# Patient Record
Sex: FEMALE | Race: BLACK OR AFRICAN AMERICAN | Employment: OTHER | URBAN - METROPOLITAN AREA
[De-identification: names, ages, dates, MRNs, and addresses within clinical notes are randomized per-mention and may not be internally consistent; named-entity substitution may affect disease eponyms.]

---

## 2018-05-07 ENCOUNTER — HOSPITAL ENCOUNTER (OUTPATIENT)
Age: 63
Discharge: HOME | End: 2018-05-09
Payer: COMMERCIAL

## 2018-05-07 DIAGNOSIS — E11.9 TYPE 2 DIABETES MELLITUS WITHOUT COMPLICATION, WITHOUT LONG-TERM CURRENT USE OF INSULIN (HCC): ICD-10-CM

## 2018-05-07 LAB
ALBUMIN SERPL-MCNC: 3.8 G/DL (ref 3.5–5.2)
ALP BLD-CCNC: 64 U/L (ref 35–104)
ALT SERPL-CCNC: 11 U/L (ref 0–32)
ANION GAP SERPL CALCULATED.3IONS-SCNC: 19 MMOL/L (ref 7–16)
AST SERPL-CCNC: 12 U/L (ref 0–31)
BILIRUB SERPL-MCNC: 0.3 MG/DL (ref 0–1.2)
BUN BLDV-MCNC: 24 MG/DL (ref 8–23)
CALCIUM SERPL-MCNC: 9.3 MG/DL (ref 8.6–10.2)
CHLORIDE BLD-SCNC: 98 MMOL/L (ref 98–107)
CO2: 25 MMOL/L (ref 22–29)
CREAT SERPL-MCNC: 1.1 MG/DL (ref 0.5–1)
GFR AFRICAN AMERICAN: >60
GFR NON-AFRICAN AMERICAN: >60 ML/MIN/1.73
GLUCOSE BLD-MCNC: 133 MG/DL (ref 74–109)
HBA1C MFR BLD: 7.6 % (ref 4.8–5.9)
POTASSIUM SERPL-SCNC: 4.9 MMOL/L (ref 3.5–5)
SODIUM BLD-SCNC: 142 MMOL/L (ref 132–146)
TOTAL PROTEIN: 6.9 G/DL (ref 6.4–8.3)

## 2018-05-07 PROCEDURE — 80053 COMPREHEN METABOLIC PANEL: CPT

## 2018-05-07 PROCEDURE — 83036 HEMOGLOBIN GLYCOSYLATED A1C: CPT

## 2018-06-27 ENCOUNTER — HOSPITAL ENCOUNTER (OUTPATIENT)
Age: 63
Discharge: HOME | End: 2018-06-27
Payer: COMMERCIAL

## 2018-06-27 DIAGNOSIS — F31.89 SEVERE BIPOLAR AFFECTIVE DISORDER WITH PSYCHOSIS (HCC): Chronic | ICD-10-CM

## 2018-06-27 LAB — VALPROIC ACID LEVEL: 64 MCG/ML (ref 50–100)

## 2018-06-27 PROCEDURE — 80164 ASSAY DIPROPYLACETIC ACD TOT: CPT

## 2018-06-27 PROCEDURE — 36415 COLL VENOUS BLD VENIPUNCTURE: CPT

## 2018-07-06 ENCOUNTER — HOSPITAL ENCOUNTER (INPATIENT)
Age: 63
LOS: 18 days | Discharge: HOME | DRG: 753 | End: 2018-07-24
Attending: EMERGENCY MEDICINE | Admitting: PSYCHIATRY & NEUROLOGY
Payer: COMMERCIAL

## 2018-07-06 DIAGNOSIS — E16.2 HYPOGLYCEMIA: ICD-10-CM

## 2018-07-06 DIAGNOSIS — F30.9 MANIA (HCC): Primary | ICD-10-CM

## 2018-07-06 DIAGNOSIS — N30.00 ACUTE CYSTITIS WITHOUT HEMATURIA: ICD-10-CM

## 2018-07-06 PROBLEM — F23 ACUTE PSYCHOSIS (HCC): Status: ACTIVE | Noted: 2018-07-06

## 2018-07-06 LAB
ACETAMINOPHEN LEVEL: <5 MCG/ML (ref 10–30)
ALBUMIN SERPL-MCNC: 4 G/DL (ref 3.5–5.2)
ALP BLD-CCNC: 63 U/L (ref 35–104)
ALT SERPL-CCNC: 13 U/L (ref 0–32)
AMPHETAMINE SCREEN, URINE: NOT DETECTED
ANION GAP SERPL CALCULATED.3IONS-SCNC: 15 MMOL/L (ref 7–16)
AST SERPL-CCNC: 18 U/L (ref 0–31)
BACTERIA: ABNORMAL /HPF
BARBITURATE SCREEN URINE: NOT DETECTED
BASOPHILS ABSOLUTE: 0.03 E9/L (ref 0–0.2)
BASOPHILS RELATIVE PERCENT: 0.5 % (ref 0–2)
BENZODIAZEPINE SCREEN, URINE: NOT DETECTED
BILIRUB SERPL-MCNC: 0.4 MG/DL (ref 0–1.2)
BILIRUBIN URINE: NEGATIVE
BLOOD, URINE: NEGATIVE
BUN BLDV-MCNC: 28 MG/DL (ref 8–23)
CALCIUM SERPL-MCNC: 9.6 MG/DL (ref 8.6–10.2)
CANNABINOID SCREEN URINE: NOT DETECTED
CHLORIDE BLD-SCNC: 97 MMOL/L (ref 98–107)
CLARITY: CLEAR
CO2: 24 MMOL/L (ref 22–29)
COCAINE METABOLITE SCREEN URINE: NOT DETECTED
COLOR: YELLOW
CREAT SERPL-MCNC: 1.2 MG/DL (ref 0.5–1)
EKG ATRIAL RATE: 114 BPM
EKG P AXIS: 68 DEGREES
EKG P-R INTERVAL: 146 MS
EKG Q-T INTERVAL: 340 MS
EKG QRS DURATION: 68 MS
EKG QTC CALCULATION (BAZETT): 468 MS
EKG R AXIS: 19 DEGREES
EKG T AXIS: 61 DEGREES
EKG VENTRICULAR RATE: 114 BPM
EOSINOPHILS ABSOLUTE: 0.05 E9/L (ref 0.05–0.5)
EOSINOPHILS RELATIVE PERCENT: 0.8 % (ref 0–6)
EPITHELIAL CELLS, UA: ABNORMAL /HPF
ETHANOL: <10 MG/DL (ref 0–0.08)
GFR AFRICAN AMERICAN: 55
GFR NON-AFRICAN AMERICAN: 55 ML/MIN/1.73
GLUCOSE BLD-MCNC: 193 MG/DL (ref 74–109)
GLUCOSE URINE: >=1000 MG/DL
HCT VFR BLD CALC: 40 % (ref 34–48)
HEMOGLOBIN: 13.1 G/DL (ref 11.5–15.5)
IMMATURE GRANULOCYTES #: 0.03 E9/L
IMMATURE GRANULOCYTES %: 0.5 % (ref 0–5)
KETONES, URINE: 15 MG/DL
LEUKOCYTE ESTERASE, URINE: ABNORMAL
LYMPHOCYTES ABSOLUTE: 2.16 E9/L (ref 1.5–4)
LYMPHOCYTES RELATIVE PERCENT: 35.1 % (ref 20–42)
MAGNESIUM: 2.3 MG/DL (ref 1.6–2.6)
MCH RBC QN AUTO: 29.1 PG (ref 26–35)
MCHC RBC AUTO-ENTMCNC: 32.8 % (ref 32–34.5)
MCV RBC AUTO: 88.9 FL (ref 80–99.9)
METER GLUCOSE: 161 MG/DL (ref 70–110)
METER GLUCOSE: 258 MG/DL (ref 70–110)
METHADONE SCREEN, URINE: NOT DETECTED
MONOCYTES ABSOLUTE: 0.63 E9/L (ref 0.1–0.95)
MONOCYTES RELATIVE PERCENT: 10.2 % (ref 2–12)
NEUTROPHILS ABSOLUTE: 3.26 E9/L (ref 1.8–7.3)
NEUTROPHILS RELATIVE PERCENT: 52.9 % (ref 43–80)
NITRITE, URINE: POSITIVE
OPIATE SCREEN URINE: NOT DETECTED
PDW BLD-RTO: 13.5 FL (ref 11.5–15)
PH UA: 5.5 (ref 5–9)
PHENCYCLIDINE SCREEN URINE: NOT DETECTED
PLATELET # BLD: 217 E9/L (ref 130–450)
PMV BLD AUTO: 9.7 FL (ref 7–12)
POTASSIUM SERPL-SCNC: 3.9 MMOL/L (ref 3.5–5)
PROPOXYPHENE SCREEN: NOT DETECTED
PROTEIN UA: NEGATIVE MG/DL
RBC # BLD: 4.5 E12/L (ref 3.5–5.5)
RBC UA: ABNORMAL /HPF (ref 0–2)
SALICYLATE, SERUM: <0.3 MG/DL (ref 0–30)
SODIUM BLD-SCNC: 136 MMOL/L (ref 132–146)
SPECIFIC GRAVITY UA: >=1.03 (ref 1–1.03)
T4 TOTAL: 5.6 MCG/DL (ref 4.5–11.7)
TOTAL PROTEIN: 7.5 G/DL (ref 6.4–8.3)
TRICYCLIC ANTIDEPRESSANTS SCREEN SERUM: NEGATIVE NG/ML
TROPONIN: <0.01 NG/ML (ref 0–0.03)
TSH SERPL DL<=0.05 MIU/L-ACNC: 2.7 UIU/ML (ref 0.27–4.2)
UROBILINOGEN, URINE: 0.2 E.U./DL
WBC # BLD: 6.2 E9/L (ref 4.5–11.5)
WBC UA: ABNORMAL /HPF (ref 0–5)

## 2018-07-06 PROCEDURE — 83735 ASSAY OF MAGNESIUM: CPT

## 2018-07-06 PROCEDURE — 2580000003 HC RX 258: Performed by: PHYSICIAN ASSISTANT

## 2018-07-06 PROCEDURE — 82962 GLUCOSE BLOOD TEST: CPT

## 2018-07-06 PROCEDURE — 6370000000 HC RX 637 (ALT 250 FOR IP): Performed by: INTERNAL MEDICINE

## 2018-07-06 PROCEDURE — 1240000000 HC EMOTIONAL WELLNESS R&B

## 2018-07-06 PROCEDURE — 96365 THER/PROPH/DIAG IV INF INIT: CPT

## 2018-07-06 PROCEDURE — 80307 DRUG TEST PRSMV CHEM ANLYZR: CPT

## 2018-07-06 PROCEDURE — 36415 COLL VENOUS BLD VENIPUNCTURE: CPT

## 2018-07-06 PROCEDURE — 99285 EMERGENCY DEPT VISIT HI MDM: CPT

## 2018-07-06 PROCEDURE — 80053 COMPREHEN METABOLIC PANEL: CPT

## 2018-07-06 PROCEDURE — 6360000002 HC RX W HCPCS: Performed by: PHYSICIAN ASSISTANT

## 2018-07-06 PROCEDURE — 84436 ASSAY OF TOTAL THYROXINE: CPT

## 2018-07-06 PROCEDURE — G0480 DRUG TEST DEF 1-7 CLASSES: HCPCS

## 2018-07-06 PROCEDURE — 85025 COMPLETE CBC W/AUTO DIFF WBC: CPT

## 2018-07-06 PROCEDURE — 84443 ASSAY THYROID STIM HORMONE: CPT

## 2018-07-06 PROCEDURE — 84484 ASSAY OF TROPONIN QUANT: CPT

## 2018-07-06 PROCEDURE — 6370000000 HC RX 637 (ALT 250 FOR IP): Performed by: PSYCHIATRY & NEUROLOGY

## 2018-07-06 PROCEDURE — 96375 TX/PRO/DX INJ NEW DRUG ADDON: CPT

## 2018-07-06 PROCEDURE — 81001 URINALYSIS AUTO W/SCOPE: CPT

## 2018-07-06 RX ORDER — LATANOPROST 50 UG/ML
1 SOLUTION/ DROPS OPHTHALMIC NIGHTLY
Status: DISCONTINUED | OUTPATIENT
Start: 2018-07-06 | End: 2018-07-24 | Stop reason: HOSPADM

## 2018-07-06 RX ORDER — QUETIAPINE FUMARATE 300 MG/1
600 TABLET, FILM COATED ORAL NIGHTLY
Status: DISCONTINUED | OUTPATIENT
Start: 2018-07-06 | End: 2018-07-06 | Stop reason: SDUPTHER

## 2018-07-06 RX ORDER — OLANZAPINE 10 MG/1
10 TABLET ORAL
Status: ACTIVE | OUTPATIENT
Start: 2018-07-06 | End: 2018-07-06

## 2018-07-06 RX ORDER — MAGNESIUM HYDROXIDE/ALUMINUM HYDROXICE/SIMETHICONE 120; 1200; 1200 MG/30ML; MG/30ML; MG/30ML
30 SUSPENSION ORAL PRN
Status: DISCONTINUED | OUTPATIENT
Start: 2018-07-06 | End: 2018-07-24 | Stop reason: HOSPADM

## 2018-07-06 RX ORDER — LOSARTAN POTASSIUM 50 MG/1
50 TABLET ORAL DAILY
Status: DISCONTINUED | OUTPATIENT
Start: 2018-07-07 | End: 2018-07-24 | Stop reason: HOSPADM

## 2018-07-06 RX ORDER — LORAZEPAM 1 MG/1
1 TABLET ORAL DAILY PRN
Status: DISCONTINUED | OUTPATIENT
Start: 2018-07-06 | End: 2018-07-24 | Stop reason: HOSPADM

## 2018-07-06 RX ORDER — ALOGLIPTIN 25 MG/1
25 TABLET, FILM COATED ORAL DAILY
Status: DISCONTINUED | OUTPATIENT
Start: 2018-07-07 | End: 2018-07-06 | Stop reason: CLARIF

## 2018-07-06 RX ORDER — NICOTINE POLACRILEX 4 MG
15 LOZENGE BUCCAL PRN
Status: DISCONTINUED | OUTPATIENT
Start: 2018-07-06 | End: 2018-07-24 | Stop reason: HOSPADM

## 2018-07-06 RX ORDER — HALOPERIDOL 5 MG/ML
10 INJECTION INTRAMUSCULAR EVERY 6 HOURS PRN
Status: DISCONTINUED | OUTPATIENT
Start: 2018-07-06 | End: 2018-07-24 | Stop reason: HOSPADM

## 2018-07-06 RX ORDER — DEXTROSE MONOHYDRATE 25 G/50ML
12.5 INJECTION, SOLUTION INTRAVENOUS PRN
Status: DISCONTINUED | OUTPATIENT
Start: 2018-07-06 | End: 2018-07-24 | Stop reason: HOSPADM

## 2018-07-06 RX ORDER — HYDROCHLOROTHIAZIDE 12.5 MG/1
12.5 TABLET ORAL DAILY
Status: DISCONTINUED | OUTPATIENT
Start: 2018-07-07 | End: 2018-07-24 | Stop reason: HOSPADM

## 2018-07-06 RX ORDER — GLIPIZIDE 5 MG/1
5 TABLET ORAL 2 TIMES DAILY
Status: DISCONTINUED | OUTPATIENT
Start: 2018-07-06 | End: 2018-07-10

## 2018-07-06 RX ORDER — DIPHENHYDRAMINE HYDROCHLORIDE 50 MG/ML
25 INJECTION INTRAMUSCULAR; INTRAVENOUS ONCE
Status: COMPLETED | OUTPATIENT
Start: 2018-07-06 | End: 2018-07-06

## 2018-07-06 RX ORDER — LOSARTAN POTASSIUM AND HYDROCHLOROTHIAZIDE 12.5; 5 MG/1; MG/1
1 TABLET ORAL DAILY
Status: DISCONTINUED | OUTPATIENT
Start: 2018-07-07 | End: 2018-07-06 | Stop reason: CLARIF

## 2018-07-06 RX ORDER — ERGOCALCIFEROL 1.25 MG/1
50000 CAPSULE ORAL WEEKLY
Status: DISCONTINUED | OUTPATIENT
Start: 2018-07-13 | End: 2018-07-24 | Stop reason: HOSPADM

## 2018-07-06 RX ORDER — QUETIAPINE FUMARATE 300 MG/1
600 TABLET, FILM COATED ORAL NIGHTLY
Status: DISCONTINUED | OUTPATIENT
Start: 2018-07-06 | End: 2018-07-24 | Stop reason: HOSPADM

## 2018-07-06 RX ORDER — DIVALPROEX SODIUM 500 MG/1
500 TABLET, EXTENDED RELEASE ORAL 2 TIMES DAILY
Status: DISCONTINUED | OUTPATIENT
Start: 2018-07-06 | End: 2018-07-09

## 2018-07-06 RX ORDER — BENZTROPINE MESYLATE 1 MG/ML
2 INJECTION INTRAMUSCULAR; INTRAVENOUS 2 TIMES DAILY PRN
Status: DISCONTINUED | OUTPATIENT
Start: 2018-07-06 | End: 2018-07-24 | Stop reason: HOSPADM

## 2018-07-06 RX ORDER — QUETIAPINE FUMARATE 300 MG/1
600 TABLET, FILM COATED ORAL NIGHTLY
Status: ON HOLD | COMMUNITY
End: 2018-07-24 | Stop reason: HOSPADM

## 2018-07-06 RX ORDER — DEXTROSE MONOHYDRATE 50 MG/ML
100 INJECTION, SOLUTION INTRAVENOUS PRN
Status: DISCONTINUED | OUTPATIENT
Start: 2018-07-06 | End: 2018-07-24 | Stop reason: HOSPADM

## 2018-07-06 RX ORDER — 0.9 % SODIUM CHLORIDE 0.9 %
1000 INTRAVENOUS SOLUTION INTRAVENOUS ONCE
Status: COMPLETED | OUTPATIENT
Start: 2018-07-06 | End: 2018-07-06

## 2018-07-06 RX ORDER — METOPROLOL TARTRATE 50 MG/1
50 TABLET, FILM COATED ORAL 2 TIMES DAILY
COMMUNITY
End: 2018-08-04

## 2018-07-06 RX ORDER — LORAZEPAM 1 MG/1
0.5 TABLET ORAL DAILY PRN
COMMUNITY
End: 2018-08-17

## 2018-07-06 RX ORDER — ACETAMINOPHEN 325 MG/1
650 TABLET ORAL EVERY 4 HOURS PRN
Status: DISCONTINUED | OUTPATIENT
Start: 2018-07-06 | End: 2018-07-24 | Stop reason: HOSPADM

## 2018-07-06 RX ORDER — TIMOLOL MALEATE 5 MG/ML
1 SOLUTION/ DROPS OPHTHALMIC 2 TIMES DAILY
Status: DISCONTINUED | OUTPATIENT
Start: 2018-07-06 | End: 2018-07-24 | Stop reason: HOSPADM

## 2018-07-06 RX ORDER — BENZTROPINE MESYLATE 1 MG/1
1 TABLET ORAL
Status: DISCONTINUED | OUTPATIENT
Start: 2018-07-07 | End: 2018-07-24 | Stop reason: HOSPADM

## 2018-07-06 RX ORDER — HALOPERIDOL 5 MG/ML
5 INJECTION INTRAMUSCULAR ONCE
Status: DISCONTINUED | OUTPATIENT
Start: 2018-07-06 | End: 2018-07-24 | Stop reason: HOSPADM

## 2018-07-06 RX ORDER — ATORVASTATIN CALCIUM 40 MG/1
40 TABLET, FILM COATED ORAL DAILY
Status: DISCONTINUED | OUTPATIENT
Start: 2018-07-07 | End: 2018-07-24 | Stop reason: HOSPADM

## 2018-07-06 RX ORDER — LORAZEPAM 1 MG/1
1 TABLET ORAL DAILY PRN
Status: DISCONTINUED | OUTPATIENT
Start: 2018-07-06 | End: 2018-07-06 | Stop reason: SDUPTHER

## 2018-07-06 RX ORDER — BENZTROPINE MESYLATE 1 MG/1
1 TABLET ORAL DAILY
Status: DISCONTINUED | OUTPATIENT
Start: 2018-07-06 | End: 2018-07-06 | Stop reason: SDUPTHER

## 2018-07-06 RX ORDER — LORAZEPAM 2 MG/ML
1 INJECTION INTRAMUSCULAR ONCE
Status: COMPLETED | OUTPATIENT
Start: 2018-07-06 | End: 2018-07-06

## 2018-07-06 RX ORDER — HYDROXYZINE PAMOATE 50 MG/1
50 CAPSULE ORAL EVERY 6 HOURS PRN
Status: DISCONTINUED | OUTPATIENT
Start: 2018-07-06 | End: 2018-07-24 | Stop reason: HOSPADM

## 2018-07-06 RX ORDER — TRAZODONE HYDROCHLORIDE 50 MG/1
50 TABLET ORAL NIGHTLY PRN
Status: DISCONTINUED | OUTPATIENT
Start: 2018-07-06 | End: 2018-07-24 | Stop reason: HOSPADM

## 2018-07-06 RX ORDER — METOPROLOL TARTRATE 50 MG/1
50 TABLET, FILM COATED ORAL 2 TIMES DAILY
Status: DISCONTINUED | OUTPATIENT
Start: 2018-07-06 | End: 2018-07-24 | Stop reason: HOSPADM

## 2018-07-06 RX ADMIN — SODIUM CHLORIDE 1000 ML: 9 INJECTION, SOLUTION INTRAVENOUS at 12:07

## 2018-07-06 RX ADMIN — DIPHENHYDRAMINE HYDROCHLORIDE 25 MG: 50 INJECTION, SOLUTION INTRAMUSCULAR; INTRAVENOUS at 12:09

## 2018-07-06 RX ADMIN — GLIPIZIDE 5 MG: 5 TABLET ORAL at 22:10

## 2018-07-06 RX ADMIN — LORAZEPAM 1 MG: 2 INJECTION INTRAMUSCULAR; INTRAVENOUS at 12:08

## 2018-07-06 RX ADMIN — METOPROLOL TARTRATE 50 MG: 50 TABLET, FILM COATED ORAL at 22:10

## 2018-07-06 RX ADMIN — DIVALPROEX SODIUM 500 MG: 500 TABLET, EXTENDED RELEASE ORAL at 21:53

## 2018-07-06 RX ADMIN — HYDROXYZINE PAMOATE 50 MG: 50 CAPSULE ORAL at 22:10

## 2018-07-06 RX ADMIN — INSULIN LISPRO 3 UNITS: 100 INJECTION, SOLUTION INTRAVENOUS; SUBCUTANEOUS at 22:10

## 2018-07-06 RX ADMIN — SODIUM CHLORIDE 1000 ML: 9 INJECTION, SOLUTION INTRAVENOUS at 13:51

## 2018-07-06 RX ADMIN — CEFTRIAXONE SODIUM 1 G: 1 INJECTION, POWDER, FOR SOLUTION INTRAMUSCULAR; INTRAVENOUS at 12:54

## 2018-07-06 RX ADMIN — TIMOLOL MALEATE 1 DROP: 5 SOLUTION OPHTHALMIC at 22:10

## 2018-07-06 RX ADMIN — QUETIAPINE FUMARATE 600 MG: 300 TABLET ORAL at 22:10

## 2018-07-06 ASSESSMENT — SLEEP AND FATIGUE QUESTIONNAIRES
RESTFUL SLEEP: YES
DO YOU USE A SLEEP AID: COMMENT
AVERAGE NUMBER OF SLEEP HOURS: 6
DIFFICULTY ARISING: NO
SLEEP PATTERN: INSOMNIA
DIFFICULTY STAYING ASLEEP: NO
DIFFICULTY FALLING ASLEEP: NO
DO YOU HAVE DIFFICULTY SLEEPING: NO

## 2018-07-06 ASSESSMENT — LIFESTYLE VARIABLES: HISTORY_ALCOHOL_USE: NO

## 2018-07-06 ASSESSMENT — PAIN SCALES - GENERAL: PAINLEVEL_OUTOF10: 0

## 2018-07-06 NOTE — ED NOTES
THE PATIENT'S TWO PURSES HAVE BEEN TAKING HOME BY HER BROTHER.      Jackelyn Lainez RN  07/06/18 3494

## 2018-07-06 NOTE — PROGRESS NOTES
`[facility]  Admission Note    Patient pleasant and cooperative. Denies SI, HI, and hallucinations. Patient speech is pressured, flight of ideas, tangential. Patient affect is exaggerated, labile. Admission Type:   Admission Type: Involuntary    Reason for admission:  Reason for Admission: \"my mom said i needed to have my medications adjusted\"    PATIENT STRENGTHS:  Strengths: Communication    Patient Strengths and Limitations:  Limitations: Multiple barriers to leisure interests    Addictive Behavior:   Addictive Behavior  In the past 3 months, have you felt or has someone told you that you have a problem with:  : None  Do you have a history of Chemical Use?: No  Do you have a history of Alcohol Use?: No  Do you have a history of Street Drug Abuse?: No  Histroy of Prescripton Drug Abuse?: No    Medical Problems:   Past Medical History:   Diagnosis Date    Bell's palsy     Carpal tunnel syndrome 8/4/2016    Diabetes mellitus ([facility] [address]     Psychiatric problem     bipolar    Systemic primary arterial hypertension 8/4/2016       Status EXAM:  Status and Exam  Normal: No  Facial Expression: Exaggerated  Affect: Appropriate  Level of Consciousness: Alert  Mood:Normal: No  Mood: Anxious, Labile  Motor Activity:Normal: No  Motor Activity: Increased  Interview Behavior: Cooperative  Preception: [place] to Person, [place] to Time, [place] to Place, [place] to Situation  Attention:Normal: No  Attention: Unable to Concentrate  Thought Processes: Flt.of Ideas, Tangential  Thought Content:Normal: No  Thought Content: Preoccupations  Hallucinations: None  Delusions: No  Memory:Normal: No  Memory: Poor Remote  Insight and Judgment: No  Insight and Judgment: Poor Judgment, Poor Insight  Present Suicidal Ideation: No  Present Homicidal Ideation: No    Tobacco Screening:  Practical Counseling, on admission, garret X, if applicable and completed (first 3 are required if patient doesn't refuse):             ( )

## 2018-07-06 NOTE — ED PROVIDER NOTES
ED Attending  CC: Kathryn     Department of Emergency Medicine   ED  Provider Note  Admit Date/RoomTime: 7/6/2018 10:04 AM  ED Room: 24/24   Chief Complaint   Psychiatric Evaluation (family states pt is \"talking aggressive\" Pt denies si/hi)    History of Present Illness   Source of history provided by:  Patient and brother. History/Exam Limitations: none. Waleska Cisse is a 66 y.o. old female who has a past medical history of:   Past Medical History:   Diagnosis Date    Bell's palsy     Carpal tunnel syndrome 8/4/2016    Diabetes mellitus ([facility] [address]     Psychiatric problem     bipolar    Systemic primary arterial hypertension 8/4/2016      presents to the emergency department by private vehicle, for aggressive behavior towards her family members which began a few day(s) prior to arrival.  Her brother states that she lives with her mother who is [de-identified]years old and her mother cannot control her anymore. She has a prior history of bipolar of which the problem is long-standing. Her brother states that she also has been not taking her psych medications. Her reported drug use history: Current marijuana. She  has previously been in counseling. There has been no history of recent trauma. She denies suicidal ideation and denies homicidal ideation. She denies any fever, chills, night sweats, chest pain, shortness of breath, headaches, numbness/weakness of her extremities, nausea, vomiting, diarrhea, abdominal pain, dysuria, urinary frequency, urinary urgency, and constipation. Quality:      Hopelessness:   No.     Terror:   No.     Confusion:   No.     Hallucinations:   None. Able to care for self: No.  Able to control self: No.    ROS    Pertinent positives and negatives are stated within HPI, all other systems reviewed and are negative. Past Surgical History:  has a past surgical history that includes Echo Complete (6/22/2013). Social History:  reports that she has never smoked.  She has never used smokeless Negative <1000 ng/mL    Barbiturate Screen, Ur NOT DETECTED Negative < 200 ng/mL    Benzodiazepine Screen, Urine NOT DETECTED Negative < 200 ng/mL    Cannabinoid Scrn, Ur NOT DETECTED Negative < 50ng/mL    COCAINE METABOLITE SCREEN URINE NOT DETECTED Negative < 300 ng/mL    Opiate Scrn, Ur NOT DETECTED Negative < 300ng/mL    PCP Scrn, Ur NOT DETECTED Negative < 25 ng/mL    Methadone Screen, Urine NOT DETECTED Negative <300 ng/mL    Propoxyphene Scrn, Ur NOT DETECTED Negative <300 ng/mL   Serum Drug Screen   Result Value Ref Range    Ethanol Lvl <10 mg/dL    Acetaminophen Level <5.0 (L) 10.0 - 85.6 mcg/mL    Salicylate, Serum <5.9 0.0 - 30.0 mg/dL    TCA Scrn NEGATIVE Cutoff:300 ng/mL   TSH without Reflex   Result Value Ref Range    TSH 2.700 0.270 - 4.200 uIU/mL   T4   Result Value Ref Range    T4, Total 5.6 4.5 - 11.7 mcg/dL   Troponin   Result Value Ref Range    Troponin <0.01 0.00 - 0.03 ng/mL   Magnesium   Result Value Ref Range    Magnesium 2.3 1.6 - 2.6 mg/dL   Microscopic Urinalysis   Result Value Ref Range    WBC, UA 10-20 (A) 0 - 5 /HPF    RBC, UA NONE 0 - 2 /HPF    Epi Cells MODERATE /HPF    Bacteria, UA MANY (A) /HPF   EKG 12 Lead   Result Value Ref Range    Ventricular Rate 114 BPM    Atrial Rate 114 BPM    P-R Interval 146 ms    QRS Duration 68 ms    Q-T Interval 340 ms    QTc Calculation (Bazett) 468 ms    P Axis 68 degrees    R Axis 19 degrees    T Axis 61 degrees     Imaging: All Radiology results interpreted by Radiologist unless otherwise noted. No orders to display     EKG #1:  Interpreted by emergency department physician unless otherwise noted. Time:  1018    Rate: 114  Rhythm: Sinus. Interpretation: sinus tachycardia.     ED Course / Medical Decision Making     Medications   haloperidol lactate (HALDOL) injection 5 mg (0 mg Intramuscular Held 7/6/18 1211)   0.9 % sodium chloride bolus (1,000 mLs Intravenous New Bag 7/6/18 1351)   LORazepam (ATIVAN) injection 1 mg (1 mg Intramuscular Given

## 2018-07-06 NOTE — ED NOTES
Per CHANTEL UMER Summers, patient is accepted to [facility] by Dr. Chel Cason. Patient to go to room #7308B. Called and notified admitting.     N2N: EXT [id number] [place]  07/06/18 3124

## 2018-07-06 NOTE — ED NOTES
FIRST PROVIDER CONTACT ASSESSMENT NOTE      Department of Emergency Medicine   7/6/18  9:39 AM    Chief Complaint: Psychiatric Evaluation (family states pt is \"talking aggressive\" Pt denies si/hi)      History of Present Illness:   Verónica Giron is a 66 y.o. female who presents to the ED for rambling conversation, talking agressively to family members. Patients family states that the Patient is bipolar schizophrenic    Medical History:  has a past medical history of Bell's palsy; Carpal tunnel syndrome; Diabetes mellitus ([address] Psychiatric problem; and Systemic primary arterial hypertension. Surgical History:  has a past surgical history that includes Echo Complete (6/22/2013). Social History:  reports that she has never smoked. She has never used smokeless tobacco. She reports that she does not drink alcohol or use drugs. Family History: family history includes Heart Disease (age of onset: 76) in her father; High Blood Pressure in her father. *ALLERGIES*     Ace inhibitors;  Ibuprofen; and Latuda [lurasidone hcl]     Physical Exam:      VS:  BP (!) 177/100   Pulse 132   Temp 97.2 °F (36.2 °C) (Temporal)   Resp 16   Ht 5' 3\" (1.6 m)   Wt 182 lb (82.6 kg)   SpO2 100%   BMI 32.24 kg/m²      Initial Plan of Care:  Initiate Treatment-Testing, Proceed toTreatment Area When Bed Available for ED Attending/MLP to Continue Care    -----------------END OF FIRST PROVIDER CONTACT ASSESSMENT NOTE--------------  Electronically signed by CHRISS Osuna CNP   DD: 7/6/18           CHRISS Osuna CNP  07/11/18 1809

## 2018-07-07 PROBLEM — F31.2 SEVERE MANIC BIPOLAR I DISORDER WITH PSYCHOTIC FEATURES (HCC): Status: ACTIVE | Noted: 2018-07-07

## 2018-07-07 LAB
METER GLUCOSE: 117 MG/DL (ref 70–110)
METER GLUCOSE: 127 MG/DL (ref 70–110)
METER GLUCOSE: 156 MG/DL (ref 70–110)
METER GLUCOSE: 241 MG/DL (ref 70–110)

## 2018-07-07 PROCEDURE — 6370000000 HC RX 637 (ALT 250 FOR IP): Performed by: INTERNAL MEDICINE

## 2018-07-07 PROCEDURE — 2580000003 HC RX 258

## 2018-07-07 PROCEDURE — 82962 GLUCOSE BLOOD TEST: CPT

## 2018-07-07 PROCEDURE — 99222 1ST HOSP IP/OBS MODERATE 55: CPT | Performed by: PSYCHIATRY & NEUROLOGY

## 2018-07-07 PROCEDURE — 1240000000 HC EMOTIONAL WELLNESS R&B

## 2018-07-07 PROCEDURE — 6360000002 HC RX W HCPCS: Performed by: PSYCHIATRY & NEUROLOGY

## 2018-07-07 PROCEDURE — 6370000000 HC RX 637 (ALT 250 FOR IP): Performed by: PSYCHIATRY & NEUROLOGY

## 2018-07-07 RX ORDER — ZIPRASIDONE MESYLATE 20 MG/ML
10 INJECTION, POWDER, LYOPHILIZED, FOR SOLUTION INTRAMUSCULAR ONCE
Status: COMPLETED | OUTPATIENT
Start: 2018-07-07 | End: 2018-07-07

## 2018-07-07 RX ORDER — 0.9 % SODIUM CHLORIDE 0.9 %
VIAL (ML) INJECTION
Status: COMPLETED
Start: 2018-07-07 | End: 2018-07-07

## 2018-07-07 RX ORDER — LORAZEPAM 2 MG/ML
1 INJECTION INTRAMUSCULAR ONCE
Status: COMPLETED | OUTPATIENT
Start: 2018-07-07 | End: 2018-07-07

## 2018-07-07 RX ADMIN — ATORVASTATIN CALCIUM 40 MG: 40 TABLET, FILM COATED ORAL at 09:05

## 2018-07-07 RX ADMIN — METFORMIN HYDROCHLORIDE 1000 MG: 1000 TABLET ORAL at 09:05

## 2018-07-07 RX ADMIN — TIMOLOL MALEATE 1 DROP: 5 SOLUTION OPHTHALMIC at 09:06

## 2018-07-07 RX ADMIN — INSULIN LISPRO 1 UNITS: 100 INJECTION, SOLUTION INTRAVENOUS; SUBCUTANEOUS at 20:38

## 2018-07-07 RX ADMIN — METOPROLOL TARTRATE 50 MG: 50 TABLET, FILM COATED ORAL at 09:05

## 2018-07-07 RX ADMIN — DIVALPROEX SODIUM 500 MG: 500 TABLET, EXTENDED RELEASE ORAL at 09:05

## 2018-07-07 RX ADMIN — LORAZEPAM 1 MG: 2 INJECTION INTRAMUSCULAR; INTRAVENOUS at 11:33

## 2018-07-07 RX ADMIN — LINAGLIPTIN 5 MG: 5 TABLET, FILM COATED ORAL at 09:06

## 2018-07-07 RX ADMIN — INSULIN LISPRO 4 UNITS: 100 INJECTION, SOLUTION INTRAVENOUS; SUBCUTANEOUS at 18:03

## 2018-07-07 RX ADMIN — GLIPIZIDE 5 MG: 5 TABLET ORAL at 09:05

## 2018-07-07 RX ADMIN — GLIPIZIDE 5 MG: 5 TABLET ORAL at 20:31

## 2018-07-07 RX ADMIN — HYDROXYZINE PAMOATE 50 MG: 50 CAPSULE ORAL at 20:30

## 2018-07-07 RX ADMIN — METFORMIN HYDROCHLORIDE 1000 MG: 1000 TABLET ORAL at 17:24

## 2018-07-07 RX ADMIN — METOPROLOL TARTRATE 50 MG: 50 TABLET, FILM COATED ORAL at 20:32

## 2018-07-07 RX ADMIN — SODIUM CHLORIDE 10 ML: 9 INJECTION INTRAMUSCULAR; INTRAVENOUS; SUBCUTANEOUS at 11:34

## 2018-07-07 RX ADMIN — TIMOLOL MALEATE 1 DROP: 5 SOLUTION OPHTHALMIC at 20:41

## 2018-07-07 RX ADMIN — LORAZEPAM 1 MG: 1 TABLET ORAL at 17:24

## 2018-07-07 RX ADMIN — QUETIAPINE FUMARATE 600 MG: 300 TABLET ORAL at 20:31

## 2018-07-07 RX ADMIN — LOSARTAN POTASSIUM 50 MG: 50 TABLET, FILM COATED ORAL at 09:06

## 2018-07-07 RX ADMIN — DIVALPROEX SODIUM 500 MG: 500 TABLET, EXTENDED RELEASE ORAL at 20:32

## 2018-07-07 RX ADMIN — ZIPRASIDONE MESYLATE 10 MG: 20 INJECTION, POWDER, LYOPHILIZED, FOR SOLUTION INTRAMUSCULAR at 11:33

## 2018-07-07 RX ADMIN — HYDROCHLOROTHIAZIDE 12.5 MG: 12.5 TABLET ORAL at 09:05

## 2018-07-07 RX ADMIN — BENZTROPINE MESYLATE 1 MG: 1 TABLET ORAL at 11:34

## 2018-07-07 ASSESSMENT — SLEEP AND FATIGUE QUESTIONNAIRES
RESTFUL SLEEP: YES
DO YOU USE A SLEEP AID: NO
AVERAGE NUMBER OF SLEEP HOURS: 7
DIFFICULTY ARISING: NO
SLEEP PATTERN: INSOMNIA
DIFFICULTY FALLING ASLEEP: NO
DIFFICULTY STAYING ASLEEP: NO
DO YOU HAVE DIFFICULTY SLEEPING: NO

## 2018-07-07 ASSESSMENT — LIFESTYLE VARIABLES: HISTORY_ALCOHOL_USE: NO

## 2018-07-07 NOTE — H&P
PSYCHIATRIC EVALUATION  (HISTORY & PHYSICAL)     CHIEF COMPLAINT:   [] Mood Problems [] Anxiety Problems [x] Psychosis                    [] Suicidal/Homicidal   [] Aggression  [] Other    HISTORY OF PRESENT ILLNESS: Jena Ross  is a 66 y.o. female who has a previous psychiatric history of bipolar disorder. Patient presents for admission with racing thoughts, fast, pressured speech, disorganized thought processes and labile affect. Patient denies SI, HI, or AVH. Patient is manic, but pleasant at this time. Per ED report, patient became aggressive with family members, and she currently lives with her [de-identified] y.o. mother who cannot manage patient anymore. Patient reports treating at [facility], and states that they were adjusting her medications. Patient states she has been taking her medications, but ED report states that her brother reported she has not been compliant with her medications. Symptoms began 4 days ago, are constant, severe, and worsened by potential medication non-compliance.       Precipitating Factors:     [] Family Stress   [] Recent loss/grief Stress   [] Health Stress   [] Relationship Stress    [] Legal Stress   [x] Environmental Stress    [] Occupational Stress   [] Financial Stress   [] Substance Abuse [x] Other: Medication Non-Compliance      PAST PSYCHIATRIC HISTORY:   History of psychiatric Hospitalization:    [] Denies    [x] yes  [] Days ago     []  Weeks Ago    [] Months ago  [x] Years ago-05/17              [x] [facility]  [] [address] [place] [address]  [] Other:        [] Once  [] More than once    Outpatient treatment:  [x] [facility]  [] [facility]  [] [facility]              [] [facility]  [] [facility]      [] [address] [facility] [] [facility]      [] [facility] [] [facility]  [] [facility] [] [facility]             [x] currently  [] in the past  [] Non-Compliant    [] Denies    Previous suicide attempt: [x]Denies            [] yes  [] OD  [] Cutting  [] Hanging  [] Gun  [] Other    Previous psych medications:  [] negative. Constitutional Symptoms: []  fever []  Chills  Skin Symptoms: [] rash []  Pruritus   Eye Symptoms: [] Vision unchanged []  recent vision problems[] blurred vision   Respiratory Symptoms:[] shortness of breath [] cough  Cardiovascular Symptoms:  [] chest pain   [] palpitations   Gastrointestinal Symptoms: []  abdominal pain []  nausea []  vomiting []  diarrhea  Genitourinary Symptoms: []  dysuria  []  hematuria   Musculoskeletal Symptoms: []  back pain []  muscle pain []  joint pain  Neurologic Symptoms: []  headache []  dizziness  Hematolymphoid Symptoms: [] Adenopathy [] Bruises   [] Schimosis       VITALS: /74   Pulse 88   Temp 97.8 °F (36.6 °C) (Oral)   Resp 16   Ht 5' 3\" (1.6 m)   Wt 180 lb 6.4 oz (81.8 kg)   SpO2 99%   BMI 31.96 kg/m²     ALLERGIES: Ace inhibitors;  Ibuprofen; and Latuda [lurasidone hcl]            Physical Examination:    Head:  [x] Atraumatic:  [x] normocephalic  Skin and Mucosa       [] Moist [] Dry [] Pale [x] Normal   Neck: [x] Thyroid [] Palpable    [x] Not palpable []  venus distention [] adenopathy   Chest: [x] Clear [] Rhonchi  [] Wheezing   CV: [x] S1 [x] S2 [x] No murmer   Abdomen:  [x] Soft   [] Tender  [] Viceromegaly   Extremities:  [x] No Edema   [] Edema    Cranial Nerves Examination:    CN II: [x] Pupils are reactive to light [] Pupils are non reactive to light  CN III, IV, VI:[x] No eye deviation  [x] No diplopia or ptosis   CN V: [x] Facial Sensation is intact  [] Facial Sensation is not intact   CN IIIV:  [x] Hearing is normal to rubbing fingers   CN IX, X:  [x] Normal gag reflex and phonation   CN XI: [x] Shoulder shrug and neck rotation is normal  CNXII: [x] Tongue is midline no deviation or atrophy       For further PE refer to ED note    MENTAL STATUS EXAM:       Mental Status Examination:    Cognition:      [x] Alert  [x] Awake  [x] Oriented  [x] Person  [x] Place [] Time      [] drowsy  [] tired  [] lethargic  [x] distractable  []

## 2018-07-07 NOTE — PROGRESS NOTES
Group Therapy Note    Patient attended community meeting. Patient identified daily goal as make myself happy. Group Therapy Note     Date: 7/7/2018  Start Time: 10:00  End Time:  10:45  Number of Participants: 7     Type of Group: Psychoeducation     Wellness Binder Information  Module Name:  Daily Maintenance  Session Number: NA     Patient's Goal: To identify positive coping skills to use on a daily basis.     Notes: Attended group and was able to identify positive coping skills. Status After Intervention:  Unchanged    Participation Level:  Active Listener, Interactive and Monopolizing    Participation Quality: Attentive, Inappropriate and Intrusive      Speech:  pressured      Thought Process/Content: Flight of ideas      Affective Functioning: Flat      Mood: anxious, depressed and elevated      Level of consciousness:  Alert      Response to Learning: Progressing to goal      Endings: None Reported    Modes of Intervention: Education      Discipline Responsible: Psychoeducational Specialist      Signature:  RICCI Holley

## 2018-07-07 NOTE — PROGRESS NOTES
Patient was asked to turn over her rings, and multiple necklaces to lock up in the locker. Patient became very irritable, yelling on the unit, swearing at this nurse and name calling. Writing on paper, and throwing it at this nurse. Another nurse went into patient room to try get the patient to give her the jewelry. [employer] police called to retrieve necklaces, and rings from patient. Patient belongings secured.

## 2018-07-07 NOTE — PROGRESS NOTES
Unable to maintain control. Yelling about different things and when asked to keep voice down , yells louder. Paranoid about taking medications and asked several questions repeatedly about meds before taking and stated \"if she got sick, she was oming after this writer\" explained they would make her better. Told another nurse earlier that she was turning her lights out on purpose. Vistaril 50 mg po given with her other hs meds and antihypertensives for now.

## 2018-07-08 LAB
METER GLUCOSE: 104 MG/DL (ref 70–110)
METER GLUCOSE: 123 MG/DL (ref 70–110)
METER GLUCOSE: 144 MG/DL (ref 70–110)
METER GLUCOSE: 86 MG/DL (ref 70–110)

## 2018-07-08 PROCEDURE — 1240000000 HC EMOTIONAL WELLNESS R&B

## 2018-07-08 PROCEDURE — 6370000000 HC RX 637 (ALT 250 FOR IP): Performed by: PSYCHIATRY & NEUROLOGY

## 2018-07-08 PROCEDURE — 99231 SBSQ HOSP IP/OBS SF/LOW 25: CPT | Performed by: PSYCHIATRY & NEUROLOGY

## 2018-07-08 PROCEDURE — 82962 GLUCOSE BLOOD TEST: CPT

## 2018-07-08 PROCEDURE — 6370000000 HC RX 637 (ALT 250 FOR IP): Performed by: INTERNAL MEDICINE

## 2018-07-08 RX ADMIN — DIVALPROEX SODIUM 500 MG: 500 TABLET, EXTENDED RELEASE ORAL at 08:43

## 2018-07-08 RX ADMIN — METFORMIN HYDROCHLORIDE 1000 MG: 1000 TABLET ORAL at 18:30

## 2018-07-08 RX ADMIN — QUETIAPINE FUMARATE 600 MG: 300 TABLET ORAL at 21:17

## 2018-07-08 RX ADMIN — CANAGLIFLOZIN 300 MG: 300 TABLET, FILM COATED ORAL at 08:43

## 2018-07-08 RX ADMIN — HYDROXYZINE PAMOATE 50 MG: 50 CAPSULE ORAL at 17:27

## 2018-07-08 RX ADMIN — LATANOPROST 1 DROP: 50 SOLUTION OPHTHALMIC at 17:26

## 2018-07-08 RX ADMIN — DIVALPROEX SODIUM 500 MG: 500 TABLET, EXTENDED RELEASE ORAL at 21:17

## 2018-07-08 RX ADMIN — LORAZEPAM 1 MG: 1 TABLET ORAL at 13:10

## 2018-07-08 RX ADMIN — ATORVASTATIN CALCIUM 40 MG: 40 TABLET, FILM COATED ORAL at 08:43

## 2018-07-08 RX ADMIN — TIMOLOL MALEATE 1 DROP: 5 SOLUTION OPHTHALMIC at 08:43

## 2018-07-08 RX ADMIN — METOPROLOL TARTRATE 50 MG: 50 TABLET, FILM COATED ORAL at 21:17

## 2018-07-08 RX ADMIN — BENZTROPINE MESYLATE 1 MG: 1 TABLET ORAL at 12:59

## 2018-07-08 RX ADMIN — TIMOLOL MALEATE 1 DROP: 5 SOLUTION OPHTHALMIC at 21:17

## 2018-07-08 RX ADMIN — HYDROCHLOROTHIAZIDE 12.5 MG: 12.5 TABLET ORAL at 08:43

## 2018-07-08 RX ADMIN — GLIPIZIDE 5 MG: 5 TABLET ORAL at 08:43

## 2018-07-08 RX ADMIN — GLIPIZIDE 5 MG: 5 TABLET ORAL at 18:33

## 2018-07-08 RX ADMIN — INSULIN LISPRO 1 UNITS: 100 INJECTION, SOLUTION INTRAVENOUS; SUBCUTANEOUS at 21:28

## 2018-07-08 RX ADMIN — METOPROLOL TARTRATE 50 MG: 50 TABLET, FILM COATED ORAL at 08:43

## 2018-07-08 RX ADMIN — METFORMIN HYDROCHLORIDE 1000 MG: 1000 TABLET ORAL at 08:43

## 2018-07-08 RX ADMIN — LINAGLIPTIN 5 MG: 5 TABLET, FILM COATED ORAL at 08:43

## 2018-07-08 RX ADMIN — LOSARTAN POTASSIUM 50 MG: 50 TABLET, FILM COATED ORAL at 08:43

## 2018-07-08 NOTE — PROGRESS NOTES
Patient came to the nurses station and started yelling at this nurse. Patient was calling this nurse names, and verbally threatening. Patient was told to go have a seat or go to her room. Patient continued to yell at the nurses station. Patient eventually went to the TV area and sat down.

## 2018-07-08 NOTE — PROGRESS NOTES
systems  Delusions:  [] Denies [] Endorses   Withdrawals:  [] Denies [] Endorses    Hallucinations: [] Denies [] Endorses    Extra Pyramidal Symptoms: [] Denies [] Endorses      /84   Pulse 98   Temp 97.6 °F (36.4 °C) (Oral)   Resp 12   Ht 5' 3\" (1.6 m)   Wt 180 lb 6.4 oz (81.8 kg)   SpO2 100%   BMI 31.96 kg/m²     Mental Status Examination:    Cognition:      [x] Alert  [x] Awake  [x] Oriented  [x] Person  [x] Place [x] Time      [] drowsy  [] tired  [] lethargic  [] distractable  [] Other     Attention/Concentration:   [] Attentive  [x] Distracted        Memory Recent and Remote: [] Intact   [] Impaired [x] Partially Impaired     Language: [] Able to recognize and name objects          [] Unable to recognize and name Objects    Fund of Knowledge:  [] Poor []  Fair  [] Good    Speech: [] Normal  [] Soft  [] Slow  [x] Fast [x] Pressured            [x] Loud [] Dysarthria  [] Incoherent    Appearance: [] Well Groomed  [] Casual Dressed  [] Unkept  [x] Disheveled          [] Normal weight[] Thin  [] Overweight  [] Obese           Attitude: [] Positive  [] Hostile  [] Demanding  [] Guarded  [] Defensive         [x] Cooperative  []  Uncooperative      Behavior:  [x] Normal Gait  [] Walks with Assistance  [] Vickie Chair    [] Walks with Tickf[place] Alexandr  [] In Hospital Bed  [] Sitting in Chair    Muscle-Skeletal:  [x] Normal Muscle Tone [] Muscle Atrophy       [] Abnormal Muscle Movement     Eye Contact:  [] Good eye contact  [x] Intermittent Eye Contact  [] Poor Eye Contact     Mood: [] Depressed  [x] Anxious  [] Irritated  [] Euthymic   [] Angry [x] Restless    Affect:  [] Congruent  [] Incongruent  [x] Labile  [] Constricted  [] Flat  [] Bizarre     Thought Process and Association:  [] Logical [] Illogical       [] Linear and Goal Directed  [x] Tangential  [x] Circumstantial     Thought Content:  [] Denies [] Endorses [] Suicidal [] Homicidal  [x] Delusional      [x] Paranoid  [] Somatic  [x] Grandiose    Perception:

## 2018-07-09 LAB
METER GLUCOSE: 104 MG/DL (ref 70–110)
METER GLUCOSE: 139 MG/DL (ref 70–110)
METER GLUCOSE: 158 MG/DL (ref 70–110)
METER GLUCOSE: 92 MG/DL (ref 70–110)

## 2018-07-09 PROCEDURE — 6370000000 HC RX 637 (ALT 250 FOR IP): Performed by: PSYCHIATRY & NEUROLOGY

## 2018-07-09 PROCEDURE — 1240000000 HC EMOTIONAL WELLNESS R&B

## 2018-07-09 PROCEDURE — 82962 GLUCOSE BLOOD TEST: CPT

## 2018-07-09 PROCEDURE — 6370000000 HC RX 637 (ALT 250 FOR IP): Performed by: INTERNAL MEDICINE

## 2018-07-09 PROCEDURE — 99232 SBSQ HOSP IP/OBS MODERATE 35: CPT | Performed by: PSYCHIATRY & NEUROLOGY

## 2018-07-09 RX ORDER — DIVALPROEX SODIUM 500 MG/1
1000 TABLET, EXTENDED RELEASE ORAL DAILY
Status: DISCONTINUED | OUTPATIENT
Start: 2018-07-10 | End: 2018-07-14

## 2018-07-09 RX ORDER — DIVALPROEX SODIUM 500 MG/1
500 TABLET, EXTENDED RELEASE ORAL ONCE
Status: COMPLETED | OUTPATIENT
Start: 2018-07-09 | End: 2018-07-09

## 2018-07-09 RX ORDER — CLONAZEPAM 0.5 MG/1
0.5 TABLET ORAL 2 TIMES DAILY
Status: DISCONTINUED | OUTPATIENT
Start: 2018-07-09 | End: 2018-07-23

## 2018-07-09 RX ORDER — DIVALPROEX SODIUM 500 MG/1
500 TABLET, EXTENDED RELEASE ORAL EVERY EVENING
Status: DISCONTINUED | OUTPATIENT
Start: 2018-07-09 | End: 2018-07-14

## 2018-07-09 RX ADMIN — INSULIN LISPRO 2 UNITS: 100 INJECTION, SOLUTION INTRAVENOUS; SUBCUTANEOUS at 17:31

## 2018-07-09 RX ADMIN — CLONAZEPAM 0.5 MG: 0.5 TABLET ORAL at 21:53

## 2018-07-09 RX ADMIN — TIMOLOL MALEATE 1 DROP: 5 SOLUTION OPHTHALMIC at 08:58

## 2018-07-09 RX ADMIN — METOPROLOL TARTRATE 50 MG: 50 TABLET, FILM COATED ORAL at 21:54

## 2018-07-09 RX ADMIN — GLIPIZIDE 5 MG: 5 TABLET ORAL at 21:53

## 2018-07-09 RX ADMIN — DIVALPROEX SODIUM 500 MG: 500 TABLET, EXTENDED RELEASE ORAL at 17:30

## 2018-07-09 RX ADMIN — TIMOLOL MALEATE 1 DROP: 5 SOLUTION OPHTHALMIC at 21:54

## 2018-07-09 RX ADMIN — METOPROLOL TARTRATE 50 MG: 50 TABLET, FILM COATED ORAL at 08:55

## 2018-07-09 RX ADMIN — CANAGLIFLOZIN 300 MG: 300 TABLET, FILM COATED ORAL at 06:55

## 2018-07-09 RX ADMIN — LOSARTAN POTASSIUM 50 MG: 50 TABLET, FILM COATED ORAL at 08:55

## 2018-07-09 RX ADMIN — BENZTROPINE MESYLATE 1 MG: 1 TABLET ORAL at 11:55

## 2018-07-09 RX ADMIN — HYDROCHLOROTHIAZIDE 12.5 MG: 12.5 TABLET ORAL at 08:55

## 2018-07-09 RX ADMIN — METFORMIN HYDROCHLORIDE 1000 MG: 1000 TABLET ORAL at 08:55

## 2018-07-09 RX ADMIN — CLONAZEPAM 0.5 MG: 0.5 TABLET ORAL at 11:55

## 2018-07-09 RX ADMIN — LATANOPROST 1 DROP: 50 SOLUTION OPHTHALMIC at 17:30

## 2018-07-09 RX ADMIN — QUETIAPINE FUMARATE 600 MG: 300 TABLET ORAL at 21:54

## 2018-07-09 RX ADMIN — METFORMIN HYDROCHLORIDE 1000 MG: 1000 TABLET ORAL at 17:29

## 2018-07-09 RX ADMIN — HYDROXYZINE PAMOATE 50 MG: 50 CAPSULE ORAL at 03:06

## 2018-07-09 RX ADMIN — DIVALPROEX SODIUM 500 MG: 500 TABLET, EXTENDED RELEASE ORAL at 11:55

## 2018-07-09 RX ADMIN — DIVALPROEX SODIUM 500 MG: 500 TABLET, EXTENDED RELEASE ORAL at 08:55

## 2018-07-09 RX ADMIN — GLIPIZIDE 5 MG: 5 TABLET ORAL at 08:55

## 2018-07-09 RX ADMIN — ATORVASTATIN CALCIUM 40 MG: 40 TABLET, FILM COATED ORAL at 08:55

## 2018-07-09 RX ADMIN — LINAGLIPTIN 5 MG: 5 TABLET, FILM COATED ORAL at 08:55

## 2018-07-09 ASSESSMENT — PAIN SCALES - GENERAL: PAINLEVEL_OUTOF10: 0

## 2018-07-09 NOTE — PROGRESS NOTES
Pt witnessed in the dining area yelling at other patients, making threatening comments towards them, and calling them inappropriate names. Staff walked pt to her room to calm down. Pt remained in room. Staff explained to pt that her behavior was inappropriate and that she needs to tell staff when there is a problem. Staff also explained that she could not threaten the other patients. No PRN medications given. Will continue to monitor.

## 2018-07-09 NOTE — PROGRESS NOTES
DATE OF SERVICE:     7/9/2018    Chau Castillo seen today for the purpose of continuation of care. Nursing, social work reports, laboratory studies and vital signs are reviewed. Patient chief complaint today is:             [] Depression      [] Anxiety        [x] Psychosis         [] Suicidal/Homicidal                         [] Delusions           [] Aggression          Subjective: Today patient states that \" I get around\"    Patient is severely manic and loud, she is grandiose. Sleep:  [] Good [] Fair  [x] Poor  Appetite:  [] Good [x] Fair  [] Poor    Depression:  [] Mild [] Moderate [] Severe                [] Constant [] Sporadic     Anxiety: [] Mild [] Moderate [x] Severe    [x] Constant [] Sporadic     Delusions: [] Mild [] Moderate [x] Severe     [] Constant [] Sporadic     [x] Paranoid [] Somatic [x] Grandiose     Hallucinations: [] Mild [] Moderate [] Severe     [] Constant [] Sporadic    [] Auditory  [] Visual [] Tactile       Suicidal: [] Constant [] Sporadic  Homicidal: [] Constant [] Sporadic    Unscheduled Medications     [] Patient Receiving Emergency Medications \" Chemical Restraint\"   [] Requesting PRN medications for anxiety    Medical Review of Systems:     All other than marked systmes have been reviewed and are all negative.     Constitutional Symptoms: []  fever []  Chills  Skin Symptoms: [] rash []  Pruritus   Eye Symptoms: [] Vision unchanged []  recent vision problems[] blurred vision   Respiratory Symptoms:[] shortness of breath [] cough  Cardiovascular Symptoms:  [] chest pain   [] palpitations   Gastrointestinal Symptoms: []  abdominal pain []  nausea []  vomiting []  diarrhea  Genitourinary Symptoms: []  dysuria  []  hematuria   Musculoskeletal Symptoms: []  back pain []  muscle pain []  joint pain  Neurologic Symptoms: []  headache []  dizziness  Hematolymphoid Symptoms: [] Adenopathy [] Bruises   [] Schimosis       Psychiatric Review of systems  Delusions:  [] Denies [] Endorses   Withdrawals:  [] Denies [] Endorses    Hallucinations: [] Denies [] Endorses    Extra Pyramidal Symptoms: [] Denies [] Endorses      BP (!) 165/90   Pulse 106   Temp 98.2 °F (36.8 °C) (Oral)   Resp 18   Ht 5' 3\" (1.6 m)   Wt 180 lb 6.4 oz (81.8 kg)   SpO2 99%   BMI 31.96 kg/m²     Mental Status Examination:    Cognition:      [x] Alert  [x] Awake  [x] Oriented  [x] Person  [x] Place [] Time      [] drowsy  [] tired  [] lethargic  [] distractable  [] Other     Attention/Concentration:   [] Attentive  [] Distracted        Memory Recent and Remote: [] Intact   [] Impaired [] Partially Impaired     Language: [] Able to recognize and name objects          [] Unable to recognize and name Objects    Fund of Knowledge:  [] Poor []  Fair  [] Good    Speech: [] Normal  [] Soft  [] Slow  [x] Fast [x] Pressured            [x] Loud [] Dysarthria  [] Incoherent    Appearance: [] Well Groomed  [] Casual Dressed  [] Unkept  [x] Disheveled          [] Normal weight[] Thin  [] Overweight  [] Obese           Attitude: [] Positive  [] Hostile  [] Demanding  [] Guarded  [] Defensive         [x] Cooperative  []  Uncooperative      Behavior:  [x] Normal Gait  [] Walks with Assistance  [] Vickie Chair    [] Walks with [place] Alexandr  [] In Hospital Bed  [] Sitting in Chair    Muscle-Skeletal:  [x] Normal Muscle Tone [] Muscle Atrophy       [] Abnormal Muscle Movement     Eye Contact:  [x] Good eye contact  [] Intermittent Eye Contact  [] Poor Eye Contact     Mood: [] Depressed  [x] Anxious  [] Irritated  [] Euthymic   [] Angry [] Restless    Affect:  [] Congruent  [] Incongruent  [x] Labile  [] Constricted  [] Flat  [] Bizarre     Thought Process and Association:  [] Logical [x] Illogical       [] Linear and Goal Directed  [x] Tangential  [] Circumstantial     Thought Content:  [] Denies [] Endorses [] Suicidal [] Homicidal  [x] Delusional      [x] Paranoid  [] Somatic  [x] Grandiose    Perception: [x]  None  [] Auditory   [] Visual  [] tactile   [] olfactory  [] Illusions         Insight: [] Intact  [] Fair  [x] Limited    Judgement:  [] Intact  [] Fair  [x] Limited      Assessment/Plan:        Patient Active Problem List   Diagnosis Code    Cellulitis L03.90    Severe bipolar affective disorder with psychosis ([facility] [address] F31.89    Carpal tunnel syndrome G56.00    DM2 (diabetes mellitus, type 2) ([facility] [address] E11.9    Hyperlipidemia E78.5    Depression F32.9    Vitamin D deficiency E55.9    Systemic primary arterial hypertension I10    Psychosis F29    Acute psychosis F23    Severe manic bipolar I disorder with psychotic features ([facility] [address] F31.2         Plan:    []  Patient is refusing medications  [] Improving as expected   [x] Not improving as expected  Will increase depakote and start Klonopin   [] Worsening    []  At Baseline       Reason for more than one antipsychotic:  [x] N/A  [] 3 failed monotherapy(drugs tried):  [] Cross over to a new antipsychotic  [] Taper to monotherapy from polypharmacy  [] Augmentation of Clozapine therapy due to treatment resistance to single therapy      [facility]  7/9/2018  10:59 AM

## 2018-07-10 LAB
METER GLUCOSE: 101 MG/DL (ref 70–110)
METER GLUCOSE: 139 MG/DL (ref 70–110)
METER GLUCOSE: 172 MG/DL (ref 70–110)
METER GLUCOSE: 196 MG/DL (ref 70–110)
METER GLUCOSE: 45 MG/DL (ref 70–110)
METER GLUCOSE: 55 MG/DL (ref 70–110)
METER GLUCOSE: 64 MG/DL (ref 70–110)
METER GLUCOSE: 99 MG/DL (ref 70–110)

## 2018-07-10 PROCEDURE — 1240000000 HC EMOTIONAL WELLNESS R&B

## 2018-07-10 PROCEDURE — 6370000000 HC RX 637 (ALT 250 FOR IP): Performed by: PSYCHIATRY & NEUROLOGY

## 2018-07-10 PROCEDURE — 6370000000 HC RX 637 (ALT 250 FOR IP): Performed by: INTERNAL MEDICINE

## 2018-07-10 PROCEDURE — 6360000002 HC RX W HCPCS: Performed by: PSYCHIATRY & NEUROLOGY

## 2018-07-10 PROCEDURE — 82962 GLUCOSE BLOOD TEST: CPT

## 2018-07-10 RX ADMIN — LATANOPROST 1 DROP: 50 SOLUTION OPHTHALMIC at 17:10

## 2018-07-10 RX ADMIN — CLONAZEPAM 0.5 MG: 0.5 TABLET ORAL at 20:05

## 2018-07-10 RX ADMIN — LOSARTAN POTASSIUM 50 MG: 50 TABLET, FILM COATED ORAL at 08:44

## 2018-07-10 RX ADMIN — TIMOLOL MALEATE 1 DROP: 5 SOLUTION OPHTHALMIC at 20:06

## 2018-07-10 RX ADMIN — HYDROCHLOROTHIAZIDE 12.5 MG: 12.5 TABLET ORAL at 08:44

## 2018-07-10 RX ADMIN — INSULIN LISPRO 1 UNITS: 100 INJECTION, SOLUTION INTRAVENOUS; SUBCUTANEOUS at 20:17

## 2018-07-10 RX ADMIN — DEXTROSE 15 G: 15 GEL ORAL at 12:38

## 2018-07-10 RX ADMIN — QUETIAPINE FUMARATE 600 MG: 300 TABLET ORAL at 20:05

## 2018-07-10 RX ADMIN — CLONAZEPAM 0.5 MG: 0.5 TABLET ORAL at 08:44

## 2018-07-10 RX ADMIN — DIVALPROEX SODIUM 500 MG: 500 TABLET, EXTENDED RELEASE ORAL at 17:10

## 2018-07-10 RX ADMIN — HALOPERIDOL LACTATE 10 MG: 5 INJECTION, SOLUTION INTRAMUSCULAR at 10:31

## 2018-07-10 RX ADMIN — LINAGLIPTIN 5 MG: 5 TABLET, FILM COATED ORAL at 08:45

## 2018-07-10 RX ADMIN — CANAGLIFLOZIN 300 MG: 300 TABLET, FILM COATED ORAL at 06:37

## 2018-07-10 RX ADMIN — ATORVASTATIN CALCIUM 40 MG: 40 TABLET, FILM COATED ORAL at 08:45

## 2018-07-10 RX ADMIN — METOPROLOL TARTRATE 50 MG: 50 TABLET, FILM COATED ORAL at 20:05

## 2018-07-10 RX ADMIN — GLIPIZIDE 5 MG: 5 TABLET ORAL at 08:45

## 2018-07-10 RX ADMIN — METFORMIN HYDROCHLORIDE 1000 MG: 1000 TABLET ORAL at 17:10

## 2018-07-10 RX ADMIN — METFORMIN HYDROCHLORIDE 1000 MG: 1000 TABLET ORAL at 08:44

## 2018-07-10 RX ADMIN — TIMOLOL MALEATE 1 DROP: 5 SOLUTION OPHTHALMIC at 08:45

## 2018-07-10 RX ADMIN — DIVALPROEX SODIUM 1000 MG: 500 TABLET, EXTENDED RELEASE ORAL at 08:44

## 2018-07-10 RX ADMIN — BENZTROPINE MESYLATE 1 MG: 1 TABLET ORAL at 13:23

## 2018-07-10 RX ADMIN — METOPROLOL TARTRATE 50 MG: 50 TABLET, FILM COATED ORAL at 08:50

## 2018-07-10 ASSESSMENT — PAIN SCALES - GENERAL: PAINLEVEL_OUTOF10: 0

## 2018-07-10 NOTE — PROGRESS NOTES
DATE OF SERVICE:     7/10/2018    Juancarlos Wisdom seen today for the purpose of continuation of care. Nursing, social work reports, laboratory studies and vital signs are reviewed. Patient chief complaint today is:             [] Depression      [x] Anxiety        [x] Psychosis         [] Suicidal/Homicidal                         [] Delusions           [] Aggression          Subjective: Today patient states that \"sleeping is better. \" Continuous rapid speech, but no irritability. Sleep:  [] Good [x] Fair  [] Poor  Appetite:  [] Good [] Fair  [] Poor    Depression:  [] Mild [] Moderate [] Severe                [] Constant [] Sporadic     Anxiety: [] Mild [] Moderate [x] Severe    [x] Constant [] Sporadic     Delusions: [] Mild [] Moderate [x] Severe     [x] Constant [] Sporadic     [x] Paranoid [] Somatic [] Grandiose     Hallucinations: [] Mild [] Moderate [] Severe     [] Constant [] Sporadic    [x] Auditory  [] Visual [] Tactile       Suicidal: [] Constant [] Sporadic  Homicidal: [] Constant [] Sporadic    Unscheduled Medications     [] Patient Receiving Emergency Medications \" Chemical Restraint\"   [] Requesting PRN medications for anxiety    Medical Review of Systems:     All other than marked systmes have been reviewed and are all negative.     Constitutional Symptoms: []  fever []  Chills  Skin Symptoms: [] rash []  Pruritus   Eye Symptoms: [] Vision unchanged []  recent vision problems[] blurred vision   Respiratory Symptoms:[] shortness of breath [] cough  Cardiovascular Symptoms:  [] chest pain   [] palpitations   Gastrointestinal Symptoms: []  abdominal pain []  nausea []  vomiting []  diarrhea  Genitourinary Symptoms: []  dysuria  []  hematuria   Musculoskeletal Symptoms: []  back pain []  muscle pain []  joint pain, Left leg swelling  Neurologic Symptoms: []  headache []  dizziness  Hematolymphoid Symptoms: [] Adenopathy [] Bruises   [] Schimosis       Psychiatric Review of systems  Delusions: [] Denies [] Endorses   Withdrawals:  [] Denies [] Endorses    Hallucinations: [] Denies [] Endorses    Extra Pyramidal Symptoms: [] Denies [] Endorses      /78   Pulse 100   Temp 97.8 °F (36.6 °C) (Oral)   Resp 18   Ht 5' 3\" (1.6 m)   Wt 180 lb 6.4 oz (81.8 kg)   SpO2 99%   BMI 31.96 kg/m²     Mental Status Examination:    Cognition:      [x] Alert  [x] Awake  [x] Oriented  [x] Person  [x] Place [x] Time      [] drowsy  [] tired  [] lethargic  [] distractable  [] Other     Attention/Concentration:   [] Attentive  [x] Distracted        Memory Recent and Remote: [] Intact   [] Impaired [] Partially Impaired     Language: [] Able to recognize and name objects          [] Unable to recognize and name Objects    Fund of Knowledge:  [] Poor [x]  Fair  [] Good    Speech: [] Normal  [x] Soft  [] Slow  [x] Fast [x] Pressured            [] Loud [] Dysarthria  [] Incoherent    Appearance: [] Well Groomed  [] Casual Dressed  [] Unkept  [x] Disheveled          [] Normal weight[] Thin  [] Overweight  [] Obese           Attitude: [] Positive  [] Hostile  [] Demanding  [] Guarded  [] Defensive         [x] Cooperative  []  Uncooperative      Behavior:  [x] Normal Gait  [] Walks with Assistance  [] Vickie Chair    [] Walks with Scharlene Minors  [] In Hospital Bed  [] Sitting in Chair    Muscle-Skeletal:  [x] Normal Muscle Tone [] Muscle Atrophy       [] Abnormal Muscle Movement     Eye Contact:  [] Good eye contact  [x] Intermittent Eye Contact  [] Poor Eye Contact     Mood: [] Depressed  [x] Anxious  [] Irritated  [] Euthymic   [] Angry [x] Restless    Affect:  [] Congruent  [] Incongruent  [x] Labile  [] Constricted  [] Flat  [] Bizarre     Thought Process and Association:  [] Logical [x] Illogical       [] Linear and Goal Directed  [] Tangential  [] Circumstantial     Thought Content:  [] Denies [x] Endorses [] Suicidal [] Homicidal  [] Delusional      [x] Paranoid  [] Somatic  [x] Grandiose    Perception: []  None  [x]

## 2018-07-11 LAB
METER GLUCOSE: 131 MG/DL (ref 70–110)
METER GLUCOSE: 175 MG/DL (ref 70–110)
METER GLUCOSE: 207 MG/DL (ref 70–110)
METER GLUCOSE: 97 MG/DL (ref 70–110)

## 2018-07-11 PROCEDURE — 6370000000 HC RX 637 (ALT 250 FOR IP): Performed by: PSYCHIATRY & NEUROLOGY

## 2018-07-11 PROCEDURE — 82962 GLUCOSE BLOOD TEST: CPT

## 2018-07-11 PROCEDURE — 6370000000 HC RX 637 (ALT 250 FOR IP): Performed by: INTERNAL MEDICINE

## 2018-07-11 PROCEDURE — 1240000000 HC EMOTIONAL WELLNESS R&B

## 2018-07-11 PROCEDURE — 99231 SBSQ HOSP IP/OBS SF/LOW 25: CPT | Performed by: PSYCHIATRY & NEUROLOGY

## 2018-07-11 PROCEDURE — 6360000002 HC RX W HCPCS: Performed by: PSYCHIATRY & NEUROLOGY

## 2018-07-11 RX ADMIN — DIVALPROEX SODIUM 500 MG: 500 TABLET, EXTENDED RELEASE ORAL at 17:49

## 2018-07-11 RX ADMIN — TIMOLOL MALEATE 1 DROP: 5 SOLUTION OPHTHALMIC at 21:57

## 2018-07-11 RX ADMIN — METFORMIN HYDROCHLORIDE 1000 MG: 1000 TABLET ORAL at 09:12

## 2018-07-11 RX ADMIN — INSULIN LISPRO 2 UNITS: 100 INJECTION, SOLUTION INTRAVENOUS; SUBCUTANEOUS at 22:07

## 2018-07-11 RX ADMIN — TIMOLOL MALEATE 1 DROP: 5 SOLUTION OPHTHALMIC at 09:13

## 2018-07-11 RX ADMIN — CLONAZEPAM 0.5 MG: 0.5 TABLET ORAL at 21:56

## 2018-07-11 RX ADMIN — DIVALPROEX SODIUM 1000 MG: 500 TABLET, EXTENDED RELEASE ORAL at 09:12

## 2018-07-11 RX ADMIN — CLONAZEPAM 0.5 MG: 0.5 TABLET ORAL at 09:12

## 2018-07-11 RX ADMIN — LOSARTAN POTASSIUM 50 MG: 50 TABLET, FILM COATED ORAL at 09:13

## 2018-07-11 RX ADMIN — ATORVASTATIN CALCIUM 40 MG: 40 TABLET, FILM COATED ORAL at 09:13

## 2018-07-11 RX ADMIN — INSULIN LISPRO 2 UNITS: 100 INJECTION, SOLUTION INTRAVENOUS; SUBCUTANEOUS at 12:45

## 2018-07-11 RX ADMIN — METOPROLOL TARTRATE 50 MG: 50 TABLET, FILM COATED ORAL at 09:13

## 2018-07-11 RX ADMIN — BENZTROPINE MESYLATE 1 MG: 1 TABLET ORAL at 12:32

## 2018-07-11 RX ADMIN — METFORMIN HYDROCHLORIDE 1000 MG: 1000 TABLET ORAL at 17:49

## 2018-07-11 RX ADMIN — QUETIAPINE FUMARATE 600 MG: 300 TABLET ORAL at 21:56

## 2018-07-11 RX ADMIN — METOPROLOL TARTRATE 50 MG: 50 TABLET, FILM COATED ORAL at 21:56

## 2018-07-11 RX ADMIN — HYDROCHLOROTHIAZIDE 12.5 MG: 12.5 TABLET ORAL at 09:13

## 2018-07-11 RX ADMIN — HALOPERIDOL LACTATE 10 MG: 5 INJECTION, SOLUTION INTRAMUSCULAR at 04:49

## 2018-07-11 ASSESSMENT — PAIN SCALES - GENERAL: PAINLEVEL_OUTOF10: 0

## 2018-07-11 NOTE — PROGRESS NOTES
DATE OF SERVICE:     7/11/2018    Emily Mcgovern seen today for the purpose of continuation of care. Nursing, social work reports, laboratory studies and vital signs are reviewed. Patient chief complaint today is:             [] Depression      [x] Anxiety        [x] Psychosis         [] Suicidal/Homicidal                         [] Delusions           [] Aggression          Subjective: Today patient states that \"I'm feeling good. \"    Sleep:  [] Good [x] Fair  [] Poor  Appetite:  [x] Good [] Fair  [] Poor    Depression:  [] Mild [] Moderate [] Severe                [] Constant [] Sporadic     Anxiety: [] Mild [] Moderate [x] Severe    [x] Constant [] Sporadic     Delusions: [] Mild [] Moderate [x] Severe     [x] Constant [] Sporadic     [x] Paranoid [] Somatic [] Grandiose     Hallucinations: [] Mild [] Moderate [] Severe     [] Constant [] Sporadic    [x] Auditory  [] Visual [] Tactile       Suicidal: [] Constant [] Sporadic  Homicidal: [] Constant [] Sporadic    Unscheduled Medications     [] Patient Receiving Emergency Medications \" Chemical Restraint\"   [] Requesting PRN medications for anxiety    Medical Review of Systems:     All other than marked systmes have been reviewed and are all negative.     Constitutional Symptoms: []  fever []  Chills  Skin Symptoms: [] rash []  Pruritus   Eye Symptoms: [] Vision unchanged []  recent vision problems[] blurred vision   Respiratory Symptoms:[] shortness of breath [] cough  Cardiovascular Symptoms:  [] chest pain   [] palpitations   Gastrointestinal Symptoms: []  abdominal pain []  nausea []  vomiting []  diarrhea  Genitourinary Symptoms: []  dysuria  []  hematuria   Musculoskeletal Symptoms: []  back pain []  muscle pain []  joint pain  Neurologic Symptoms: []  headache []  dizziness  Hematolymphoid Symptoms: [] Adenopathy [] Bruises   [] Schimosis       Psychiatric Review of systems  Delusions:  [] Denies [] Endorses   Withdrawals:  [] Denies [] Endorses

## 2018-07-11 NOTE — PROGRESS NOTES
Group Therapy Note    Date: 7/11/2018  Start Time: 1105  End Time:  9811  Number of Participants: 9    Type of Group: Psychotherapy    Wellness Binder Information  Module Name:  n/a  Session Number:  n/a    Patient's Goal:  To increase socialization and improve communication with others. Notes:  Pt active in group discussion focusing on ways to improve relationships with others. Pt shared personal issues and provided feedback and support to others. She has tendency to monopolize group but responds easily to limits and allows others to share. Pt was cooperative and pleasant in group. Status After Intervention:  Improved    Participation Level:  Active Listener and Interactive    Participation Quality: Appropriate, Attentive, Sharing and Supportive      Speech:  normal      Thought Process/Content: Linear      Affective Functioning: Congruent      Mood: euthymic      Level of consciousness:  Alert, Oriented x4 and Attentive      Response to Learning: Able to verbalize current knowledge/experience, Able to verbalize/acknowledge new learning and Progressing to goal      Endings: None Reported    Modes of Intervention: Support, Socialization, Exploration and Problem-solving      Discipline Responsible: /Counselor

## 2018-07-11 NOTE — PROGRESS NOTES
Patient remains with flight of ideas, tangential and is out in the dinning room observed talking to unseen others. She was given PRN Haldol Im as ordered. Patient tolerated well.

## 2018-07-11 NOTE — PROGRESS NOTES
Patient attended and participated in groups. She was flight of ideas with conversation, she was pleasant and cooperative with staff.

## 2018-07-12 LAB
METER GLUCOSE: 117 MG/DL (ref 70–110)
METER GLUCOSE: 153 MG/DL (ref 70–110)
METER GLUCOSE: 169 MG/DL (ref 70–110)
METER GLUCOSE: 172 MG/DL (ref 70–110)

## 2018-07-12 PROCEDURE — 6360000002 HC RX W HCPCS: Performed by: PSYCHIATRY & NEUROLOGY

## 2018-07-12 PROCEDURE — 6370000000 HC RX 637 (ALT 250 FOR IP): Performed by: PSYCHIATRY & NEUROLOGY

## 2018-07-12 PROCEDURE — 82962 GLUCOSE BLOOD TEST: CPT

## 2018-07-12 PROCEDURE — 99231 SBSQ HOSP IP/OBS SF/LOW 25: CPT | Performed by: PSYCHIATRY & NEUROLOGY

## 2018-07-12 PROCEDURE — 1240000000 HC EMOTIONAL WELLNESS R&B

## 2018-07-12 PROCEDURE — 6370000000 HC RX 637 (ALT 250 FOR IP): Performed by: INTERNAL MEDICINE

## 2018-07-12 RX ADMIN — ATORVASTATIN CALCIUM 40 MG: 40 TABLET, FILM COATED ORAL at 08:21

## 2018-07-12 RX ADMIN — METFORMIN HYDROCHLORIDE 1000 MG: 1000 TABLET ORAL at 08:20

## 2018-07-12 RX ADMIN — METOPROLOL TARTRATE 50 MG: 50 TABLET, FILM COATED ORAL at 08:21

## 2018-07-12 RX ADMIN — METFORMIN HYDROCHLORIDE 1000 MG: 1000 TABLET ORAL at 16:56

## 2018-07-12 RX ADMIN — CLONAZEPAM 0.5 MG: 0.5 TABLET ORAL at 08:21

## 2018-07-12 RX ADMIN — HALOPERIDOL LACTATE 10 MG: 5 INJECTION, SOLUTION INTRAMUSCULAR at 08:35

## 2018-07-12 RX ADMIN — DIVALPROEX SODIUM 1000 MG: 500 TABLET, EXTENDED RELEASE ORAL at 08:20

## 2018-07-12 RX ADMIN — TIMOLOL MALEATE 1 DROP: 5 SOLUTION OPHTHALMIC at 08:23

## 2018-07-12 RX ADMIN — LOSARTAN POTASSIUM 50 MG: 50 TABLET, FILM COATED ORAL at 08:21

## 2018-07-12 RX ADMIN — INSULIN LISPRO 2 UNITS: 100 INJECTION, SOLUTION INTRAVENOUS; SUBCUTANEOUS at 18:07

## 2018-07-12 RX ADMIN — CLONAZEPAM 0.5 MG: 0.5 TABLET ORAL at 20:40

## 2018-07-12 RX ADMIN — HYDROCHLOROTHIAZIDE 12.5 MG: 12.5 TABLET ORAL at 08:21

## 2018-07-12 RX ADMIN — INSULIN LISPRO 2 UNITS: 100 INJECTION, SOLUTION INTRAVENOUS; SUBCUTANEOUS at 08:21

## 2018-07-12 RX ADMIN — DIVALPROEX SODIUM 500 MG: 500 TABLET, EXTENDED RELEASE ORAL at 16:56

## 2018-07-12 RX ADMIN — METOPROLOL TARTRATE 50 MG: 50 TABLET, FILM COATED ORAL at 20:40

## 2018-07-12 RX ADMIN — LATANOPROST 1 DROP: 50 SOLUTION OPHTHALMIC at 20:38

## 2018-07-12 RX ADMIN — QUETIAPINE FUMARATE 600 MG: 300 TABLET ORAL at 20:40

## 2018-07-12 RX ADMIN — INSULIN LISPRO 1 UNITS: 100 INJECTION, SOLUTION INTRAVENOUS; SUBCUTANEOUS at 20:36

## 2018-07-12 RX ADMIN — TIMOLOL MALEATE 1 DROP: 5 SOLUTION OPHTHALMIC at 16:58

## 2018-07-12 RX ADMIN — BENZTROPINE MESYLATE 1 MG: 1 TABLET ORAL at 12:11

## 2018-07-12 ASSESSMENT — PAIN SCALES - GENERAL: PAINLEVEL_OUTOF10: 0

## 2018-07-12 NOTE — PROGRESS NOTES
Patient is observed talking to unseen others, yet she denies any A/V hallucinations. She took her medications without difficulties. She can get verbally aggressive with other patients for unknown reasons. Patient is able to calm herself after staff redirects her. Patient appears paranoid. She is currently resting I her room. Safety maintained.

## 2018-07-13 LAB
METER GLUCOSE: 132 MG/DL (ref 70–110)
METER GLUCOSE: 138 MG/DL (ref 70–110)
METER GLUCOSE: 140 MG/DL (ref 70–110)
METER GLUCOSE: 213 MG/DL (ref 70–110)
METER GLUCOSE: 253 MG/DL (ref 70–110)

## 2018-07-13 PROCEDURE — 82962 GLUCOSE BLOOD TEST: CPT

## 2018-07-13 PROCEDURE — 1240000000 HC EMOTIONAL WELLNESS R&B

## 2018-07-13 PROCEDURE — 6370000000 HC RX 637 (ALT 250 FOR IP): Performed by: INTERNAL MEDICINE

## 2018-07-13 PROCEDURE — 6370000000 HC RX 637 (ALT 250 FOR IP): Performed by: PSYCHIATRY & NEUROLOGY

## 2018-07-13 PROCEDURE — 99231 SBSQ HOSP IP/OBS SF/LOW 25: CPT | Performed by: PSYCHIATRY & NEUROLOGY

## 2018-07-13 RX ADMIN — DIVALPROEX SODIUM 500 MG: 500 TABLET, EXTENDED RELEASE ORAL at 17:08

## 2018-07-13 RX ADMIN — TIMOLOL MALEATE 1 DROP: 5 SOLUTION OPHTHALMIC at 08:26

## 2018-07-13 RX ADMIN — METFORMIN HYDROCHLORIDE 1000 MG: 1000 TABLET ORAL at 17:08

## 2018-07-13 RX ADMIN — ATORVASTATIN CALCIUM 40 MG: 40 TABLET, FILM COATED ORAL at 08:25

## 2018-07-13 RX ADMIN — LATANOPROST 1 DROP: 50 SOLUTION OPHTHALMIC at 21:53

## 2018-07-13 RX ADMIN — BENZTROPINE MESYLATE 1 MG: 1 TABLET ORAL at 13:39

## 2018-07-13 RX ADMIN — CLONAZEPAM 0.5 MG: 0.5 TABLET ORAL at 08:25

## 2018-07-13 RX ADMIN — HYDROCHLOROTHIAZIDE 12.5 MG: 12.5 TABLET ORAL at 08:25

## 2018-07-13 RX ADMIN — QUETIAPINE FUMARATE 600 MG: 300 TABLET ORAL at 21:52

## 2018-07-13 RX ADMIN — DIVALPROEX SODIUM 1000 MG: 500 TABLET, EXTENDED RELEASE ORAL at 08:25

## 2018-07-13 RX ADMIN — ERGOCALCIFEROL 50000 UNITS: 1.25 CAPSULE ORAL at 08:25

## 2018-07-13 RX ADMIN — METOPROLOL TARTRATE 50 MG: 50 TABLET, FILM COATED ORAL at 08:25

## 2018-07-13 RX ADMIN — ACETAMINOPHEN 650 MG: 325 TABLET, FILM COATED ORAL at 03:00

## 2018-07-13 RX ADMIN — METOPROLOL TARTRATE 50 MG: 50 TABLET, FILM COATED ORAL at 21:52

## 2018-07-13 RX ADMIN — LOSARTAN POTASSIUM 50 MG: 50 TABLET, FILM COATED ORAL at 08:25

## 2018-07-13 RX ADMIN — CLONAZEPAM 0.5 MG: 0.5 TABLET ORAL at 21:52

## 2018-07-13 RX ADMIN — METFORMIN HYDROCHLORIDE 1000 MG: 1000 TABLET ORAL at 08:29

## 2018-07-13 RX ADMIN — TIMOLOL MALEATE 1 DROP: 5 SOLUTION OPHTHALMIC at 21:53

## 2018-07-13 ASSESSMENT — PAIN SCALES - GENERAL
PAINLEVEL_OUTOF10: 0
PAINLEVEL_OUTOF10: 2
PAINLEVEL_OUTOF10: 5
PAINLEVEL_OUTOF10: 0

## 2018-07-13 NOTE — PROGRESS NOTES
Group Therapy Note    Date: 7/13/2018  Start Time:  1:30  End Time:  2:00  Number of Participants: 7    Type of Group: Cognitive Skills    Wellness Binder Information  Module Name:  Things we can and cannot change   Session Number:  n/a    Patient's Goal:  Patient will identify things they can and cannot change in recovery. Notes:  Patient was interactive during group sharing what he can and cannot change in recovery and gave support to others. Status After Intervention:  Improved    Participation Level:  Active Listener and Interactive    Participation Quality: Appropriate, Attentive, Sharing and Supportive      Speech:  normal      Thought Process/Content: Logical      Affective Functioning: Congruent      Mood: depressed      Level of consciousness:  Alert, Oriented x4 and Attentive      Response to Learning: Able to verbalize current knowledge/experience, Able to verbalize/acknowledge new learning, Able to retain information, Capable of insight, Able to change behavior and Progressing to goal      Endings: None Reported    Modes of Intervention: Education, Support, Socialization, Exploration, Clarifying and Problem-solving      Discipline Responsible: Psychoeducational Specialist      Signature:  Liberty Caputo

## 2018-07-13 NOTE — PROGRESS NOTES
DATE OF SERVICE:     7/13/2018    Diya Cornea seen today for the purpose of continuation of care. Nursing, social work reports, laboratory studies and vital signs are reviewed. Patient chief complaint today is:             [] Depression      [x] Anxiety        [x] Psychosis         [] Suicidal/Homicidal                         [] Delusions           [] Aggression          Subjective: Today patient states that \"I feel great. \" Denies SI, HI, and AVH. Sleep:  [] Good [x] Fair  [] Poor  Appetite:  [x] Good [] Fair  [] Poor    Depression:  [] Mild [] Moderate [] Severe                [] Constant [] Sporadic     Anxiety: [] Mild [] Moderate [x] Severe    [x] Constant [] Sporadic     Delusions: [] Mild [] Moderate [] Severe     [] Constant [] Sporadic     [] Paranoid [] Somatic [] Grandiose     Hallucinations: [] Mild [] Moderate [x] Severe     [x] Constant [] Sporadic    [x] Auditory  [] Visual [] Tactile       Suicidal: [] Constant [] Sporadic  Homicidal: [] Constant [] Sporadic    Unscheduled Medications     [] Patient Receiving Emergency Medications \" Chemical Restraint\"   [] Requesting PRN medications for anxiety    Medical Review of Systems:     All other than marked systmes have been reviewed and are all negative.     Constitutional Symptoms: []  fever []  Chills  Skin Symptoms: [] rash []  Pruritus   Eye Symptoms: [] Vision unchanged []  recent vision problems[] blurred vision   Respiratory Symptoms:[] shortness of breath [] cough  Cardiovascular Symptoms:  [] chest pain   [] palpitations   Gastrointestinal Symptoms: []  abdominal pain []  nausea []  vomiting []  diarrhea  Genitourinary Symptoms: []  dysuria  []  hematuria   Musculoskeletal Symptoms: []  back pain []  muscle pain []  joint pain  Neurologic Symptoms: []  headache []  dizziness  Hematolymphoid Symptoms: [] Adenopathy [] Bruises   [] Schimosis       Psychiatric Review of systems  Delusions:  [] Denies [] Endorses   Withdrawals:  [] Denies [] Endorses    Hallucinations: [x] Denies [] Endorses    Extra Pyramidal Symptoms: [] Denies [] Endorses      /79   Pulse 97   Temp 98.4 °F (36.9 °C) (Temporal)   Resp 18   Ht 5' 3\" (1.6 m)   Wt 180 lb 6.4 oz (81.8 kg)   SpO2 96%   BMI 31.96 kg/m²     Mental Status Examination:    Cognition:      [x] Alert  [x] Awake  [x] Oriented  [x] Person  [x] Place [x] Time      [] drowsy  [] tired  [] lethargic  [x] distractable  [] Other     Attention/Concentration:   [] Attentive  [x] Distracted        Memory Recent and Remote: [] Intact   [] Impaired [] Partially Impaired     Language: [] Able to recognize and name objects          [] Unable to recognize and name Objects    Fund of Knowledge:  [] Poor []  Fair  [] Good    Speech: [] Normal  [x] Soft  [] Slow  [x] Fast [x] Pressured            [] Loud [] Dysarthria  [] Incoherent    Appearance: [] Well Groomed  [x] Casual Dressed  [] Unkept  [x] Disheveled          [] Normal weight[] Thin  [x] Overweight  [] Obese           Attitude: [x] Positive  [] Hostile  [] Demanding  [] Guarded  [] Defensive         [x] Cooperative  []  Uncooperative      Behavior:  [x] Normal Gait  [] Walks with Assistance  [] Vickie Chair    [] Walks with Mac Keens  [] In Hospital Bed  [] Sitting in Chair    Muscle-Skeletal:  [x] Normal Muscle Tone [] Muscle Atrophy       [] Abnormal Muscle Movement     Eye Contact:  [] Good eye contact  [x] Intermittent Eye Contact  [] Poor Eye Contact     Mood: [] Depressed  [x] Anxious  [] Irritated  [] Euthymic   [] Angry [x] Restless    Affect:  [] Congruent  [] Incongruent  [x] Labile  [] Constricted  [] Flat  [] Bizarre     Thought Process and Association:  [] Logical [x] Illogical       [] Linear and Goal Directed  [x] Tangential  [] Circumstantial     Thought Content:  [] Denies [] Endorses [] Suicidal [] Homicidal  [x] Delusional      [] Paranoid  [] Somatic  [x] Grandiose    Perception: [x]  None  [] Auditory   [] Visual  [] tactile   []

## 2018-07-13 NOTE — CONSULTS
Subjective:  62-year-old -American woman with complicated medical and psychiatric history  Patient was seen on psychiatric unit earlier this morning  Data reviewed in detail  She was admitted with acute psychosis  Offers no significant complaints this morning  Currently on metformin only  Blood sugars improved  Objective:    /79   Pulse 97   Temp 98.4 °F (36.9 °C) (Temporal)   Resp 18   Ht 5' 3\" (1.6 m)   Wt 180 lb 6.4 oz (81.8 kg)   SpO2 96%   BMI 31.96 kg/m²   Awake alert oriented  Seems somewhat agitated  Answers questions appropriately  Pressure of speech noted  Oral mucosa moist  Neck supple  Heart:  RRR, no murmurs, gallops, or rubs.   Lungs:  CTA bilaterally, no wheeze, rales or rhonchi  Abd: bowel sounds present, nontender, nondistended, no masses  Extrem:  No clubbing, cyanosis, And chronic bilateral lower extremity  Edema noted    Data reviewed in detail    Assessment:  Acute psychosis admitted by psychiatry  Poorly controlled type 2 diabetes  Mellitus with low sugars  Improved now  Multiple comorbidities as listed below  Patient Active Problem List   Diagnosis    Cellulitis    Severe bipolar affective disorder with psychosis ([address]    Carpal tunnel syndrome    DM2 (diabetes mellitus, type 2) ([address]    Hyperlipidemia    Depression    Vitamin D deficiency    Systemic primary arterial hypertension    Psychosis    Acute psychosis    Severe manic bipolar I disorder with psychotic features ([address]       Plan:  Discontinue glyburide and invokana and tradjenta  Continue metformin  Reviewed in detail with the patient        Gerda Mejias  7:47 AM  7/13/2018

## 2018-07-14 LAB
METER GLUCOSE: 152 MG/DL (ref 70–110)
METER GLUCOSE: 163 MG/DL (ref 70–110)
METER GLUCOSE: 184 MG/DL (ref 70–110)
METER GLUCOSE: 185 MG/DL (ref 70–110)
METER GLUCOSE: 214 MG/DL (ref 70–110)
VALPROIC ACID LEVEL: 77 MCG/ML (ref 50–100)

## 2018-07-14 PROCEDURE — 36415 COLL VENOUS BLD VENIPUNCTURE: CPT

## 2018-07-14 PROCEDURE — 80164 ASSAY DIPROPYLACETIC ACD TOT: CPT

## 2018-07-14 PROCEDURE — 99231 SBSQ HOSP IP/OBS SF/LOW 25: CPT | Performed by: PSYCHIATRY & NEUROLOGY

## 2018-07-14 PROCEDURE — 82962 GLUCOSE BLOOD TEST: CPT

## 2018-07-14 PROCEDURE — 1240000000 HC EMOTIONAL WELLNESS R&B

## 2018-07-14 PROCEDURE — 6370000000 HC RX 637 (ALT 250 FOR IP): Performed by: PSYCHIATRY & NEUROLOGY

## 2018-07-14 PROCEDURE — 6370000000 HC RX 637 (ALT 250 FOR IP): Performed by: INTERNAL MEDICINE

## 2018-07-14 RX ORDER — DIVALPROEX SODIUM 500 MG/1
1000 TABLET, EXTENDED RELEASE ORAL 2 TIMES DAILY
Status: DISCONTINUED | OUTPATIENT
Start: 2018-07-15 | End: 2018-07-24 | Stop reason: HOSPADM

## 2018-07-14 RX ORDER — QUETIAPINE FUMARATE 100 MG/1
100 TABLET, FILM COATED ORAL EVERY MORNING
Status: DISCONTINUED | OUTPATIENT
Start: 2018-07-15 | End: 2018-07-17

## 2018-07-14 RX ADMIN — METOPROLOL TARTRATE 50 MG: 50 TABLET, FILM COATED ORAL at 08:59

## 2018-07-14 RX ADMIN — METFORMIN HYDROCHLORIDE 1000 MG: 1000 TABLET ORAL at 17:44

## 2018-07-14 RX ADMIN — INSULIN LISPRO 4 UNITS: 100 INJECTION, SOLUTION INTRAVENOUS; SUBCUTANEOUS at 17:44

## 2018-07-14 RX ADMIN — LINAGLIPTIN 5 MG: 5 TABLET, FILM COATED ORAL at 21:24

## 2018-07-14 RX ADMIN — DIVALPROEX SODIUM 500 MG: 500 TABLET, EXTENDED RELEASE ORAL at 17:44

## 2018-07-14 RX ADMIN — LOSARTAN POTASSIUM 50 MG: 50 TABLET, FILM COATED ORAL at 08:59

## 2018-07-14 RX ADMIN — HYDROXYZINE PAMOATE 50 MG: 50 CAPSULE ORAL at 16:56

## 2018-07-14 RX ADMIN — CLONAZEPAM 0.5 MG: 0.5 TABLET ORAL at 08:59

## 2018-07-14 RX ADMIN — INSULIN LISPRO 2 UNITS: 100 INJECTION, SOLUTION INTRAVENOUS; SUBCUTANEOUS at 09:01

## 2018-07-14 RX ADMIN — DIVALPROEX SODIUM 1000 MG: 500 TABLET, EXTENDED RELEASE ORAL at 08:59

## 2018-07-14 RX ADMIN — INSULIN LISPRO 2 UNITS: 100 INJECTION, SOLUTION INTRAVENOUS; SUBCUTANEOUS at 12:55

## 2018-07-14 RX ADMIN — CLONAZEPAM 0.5 MG: 0.5 TABLET ORAL at 21:24

## 2018-07-14 RX ADMIN — TIMOLOL MALEATE 1 DROP: 5 SOLUTION OPHTHALMIC at 21:29

## 2018-07-14 RX ADMIN — BENZTROPINE MESYLATE 1 MG: 1 TABLET ORAL at 12:56

## 2018-07-14 RX ADMIN — TIMOLOL MALEATE 1 DROP: 5 SOLUTION OPHTHALMIC at 08:59

## 2018-07-14 RX ADMIN — QUETIAPINE FUMARATE 600 MG: 300 TABLET ORAL at 21:24

## 2018-07-14 RX ADMIN — INSULIN LISPRO 1 UNITS: 100 INJECTION, SOLUTION INTRAVENOUS; SUBCUTANEOUS at 21:24

## 2018-07-14 RX ADMIN — HYDROCHLOROTHIAZIDE 12.5 MG: 12.5 TABLET ORAL at 08:59

## 2018-07-14 RX ADMIN — ATORVASTATIN CALCIUM 40 MG: 40 TABLET, FILM COATED ORAL at 08:59

## 2018-07-14 RX ADMIN — METOPROLOL TARTRATE 50 MG: 50 TABLET, FILM COATED ORAL at 21:23

## 2018-07-14 RX ADMIN — LATANOPROST 1 DROP: 50 SOLUTION OPHTHALMIC at 21:29

## 2018-07-14 RX ADMIN — METFORMIN HYDROCHLORIDE 1000 MG: 1000 TABLET ORAL at 08:59

## 2018-07-14 ASSESSMENT — PAIN SCALES - GENERAL: PAINLEVEL_OUTOF10: 0

## 2018-07-14 ASSESSMENT — PAIN - FUNCTIONAL ASSESSMENT: PAIN_FUNCTIONAL_ASSESSMENT: 0-10

## 2018-07-14 NOTE — PROGRESS NOTES
Patient attended community meeting/morning stretch. Patient identified goal for the day as:  \"Keep behaving\"

## 2018-07-14 NOTE — CONSULTS
Subjective:  62-year-old -American woman with complicated medical and psychiatric history  Patient was seen on psychiatric unit earlier this morning  Data reviewed in detail  She was admitted with acute psychosis  Offers no significant complaints this morning  Currently on metformin only  Blood sugars improved  Objective:    BP (!) 153/80   Pulse 100   Temp 98.8 °F (37.1 °C) (Oral)   Resp 16   Ht 5' 3\" (1.6 m)   Wt 180 lb 6.4 oz (81.8 kg)   SpO2 100%   BMI 31.96 kg/m²   Awake alert oriented  Seems somewhat agitated  Answers questions appropriately  Pressure of speech noted  Oral mucosa moist  Neck supple  Heart:  RRR, no murmurs, gallops, or rubs.   Lungs:  CTA bilaterally, no wheeze, rales or rhonchi  Abd: bowel sounds present, nontender, nondistended, no masses  Extrem:  No clubbing, cyanosis, And chronic bilateral lower extremity  Edema noted    Data reviewed in detail    Assessment:  Acute psychosis admitted by psychiatry  Poorly controlled type 2 diabetes  Mellitus resolved hypoglycemia  Improved now  Multiple comorbidities as listed below  Patient Active Problem List   Diagnosis    Cellulitis    Severe bipolar affective disorder with psychosis ([address]    Carpal tunnel syndrome    DM2 (diabetes mellitus, type 2) ([address]    Hyperlipidemia    Depression    Vitamin D deficiency    Systemic primary arterial hypertension    Psychosis    Acute psychosis    Severe manic bipolar I disorder with psychotic features ([address]       Plan:  Discontinue glyburide and invokana  Resume Tradjenta  Continue metformin  Reviewed in detail with the patient        Justin Warren  4:50 PM  7/14/2018

## 2018-07-14 NOTE — PROGRESS NOTES
sharee Note    Date: 7/14/2018  Start Time: 11:00  End Time:  11:45  Number of Participants: 10    Type of Group: Recovery    Wellness Binder Information  Module Name:  Shortcuts to Happiness  Session Number:     Patient's Goal:  learn 5 new principles to increase  Happiness    Notes:  Pt will be able to acknowledge and verbalize new learning    Status After Intervention:  Improved    Participation Level: Interactive    Participation Quality: Attentive and Sharing      Speech:  None      Thought Process/Content: Logical      Affective Functioning: Congruent      Mood: anxious and depressed      Level of consciousness:  Attentive      Response to Learning: Able to verbalize/acknowledge new learning      Endings: None Reported    Modes of Intervention: Support and Socialization      Discipline Responsible: /Counselor      Signature:  GLORY Vásquez

## 2018-07-14 NOTE — PROGRESS NOTES
DATE OF SERVICE:     7/14/2018    Cintia Bernal seen today for the purpose of continuation of care. Nursing, social work reports, laboratory studies and vital signs are reviewed. Per nursing she remains manic and grandiose. Patient chief complaint today is:             [] Depression      [x] Anxiety        [x] Psychosis         [] Suicidal/Homicidal                         [] Delusions           [] Aggression          Subjective: Today patient states that she slept good last night and feels a lot better. Did attend groups today. Denies SI, HI, and AVH. Sleep:  [x] Good [] Fair  [] Poor  Appetite:  [x] Good [] Fair  [] Poor    Depression:  [] Mild [] Moderate [] Severe                [] Constant [] Sporadic     Anxiety: [] Mild [] Moderate [x] Severe    [x] Constant [] Sporadic     Delusions: [] Mild [] Moderate [x] Severe     [x] Constant [] Sporadic     [] Paranoid [] Somatic [x] Grandiose     Hallucinations: [] Mild [] Moderate [] Severe     [] Constant [] Sporadic    [] Auditory  [] Visual [] Tactile       Suicidal: [] Constant [] Sporadic  Homicidal: [] Constant [] Sporadic     Unscheduled Medications     [] Patient Receiving Emergency Medications \" Chemical Restraint\"   [] Requesting PRN medications for anxiety    Medical Review of Systems:     All other than marked systmes have been reviewed and are all negative.     Constitutional Symptoms: []  fever []  Chills  Skin Symptoms: [] rash []  Pruritus   Eye Symptoms: [] Vision unchanged []  recent vision problems[] blurred vision   Respiratory Symptoms:[] shortness of breath [] cough  Cardiovascular Symptoms:  [] chest pain   [] palpitations   Gastrointestinal Symptoms: []  abdominal pain []  nausea []  vomiting []  diarrhea  Genitourinary Symptoms: []  dysuria  []  hematuria   Musculoskeletal Symptoms: []  back pain []  muscle pain []  joint pain  Neurologic Symptoms: []  headache []  dizziness  Hematolymphoid Symptoms: [] Adenopathy [] Bruises   [] Schimosis       Psychiatric Review of systems  Delusions:  [] Denies [] Endorses   Withdrawals:  [] Denies [] Endorses    Hallucinations: [] Denies [] Endorses    Extra Pyramidal Symptoms: [] Denies [] Endorses      BP (!) 153/80   Pulse 100   Temp 98.8 °F (37.1 °C) (Oral)   Resp 16   Ht 5' 3\" (1.6 m)   Wt 180 lb 6.4 oz (81.8 kg)   SpO2 100%   BMI 31.96 kg/m²     Mental Status Examination:    Cognition:      [x] Alert  [x] Awake  [x] Oriented  [x] Person  [x] Place [x] Time      [] drowsy  [] tired  [] lethargic  [] distractable  [] Other     Attention/Concentration:   [] Attentive  [x] Distracted        Memory Recent and Remote: [] Intact   [] Impaired [] Partially Impaired     Language: [] Able to recognize and name objects          [] Unable to recognize and name Objects    Fund of Knowledge:  [] Poor []  Fair  [] Good    Speech: [] Normal  [x] Soft  [] Slow  [x] Fast [] Pressured            [] Loud [] Dysarthria  [] Incoherent    Appearance: [] Well Groomed  [x] Casual Dressed  [] Unkept  [x] Disheveled          [] Normal weight[] Thin  [] Overweight  [] Obese           Attitude: [x] Positive  [] Hostile  [] Demanding  [] Guarded  [] Defensive         [x] Cooperative  []  Uncooperative      Behavior:  [x] Normal Gait  [] Walks with Assistance  [] Vickie Chair    [] Walks with Brad [place]  [] In Hospital Bed  [] Sitting in Chair    Muscle-Skeletal:  [x] Normal Muscle Tone [] Muscle Atrophy       [] Abnormal Muscle Movement     Eye Contact:  [] Good eye contact  [x] Intermittent Eye Contact  [] Poor Eye Contact     Mood: [] Depressed  [x] Anxious  [] Irritated  [] Euthymic   [] Angry [x] Restless    Affect:  [] Congruent  [] Incongruent  [x] Labile  [] Constricted  [] Flat  [] Bizarre     Thought Process and Association:  [] Logical [x] Illogical       [] Linear and Goal Directed  [x] Tangential  [] Circumstantial     Thought Content:  [x] Denies [] Endorses [] Suicidal [] Homicidal  [] Delusional      [] Paranoid

## 2018-07-15 ENCOUNTER — APPOINTMENT (OUTPATIENT)
Dept: CT IMAGING | Age: 63
DRG: 753 | End: 2018-07-15
Payer: COMMERCIAL

## 2018-07-15 LAB
METER GLUCOSE: 120 MG/DL (ref 70–110)
METER GLUCOSE: 166 MG/DL (ref 70–110)
METER GLUCOSE: 168 MG/DL (ref 70–110)
METER GLUCOSE: 244 MG/DL (ref 70–110)

## 2018-07-15 PROCEDURE — 70450 CT HEAD/BRAIN W/O DYE: CPT

## 2018-07-15 PROCEDURE — 99231 SBSQ HOSP IP/OBS SF/LOW 25: CPT | Performed by: PSYCHIATRY & NEUROLOGY

## 2018-07-15 PROCEDURE — 1240000000 HC EMOTIONAL WELLNESS R&B

## 2018-07-15 PROCEDURE — 6370000000 HC RX 637 (ALT 250 FOR IP): Performed by: PSYCHIATRY & NEUROLOGY

## 2018-07-15 PROCEDURE — 82962 GLUCOSE BLOOD TEST: CPT

## 2018-07-15 PROCEDURE — 6370000000 HC RX 637 (ALT 250 FOR IP): Performed by: INTERNAL MEDICINE

## 2018-07-15 RX ADMIN — TIMOLOL MALEATE 1 DROP: 5 SOLUTION OPHTHALMIC at 08:29

## 2018-07-15 RX ADMIN — ACETAMINOPHEN 650 MG: 325 TABLET, FILM COATED ORAL at 21:09

## 2018-07-15 RX ADMIN — INSULIN LISPRO 1 UNITS: 100 INJECTION, SOLUTION INTRAVENOUS; SUBCUTANEOUS at 21:10

## 2018-07-15 RX ADMIN — LATANOPROST 1 DROP: 50 SOLUTION OPHTHALMIC at 21:20

## 2018-07-15 RX ADMIN — METOPROLOL TARTRATE 50 MG: 50 TABLET, FILM COATED ORAL at 08:30

## 2018-07-15 RX ADMIN — METFORMIN HYDROCHLORIDE 1000 MG: 1000 TABLET ORAL at 08:30

## 2018-07-15 RX ADMIN — LOSARTAN POTASSIUM 50 MG: 50 TABLET, FILM COATED ORAL at 08:29

## 2018-07-15 RX ADMIN — CLONAZEPAM 0.5 MG: 0.5 TABLET ORAL at 21:06

## 2018-07-15 RX ADMIN — METFORMIN HYDROCHLORIDE 1000 MG: 1000 TABLET ORAL at 17:57

## 2018-07-15 RX ADMIN — TIMOLOL MALEATE 1 DROP: 5 SOLUTION OPHTHALMIC at 21:20

## 2018-07-15 RX ADMIN — METOPROLOL TARTRATE 50 MG: 50 TABLET, FILM COATED ORAL at 21:07

## 2018-07-15 RX ADMIN — DIVALPROEX SODIUM 1000 MG: 500 TABLET, EXTENDED RELEASE ORAL at 21:06

## 2018-07-15 RX ADMIN — ATORVASTATIN CALCIUM 40 MG: 40 TABLET, FILM COATED ORAL at 08:30

## 2018-07-15 RX ADMIN — BENZTROPINE MESYLATE 1 MG: 1 TABLET ORAL at 12:39

## 2018-07-15 RX ADMIN — LINAGLIPTIN 5 MG: 5 TABLET, FILM COATED ORAL at 08:30

## 2018-07-15 RX ADMIN — QUETIAPINE FUMARATE 100 MG: 100 TABLET ORAL at 08:30

## 2018-07-15 RX ADMIN — INSULIN LISPRO 2 UNITS: 100 INJECTION, SOLUTION INTRAVENOUS; SUBCUTANEOUS at 08:28

## 2018-07-15 RX ADMIN — HYDROCHLOROTHIAZIDE 12.5 MG: 12.5 TABLET ORAL at 08:30

## 2018-07-15 RX ADMIN — QUETIAPINE FUMARATE 600 MG: 300 TABLET ORAL at 21:09

## 2018-07-15 RX ADMIN — DIVALPROEX SODIUM 1000 MG: 500 TABLET, EXTENDED RELEASE ORAL at 08:30

## 2018-07-15 RX ADMIN — CLONAZEPAM 0.5 MG: 0.5 TABLET ORAL at 08:30

## 2018-07-15 RX ADMIN — INSULIN LISPRO 4 UNITS: 100 INJECTION, SOLUTION INTRAVENOUS; SUBCUTANEOUS at 17:58

## 2018-07-15 ASSESSMENT — PAIN DESCRIPTION - LOCATION: LOCATION: HIP

## 2018-07-15 ASSESSMENT — PAIN SCALES - GENERAL
PAINLEVEL_OUTOF10: 1
PAINLEVEL_OUTOF10: 0
PAINLEVEL_OUTOF10: 5
PAINLEVEL_OUTOF10: 0

## 2018-07-15 ASSESSMENT — PAIN DESCRIPTION - ORIENTATION: ORIENTATION: RIGHT

## 2018-07-15 ASSESSMENT — PAIN DESCRIPTION - PROGRESSION: CLINICAL_PROGRESSION: GRADUALLY IMPROVING

## 2018-07-15 ASSESSMENT — PAIN - FUNCTIONAL ASSESSMENT: PAIN_FUNCTIONAL_ASSESSMENT: 0-10

## 2018-07-15 ASSESSMENT — PAIN DESCRIPTION - DESCRIPTORS: DESCRIPTORS: PATIENT UNABLE TO DESCRIBE

## 2018-07-15 NOTE — PROGRESS NOTES
DATE OF SERVICE:     7/15/2018    Jalyn Cooper seen today for the purpose of continuation of care. Nursing, social work reports, laboratory studies and vital signs are reviewed. Per nursing she remains manic and grandiose. Patient chief complaint today is:             [] Depression      [x] Anxiety        [x] Psychosis         [] Suicidal/Homicidal                         [] Delusions           [] Aggression          Subjective:     Patient singing and dancing in kitchen area. Today patient states that she doesn't know how she fell last night. Did attend groups today. Denies SI, HI, and AVH. Sleep:  [x] Good [] Fair  [] Poor  Appetite:  [x] Good [] Fair  [] Poor    Depression:  [] Mild [] Moderate [] Severe                [] Constant [] Sporadic     Anxiety: [] Mild [] Moderate [x] Severe    [x] Constant [] Sporadic     Delusions: [] Mild [] Moderate [x] Severe     [x] Constant [] Sporadic     [] Paranoid [] Somatic [x] Grandiose     Hallucinations: [] Mild [] Moderate [] Severe     [] Constant [] Sporadic    [] Auditory  [] Visual [] Tactile       Suicidal: [] Constant [] Sporadic  Homicidal: [] Constant [] Sporadic     Unscheduled Medications     [] Patient Receiving Emergency Medications \" Chemical Restraint\"   [] Requesting PRN medications for anxiety    Medical Review of Systems:     All other than marked systmes have been reviewed and are all negative.     Constitutional Symptoms: []  fever []  Chills  Skin Symptoms: [] rash []  Pruritus   Eye Symptoms: [] Vision unchanged []  recent vision problems[] blurred vision   Respiratory Symptoms:[] shortness of breath [] cough  Cardiovascular Symptoms:  [] chest pain   [] palpitations   Gastrointestinal Symptoms: []  abdominal pain []  nausea []  vomiting []  diarrhea  Genitourinary Symptoms: []  dysuria  []  hematuria   Musculoskeletal Symptoms: []  back pain []  muscle pain []  joint pain  Neurologic Symptoms: []  headache []  dizziness  Hematolymphoid

## 2018-07-15 NOTE — PROGRESS NOTES
Patient declined to attend community meeting.  Patient identified goal for the day as: \"Not to fall\"

## 2018-07-15 NOTE — PROGRESS NOTES
Client yelled out a few times from her chair then heard a thud and reported by other clients that she had fallen out of her chair. Found on left side . No bleeding noted. No contusions or brusies noted. Speech is slurred and difficult to arouse. Pupils react and are equal. All extremities are in alignment. Will respond verbaly but, slowly. asst to bed and side ralis and bed alarm on and Dr Yanira Selby and Dr Delma Garcia notified.  Ct of head was ordered by Dr Yanira Selby and Dr Delma Garcia said to do without contrast.

## 2018-07-16 LAB
METER GLUCOSE: 115 MG/DL (ref 70–110)
METER GLUCOSE: 118 MG/DL (ref 70–110)
METER GLUCOSE: 144 MG/DL (ref 70–110)
METER GLUCOSE: 176 MG/DL (ref 70–110)

## 2018-07-16 PROCEDURE — 6370000000 HC RX 637 (ALT 250 FOR IP): Performed by: INTERNAL MEDICINE

## 2018-07-16 PROCEDURE — 1240000000 HC EMOTIONAL WELLNESS R&B

## 2018-07-16 PROCEDURE — 6370000000 HC RX 637 (ALT 250 FOR IP): Performed by: PSYCHIATRY & NEUROLOGY

## 2018-07-16 PROCEDURE — 82962 GLUCOSE BLOOD TEST: CPT

## 2018-07-16 PROCEDURE — 99231 SBSQ HOSP IP/OBS SF/LOW 25: CPT | Performed by: PSYCHIATRY & NEUROLOGY

## 2018-07-16 RX ADMIN — DIVALPROEX SODIUM 1000 MG: 500 TABLET, EXTENDED RELEASE ORAL at 09:59

## 2018-07-16 RX ADMIN — METOPROLOL TARTRATE 50 MG: 50 TABLET, FILM COATED ORAL at 21:33

## 2018-07-16 RX ADMIN — METFORMIN HYDROCHLORIDE 1000 MG: 1000 TABLET ORAL at 10:01

## 2018-07-16 RX ADMIN — ATORVASTATIN CALCIUM 40 MG: 40 TABLET, FILM COATED ORAL at 09:59

## 2018-07-16 RX ADMIN — INSULIN LISPRO 2 UNITS: 100 INJECTION, SOLUTION INTRAVENOUS; SUBCUTANEOUS at 13:36

## 2018-07-16 RX ADMIN — LOSARTAN POTASSIUM 50 MG: 50 TABLET, FILM COATED ORAL at 10:00

## 2018-07-16 RX ADMIN — DIVALPROEX SODIUM 1000 MG: 500 TABLET, EXTENDED RELEASE ORAL at 21:33

## 2018-07-16 RX ADMIN — CLONAZEPAM 0.5 MG: 0.5 TABLET ORAL at 21:32

## 2018-07-16 RX ADMIN — METOPROLOL TARTRATE 50 MG: 50 TABLET, FILM COATED ORAL at 10:00

## 2018-07-16 RX ADMIN — QUETIAPINE FUMARATE 100 MG: 100 TABLET ORAL at 10:02

## 2018-07-16 RX ADMIN — LINAGLIPTIN 5 MG: 5 TABLET, FILM COATED ORAL at 10:00

## 2018-07-16 RX ADMIN — BENZTROPINE MESYLATE 1 MG: 1 TABLET ORAL at 13:36

## 2018-07-16 RX ADMIN — CLONAZEPAM 0.5 MG: 0.5 TABLET ORAL at 09:59

## 2018-07-16 RX ADMIN — METFORMIN HYDROCHLORIDE 1000 MG: 1000 TABLET ORAL at 18:27

## 2018-07-16 RX ADMIN — TIMOLOL MALEATE 1 DROP: 5 SOLUTION OPHTHALMIC at 10:01

## 2018-07-16 RX ADMIN — INSULIN LISPRO 2 UNITS: 100 INJECTION, SOLUTION INTRAVENOUS; SUBCUTANEOUS at 10:04

## 2018-07-16 RX ADMIN — LATANOPROST 1 DROP: 50 SOLUTION OPHTHALMIC at 21:39

## 2018-07-16 RX ADMIN — TIMOLOL MALEATE 1 DROP: 5 SOLUTION OPHTHALMIC at 21:39

## 2018-07-16 RX ADMIN — QUETIAPINE FUMARATE 600 MG: 300 TABLET ORAL at 21:33

## 2018-07-16 RX ADMIN — HYDROCHLOROTHIAZIDE 12.5 MG: 12.5 TABLET ORAL at 10:00

## 2018-07-16 ASSESSMENT — PAIN SCALES - GENERAL
PAINLEVEL_OUTOF10: 5
PAINLEVEL_OUTOF10: 0

## 2018-07-16 NOTE — PROGRESS NOTES
Grandiose    Perception: [x]  None  [] Auditory   [] Visual  [] tactile   [] olfactory  [] Illusions         Insight: [] Intact  [] Fair  [x] Limited    Judgement:  [] Intact  [] Fair  [x] Limited      Assessment/Plan:        Patient Active Problem List   Diagnosis Code    Cellulitis L03.90    Severe bipolar [facility] F31.89    Carpal tunnel syndrome G56.00    DM2 (diabetes mellitus, type 2) (HCC) E11.9    Hyperlipidemia E78.5    Depression F32.9    Vitamin D deficiency E55.9    Systemic primary arterial hypertension I10    Psychosis F29    Acute psychosis F23    Severe manic bipolar I disorder with psychotic features ([facility] F31.2         Plan:    []  Patient is refusing medications  [] Improving as expected   [x] Not improving as expected   [] Worsening    []  At Baseline       Reason for more than one antipsychotic:  [x] N/A  [] 3 failed monotherapy(drugs tried):  [] Cross over to a new antipsychotic  [] Taper to monotherapy from polypharmacy  [] Augmentation of Clozapine therapy due to treatment resistance to single therapy      José Miguel Sheldon  7/16/2018  12:30 PM

## 2018-07-16 NOTE — CONSULTS
Subjective:  82-year-old -American woman with complicated medical and psychiatric history  Patient was seen on psychiatric unit earlier this morning  Data reviewed in detail  She was admitted with acute psychosis  Offers no significant complaints this morning  Currently on metformin /tradjenta  Blood sugars improved  Pt was found on the floor 2 nights ago  No apparent injuries  No syncope  CT head negative  Objective:    /67   Pulse 104   Temp 98.7 °F (37.1 °C) (Oral)   Resp 20   Ht 5' 3\" (1.6 m)   Wt 180 lb 6.4 oz (81.8 kg)   SpO2 94%   BMI 31.96 kg/m²   Awake alert oriented  Seems somewhat agitated  Answers questions appropriately  Pressure of speech noted  Oral mucosa moist  Neck supple  Heart:  RRR, no murmurs, gallops, or rubs.   Lungs:  CTA bilaterally, no wheeze, rales or rhonchi  Abd: bowel sounds present, nontender, nondistended, no masses  Extrem:  No clubbing, cyanosis, And chronic bilateral lower extremity  Edema noted    Data reviewed in detail    Assessment:  Acute psychosis admitted by psychiatry  Poorly controlled type 2 diabetes  Mellitus resolved hypoglycemia  Improved now  Multiple comorbidities as listed below  Patient Active Problem List   Diagnosis    Cellulitis    Severe bipolar affective disorder with psychosis ([address]    Carpal tunnel syndrome    DM2 (diabetes mellitus, type 2) ([address]    Hyperlipidemia    Depression    Vitamin D deficiency    Systemic primary arterial hypertension    Psychosis    Acute psychosis    Severe manic bipolar I disorder with psychotic features ([address]       Plan:  Discontinued glyburide and invokana  Continue Tradjenta  Continue metformin  Reviewed in detail with the patient        Brody Fina  7:53 AM  7/16/2018

## 2018-07-17 ENCOUNTER — APPOINTMENT (OUTPATIENT)
Dept: GENERAL RADIOLOGY | Age: 63
DRG: 753 | End: 2018-07-17
Payer: COMMERCIAL

## 2018-07-17 LAB
METER GLUCOSE: 108 MG/DL (ref 70–110)
METER GLUCOSE: 111 MG/DL (ref 70–110)
METER GLUCOSE: 136 MG/DL (ref 70–110)
METER GLUCOSE: 179 MG/DL (ref 70–110)

## 2018-07-17 PROCEDURE — 6370000000 HC RX 637 (ALT 250 FOR IP): Performed by: INTERNAL MEDICINE

## 2018-07-17 PROCEDURE — 82962 GLUCOSE BLOOD TEST: CPT

## 2018-07-17 PROCEDURE — 6370000000 HC RX 637 (ALT 250 FOR IP): Performed by: PSYCHIATRY & NEUROLOGY

## 2018-07-17 PROCEDURE — 1240000000 HC EMOTIONAL WELLNESS R&B

## 2018-07-17 PROCEDURE — 73565 X-RAY EXAM OF KNEES: CPT

## 2018-07-17 PROCEDURE — 99232 SBSQ HOSP IP/OBS MODERATE 35: CPT | Performed by: PSYCHIATRY & NEUROLOGY

## 2018-07-17 RX ORDER — QUETIAPINE FUMARATE 100 MG/1
200 TABLET, FILM COATED ORAL EVERY MORNING
Status: DISCONTINUED | OUTPATIENT
Start: 2018-07-18 | End: 2018-07-24 | Stop reason: HOSPADM

## 2018-07-17 RX ADMIN — CLONAZEPAM 0.5 MG: 0.5 TABLET ORAL at 23:24

## 2018-07-17 RX ADMIN — METFORMIN HYDROCHLORIDE 1000 MG: 1000 TABLET ORAL at 18:04

## 2018-07-17 RX ADMIN — QUETIAPINE FUMARATE 600 MG: 300 TABLET ORAL at 23:24

## 2018-07-17 RX ADMIN — BENZTROPINE MESYLATE 1 MG: 1 TABLET ORAL at 11:19

## 2018-07-17 RX ADMIN — METFORMIN HYDROCHLORIDE 1000 MG: 1000 TABLET ORAL at 08:47

## 2018-07-17 RX ADMIN — METOPROLOL TARTRATE 50 MG: 50 TABLET, FILM COATED ORAL at 08:46

## 2018-07-17 RX ADMIN — LATANOPROST 1 DROP: 50 SOLUTION OPHTHALMIC at 23:29

## 2018-07-17 RX ADMIN — ATORVASTATIN CALCIUM 40 MG: 40 TABLET, FILM COATED ORAL at 08:47

## 2018-07-17 RX ADMIN — DIVALPROEX SODIUM 1000 MG: 500 TABLET, EXTENDED RELEASE ORAL at 23:24

## 2018-07-17 RX ADMIN — CLONAZEPAM 0.5 MG: 0.5 TABLET ORAL at 08:46

## 2018-07-17 RX ADMIN — LINAGLIPTIN 5 MG: 5 TABLET, FILM COATED ORAL at 08:46

## 2018-07-17 RX ADMIN — LOSARTAN POTASSIUM 50 MG: 50 TABLET, FILM COATED ORAL at 08:46

## 2018-07-17 RX ADMIN — METOPROLOL TARTRATE 50 MG: 50 TABLET, FILM COATED ORAL at 23:25

## 2018-07-17 RX ADMIN — DIVALPROEX SODIUM 1000 MG: 500 TABLET, EXTENDED RELEASE ORAL at 08:46

## 2018-07-17 RX ADMIN — QUETIAPINE FUMARATE 100 MG: 100 TABLET ORAL at 08:47

## 2018-07-17 RX ADMIN — HYDROCHLOROTHIAZIDE 12.5 MG: 12.5 TABLET ORAL at 08:46

## 2018-07-17 RX ADMIN — ACETAMINOPHEN 650 MG: 325 TABLET, FILM COATED ORAL at 11:18

## 2018-07-17 RX ADMIN — TIMOLOL MALEATE 1 DROP: 5 SOLUTION OPHTHALMIC at 08:55

## 2018-07-17 RX ADMIN — INSULIN LISPRO 2 UNITS: 100 INJECTION, SOLUTION INTRAVENOUS; SUBCUTANEOUS at 08:47

## 2018-07-17 ASSESSMENT — PAIN SCALES - GENERAL
PAINLEVEL_OUTOF10: 8
PAINLEVEL_OUTOF10: 0

## 2018-07-17 NOTE — PROGRESS NOTES
Schimosis       Psychiatric Review of systems  Delusions:  [] Denies [] Endorses   Withdrawals:  [] Denies [] Endorses    Hallucinations: [] Denies [] Endorses    Extra Pyramidal Symptoms: [] Denies [] Endorses      /81   Pulse 97   Temp 96.3 °F (35.7 °C) (Axillary)   Resp 16   Ht 5' 3\" (1.6 m)   Wt 180 lb 6.4 oz (81.8 kg)   SpO2 100%   BMI 31.96 kg/m²     Mental Status Examination:    Cognition:      [x] Alert  [x] Awake  [x] Oriented  [x] Person  [x] Place [x] Time      [] drowsy  [] tired  [] lethargic  [] distractable  [] Other     Attention/Concentration:   [] Attentive  [x] Distracted        Memory Recent and Remote: [x] Intact   [] Impaired [] Partially Impaired     Language: [] Able to recognize and name objects          [] Unable to recognize and name Objects    Fund of Knowledge:  [] Poor []  Fair  [] Good    Speech: [] Normal  [x] Soft  [] Slow  [] Fast [] Pressured            [] Loud [] Dysarthria  [] Incoherent    Appearance: [] Well Groomed  [] Casual Dressed  [] Unkept  [x] Disheveled          [] Normal weight[] Thin  [x] Overweight  [] Obese           Attitude: [] Positive  [] Hostile  [] Demanding  [] Guarded  [] Defensive         [x] Cooperative  []  Uncooperative      Behavior:  [x] Normal Gait  [] Walks with Assistance  [] Vickie Chair    [] Walks with Rohini Michelle  [] In Hospital Bed  [] Sitting in Chair    Muscle-Skeletal:  [x] Normal Muscle Tone [] Muscle Atrophy       [] Abnormal Muscle Movement     Eye Contact:  [x] Good eye contact  [] Intermittent Eye Contact  [] Poor Eye Contact     Mood: [] Depressed  [x] Anxious  [] Irritated  [] Euthymic   [] Angry [x] Restless    Affect:  [] Congruent  [] Incongruent  [x] Labile  [] Constricted  [] Flat  [] Bizarre     Thought Process and Association:  [] Logical [] Illogical       [] Linear and Goal Directed  [x] Tangential  [x] Circumstantial     Thought Content:  [] Denies [] Endorses [] Suicidal [] Homicidal  [x] Delusional      [] Paranoid [] Somatic  [x] Grandiose    Perception: [x]  None  [] Auditory   [] Visual  [] tactile   [] olfactory  [] Illusions         Insight: [] Intact  [] Fair  [x] Limited    Judgement:  [] Intact  [] Fair  [x] Limited      Assessment/Plan:        Patient Active Problem List   Diagnosis Code    Cellulitis L03.90    Severe bipolar affective disorder with psychosis ([facility] F31.89    Carpal tunnel syndrome G56.00    DM2 (diabetes mellitus, type 2) ([facility] E11.9    Hyperlipidemia E78.5    Depression F32.9    Vitamin D deficiency E55.9    Systemic primary arterial hypertension I10    Psychosis F29    Acute psychosis F23    Severe manic bipolar I disorder with [facility] features [facility] F31.2         Plan:    []  Patient is refusing medications  [] Improving as expected   [x] Not improving as expected Will increase Seroquel to 200 mg in the morning   [] Worsening    []  At Baseline       Reason for more than one antipsychotic:  [x] N/A  [] 3 failed monotherapy(drugs tried):  [] Cross over to a new antipsychotic  [] Taper to monotherapy from polypharmacy  [] Augmentation of Clozapine therapy due to treatment resistance to single therapy      Signed:  Yessenia Sanders  7/17/2018  12:18 PM

## 2018-07-17 NOTE — CONSULTS
Subjective:  30-year-old -American woman with complicated medical and psychiatric history  Patient was seen on psychiatric unit earlier this morning  Data reviewed in detail  She was admitted with acute psychosis  Offers no significant complaints this morning  Currently on metformin /tradjenta  Blood sugars improved  Pt was found on the floor 3 nights ago  No apparent injuries  No syncope  CT head negative  C/o knee pain  objective:    /81   Pulse 97   Temp 96.3 °F (35.7 °C) (Axillary)   Resp 16   Ht 5' 3\" (1.6 m)   Wt 180 lb 6.4 oz (81.8 kg)   SpO2 100%   BMI 31.96 kg/m²   Awake alert oriented  Seems somewhat agitated  Answers questions appropriately  Pressure of speech noted  Oral mucosa moist  Neck supple  Heart:  RRR, no murmurs, gallops, or rubs.   Lungs:  CTA bilaterally, no wheeze, rales or rhonchi  Abd: bowel sounds present, nontender, nondistended, no masses  Extrem:  No clubbing, cyanosis, And chronic bilateral lower extremity  Edema noted  Mild knee swelling noted  Data reviewed in detail    Assessment:  Acute psychosis admitted by psychiatry  Poorly controlled type 2 diabetes  Mellitus resolved hypoglycemia  Improved now  Knee injury  Multiple comorbidities as listed below  Patient Active Problem List   Diagnosis    Cellulitis    Severe bipolar affective disorder with psychosis ([address]    Carpal tunnel syndrome    DM2 (diabetes mellitus, type 2) ([address]    Hyperlipidemia    Depression    Vitamin D deficiency    Systemic primary arterial hypertension    Psychosis    Acute psychosis    Severe manic bipolar I disorder with psychotic features ([address]       Plan:  Check xrays of knees  Discontinued glyburide and invokana  Continue Tradjenta  Continue metformin  Reviewed in detail with the patient        Simeon Earlybautista  12:59 PM  7/17/2018

## 2018-07-18 LAB
METER GLUCOSE: 107 MG/DL (ref 70–110)
METER GLUCOSE: 160 MG/DL (ref 70–110)
METER GLUCOSE: 162 MG/DL (ref 70–110)
METER GLUCOSE: 200 MG/DL (ref 70–110)

## 2018-07-18 PROCEDURE — 6370000000 HC RX 637 (ALT 250 FOR IP): Performed by: INTERNAL MEDICINE

## 2018-07-18 PROCEDURE — 99232 SBSQ HOSP IP/OBS MODERATE 35: CPT | Performed by: PSYCHIATRY & NEUROLOGY

## 2018-07-18 PROCEDURE — 6370000000 HC RX 637 (ALT 250 FOR IP): Performed by: PSYCHIATRY & NEUROLOGY

## 2018-07-18 PROCEDURE — 82962 GLUCOSE BLOOD TEST: CPT

## 2018-07-18 PROCEDURE — 1240000000 HC EMOTIONAL WELLNESS R&B

## 2018-07-18 RX ADMIN — METOPROLOL TARTRATE 50 MG: 50 TABLET, FILM COATED ORAL at 21:38

## 2018-07-18 RX ADMIN — TIMOLOL MALEATE 1 DROP: 5 SOLUTION OPHTHALMIC at 00:25

## 2018-07-18 RX ADMIN — INSULIN LISPRO 1 UNITS: 100 INJECTION, SOLUTION INTRAVENOUS; SUBCUTANEOUS at 21:30

## 2018-07-18 RX ADMIN — CLONAZEPAM 0.5 MG: 0.5 TABLET ORAL at 08:36

## 2018-07-18 RX ADMIN — DIVALPROEX SODIUM 1000 MG: 500 TABLET, EXTENDED RELEASE ORAL at 08:36

## 2018-07-18 RX ADMIN — BENZTROPINE MESYLATE 1 MG: 1 TABLET ORAL at 13:01

## 2018-07-18 RX ADMIN — HYDROCHLOROTHIAZIDE 12.5 MG: 12.5 TABLET ORAL at 08:36

## 2018-07-18 RX ADMIN — LINAGLIPTIN 5 MG: 5 TABLET, FILM COATED ORAL at 08:36

## 2018-07-18 RX ADMIN — CLONAZEPAM 0.5 MG: 0.5 TABLET ORAL at 22:11

## 2018-07-18 RX ADMIN — QUETIAPINE FUMARATE 600 MG: 300 TABLET ORAL at 22:11

## 2018-07-18 RX ADMIN — INSULIN LISPRO 4 UNITS: 100 INJECTION, SOLUTION INTRAVENOUS; SUBCUTANEOUS at 08:35

## 2018-07-18 RX ADMIN — TIMOLOL MALEATE 1 DROP: 5 SOLUTION OPHTHALMIC at 21:39

## 2018-07-18 RX ADMIN — DIVALPROEX SODIUM 1000 MG: 500 TABLET, EXTENDED RELEASE ORAL at 22:11

## 2018-07-18 RX ADMIN — METOPROLOL TARTRATE 50 MG: 50 TABLET, FILM COATED ORAL at 08:36

## 2018-07-18 RX ADMIN — METFORMIN HYDROCHLORIDE 1000 MG: 1000 TABLET ORAL at 08:37

## 2018-07-18 RX ADMIN — LATANOPROST 1 DROP: 50 SOLUTION OPHTHALMIC at 21:34

## 2018-07-18 RX ADMIN — INSULIN LISPRO 2 UNITS: 100 INJECTION, SOLUTION INTRAVENOUS; SUBCUTANEOUS at 18:06

## 2018-07-18 RX ADMIN — LOSARTAN POTASSIUM 50 MG: 50 TABLET, FILM COATED ORAL at 08:36

## 2018-07-18 RX ADMIN — METFORMIN HYDROCHLORIDE 1000 MG: 1000 TABLET ORAL at 18:08

## 2018-07-18 RX ADMIN — ATORVASTATIN CALCIUM 40 MG: 40 TABLET, FILM COATED ORAL at 08:36

## 2018-07-18 RX ADMIN — QUETIAPINE FUMARATE 200 MG: 100 TABLET ORAL at 08:37

## 2018-07-18 RX ADMIN — RISPERIDONE 3 MG: 2 TABLET, FILM COATED ORAL at 13:01

## 2018-07-18 RX ADMIN — TIMOLOL MALEATE 1 DROP: 5 SOLUTION OPHTHALMIC at 08:37

## 2018-07-18 ASSESSMENT — PAIN SCALES - GENERAL
PAINLEVEL_OUTOF10: 0
PAINLEVEL_OUTOF10: 0

## 2018-07-18 NOTE — PROGRESS NOTES
Spiritual Support Group Note    Number of Participants in Group:  6                               Time: 2-2:45    Goal: Relief from isolation and loneliness             Susu Sharing             Self-understanding and gain insight  X             Acceptance and belonging            Recognize they are not alone                Socialization             Empowerment       Encouragement    Topic:  [] Spiritual Wellness and Self Care                  [] Hope                     [] Connecting with Divine/Others        [x] Thankfulness and Gratitude               []  Meaningfulness and Purpose               [] Forgiveness               [] Peace               [] Connect to [employer]      [] Other    Participation Level:   [x] Active Listener   [] Minimal   [] Monopolizing   [x] Interactive   [] No Participation   []  Other:     Attention:   [x] Alert   [] Distractible   [] Drowsy   [] Poor   [] Other:    Manner:   [x] Cooperative   [] Suspicious   [] Withdrawn   [] Guarded   [] Irritable   [] Inhospitable   [] Other:     Others Comments from Group:

## 2018-07-18 NOTE — PROGRESS NOTES
Patient declined to attend community meeting. Patient identified goal for the day as:  \"Have a good day\"

## 2018-07-18 NOTE — PROGRESS NOTES
DATE OF SERVICE:     7/18/2018    Cam Moreno seen today for the purpose of continuation of care. Nursing, social work reports, laboratory studies and vital signs are reviewed. Patient chief complaint today is:             [] Depression      [] Anxiety        [x] Psychosis         [] Suicidal/Homicidal                         [] Delusions           [] Aggression          Subjective: Today patient is manic. Patient slept 1.5 hours last night and per nursing, patient has been yelling out. Sleep:  [] Good [] Fair  [x] Poor  Appetite:  [] Good [x] Fair  [] Poor    Depression:  [] Mild [] Moderate [] Severe                [] Constant [] Sporadic     Anxiety: [] Mild [] Moderate [x] Severe    [x] Constant [] Sporadic     Delusions: [] Mild [] Moderate [x] Severe     [x] Constant [] Sporadic     [] Paranoid [] Somatic [x] Grandiose     Hallucinations: [] Mild [] Moderate [] Severe     [] Constant [] Sporadic    [] Auditory  [] Visual [] Tactile       Suicidal: [] Constant [] Sporadic  Homicidal: [] Constant [] Sporadic    Unscheduled Medications     [] Patient Receiving Emergency Medications \" Chemical Restraint\"   [] Requesting PRN medications for anxiety    Medical Review of Systems:     All other than marked systmes have been reviewed and are all negative.     Constitutional Symptoms: []  fever []  Chills  Skin Symptoms: [] rash []  Pruritus   Eye Symptoms: [] Vision unchanged []  recent vision problems[] blurred vision   Respiratory Symptoms:[] shortness of breath [] cough  Cardiovascular Symptoms:  [] chest pain   [] palpitations   Gastrointestinal Symptoms: []  abdominal pain []  nausea []  vomiting []  diarrhea  Genitourinary Symptoms: []  dysuria  []  hematuria   Musculoskeletal Symptoms: []  back pain []  muscle pain []  joint pain  Neurologic Symptoms: []  headache []  dizziness  Hematolymphoid Symptoms: [] Adenopathy [] Bruises   [] Schimosis       Psychiatric Review of systems  Delusions:  []

## 2018-07-18 NOTE — PROGRESS NOTES
Patient was falling asleep in her chair in the day room staff assisted her to bed to prevent her from falling.

## 2018-07-19 LAB
METER GLUCOSE: 119 MG/DL (ref 70–110)
METER GLUCOSE: 143 MG/DL (ref 70–110)
METER GLUCOSE: 232 MG/DL (ref 70–110)

## 2018-07-19 PROCEDURE — 1240000000 HC EMOTIONAL WELLNESS R&B

## 2018-07-19 PROCEDURE — 6370000000 HC RX 637 (ALT 250 FOR IP): Performed by: INTERNAL MEDICINE

## 2018-07-19 PROCEDURE — 6360000002 HC RX W HCPCS: Performed by: PSYCHIATRY & NEUROLOGY

## 2018-07-19 PROCEDURE — 6370000000 HC RX 637 (ALT 250 FOR IP): Performed by: PSYCHIATRY & NEUROLOGY

## 2018-07-19 PROCEDURE — 99231 SBSQ HOSP IP/OBS SF/LOW 25: CPT | Performed by: PSYCHIATRY & NEUROLOGY

## 2018-07-19 PROCEDURE — 82962 GLUCOSE BLOOD TEST: CPT

## 2018-07-19 RX ADMIN — BENZTROPINE MESYLATE 1 MG: 1 TABLET ORAL at 12:29

## 2018-07-19 RX ADMIN — METFORMIN HYDROCHLORIDE 1000 MG: 1000 TABLET ORAL at 08:36

## 2018-07-19 RX ADMIN — METOPROLOL TARTRATE 50 MG: 50 TABLET, FILM COATED ORAL at 08:37

## 2018-07-19 RX ADMIN — RISPERIDONE 3 MG: 2 TABLET, FILM COATED ORAL at 08:36

## 2018-07-19 RX ADMIN — QUETIAPINE FUMARATE 200 MG: 100 TABLET ORAL at 08:36

## 2018-07-19 RX ADMIN — HYDROCHLOROTHIAZIDE 12.5 MG: 12.5 TABLET ORAL at 08:37

## 2018-07-19 RX ADMIN — CLONAZEPAM 0.5 MG: 0.5 TABLET ORAL at 08:36

## 2018-07-19 RX ADMIN — HALOPERIDOL LACTATE 10 MG: 5 INJECTION, SOLUTION INTRAMUSCULAR at 17:38

## 2018-07-19 RX ADMIN — TIMOLOL MALEATE 1 DROP: 5 SOLUTION OPHTHALMIC at 08:37

## 2018-07-19 RX ADMIN — LINAGLIPTIN 5 MG: 5 TABLET, FILM COATED ORAL at 08:36

## 2018-07-19 RX ADMIN — LOSARTAN POTASSIUM 50 MG: 50 TABLET, FILM COATED ORAL at 08:37

## 2018-07-19 RX ADMIN — DIVALPROEX SODIUM 1000 MG: 500 TABLET, EXTENDED RELEASE ORAL at 08:36

## 2018-07-19 RX ADMIN — ATORVASTATIN CALCIUM 40 MG: 40 TABLET, FILM COATED ORAL at 08:37

## 2018-07-19 RX ADMIN — INSULIN LISPRO 4 UNITS: 100 INJECTION, SOLUTION INTRAVENOUS; SUBCUTANEOUS at 08:37

## 2018-07-19 ASSESSMENT — PAIN SCALES - GENERAL: PAINLEVEL_OUTOF10: 0

## 2018-07-20 LAB
METER GLUCOSE: 140 MG/DL (ref 70–110)
METER GLUCOSE: 176 MG/DL (ref 70–110)
METER GLUCOSE: 186 MG/DL (ref 70–110)
METER GLUCOSE: 194 MG/DL (ref 70–110)
METER GLUCOSE: 225 MG/DL (ref 70–110)

## 2018-07-20 PROCEDURE — 1240000000 HC EMOTIONAL WELLNESS R&B

## 2018-07-20 PROCEDURE — 6370000000 HC RX 637 (ALT 250 FOR IP): Performed by: PSYCHIATRY & NEUROLOGY

## 2018-07-20 PROCEDURE — 6370000000 HC RX 637 (ALT 250 FOR IP): Performed by: INTERNAL MEDICINE

## 2018-07-20 PROCEDURE — 99232 SBSQ HOSP IP/OBS MODERATE 35: CPT | Performed by: PSYCHIATRY & NEUROLOGY

## 2018-07-20 PROCEDURE — 82962 GLUCOSE BLOOD TEST: CPT

## 2018-07-20 RX ORDER — RISPERIDONE 2 MG/1
6 TABLET, FILM COATED ORAL DAILY
Status: DISCONTINUED | OUTPATIENT
Start: 2018-07-21 | End: 2018-07-22

## 2018-07-20 RX ADMIN — QUETIAPINE FUMARATE 200 MG: 100 TABLET ORAL at 08:30

## 2018-07-20 RX ADMIN — HYDROCHLOROTHIAZIDE 12.5 MG: 12.5 TABLET ORAL at 08:30

## 2018-07-20 RX ADMIN — LINAGLIPTIN 5 MG: 5 TABLET, FILM COATED ORAL at 08:30

## 2018-07-20 RX ADMIN — CLONAZEPAM 0.5 MG: 0.5 TABLET ORAL at 08:35

## 2018-07-20 RX ADMIN — METOPROLOL TARTRATE 50 MG: 50 TABLET, FILM COATED ORAL at 08:30

## 2018-07-20 RX ADMIN — INSULIN LISPRO 2 UNITS: 100 INJECTION, SOLUTION INTRAVENOUS; SUBCUTANEOUS at 12:44

## 2018-07-20 RX ADMIN — TIMOLOL MALEATE 1 DROP: 5 SOLUTION OPHTHALMIC at 00:27

## 2018-07-20 RX ADMIN — TIMOLOL MALEATE 1 DROP: 5 SOLUTION OPHTHALMIC at 21:07

## 2018-07-20 RX ADMIN — RISPERIDONE 3 MG: 2 TABLET, FILM COATED ORAL at 08:30

## 2018-07-20 RX ADMIN — LOSARTAN POTASSIUM 50 MG: 50 TABLET, FILM COATED ORAL at 08:30

## 2018-07-20 RX ADMIN — BENZTROPINE MESYLATE 1 MG: 1 TABLET ORAL at 11:51

## 2018-07-20 RX ADMIN — QUETIAPINE FUMARATE 600 MG: 300 TABLET ORAL at 00:31

## 2018-07-20 RX ADMIN — DIVALPROEX SODIUM 1000 MG: 500 TABLET, EXTENDED RELEASE ORAL at 21:07

## 2018-07-20 RX ADMIN — METOPROLOL TARTRATE 50 MG: 50 TABLET, FILM COATED ORAL at 21:06

## 2018-07-20 RX ADMIN — CLONAZEPAM 0.5 MG: 0.5 TABLET ORAL at 21:06

## 2018-07-20 RX ADMIN — METFORMIN HYDROCHLORIDE 1000 MG: 1000 TABLET ORAL at 08:31

## 2018-07-20 RX ADMIN — QUETIAPINE FUMARATE 600 MG: 300 TABLET ORAL at 21:07

## 2018-07-20 RX ADMIN — INSULIN LISPRO 1 UNITS: 100 INJECTION, SOLUTION INTRAVENOUS; SUBCUTANEOUS at 21:13

## 2018-07-20 RX ADMIN — ATORVASTATIN CALCIUM 40 MG: 40 TABLET, FILM COATED ORAL at 08:31

## 2018-07-20 RX ADMIN — ERGOCALCIFEROL 50000 UNITS: 1.25 CAPSULE ORAL at 08:30

## 2018-07-20 RX ADMIN — METFORMIN HYDROCHLORIDE 1000 MG: 1000 TABLET ORAL at 17:43

## 2018-07-20 RX ADMIN — METOPROLOL TARTRATE 50 MG: 50 TABLET, FILM COATED ORAL at 00:29

## 2018-07-20 RX ADMIN — HYDROXYZINE PAMOATE 50 MG: 50 CAPSULE ORAL at 21:07

## 2018-07-20 RX ADMIN — DIVALPROEX SODIUM 1000 MG: 500 TABLET, EXTENDED RELEASE ORAL at 00:30

## 2018-07-20 RX ADMIN — INSULIN LISPRO 2 UNITS: 100 INJECTION, SOLUTION INTRAVENOUS; SUBCUTANEOUS at 17:46

## 2018-07-20 RX ADMIN — TIMOLOL MALEATE 1 DROP: 5 SOLUTION OPHTHALMIC at 08:51

## 2018-07-20 RX ADMIN — DIVALPROEX SODIUM 1000 MG: 500 TABLET, EXTENDED RELEASE ORAL at 08:30

## 2018-07-20 RX ADMIN — METFORMIN HYDROCHLORIDE 1000 MG: 1000 TABLET ORAL at 00:30

## 2018-07-20 NOTE — PROGRESS NOTES
Patient sitting in bathroom in front of mirror in a chair from the room. Patient refused medications several attempts made by this nurse and staff, Patient just starts screaming obscenities when door is opened and says to \"Get the F* out\" of the room.

## 2018-07-20 NOTE — PROGRESS NOTES
Group Therapy Note    Date: 7/20/2018  Start Time: 1:30  End Time:  2:00  Number of Participants: 9    Type of Group: Cognitive Skills    Wellness Binder Information  Module Name:  Ana Hayden  Session Number:  n/a    Patient's Goal:  Patient will simply match nouns to adjectives and build therapeutic relationships with peers. Notes:  Patient was interactive during group building therapeutic relationships and matched nouns to adjectives. Status After Intervention:  Improved    Participation Level:  Active Listener and Interactive    Participation Quality: Appropriate, Attentive, Sharing and Supportive      Speech:  normal      Thought Process/Content: Logical      Affective Functioning: Congruent      Mood: euthymic      Level of consciousness:  Alert, Oriented x4 and Attentive      Response to Learning: Able to verbalize current knowledge/experience, Able to verbalize/acknowledge new learning, Able to retain information, Capable of insight, Able to change behavior and Progressing to goal      Endings: None Reported    Modes of Intervention: Education, Support, Socialization, Exploration, Clarifying, Problem-solving and Activity      Discipline Responsible: Psychoeducational Specialist      Signature:  Haylie Rinaldi

## 2018-07-20 NOTE — PROGRESS NOTES
Patient sitting in TV area with peers. She began yelling at two elderly, female peers, \"Shut up bitches, I'm gonna kick your asses! \" She zachary to her feet and continued to scream at them. Staff intervened. Patient demanding telephone access. Patient was called to nurse's station area to review her behavior plan. She acknowledged awareness of plan but said she would not return to her room as specified on plan. She continued to demand phone. She then returned to TV area and sat quietly. Within a few minutes, she was yelling again. She physically threatened nursing staff by yelling, \"I'm gonna kick all your asses if you come near me. \" She called staff derogatory names and employed various insults. Hospital police were contacted. Officer Tonie Qureshi arrived and attempted to de-escalate patient. Patient told him that the nurses were \"bitches who stick together like feathers. \" She displayed seductive behavior with him by flirting and dancing. She began singing between yelling tirades. Patient eventually allowed officer to escort her to her room while accompanied by 3 nurses. Patient continued to verbally abuse and physically threaten staff. IM Haldol given without force. Patient did not attempt to harm staff. Patient and staff were uninjured. Patient told staff to get out afterward and remained in her room.

## 2018-07-20 NOTE — PROGRESS NOTES
Patient became boisterous and swearing about doctor not letting her out today and screaming obscenities saying to get his attention, unable to redirect with Behavioral Plan. Patient acknowledged plan and refused to comply. Security helped walk pt back to room. Patient tried to hit and throw bath wipe at staff member and threatened to kill this nurse and two other nurse's. Haldol IM given with security's presence.

## 2018-07-20 NOTE — PROGRESS NOTES
Group Therapy Note     Date: 7/20/2018  Start Time: 2:00  End Time:  2:45  Number of Participants:      Type of Group: Recovery     Wellness Binder Information  Module Name:  10 ways to forgive yourself  And  let go of the past  Session Number:      Patient's Goal:   Pt will learn 10 new principles  To forgive  and move on  from the past.     Notes:  Pt will be able to acknowledge and verbalize  new learning.     Status After Intervention:  Improved     Participation Level: Interactive     Participation Quality: Attentive and Sharing        Speech:  Normal        Thought Process/Content: Logical        Affective Functioning: Congruent        Mood: anxious and depressed        Level of consciousness:  Attentive        Response to Learning: Able to verbalize/acknowledge new learning        Endings: None Reported     Modes of Intervention: Support and Socialization        Discipline Responsible: /Counselor        Signature:  GLORY Carvalho

## 2018-07-20 NOTE — PROGRESS NOTES
DATE OF SERVICE:     7/20/2018    Haley Rizzo seen today for the purpose of continuation of care. Nursing, social work reports, laboratory studies and vital signs are reviewed. Patient chief complaint today is:             [] Depression      [] Anxiety        [x] Psychosis         [] Suicidal/Homicidal                         [] Delusions           [] Aggression          Subjective: Today patient remains manic. Patient did not sleep last night, was sleeping briefly today on unit. Patient received emergency PRN medications and police had to be called to unit last evening for erratic and aggressive behavior. Sleep:  [] Good [] Fair  [x] Poor  Appetite:  [] Good [x] Fair  [] Poor    Depression:  [] Mild [] Moderate [] Severe                [] Constant [] Sporadic     Anxiety: [] Mild [] Moderate [x] Severe    [x] Constant [] Sporadic     Delusions: [] Mild [] Moderate [x] Severe     [x] Constant [] Sporadic     [] Paranoid [] Somatic [x] Grandiose     Hallucinations: [] Mild [] Moderate [] Severe     [] Constant [] Sporadic    [] Auditory  [] Visual [] Tactile       Suicidal: [] Constant [] Sporadic  Homicidal: [] Constant [] Sporadic    Unscheduled Medications     [x] Patient Receiving Emergency Medications \" Chemical Restraint\"   [] Requesting PRN medications for anxiety    Medical Review of Systems:     All other than marked systmes have been reviewed and are all negative.     Constitutional Symptoms: []  fever []  Chills  Skin Symptoms: [] rash []  Pruritus   Eye Symptoms: [] Vision unchanged []  recent vision problems[] blurred vision   Respiratory Symptoms:[] shortness of breath [] cough  Cardiovascular Symptoms:  [] chest pain   [] palpitations   Gastrointestinal Symptoms: []  abdominal pain []  nausea []  vomiting []  diarrhea  Genitourinary Symptoms: []  dysuria  []  hematuria   Musculoskeletal Symptoms: []  back pain []  muscle pain []  joint pain  Neurologic Symptoms: []  headache [] dizziness  Hematolymphoid Symptoms: [] Adenopathy [] Bruises   [] Schimosis       Psychiatric Review of systems  Delusions:  [] Denies [] Endorses   Withdrawals:  [] Denies [] Endorses    Hallucinations: [] Denies [] Endorses    Extra Pyramidal Symptoms: [] Denies [] Endorses      /84   Pulse 100   Temp 97.9 °F (36.6 °C) (Oral)   Resp 18   Ht 5' 3\" (1.6 m)   Wt 180 lb 6.4 oz (81.8 kg)   SpO2 100%   BMI 31.96 kg/m²     Mental Status Examination:    Cognition:      [x] Alert  [x] Awake  [x] Oriented  [x] Person  [x] Place [] Time      [] drowsy  [] tired  [] lethargic  [] distractable  [] Other     Attention/Concentration:   [] Attentive  [x] Distracted        Memory Recent and Remote: [] Intact   [] Impaired [x] Partially Impaired     Language: [] Able to recognize and name objects          [] Unable to recognize and name Objects    Fund of Knowledge:  [] Poor []  Fair  [] Good    Speech: [] Normal  [] Soft  [] Slow  [] Fast [] Pressured            [x] Loud [x] Dysarthria  [] Incoherent    Appearance: [] Well Groomed  [] Casual Dressed  [] Unkept  [x] Disheveled          [] Normal weight[] Thin  [] Overweight  [] Obese           Attitude: [] Positive  [] Hostile  [] Demanding  [] Guarded  [] Defensive         [x] Cooperative  []  Uncooperative      Behavior:  [x] Normal Gait  [] Walks with Assistance  [] Vickie Chair    [] Walks with Eden Fabián  [] In Hospital Bed  [] Sitting in Chair    Muscle-Skeletal:  [x] Normal Muscle Tone [] Muscle Atrophy       [] Abnormal Muscle Movement     Eye Contact:  [x] Good eye contact  [] Intermittent Eye Contact  [] Poor Eye Contact     Mood: [] Depressed  [x] Anxious  [] Irritated  [] Euthymic   [] Angry [x] Restless    Affect:  [] Congruent  [] Incongruent  [x] Labile  [] Constricted  [] Flat  [] Bizarre     Thought Process and Association:  [] Logical [x] Illogical       [] Linear and Goal Directed  [] Tangential  [] Circumstantial     Thought Content:  [] Denies []

## 2018-07-21 LAB
METER GLUCOSE: 154 MG/DL (ref 70–110)
METER GLUCOSE: 162 MG/DL (ref 70–110)
METER GLUCOSE: 197 MG/DL (ref 70–110)
METER GLUCOSE: 214 MG/DL (ref 70–110)
METER GLUCOSE: 216 MG/DL (ref 70–110)

## 2018-07-21 PROCEDURE — 6370000000 HC RX 637 (ALT 250 FOR IP): Performed by: PSYCHIATRY & NEUROLOGY

## 2018-07-21 PROCEDURE — 1240000000 HC EMOTIONAL WELLNESS R&B

## 2018-07-21 PROCEDURE — 6370000000 HC RX 637 (ALT 250 FOR IP): Performed by: INTERNAL MEDICINE

## 2018-07-21 PROCEDURE — 82962 GLUCOSE BLOOD TEST: CPT

## 2018-07-21 PROCEDURE — 99231 SBSQ HOSP IP/OBS SF/LOW 25: CPT | Performed by: PSYCHIATRY & NEUROLOGY

## 2018-07-21 RX ADMIN — INSULIN LISPRO 2 UNITS: 100 INJECTION, SOLUTION INTRAVENOUS; SUBCUTANEOUS at 13:25

## 2018-07-21 RX ADMIN — CLONAZEPAM 0.5 MG: 0.5 TABLET ORAL at 21:06

## 2018-07-21 RX ADMIN — QUETIAPINE FUMARATE 600 MG: 300 TABLET ORAL at 21:06

## 2018-07-21 RX ADMIN — CLONAZEPAM 0.5 MG: 0.5 TABLET ORAL at 09:11

## 2018-07-21 RX ADMIN — METOPROLOL TARTRATE 50 MG: 50 TABLET, FILM COATED ORAL at 09:11

## 2018-07-21 RX ADMIN — DIVALPROEX SODIUM 1000 MG: 500 TABLET, EXTENDED RELEASE ORAL at 09:11

## 2018-07-21 RX ADMIN — TIMOLOL MALEATE 1 DROP: 5 SOLUTION OPHTHALMIC at 21:10

## 2018-07-21 RX ADMIN — ATORVASTATIN CALCIUM 40 MG: 40 TABLET, FILM COATED ORAL at 09:11

## 2018-07-21 RX ADMIN — INSULIN LISPRO 2 UNITS: 100 INJECTION, SOLUTION INTRAVENOUS; SUBCUTANEOUS at 21:30

## 2018-07-21 RX ADMIN — INSULIN LISPRO 4 UNITS: 100 INJECTION, SOLUTION INTRAVENOUS; SUBCUTANEOUS at 08:45

## 2018-07-21 RX ADMIN — TIMOLOL MALEATE 1 DROP: 5 SOLUTION OPHTHALMIC at 09:17

## 2018-07-21 RX ADMIN — HYDROCHLOROTHIAZIDE 12.5 MG: 12.5 TABLET ORAL at 09:11

## 2018-07-21 RX ADMIN — HYDROXYZINE PAMOATE 50 MG: 50 CAPSULE ORAL at 09:11

## 2018-07-21 RX ADMIN — QUETIAPINE FUMARATE 200 MG: 100 TABLET ORAL at 09:11

## 2018-07-21 RX ADMIN — METFORMIN HYDROCHLORIDE 1000 MG: 1000 TABLET ORAL at 08:45

## 2018-07-21 RX ADMIN — LINAGLIPTIN 5 MG: 5 TABLET, FILM COATED ORAL at 09:11

## 2018-07-21 RX ADMIN — DIVALPROEX SODIUM 1000 MG: 500 TABLET, EXTENDED RELEASE ORAL at 21:07

## 2018-07-21 RX ADMIN — LATANOPROST 1 DROP: 50 SOLUTION OPHTHALMIC at 21:09

## 2018-07-21 RX ADMIN — RISPERIDONE 6 MG: 2 TABLET, FILM COATED ORAL at 09:11

## 2018-07-21 RX ADMIN — METOPROLOL TARTRATE 50 MG: 50 TABLET, FILM COATED ORAL at 21:06

## 2018-07-21 RX ADMIN — METFORMIN HYDROCHLORIDE 1000 MG: 1000 TABLET ORAL at 18:04

## 2018-07-21 RX ADMIN — BENZTROPINE MESYLATE 1 MG: 1 TABLET ORAL at 13:24

## 2018-07-21 RX ADMIN — LOSARTAN POTASSIUM 50 MG: 50 TABLET, FILM COATED ORAL at 09:11

## 2018-07-21 RX ADMIN — INSULIN LISPRO 2 UNITS: 100 INJECTION, SOLUTION INTRAVENOUS; SUBCUTANEOUS at 18:07

## 2018-07-21 ASSESSMENT — PAIN SCALES - GENERAL
PAINLEVEL_OUTOF10: 0

## 2018-07-21 NOTE — PROGRESS NOTES
DATE OF SERVICE:     7/21/2018    Diya Damon seen today for the purpose of continuation of care. Nursing, social work reports, laboratory studies and vital signs are reviewed. Patient chief complaint today is:             [] Depression      [] Anxiety        [x] Psychosis         [] Suicidal/Homicidal                         [] Delusions           [] Aggression          Subjective: Today patient is irritable and yelling on unit in the am.  Denies SI, HI, or AVH. Sleep:  [] Good [] Fair  [x] Poor  Appetite:  [] Good [x] Fair  [] Poor    Depression:  [] Mild [] Moderate [] Severe                [] Constant [] Sporadic     Anxiety: [] Mild [] Moderate [x] Severe    [x] Constant [] Sporadic     Delusions: [] Mild [] Moderate [x] Severe     [x] Constant [] Sporadic     [x] Paranoid [] Somatic [x] Grandiose     Hallucinations: [] Mild [] Moderate [] Severe     [] Constant [] Sporadic    [] Auditory  [] Visual [] Tactile       Suicidal: [] Constant [] Sporadic  Homicidal: [] Constant [] Sporadic    Unscheduled Medications     [] Patient Receiving Emergency Medications \" Chemical Restraint\"   [] Requesting PRN medications for anxiety    Medical Review of Systems:     All other than marked systmes have been reviewed and are all negative.     Constitutional Symptoms: []  fever []  Chills  Skin Symptoms: [] rash []  Pruritus   Eye Symptoms: [] Vision unchanged []  recent vision problems[] blurred vision   Respiratory Symptoms:[] shortness of breath [] cough  Cardiovascular Symptoms:  [] chest pain   [] palpitations   Gastrointestinal Symptoms: []  abdominal pain []  nausea []  vomiting []  diarrhea  Genitourinary Symptoms: []  dysuria  []  hematuria   Musculoskeletal Symptoms: []  back pain []  muscle pain []  joint pain  Neurologic Symptoms: []  headache []  dizziness  Hematolymphoid Symptoms: [] Adenopathy [] Bruises   [] Schimosis       Psychiatric Review of systems  Delusions:  [] Denies [] Endorses Withdrawals:  [] Denies [] Endorses    Hallucinations: [] Denies [] Endorses    Extra Pyramidal Symptoms: [] Denies [] Endorses      BP (!) 152/87   Pulse 115   Temp 98 °F (36.7 °C) (Oral)   Resp 16   Ht 5' 3\" (1.6 m)   Wt 180 lb 6.4 oz (81.8 kg)   SpO2 95%   BMI 31.96 kg/m²     Mental Status Examination:    Cognition:      [x] Alert  [x] Awake  [x] Oriented  [x] Person  [x] Place [x] Time      [] drowsy  [] tired  [] lethargic  [] distractable  [] Other     Attention/Concentration:   [] Attentive  [x] Distracted        Memory Recent and Remote: [x] Intact   [] Impaired [] Partially Impaired     Language: [] Able to recognize and name objects          [] Unable to recognize and name Objects    Fund of Knowledge:  [] Poor []  Fair  [] Good    Speech: [] Normal  [] Soft  [] Slow  [x] Fast [] Pressured            [x] Loud [x] Dysarthria  [] Incoherent    Appearance: [] Well Groomed  [] Casual Dressed  [] Unkept  [x] Disheveled          [] Normal weight[] Thin  [] Overweight  [] Obese           Attitude: [] Positive  [] Hostile  [] Demanding  [] Guarded  [] Defensive         [x] Cooperative  []  Uncooperative      Behavior:  [x] Normal Gait  [] Walks with Assistance  [] Vickie Chair    [] Walks with Anatoly Grade  [] In Hospital Bed  [] Sitting in Chair    Muscle-Skeletal:  [x] Normal Muscle Tone [] Muscle Atrophy       [] Abnormal Muscle Movement     Eye Contact:  [] Good eye contact  [x] Intermittent Eye Contact  [] Poor Eye Contact     Mood: [] Depressed  [x] Anxious  [] Irritated  [] Euthymic   [] Angry [x] Restless    Affect:  [] Congruent  [] Incongruent  [x] Labile  [] Constricted  [] Flat  [] Bizarre     Thought Process and Association:  [] Logical [] Illogical       [] Linear and Goal Directed  [x] Tangential  [x] Circumstantial     Thought Content:  [] Denies [] Endorses [] Suicidal [] Homicidal  [x] Delusional      [x] Paranoid  [] Somatic  [x] Grandiose    Perception: [x]  None  [] Auditory   [] Visual  []

## 2018-07-22 LAB
METER GLUCOSE: 125 MG/DL (ref 70–110)
METER GLUCOSE: 154 MG/DL (ref 70–110)
METER GLUCOSE: 183 MG/DL (ref 70–110)
METER GLUCOSE: 185 MG/DL (ref 70–110)

## 2018-07-22 PROCEDURE — 99231 SBSQ HOSP IP/OBS SF/LOW 25: CPT | Performed by: PSYCHIATRY & NEUROLOGY

## 2018-07-22 PROCEDURE — 1240000000 HC EMOTIONAL WELLNESS R&B

## 2018-07-22 PROCEDURE — 6370000000 HC RX 637 (ALT 250 FOR IP): Performed by: PSYCHIATRY & NEUROLOGY

## 2018-07-22 PROCEDURE — 82962 GLUCOSE BLOOD TEST: CPT

## 2018-07-22 PROCEDURE — 6370000000 HC RX 637 (ALT 250 FOR IP): Performed by: INTERNAL MEDICINE

## 2018-07-22 PROCEDURE — 6360000002 HC RX W HCPCS: Performed by: PSYCHIATRY & NEUROLOGY

## 2018-07-22 RX ADMIN — DIVALPROEX SODIUM 1000 MG: 500 TABLET, EXTENDED RELEASE ORAL at 08:38

## 2018-07-22 RX ADMIN — CLONAZEPAM 0.5 MG: 0.5 TABLET ORAL at 08:45

## 2018-07-22 RX ADMIN — METFORMIN HYDROCHLORIDE 1000 MG: 1000 TABLET ORAL at 18:08

## 2018-07-22 RX ADMIN — INSULIN LISPRO 2 UNITS: 100 INJECTION, SOLUTION INTRAVENOUS; SUBCUTANEOUS at 08:42

## 2018-07-22 RX ADMIN — BENZTROPINE MESYLATE 1 MG: 1 TABLET ORAL at 13:00

## 2018-07-22 RX ADMIN — METOPROLOL TARTRATE 50 MG: 50 TABLET, FILM COATED ORAL at 21:21

## 2018-07-22 RX ADMIN — METFORMIN HYDROCHLORIDE 1000 MG: 1000 TABLET ORAL at 08:38

## 2018-07-22 RX ADMIN — HYDROCHLOROTHIAZIDE 12.5 MG: 12.5 TABLET ORAL at 08:38

## 2018-07-22 RX ADMIN — BENZTROPINE MESYLATE 2 MG: 1 INJECTION INTRAMUSCULAR; INTRAVENOUS at 03:19

## 2018-07-22 RX ADMIN — LATANOPROST 1 DROP: 50 SOLUTION OPHTHALMIC at 21:31

## 2018-07-22 RX ADMIN — QUETIAPINE FUMARATE 200 MG: 100 TABLET ORAL at 08:39

## 2018-07-22 RX ADMIN — QUETIAPINE FUMARATE 600 MG: 300 TABLET ORAL at 21:21

## 2018-07-22 RX ADMIN — LINAGLIPTIN 5 MG: 5 TABLET, FILM COATED ORAL at 08:38

## 2018-07-22 RX ADMIN — HALOPERIDOL LACTATE 10 MG: 5 INJECTION, SOLUTION INTRAMUSCULAR at 03:20

## 2018-07-22 RX ADMIN — TIMOLOL MALEATE 1 DROP: 5 SOLUTION OPHTHALMIC at 21:20

## 2018-07-22 RX ADMIN — INSULIN LISPRO 2 UNITS: 100 INJECTION, SOLUTION INTRAVENOUS; SUBCUTANEOUS at 13:01

## 2018-07-22 RX ADMIN — LORAZEPAM 1 MG: 1 TABLET ORAL at 16:00

## 2018-07-22 RX ADMIN — METOPROLOL TARTRATE 50 MG: 50 TABLET, FILM COATED ORAL at 08:38

## 2018-07-22 RX ADMIN — RISPERIDONE 6 MG: 2 TABLET, FILM COATED ORAL at 08:37

## 2018-07-22 RX ADMIN — INSULIN LISPRO 1 UNITS: 100 INJECTION, SOLUTION INTRAVENOUS; SUBCUTANEOUS at 21:26

## 2018-07-22 RX ADMIN — DIVALPROEX SODIUM 1000 MG: 500 TABLET, EXTENDED RELEASE ORAL at 21:22

## 2018-07-22 RX ADMIN — ATORVASTATIN CALCIUM 40 MG: 40 TABLET, FILM COATED ORAL at 08:38

## 2018-07-22 RX ADMIN — TIMOLOL MALEATE 1 DROP: 5 SOLUTION OPHTHALMIC at 08:37

## 2018-07-22 RX ADMIN — LOSARTAN POTASSIUM 50 MG: 50 TABLET, FILM COATED ORAL at 08:38

## 2018-07-22 RX ADMIN — HYDROXYZINE PAMOATE 50 MG: 50 CAPSULE ORAL at 13:00

## 2018-07-22 RX ADMIN — CLONAZEPAM 0.5 MG: 0.5 TABLET ORAL at 21:22

## 2018-07-22 ASSESSMENT — PAIN SCALES - GENERAL
PAINLEVEL_OUTOF10: 0
PAINLEVEL_OUTOF10: 0

## 2018-07-22 NOTE — PROGRESS NOTES
Date: 7/22/2018  Start Time: 10:00  End Time:  10:45  Number of Participants: 6     Type of Group: Psychotherapy     Wellness Binder Information  Module Name:   Session Number:      Patient's Goal:  Gain insight into interpersonal relationships by interacting with others.     Notes: Pt was sleep during group.     Status After Intervention:  Improved     Participation Level: Interactive     Participation Quality: Attentive and Sharing     Speech: Normal     Thought Process/Content: Logical     Affective Functioning: Congruent     Mood: anxious and depressed     Level of consciousness:  Attentive     Response to Learning: Able to verbalize/acknowledge new learning     Endings: None Reported     Modes of Intervention: Support and Socialization     Discipline Responsible: /Counselor

## 2018-07-22 NOTE — PROGRESS NOTES
Patient resting quietly with eyes closed. No S/S of distress noted or observed. No prn medications given at this time. Will continue to monitor, provide needs and safe environment with continued rounding every 15 minutes.

## 2018-07-23 LAB
METER GLUCOSE: 128 MG/DL (ref 70–110)
METER GLUCOSE: 132 MG/DL (ref 70–110)
METER GLUCOSE: 201 MG/DL (ref 70–110)
METER GLUCOSE: 212 MG/DL (ref 70–110)

## 2018-07-23 PROCEDURE — 6370000000 HC RX 637 (ALT 250 FOR IP): Performed by: PSYCHIATRY & NEUROLOGY

## 2018-07-23 PROCEDURE — 82962 GLUCOSE BLOOD TEST: CPT

## 2018-07-23 PROCEDURE — 6370000000 HC RX 637 (ALT 250 FOR IP): Performed by: INTERNAL MEDICINE

## 2018-07-23 PROCEDURE — 1240000000 HC EMOTIONAL WELLNESS R&B

## 2018-07-23 PROCEDURE — 99231 SBSQ HOSP IP/OBS SF/LOW 25: CPT | Performed by: PSYCHIATRY & NEUROLOGY

## 2018-07-23 RX ADMIN — DIVALPROEX SODIUM 1000 MG: 500 TABLET, EXTENDED RELEASE ORAL at 08:46

## 2018-07-23 RX ADMIN — QUETIAPINE FUMARATE 600 MG: 300 TABLET ORAL at 21:31

## 2018-07-23 RX ADMIN — INSULIN LISPRO 4 UNITS: 100 INJECTION, SOLUTION INTRAVENOUS; SUBCUTANEOUS at 08:55

## 2018-07-23 RX ADMIN — LINAGLIPTIN 5 MG: 5 TABLET, FILM COATED ORAL at 08:46

## 2018-07-23 RX ADMIN — ATORVASTATIN CALCIUM 40 MG: 40 TABLET, FILM COATED ORAL at 08:46

## 2018-07-23 RX ADMIN — BENZTROPINE MESYLATE 1 MG: 1 TABLET ORAL at 13:19

## 2018-07-23 RX ADMIN — QUETIAPINE FUMARATE 200 MG: 100 TABLET ORAL at 08:46

## 2018-07-23 RX ADMIN — DIVALPROEX SODIUM 1000 MG: 500 TABLET, EXTENDED RELEASE ORAL at 21:31

## 2018-07-23 RX ADMIN — HYDROCHLOROTHIAZIDE 12.5 MG: 12.5 TABLET ORAL at 08:46

## 2018-07-23 RX ADMIN — LOSARTAN POTASSIUM 50 MG: 50 TABLET, FILM COATED ORAL at 08:46

## 2018-07-23 RX ADMIN — METOPROLOL TARTRATE 50 MG: 50 TABLET, FILM COATED ORAL at 21:31

## 2018-07-23 RX ADMIN — LATANOPROST 1 DROP: 50 SOLUTION OPHTHALMIC at 21:31

## 2018-07-23 RX ADMIN — TIMOLOL MALEATE 1 DROP: 5 SOLUTION OPHTHALMIC at 08:48

## 2018-07-23 RX ADMIN — METFORMIN HYDROCHLORIDE 1000 MG: 1000 TABLET ORAL at 17:59

## 2018-07-23 RX ADMIN — METOPROLOL TARTRATE 50 MG: 50 TABLET, FILM COATED ORAL at 08:46

## 2018-07-23 RX ADMIN — METFORMIN HYDROCHLORIDE 1000 MG: 1000 TABLET ORAL at 08:46

## 2018-07-23 RX ADMIN — INSULIN LISPRO 4 UNITS: 100 INJECTION, SOLUTION INTRAVENOUS; SUBCUTANEOUS at 13:20

## 2018-07-23 RX ADMIN — TIMOLOL MALEATE 1 DROP: 5 SOLUTION OPHTHALMIC at 21:31

## 2018-07-23 RX ADMIN — CLONAZEPAM 0.5 MG: 0.5 TABLET ORAL at 08:47

## 2018-07-23 NOTE — PROGRESS NOTES
Symptoms: [] Adenopathy [] Bruises   [] Schimosis       Psychiatric Review of systems  Delusions:  [] Denies [] Endorses   Withdrawals:  [] Denies [] Endorses    Hallucinations: [] Denies [] Endorses    Extra Pyramidal Symptoms: [] Denies [] Endorses      /78   Pulse 93   Temp 97.9 °F (36.6 °C) (Oral)   Resp 18   Ht 5' 3\" (1.6 m)   Wt 180 lb 6.4 oz (81.8 kg)   SpO2 98%   BMI 31.96 kg/m²     Mental Status Examination:    Cognition:      [x] Alert  [x] Awake  [x] Oriented  [x] Person  [x] Place [x] Time      [] drowsy  [] tired  [] lethargic  [] distractable  [] Other     Attention/Concentration:   [x] Attentive  [] Distracted        Memory Recent and Remote: [] Intact   [] Impaired [x] Partially Impaired     Language: [] Able to recognize and name objects          [] Unable to recognize and name Objects    Fund of Knowledge:  [] Poor []  Fair  [] Good    Speech: [] Normal  [] Soft  [] Slow  [] Fast [] Pressured            [x] Loud [x] Dysarthria  [] Incoherent    Appearance: [] Well Groomed  [] Casual Dressed  [] Unkept  [x] Disheveled          [] Normal weight[] Thin  [] Overweight  [] Obese           Attitude: [] Positive  [] Hostile  [] Demanding  [] Guarded  [] Defensive         [x] Cooperative  []  Uncooperative      Behavior:  [x] Normal Gait  [] Walks with Assistance  [] Vickie Chair    [] Walks with Sherrine Specking  [] In Hospital Bed  [] Sitting in Chair    Muscle-Skeletal:  [x] Normal Muscle Tone [] Muscle Atrophy       [] Abnormal Muscle Movement     Eye Contact:  [x] Good eye contact  [] Intermittent Eye Contact  [] Poor Eye Contact     Mood: [] Depressed  [x] Anxious  [x] Irritated  [] Euthymic   [] Angry [x] Restless    Affect:  [] Congruent  [] Incongruent  [x] Labile  [] Constricted  [] Flat  [] Bizarre     Thought Process and Association:  [] Logical [] Illogical       [] Linear and Goal Directed  [x] Tangential  [x] Circumstantial     Thought Content:  [] Denies [] Endorses [] Suicidal []

## 2018-07-23 NOTE — PROGRESS NOTES
Pt remained awake most of the night. Pt. Irritable. Speaking to unseen others. No other issues. Will continue to monitor q 15 min for safety.

## 2018-07-24 VITALS
HEART RATE: 112 BPM | WEIGHT: 180.4 LBS | TEMPERATURE: 97.7 F | SYSTOLIC BLOOD PRESSURE: 139 MMHG | OXYGEN SATURATION: 97 % | HEIGHT: 63 IN | DIASTOLIC BLOOD PRESSURE: 84 MMHG | BODY MASS INDEX: 31.96 KG/M2 | RESPIRATION RATE: 16 BRPM

## 2018-07-24 LAB
METER GLUCOSE: 164 MG/DL (ref 70–110)
METER GLUCOSE: 179 MG/DL (ref 70–110)
VALPROIC ACID LEVEL: 88 MCG/ML (ref 50–100)

## 2018-07-24 PROCEDURE — 36415 COLL VENOUS BLD VENIPUNCTURE: CPT

## 2018-07-24 PROCEDURE — 6370000000 HC RX 637 (ALT 250 FOR IP): Performed by: PSYCHIATRY & NEUROLOGY

## 2018-07-24 PROCEDURE — 80164 ASSAY DIPROPYLACETIC ACD TOT: CPT

## 2018-07-24 PROCEDURE — 99238 HOSP IP/OBS DSCHRG MGMT 30/<: CPT | Performed by: PSYCHIATRY & NEUROLOGY

## 2018-07-24 PROCEDURE — 6370000000 HC RX 637 (ALT 250 FOR IP): Performed by: INTERNAL MEDICINE

## 2018-07-24 PROCEDURE — 82962 GLUCOSE BLOOD TEST: CPT

## 2018-07-24 RX ORDER — DIVALPROEX SODIUM 500 MG/1
1000 TABLET, EXTENDED RELEASE ORAL 2 TIMES DAILY
Qty: 30 TABLET | Refills: 0 | Status: SHIPPED | OUTPATIENT
Start: 2018-07-24 | End: 2018-08-04

## 2018-07-24 RX ORDER — QUETIAPINE FUMARATE 200 MG/1
200 TABLET, FILM COATED ORAL EVERY MORNING
Qty: 60 TABLET | Refills: 0 | Status: SHIPPED | OUTPATIENT
Start: 2018-07-25 | End: 2018-08-04

## 2018-07-24 RX ORDER — QUETIAPINE FUMARATE 300 MG/1
600 TABLET, FILM COATED ORAL NIGHTLY
Qty: 60 TABLET | Refills: 0 | Status: SHIPPED | OUTPATIENT
Start: 2018-07-24 | End: 2018-08-04

## 2018-07-24 RX ADMIN — QUETIAPINE FUMARATE 200 MG: 100 TABLET ORAL at 09:44

## 2018-07-24 RX ADMIN — INSULIN LISPRO 2 UNITS: 100 INJECTION, SOLUTION INTRAVENOUS; SUBCUTANEOUS at 09:43

## 2018-07-24 RX ADMIN — LOSARTAN POTASSIUM 50 MG: 50 TABLET, FILM COATED ORAL at 09:44

## 2018-07-24 RX ADMIN — BENZTROPINE MESYLATE 1 MG: 1 TABLET ORAL at 12:55

## 2018-07-24 RX ADMIN — TIMOLOL MALEATE 1 DROP: 5 SOLUTION OPHTHALMIC at 09:49

## 2018-07-24 RX ADMIN — METFORMIN HYDROCHLORIDE 1000 MG: 1000 TABLET ORAL at 09:45

## 2018-07-24 RX ADMIN — HYDROCHLOROTHIAZIDE 12.5 MG: 12.5 TABLET ORAL at 09:44

## 2018-07-24 RX ADMIN — ATORVASTATIN CALCIUM 40 MG: 40 TABLET, FILM COATED ORAL at 09:45

## 2018-07-24 RX ADMIN — INSULIN LISPRO 2 UNITS: 100 INJECTION, SOLUTION INTRAVENOUS; SUBCUTANEOUS at 12:53

## 2018-07-24 RX ADMIN — METOPROLOL TARTRATE 50 MG: 50 TABLET, FILM COATED ORAL at 09:45

## 2018-07-24 RX ADMIN — LINAGLIPTIN 5 MG: 5 TABLET, FILM COATED ORAL at 09:44

## 2018-07-24 ASSESSMENT — PAIN SCALES - GENERAL: PAINLEVEL_OUTOF10: 0

## 2018-07-24 NOTE — PROGRESS NOTES
Therapy Note    Date: 7/24/2018  Start Time: 11:00  End Time:  11:45  Number of Participants: 3    Type of Group: Psychotherapy    Wellness Binder Information  Module Name:    Session Number:     Patient's Goal:  Gain insight into interpersonal relationships by interacting with others,    Notes:  Pt was able to explore feeling of rejection and lack of support group. Pt was given positive feed back and support.     Status After Intervention:  Improved    Participation Level: Interactive    Participation Quality: Attentive and Sharing      Speech:  Normal      Thought Process/Content: Logical      Affective Functioning: Congruent      Mood: anxious and depressed      Level of consciousness:  Oriented x4 and Attentive      Response to Learning: Able to verbalize/acknowledge new learning      Endings: None Reported    Modes of Intervention: Support and Socialization      Discipline Responsible: /Counselor      Signature:  GLORY Wiseman

## 2018-07-24 NOTE — PROGRESS NOTES
DATE OF SERVICE:      7/23/2018     Didier Sims seen today for the purpose of continuation of care. Nursing, social work reports, laboratory studies and vital signs are reviewed.       Patient chief complaint today is:                                             [] Depression                                       [] Anxiety                                                 [x] Psychosis                                          [] Suicidal/Homicidal                                                 [] Delusions                                      [] Aggression           Subjective:      Today patient is labile. Patient was aggressive and irritable on night shift and received PRN medications. Pt slurring words today. Klonopin d/c'd.  Patient is cooperative today, denies SI, HI, or AVH.     Sleep:  [] Good [] Fair  [x] Poor  Appetite:  [] Good [x] Fair  [] Poor     Depression:      [] Mild [] Moderate [] Severe                                      [] Constant [] Sporadic      Anxiety: [] Mild [] Moderate [x] Severe               [x] Constant [] Sporadic      Delusions: [] Mild [] Moderate [x] Severe                           [x] Constant [] Sporadic                           [] Paranoid [] Somatic [x] Grandiose      Hallucinations: [] Mild [] Moderate [] Severe                           [] Constant [] Sporadic                          [] Auditory  [] Visual [] Tactile              Suicidal: [] Constant [] Sporadic  Homicidal: [] Constant [] Sporadic     Unscheduled Medications      [x] Patient Receiving Emergency Medications \" Chemical Restraint\"   [] Requesting PRN medications for anxiety     Medical Review of Systems:      All other than marked systmes have been reviewed and are all negative.     Constitutional Symptoms: []  fever []  Chills  Skin Symptoms: [] rash []  Pruritus   Eye Symptoms: [] Vision unchanged []  recent vision problems[] blurred vision   Respiratory Symptoms:[] shortness of breath [] cough  Cardiovascular Symptoms:  [] chest pain   [] palpitations   Gastrointestinal Symptoms: []  abdominal pain []  nausea []  vomiting []  diarrhea  Genitourinary Symptoms: []  dysuria  []  hematuria   Musculoskeletal Symptoms: []  back pain []  muscle pain []  joint pain  Neurologic Symptoms: []  headache []  dizziness  Hematolymphoid Symptoms: [] Adenopathy [] Bruises   [] Schimosis        Psychiatric Review of systems  Delusions:  [] Denies [] Endorses   Withdrawals:  [] Denies [] Endorses    Hallucinations: [] Denies [] Endorses    Extra Pyramidal Symptoms: [] Denies [] Endorses       /78   Pulse 93   Temp 97.9 °F (36.6 °C) (Oral)   Resp 18   Ht 5' 3\" (1.6 m)   Wt 180 lb 6.4 oz (81.8 kg)   SpO2 98%   BMI 31.96 kg/m²      Mental Status Examination:     Cognition:       [x] Alert  [x] Awake  [x] Oriented  [x] Person  [x] Place [x] Time       [] drowsy  [] tired  [] lethargic  [] distractable  [] Other      Attention/Concentration:   [x] Attentive  [] Distracted         Memory Recent and Remote: [] Intact   [] Impaired [x] Partially Impaired      Language: [] Able to recognize and name objects                         [] Unable to recognize and name [address] [place] [address] of Knowledge:  [] Poor []  Fair  [] Good     Speech: [] Normal  [] Soft  [] Slow  [] Fast [] Pressured                                    [x] Loud [x] Dysarthria  [] Incoherent     Appearance: [] Well Groomed  [] Casual Dressed  [] Unkept  [x] Disheveled                         [] Normal weight[] Thin  [] Overweight  [] Obese           Attitude: [] Positive  [] Hostile  [] Demanding  [] Guarded  [] Defensive                    [x] Cooperative  []  Uncooperative       Behavior:  [x] Normal Gait  [] Walks with Assistance  [] Vickie Chair               [] Walks with [place]  [] In Hospital Bed  [] Sitting in Chair     Muscle-Skeletal:  [x] Normal Muscle Tone [] Muscle Atrophy                                        [] Abnormal Muscle Movement

## 2018-07-24 NOTE — PLAN OF CARE
Problem: Altered Mood, Depressive Behavior:  Goal: Able to verbalize acceptance of life and situations over which he or she has no control  Able to verbalize acceptance of life and situations over which he or she has no control   Outcome: Ongoing
Problem: Altered Mood, Depressive Behavior:  Goal: Able to verbalize support systems  Able to verbalize support systems   Outcome: Ongoing  Calls mother every day. Mother thought she sounded better today , stated \"speech has slowed down \" did not feel she quite well but, improved.
Problem: Altered Mood, Manic Behavior:  Goal: Ability to demonstrate self-control will improve  Ability to demonstrate self-control will improve   Outcome: Ongoing  Has trouble not cursing especially in front of others . reviewed behavior plan with her for the shift including three phone calls for the shift.
Problem: Altered Mood, Manic Behavior:  Goal: Ability to interact with others will improve  Ability to interact with others will improve   Outcome: Ongoing
Problem: Altered Mood, Manic Behavior:  Goal: Ability to interact with others will improve  Ability to interact with others will improve   Outcome: Ongoing    Goal: Mood stable  Mood stable   Outcome: Ongoing      Problem: Anger Management/Homicidal Ideation:  Goal: Absence of angry outbursts  Absence of angry outbursts   Outcome: Ongoing    Pt is very agitated. Pt refused night medications. Pt refused to attend group. Pt refused assessment and to answer any questions by staff. Pt would not answer questions about SI/HI or A/V hallucinations. Every time staff enters room, pt yells \"get the fuck out of my room\". Pt refused blood sugar checks. PRN medication given earlier (see eMar). Will continue to try and support pt. Will continue to monitor.
Problem: Altered Mood, Manic Behavior:  Goal: Able to sleep  Able to sleep   Outcome: Ongoing  Patient is sleeping poorly (1.25 hours reported last night).
Problem: Altered Mood, Manic Behavior:  Goal: Able to verbalize decrease in frequency and intensity of racing thoughts  Able to verbalize decrease in frequency and intensity of racing thoughts   Outcome: Not Met This Shift
Problem: Altered Mood, Manic Behavior:  Goal: Able to verbalize decrease in frequency and intensity of racing thoughts  Able to verbalize decrease in frequency and intensity of racing thoughts   Outcome: Ongoing
Problem: Altered Mood, Manic Behavior:  Goal: Able to verbalize decrease in frequency and intensity of racing thoughts  Able to verbalize decrease in frequency and intensity of racing thoughts   Outcome: Ongoing    Goal: Ability to achieve adequate nutritional intake will improve  Ability to achieve adequate nutritional intake will improve   Outcome: Ongoing  Patient cooperative, irritable. Patient denies SI,HI, and hallucinations. Patient stated \"I feel good, I feel excited\" at the beginning of shift. Patient got verbally aggressive on phone and was given a warning about her behavior and patient continues to verbally threaten staff. Patient asked to go to room for 30 minute time out. Patient complied and was assisted to her room with no problems.  Will continue to observe and support
Problem: Altered Mood, Manic Behavior:  Goal: Able to verbalize decrease in frequency and intensity of racing thoughts  Able to verbalize decrease in frequency and intensity of racing thoughts   Outcome: Ongoing    Goal: Ability to demonstrate self-control will improve  Ability to demonstrate self-control will improve   Outcome: Ongoing  Patient pleasant and cooperative. Denies SI, HI, and hallucinations. Patient is euphoric, exaggerated, tangential, flight of ideas. Speech is pressured. Patient has been interacting appropriately with peers and staff and attended groups. Will continue to observe and support.
Problem: Altered Mood, Manic Behavior:  Goal: Able to verbalize decrease in frequency and intensity of racing thoughts  Able to verbalize decrease in frequency and intensity of racing thoughts   Outcome: Ongoing    Goal: Ability to demonstrate self-control will improve  Ability to demonstrate self-control will improve   Outcome: Ongoing  Patient pleasant and cooperative. Denies SI,HI, and hallucinations. Patient speech is tangential, pressured, flight of ideas. Patient affect is exaggerated. Will continue to observe and support.
Problem: Altered Mood, Manic Behavior:  Goal: Able to verbalize decrease in frequency and intensity of racing thoughts  Able to verbalize decrease in frequency and intensity of racing thoughts   Outcome: Ongoing    Goal: Ability to interact with others will improve  Ability to interact with others will improve   Outcome: Ongoing  Patient cooperative. Denies SI, HI, and hallucinations. Patient has flight of ideas, tangential, and labile. Patient can be argumentative and irritable.  Will continue to observe and support
Problem: Altered Mood, Manic Behavior:  Goal: Able to verbalize decrease in frequency and intensity of racing thoughts  Able to verbalize decrease in frequency and intensity of racing thoughts   Outcome: Ongoing    Goal: Ability to interact with others will improve  Ability to interact with others will improve   Outcome: Ongoing  Patient cooperative. Denies SI,HI and hallucinations. Patient is irritable, labile, boisterous. Affect is exaggerated . Patient is tangential, flight of ideas.  Will continue to observe and support
Problem: Altered Mood, Manic Behavior:  Goal: Able to verbalize decrease in frequency and intensity of racing thoughts  Able to verbalize decrease in frequency and intensity of racing thoughts   Outcome: Ongoing    Goal: Ability to interact with others will improve  Ability to interact with others will improve   Outcome: Ongoing  Patient irritable and uncooperative. Denies SI,HI, and hallucinations. Patient made multiple verbal threats toward multiple staff members and patients throughout the day. Patient given IM Haldol this morning and had to be asked to calm down in her room multiple times today. Patient has flight of ideas, tangential, pressured speech.  Will continue to observe and support
Problem: Altered Mood, Manic Behavior:  Goal: Able to verbalize decrease in frequency and intensity of racing thoughts  Able to verbalize decrease in frequency and intensity of racing thoughts   Outcome: Ongoing    Goal: Ability to interact with others will improve  Ability to interact with others will improve   Outcome: Ongoing  Patient pleasant and cooperative. Denies SI,HI, and hallucinations. Patient has been euphoric, singing and dancing. No outbursts observed or reported.  Will continue to observe
Problem: Altered Mood, Manic Behavior:  Goal: Able to verbalize decrease in frequency and intensity of racing thoughts  Able to verbalize decrease in frequency and intensity of racing thoughts   Outcome: Ongoing    Goal: Absence of self-harm  Absence of self-harm   Outcome: Ongoing  Pt cooperative and pleasant. Denies SI, HI, and hallucinations at this time. Pt is having flight of ideas. Pt is having angry outbursts at times, but is able to regain control with minimal assistance. Pt is out and social on the unit. No unit problems. Will continue to observe and support.
Problem: Altered Mood, Manic Behavior:  Goal: Absence of self-harm  Absence of self-harm   Outcome: Met This Shift    Goal: Ability to achieve adequate nutritional intake will improve  Ability to achieve adequate nutritional intake will improve   Outcome: Met This Shift
Problem: Altered Mood, Manic Behavior:  Goal: Absence of self-harm  Absence of self-harm   Outcome: Met This Shift    Goal: Ability to interact with others will improve  Ability to interact with others will improve   Outcome: Met This Shift      Problem: Anger Management/Homicidal Ideation:  Goal: Absence of angry outbursts  Absence of angry outbursts   Outcome: Met This Shift    Pt is calm and cooperative. Pt denies HI/SI and denies A/V hallucinations. Pt is isolative to her room. Pt did not attend groups this evening.
Problem: Altered Mood, Manic Behavior:  Goal: Absence of self-harm  Absence of self-harm   Outcome: Met This Shift    Goal: Ability to interact with others will improve  Ability to interact with others will improve   Outcome: Met This Shift      Problem: Anger Management/Homicidal Ideation:  Goal: Absence of angry outbursts  Absence of angry outbursts   Outcome: Met This Shift    Pt is calm and cooperative. Pt denies SI/HI and denies A/V hallucinations. Pt attended groups and took all medications. Pt is social with others on the unit.
Problem: Altered Mood, Manic Behavior:  Goal: Absence of self-harm  Absence of self-harm   Outcome: Met This Shift    Goal: Ability to interact with others will improve  Ability to interact with others will improve   Outcome: Met This Shift    Pt is calm and cooperative. Pt denies SI/HI and denies A/V hallucinations. Pt observed talking to unseen others and talking to self. Pt is social with the other patients and staff. Pt took all medications.
Problem: Altered Mood, Manic Behavior:  Goal: Absence of self-harm  Absence of self-harm   Outcome: Met This Shift    Pt is resting in bed with eyes closed. Pt shows no signs of distress. No PRN medications needed at this time. Safety needs met. Will continue to monitor.   Goal: Mood stable  Mood stable   Outcome: Met This Shift      Problem: Anger Management/Homicidal Ideation:  Goal: Participates in care planning  Participates in care planning   Outcome: Met This Shift
Problem: Anger Management/Homicidal Ideation:  Goal: Able to display appropriate communication and problem solving  Able to display appropriate communication and problem solving   Outcome: Ongoing    Goal: Absence of angry outbursts  Absence of angry outbursts   Outcome: Ongoing  Patient pleasant and cooperative. Denies SI,HI, and hallucinations. Patient has been euphoric, singing and dancing. Will continue to observe and support.
Problem: Anger Management/Homicidal Ideation:  Goal: Able to display appropriate communication and problem solving  Able to display appropriate communication and problem solving   Outcome: Ongoing    Goal: Absence of homicidal ideation  Absence of homicidal ideation   Outcome: Ongoing  Patient cooperative, irritable, labile, euphoric. Patient denies SI,HI, and hallucinations. Patient singing on unit, laughing then patient got into argument with another patient and began making verbal threats and calling the patient inappropriate names. Patient asked to stop that behavior and reminded of her behavior plan and patient continued to make verbal threats. Patient asked to go to room for time out. Patient continued behavior but walked willingly to room. Patient offered PRN medication to her her calm down, patient refused medication and stated she would calm down on her own. Patient was given dinner to eat in her room so that she could continue to calm down. When this nurse checked on patient approximately 15 minutes later patient was calm and sitting at desk in room reading. Will continue to observe and support.
Problem: Falls - Risk of:  Goal: Will remain free from falls  Will remain free from falls   Outcome: Ongoing
Problem: Falls - Risk of:  Goal: Will remain free from falls  Will remain free from falls   Outcome: Ongoing
Problem: Falls - Risk of: Intervention: Postfall assessment  See note for post fall on 7/14/2018. Speech is slurred and responds slowly, no lacerations , contusions , or bleeding, Vitals stable and blood sugar was 163. Vitals were 97.9, pulse 90 , b/p 108/72 98%. CT of head ordered and neuro checks started.
Problem: Pain:  Goal: Control of acute pain  Control of acute pain   Outcome: Met This Shift
Obsessions  Memory:Normal: No  Memory: Poor Recent, Poor Remote  Insight and Judgment: No  Insight and Judgment: Poor Judgment, Poor Insight  Present Suicidal Ideation: No  Present Homicidal Ideation: No    Daily Assessment Last Entry:   Daily Sleep (WDL): Exceptions to WDL         Patient Currently in Pain: Yes  Daily Nutrition (WDL): Within Defined Limits    Patient Monitoring:  Frequency of Checks: 4 times per hour, close    Psychiatric Symptoms:   Depression Symptoms  Depression Symptoms: No problems reported or observed. Anxiety Symptoms  Anxiety Symptoms: Generalized  Gretta Symptoms  Gretta Symptoms: Flight of ideas, Poor judgment, Pressured speech, Increased energy, Labile     Psychosis Symptoms  Hallucination Type: No problems reported or observed. Delusion Type: Paranoid    Suicide Risk CSSR-S:  1) Within the past month, have you wished you were dead or wished you could go to sleep and not wake up? : NO  2) Within the past month, have you actually had any thoughts of killing yourself? : NO  6)  Have you ever done anything, started to do anything, or prepared to do anything to end your life?: NO  Change in Result no Change in Plan of care no    EDUCATION:   Learner Progress Toward Treatment Goals: Reviewed goals and plan of care    Method: Small group    Outcome: Needs reinforcement    PATIENT GOALS: \"to be involved with people\"    PLAN/TREATMENT RECOMMENDATIONS UPDATE:  Encourage group attendance.  Provide emotional support    GOALS UPDATE:  Time frame for Short-Term Goals: 1-3 days      Yelitza Ayon RN
Poor Recent, Poor Remote  Insight and Judgment: No  Insight and Judgment: Poor Judgment, Poor Insight  Present Suicidal Ideation: No  Present Homicidal Ideation: No    Daily Assessment Last Entry:   Daily Sleep (WDL): Exceptions to WDL         Patient Currently in Pain: Unable to Assess (patient asleep)  Daily Nutrition (WDL): Within Defined Limits    Patient Monitoring:  Frequency of Checks: 4 times per hour, close    Psychiatric Symptoms:   Depression Symptoms  Depression Symptoms: Sleep disturbance  Anxiety Symptoms  Anxiety Symptoms: Obsessions  Gretta Symptoms  Gretta Symptoms: Flight of ideas, Grandiosity, Poor judgment, Pressured speech     Psychosis Symptoms  Hallucination Type: No problems reported or observed. Delusion Type: Grandeur    Suicide Risk CSSR-S:  1) Within the past month, have you wished you were dead or wished you could go to sleep and not wake up? : NO  2) Within the past month, have you actually had any thoughts of killing yourself? : NO  6)  Have you ever done anything, started to do anything, or prepared to do anything to end your life?: NO  Change in Result no Change in Plan of care no    EDUCATION:   Learner Progress Toward Treatment Goals: Reviewed goals and plan of care    Method: Small group    Outcome: Needs reinforcement    PATIENT GOALS: n/a     PLAN/TREATMENT RECOMMENDATIONS UPDATE:  Encourage group attendance.  Provide emotional support    GOALS UPDATE:  Time frame for Short-Term Goals: 1-3 days      Nino Andre RN

## 2018-07-24 NOTE — PROGRESS NOTES
Physical Therapy    Facility/Department: [facility] 6C ACUTE BH 2  Initial Assessment    NAME: Carmenza Gastelum  : 1955  MRN: [id number]    Date of Service: 2018    Physical therapy orders received/treatment attempted and chart review completed. Patient does present with facial droop and garbled speech. Patient with neoprene knee immobilizer on RLE but unable to state why. Patient observed up on the unit with and without the immobilizer and demonstrated steady/safe gait. Patient with no acute PT needs. Will discontinue PT orders at this time. Thank you for the opportunity to assist in the care of this patient. No PT services rendered.     Nila Velasquez, PT, DPT  License [id number]

## 2018-07-24 NOTE — DISCHARGE SUMMARY
Insight: [] Intact  [x] Fair  [] Limited    Judgement:  [] Intact  [x] Fair  [] Limited    Hospital Course:   Admit Date: 7/6/2018     Discharge Date: 7/24/2018  Admitted from:  [x]  Emergency Room  []  Home  []  Another facility   []  [facility]     Admitting diagnosis:   Patient Active Problem List   Diagnosis    Cellulitis    Severe bipolar affective [facility] with psychosis ([facility]    Carpal tunnel syndrome    DM2 (diabetes mellitus, type 2) ([facility]    Hyperlipidemia    Depression    Vitamin D deficiency    Systemic primary arterial hypertension    Psychosis    Acute psychosis    Severe manic bipolar I disorder with psychotic features ([facility]      Length of stay:  18 days              Lauren Aguilar was admitted in Psychiatric unit  from ER with Gretta. Patient was treated            With the above . Patient responded well to the treatment. Discharge Summary Plan:     Discharge Status:    [x] Improved [] Unchanged    [] Worse       Discharge instructions given:  [x] Patient    [] Family [] Other         Discharge disposition:  [x] Home [] Step Down unit  [] Group Home []  [facility]                                                    [] [facility]    [] AMA  [] Other           Prescriptions: Continue same medications, review with patient.        Reason for more than one antipsychotic:  [x] N/A  [] 3 failed monotherapy(drugs tried):  [] Cross over to a new antipsychotic  [] Taper to monotherapy from polypharmacy  [] Augmentation of Clozapine therapy due to treatment resistance to single therapy      Diagnosis:        Patient Active Problem List   Diagnosis Code    Cellulitis L03.90    Severe bipolar affective disorder with psychosis ([facility] F31.89    Carpal tunnel syndrome G56.00    DM2 (diabetes mellitus, type 2) ([facility] E11.9    Hyperlipidemia E78.5    Depression F32.9    Vitamin D deficiency E55.9    Systemic primary arterial hypertension I10    Psychosis F29    Acute psychosis F23    Severe manic bipolar I

## 2018-07-24 NOTE — PROGRESS NOTES
CLINICAL PHARMACY NOTE: MEDS TO [address] Select Patient?: No  Total # of Prescriptions Filled: 2   The following medications were delivered to the patient:  · Depakote  · Quetiapine  Total # of Interventions Completed: 3  Time Spent (min): 30    Additional Documentation:

## 2018-08-04 ENCOUNTER — HOSPITAL ENCOUNTER (EMERGENCY)
Age: 63
Discharge: OTHER INSTITUTION | End: 2018-08-04
Attending: EMERGENCY MEDICINE
Payer: COMMERCIAL

## 2018-08-04 VITALS
WEIGHT: 182 LBS | SYSTOLIC BLOOD PRESSURE: 133 MMHG | HEIGHT: 63 IN | HEART RATE: 99 BPM | BODY MASS INDEX: 32.25 KG/M2 | OXYGEN SATURATION: 97 % | TEMPERATURE: 98 F | RESPIRATION RATE: 16 BRPM | DIASTOLIC BLOOD PRESSURE: 76 MMHG

## 2018-08-04 DIAGNOSIS — I10 ESSENTIAL HYPERTENSION: ICD-10-CM

## 2018-08-04 DIAGNOSIS — Z76.0 ENCOUNTER FOR MEDICATION REFILL: ICD-10-CM

## 2018-08-04 DIAGNOSIS — R45.1 AGITATION: Primary | ICD-10-CM

## 2018-08-04 PROCEDURE — 6370000000 HC RX 637 (ALT 250 FOR IP)

## 2018-08-04 PROCEDURE — 6370000000 HC RX 637 (ALT 250 FOR IP): Performed by: NURSE PRACTITIONER

## 2018-08-04 PROCEDURE — 99285 EMERGENCY DEPT VISIT HI MDM: CPT

## 2018-08-04 RX ORDER — ATORVASTATIN CALCIUM 40 MG/1
40 TABLET, FILM COATED ORAL DAILY
Qty: 7 TABLET | Refills: 0 | Status: SHIPPED | OUTPATIENT
Start: 2018-08-04 | End: 2019-03-14 | Stop reason: SDUPTHER

## 2018-08-04 RX ORDER — ALOGLIPTIN 25 MG/1
25 TABLET, FILM COATED ORAL DAILY
COMMUNITY
End: 2018-08-17

## 2018-08-04 RX ORDER — ALOGLIPTIN 25 MG/1
25 TABLET, FILM COATED ORAL DAILY
Qty: 7 TABLET | Refills: 0 | Status: SHIPPED | OUTPATIENT
Start: 2018-08-04 | End: 2018-08-06 | Stop reason: SDUPTHER

## 2018-08-04 RX ORDER — DIVALPROEX SODIUM 500 MG/1
1000 TABLET, EXTENDED RELEASE ORAL 2 TIMES DAILY
Qty: 28 TABLET | Refills: 0 | Status: SHIPPED | OUTPATIENT
Start: 2018-08-04 | End: 2018-08-06 | Stop reason: DRUGHIGH

## 2018-08-04 RX ORDER — METOPROLOL TARTRATE 50 MG/1
50 TABLET, FILM COATED ORAL ONCE
Status: COMPLETED | OUTPATIENT
Start: 2018-08-04 | End: 2018-08-04

## 2018-08-04 RX ORDER — GLIPIZIDE 5 MG/1
5 TABLET ORAL 3 TIMES DAILY
COMMUNITY
End: 2018-08-06 | Stop reason: ALTCHOICE

## 2018-08-04 RX ORDER — DIVALPROEX SODIUM 500 MG/1
500 TABLET, DELAYED RELEASE ORAL 3 TIMES DAILY
Status: ON HOLD | COMMUNITY
End: 2018-08-27 | Stop reason: HOSPADM

## 2018-08-04 RX ORDER — LOSARTAN POTASSIUM AND HYDROCHLOROTHIAZIDE 12.5; 5 MG/1; MG/1
1 TABLET ORAL DAILY
COMMUNITY
End: 2018-08-06 | Stop reason: SDUPTHER

## 2018-08-04 RX ORDER — QUETIAPINE 300 MG/1
600 TABLET, FILM COATED, EXTENDED RELEASE ORAL NIGHTLY
Qty: 14 TABLET | Refills: 0 | Status: SHIPPED | OUTPATIENT
Start: 2018-08-04 | End: 2018-08-06 | Stop reason: SDUPTHER

## 2018-08-04 RX ORDER — ATORVASTATIN CALCIUM 40 MG/1
40 TABLET, FILM COATED ORAL DAILY
COMMUNITY
End: 2018-08-06 | Stop reason: SDUPTHER

## 2018-08-04 RX ORDER — QUETIAPINE 300 MG/1
600 TABLET, FILM COATED, EXTENDED RELEASE ORAL NIGHTLY
COMMUNITY
End: 2019-03-14 | Stop reason: DRUGHIGH

## 2018-08-04 RX ORDER — QUETIAPINE 200 MG/1
600 TABLET, FILM COATED, EXTENDED RELEASE ORAL ONCE
Status: COMPLETED | OUTPATIENT
Start: 2018-08-04 | End: 2018-08-04

## 2018-08-04 RX ORDER — LOSARTAN POTASSIUM AND HYDROCHLOROTHIAZIDE 12.5; 5 MG/1; MG/1
0.5 TABLET ORAL DAILY
Qty: 7 TABLET | Refills: 0 | Status: ON HOLD | OUTPATIENT
Start: 2018-08-04 | End: 2018-08-27 | Stop reason: HOSPADM

## 2018-08-04 RX ORDER — METOPROLOL TARTRATE 50 MG/1
TABLET, FILM COATED ORAL
Status: COMPLETED
Start: 2018-08-04 | End: 2018-08-04

## 2018-08-04 RX ADMIN — METOPROLOL TARTRATE 50 MG: 50 TABLET, FILM COATED ORAL at 19:17

## 2018-08-04 RX ADMIN — QUETIAPINE FUMARATE 600 MG: 200 TABLET, EXTENDED RELEASE ORAL at 19:17

## 2018-08-04 RX ADMIN — METOPROLOL TARTRATE 50 MG: 50 TABLET ORAL at 19:17

## 2018-08-04 NOTE — ED PROVIDER NOTES
ED Attending  CC: Kathryn     Department of Emergency Medicine   ED  Provider Note  Admit Date/RoomTime: 8/4/2018  1:53 PM  ED Room: [address] [place] [address]  Chief Complaint   Psychiatric Evaluation (per ems ptd mother called them stting pt babbling to self and not acting right per pt not so she is bossing me around telling me what to do pt report was here on psych unit last week released to sonya reports she is taking meds)    History of Present Illness   Source of history provided by:  patient. History/Exam Limitations: none. Hussain Pat is a 66 y.o. old female who has a past medical history of:   Past Medical History:   Diagnosis Date    Bell's palsy     Carpal tunnel syndrome 8/4/2016    Diabetes mellitus ([facility]     Psychiatric problem     bipolar    Systemic primary arterial hypertension 8/4/2016    presents to the emergency department by ems, for was in verbal altercation with her mother, who called EMS which began several day(s) prior to arrival.  She has a prior history of bipolar and psychosis of which the problem is long-standing. Her reported drug use history: denies. She  is currently in counseling at [facility]. There has been no history of recent trauma. She denies suicidal ideation  and denies homicidal ideation. She was apparently arguing with her mother and that prompted the visit today. She reports that she is compliant with her home medications and is calm and cooperative. She does get agitated when discussed her relationship with her mother. She denies any medical or psychiatric complaints. Quality:      Hopelessness:   No.     Terror:   No.     Confusion:   No.     Hallucinations:   None. Able to care for self: Yes. Able to control self: Yes. ROS    Pertinent positives and negatives are stated within HPI, all other systems reviewed and are negative. Past Surgical History:  has a past surgical history that includes Echo Complete (6/22/2013).   Social History:  reports that she has never smoked. She has never used smokeless tobacco. She reports that she does not drink alcohol or use drugs. Family History: family history includes Heart Disease (age of onset: 76) in her father; High Blood Pressure in her father. Allergies: Ace inhibitors; Ibuprofen; and Latuda [lurasidone hcl]    Physical Exam           ED Triage Vitals [08/04/18 1346]   BP Temp Temp Source Pulse Resp SpO2 Height Weight   131/79 97.4 °F (36.3 °C) Temporal 97 16 97 % 5' 3\" (1.6 m) 182 lb (82.6 kg)      Oxygen Saturation Interpretation: Normal.    General Appearance:  well-appearing. Constitutional:   Level of Consciousness: Awake and alert. ETOH: No.          Distress: none. Cooperativeness: cooperative. Eyes:  PERRL, EOMI, no discharge or conjunctival injection. Ears:  External ears without lesions. Throat:  Pharynx without injection, exudate, or tonsillar hypertrophy. Airway patient. Neck:  Normal ROM. Supple. Respiratory:  Clear to auscultation and breath sounds equal.  CV:  Regular rate and rhythm, normal heart sounds, without pathological murmurs, ectopy, gallops, or rubs. GI:  Abdomen Soft, nontender, good bowel sounds. No firm or pulsatile mass. Back:  No costovertebral tenderness. Integument:  Normal turgor. Warm, dry, without visible rash, unless noted elsewhere. Lymphatics: No lymphangitis or adenopathy noted. Neurological:  Oriented. Motor functions intact. Psychiatric:        Thought Process:       Coherent:  Yes. Delusions / Paranoia: no evidence of paranoia. Flight of ideas:  No.         Rambling conversation:  No.       Affect: congruent. Suicidal ideation:  no suicidal ideation. Homicidal ideation:  no homicidal ideation. Perceptions:  denies any perceptual disturbance present. Insight:  average. Judgement: average.     Lab / Imaging Results   (All laboratory and radiology results have been personally reviewed by myself)  Labs:  No

## 2018-08-05 NOTE — ED NOTES
POST - DISCHARGE NOTE:  Pt mother called approximately 22:46 requesting to know if pt (daughter) was discharged or walked away from ER. Explained that could not give specific information but hypothetically that if a person leaves ED it is most often because they have been evaluated and discharged. Mother stated: \"Well she is back here again and she is using that language again, she is angry and screaming at me, she can't stay here. \"  Advised mother that if she feels unsafe or feels that daughter is currently presenting a danger to her or committing a crime she can notify police. It should be noted pt cooperative during her whole stay, agreed to Crisis Unit referral and was awaiting taxi but was not here when taxi arrived.       [address], [place], [place]  08/04/18 4232
Pt agreeable to discharge to crisis unit.  contracts for safety      Shayy Manuel RN  08/04/18 3054
appropriate, no signs of agitation, speech is normal in rate flow and volume, does become loud and rapid when discussing mother. Pleasant, cooperative, appropriate with unit staff. Thought form logical, thought content clear, denies A/V hallucinations, delusions, paranoia, none noted in interview. Discussed with Dr Silva Gonzalez, pt does not meet in-patient criteria, referral to Crisis Unit will be pursued as it is felt a brief time away from mother will be beneficial and allow pt to maintain psychiatric stability.         [address], MSW, [facility]  08/04/18 1024

## 2018-08-06 ENCOUNTER — HOSPITAL ENCOUNTER (EMERGENCY)
Age: 63
Discharge: PSYCHIATRIC OR SUBSTANCE USE FACILITY | End: 2018-08-07
Attending: EMERGENCY MEDICINE
Payer: COMMERCIAL

## 2018-08-06 ENCOUNTER — HOSPITAL ENCOUNTER (OUTPATIENT)
Age: 63
Discharge: HOME | End: 2018-08-08
Payer: COMMERCIAL

## 2018-08-06 DIAGNOSIS — N39.0 URINARY TRACT INFECTION WITH PYURIA: ICD-10-CM

## 2018-08-06 DIAGNOSIS — F31.10 BIPOLAR DISEASE, MANIC (HCC): Primary | ICD-10-CM

## 2018-08-06 DIAGNOSIS — E78.00 PURE HYPERCHOLESTEROLEMIA: ICD-10-CM

## 2018-08-06 DIAGNOSIS — E11.9 TYPE 2 DIABETES MELLITUS WITHOUT COMPLICATION, WITHOUT LONG-TERM CURRENT USE OF INSULIN (HCC): ICD-10-CM

## 2018-08-06 LAB
ACETAMINOPHEN LEVEL: <5 MCG/ML (ref 10–30)
ALBUMIN SERPL-MCNC: 3.5 G/DL (ref 3.5–5.2)
ALBUMIN SERPL-MCNC: 3.8 G/DL (ref 3.5–5.2)
ALP BLD-CCNC: 54 U/L (ref 35–104)
ALP BLD-CCNC: 76 U/L (ref 35–104)
ALT SERPL-CCNC: 10 U/L (ref 0–32)
ALT SERPL-CCNC: 11 U/L (ref 0–32)
AMPHETAMINE SCREEN, URINE: NOT DETECTED
ANION GAP SERPL CALCULATED.3IONS-SCNC: 16 MMOL/L (ref 7–16)
ANION GAP SERPL CALCULATED.3IONS-SCNC: 16 MMOL/L (ref 7–16)
AST SERPL-CCNC: 13 U/L (ref 0–31)
AST SERPL-CCNC: 14 U/L (ref 0–31)
BACTERIA: ABNORMAL /HPF
BARBITURATE SCREEN URINE: NOT DETECTED
BASOPHILS ABSOLUTE: 0.02 E9/L (ref 0–0.2)
BASOPHILS RELATIVE PERCENT: 0.3 % (ref 0–2)
BENZODIAZEPINE SCREEN, URINE: NOT DETECTED
BILIRUB SERPL-MCNC: <0.2 MG/DL (ref 0–1.2)
BILIRUB SERPL-MCNC: <0.2 MG/DL (ref 0–1.2)
BILIRUBIN URINE: NEGATIVE
BLOOD, URINE: NEGATIVE
BUN BLDV-MCNC: 22 MG/DL (ref 8–23)
BUN BLDV-MCNC: 25 MG/DL (ref 8–23)
CALCIUM SERPL-MCNC: 9.6 MG/DL (ref 8.6–10.2)
CALCIUM SERPL-MCNC: 9.6 MG/DL (ref 8.6–10.2)
CANNABINOID SCREEN URINE: NOT DETECTED
CHLORIDE BLD-SCNC: 101 MMOL/L (ref 98–107)
CHLORIDE BLD-SCNC: 102 MMOL/L (ref 98–107)
CHOLESTEROL, TOTAL: 125 MG/DL (ref 0–199)
CLARITY: CLEAR
CO2: 24 MMOL/L (ref 22–29)
CO2: 25 MMOL/L (ref 22–29)
COCAINE METABOLITE SCREEN URINE: NOT DETECTED
COLOR: YELLOW
CREAT SERPL-MCNC: 1.1 MG/DL (ref 0.5–1)
CREAT SERPL-MCNC: 1.1 MG/DL (ref 0.5–1)
EKG ATRIAL RATE: 113 BPM
EKG P AXIS: 63 DEGREES
EKG P-R INTERVAL: 152 MS
EKG Q-T INTERVAL: 330 MS
EKG QRS DURATION: 82 MS
EKG QTC CALCULATION (BAZETT): 452 MS
EKG R AXIS: 23 DEGREES
EKG T AXIS: 61 DEGREES
EKG VENTRICULAR RATE: 113 BPM
EOSINOPHILS ABSOLUTE: 0.11 E9/L (ref 0.05–0.5)
EOSINOPHILS RELATIVE PERCENT: 1.7 % (ref 0–6)
ETHANOL: <10 MG/DL (ref 0–0.08)
GFR AFRICAN AMERICAN: >60
GFR AFRICAN AMERICAN: >60
GFR NON-AFRICAN AMERICAN: >60 ML/MIN/1.73
GFR NON-AFRICAN AMERICAN: >60 ML/MIN/1.73
GLUCOSE BLD-MCNC: 152 MG/DL (ref 74–109)
GLUCOSE BLD-MCNC: 84 MG/DL (ref 74–109)
GLUCOSE URINE: NEGATIVE MG/DL
HBA1C MFR BLD: 7.6 % (ref 4–5.6)
HCT VFR BLD CALC: 38.1 % (ref 34–48)
HDLC SERPL-MCNC: 46 MG/DL
HEMOGLOBIN: 12.4 G/DL (ref 11.5–15.5)
IMMATURE GRANULOCYTES #: 0.01 E9/L
IMMATURE GRANULOCYTES %: 0.2 % (ref 0–5)
KETONES, URINE: ABNORMAL MG/DL
LDL CHOLESTEROL CALCULATED: 64 MG/DL (ref 0–99)
LEUKOCYTE ESTERASE, URINE: ABNORMAL
LYMPHOCYTES ABSOLUTE: 2.9 E9/L (ref 1.5–4)
LYMPHOCYTES RELATIVE PERCENT: 44.5 % (ref 20–42)
MCH RBC QN AUTO: 28.9 PG (ref 26–35)
MCHC RBC AUTO-ENTMCNC: 32.5 % (ref 32–34.5)
MCV RBC AUTO: 88.8 FL (ref 80–99.9)
METHADONE SCREEN, URINE: NOT DETECTED
MONOCYTES ABSOLUTE: 0.6 E9/L (ref 0.1–0.95)
MONOCYTES RELATIVE PERCENT: 9.2 % (ref 2–12)
NEUTROPHILS ABSOLUTE: 2.87 E9/L (ref 1.8–7.3)
NEUTROPHILS RELATIVE PERCENT: 44.1 % (ref 43–80)
NITRITE, URINE: NEGATIVE
OPIATE SCREEN URINE: NOT DETECTED
PDW BLD-RTO: 13.7 FL (ref 11.5–15)
PH UA: 6 (ref 5–9)
PHENCYCLIDINE SCREEN URINE: NOT DETECTED
PLATELET # BLD: 234 E9/L (ref 130–450)
PMV BLD AUTO: 9.9 FL (ref 7–12)
POTASSIUM SERPL-SCNC: 4.6 MMOL/L (ref 3.5–5)
POTASSIUM SERPL-SCNC: 5 MMOL/L (ref 3.5–5)
PROPOXYPHENE SCREEN: NOT DETECTED
PROTEIN UA: NEGATIVE MG/DL
RBC # BLD: 4.29 E12/L (ref 3.5–5.5)
RBC UA: ABNORMAL /HPF (ref 0–2)
SALICYLATE, SERUM: <0.3 MG/DL (ref 0–30)
SODIUM BLD-SCNC: 142 MMOL/L (ref 132–146)
SODIUM BLD-SCNC: 142 MMOL/L (ref 132–146)
SPECIFIC GRAVITY UA: 1.02 (ref 1–1.03)
TOTAL PROTEIN: 6.5 G/DL (ref 6.4–8.3)
TOTAL PROTEIN: 7 G/DL (ref 6.4–8.3)
TRICYCLIC ANTIDEPRESSANTS SCREEN SERUM: NEGATIVE NG/ML
TRIGL SERPL-MCNC: 76 MG/DL (ref 0–149)
TSH SERPL DL<=0.05 MIU/L-ACNC: 3.52 UIU/ML (ref 0.27–4.2)
UROBILINOGEN, URINE: 0.2 E.U./DL
VLDLC SERPL CALC-MCNC: 15 MG/DL
WBC # BLD: 6.5 E9/L (ref 4.5–11.5)
WBC UA: ABNORMAL /HPF (ref 0–5)

## 2018-08-06 PROCEDURE — 80061 LIPID PANEL: CPT

## 2018-08-06 PROCEDURE — 84443 ASSAY THYROID STIM HORMONE: CPT

## 2018-08-06 PROCEDURE — 99285 EMERGENCY DEPT VISIT HI MDM: CPT

## 2018-08-06 PROCEDURE — 80053 COMPREHEN METABOLIC PANEL: CPT

## 2018-08-06 PROCEDURE — 80307 DRUG TEST PRSMV CHEM ANLYZR: CPT

## 2018-08-06 PROCEDURE — G0480 DRUG TEST DEF 1-7 CLASSES: HCPCS

## 2018-08-06 PROCEDURE — 81001 URINALYSIS AUTO W/SCOPE: CPT

## 2018-08-06 PROCEDURE — 85025 COMPLETE CBC W/AUTO DIFF WBC: CPT

## 2018-08-06 PROCEDURE — 36415 COLL VENOUS BLD VENIPUNCTURE: CPT

## 2018-08-06 PROCEDURE — 83036 HEMOGLOBIN GLYCOSYLATED A1C: CPT

## 2018-08-06 RX ORDER — CEFDINIR 300 MG/1
300 CAPSULE ORAL EVERY 12 HOURS SCHEDULED
Status: DISCONTINUED | OUTPATIENT
Start: 2018-08-07 | End: 2018-08-07 | Stop reason: HOSPADM

## 2018-08-07 VITALS
OXYGEN SATURATION: 100 % | HEART RATE: 114 BPM | DIASTOLIC BLOOD PRESSURE: 91 MMHG | RESPIRATION RATE: 20 BRPM | BODY MASS INDEX: 31.71 KG/M2 | TEMPERATURE: 97.7 F | WEIGHT: 179 LBS | SYSTOLIC BLOOD PRESSURE: 157 MMHG | HEIGHT: 63 IN

## 2018-08-07 LAB — METER GLUCOSE: 179 MG/DL (ref 70–110)

## 2018-08-07 PROCEDURE — 6370000000 HC RX 637 (ALT 250 FOR IP): Performed by: EMERGENCY MEDICINE

## 2018-08-07 PROCEDURE — 96372 THER/PROPH/DIAG INJ SC/IM: CPT

## 2018-08-07 PROCEDURE — 82962 GLUCOSE BLOOD TEST: CPT

## 2018-08-07 PROCEDURE — 2580000003 HC RX 258

## 2018-08-07 PROCEDURE — 6360000002 HC RX W HCPCS: Performed by: EMERGENCY MEDICINE

## 2018-08-07 PROCEDURE — 87088 URINE BACTERIA CULTURE: CPT

## 2018-08-07 RX ORDER — ZIPRASIDONE MESYLATE 20 MG/ML
20 INJECTION, POWDER, LYOPHILIZED, FOR SOLUTION INTRAMUSCULAR ONCE
Status: COMPLETED | OUTPATIENT
Start: 2018-08-07 | End: 2018-08-07

## 2018-08-07 RX ORDER — GLIPIZIDE 5 MG/1
5 TABLET ORAL
Status: ON HOLD | COMMUNITY
End: 2018-08-27 | Stop reason: HOSPADM

## 2018-08-07 RX ORDER — LATANOPROST 50 UG/ML
1 SOLUTION/ DROPS OPHTHALMIC DAILY
COMMUNITY
End: 2019-03-14 | Stop reason: SDUPTHER

## 2018-08-07 RX ORDER — LOSARTAN POTASSIUM 25 MG/1
25 TABLET ORAL ONCE
Status: COMPLETED | OUTPATIENT
Start: 2018-08-07 | End: 2018-08-07

## 2018-08-07 RX ORDER — HYDROCHLOROTHIAZIDE 12.5 MG/1
6.25 TABLET ORAL ONCE
Status: COMPLETED | OUTPATIENT
Start: 2018-08-07 | End: 2018-08-07

## 2018-08-07 RX ORDER — GLIPIZIDE 5 MG/1
5 TABLET ORAL ONCE
Status: COMPLETED | OUTPATIENT
Start: 2018-08-07 | End: 2018-08-07

## 2018-08-07 RX ORDER — LOSARTAN POTASSIUM AND HYDROCHLOROTHIAZIDE 12.5; 5 MG/1; MG/1
0.5 TABLET ORAL ONCE
Status: DISCONTINUED | OUTPATIENT
Start: 2018-08-07 | End: 2018-08-07 | Stop reason: CLARIF

## 2018-08-07 RX ORDER — DIVALPROEX SODIUM 250 MG/1
500 TABLET, DELAYED RELEASE ORAL ONCE
Status: COMPLETED | OUTPATIENT
Start: 2018-08-07 | End: 2018-08-07

## 2018-08-07 RX ORDER — METOPROLOL TARTRATE 50 MG/1
50 TABLET, FILM COATED ORAL ONCE
Status: COMPLETED | OUTPATIENT
Start: 2018-08-07 | End: 2018-08-07

## 2018-08-07 RX ADMIN — WATER 10 ML: 1 INJECTION INTRAMUSCULAR; INTRAVENOUS; SUBCUTANEOUS at 08:58

## 2018-08-07 RX ADMIN — METOPROLOL TARTRATE 50 MG: 50 TABLET ORAL at 08:32

## 2018-08-07 RX ADMIN — ZIPRASIDONE MESYLATE 20 MG: 20 INJECTION, POWDER, LYOPHILIZED, FOR SOLUTION INTRAMUSCULAR at 08:58

## 2018-08-07 RX ADMIN — CEFDINIR 300 MG: 300 CAPSULE ORAL at 08:32

## 2018-08-07 RX ADMIN — DIVALPROEX SODIUM 500 MG: 250 TABLET, DELAYED RELEASE ORAL at 08:56

## 2018-08-07 RX ADMIN — GLIPIZIDE 5 MG: 5 TABLET ORAL at 08:32

## 2018-08-07 RX ADMIN — METFORMIN HYDROCHLORIDE 1000 MG: 1000 TABLET ORAL at 08:56

## 2018-08-07 RX ADMIN — HYDROCHLOROTHIAZIDE 6.25 MG: 12.5 TABLET ORAL at 08:55

## 2018-08-07 RX ADMIN — LOSARTAN POTASSIUM 25 MG: 25 TABLET, FILM COATED ORAL at 08:32

## 2018-08-07 NOTE — ED PROVIDER NOTES
HPI:  8/6/18, Time: 9:06 PM         Andrew Recinos is a 66 y.o. female presenting to the ED for angry outburst, beginning today. The complaint has been persistent, moderate in severity, and worsened by her bipolar disease. Patient admits multiple nonphysical argument with family members. Denies striking anyone. Denies threatening to harm anyone. Denies threatening to harm herself. Admits police were called. Patient admits history of bipolar disease and states she is taking her medications as prescribed. Patient also admits history of Bell's palsy with residual facial weakness. Patient localizes it to the right side. Denies headache, chest pain, shortness of breath, abdominal pain, vomiting, diarrhea. Denies urinary symptoms. Denies fever. Denies any trauma. Review of Systems:   Pertinent positives and negatives are stated within HPI, all other systems reviewed and are negative.          --------------------------------------------- PAST HISTORY ---------------------------------------------  Past Medical History:  has a past medical history of Bell's palsy; Carpal tunnel syndrome; Diabetes mellitus ([facility] [address] Psychiatric problem; and Systemic primary arterial hypertension. Past Surgical History:  has a past surgical history that includes Echo Complete (6/22/2013). Social History:  reports that she has never smoked. She has never used smokeless tobacco. She reports that she does not drink alcohol or use drugs. Family History: family history includes Heart Disease (age of onset: 76) in her father; High Blood Pressure in her father. The patients home medications have been reviewed. Allergies: Ace inhibitors;  Ibuprofen; and Latuda [lurasidone hcl]        ---------------------------------------------------PHYSICAL EXAM--------------------------------------    Constitutional/General: Alert and oriented x3, well appearing, non toxic in NAD  Head: Normocephalic and atraumatic  Eyes: PERRL, EOMI  Mouth:

## 2018-08-07 NOTE — ED NOTES
Medications reviewed and verified with [facility] pharmacist Imelda Fulton.      Shira Morgan RN  08/07/18 1279

## 2018-08-07 NOTE — ED NOTES
Pt has been talking to herself and unseen others, responding, internally stimulated, verbiage is becoming more aggressive and angered, disruptive to the unit, is very loud and makes inappropriate comments. She still has her pants on, and is now upset because \"she told me I can keep my pants on\", referring to previous workers. Pt is not redirectable at this time and continues to be disruptive to other pts.          Gary Mcmullen, RICCI  08/07/18 0917

## 2018-08-07 NOTE — ED NOTES
Patient has been encouraged throughout night to lay in bed and try to rest, patient still refusing at this time.       Rinku Gardner RN  08/07/18 4284

## 2018-08-07 NOTE — ED NOTES
PT HAS BEEN ACCEPTED TO [facility] BY DR. Haile Luna TO  #1    FAXED PINK SLIP FAXED TO [phone]    [facility] WILL TRANSPORT BY 1000    [id number] [facility] 830 [facility]  08/07/18 Om 9091 830 [facility]  08/07/18 7601

## 2018-08-08 LAB — URINE CULTURE, ROUTINE: NORMAL

## 2018-08-17 ENCOUNTER — HOSPITAL ENCOUNTER (INPATIENT)
Age: 63
LOS: 10 days | Discharge: PSYCHIATRIC OR SUBSTANCE USE FACILITY | DRG: 753 | End: 2018-08-27
Attending: EMERGENCY MEDICINE | Admitting: PSYCHIATRY & NEUROLOGY
Payer: COMMERCIAL

## 2018-08-17 DIAGNOSIS — Z76.89 ENCOUNTER FOR PSYCHIATRIC ASSESSMENT: Primary | ICD-10-CM

## 2018-08-17 PROBLEM — F31.9 BIPOLAR 1 DISORDER (HCC): Status: ACTIVE | Noted: 2018-08-17

## 2018-08-17 LAB
ACETAMINOPHEN LEVEL: <5 MCG/ML (ref 10–30)
ALBUMIN SERPL-MCNC: 3.8 G/DL (ref 3.5–5.2)
ALP BLD-CCNC: 72 U/L (ref 35–104)
ALT SERPL-CCNC: 13 U/L (ref 0–32)
AMPHETAMINE SCREEN, URINE: NOT DETECTED
ANION GAP SERPL CALCULATED.3IONS-SCNC: 15 MMOL/L (ref 7–16)
AST SERPL-CCNC: 15 U/L (ref 0–31)
BACTERIA: ABNORMAL /HPF
BARBITURATE SCREEN URINE: NOT DETECTED
BASOPHILS ABSOLUTE: 0.03 E9/L (ref 0–0.2)
BASOPHILS RELATIVE PERCENT: 0.4 % (ref 0–2)
BENZODIAZEPINE SCREEN, URINE: NOT DETECTED
BILIRUB SERPL-MCNC: <0.2 MG/DL (ref 0–1.2)
BILIRUBIN URINE: NEGATIVE
BLOOD, URINE: NEGATIVE
BUN BLDV-MCNC: 25 MG/DL (ref 8–23)
CALCIUM SERPL-MCNC: 9.6 MG/DL (ref 8.6–10.2)
CANNABINOID SCREEN URINE: NOT DETECTED
CHLORIDE BLD-SCNC: 101 MMOL/L (ref 98–107)
CLARITY: CLEAR
CO2: 27 MMOL/L (ref 22–29)
COCAINE METABOLITE SCREEN URINE: NOT DETECTED
COLOR: YELLOW
CREAT SERPL-MCNC: 1 MG/DL (ref 0.5–1)
EKG ATRIAL RATE: 100 BPM
EKG P AXIS: 68 DEGREES
EKG P-R INTERVAL: 144 MS
EKG Q-T INTERVAL: 344 MS
EKG QRS DURATION: 72 MS
EKG QTC CALCULATION (BAZETT): 443 MS
EKG R AXIS: 55 DEGREES
EKG T AXIS: 72 DEGREES
EKG VENTRICULAR RATE: 100 BPM
EOSINOPHILS ABSOLUTE: 0.06 E9/L (ref 0.05–0.5)
EOSINOPHILS RELATIVE PERCENT: 0.8 % (ref 0–6)
EPITHELIAL CELLS, UA: ABNORMAL /HPF
ETHANOL: <10 MG/DL (ref 0–0.08)
GFR AFRICAN AMERICAN: >60
GFR NON-AFRICAN AMERICAN: >60 ML/MIN/1.73
GLUCOSE BLD-MCNC: 83 MG/DL (ref 74–109)
GLUCOSE URINE: NEGATIVE MG/DL
HCT VFR BLD CALC: 37.9 % (ref 34–48)
HEMOGLOBIN: 12.2 G/DL (ref 11.5–15.5)
IMMATURE GRANULOCYTES #: 0.03 E9/L
IMMATURE GRANULOCYTES %: 0.4 % (ref 0–5)
KETONES, URINE: ABNORMAL MG/DL
LEUKOCYTE ESTERASE, URINE: ABNORMAL
LYMPHOCYTES ABSOLUTE: 3.18 E9/L (ref 1.5–4)
LYMPHOCYTES RELATIVE PERCENT: 40.8 % (ref 20–42)
MCH RBC QN AUTO: 28.6 PG (ref 26–35)
MCHC RBC AUTO-ENTMCNC: 32.2 % (ref 32–34.5)
MCV RBC AUTO: 89 FL (ref 80–99.9)
METHADONE SCREEN, URINE: NOT DETECTED
MONOCYTES ABSOLUTE: 0.76 E9/L (ref 0.1–0.95)
MONOCYTES RELATIVE PERCENT: 9.7 % (ref 2–12)
NEUTROPHILS ABSOLUTE: 3.74 E9/L (ref 1.8–7.3)
NEUTROPHILS RELATIVE PERCENT: 47.9 % (ref 43–80)
NITRITE, URINE: NEGATIVE
OPIATE SCREEN URINE: NOT DETECTED
PDW BLD-RTO: 13.8 FL (ref 11.5–15)
PH UA: 6 (ref 5–9)
PHENCYCLIDINE SCREEN URINE: NOT DETECTED
PLATELET # BLD: 206 E9/L (ref 130–450)
PMV BLD AUTO: 9.9 FL (ref 7–12)
POTASSIUM SERPL-SCNC: 4.7 MMOL/L (ref 3.5–5)
PROPOXYPHENE SCREEN: NOT DETECTED
PROTEIN UA: NEGATIVE MG/DL
RBC # BLD: 4.26 E12/L (ref 3.5–5.5)
RBC UA: ABNORMAL /HPF (ref 0–2)
SALICYLATE, SERUM: <0.3 MG/DL (ref 0–30)
SODIUM BLD-SCNC: 143 MMOL/L (ref 132–146)
SPECIFIC GRAVITY UA: 1.02 (ref 1–1.03)
TOTAL PROTEIN: 7.1 G/DL (ref 6.4–8.3)
TRICYCLIC ANTIDEPRESSANTS SCREEN SERUM: NEGATIVE NG/ML
UROBILINOGEN, URINE: 0.2 E.U./DL
VALPROIC ACID LEVEL: 91 MCG/ML (ref 50–100)
WBC # BLD: 7.8 E9/L (ref 4.5–11.5)
WBC UA: ABNORMAL /HPF (ref 0–5)

## 2018-08-17 PROCEDURE — 81001 URINALYSIS AUTO W/SCOPE: CPT

## 2018-08-17 PROCEDURE — G0480 DRUG TEST DEF 1-7 CLASSES: HCPCS

## 2018-08-17 PROCEDURE — 93005 ELECTROCARDIOGRAM TRACING: CPT | Performed by: PHYSICIAN ASSISTANT

## 2018-08-17 PROCEDURE — 99285 EMERGENCY DEPT VISIT HI MDM: CPT

## 2018-08-17 PROCEDURE — 80307 DRUG TEST PRSMV CHEM ANLYZR: CPT

## 2018-08-17 PROCEDURE — 36415 COLL VENOUS BLD VENIPUNCTURE: CPT

## 2018-08-17 PROCEDURE — 80164 ASSAY DIPROPYLACETIC ACD TOT: CPT

## 2018-08-17 PROCEDURE — 85025 COMPLETE CBC W/AUTO DIFF WBC: CPT

## 2018-08-17 PROCEDURE — 1240000000 HC EMOTIONAL WELLNESS R&B

## 2018-08-17 PROCEDURE — 83036 HEMOGLOBIN GLYCOSYLATED A1C: CPT

## 2018-08-17 PROCEDURE — 80053 COMPREHEN METABOLIC PANEL: CPT

## 2018-08-17 RX ORDER — ARIPIPRAZOLE 15 MG/1
15 TABLET ORAL 2 TIMES DAILY
Status: ON HOLD | COMMUNITY
End: 2018-08-27 | Stop reason: HOSPADM

## 2018-08-17 RX ORDER — DIVALPROEX SODIUM 500 MG/1
500 TABLET, DELAYED RELEASE ORAL 3 TIMES DAILY
Status: DISCONTINUED | OUTPATIENT
Start: 2018-08-18 | End: 2018-08-27 | Stop reason: HOSPADM

## 2018-08-17 RX ORDER — LOSARTAN POTASSIUM AND HYDROCHLOROTHIAZIDE 12.5; 5 MG/1; MG/1
0.5 TABLET ORAL DAILY
Status: DISCONTINUED | OUTPATIENT
Start: 2018-08-18 | End: 2018-08-17 | Stop reason: SDUPTHER

## 2018-08-17 RX ORDER — ATORVASTATIN CALCIUM 40 MG/1
40 TABLET, FILM COATED ORAL DAILY
Status: DISCONTINUED | OUTPATIENT
Start: 2018-08-18 | End: 2018-08-27 | Stop reason: HOSPADM

## 2018-08-17 RX ORDER — HYDROCHLOROTHIAZIDE 12.5 MG/1
12.5 TABLET ORAL DAILY
Status: DISCONTINUED | OUTPATIENT
Start: 2018-08-18 | End: 2018-08-27 | Stop reason: HOSPADM

## 2018-08-17 RX ORDER — BENZTROPINE MESYLATE 1 MG/ML
2 INJECTION INTRAMUSCULAR; INTRAVENOUS 2 TIMES DAILY PRN
Status: DISCONTINUED | OUTPATIENT
Start: 2018-08-17 | End: 2018-08-27 | Stop reason: HOSPADM

## 2018-08-17 RX ORDER — ARIPIPRAZOLE 15 MG/1
15 TABLET ORAL 2 TIMES DAILY
Status: DISCONTINUED | OUTPATIENT
Start: 2018-08-18 | End: 2018-08-18

## 2018-08-17 RX ORDER — QUETIAPINE FUMARATE 25 MG/1
50 TABLET, FILM COATED ORAL 4 TIMES DAILY
Status: DISCONTINUED | OUTPATIENT
Start: 2018-08-18 | End: 2018-08-18

## 2018-08-17 RX ORDER — QUETIAPINE FUMARATE 25 MG/1
50 TABLET, FILM COATED ORAL 4 TIMES DAILY
Status: ON HOLD | COMMUNITY
End: 2018-08-27 | Stop reason: HOSPADM

## 2018-08-17 RX ORDER — LANOLIN ALCOHOL/MO/W.PET/CERES
5 CREAM (GRAM) TOPICAL NIGHTLY
Status: ON HOLD | COMMUNITY
End: 2018-08-27 | Stop reason: HOSPADM

## 2018-08-17 RX ORDER — OLANZAPINE 10 MG/1
10 TABLET ORAL
Status: ACTIVE | OUTPATIENT
Start: 2018-08-17 | End: 2018-08-17

## 2018-08-17 RX ORDER — HYDROXYZINE PAMOATE 50 MG/1
50 CAPSULE ORAL EVERY 6 HOURS PRN
Status: DISCONTINUED | OUTPATIENT
Start: 2018-08-17 | End: 2018-08-27 | Stop reason: HOSPADM

## 2018-08-17 RX ORDER — METOPROLOL TARTRATE 50 MG/1
50 TABLET, FILM COATED ORAL 2 TIMES DAILY
Status: DISCONTINUED | OUTPATIENT
Start: 2018-08-18 | End: 2018-08-27 | Stop reason: HOSPADM

## 2018-08-17 RX ORDER — QUETIAPINE 200 MG/1
600 TABLET, FILM COATED, EXTENDED RELEASE ORAL NIGHTLY
Status: DISCONTINUED | OUTPATIENT
Start: 2018-08-18 | End: 2018-08-27 | Stop reason: HOSPADM

## 2018-08-17 RX ORDER — TIMOLOL MALEATE 5 MG/ML
1 SOLUTION/ DROPS OPHTHALMIC 2 TIMES DAILY
Status: DISCONTINUED | OUTPATIENT
Start: 2018-08-18 | End: 2018-08-27 | Stop reason: HOSPADM

## 2018-08-17 RX ORDER — LATANOPROST 50 UG/ML
1 SOLUTION/ DROPS OPHTHALMIC DAILY
Status: DISCONTINUED | OUTPATIENT
Start: 2018-08-18 | End: 2018-08-27 | Stop reason: HOSPADM

## 2018-08-17 RX ORDER — HALOPERIDOL 5 MG/ML
10 INJECTION INTRAMUSCULAR EVERY 6 HOURS PRN
Status: DISCONTINUED | OUTPATIENT
Start: 2018-08-17 | End: 2018-08-27 | Stop reason: HOSPADM

## 2018-08-17 RX ORDER — LOSARTAN POTASSIUM 50 MG/1
50 TABLET ORAL DAILY
Status: DISCONTINUED | OUTPATIENT
Start: 2018-08-18 | End: 2018-08-27 | Stop reason: HOSPADM

## 2018-08-17 RX ORDER — ERGOCALCIFEROL 1.25 MG/1
50000 CAPSULE ORAL WEEKLY
Status: DISCONTINUED | OUTPATIENT
Start: 2018-08-18 | End: 2018-08-27 | Stop reason: HOSPADM

## 2018-08-17 RX ORDER — ACETAMINOPHEN 325 MG/1
650 TABLET ORAL EVERY 4 HOURS PRN
Status: DISCONTINUED | OUTPATIENT
Start: 2018-08-17 | End: 2018-08-27 | Stop reason: HOSPADM

## 2018-08-17 RX ORDER — MAGNESIUM HYDROXIDE/ALUMINUM HYDROXICE/SIMETHICONE 120; 1200; 1200 MG/30ML; MG/30ML; MG/30ML
30 SUSPENSION ORAL PRN
Status: DISCONTINUED | OUTPATIENT
Start: 2018-08-17 | End: 2018-08-27 | Stop reason: HOSPADM

## 2018-08-17 RX ORDER — GLIPIZIDE 5 MG/1
5 TABLET ORAL
Status: DISCONTINUED | OUTPATIENT
Start: 2018-08-18 | End: 2018-08-19

## 2018-08-17 RX ORDER — TRAZODONE HYDROCHLORIDE 50 MG/1
50 TABLET ORAL NIGHTLY PRN
Status: DISCONTINUED | OUTPATIENT
Start: 2018-08-17 | End: 2018-08-27 | Stop reason: HOSPADM

## 2018-08-17 ASSESSMENT — SLEEP AND FATIGUE QUESTIONNAIRES
DIFFICULTY FALLING ASLEEP: NO
SLEEP PATTERN: RESTLESSNESS
AVERAGE NUMBER OF SLEEP HOURS: 6
DIFFICULTY STAYING ASLEEP: NO
RESTFUL SLEEP: YES
DO YOU HAVE DIFFICULTY SLEEPING: NO
DO YOU USE A SLEEP AID: NO
DIFFICULTY ARISING: NO

## 2018-08-17 NOTE — ED PROVIDER NOTES
Ht 5' 3\" (1.6 m)   Wt 179 lb (81.2 kg)   SpO2 99%   BMI 31.71 kg/m²      Oxygen Saturation Interpretation: Normal.    General Appearance:  well-appearing, hospital attire, fair grooming and fair hygiene. Constitutional: Alert and oriented x4, NAD, calm and cooperative     HEENT: Non-traumatic, No bruising or lacerations noted, EOMI, Airway patient. Neck: Supple. Non-tender with no meningeal signs    Respiratory:  Clear to auscultation and breath sounds equal. No distress   CV:  Regular rate and rhythm  GI:  Abdomen soft, nontender, No guarding or rigidity   Back:  No costovertebral tenderness. No midline tenderness   Integument:  Normal turgor. Warm, dry, without visible rash, unless noted elsewhere. Extremities: No tenderness or edema bilaterally   Neurological: CN II-XII grossly intact, Motor functions intact. Psychiatric:        Thought Process:       Coherent:  Yes. Delusions / Paranoia: no evidence of delusions and no evidence of paranoia. Flight of ideas:  No.         Rambling conversation:  No.       Affect: congruent. Suicidal ideation:  no suicidal ideation. Homicidal ideation:  no homicidal ideation. Perceptions:  denies any perceptual disturbance present.        Lab / Imaging Results   (All laboratory and radiology results have been personally reviewed by myself)  Labs:  Results for orders placed or performed during the hospital encounter of 08/17/18   CBC Auto Differential   Result Value Ref Range    WBC 7.8 4.5 - 11.5 E9/L    RBC 4.26 3.50 - 5.50 E12/L    Hemoglobin 12.2 11.5 - 15.5 g/dL    Hematocrit 37.9 34.0 - 48.0 %    MCV 89.0 80.0 - 99.9 fL    MCH 28.6 26.0 - 35.0 pg    MCHC 32.2 32.0 - 34.5 %    RDW 13.8 11.5 - 15.0 fL    Platelets 025 778 - 251 E9/L    MPV 9.9 7.0 - 12.0 fL    Neutrophils % 47.9 43.0 - 80.0 %    Immature Granulocytes % 0.4 0.0 - 5.0 %    Lymphocytes % 40.8 20.0 - 42.0 %    Monocytes % 9.7 2.0 - 12.0 %    Eosinophils % 0.8 0.0 - 6.0 % specific details for the plan of care and counseling regarding the diagnosis and prognosis. Questions are answered at this time and they are agreeable with the plan. Case discussed with Dr. Alberto Given      1. Encounter for psychiatric assessment      Plan   Patient medically cleared for psychiatric evaluation   Dispo per social work   Patient condition is good    New Medications     New Prescriptions    No medications on file       Electronically signed by Lauri Nj PA-C   DD: 8/17/18  **This report was transcribed using voice recognition software. Every effort was made to ensure accuracy; however, inadvertent computerized transcription errors may be present.     END OF ED PROVIDER NOTE        Lauri Nj PA-C  08/17/18 3329

## 2018-08-17 NOTE — ED NOTES
1 labeled bag of pt belongings placed in locker 31. Pt's  has her shoes and purse and is taking them home with him.      Michelle Avalos RN  08/17/18 4429

## 2018-08-18 PROBLEM — F31.2 SEVERE MANIC BIPOLAR I DISORDER WITH PSYCHOTIC FEATURES (HCC): Status: ACTIVE | Noted: 2018-08-18

## 2018-08-18 PROBLEM — F31.13 BIPOLAR DISORD, CRNT EPSD MANIC W/O PSYCH FEATURES, SEVERE (HCC): Status: ACTIVE | Noted: 2018-08-18

## 2018-08-18 LAB
HBA1C MFR BLD: 7.8 % (ref 4–5.6)
METER GLUCOSE: 163 MG/DL (ref 70–110)
METER GLUCOSE: 192 MG/DL (ref 70–110)
METER GLUCOSE: 195 MG/DL (ref 70–110)
METER GLUCOSE: 92 MG/DL (ref 70–110)

## 2018-08-18 PROCEDURE — 6370000000 HC RX 637 (ALT 250 FOR IP): Performed by: INTERNAL MEDICINE

## 2018-08-18 PROCEDURE — 82962 GLUCOSE BLOOD TEST: CPT

## 2018-08-18 PROCEDURE — 1240000000 HC EMOTIONAL WELLNESS R&B

## 2018-08-18 PROCEDURE — 99221 1ST HOSP IP/OBS SF/LOW 40: CPT | Performed by: PSYCHIATRY & NEUROLOGY

## 2018-08-18 PROCEDURE — 6370000000 HC RX 637 (ALT 250 FOR IP): Performed by: PSYCHIATRY & NEUROLOGY

## 2018-08-18 RX ORDER — DEXTROSE MONOHYDRATE 50 MG/ML
100 INJECTION, SOLUTION INTRAVENOUS PRN
Status: DISCONTINUED | OUTPATIENT
Start: 2018-08-18 | End: 2018-08-27 | Stop reason: HOSPADM

## 2018-08-18 RX ORDER — DEXTROSE MONOHYDRATE 25 G/50ML
12.5 INJECTION, SOLUTION INTRAVENOUS PRN
Status: DISCONTINUED | OUTPATIENT
Start: 2018-08-18 | End: 2018-08-27 | Stop reason: HOSPADM

## 2018-08-18 RX ORDER — QUETIAPINE FUMARATE 100 MG/1
100 TABLET, FILM COATED ORAL 2 TIMES DAILY
Status: DISCONTINUED | OUTPATIENT
Start: 2018-08-19 | End: 2018-08-21

## 2018-08-18 RX ORDER — ARIPIPRAZOLE 10 MG/1
10 TABLET ORAL 2 TIMES DAILY
Status: DISCONTINUED | OUTPATIENT
Start: 2018-08-18 | End: 2018-08-20

## 2018-08-18 RX ORDER — NICOTINE POLACRILEX 4 MG
15 LOZENGE BUCCAL PRN
Status: DISCONTINUED | OUTPATIENT
Start: 2018-08-18 | End: 2018-08-27 | Stop reason: HOSPADM

## 2018-08-18 RX ADMIN — INSULIN LISPRO 2 UNITS: 100 INJECTION, SOLUTION INTRAVENOUS; SUBCUTANEOUS at 16:46

## 2018-08-18 RX ADMIN — QUETIAPINE FUMARATE 600 MG: 200 TABLET, EXTENDED RELEASE ORAL at 20:32

## 2018-08-18 RX ADMIN — DIVALPROEX SODIUM 500 MG: 500 TABLET, DELAYED RELEASE ORAL at 20:32

## 2018-08-18 RX ADMIN — QUETIAPINE FUMARATE 50 MG: 25 TABLET ORAL at 06:41

## 2018-08-18 RX ADMIN — HYDROCHLOROTHIAZIDE 12.5 MG: 12.5 TABLET ORAL at 09:07

## 2018-08-18 RX ADMIN — QUETIAPINE FUMARATE 50 MG: 25 TABLET ORAL at 14:55

## 2018-08-18 RX ADMIN — HYDROXYZINE PAMOATE 50 MG: 50 CAPSULE ORAL at 16:47

## 2018-08-18 RX ADMIN — TIMOLOL MALEATE 1 DROP: 5 SOLUTION OPHTHALMIC at 00:38

## 2018-08-18 RX ADMIN — GLIPIZIDE 5 MG: 5 TABLET ORAL at 06:41

## 2018-08-18 RX ADMIN — DIVALPROEX SODIUM 500 MG: 500 TABLET, DELAYED RELEASE ORAL at 09:08

## 2018-08-18 RX ADMIN — LINAGLIPTIN 5 MG: 5 TABLET, FILM COATED ORAL at 09:08

## 2018-08-18 RX ADMIN — DIVALPROEX SODIUM 500 MG: 500 TABLET, DELAYED RELEASE ORAL at 14:55

## 2018-08-18 RX ADMIN — ATORVASTATIN CALCIUM 40 MG: 40 TABLET, FILM COATED ORAL at 09:08

## 2018-08-18 RX ADMIN — QUETIAPINE FUMARATE 600 MG: 200 TABLET, EXTENDED RELEASE ORAL at 00:26

## 2018-08-18 RX ADMIN — METOPROLOL TARTRATE 50 MG: 50 TABLET, FILM COATED ORAL at 09:07

## 2018-08-18 RX ADMIN — QUETIAPINE FUMARATE 50 MG: 25 TABLET ORAL at 18:57

## 2018-08-18 RX ADMIN — LATANOPROST 1 DROP: 50 SOLUTION OPHTHALMIC at 00:38

## 2018-08-18 RX ADMIN — METFORMIN HYDROCHLORIDE 1000 MG: 1000 TABLET ORAL at 09:08

## 2018-08-18 RX ADMIN — ARIPIPRAZOLE 10 MG: 10 TABLET ORAL at 20:33

## 2018-08-18 RX ADMIN — LATANOPROST 1 DROP: 50 SOLUTION OPHTHALMIC at 20:32

## 2018-08-18 RX ADMIN — ARIPIPRAZOLE 15 MG: 15 TABLET ORAL at 00:39

## 2018-08-18 RX ADMIN — LOSARTAN POTASSIUM 50 MG: 50 TABLET, FILM COATED ORAL at 09:08

## 2018-08-18 RX ADMIN — TIMOLOL MALEATE 1 DROP: 5 SOLUTION OPHTHALMIC at 09:11

## 2018-08-18 RX ADMIN — ARIPIPRAZOLE 15 MG: 15 TABLET ORAL at 09:08

## 2018-08-18 RX ADMIN — METOPROLOL TARTRATE 50 MG: 50 TABLET, FILM COATED ORAL at 00:38

## 2018-08-18 RX ADMIN — ERGOCALCIFEROL 50000 UNITS: 1.25 CAPSULE ORAL at 09:07

## 2018-08-18 RX ADMIN — QUETIAPINE FUMARATE 50 MG: 25 TABLET ORAL at 09:13

## 2018-08-18 ASSESSMENT — SLEEP AND FATIGUE QUESTIONNAIRES
DIFFICULTY ARISING: NO
SLEEP PATTERN: RESTLESSNESS
DIFFICULTY FALLING ASLEEP: NO
DO YOU USE A SLEEP AID: NO
RESTFUL SLEEP: YES
DO YOU HAVE DIFFICULTY SLEEPING: NO
DIFFICULTY STAYING ASLEEP: NO
AVERAGE NUMBER OF SLEEP HOURS: 6

## 2018-08-18 ASSESSMENT — LIFESTYLE VARIABLES
HISTORY_ALCOHOL_USE: NO
HISTORY_ALCOHOL_USE: NO

## 2018-08-18 ASSESSMENT — PAIN SCALES - GENERAL: PAINLEVEL_OUTOF10: 0

## 2018-08-18 NOTE — ED NOTES
Patient's purse and shoes in locker 28. Will send up with patient to floor with other belongings in locker 29.      Rodney Dominguez RN  08/17/18 2020

## 2018-08-18 NOTE — PROGRESS NOTES
Attended community meeting, participate in morning stretching. Shared goal for the day as to find my shoes. Recreation assessment completed.

## 2018-08-18 NOTE — ED NOTES
Report called, waiting for floor to call stating they are ready for patient.       Kostas Crow RN  08/17/18 0873

## 2018-08-18 NOTE — BH NOTE
`[facility]  Admission Note   Pt cooperative on admission. Tangential, racing thoughts. Says her mother was getting all her nerves and she wanted to punch her. Pt. Was her about a month ago. Says she has been compliant on medication. Took night time medications without problem. Admission Type:   Admission Type: Involuntary    Reason for admission:  Reason for Admission: Mom was getting all my nerves and i wass gonna punch her.     PATIENT STRENGTHS:  Strengths: Communication, Connection to output provider    Patient Strengths and Limitations:  Limitations: Tendency to isolate self, Difficulty problem solving/relies on others to help solve problems, Lacks leisure interests    Addictive Behavior:   Addictive Behavior  In the past 3 months, have you felt or has someone told you that you have a problem with:  : None  Do you have a history of Chemical Use?: No  Do you have a history of Alcohol Use?: No  Do you have a history of Street Drug Abuse?: No  Histroy of Prescripton Drug Abuse?: No    Medical Problems:   Past Medical History:   Diagnosis Date    Bell's palsy     Carpal tunnel syndrome 8/4/2016    Diabetes mellitus ([facility] [address]     Psychiatric problem     bipolar    Systemic primary arterial hypertension 8/4/2016       Status EXAM:  Status and Exam  Normal: No  Facial Expression: Exaggerated  Affect: Unstable  Level of Consciousness: Alert  Mood:Normal: No  Mood: Anxious, Irritable, Labile  Motor Activity:Normal: No  Motor Activity: Decreased  Interview Behavior: Cooperative  Preception: [place] to Person, [place] to Time, [place] to Place, [place] to Situation  Attention:Normal: Yes  Attention: Distractible  Thought Processes: Tangential  Thought Content:Normal: No  Thought Content: Paranoia, Preoccupations  Hallucinations: None  Delusions: Yes  Delusions: Obsessions  Memory: Poor Recent, Poor Remote  Insight and Judgment: No  Insight and Judgment: Poor Judgment, Poor Insight  Present Suicidal

## 2018-08-18 NOTE — PLAN OF CARE
Problem: Altered Mood, Manic Behavior:  Goal: Able to verbalize decrease in frequency and intensity of racing thoughts  Able to verbalize decrease in frequency and intensity of racing thoughts   Outcome: Ongoing    Goal: Ability to interact with others will improve  Ability to interact with others will improve   Outcome: Ongoing      Comments: Has been out and about the unit and mood is pleasant. Continues to have pressured, rapid speech much of the time with flight of ideas. Behavior has been in control.

## 2018-08-18 NOTE — ED NOTES
Notified dr Charlene Bentley of need for admission pt accepted to [facility].        Marcy Vázquez RN  08/17/18 2016

## 2018-08-19 LAB
METER GLUCOSE: 122 MG/DL (ref 70–110)
METER GLUCOSE: 172 MG/DL (ref 70–110)
METER GLUCOSE: 193 MG/DL (ref 70–110)
METER GLUCOSE: 77 MG/DL (ref 70–110)
METER GLUCOSE: 93 MG/DL (ref 70–110)

## 2018-08-19 PROCEDURE — 6370000000 HC RX 637 (ALT 250 FOR IP): Performed by: INTERNAL MEDICINE

## 2018-08-19 PROCEDURE — 99231 SBSQ HOSP IP/OBS SF/LOW 25: CPT | Performed by: PSYCHIATRY & NEUROLOGY

## 2018-08-19 PROCEDURE — 6370000000 HC RX 637 (ALT 250 FOR IP): Performed by: PSYCHIATRY & NEUROLOGY

## 2018-08-19 PROCEDURE — 82962 GLUCOSE BLOOD TEST: CPT

## 2018-08-19 PROCEDURE — 1240000000 HC EMOTIONAL WELLNESS R&B

## 2018-08-19 RX ORDER — GLIPIZIDE 5 MG/1
15 TABLET ORAL
Status: DISCONTINUED | OUTPATIENT
Start: 2018-08-20 | End: 2018-08-20

## 2018-08-19 RX ADMIN — DIVALPROEX SODIUM 500 MG: 500 TABLET, DELAYED RELEASE ORAL at 08:59

## 2018-08-19 RX ADMIN — METFORMIN HYDROCHLORIDE 1000 MG: 1000 TABLET ORAL at 08:59

## 2018-08-19 RX ADMIN — LOSARTAN POTASSIUM 50 MG: 50 TABLET, FILM COATED ORAL at 09:00

## 2018-08-19 RX ADMIN — QUETIAPINE FUMARATE 600 MG: 200 TABLET, EXTENDED RELEASE ORAL at 20:09

## 2018-08-19 RX ADMIN — LATANOPROST 1 DROP: 50 SOLUTION OPHTHALMIC at 20:08

## 2018-08-19 RX ADMIN — HYDROCHLOROTHIAZIDE 12.5 MG: 12.5 TABLET ORAL at 09:00

## 2018-08-19 RX ADMIN — DIVALPROEX SODIUM 500 MG: 500 TABLET, DELAYED RELEASE ORAL at 13:24

## 2018-08-19 RX ADMIN — DIVALPROEX SODIUM 500 MG: 500 TABLET, DELAYED RELEASE ORAL at 20:09

## 2018-08-19 RX ADMIN — METOPROLOL TARTRATE 50 MG: 50 TABLET, FILM COATED ORAL at 11:29

## 2018-08-19 RX ADMIN — TIMOLOL MALEATE 1 DROP: 5 SOLUTION OPHTHALMIC at 09:02

## 2018-08-19 RX ADMIN — METFORMIN HYDROCHLORIDE 1000 MG: 1000 TABLET ORAL at 16:41

## 2018-08-19 RX ADMIN — ARIPIPRAZOLE 10 MG: 10 TABLET ORAL at 08:59

## 2018-08-19 RX ADMIN — TRAZODONE HYDROCHLORIDE 50 MG: 50 TABLET ORAL at 20:09

## 2018-08-19 RX ADMIN — GLIPIZIDE 5 MG: 5 TABLET ORAL at 06:43

## 2018-08-19 RX ADMIN — METOPROLOL TARTRATE 50 MG: 50 TABLET, FILM COATED ORAL at 20:09

## 2018-08-19 RX ADMIN — ARIPIPRAZOLE 10 MG: 10 TABLET ORAL at 20:09

## 2018-08-19 RX ADMIN — QUETIAPINE FUMARATE 100 MG: 100 TABLET ORAL at 09:00

## 2018-08-19 RX ADMIN — ATORVASTATIN CALCIUM 40 MG: 40 TABLET, FILM COATED ORAL at 20:09

## 2018-08-19 RX ADMIN — TIMOLOL MALEATE 1 DROP: 5 SOLUTION OPHTHALMIC at 20:08

## 2018-08-19 RX ADMIN — QUETIAPINE FUMARATE 100 MG: 100 TABLET ORAL at 20:09

## 2018-08-19 RX ADMIN — LINAGLIPTIN 5 MG: 5 TABLET, FILM COATED ORAL at 08:59

## 2018-08-19 RX ADMIN — INSULIN LISPRO 2 UNITS: 100 INJECTION, SOLUTION INTRAVENOUS; SUBCUTANEOUS at 09:01

## 2018-08-19 ASSESSMENT — PAIN SCALES - GENERAL
PAINLEVEL_OUTOF10: 0
PAINLEVEL_OUTOF10: 0

## 2018-08-19 NOTE — PLAN OF CARE
Problem: Altered Mood, Manic Behavior:  Goal: Able to verbalize decrease in frequency and intensity of racing thoughts  Able to verbalize decrease in frequency and intensity of racing thoughts   Outcome: Ongoing    Goal: Ability to interact with others will improve  Ability to interact with others will improve   Outcome: Ongoing  Patient out on unit. Stevens bright and singing. Flight of ideas and pressured speech. Denies suicidal and homicidal thoughts. Denies hallucinations.

## 2018-08-19 NOTE — PLAN OF CARE
Problem: Altered Mood, Manic Behavior:  Goal: Absence of self-harm  Absence of self-harm   Outcome: Met This Shift  Pt resting in bed apparently asleep with easy even respirations at HS q 15 min electronic rounding. RN hourly rounds provided to pt this shift.

## 2018-08-19 NOTE — PROGRESS NOTES
DATE OF SERVICE:     8/19/2018    Laurie Alvarado seen today for the purpose of continuation of care. Nursing, social work reports, laboratory studies and vital signs are reviewed. Patient chief complaint today is:             [] Depression      [] Anxiety        [x] Psychosis         [] Suicidal/Homicidal                         [] Delusions           [] Aggression          Subjective: Today patient is bright, singing on unit, and is manic. She has flight of ideas with rapid pressure speech. She slept 3 hours last night, taking medications. She is pleasant and cooperative, no altercations with other patients. Sleep:  [] Good [] Fair  [x] Poor  Appetite:  [] Good [x] Fair  [] Poor    Depression:  [] Mild [] Moderate [] Severe                [] Constant [] Sporadic     Anxiety: [] Mild [] Moderate [x] Severe    [x] Constant [] Sporadic     Delusions: [] Mild [] Moderate [x] Severe     [x] Constant [] Sporadic     [x] Paranoid [] Somatic [x] Grandiose     Hallucinations: [] Mild [] Moderate [] Severe     [] Constant [] Sporadic    [] Auditory  [] Visual [] Tactile       Suicidal: [] Constant [] Sporadic  Homicidal: [] Constant [] Sporadic    Unscheduled Medications     [] Patient Receiving Emergency Medications \" Chemical Restraint\"   [] Requesting PRN medications for anxiety    Medical Review of Systems:     All other than marked systmes have been reviewed and are all negative.     Constitutional Symptoms: []  fever []  Chills  Skin Symptoms: [] rash []  Pruritus   Eye Symptoms: [] Vision unchanged []  recent vision problems[] blurred vision   Respiratory Symptoms:[] shortness of breath [] cough  Cardiovascular Symptoms:  [] chest pain   [] palpitations   Gastrointestinal Symptoms: []  abdominal pain []  nausea []  vomiting []  diarrhea  Genitourinary Symptoms: []  dysuria  []  hematuria   Musculoskeletal Symptoms: []  back pain []  muscle pain []  joint pain  Neurologic Symptoms: []  headache []

## 2018-08-19 NOTE — CONSULTS
psychosis ([facility] [address] 07/23/2014       Plan  · DM, Type II, non-insulin-dependent, uncontrolled: Continue metformin/Tradjenta. Increase glipizide. SSI. MhO9x-5.8%. Consider insulins, but with her psych hx-- injections probably is not a good idea. · Severe bipolar disorder with psychotic features: Per psychiatry  · Follow labs  · DVT prophylaxis. · Please see orders for further management and care. ·  for discharge planning  · Discharge plan: pending clinical improvement from psychiatric POV-- no internal medicine issues keeping the patient in the hospital.     Honey Cuello DO  8/19/2018  8:06 AM    Hospital Cell (always forwarded to covering physician): [phone]. I can be reached through [employer] as well. NOTE:  This report was transcribed using voice recognition software. Every effort was made to ensure accuracy; however, inadvertent computerized transcription errors may be present.

## 2018-08-20 LAB
METER GLUCOSE: 176 MG/DL (ref 70–110)
METER GLUCOSE: 58 MG/DL (ref 70–110)
METER GLUCOSE: 64 MG/DL (ref 70–110)
METER GLUCOSE: 84 MG/DL (ref 70–110)

## 2018-08-20 PROCEDURE — 6370000000 HC RX 637 (ALT 250 FOR IP): Performed by: PSYCHIATRY & NEUROLOGY

## 2018-08-20 PROCEDURE — 82962 GLUCOSE BLOOD TEST: CPT

## 2018-08-20 PROCEDURE — 1240000000 HC EMOTIONAL WELLNESS R&B

## 2018-08-20 PROCEDURE — 99231 SBSQ HOSP IP/OBS SF/LOW 25: CPT | Performed by: PSYCHIATRY & NEUROLOGY

## 2018-08-20 PROCEDURE — 6370000000 HC RX 637 (ALT 250 FOR IP): Performed by: INTERNAL MEDICINE

## 2018-08-20 RX ORDER — ARIPIPRAZOLE 15 MG/1
15 TABLET ORAL 2 TIMES DAILY
Status: DISCONTINUED | OUTPATIENT
Start: 2018-08-20 | End: 2018-08-27 | Stop reason: HOSPADM

## 2018-08-20 RX ADMIN — LOSARTAN POTASSIUM 50 MG: 50 TABLET, FILM COATED ORAL at 09:09

## 2018-08-20 RX ADMIN — METFORMIN HYDROCHLORIDE 1000 MG: 1000 TABLET ORAL at 16:31

## 2018-08-20 RX ADMIN — METOPROLOL TARTRATE 50 MG: 50 TABLET, FILM COATED ORAL at 22:40

## 2018-08-20 RX ADMIN — QUETIAPINE FUMARATE 100 MG: 100 TABLET ORAL at 22:40

## 2018-08-20 RX ADMIN — QUETIAPINE FUMARATE 100 MG: 100 TABLET ORAL at 09:09

## 2018-08-20 RX ADMIN — DIVALPROEX SODIUM 500 MG: 500 TABLET, DELAYED RELEASE ORAL at 22:40

## 2018-08-20 RX ADMIN — LINAGLIPTIN 5 MG: 5 TABLET, FILM COATED ORAL at 09:09

## 2018-08-20 RX ADMIN — TIMOLOL MALEATE 1 DROP: 5 SOLUTION OPHTHALMIC at 22:40

## 2018-08-20 RX ADMIN — METOPROLOL TARTRATE 50 MG: 50 TABLET, FILM COATED ORAL at 09:08

## 2018-08-20 RX ADMIN — HYDROCHLOROTHIAZIDE 12.5 MG: 12.5 TABLET ORAL at 09:08

## 2018-08-20 RX ADMIN — INSULIN LISPRO 2 UNITS: 100 INJECTION, SOLUTION INTRAVENOUS; SUBCUTANEOUS at 09:08

## 2018-08-20 RX ADMIN — DIVALPROEX SODIUM 500 MG: 500 TABLET, DELAYED RELEASE ORAL at 09:09

## 2018-08-20 RX ADMIN — LATANOPROST 1 DROP: 50 SOLUTION OPHTHALMIC at 22:48

## 2018-08-20 RX ADMIN — GLIPIZIDE 15 MG: 5 TABLET ORAL at 06:29

## 2018-08-20 RX ADMIN — ARIPIPRAZOLE 15 MG: 15 TABLET ORAL at 22:44

## 2018-08-20 RX ADMIN — DIVALPROEX SODIUM 500 MG: 500 TABLET, DELAYED RELEASE ORAL at 14:31

## 2018-08-20 RX ADMIN — QUETIAPINE FUMARATE 600 MG: 200 TABLET, EXTENDED RELEASE ORAL at 22:48

## 2018-08-20 RX ADMIN — ARIPIPRAZOLE 10 MG: 10 TABLET ORAL at 09:09

## 2018-08-20 RX ADMIN — TIMOLOL MALEATE 1 DROP: 5 SOLUTION OPHTHALMIC at 09:13

## 2018-08-20 RX ADMIN — METFORMIN HYDROCHLORIDE 1000 MG: 1000 TABLET ORAL at 09:09

## 2018-08-20 RX ADMIN — ATORVASTATIN CALCIUM 40 MG: 40 TABLET, FILM COATED ORAL at 22:41

## 2018-08-20 ASSESSMENT — PAIN SCALES - GENERAL
PAINLEVEL_OUTOF10: 0
PAINLEVEL_OUTOF10: 0

## 2018-08-20 NOTE — PROGRESS NOTES
Group Therapy Note    Date: 8/20/2018  Start Time: 1000  End Time:  1050  Number of Participants: 8    Type of Group: Psychoeducation    Wellness Binder Information  Module Name:  Self esteem  Session Number:  11-4    Patient's Goal:  Patient will be able to define self esteem, and identify positive techniques to counteract negative thoughts. Notes:  Engaged in group sharing when prompted and listening. Status After Intervention:  Improved    Participation Level:  Active Listener and Interactive    Participation Quality: Appropriate, Attentive, Sharing and Supportive      Speech:  normal      Thought Process/Content: Logical      Affective Functioning: Congruent      Mood: euthymic      Level of consciousness:  Alert, Oriented x4 and Attentive      Response to Learning: Able to verbalize/acknowledge new learning, Able to retain information and Progressing to goal      Endings: None Reported    Modes of Intervention: Education, Support, Socialization, Exploration and Problem-solving      Discipline Responsible: Psychoeducational Specialist      Signature:  Rickie Magdaleno

## 2018-08-20 NOTE — PROGRESS NOTES
[] Bruises   [] Schimosis       Psychiatric Review of systems  Delusions:  [] Denies [] Endorses   Withdrawals:  [] Denies [] Endorses    Hallucinations: [] Denies [] Endorses    Extra Pyramidal Symptoms: [] Denies [] Endorses      BP (!) 143/78   Pulse 103   Temp 97.7 °F (36.5 °C) (Oral)   Resp 18   Ht 5' 3\" (1.6 m)   Wt 179 lb (81.2 kg)   SpO2 98%   BMI 31.71 kg/m²     Mental Status Examination:    Cognition:      [x] Alert  [x] Awake  [x] Oriented  [x] Person  [x] Place [x] Time      [] drowsy  [] tired  [] lethargic  [] distractable  [] Other     Attention/Concentration:   [x] Attentive  [] Distracted        Memory Recent and Remote: [x] Intact   [] Impaired [] Partially Impaired     Language: [] Able to recognize and name objects          [] Unable to recognize and name Objects    Fund of Knowledge:  [] Poor []  Fair  [] Good    Speech: [] Normal  [] Soft  [] Slow  [] Fast [] Pressured            [x] Loud [] Dysarthria  [] Incoherent    Appearance: [] Well Groomed  [x] Casual Dressed  [] Unkept  [] Disheveled          [] Normal weight[] Thin  [] Overweight  [] Obese           Attitude: [] Positive  [] Hostile  [] Demanding  [] Guarded  [] Defensive         [x] Cooperative  []  Uncooperative      Behavior:  [x] Normal Gait  [] Walks with Assistance  [] Vickie Chair    [] Walks with Reeda Osier  [] In Hospital Bed  [] Sitting in Chair    Muscle-Skeletal:  [x] Normal Muscle Tone [] Muscle Atrophy       [] Abnormal Muscle Movement     Eye Contact:  [x] Good eye contact  [] Intermittent Eye Contact  [] Poor Eye Contact     Mood: [] Depressed  [x] Anxious  [] Irritated  [] Euthymic   [] Angry [x] Restless    Affect:  [] Congruent  [] Incongruent  [x] Labile  [] Constricted  [] Flat  [] Bizarre     Thought Process and Association:  [] Logical [] Illogical       [] Linear and Goal Directed  [x] Tangential  [x] Circumstantial     Thought Content:  [] Denies [] Endorses [] Suicidal [] Homicidal  [x] Delusional      [x] Paranoid  [] Somatic  [x] Grandiose    Perception: [x]  None  [] Auditory   [] Visual  [] tactile   [] olfactory  [] Illusions         Insight: [] Intact  [] Fair  [x] Limited    Judgement:  [] Intact  [] Fair  [x] Limited      Assessment/Plan:        Patient Active Problem List   Diagnosis Code    Severe bipolar affective disorder with psychosis ([facility] [address] F31.89    Carpal tunnel syndrome G56.00    Hyperlipidemia E78.5    Depression F32.9    Vitamin D deficiency E55.9    Systemic primary arterial hypertension I10    Acute psychosis F23    Severe manic bipolar I disorder with psychotic features ([facility]) F31.2    Bipolar 1 disorder ([facility] [address] F31.9    Severe manic bipolar I disorder with psychotic features ([facility] [address] F31.2    Non-insulin dependent type 2 diabetes mellitus ([facility] E11.9         Plan:    []  Patient is refusing medications  [] Improving as expected   [x] Not improving as expected   [] Worsening    []  At Baseline      Increase Abilify to 15 mg BID        Reason for more than one antipsychotic:  [] N/A  [] 3 failed monotherapy(drugs tried):  [x] Cross over to a new antipsychotic  [] Taper to monotherapy from polypharmacy  [] Augmentation of Clozapine therapy due to treatment resistance to single therapy      Signed:  Cynthia Sawyer  8/20/2018  3:59 PM

## 2018-08-20 NOTE — PLAN OF CARE
Problem: Altered Mood, Manic Behavior:  Goal: Able to sleep  Able to sleep   Outcome: Ongoing  11-7am shift note:  Pt observed to be asleep with easy even respirations at HS rounds since 0300. RN hourly rounds provided to pt this shift.

## 2018-08-21 LAB
METER GLUCOSE: 122 MG/DL (ref 70–110)
METER GLUCOSE: 167 MG/DL (ref 70–110)
METER GLUCOSE: 181 MG/DL (ref 70–110)
METER GLUCOSE: 90 MG/DL (ref 70–110)

## 2018-08-21 PROCEDURE — 6370000000 HC RX 637 (ALT 250 FOR IP): Performed by: PSYCHIATRY & NEUROLOGY

## 2018-08-21 PROCEDURE — 1240000000 HC EMOTIONAL WELLNESS R&B

## 2018-08-21 PROCEDURE — 99231 SBSQ HOSP IP/OBS SF/LOW 25: CPT | Performed by: PSYCHIATRY & NEUROLOGY

## 2018-08-21 PROCEDURE — 6370000000 HC RX 637 (ALT 250 FOR IP): Performed by: INTERNAL MEDICINE

## 2018-08-21 PROCEDURE — 82962 GLUCOSE BLOOD TEST: CPT

## 2018-08-21 RX ORDER — QUETIAPINE FUMARATE 300 MG/1
300 TABLET, FILM COATED ORAL EVERY EVENING
Status: DISCONTINUED | OUTPATIENT
Start: 2018-08-21 | End: 2018-08-27 | Stop reason: HOSPADM

## 2018-08-21 RX ADMIN — QUETIAPINE FUMARATE 600 MG: 200 TABLET, EXTENDED RELEASE ORAL at 21:42

## 2018-08-21 RX ADMIN — METOPROLOL TARTRATE 50 MG: 50 TABLET, FILM COATED ORAL at 08:16

## 2018-08-21 RX ADMIN — METFORMIN HYDROCHLORIDE 1000 MG: 1000 TABLET ORAL at 08:16

## 2018-08-21 RX ADMIN — HYDROCHLOROTHIAZIDE 12.5 MG: 12.5 TABLET ORAL at 08:16

## 2018-08-21 RX ADMIN — INSULIN LISPRO 2 UNITS: 100 INJECTION, SOLUTION INTRAVENOUS; SUBCUTANEOUS at 08:20

## 2018-08-21 RX ADMIN — LOSARTAN POTASSIUM 50 MG: 50 TABLET, FILM COATED ORAL at 08:16

## 2018-08-21 RX ADMIN — TIMOLOL MALEATE 1 DROP: 5 SOLUTION OPHTHALMIC at 08:17

## 2018-08-21 RX ADMIN — QUETIAPINE 300 MG: 300 TABLET ORAL at 18:39

## 2018-08-21 RX ADMIN — DIVALPROEX SODIUM 500 MG: 500 TABLET, DELAYED RELEASE ORAL at 13:59

## 2018-08-21 RX ADMIN — DIVALPROEX SODIUM 500 MG: 500 TABLET, DELAYED RELEASE ORAL at 21:43

## 2018-08-21 RX ADMIN — TIMOLOL MALEATE 1 DROP: 5 SOLUTION OPHTHALMIC at 22:11

## 2018-08-21 RX ADMIN — LINAGLIPTIN 5 MG: 5 TABLET, FILM COATED ORAL at 08:17

## 2018-08-21 RX ADMIN — DIVALPROEX SODIUM 500 MG: 500 TABLET, DELAYED RELEASE ORAL at 08:16

## 2018-08-21 RX ADMIN — QUETIAPINE FUMARATE 100 MG: 100 TABLET ORAL at 08:17

## 2018-08-21 RX ADMIN — ATORVASTATIN CALCIUM 40 MG: 40 TABLET, FILM COATED ORAL at 21:43

## 2018-08-21 RX ADMIN — ARIPIPRAZOLE 15 MG: 15 TABLET ORAL at 21:43

## 2018-08-21 RX ADMIN — LATANOPROST 1 DROP: 50 SOLUTION OPHTHALMIC at 21:43

## 2018-08-21 RX ADMIN — ARIPIPRAZOLE 15 MG: 15 TABLET ORAL at 08:16

## 2018-08-21 RX ADMIN — METOPROLOL TARTRATE 50 MG: 50 TABLET, FILM COATED ORAL at 21:42

## 2018-08-21 ASSESSMENT — PAIN SCALES - GENERAL: PAINLEVEL_OUTOF10: 0

## 2018-08-21 NOTE — PLAN OF CARE
Problem: Altered Mood, Manic Behavior:  Goal: Able to sleep  Able to sleep   Outcome: Ongoing    Goal: Able to verbalize decrease in frequency and intensity of racing thoughts  Able to verbalize decrease in frequency and intensity of racing thoughts   Outcome: Ongoing      Comments: Pt. Denies SI, HI, and hallucinations this shift. Pt. Denies physical complaints currently.

## 2018-08-21 NOTE — PROGRESS NOTES
Suicidal [] Homicidal  [x] Delusional      [x] Paranoid  [] Somatic  [x] Grandiose    Perception: [x]  None  [] Auditory   [] Visual  [] tactile   [] olfactory  [] Illusions         Insight: [] Intact  [] Fair  [x] Limited    Judgement:  [] Intact  [] Fair  [x] Limited      Assessment/Plan:        Patient Active Problem List   Diagnosis Code    Severe bipolar affective disorder with psychosis ([address] F31.89    Carpal tunnel syndrome G56.00    Hyperlipidemia E78.5    Depression F32.9    Vitamin D deficiency E55.9    Systemic primary arterial hypertension I10    Acute psychosis F23    Severe manic bipolar I disorder with psychotic features ([address] F31.2    Bipolar 1 disorder ([address] F31.9    Severe manic bipolar I disorder with psychotic features ([address] F31.2    Non-insulin dependent type 2 diabetes mellitus ([facility] [address] E11.9         Plan:    []  Patient is refusing medications  [] Improving as expected   [x] Not improving as expected   [] Worsening    []  At Baseline      Increase Seroquel to 300 mg Q evening        Reason for more than one antipsychotic:  [] N/A  [] 3 failed monotherapy(drugs tried):  [x] Cross over to a new antipsychotic  [] Taper to monotherapy from polypharmacy  [] Augmentation of Clozapine therapy due to treatment resistance to single therapy      Signed:  Candace Ribeiro  8/21/2018  11:28 AM

## 2018-08-21 NOTE — PROGRESS NOTES
Pt awake and alert. Cooperative with care. Speech slurred at times, tangential. Answers questions appropriately. Currently social with peers. No behavioral outburst this morning. No hallucinations observed this shift. Close observations continue.

## 2018-08-22 LAB
METER GLUCOSE: 123 MG/DL (ref 70–110)
METER GLUCOSE: 125 MG/DL (ref 70–110)
METER GLUCOSE: 144 MG/DL (ref 70–110)
METER GLUCOSE: 157 MG/DL (ref 70–110)

## 2018-08-22 PROCEDURE — 6370000000 HC RX 637 (ALT 250 FOR IP): Performed by: INTERNAL MEDICINE

## 2018-08-22 PROCEDURE — 1240000000 HC EMOTIONAL WELLNESS R&B

## 2018-08-22 PROCEDURE — 6370000000 HC RX 637 (ALT 250 FOR IP): Performed by: PSYCHIATRY & NEUROLOGY

## 2018-08-22 PROCEDURE — 82962 GLUCOSE BLOOD TEST: CPT

## 2018-08-22 PROCEDURE — 99231 SBSQ HOSP IP/OBS SF/LOW 25: CPT | Performed by: PSYCHIATRY & NEUROLOGY

## 2018-08-22 RX ADMIN — ATORVASTATIN CALCIUM 40 MG: 40 TABLET, FILM COATED ORAL at 21:16

## 2018-08-22 RX ADMIN — LOSARTAN POTASSIUM 50 MG: 50 TABLET, FILM COATED ORAL at 08:55

## 2018-08-22 RX ADMIN — TIMOLOL MALEATE 1 DROP: 5 SOLUTION OPHTHALMIC at 08:55

## 2018-08-22 RX ADMIN — ARIPIPRAZOLE 15 MG: 15 TABLET ORAL at 08:54

## 2018-08-22 RX ADMIN — QUETIAPINE FUMARATE 600 MG: 200 TABLET, EXTENDED RELEASE ORAL at 22:46

## 2018-08-22 RX ADMIN — DIVALPROEX SODIUM 500 MG: 500 TABLET, DELAYED RELEASE ORAL at 08:55

## 2018-08-22 RX ADMIN — HYDROCHLOROTHIAZIDE 12.5 MG: 12.5 TABLET ORAL at 08:54

## 2018-08-22 RX ADMIN — LATANOPROST 1 DROP: 50 SOLUTION OPHTHALMIC at 21:14

## 2018-08-22 RX ADMIN — TIMOLOL MALEATE 1 DROP: 5 SOLUTION OPHTHALMIC at 22:05

## 2018-08-22 RX ADMIN — METOPROLOL TARTRATE 50 MG: 50 TABLET, FILM COATED ORAL at 21:15

## 2018-08-22 RX ADMIN — INSULIN LISPRO 2 UNITS: 100 INJECTION, SOLUTION INTRAVENOUS; SUBCUTANEOUS at 08:59

## 2018-08-22 RX ADMIN — METFORMIN HYDROCHLORIDE 1000 MG: 1000 TABLET ORAL at 08:54

## 2018-08-22 RX ADMIN — ARIPIPRAZOLE 15 MG: 15 TABLET ORAL at 21:16

## 2018-08-22 RX ADMIN — DIVALPROEX SODIUM 500 MG: 500 TABLET, DELAYED RELEASE ORAL at 13:39

## 2018-08-22 RX ADMIN — DIVALPROEX SODIUM 500 MG: 500 TABLET, DELAYED RELEASE ORAL at 21:16

## 2018-08-22 RX ADMIN — LINAGLIPTIN 5 MG: 5 TABLET, FILM COATED ORAL at 08:55

## 2018-08-22 RX ADMIN — QUETIAPINE 300 MG: 300 TABLET ORAL at 19:21

## 2018-08-22 RX ADMIN — METFORMIN HYDROCHLORIDE 1000 MG: 1000 TABLET ORAL at 19:21

## 2018-08-22 RX ADMIN — METOPROLOL TARTRATE 50 MG: 50 TABLET, FILM COATED ORAL at 08:55

## 2018-08-22 ASSESSMENT — PAIN SCALES - GENERAL
PAINLEVEL_OUTOF10: 0
PAINLEVEL_OUTOF10: 0

## 2018-08-22 NOTE — PLAN OF CARE
Problem: Altered Mood, Manic Behavior:  Goal: Ability to interact with others will improve  Ability to interact with others will improve   Outcome: Ongoing      Comments: Pt verbally aggressive during the night. Easily verbally redirectable. Pt was seen speaking to unseen others. Pt denies SI, HI and hallucinations.

## 2018-08-22 NOTE — PROGRESS NOTES
Musculoskeletal Symptoms: []  back pain []  muscle pain []  joint pain  Neurologic Symptoms: []  headache []  dizziness  Hematolymphoid Symptoms: [] Adenopathy [] Bruises   [] Schimosis       Psychiatric Review of systems  Delusions:  [] Denies [] Endorses   Withdrawals:  [] Denies [] Endorses    Hallucinations: [] Denies [] Endorses    Extra Pyramidal Symptoms: [] Denies [] Endorses      /66   Pulse 98   Temp 97.9 °F (36.6 °C) (Temporal)   Resp 16   Ht 5' 3\" (1.6 m)   Wt 179 lb (81.2 kg)   SpO2 100%   BMI 31.71 kg/m²     Mental Status Examination:    Cognition:      [x] Alert  [x] Awake  [x] Oriented  [x] Person  [x] Place [x] Time      [] drowsy  [] tired  [] lethargic  [] distractable  [] Other     Attention/Concentration:   [x] Attentive  [] Distracted        Memory Recent and Remote: [x] Intact   [] Impaired [] Partially Impaired     Language: [] Able to recognize and name objects          [] Unable to recognize and name Objects    Fund of Knowledge:  [] Poor []  Fair  [] Good    Speech: [] Normal  [] Soft  [] Slow  [] Fast [x] Pressured            [x] Loud [] Dysarthria  [x] Incoherent at times    Appearance: [] Well Groomed  [x] Casual Dressed  [] Unkept  [] Disheveled          [] Normal weight[] Thin  [x] Overweight  [] Obese           Attitude: [] Positive  [] Hostile  [] Demanding  [] Guarded  [] Defensive         [x] Cooperative  []  Uncooperative      Behavior:  [x] Normal Gait  [] Walks with Assistance  [] Vickie Chair    [] Walks with Bigg Rufina  [] In Hospital Bed  [] Sitting in Chair    Muscle-Skeletal:  [x] Normal Muscle Tone [] Muscle Atrophy       [] Abnormal Muscle Movement     Eye Contact:  [x] Good eye contact  [] Intermittent Eye Contact  [] Poor Eye Contact     Mood: [] Depressed  [] Anxious  [] Irritated  [x] Euthymic   [] Angry [x] Restless    Affect:  [] Congruent  [x] Incongruent  [x] Labile  [] Constricted  [] Flat  [x] Bizarre     Thought Process and Association:  [] Logical

## 2018-08-22 NOTE — PROGRESS NOTES
Patient attended community meeting/morning stretch. Patient identified goal for the day as: \"Talk to grandkids\"                                                                           Group Therapy Note    Date: 8/22/2018  Start Time: 10:00  End Time:  10:40  Number of Participants: 5    Type of Group: Psychoeducation    Wellness Binder Information  Module Name:  Spiritual Wellness  Session Number:  1    Patient's Goal:  Patient will identify the role hope plays in recovery. Notes:  Patient was interactive during group sharing the role hope plays in recovery. Patient gave support and feedback to others. Status After Intervention:  Improved    Participation Level:  Active Listener and Interactive    Participation Quality: Appropriate, Attentive, Sharing and Supportive      Speech:  normal      Thought Process/Content: Logical      Affective Functioning: Congruent      Mood: depressed      Level of consciousness:  Alert, Oriented x4 and Attentive      Response to Learning: Able to verbalize current knowledge/experience, Able to verbalize/acknowledge new learning, Able to retain information, Capable of insight, Able to change behavior and Progressing to goal      Endings: None Reported    Modes of Intervention: Education, Support, Socialization, Exploration, Clarifying, Problem-solving and Activity      Discipline Responsible: Psychoeducational Specialist      Signature:  Mark Martinez

## 2018-08-22 NOTE — PROGRESS NOTES
[address] [place] [address]  Day 3 Interdisciplinary Treatment Plan Note  Review Date & Time: 08/22/2018  0940    Patient was in treatment team    Admission Type:   Admission Type: Involuntary    Reason for admission:  Reason for Admission: Mom was getting all my nerves and i wass gonna punch her. Estimated Length of Stay Update:  3-5 days  Estimated Discharge Date Update: 5-8 days    PATIENT STRENGTHS:  Patient Strengths Strengths: Communication, Connection to output provider  Patient Strengths and Limitations:Limitations: General negative or hopeless attitude about future/recovery  Addictive Behavior:Addictive Behavior  In the past 3 months, have you felt or has someone told you that you have a problem with:  : None  Do you have a history of Chemical Use?: No  Do you have a history of Alcohol Use?: No  Do you have a history of Street Drug Abuse?: No  Histroy of Prescripton Drug Abuse?: No  Medical Problems:  Past Medical History:   Diagnosis Date    Bell's palsy     Bipolar disorder ([facility]     bipolar    Carpal tunnel syndrome 8/4/2016    Non-insulin dependent type 2 diabetes mellitus ([facility]     Systemic primary arterial hypertension 8/4/2016       Risk:  Fall RiskTotal: 91  Filiberto Scale Filiberto Scale Score: 20  BVC Total: 0  Change in scores no.  Changes to plan of Care  none    Status EXAM:   Status and Exam  Normal: No  Facial Expression: Exaggerated  Affect: Unstable  Level of Consciousness: Alert  Mood:Normal: No  Mood: Anxious  Motor Activity:Normal: No  Motor Activity: Increased  Interview Behavior: Cooperative  Preception: [place] to Person, Lisy Buer[place] to Time, [place] to Place, [place] to Situation  Attention:Normal: No  Attention: Unable to Concentrate  Thought Processes: Blocking, Flt.of Ideas, Tangential  Thought Content:Normal: No  Thought Content: Preoccupations  Hallucinations: None  Delusions: No  Delusions: Persecution, Obsessions  Memory:Normal: No  Memory: Poor Recent  Insight and Judgment: No  Insight and Judgment: Poor Judgment, Poor Insight  Present Suicidal Ideation: No  Present Homicidal Ideation: No    Daily Assessment Last Entry:   Daily Sleep (WDL): Within Defined Limits         Patient Currently in Pain: Denies  Daily Nutrition (WDL): Exceptions to WDL  Appetite Change: Decreased  Barriers to Nutrition: Other (Comment) (mood)  Level of Assistance: Independent/Self    Patient Monitoring:  Frequency of Checks: 4 times per hour, close    Psychiatric Symptoms:   Depression Symptoms  Depression Symptoms: Impaired concentration  Anxiety Symptoms  Anxiety Symptoms: Generalized  Gretta Symptoms  Gretta Symptoms: Flight of ideas, Pressured speech, Increased energy     Psychosis Symptoms  Hallucination Type: No problems reported or observed. Delusion Type: No problems reported or observed.     Suicide Risk CSSR-S:  1) Within the past month, have you wished you were dead or wished you could go to sleep and not wake up? : NO  2) Within the past month, have you actually had any thoughts of killing yourself? : NO  6)  Have you ever done anything, started to do anything, or prepared to do anything to end your life?: NO  Change in Result no Change in Plan of care none      EDUCATION:   Learner Progress Toward Treatment Goals: progressing    Method: follow care plan    Outcome: meet goals and return home    PATIENT GOALS:     PLAN/TREATMENT RECOMMENDATIONS UPDATE: continue present care plan working towards goals    GOALS UPDATE:   Time frame for Short-Term Goals: 3-5 days      Jass Pichardo RN

## 2018-08-22 NOTE — PROGRESS NOTES
Group Therapy Note    Date: 8/22/2018  Start Time: 3:50  End Time:  4:30  Number of Participants: 4    Type of Group: Recreational    Wellness Binder Information  Module Name:  Chicken Soup for Soul   Session Number:  n/a    Patient's Goal:  Patient will build therapeutic relationships with peers through chicken soup game. Notes:  Patient was interactive during group participating in chicken soup game building therapeutic relationships with peers. Status After Intervention:  Improved    Participation Level:  Active Listener and Interactive    Participation Quality: Appropriate, Attentive, Sharing and Supportive      Speech:  normal      Thought Process/Content: Logical      Affective Functioning: Congruent      Mood: depressed      Level of consciousness:  Alert, Oriented x4 and Attentive      Response to Learning: Able to verbalize current knowledge/experience, Able to verbalize/acknowledge new learning, Able to retain information, Capable of insight, Able to change behavior and Progressing to goal      Endings: None Reported    Modes of Intervention: Education, Support, Socialization, Exploration, Clarifying, Problem-solving and Activity      Discipline Responsible: Psychoeducational Specialist      Signature:  Coy Soto

## 2018-08-23 LAB
METER GLUCOSE: 146 MG/DL (ref 70–110)
METER GLUCOSE: 161 MG/DL (ref 70–110)
METER GLUCOSE: 176 MG/DL (ref 70–110)
METER GLUCOSE: 95 MG/DL (ref 70–110)
VALPROIC ACID LEVEL: 76 MCG/ML (ref 50–100)

## 2018-08-23 PROCEDURE — 6370000000 HC RX 637 (ALT 250 FOR IP): Performed by: INTERNAL MEDICINE

## 2018-08-23 PROCEDURE — 6370000000 HC RX 637 (ALT 250 FOR IP): Performed by: PSYCHIATRY & NEUROLOGY

## 2018-08-23 PROCEDURE — 99231 SBSQ HOSP IP/OBS SF/LOW 25: CPT | Performed by: PSYCHIATRY & NEUROLOGY

## 2018-08-23 PROCEDURE — 80164 ASSAY DIPROPYLACETIC ACD TOT: CPT

## 2018-08-23 PROCEDURE — 1240000000 HC EMOTIONAL WELLNESS R&B

## 2018-08-23 PROCEDURE — 82962 GLUCOSE BLOOD TEST: CPT

## 2018-08-23 PROCEDURE — 36415 COLL VENOUS BLD VENIPUNCTURE: CPT

## 2018-08-23 RX ADMIN — LINAGLIPTIN 5 MG: 5 TABLET, FILM COATED ORAL at 09:34

## 2018-08-23 RX ADMIN — METFORMIN HYDROCHLORIDE 1000 MG: 1000 TABLET ORAL at 18:20

## 2018-08-23 RX ADMIN — HYDROCHLOROTHIAZIDE 12.5 MG: 12.5 TABLET ORAL at 09:34

## 2018-08-23 RX ADMIN — LOSARTAN POTASSIUM 50 MG: 50 TABLET, FILM COATED ORAL at 09:34

## 2018-08-23 RX ADMIN — METOPROLOL TARTRATE 50 MG: 50 TABLET, FILM COATED ORAL at 21:36

## 2018-08-23 RX ADMIN — LATANOPROST 1 DROP: 50 SOLUTION OPHTHALMIC at 21:40

## 2018-08-23 RX ADMIN — DIVALPROEX SODIUM 500 MG: 500 TABLET, DELAYED RELEASE ORAL at 13:38

## 2018-08-23 RX ADMIN — TIMOLOL MALEATE 1 DROP: 5 SOLUTION OPHTHALMIC at 09:37

## 2018-08-23 RX ADMIN — DIVALPROEX SODIUM 500 MG: 500 TABLET, DELAYED RELEASE ORAL at 09:34

## 2018-08-23 RX ADMIN — METFORMIN HYDROCHLORIDE 1000 MG: 1000 TABLET ORAL at 09:34

## 2018-08-23 RX ADMIN — METOPROLOL TARTRATE 50 MG: 50 TABLET, FILM COATED ORAL at 09:34

## 2018-08-23 RX ADMIN — INSULIN LISPRO 2 UNITS: 100 INJECTION, SOLUTION INTRAVENOUS; SUBCUTANEOUS at 18:05

## 2018-08-23 RX ADMIN — ARIPIPRAZOLE 15 MG: 15 TABLET ORAL at 21:36

## 2018-08-23 RX ADMIN — QUETIAPINE FUMARATE 600 MG: 200 TABLET, EXTENDED RELEASE ORAL at 21:36

## 2018-08-23 RX ADMIN — ARIPIPRAZOLE 15 MG: 15 TABLET ORAL at 09:34

## 2018-08-23 RX ADMIN — DIVALPROEX SODIUM 500 MG: 500 TABLET, DELAYED RELEASE ORAL at 21:38

## 2018-08-23 RX ADMIN — QUETIAPINE 300 MG: 300 TABLET ORAL at 18:21

## 2018-08-23 RX ADMIN — TIMOLOL MALEATE 1 DROP: 5 SOLUTION OPHTHALMIC at 21:40

## 2018-08-23 RX ADMIN — INSULIN LISPRO 2 UNITS: 100 INJECTION, SOLUTION INTRAVENOUS; SUBCUTANEOUS at 09:35

## 2018-08-23 RX ADMIN — ATORVASTATIN CALCIUM 40 MG: 40 TABLET, FILM COATED ORAL at 21:36

## 2018-08-23 ASSESSMENT — PAIN SCALES - GENERAL: PAINLEVEL_OUTOF10: 0

## 2018-08-23 NOTE — PROGRESS NOTES
DATE OF SERVICE:     8/23/2018    Sabra Odom seen today for the purpose of continuation of care. Nursing, social work reports, laboratory studies and vital signs are reviewed. Patient chief complaint today is:             [] Depression      [] Anxiety        [x] Psychosis         [] Suicidal/Homicidal                         [] Delusions           [] Aggression          Subjective: Today patient states that she is \"feeling better today. \" Patient is labile, Manic symptoms are severe and constant. She still has flight of ideas with rapid pressured speech. She is pleasant and cooperative. Denies SI, HI, and AVH. Sleep:  [] Good [] Fair  [x] Poor  Appetite:  [] Good [x] Fair  [] Poor    Depression:  [] Mild [] Moderate [] Severe                [] Constant [] Sporadic     Anxiety: [] Mild [] Moderate [x] Severe    [x] Constant [] Sporadic     Delusions: [] Mild [] Moderate [x] Severe     [x] Constant [] Sporadic     [x] Paranoid [] Somatic [x] Grandiose     Hallucinations: [] Mild [] Moderate [] Severe     [] Constant [] Sporadic    [] Auditory  [] Visual [] Tactile       Suicidal: [] Constant [] Sporadic  Homicidal: [] Constant [] Sporadic    Unscheduled Medications     [] Patient Receiving Emergency Medications \" Chemical Restraint\"   [] Requesting PRN medications for anxiety    Medical Review of Systems:     All other than marked systmes have been reviewed and are all negative.     Constitutional Symptoms: []  fever []  Chills  Skin Symptoms: [] rash []  Pruritus   Eye Symptoms: [] Vision unchanged []  recent vision problems[] blurred vision   Respiratory Symptoms:[] shortness of breath [] cough  Cardiovascular Symptoms:  [] chest pain   [] palpitations   Gastrointestinal Symptoms: []  abdominal pain []  nausea []  vomiting []  diarrhea  Genitourinary Symptoms: []  dysuria  []  hematuria   Musculoskeletal Symptoms: []  back pain []  muscle pain []  joint pain  Neurologic Symptoms: []  headache []

## 2018-08-23 NOTE — CARE COORDINATION
Population Navigator called brother and informed him that the Dr. Praveen Nicole the Expert evaluation. He agreed to pick it up. He reports that [facility] is reporting that she is missing some belongings from the ER. Spoke with Jesús Rubin at [facility] to see if she had any belongings there. She reported that everything was given to her brothers. However, she also reported that [facility] reported that she was missing jewelry upon her arrival from [facility] [place]. Spoke to [facility] who reports that she is not quite sure when she lost her jewelry. Informed her that the chart indicates that some of her belongings were given to her brother in the ER (wallet) and advised her to call him to verify that he has it. She agreed.    Electronically signed by Rubio Mcgraw [facility] on 8/23/2018 at 9:29 AM

## 2018-08-23 NOTE — PLAN OF CARE
Problem: Altered Mood, Manic Behavior:  Goal: Able to sleep  Able to sleep   Outcome: Ongoing    Goal: Able to verbalize decrease in frequency and intensity of racing thoughts  Able to verbalize decrease in frequency and intensity of racing thoughts   Outcome: Ongoing  Pt awakened from sleep yelling and cursing in room. Nursing was able to reorient and verbally redirect. Currently resting in bed with eyes closed.  Close observations continues

## 2018-08-23 NOTE — PLAN OF CARE
Problem: Altered Mood, Manic Behavior:  Goal: Ability to interact with others will improve  Ability to interact with others will improve   Outcome: Ongoing  Patient pleasant and cooperative. Denies SI and hallucinations. Patient states she has HI toward her mother. Patient states she is anxious. Patient occasionally singing in day area. Patient took medication and interacts appropriately with others. Will continue to observe and support.   Goal: Ability to demonstrate self-control will improve  Ability to demonstrate self-control will improve   Outcome: Ongoing

## 2018-08-24 LAB
METER GLUCOSE: 112 MG/DL (ref 70–110)
METER GLUCOSE: 137 MG/DL (ref 70–110)
METER GLUCOSE: 144 MG/DL (ref 70–110)
METER GLUCOSE: 194 MG/DL (ref 70–110)

## 2018-08-24 PROCEDURE — 82962 GLUCOSE BLOOD TEST: CPT

## 2018-08-24 PROCEDURE — 1240000000 HC EMOTIONAL WELLNESS R&B

## 2018-08-24 PROCEDURE — 99231 SBSQ HOSP IP/OBS SF/LOW 25: CPT | Performed by: PSYCHIATRY & NEUROLOGY

## 2018-08-24 PROCEDURE — 6370000000 HC RX 637 (ALT 250 FOR IP): Performed by: PSYCHIATRY & NEUROLOGY

## 2018-08-24 PROCEDURE — 6370000000 HC RX 637 (ALT 250 FOR IP): Performed by: INTERNAL MEDICINE

## 2018-08-24 RX ADMIN — DIVALPROEX SODIUM 500 MG: 500 TABLET, DELAYED RELEASE ORAL at 15:17

## 2018-08-24 RX ADMIN — ARIPIPRAZOLE 15 MG: 15 TABLET ORAL at 21:28

## 2018-08-24 RX ADMIN — METFORMIN HYDROCHLORIDE 1000 MG: 1000 TABLET ORAL at 18:05

## 2018-08-24 RX ADMIN — QUETIAPINE 300 MG: 300 TABLET ORAL at 18:05

## 2018-08-24 RX ADMIN — QUETIAPINE FUMARATE 600 MG: 200 TABLET, EXTENDED RELEASE ORAL at 22:10

## 2018-08-24 RX ADMIN — METFORMIN HYDROCHLORIDE 1000 MG: 1000 TABLET ORAL at 09:30

## 2018-08-24 RX ADMIN — ARIPIPRAZOLE 15 MG: 15 TABLET ORAL at 09:31

## 2018-08-24 RX ADMIN — INSULIN LISPRO 2 UNITS: 100 INJECTION, SOLUTION INTRAVENOUS; SUBCUTANEOUS at 09:33

## 2018-08-24 RX ADMIN — TIMOLOL MALEATE 1 DROP: 5 SOLUTION OPHTHALMIC at 09:30

## 2018-08-24 RX ADMIN — LINAGLIPTIN 5 MG: 5 TABLET, FILM COATED ORAL at 09:30

## 2018-08-24 RX ADMIN — LOSARTAN POTASSIUM 50 MG: 50 TABLET, FILM COATED ORAL at 09:30

## 2018-08-24 RX ADMIN — TIMOLOL MALEATE 1 DROP: 5 SOLUTION OPHTHALMIC at 21:27

## 2018-08-24 RX ADMIN — DIVALPROEX SODIUM 500 MG: 500 TABLET, DELAYED RELEASE ORAL at 21:28

## 2018-08-24 RX ADMIN — HYDROCHLOROTHIAZIDE 12.5 MG: 12.5 TABLET ORAL at 09:30

## 2018-08-24 RX ADMIN — METOPROLOL TARTRATE 50 MG: 50 TABLET, FILM COATED ORAL at 09:30

## 2018-08-24 RX ADMIN — INSULIN LISPRO 2 UNITS: 100 INJECTION, SOLUTION INTRAVENOUS; SUBCUTANEOUS at 17:52

## 2018-08-24 RX ADMIN — METOPROLOL TARTRATE 50 MG: 50 TABLET, FILM COATED ORAL at 21:28

## 2018-08-24 RX ADMIN — DIVALPROEX SODIUM 500 MG: 500 TABLET, DELAYED RELEASE ORAL at 09:31

## 2018-08-24 RX ADMIN — ATORVASTATIN CALCIUM 40 MG: 40 TABLET, FILM COATED ORAL at 21:28

## 2018-08-24 RX ADMIN — LATANOPROST 1 DROP: 50 SOLUTION OPHTHALMIC at 21:27

## 2018-08-24 ASSESSMENT — PAIN SCALES - GENERAL
PAINLEVEL_OUTOF10: 0

## 2018-08-24 NOTE — PROGRESS NOTES
Group Therapy Note    Date: 8/24/2018  Start Time: 1:15  End Time:  1:50  Number of Participants: 5     Type of Group: Cognitive Skills     Wellness Binder Information  Module Name: Self-care  Session Number:  9-1     Patient's Goal: To understand the importance of self care in wellness recovery.     Notes: Attended group and was able to participate in discussion with peers.     Status After Intervention:  Unchanged    Participation Level:  Active Listener and Interactive    Participation Quality: Attentive and Sharing      Speech:  hesitant      Thought Process/Content: Perseverating      Affective Functioning: Flat      Mood: depressed      Level of consciousness:  Alert      Response to Learning: Progressing to goal      Endings: None Reported    Modes of Intervention: Education      Discipline Responsible: Psychoeducational Specialist      Signature:  RICCI Pizarro

## 2018-08-24 NOTE — PROGRESS NOTES
DATE OF SERVICE:     8/24/2018    Jessica Ingram seen today for the purpose of continuation of care. Nursing, social work reports, laboratory studies and vital signs are reviewed. Patient chief complaint today is:             [] Depression      [] Anxiety        [x] Psychosis         [] Suicidal/Homicidal                         [] Delusions           [] Aggression          Subjective: Today patient states that she is \"feeling happy. \" Patient is sitting in chair in her room singing. Manic which is severe and constant. She has flight of ideas with rapid pressured speech. Speech is still garbled and hard to understand at times. She is pleasant and cooperative. Nursing reports that patient admits to HI over night. Denies SI and AVH. Sleep:  [] Good [] Fair  [x] Poor  Appetite:  [] Good [x] Fair  [] Poor    Depression:  [] Mild [] Moderate [] Severe                [] Constant [] Sporadic     Anxiety: [] Mild [] Moderate [x] Severe    [x] Constant [] Sporadic     Delusions: [] Mild [] Moderate [x] Severe     [x] Constant [] Sporadic     [x] Paranoid [] Somatic [x] Grandiose     Hallucinations: [] Mild [] Moderate [] Severe     [] Constant [] Sporadic    [] Auditory  [] Visual [] Tactile       Suicidal: [] Constant [] Sporadic  Homicidal: [] Constant [x] Sporadic    Unscheduled Medications     [] Patient Receiving Emergency Medications \" Chemical Restraint\"   [] Requesting PRN medications for anxiety    Medical Review of Systems:     All other than marked systmes have been reviewed and are all negative.     Constitutional Symptoms: []  fever []  Chills  Skin Symptoms: [] rash []  Pruritus   Eye Symptoms: [] Vision unchanged []  recent vision problems[] blurred vision   Respiratory Symptoms:[] shortness of breath [] cough  Cardiovascular Symptoms:  [] chest pain   [] palpitations   Gastrointestinal Symptoms: []  abdominal pain []  nausea []  vomiting []  diarrhea  Genitourinary Symptoms: []  dysuria  []

## 2018-08-25 LAB
METER GLUCOSE: 124 MG/DL (ref 70–110)
METER GLUCOSE: 134 MG/DL (ref 70–110)
METER GLUCOSE: 169 MG/DL (ref 70–110)
METER GLUCOSE: 183 MG/DL (ref 70–110)

## 2018-08-25 PROCEDURE — 6370000000 HC RX 637 (ALT 250 FOR IP): Performed by: PSYCHIATRY & NEUROLOGY

## 2018-08-25 PROCEDURE — 1240000000 HC EMOTIONAL WELLNESS R&B

## 2018-08-25 PROCEDURE — 6370000000 HC RX 637 (ALT 250 FOR IP): Performed by: INTERNAL MEDICINE

## 2018-08-25 PROCEDURE — 99231 SBSQ HOSP IP/OBS SF/LOW 25: CPT | Performed by: PSYCHIATRY & NEUROLOGY

## 2018-08-25 PROCEDURE — 82962 GLUCOSE BLOOD TEST: CPT

## 2018-08-25 RX ADMIN — ATORVASTATIN CALCIUM 40 MG: 40 TABLET, FILM COATED ORAL at 21:42

## 2018-08-25 RX ADMIN — ARIPIPRAZOLE 15 MG: 15 TABLET ORAL at 21:41

## 2018-08-25 RX ADMIN — QUETIAPINE FUMARATE 600 MG: 200 TABLET, EXTENDED RELEASE ORAL at 21:42

## 2018-08-25 RX ADMIN — DIVALPROEX SODIUM 500 MG: 500 TABLET, DELAYED RELEASE ORAL at 09:53

## 2018-08-25 RX ADMIN — TRAZODONE HYDROCHLORIDE 50 MG: 50 TABLET ORAL at 21:41

## 2018-08-25 RX ADMIN — LATANOPROST 1 DROP: 50 SOLUTION OPHTHALMIC at 21:43

## 2018-08-25 RX ADMIN — INSULIN LISPRO 2 UNITS: 100 INJECTION, SOLUTION INTRAVENOUS; SUBCUTANEOUS at 12:52

## 2018-08-25 RX ADMIN — METFORMIN HYDROCHLORIDE 1000 MG: 1000 TABLET ORAL at 17:21

## 2018-08-25 RX ADMIN — DIVALPROEX SODIUM 500 MG: 500 TABLET, DELAYED RELEASE ORAL at 14:33

## 2018-08-25 RX ADMIN — ERGOCALCIFEROL 50000 UNITS: 1.25 CAPSULE ORAL at 08:58

## 2018-08-25 RX ADMIN — METOPROLOL TARTRATE 50 MG: 50 TABLET, FILM COATED ORAL at 08:58

## 2018-08-25 RX ADMIN — METFORMIN HYDROCHLORIDE 1000 MG: 1000 TABLET ORAL at 08:58

## 2018-08-25 RX ADMIN — HYDROCHLOROTHIAZIDE 12.5 MG: 12.5 TABLET ORAL at 08:58

## 2018-08-25 RX ADMIN — QUETIAPINE 300 MG: 300 TABLET ORAL at 17:21

## 2018-08-25 RX ADMIN — ARIPIPRAZOLE 15 MG: 15 TABLET ORAL at 08:58

## 2018-08-25 RX ADMIN — LINAGLIPTIN 5 MG: 5 TABLET, FILM COATED ORAL at 08:58

## 2018-08-25 RX ADMIN — METOPROLOL TARTRATE 50 MG: 50 TABLET, FILM COATED ORAL at 21:41

## 2018-08-25 RX ADMIN — TIMOLOL MALEATE 1 DROP: 5 SOLUTION OPHTHALMIC at 21:43

## 2018-08-25 RX ADMIN — DIVALPROEX SODIUM 500 MG: 500 TABLET, DELAYED RELEASE ORAL at 21:42

## 2018-08-25 RX ADMIN — LOSARTAN POTASSIUM 50 MG: 50 TABLET, FILM COATED ORAL at 08:58

## 2018-08-25 ASSESSMENT — PAIN SCALES - GENERAL
PAINLEVEL_OUTOF10: 0
PAINLEVEL_OUTOF10: 0

## 2018-08-25 NOTE — PROGRESS NOTES
Process and Association:  [] Logical [x] Illogical       [] Linear and Goal Directed  [x] Tangential  [x] Circumstantial     Thought Content:  [] Denies [] Endorses [] Suicidal [] Homicidal  [x] Delusional      [x] Paranoid  [] Somatic  [x] Grandiose    Perception: [x]  None  [] Auditory   [] Visual  [] tactile   [] olfactory  [] Illusions         Insight: [] Intact  [] Fair  [x] Limited    Judgement:  [] Intact  [] Fair  [x] Limited      Assessment/Plan:        Patient Active Problem List   Diagnosis Code    Severe bipolar affective disorder with psychosis ([email or web address] F31.89    Carpal tunnel syndrome G56.00    Hyperlipidemia E78.5    Depression F32.9    Vitamin D deficiency E55.9    Systemic primary arterial hypertension I10    Acute psychosis F23    Severe manic bipolar I disorder with psychotic features ([facility] F31.2    Bipolar 1 disorder ([facility] [address] [facility] F31.9    Severe manic bipolar I disorder with psychotic features ([email or web address] F31.2    Non-insulin dependent type 2 diabetes mellitus ([facility] E11.9         Plan:    []  Patient is refusing medications  [x] Improving as expected   [] Not improving as expected   [] Worsening    []  At Baseline      Continue current meds  Schafer milieu  Cont psychoeducation and coping skill      Reason for more than one antipsychotic:  [] N/A  [] 3 failed monotherapy(drugs tried):  [x] Cross over to a new antipsychotic  [] Taper to monotherapy from polypharmacy  [] Augmentation of Clozapine therapy due to treatment resistance to single therapy      Signed:  Remy Kirby  8/25/2018  2:11 PM

## 2018-08-25 NOTE — PLAN OF CARE
Problem: Altered Mood, Manic Behavior:  Goal: Able to verbalize decrease in frequency and intensity of racing thoughts  Able to verbalize decrease in frequency and intensity of racing thoughts   Outcome: Ongoing  Pt is stable and alert. Pt is cooperative, mood is pleasant and brightened but also can be low level irritable. Pt denies suicidal or homicidal ideations, denies hallucinations. Will follow and monitor.    Goal: Ability to demonstrate self-control will improve  Ability to demonstrate self-control will improve   Outcome: Ongoing

## 2018-08-26 LAB
METER GLUCOSE: 105 MG/DL (ref 70–110)
METER GLUCOSE: 106 MG/DL (ref 70–110)
METER GLUCOSE: 126 MG/DL (ref 70–110)
METER GLUCOSE: 159 MG/DL (ref 70–110)

## 2018-08-26 PROCEDURE — 6370000000 HC RX 637 (ALT 250 FOR IP): Performed by: PSYCHIATRY & NEUROLOGY

## 2018-08-26 PROCEDURE — 99231 SBSQ HOSP IP/OBS SF/LOW 25: CPT | Performed by: PSYCHIATRY & NEUROLOGY

## 2018-08-26 PROCEDURE — 6370000000 HC RX 637 (ALT 250 FOR IP): Performed by: INTERNAL MEDICINE

## 2018-08-26 PROCEDURE — 82962 GLUCOSE BLOOD TEST: CPT

## 2018-08-26 PROCEDURE — 1240000000 HC EMOTIONAL WELLNESS R&B

## 2018-08-26 RX ADMIN — TRAZODONE HYDROCHLORIDE 50 MG: 50 TABLET ORAL at 20:35

## 2018-08-26 RX ADMIN — ARIPIPRAZOLE 15 MG: 15 TABLET ORAL at 08:17

## 2018-08-26 RX ADMIN — INSULIN LISPRO 2 UNITS: 100 INJECTION, SOLUTION INTRAVENOUS; SUBCUTANEOUS at 08:20

## 2018-08-26 RX ADMIN — ARIPIPRAZOLE 15 MG: 15 TABLET ORAL at 20:36

## 2018-08-26 RX ADMIN — DIVALPROEX SODIUM 500 MG: 500 TABLET, DELAYED RELEASE ORAL at 08:17

## 2018-08-26 RX ADMIN — METOPROLOL TARTRATE 50 MG: 50 TABLET, FILM COATED ORAL at 08:17

## 2018-08-26 RX ADMIN — METOPROLOL TARTRATE 50 MG: 50 TABLET, FILM COATED ORAL at 20:35

## 2018-08-26 RX ADMIN — DIVALPROEX SODIUM 500 MG: 500 TABLET, DELAYED RELEASE ORAL at 20:36

## 2018-08-26 RX ADMIN — LATANOPROST 1 DROP: 50 SOLUTION OPHTHALMIC at 20:36

## 2018-08-26 RX ADMIN — METFORMIN HYDROCHLORIDE 1000 MG: 1000 TABLET ORAL at 17:12

## 2018-08-26 RX ADMIN — TIMOLOL MALEATE 1 DROP: 5 SOLUTION OPHTHALMIC at 20:36

## 2018-08-26 RX ADMIN — ATORVASTATIN CALCIUM 40 MG: 40 TABLET, FILM COATED ORAL at 20:36

## 2018-08-26 RX ADMIN — LINAGLIPTIN 5 MG: 5 TABLET, FILM COATED ORAL at 08:17

## 2018-08-26 RX ADMIN — METFORMIN HYDROCHLORIDE 1000 MG: 1000 TABLET ORAL at 08:17

## 2018-08-26 RX ADMIN — HYDROCHLOROTHIAZIDE 12.5 MG: 12.5 TABLET ORAL at 08:17

## 2018-08-26 RX ADMIN — QUETIAPINE 300 MG: 300 TABLET ORAL at 21:10

## 2018-08-26 RX ADMIN — LOSARTAN POTASSIUM 50 MG: 50 TABLET, FILM COATED ORAL at 08:17

## 2018-08-26 RX ADMIN — QUETIAPINE FUMARATE 600 MG: 200 TABLET, EXTENDED RELEASE ORAL at 20:41

## 2018-08-26 RX ADMIN — DIVALPROEX SODIUM 500 MG: 500 TABLET, DELAYED RELEASE ORAL at 15:43

## 2018-08-26 RX ADMIN — TIMOLOL MALEATE 1 DROP: 5 SOLUTION OPHTHALMIC at 08:17

## 2018-08-26 ASSESSMENT — PAIN SCALES - GENERAL
PAINLEVEL_OUTOF10: 0
PAINLEVEL_OUTOF10: 0

## 2018-08-26 NOTE — PROGRESS NOTES
Patient resting quietly with eyes closed, No S/S of distress noted or observed. No issues. Will continue to monitor,provide needs with continued rounding every 15 minutes.

## 2018-08-26 NOTE — PLAN OF CARE
Problem: Altered Mood, Manic Behavior:  Goal: Able to verbalize decrease in frequency and intensity of racing thoughts  Able to verbalize decrease in frequency and intensity of racing thoughts   Outcome: Ongoing  Mood is pleasant  Cooperative with meds  Slightly manic but no dyscontrol  Singing and smiling  Will continue to monitor

## 2018-08-26 NOTE — PROGRESS NOTES
pain  Neurologic Symptoms: []  headache []  dizziness  Hematolymphoid Symptoms: [] Adenopathy [] Bruises   [] Schimosis       Psychiatric Review of systems  Delusions:  [] Denies [] Endorses   Withdrawals:  [] Denies [] Endorses    Hallucinations: [] Denies [] Endorses    Extra Pyramidal Symptoms: [] Denies [] Endorses      /67   Pulse 96   Temp 97.3 °F (36.3 °C) (Temporal)   Resp 16   Ht 5' 3\" (1.6 m)   Wt 179 lb (81.2 kg)   SpO2 99%   BMI 31.71 kg/m²     Mental Status Examination:    Cognition:      [x] Alert  [x] Awake  [x] Oriented  [x] Person  [x] Place [x] Time      [] drowsy  [] tired  [] lethargic  [] distractable  [] Other     Attention/Concentration:   [x] Attentive  [] Distracted        Memory Recent and Remote: [x] Intact   [] Impaired [] Partially Impaired     Language: [] Able to recognize and name objects          [] Unable to recognize and name Objects    Fund of Knowledge:  [] Poor []  Fair  [] Good    Speech: [] Normal  [] Soft  [] Slow  [] Fast [x] Pressured            [x] Loud [] Dysarthria  [x] Incoherent at times    Appearance: [] Well Groomed  [x] Casual Dressed  [] Unkept  [x] Disheveled          [] Normal weight[] Thin  [x] Overweight  [] Obese           Attitude: [] Positive  [] Hostile  [] Demanding  [] Guarded  [] Defensive         [x] Cooperative  []  Uncooperative      Behavior:  [x] Normal Gait  [] Walks with Assistance  [] Vickie Chair    [] Walks with Vianey Arias  [] In Hospital Bed  [] Sitting in Chair    Muscle-Skeletal:  [x] Normal Muscle Tone [] Muscle Atrophy       [] Abnormal Muscle Movement     Eye Contact:  [x] Good eye contact  [] Intermittent Eye Contact  [] Poor Eye Contact     Mood: [] Depressed  [] Anxious  [] Irritated  [x] Euthymic   [] Angry [x] Restless    Affect:  [] Congruent  [x] Incongruent  [x] Labile  [] Constricted  [] Flat  [x] Bizarre     Thought Process and Association:  [] Logical [x] Illogical       [] Linear and Goal Directed  [x] Tangential  [x]

## 2018-08-26 NOTE — PROGRESS NOTES
Group Therapy Note    Date: 8/26/2018  Start Time: 11:05 am  End Time:  11:55 am  Number of Participants: 8    Type of Group: Community Meeting/Wellness    Wellness [facility] [facility]  Module Name:  [employer] Number:  Importance of Support    Patient's Goal:  Patient will be aware of treatment team and identify daily goal.  Will recognize importance of support and safety during wellness recovery. Notes:  Identified daily goal as \" Revelations, never come back\". Interacted pleasantly with peers. Status After Intervention:  Improved    Participation Level:  Active Listener and Interactive    Participation Quality: Appropriate, Attentive and Sharing      Speech:  pressured      Thought Process/Content: Linear      Affective Functioning: Incongruent      Mood: elevated      Level of consciousness:  Alert      Response to Learning: Progressing to goal      Endings: None Reported    Modes of Intervention: Education, Support, Socialization and Exploration      Discipline Responsible: Psychoeducational Specialist      Signature:  Collette , [address]

## 2018-08-27 VITALS
TEMPERATURE: 97.4 F | SYSTOLIC BLOOD PRESSURE: 98 MMHG | RESPIRATION RATE: 16 BRPM | HEIGHT: 63 IN | BODY MASS INDEX: 31.71 KG/M2 | OXYGEN SATURATION: 100 % | WEIGHT: 179 LBS | DIASTOLIC BLOOD PRESSURE: 65 MMHG | HEART RATE: 100 BPM

## 2018-08-27 LAB
METER GLUCOSE: 116 MG/DL (ref 70–110)
METER GLUCOSE: 201 MG/DL (ref 70–110)

## 2018-08-27 PROCEDURE — 6370000000 HC RX 637 (ALT 250 FOR IP): Performed by: PSYCHIATRY & NEUROLOGY

## 2018-08-27 PROCEDURE — 6370000000 HC RX 637 (ALT 250 FOR IP): Performed by: INTERNAL MEDICINE

## 2018-08-27 PROCEDURE — 82962 GLUCOSE BLOOD TEST: CPT

## 2018-08-27 PROCEDURE — 99238 HOSP IP/OBS DSCHRG MGMT 30/<: CPT | Performed by: PSYCHIATRY & NEUROLOGY

## 2018-08-27 RX ORDER — METOPROLOL TARTRATE 50 MG/1
50 TABLET, FILM COATED ORAL 2 TIMES DAILY
Qty: 60 TABLET | Refills: 3 | Status: SHIPPED | OUTPATIENT
Start: 2018-08-27 | End: 2018-11-29 | Stop reason: SDUPTHER

## 2018-08-27 RX ORDER — DIVALPROEX SODIUM 500 MG/1
500 TABLET, DELAYED RELEASE ORAL 3 TIMES DAILY
Qty: 90 TABLET | Refills: 0 | Status: SHIPPED | OUTPATIENT
Start: 2018-08-27 | End: 2019-05-28

## 2018-08-27 RX ORDER — LOSARTAN POTASSIUM 50 MG/1
50 TABLET ORAL DAILY
Qty: 30 TABLET | Refills: 3 | Status: SHIPPED | OUTPATIENT
Start: 2018-08-28 | End: 2018-11-29 | Stop reason: SDUPTHER

## 2018-08-27 RX ORDER — HYDROCHLOROTHIAZIDE 12.5 MG/1
12.5 TABLET ORAL DAILY
Qty: 30 TABLET | Refills: 3 | Status: SHIPPED | OUTPATIENT
Start: 2018-08-28 | End: 2019-03-14 | Stop reason: SDUPTHER

## 2018-08-27 RX ORDER — TIMOLOL MALEATE 5 MG/ML
1 SOLUTION/ DROPS OPHTHALMIC 2 TIMES DAILY
Qty: 1 BOTTLE | Refills: 0 | Status: SHIPPED | OUTPATIENT
Start: 2018-08-27 | End: 2018-09-26

## 2018-08-27 RX ORDER — ALOGLIPTIN 25 MG/1
25 TABLET, FILM COATED ORAL DAILY
Qty: 30 TABLET | Refills: 1 | Status: SHIPPED | OUTPATIENT
Start: 2018-08-27 | End: 2018-08-27 | Stop reason: SDUPTHER

## 2018-08-27 RX ORDER — ERGOCALCIFEROL (VITAMIN D2) 1250 MCG
50000 CAPSULE ORAL WEEKLY
Qty: 4 CAPSULE | Refills: 1 | Status: SHIPPED | OUTPATIENT
Start: 2018-09-01 | End: 2019-03-14 | Stop reason: SDUPTHER

## 2018-08-27 RX ORDER — LATANOPROST 50 UG/ML
1 SOLUTION/ DROPS OPHTHALMIC DAILY
Qty: 1 BOTTLE | Refills: 3 | Status: SHIPPED | OUTPATIENT
Start: 2018-08-27 | End: 2019-05-28

## 2018-08-27 RX ORDER — ATORVASTATIN CALCIUM 40 MG/1
40 TABLET, FILM COATED ORAL DAILY
Qty: 30 TABLET | Refills: 3 | Status: SHIPPED | OUTPATIENT
Start: 2018-08-27 | End: 2018-11-29 | Stop reason: SDUPTHER

## 2018-08-27 RX ORDER — QUETIAPINE FUMARATE 300 MG/1
300 TABLET, FILM COATED ORAL EVERY EVENING
Qty: 60 TABLET | Refills: 0 | Status: SHIPPED | OUTPATIENT
Start: 2018-08-27 | End: 2019-03-14 | Stop reason: DRUGHIGH

## 2018-08-27 RX ADMIN — TIMOLOL MALEATE 1 DROP: 5 SOLUTION OPHTHALMIC at 08:37

## 2018-08-27 RX ADMIN — DIVALPROEX SODIUM 500 MG: 500 TABLET, DELAYED RELEASE ORAL at 08:37

## 2018-08-27 RX ADMIN — LINAGLIPTIN 5 MG: 5 TABLET, FILM COATED ORAL at 08:37

## 2018-08-27 RX ADMIN — HYDROCHLOROTHIAZIDE 12.5 MG: 12.5 TABLET ORAL at 08:37

## 2018-08-27 RX ADMIN — METFORMIN HYDROCHLORIDE 1000 MG: 1000 TABLET ORAL at 08:37

## 2018-08-27 RX ADMIN — METOPROLOL TARTRATE 50 MG: 50 TABLET, FILM COATED ORAL at 08:37

## 2018-08-27 RX ADMIN — INSULIN LISPRO 4 UNITS: 100 INJECTION, SOLUTION INTRAVENOUS; SUBCUTANEOUS at 08:39

## 2018-08-27 RX ADMIN — DIVALPROEX SODIUM 500 MG: 500 TABLET, DELAYED RELEASE ORAL at 13:44

## 2018-08-27 RX ADMIN — LOSARTAN POTASSIUM 50 MG: 50 TABLET, FILM COATED ORAL at 08:37

## 2018-08-27 RX ADMIN — ARIPIPRAZOLE 15 MG: 15 TABLET ORAL at 08:37

## 2018-08-27 ASSESSMENT — PAIN SCALES - GENERAL
PAINLEVEL_OUTOF10: 0

## 2018-08-27 NOTE — CARE COORDINATION
EDDIE Note; d/c planning: SW made referral to crisis unit this am to [facility]. There are beds today there. [facility] will review notes and then inform [facility]die if they will accept pt. [facility] pt at Crisis unit today. Scripts will be sent to [facility] ([phone] ph and [phone] fax) for delivery to crisis unit.

## 2018-08-27 NOTE — DISCHARGE SUMMARY
Process and Association:  [] Logical [] Illogical       [x] Linear and Goal Directed  [] Tangential  [] Circumstantial     Thought Content:  [x] Denies [] Endorses [] Suicidal [] Homicidal  [] Delusional      [] Paranoid  [] Somatic  [] Grandiose    Perception: [x]  None  [] Auditory   [] Visual  [] tactile   [] olfactory  [] Illusions         Insight: [] Intact  [x] Fair  [] Limited    Judgement:  [] Intact  [x] Fair  [] Limited    Hospital Course:   Admit Date: 8/17/2018     Discharge Date: 8/27/2018  Admitted from:  []  Emergency Room  []  Home  []  Another facility   []  [facility]     Admitting diagnosis:   Patient Active Problem List   Diagnosis    Severe bipolar affective disorder with psychosis ([facility]    Carpal tunnel syndrome    Hyperlipidemia    Depression    Vitamin D deficiency    Systemic primary arterial hypertension    Acute psychosis    Severe manic bipolar I disorder with psychotic features ([facility]    Bipolar 1 disorder ([facility]    Severe manic bipolar I disorder with psychotic features ([facility]    Non-insulin dependent type 2 diabetes mellitus ([facility]      Length of stay:  10 days              Francisco J Good was admitted in Psychiatric unit  from ER with Bipolar disorder and psychosis. Patient was treated            With the above . Patient responded well to the treatment. Discharge Summary Plan:     Discharge Status:    [x] Improved [] Unchanged    [] Worse       Discharge instructions given:  [x] Patient    [] Family [] Other         Discharge disposition:  [] Home [x] Step Down unit  [] Group Home []  [facility]                                                    [] [facility]    [] AMA  [] Other           Prescriptions: Continue same medications, review with patient.        Reason for more than one antipsychotic:  [] N/A  [] 3 failed monotherapy(drugs tried):  [x] Cross over to a new antipsychotic  [] Taper to monotherapy from polypharmacy  [] Augmentation of Clozapine therapy due to treatment resistance to single therapy      Diagnosis:        Patient Active Problem List   Diagnosis Code    Severe bipolar affective disorder with psychosis ([facility] F31.89    Carpal tunnel syndrome G56.00    Hyperlipidemia E78.5    Depression F32.9    Vitamin D deficiency E55.9    Systemic primary arterial hypertension I10    Acute psychosis F23    Severe manic bipolar I disorder with psychotic features ([facility] F31.2    Bipolar 1 disorder ([facility] F31.9    Severe manic bipolar I disorder with psychotic features ([facility] F31.2    Non-insulin dependent type 2 diabetes mellitus ([facility] E11.9       Education and Follow-up:  Counseled:  [] Patient     [] Family    [] Guardian      Signed:   Denisse Joshi   8/27/2018   11:34 AM

## 2018-08-27 NOTE — CARE COORDINATION
In order to ensure appropriate transition and discharge planning is in place, the following documents have been transmitted to [facility] unit, as the new outpatient provider:     The d/c diagnosis was transmitted to the next care provider   The reason for hospitalization was transmitted to the next care provider   The d/c medications (dosage and indication) were transmitted to the next care provider    The continuing care plan was transmitted to the next care provider

## 2018-10-04 ENCOUNTER — HOSPITAL ENCOUNTER (OUTPATIENT)
Age: 63
Discharge: HOME | End: 2018-10-04
Payer: COMMERCIAL

## 2018-10-04 LAB
ALBUMIN SERPL-MCNC: 3.9 G/DL (ref 3.5–5.2)
ALP BLD-CCNC: 76 U/L (ref 35–104)
ALT SERPL-CCNC: 16 U/L (ref 0–32)
ANION GAP SERPL CALCULATED.3IONS-SCNC: 14 MMOL/L (ref 7–16)
AST SERPL-CCNC: 12 U/L (ref 0–31)
BILIRUB SERPL-MCNC: 0.3 MG/DL (ref 0–1.2)
BUN BLDV-MCNC: 26 MG/DL (ref 8–23)
CALCIUM SERPL-MCNC: 9.9 MG/DL (ref 8.6–10.2)
CHLORIDE BLD-SCNC: 96 MMOL/L (ref 98–107)
CHOLESTEROL, TOTAL: 153 MG/DL (ref 0–199)
CO2: 27 MMOL/L (ref 22–29)
CREAT SERPL-MCNC: 0.9 MG/DL (ref 0.5–1)
GFR AFRICAN AMERICAN: >60
GFR NON-AFRICAN AMERICAN: >60 ML/MIN/1.73
GLUCOSE BLD-MCNC: 251 MG/DL (ref 74–109)
HDLC SERPL-MCNC: 53 MG/DL
LDL CHOLESTEROL CALCULATED: 82 MG/DL (ref 0–99)
POTASSIUM SERPL-SCNC: 5 MMOL/L (ref 3.5–5)
SODIUM BLD-SCNC: 137 MMOL/L (ref 132–146)
TOTAL PROTEIN: 7.3 G/DL (ref 6.4–8.3)
TRIGL SERPL-MCNC: 92 MG/DL (ref 0–149)
TSH SERPL DL<=0.05 MIU/L-ACNC: 6.12 UIU/ML (ref 0.27–4.2)
VALPROIC ACID LEVEL: 40 MCG/ML (ref 50–100)
VLDLC SERPL CALC-MCNC: 18 MG/DL

## 2018-10-04 PROCEDURE — 36415 COLL VENOUS BLD VENIPUNCTURE: CPT

## 2018-10-04 PROCEDURE — 84443 ASSAY THYROID STIM HORMONE: CPT

## 2018-10-04 PROCEDURE — 80061 LIPID PANEL: CPT

## 2018-10-04 PROCEDURE — 80053 COMPREHEN METABOLIC PANEL: CPT

## 2018-10-04 PROCEDURE — 80164 ASSAY DIPROPYLACETIC ACD TOT: CPT

## 2019-03-22 ENCOUNTER — HOSPITAL ENCOUNTER (OUTPATIENT)
Age: 64
Discharge: HOME | End: 2019-03-22
Payer: COMMERCIAL

## 2019-03-22 DIAGNOSIS — E11.8 TYPE 2 DIABETES MELLITUS WITH COMPLICATION, WITHOUT LONG-TERM CURRENT USE OF INSULIN (HCC): ICD-10-CM

## 2019-03-22 DIAGNOSIS — E03.9 HYPOTHYROIDISM, UNSPECIFIED TYPE: ICD-10-CM

## 2019-03-22 DIAGNOSIS — E78.2 MIXED HYPERLIPIDEMIA: ICD-10-CM

## 2019-03-22 LAB
ALBUMIN SERPL-MCNC: 4.3 G/DL (ref 3.5–5.2)
ALP BLD-CCNC: 69 U/L (ref 35–104)
ALT SERPL-CCNC: 7 U/L (ref 0–32)
ANION GAP SERPL CALCULATED.3IONS-SCNC: 14 MMOL/L (ref 7–16)
AST SERPL-CCNC: 12 U/L (ref 0–31)
BILIRUB SERPL-MCNC: 0.4 MG/DL (ref 0–1.2)
BUN BLDV-MCNC: 28 MG/DL (ref 8–23)
CALCIUM SERPL-MCNC: 9.3 MG/DL (ref 8.6–10.2)
CHLORIDE BLD-SCNC: 95 MMOL/L (ref 98–107)
CHOLESTEROL, TOTAL: 163 MG/DL (ref 0–199)
CO2: 30 MMOL/L (ref 22–29)
CREAT SERPL-MCNC: 1.1 MG/DL (ref 0.5–1)
GFR AFRICAN AMERICAN: >60
GFR NON-AFRICAN AMERICAN: >60 ML/MIN/1.73
GLUCOSE BLD-MCNC: 224 MG/DL (ref 74–99)
HBA1C MFR BLD: 9.5 % (ref 4–5.6)
HDLC SERPL-MCNC: 45 MG/DL
LDL CHOLESTEROL CALCULATED: 97 MG/DL (ref 0–99)
POTASSIUM SERPL-SCNC: 5 MMOL/L (ref 3.5–5)
SODIUM BLD-SCNC: 139 MMOL/L (ref 132–146)
TOTAL PROTEIN: 7.3 G/DL (ref 6.4–8.3)
TRIGL SERPL-MCNC: 106 MG/DL (ref 0–149)
TSH SERPL DL<=0.05 MIU/L-ACNC: 5.13 UIU/ML (ref 0.27–4.2)
VLDLC SERPL CALC-MCNC: 21 MG/DL

## 2019-03-22 PROCEDURE — 80061 LIPID PANEL: CPT

## 2019-03-22 PROCEDURE — 80053 COMPREHEN METABOLIC PANEL: CPT

## 2019-03-22 PROCEDURE — 84443 ASSAY THYROID STIM HORMONE: CPT

## 2019-03-22 PROCEDURE — 36415 COLL VENOUS BLD VENIPUNCTURE: CPT

## 2019-03-22 PROCEDURE — 83036 HEMOGLOBIN GLYCOSYLATED A1C: CPT

## 2019-04-15 ENCOUNTER — HOSPITAL ENCOUNTER (OUTPATIENT)
Age: 64
Discharge: HOME | End: 2019-04-15
Payer: COMMERCIAL

## 2019-04-15 LAB — VALPROIC ACID LEVEL: 87 MCG/ML (ref 50–100)

## 2019-04-15 PROCEDURE — 36415 COLL VENOUS BLD VENIPUNCTURE: CPT

## 2019-04-15 PROCEDURE — 80164 ASSAY DIPROPYLACETIC ACD TOT: CPT

## 2019-04-25 ENCOUNTER — HOSPITAL ENCOUNTER (EMERGENCY)
Age: 64
Discharge: PSYCHIATRIC OR SUBSTANCE USE FACILITY | End: 2019-04-25
Attending: EMERGENCY MEDICINE
Payer: COMMERCIAL

## 2019-04-25 VITALS
TEMPERATURE: 98.1 F | HEART RATE: 114 BPM | OXYGEN SATURATION: 99 % | HEIGHT: 63 IN | RESPIRATION RATE: 18 BRPM | BODY MASS INDEX: 31.89 KG/M2 | DIASTOLIC BLOOD PRESSURE: 81 MMHG | WEIGHT: 180 LBS | SYSTOLIC BLOOD PRESSURE: 166 MMHG

## 2019-04-25 DIAGNOSIS — Z86.59 HISTORY OF BIPOLAR DISORDER: ICD-10-CM

## 2019-04-25 DIAGNOSIS — Z76.89 ENCOUNTER FOR PSYCHIATRIC ASSESSMENT: Primary | ICD-10-CM

## 2019-04-25 LAB
ACETAMINOPHEN LEVEL: <5 MCG/ML (ref 10–30)
ALBUMIN SERPL-MCNC: 3.9 G/DL (ref 3.5–5.2)
ALP BLD-CCNC: 58 U/L (ref 35–104)
ALT SERPL-CCNC: 12 U/L (ref 0–32)
AMPHETAMINE SCREEN, URINE: NOT DETECTED
ANION GAP SERPL CALCULATED.3IONS-SCNC: 8 MMOL/L (ref 7–16)
AST SERPL-CCNC: 17 U/L (ref 0–31)
BACTERIA: ABNORMAL /HPF
BARBITURATE SCREEN URINE: NOT DETECTED
BASOPHILS ABSOLUTE: 0.04 E9/L (ref 0–0.2)
BASOPHILS RELATIVE PERCENT: 0.6 % (ref 0–2)
BENZODIAZEPINE SCREEN, URINE: NOT DETECTED
BILIRUB SERPL-MCNC: 0.4 MG/DL (ref 0–1.2)
BILIRUBIN URINE: NEGATIVE
BLOOD, URINE: NEGATIVE
BUN BLDV-MCNC: 15 MG/DL (ref 8–23)
CALCIUM SERPL-MCNC: 9.6 MG/DL (ref 8.6–10.2)
CANNABINOID SCREEN URINE: NOT DETECTED
CHLORIDE BLD-SCNC: 94 MMOL/L (ref 98–107)
CLARITY: CLEAR
CO2: 31 MMOL/L (ref 22–29)
COCAINE METABOLITE SCREEN URINE: NOT DETECTED
COLOR: YELLOW
CREAT SERPL-MCNC: 1 MG/DL (ref 0.5–1)
EOSINOPHILS ABSOLUTE: 0.09 E9/L (ref 0.05–0.5)
EOSINOPHILS RELATIVE PERCENT: 1.4 % (ref 0–6)
ETHANOL: <10 MG/DL (ref 0–0.08)
GFR AFRICAN AMERICAN: >60
GFR NON-AFRICAN AMERICAN: >60 ML/MIN/1.73
GLUCOSE BLD-MCNC: 178 MG/DL (ref 74–99)
GLUCOSE URINE: NEGATIVE MG/DL
HCT VFR BLD CALC: 35.2 % (ref 34–48)
HEMOGLOBIN: 11.7 G/DL (ref 11.5–15.5)
IMMATURE GRANULOCYTES #: 0.02 E9/L
IMMATURE GRANULOCYTES %: 0.3 % (ref 0–5)
KETONES, URINE: NEGATIVE MG/DL
LEUKOCYTE ESTERASE, URINE: ABNORMAL
LYMPHOCYTES ABSOLUTE: 3.2 E9/L (ref 1.5–4)
LYMPHOCYTES RELATIVE PERCENT: 49.8 % (ref 20–42)
MCH RBC QN AUTO: 29 PG (ref 26–35)
MCHC RBC AUTO-ENTMCNC: 33.2 % (ref 32–34.5)
MCV RBC AUTO: 87.1 FL (ref 80–99.9)
METHADONE SCREEN, URINE: NOT DETECTED
MONOCYTES ABSOLUTE: 0.74 E9/L (ref 0.1–0.95)
MONOCYTES RELATIVE PERCENT: 11.5 % (ref 2–12)
NEUTROPHILS ABSOLUTE: 2.33 E9/L (ref 1.8–7.3)
NEUTROPHILS RELATIVE PERCENT: 36.4 % (ref 43–80)
NITRITE, URINE: NEGATIVE
OPIATE SCREEN URINE: NOT DETECTED
PDW BLD-RTO: 13.2 FL (ref 11.5–15)
PH UA: 6 (ref 5–9)
PHENCYCLIDINE SCREEN URINE: NOT DETECTED
PLATELET # BLD: 235 E9/L (ref 130–450)
PMV BLD AUTO: 9.9 FL (ref 7–12)
POTASSIUM SERPL-SCNC: 4.4 MMOL/L (ref 3.5–5)
PROPOXYPHENE SCREEN: NOT DETECTED
PROTEIN UA: NEGATIVE MG/DL
RBC # BLD: 4.04 E12/L (ref 3.5–5.5)
RBC UA: ABNORMAL /HPF (ref 0–2)
SALICYLATE, SERUM: <0.3 MG/DL (ref 0–30)
SODIUM BLD-SCNC: 133 MMOL/L (ref 132–146)
SPECIFIC GRAVITY UA: <=1.005 (ref 1–1.03)
TOTAL PROTEIN: 7.2 G/DL (ref 6.4–8.3)
TRICYCLIC ANTIDEPRESSANTS SCREEN SERUM: NEGATIVE NG/ML
UROBILINOGEN, URINE: 0.2 E.U./DL
VALPROIC ACID LEVEL: 66 MCG/ML (ref 50–100)
WBC # BLD: 6.4 E9/L (ref 4.5–11.5)
WBC UA: ABNORMAL /HPF (ref 0–5)

## 2019-04-25 PROCEDURE — 93005 ELECTROCARDIOGRAM TRACING: CPT | Performed by: EMERGENCY MEDICINE

## 2019-04-25 PROCEDURE — 80053 COMPREHEN METABOLIC PANEL: CPT

## 2019-04-25 PROCEDURE — 99284 EMERGENCY DEPT VISIT MOD MDM: CPT

## 2019-04-25 PROCEDURE — G0480 DRUG TEST DEF 1-7 CLASSES: HCPCS

## 2019-04-25 PROCEDURE — 80164 ASSAY DIPROPYLACETIC ACD TOT: CPT

## 2019-04-25 PROCEDURE — 36415 COLL VENOUS BLD VENIPUNCTURE: CPT

## 2019-04-25 PROCEDURE — 80307 DRUG TEST PRSMV CHEM ANLYZR: CPT

## 2019-04-25 PROCEDURE — 81001 URINALYSIS AUTO W/SCOPE: CPT

## 2019-04-25 PROCEDURE — 85025 COMPLETE CBC W/AUTO DIFF WBC: CPT

## 2019-04-25 NOTE — ED NOTES
Pt is alert and oriented x4, cooperative. Pt is manic with pressured speech. Pt is agitated and frequently using profane language. Denies SI/HI. Denies A/V hallucinations. No other complaints. Ambulates with a steady gait. Will continue to monitor.       Michael Calvo RN  04/25/19 1257

## 2019-04-25 NOTE — ED NOTES
Brother Lenin Herrera [phone] called reoproting that he brought pt in due to her aggressive state, verbally abusive and threatening. He said that he went over this morning and her house door was wide open and she immediately came out and expressed aggressive behaviors towards him. He said that she is also hallucinating, believing that there are kids who are playing in her house and that there is a tall lady looking in her windows. Pt reportedly treats with [facility] and is most likely off meds. Pt refuses to talk with me - told me \"look in the chart and you tell me why I am here\". Behaviors are controlled, but pt is uncooperative to questioning. She is internally stimulated and unstable.                           RICCI Lazar  04/25/19 [id number]
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
## 2019-04-25 NOTE — ED NOTES
PT'S BROTHER PRESENT AND REPORTING THAT PT HAS BEEN HALLUCINATING FOR OVER 1 WEEK. PT MISSED HER APPOINTMENT WITH DR. Ayad Pagan LAST TUESDAY     PT'S BROTHER IS Tawanna Benitez [phone] - HE IS INVOLVED WITH HER MH CARE - HE TRANSPORTS HER TO HER APPOINTMENT.     Fady Cespedes, RICCI  04/25/19 1026

## 2019-04-25 NOTE — ED NOTES
RECEIVED PHONE 1212 \[facility]\"". PATIENT IS ACCEPTED TO [facility] BY DR. Jesus Alvarez. PATIENT TO GO TO ROOM #200-B ON THE ADULT UNIT.    N2N: [phone] EXT [phone]    [facility] ACCESS TO ARRANGE TRANSPORT.      MAMI Franks, SOUMYAW  04/25/19 8592

## 2019-04-27 LAB
EKG ATRIAL RATE: 112 BPM
EKG P AXIS: 64 DEGREES
EKG P-R INTERVAL: 158 MS
EKG Q-T INTERVAL: 336 MS
EKG QRS DURATION: 80 MS
EKG QTC CALCULATION (BAZETT): 458 MS
EKG R AXIS: 15 DEGREES
EKG T AXIS: 81 DEGREES
EKG VENTRICULAR RATE: 112 BPM

## 2019-05-27 ENCOUNTER — HOSPITAL ENCOUNTER (EMERGENCY)
Age: 64
Discharge: HOME | End: 2019-05-28
Attending: EMERGENCY MEDICINE
Payer: COMMERCIAL

## 2019-05-27 DIAGNOSIS — F29 PSYCHOSIS, UNSPECIFIED PSYCHOSIS TYPE (HCC): Primary | ICD-10-CM

## 2019-05-27 DIAGNOSIS — N39.0 URINARY TRACT INFECTION WITHOUT HEMATURIA, SITE UNSPECIFIED: ICD-10-CM

## 2019-05-27 LAB
ACETAMINOPHEN LEVEL: <5 MCG/ML (ref 10–30)
ALBUMIN SERPL-MCNC: 4 G/DL (ref 3.5–5.2)
ALP BLD-CCNC: 66 U/L (ref 35–104)
ALT SERPL-CCNC: 13 U/L (ref 0–32)
AMPHETAMINE SCREEN, URINE: NOT DETECTED
ANION GAP SERPL CALCULATED.3IONS-SCNC: 17 MMOL/L (ref 7–16)
AST SERPL-CCNC: 14 U/L (ref 0–31)
BACTERIA: ABNORMAL /HPF
BARBITURATE SCREEN URINE: NOT DETECTED
BENZODIAZEPINE SCREEN, URINE: NOT DETECTED
BILIRUB SERPL-MCNC: 0.2 MG/DL (ref 0–1.2)
BILIRUBIN URINE: NEGATIVE
BLOOD, URINE: NEGATIVE
BUN BLDV-MCNC: 26 MG/DL (ref 8–23)
CALCIUM SERPL-MCNC: 9.1 MG/DL (ref 8.6–10.2)
CANNABINOID SCREEN URINE: NOT DETECTED
CHLORIDE BLD-SCNC: 100 MMOL/L (ref 98–107)
CLARITY: CLEAR
CO2: 24 MMOL/L (ref 22–29)
COCAINE METABOLITE SCREEN URINE: NOT DETECTED
COLOR: YELLOW
CREAT SERPL-MCNC: 1.1 MG/DL (ref 0.5–1)
ETHANOL: <10 MG/DL (ref 0–0.08)
GFR AFRICAN AMERICAN: >60
GFR NON-AFRICAN AMERICAN: >60 ML/MIN/1.73
GLUCOSE BLD-MCNC: 121 MG/DL (ref 74–99)
GLUCOSE URINE: NEGATIVE MG/DL
HCT VFR BLD CALC: 38.6 % (ref 34–48)
HEMOGLOBIN: 12.5 G/DL (ref 11.5–15.5)
KETONES, URINE: ABNORMAL MG/DL
LEUKOCYTE ESTERASE, URINE: ABNORMAL
MCH RBC QN AUTO: 28.9 PG (ref 26–35)
MCHC RBC AUTO-ENTMCNC: 32.4 % (ref 32–34.5)
MCV RBC AUTO: 89.1 FL (ref 80–99.9)
METHADONE SCREEN, URINE: NOT DETECTED
NITRITE, URINE: NEGATIVE
OPIATE SCREEN URINE: NOT DETECTED
PDW BLD-RTO: 13.9 FL (ref 11.5–15)
PH UA: 5 (ref 5–9)
PHENCYCLIDINE SCREEN URINE: NOT DETECTED
PLATELET # BLD: 231 E9/L (ref 130–450)
PMV BLD AUTO: 9.7 FL (ref 7–12)
POTASSIUM SERPL-SCNC: 4.3 MMOL/L (ref 3.5–5)
PROPOXYPHENE SCREEN: NOT DETECTED
PROTEIN UA: NEGATIVE MG/DL
RBC # BLD: 4.33 E12/L (ref 3.5–5.5)
RBC UA: ABNORMAL /HPF (ref 0–2)
SALICYLATE, SERUM: <0.3 MG/DL (ref 0–30)
SODIUM BLD-SCNC: 141 MMOL/L (ref 132–146)
SPECIFIC GRAVITY UA: 1.02 (ref 1–1.03)
TOTAL PROTEIN: 7 G/DL (ref 6.4–8.3)
TRICYCLIC ANTIDEPRESSANTS SCREEN SERUM: NEGATIVE NG/ML
UROBILINOGEN, URINE: 0.2 E.U./DL
VALPROIC ACID LEVEL: 45 MCG/ML (ref 50–100)
WBC # BLD: 6.1 E9/L (ref 4.5–11.5)
WBC UA: >20 /HPF (ref 0–5)

## 2019-05-27 PROCEDURE — G0480 DRUG TEST DEF 1-7 CLASSES: HCPCS

## 2019-05-27 PROCEDURE — 93010 ELECTROCARDIOGRAM REPORT: CPT | Performed by: INTERNAL MEDICINE

## 2019-05-27 PROCEDURE — 80307 DRUG TEST PRSMV CHEM ANLYZR: CPT

## 2019-05-27 PROCEDURE — 99284 EMERGENCY DEPT VISIT MOD MDM: CPT

## 2019-05-27 PROCEDURE — 81001 URINALYSIS AUTO W/SCOPE: CPT

## 2019-05-27 PROCEDURE — 80053 COMPREHEN METABOLIC PANEL: CPT

## 2019-05-27 PROCEDURE — 93005 ELECTROCARDIOGRAM TRACING: CPT | Performed by: EMERGENCY MEDICINE

## 2019-05-27 PROCEDURE — 85027 COMPLETE CBC AUTOMATED: CPT

## 2019-05-27 PROCEDURE — 36415 COLL VENOUS BLD VENIPUNCTURE: CPT

## 2019-05-27 PROCEDURE — 80164 ASSAY DIPROPYLACETIC ACD TOT: CPT

## 2019-05-28 VITALS
HEIGHT: 63 IN | DIASTOLIC BLOOD PRESSURE: 79 MMHG | TEMPERATURE: 96.8 F | WEIGHT: 180 LBS | BODY MASS INDEX: 31.89 KG/M2 | OXYGEN SATURATION: 100 % | RESPIRATION RATE: 16 BRPM | SYSTOLIC BLOOD PRESSURE: 138 MMHG | HEART RATE: 98 BPM

## 2019-05-28 LAB
EKG ATRIAL RATE: 102 BPM
EKG P AXIS: 70 DEGREES
EKG P-R INTERVAL: 140 MS
EKG Q-T INTERVAL: 340 MS
EKG QRS DURATION: 80 MS
EKG QTC CALCULATION (BAZETT): 443 MS
EKG R AXIS: 26 DEGREES
EKG T AXIS: 86 DEGREES
EKG VENTRICULAR RATE: 102 BPM

## 2019-05-28 PROCEDURE — 6370000000 HC RX 637 (ALT 250 FOR IP): Performed by: EMERGENCY MEDICINE

## 2019-05-28 RX ORDER — DIVALPROEX SODIUM 250 MG/1
250 TABLET, DELAYED RELEASE ORAL 2 TIMES DAILY
COMMUNITY
Start: 2019-05-15 | End: 2019-06-27 | Stop reason: DRUGHIGH

## 2019-05-28 RX ORDER — DIVALPROEX SODIUM 500 MG/1
TABLET, EXTENDED RELEASE ORAL
Refills: 2 | COMMUNITY
Start: 2019-05-07 | End: 2019-11-07 | Stop reason: SDUPTHER

## 2019-05-28 RX ORDER — CEPHALEXIN 250 MG/1
500 CAPSULE ORAL 2 TIMES DAILY
Qty: 28 CAPSULE | Refills: 0 | Status: SHIPPED | OUTPATIENT
Start: 2019-05-28 | End: 2019-06-04

## 2019-05-28 RX ORDER — TRIAMCINOLONE ACETONIDE 1 MG/G
CREAM TOPICAL
Refills: 0 | COMMUNITY
Start: 2019-04-10 | End: 2020-02-11

## 2019-05-28 RX ORDER — CEPHALEXIN 500 MG/1
500 CAPSULE ORAL ONCE
Status: COMPLETED | OUTPATIENT
Start: 2019-05-28 | End: 2019-05-28

## 2019-05-28 RX ORDER — ARIPIPRAZOLE 30 MG/1
15 TABLET ORAL DAILY
Status: ON HOLD | COMMUNITY
Start: 2019-05-15 | End: 2019-12-06 | Stop reason: SDUPTHER

## 2019-05-28 RX ORDER — METOPROLOL TARTRATE 50 MG/1
50 TABLET, FILM COATED ORAL ONCE
Status: COMPLETED | OUTPATIENT
Start: 2019-05-28 | End: 2019-05-28

## 2019-05-28 RX ORDER — QUETIAPINE FUMARATE 100 MG/1
100 TABLET, FILM COATED ORAL 3 TIMES DAILY
COMMUNITY
End: 2019-06-27 | Stop reason: DRUGHIGH

## 2019-05-28 RX ORDER — LORAZEPAM 1 MG/1
1 TABLET ORAL ONCE
Status: COMPLETED | OUTPATIENT
Start: 2019-05-28 | End: 2019-05-28

## 2019-05-28 RX ORDER — LOSARTAN POTASSIUM 25 MG/1
50 TABLET ORAL ONCE
Status: COMPLETED | OUTPATIENT
Start: 2019-05-28 | End: 2019-05-28

## 2019-05-28 RX ADMIN — LOSARTAN POTASSIUM 50 MG: 25 TABLET ORAL at 10:20

## 2019-05-28 RX ADMIN — METFORMIN HYDROCHLORIDE 1000 MG: 1000 TABLET ORAL at 10:30

## 2019-05-28 RX ADMIN — METOPROLOL TARTRATE 50 MG: 50 TABLET, FILM COATED ORAL at 10:21

## 2019-05-28 RX ADMIN — LORAZEPAM 1 MG: 1 TABLET ORAL at 04:12

## 2019-05-28 RX ADMIN — CEPHALEXIN 500 MG: 500 CAPSULE ORAL at 00:43

## 2019-05-28 RX ADMIN — QUETIAPINE FUMARATE 400 MG: 100 TABLET ORAL at 00:43

## 2019-05-28 NOTE — ED NOTES
Patient presents to ED after mom called 911 d/t belief that patient is no longer taking her medications. Patient reports she has been taking her medications as prescribed, listing what time of day she takes what. Patient presents with rambling, pressured, and boisterous speech. Pleasant and cooperative at this time. Labile mood. She is alert and oriented x 3. Patient denies SI. Reports she is homicidal toward her mom and is [place] knock her weave out her head\". She states her mom has been messing up her appointments and is calling her mom vulgar names. Patient denies hallucinations. She does not appear internally stimulated.         Jefferson Coates, RN  05/27/19 Biju Perez, UMER  05/28/19 9810

## 2019-05-28 NOTE — ED NOTES
Pt calm and cooperative at this time moments af belligerent words every now and then however pt easily redirected.   Informed pt of plan of care       Jaime Lee RN  05/28/19 1257

## 2019-05-28 NOTE — ED NOTES
Pt was referred to [facility] at the [facility] for inpatient admission.      MAMI Arias, [place]  05/28/19 8956

## 2019-05-28 NOTE — ED PROVIDER NOTES
secretions  Neck: Supple, full ROM  Pulmonary: Lungs clear to auscultation bilaterally, no wheezes, rales, or rhonchi. Not in respiratory distress  Cardiovascular:  Regular rate and rhythm, no murmurs, gallops, or rubs. 2+ distal pulses  Abdomen: Soft, non tender, non distended  Extremities: Moves all extremities x 4. Warm and well perfused  Skin: warm and dry without rash  Neurologic: GCS 15, CN 2-12 grossly intact, no focal deficits  Psychiatric:    General: She is cooperative. Pressured speech. Mood: appropriate. Thought Process: tangential.    Suicidal Thoughts: none   Homicidal Thoughts: none   Hallucinations: none    -------------------------------------------------- RESULTS -------------------------------------------------  All laboratory and imaging studies were reviewed by myself.     LABS:  Results for orders placed or performed during the hospital encounter of 05/27/19   CBC   Result Value Ref Range    WBC 6.1 4.5 - 11.5 E9/L    RBC 4.33 3.50 - 5.50 E12/L    Hemoglobin 12.5 11.5 - 15.5 g/dL    Hematocrit 38.6 34.0 - 48.0 %    MCV 89.1 80.0 - 99.9 fL    MCH 28.9 26.0 - 35.0 pg    MCHC 32.4 32.0 - 34.5 %    RDW 13.9 11.5 - 15.0 fL    Platelets 485 312 - 759 E9/L    MPV 9.7 7.0 - 12.0 fL   Comprehensive Metabolic Panel   Result Value Ref Range    Sodium 141 132 - 146 mmol/L    Potassium 4.3 3.5 - 5.0 mmol/L    Chloride 100 98 - 107 mmol/L    CO2 24 22 - 29 mmol/L    Anion Gap 17 (H) 7 - 16 mmol/L    Glucose 121 (H) 74 - 99 mg/dL    BUN 26 (H) 8 - 23 mg/dL    CREATININE 1.1 (H) 0.5 - 1.0 mg/dL    GFR Non-African American >60 >=60 mL/min/1.73    GFR African American >60     Calcium 9.1 8.6 - 10.2 mg/dL    Total Protein 7.0 6.4 - 8.3 g/dL    Alb 4.0 3.5 - 5.2 g/dL    Total Bilirubin 0.2 0.0 - 1.2 mg/dL    Alkaline Phosphatase 66 35 - 104 U/L    ALT 13 0 - 32 U/L    AST 14 0 - 31 U/L   Serum Drug Screen   Result Value Ref Range    Ethanol Lvl <10 mg/dL    Acetaminophen Level <5.0 (L) 10.0 - 30.0 mcg/mL Salicylate, Serum <9.9 0.0 - 30.0 mg/dL    TCA Scrn NEGATIVE Cutoff:300 ng/mL   Urine Drug Screen   Result Value Ref Range    Amphetamine Screen, Urine NOT DETECTED Negative <1000 ng/mL    Barbiturate Screen, Ur NOT DETECTED Negative < 200 ng/mL    Benzodiazepine Screen, Urine NOT DETECTED Negative < 200 ng/mL    Cannabinoid Scrn, Ur NOT DETECTED Negative < 50ng/mL    Cocaine Metabolite Screen, Urine NOT DETECTED Negative < 300 ng/mL    Opiate Scrn, Ur NOT DETECTED Negative < 300ng/mL    PCP Screen, Urine NOT DETECTED Negative < 25 ng/mL    Methadone Screen, Urine NOT DETECTED Negative <300 ng/mL    Propoxyphene Scrn, Ur NOT DETECTED Negative <300 ng/mL   Urinalysis   Result Value Ref Range    Color, UA Yellow Straw/Yellow    Clarity, UA Clear Clear    Glucose, Ur Negative Negative mg/dL    Bilirubin Urine Negative Negative    Ketones, Urine TRACE (A) Negative mg/dL    Specific Gravity, UA 1.020 1.005 - 1.030    Blood, Urine Negative Negative    pH, UA 5.0 5.0 - 9.0    Protein, UA Negative Negative mg/dL    Urobilinogen, Urine 0.2 <2.0 E.U./dL    Nitrite, Urine Negative Negative    Leukocyte Esterase, Urine MODERATE (A) Negative   Valproic acid level, total   Result Value Ref Range    Valproic Acid Lvl 45 (L) 50 - 100 mcg/mL   Microscopic Urinalysis   Result Value Ref Range    WBC, UA >20 0 - 5 /HPF    RBC, UA NONE 0 - 2 /HPF    Bacteria, UA MODERATE (A) /HPF   EKG 12 Lead   Result Value Ref Range    Ventricular Rate 102 BPM    Atrial Rate 102 BPM    P-R Interval 140 ms    QRS Duration 80 ms    Q-T Interval 340 ms    QTc Calculation (Bazett) 443 ms    P Axis 70 degrees    R Axis 26 degrees    T Axis 86 degrees       RADIOLOGY:  Interpreted by Radiologist.  No orders to display     EKG: This EKG is signed and interpreted by me.     Rate: 102  Rhythm: Sinus tachycardia  Interpretation: non-specific EKG  Comparison: no previous EKG available      ------------------------- NURSING NOTES AND VITALS REVIEWED

## 2019-05-28 NOTE — ED NOTES
Attempted to call [facility] for medication list, no answer at this time.       Fuad Wise, RN  05/28/19 1464

## 2019-05-28 NOTE — ED NOTES
Patient accepted to [facility] by Dr. Codi Matta. No bed available until 12 pm.   Nurse to nurse to be called to [phone]. [place] slip faxed to [phone].       Willy García RN  05/28/19 0583

## 2019-05-28 NOTE — ED NOTES
Notified [facility] pharmacy concerning losartan stating ace inhibitor allergy informed pt has been on this med since 2016     Nurys Garcia RN  05/28/19 0320

## 2019-05-28 NOTE — ED NOTES
Patient requesting medication for anxiety. Dr. Raoul Valdez notified. Will be medicated as ordered.       Carley Atkins RN  05/28/19 9343

## 2019-05-28 NOTE — ED NOTES
Most of patient's jewelry removed and placed in a patient belonging bag and placed inside patient's purse. Patient verbalized understanding and this was done at bedside in front of patient. 2 bags of belongings to locker 28.       Jose Singer RN  05/27/19 0262

## 2019-05-28 NOTE — ED NOTES
Bed: -28  Expected date: 5/27/19  Expected time:   Means of arrival:   Comments:  ems     June Johnson RN  05/27/19 5620
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
## 2019-05-28 NOTE — ED NOTES
Patient awake in room. She is singing one moment, but then will make derogatory comments to staff and vulgar statements. Patient was asked if she would like something to help her rest, she refused and stated she is a \"night owl\".        Tayler Mcmanus RN  05/28/19 9167

## 2019-05-28 NOTE — ED NOTES
Pt to be referred to the [facility] once medically clear     MAMI Alegre, [facility]  05/27/19 5808

## 2019-09-06 ENCOUNTER — HOSPITAL ENCOUNTER (OUTPATIENT)
Dept: GENERAL RADIOLOGY | Age: 64
Discharge: HOME | End: 2019-09-08
Payer: COMMERCIAL

## 2019-09-06 DIAGNOSIS — Z12.31 VISIT FOR SCREENING MAMMOGRAM: ICD-10-CM

## 2019-09-06 PROCEDURE — 77063 BREAST TOMOSYNTHESIS BI: CPT

## 2019-12-06 ENCOUNTER — APPOINTMENT (OUTPATIENT)
Dept: CT IMAGING | Age: 64
DRG: 351 | End: 2019-12-06
Payer: COMMERCIAL

## 2019-12-06 ENCOUNTER — APPOINTMENT (OUTPATIENT)
Dept: GENERAL RADIOLOGY | Age: 64
DRG: 351 | End: 2019-12-06
Payer: COMMERCIAL

## 2019-12-06 ENCOUNTER — HOSPITAL ENCOUNTER (INPATIENT)
Age: 64
LOS: 7 days | Discharge: NURSING, REHAB OR LONG-TERM CARE | DRG: 351 | End: 2019-12-13
Attending: EMERGENCY MEDICINE | Admitting: INTERNAL MEDICINE
Payer: COMMERCIAL

## 2019-12-06 DIAGNOSIS — E87.20 LACTIC ACIDOSIS: Primary | ICD-10-CM

## 2019-12-06 DIAGNOSIS — A41.9 SEPTICEMIA (HCC): ICD-10-CM

## 2019-12-06 DIAGNOSIS — N39.0 URINARY TRACT INFECTION WITHOUT HEMATURIA, SITE UNSPECIFIED: ICD-10-CM

## 2019-12-06 LAB
ALBUMIN SERPL-MCNC: 3.7 G/DL (ref 3.5–5.2)
ALP BLD-CCNC: 69 U/L (ref 35–104)
ALT SERPL-CCNC: 15 U/L (ref 0–32)
ANION GAP SERPL CALCULATED.3IONS-SCNC: 14 MMOL/L (ref 7–16)
ANION GAP SERPL CALCULATED.3IONS-SCNC: 22 MMOL/L (ref 7–16)
AST SERPL-CCNC: 20 U/L (ref 0–31)
BACTERIA: ABNORMAL /HPF
BILIRUB SERPL-MCNC: 0.4 MG/DL (ref 0–1.2)
BILIRUBIN URINE: NEGATIVE
BLOOD, URINE: ABNORMAL
BUN BLDV-MCNC: 37 MG/DL (ref 8–23)
BUN BLDV-MCNC: 38 MG/DL (ref 8–23)
CALCIUM SERPL-MCNC: 8.7 MG/DL (ref 8.6–10.2)
CALCIUM SERPL-MCNC: 9.1 MG/DL (ref 8.6–10.2)
CHLORIDE BLD-SCNC: 92 MMOL/L (ref 98–107)
CHLORIDE BLD-SCNC: 97 MMOL/L (ref 98–107)
CLARITY: CLEAR
CO2: 19 MMOL/L (ref 22–29)
CO2: 26 MMOL/L (ref 22–29)
COLOR: YELLOW
CREAT SERPL-MCNC: 1.4 MG/DL (ref 0.5–1)
CREAT SERPL-MCNC: 1.5 MG/DL (ref 0.5–1)
EKG ATRIAL RATE: 120 BPM
EKG P AXIS: 64 DEGREES
EKG P-R INTERVAL: 172 MS
EKG Q-T INTERVAL: 324 MS
EKG QRS DURATION: 68 MS
EKG QTC CALCULATION (BAZETT): 457 MS
EKG R AXIS: 18 DEGREES
EKG T AXIS: 96 DEGREES
EKG VENTRICULAR RATE: 120 BPM
EPITHELIAL CELLS, UA: ABNORMAL /HPF
GFR AFRICAN AMERICAN: 42
GFR AFRICAN AMERICAN: 46
GFR NON-AFRICAN AMERICAN: 42 ML/MIN/1.73
GFR NON-AFRICAN AMERICAN: 46 ML/MIN/1.73
GLUCOSE BLD-MCNC: 255 MG/DL (ref 74–99)
GLUCOSE BLD-MCNC: 376 MG/DL (ref 74–99)
GLUCOSE URINE: >=1000 MG/DL
HCT VFR BLD CALC: 35.9 % (ref 34–48)
HEMOGLOBIN: 11.5 G/DL (ref 11.5–15.5)
KETONES, URINE: ABNORMAL MG/DL
LACTIC ACID: 5 MMOL/L (ref 0.5–2.2)
LACTIC ACID: 6.7 MMOL/L (ref 0.5–2.2)
LEUKOCYTE ESTERASE, URINE: ABNORMAL
MCH RBC QN AUTO: 29.8 PG (ref 26–35)
MCHC RBC AUTO-ENTMCNC: 32 % (ref 32–34.5)
MCV RBC AUTO: 93 FL (ref 80–99.9)
METER GLUCOSE: 187 MG/DL (ref 74–99)
METER GLUCOSE: 228 MG/DL (ref 74–99)
METER GLUCOSE: 239 MG/DL (ref 74–99)
METER GLUCOSE: 258 MG/DL (ref 74–99)
METER GLUCOSE: 307 MG/DL (ref 74–99)
NITRITE, URINE: POSITIVE
PDW BLD-RTO: 15.5 FL (ref 11.5–15)
PH UA: 6 (ref 5–9)
PLATELET # BLD: 159 E9/L (ref 130–450)
PMV BLD AUTO: 10.6 FL (ref 7–12)
POTASSIUM SERPL-SCNC: 4.5 MMOL/L (ref 3.5–5)
POTASSIUM SERPL-SCNC: 5 MMOL/L (ref 3.5–5)
PROTEIN UA: NEGATIVE MG/DL
RBC # BLD: 3.86 E12/L (ref 3.5–5.5)
RBC UA: ABNORMAL /HPF (ref 0–2)
SODIUM BLD-SCNC: 133 MMOL/L (ref 132–146)
SODIUM BLD-SCNC: 137 MMOL/L (ref 132–146)
SPECIFIC GRAVITY UA: 1.01 (ref 1–1.03)
TOTAL PROTEIN: 7.3 G/DL (ref 6.4–8.3)
TROPONIN: <0.01 NG/ML (ref 0–0.03)
UROBILINOGEN, URINE: 0.2 E.U./DL
VALPROIC ACID LEVEL: 124 MCG/ML (ref 50–100)
WBC # BLD: 7.4 E9/L (ref 4.5–11.5)
WBC UA: ABNORMAL /HPF (ref 0–5)

## 2019-12-06 PROCEDURE — 6370000000 HC RX 637 (ALT 250 FOR IP): Performed by: INTERNAL MEDICINE

## 2019-12-06 PROCEDURE — 6370000000 HC RX 637 (ALT 250 FOR IP): Performed by: EMERGENCY MEDICINE

## 2019-12-06 PROCEDURE — 81001 URINALYSIS AUTO W/SCOPE: CPT

## 2019-12-06 PROCEDURE — 6360000002 HC RX W HCPCS: Performed by: INTERNAL MEDICINE

## 2019-12-06 PROCEDURE — 6360000002 HC RX W HCPCS: Performed by: EMERGENCY MEDICINE

## 2019-12-06 PROCEDURE — 96365 THER/PROPH/DIAG IV INF INIT: CPT

## 2019-12-06 PROCEDURE — 70450 CT HEAD/BRAIN W/O DYE: CPT

## 2019-12-06 PROCEDURE — 85027 COMPLETE CBC AUTOMATED: CPT

## 2019-12-06 PROCEDURE — 2060000000 HC ICU INTERMEDIATE R&B

## 2019-12-06 PROCEDURE — 80048 BASIC METABOLIC PNL TOTAL CA: CPT

## 2019-12-06 PROCEDURE — 83605 ASSAY OF LACTIC ACID: CPT

## 2019-12-06 PROCEDURE — 99285 EMERGENCY DEPT VISIT HI MDM: CPT

## 2019-12-06 PROCEDURE — 96375 TX/PRO/DX INJ NEW DRUG ADDON: CPT

## 2019-12-06 PROCEDURE — 82962 GLUCOSE BLOOD TEST: CPT

## 2019-12-06 PROCEDURE — 93005 ELECTROCARDIOGRAM TRACING: CPT | Performed by: EMERGENCY MEDICINE

## 2019-12-06 PROCEDURE — 71045 X-RAY EXAM CHEST 1 VIEW: CPT

## 2019-12-06 PROCEDURE — 87040 BLOOD CULTURE FOR BACTERIA: CPT

## 2019-12-06 PROCEDURE — 72125 CT NECK SPINE W/O DYE: CPT

## 2019-12-06 PROCEDURE — 80164 ASSAY DIPROPYLACETIC ACD TOT: CPT

## 2019-12-06 PROCEDURE — 84484 ASSAY OF TROPONIN QUANT: CPT

## 2019-12-06 PROCEDURE — 93010 ELECTROCARDIOGRAM REPORT: CPT | Performed by: INTERNAL MEDICINE

## 2019-12-06 PROCEDURE — 80053 COMPREHEN METABOLIC PANEL: CPT

## 2019-12-06 PROCEDURE — 36415 COLL VENOUS BLD VENIPUNCTURE: CPT

## 2019-12-06 PROCEDURE — 2580000003 HC RX 258: Performed by: EMERGENCY MEDICINE

## 2019-12-06 PROCEDURE — 2580000003 HC RX 258: Performed by: INTERNAL MEDICINE

## 2019-12-06 RX ORDER — LOSARTAN POTASSIUM 50 MG/1
50 TABLET ORAL DAILY
Status: DISCONTINUED | OUTPATIENT
Start: 2019-12-06 | End: 2019-12-13 | Stop reason: HOSPADM

## 2019-12-06 RX ORDER — DIVALPROEX SODIUM 250 MG/1
1000 TABLET, DELAYED RELEASE ORAL 2 TIMES DAILY
Status: DISCONTINUED | OUTPATIENT
Start: 2019-12-06 | End: 2019-12-13 | Stop reason: HOSPADM

## 2019-12-06 RX ORDER — HYDROCHLOROTHIAZIDE 12.5 MG/1
12.5 TABLET ORAL DAILY
Status: DISCONTINUED | OUTPATIENT
Start: 2019-12-06 | End: 2019-12-13 | Stop reason: HOSPADM

## 2019-12-06 RX ORDER — ACETAMINOPHEN 325 MG/1
650 TABLET ORAL ONCE
Status: COMPLETED | OUTPATIENT
Start: 2019-12-06 | End: 2019-12-06

## 2019-12-06 RX ORDER — SODIUM CHLORIDE 0.9 % (FLUSH) 0.9 %
10 SYRINGE (ML) INJECTION PRN
Status: DISCONTINUED | OUTPATIENT
Start: 2019-12-06 | End: 2019-12-13 | Stop reason: HOSPADM

## 2019-12-06 RX ORDER — ARIPIPRAZOLE 15 MG/1
30 TABLET ORAL DAILY
Status: DISCONTINUED | OUTPATIENT
Start: 2019-12-06 | End: 2019-12-06 | Stop reason: CLARIF

## 2019-12-06 RX ORDER — BRIMONIDINE TARTRATE 2 MG/ML
1 SOLUTION/ DROPS OPHTHALMIC 2 TIMES DAILY
Status: DISCONTINUED | OUTPATIENT
Start: 2019-12-06 | End: 2019-12-13 | Stop reason: HOSPADM

## 2019-12-06 RX ORDER — QUETIAPINE FUMARATE 200 MG/1
400 TABLET, FILM COATED ORAL NIGHTLY
Status: DISCONTINUED | OUTPATIENT
Start: 2019-12-06 | End: 2019-12-13 | Stop reason: HOSPADM

## 2019-12-06 RX ORDER — SODIUM CHLORIDE 0.9 % (FLUSH) 0.9 %
10 SYRINGE (ML) INJECTION EVERY 12 HOURS SCHEDULED
Status: DISCONTINUED | OUTPATIENT
Start: 2019-12-06 | End: 2019-12-13 | Stop reason: HOSPADM

## 2019-12-06 RX ORDER — ATORVASTATIN CALCIUM 40 MG/1
40 TABLET, FILM COATED ORAL DAILY
Status: DISCONTINUED | OUTPATIENT
Start: 2019-12-06 | End: 2019-12-13 | Stop reason: HOSPADM

## 2019-12-06 RX ORDER — NICOTINE POLACRILEX 4 MG
15 LOZENGE BUCCAL PRN
Status: DISCONTINUED | OUTPATIENT
Start: 2019-12-06 | End: 2019-12-13 | Stop reason: HOSPADM

## 2019-12-06 RX ORDER — 0.9 % SODIUM CHLORIDE 0.9 %
1000 INTRAVENOUS SOLUTION INTRAVENOUS ONCE
Status: COMPLETED | OUTPATIENT
Start: 2019-12-06 | End: 2019-12-06

## 2019-12-06 RX ORDER — TIMOLOL MALEATE 5 MG/ML
1 SOLUTION/ DROPS OPHTHALMIC 2 TIMES DAILY
Status: DISCONTINUED | OUTPATIENT
Start: 2019-12-06 | End: 2019-12-13 | Stop reason: HOSPADM

## 2019-12-06 RX ORDER — METOPROLOL TARTRATE 50 MG/1
50 TABLET, FILM COATED ORAL 2 TIMES DAILY
Status: DISCONTINUED | OUTPATIENT
Start: 2019-12-06 | End: 2019-12-08

## 2019-12-06 RX ORDER — OLANZAPINE 10 MG/1
10 TABLET ORAL 2 TIMES DAILY
Status: DISCONTINUED | OUTPATIENT
Start: 2019-12-06 | End: 2019-12-06

## 2019-12-06 RX ORDER — DEXTROSE MONOHYDRATE 50 MG/ML
100 INJECTION, SOLUTION INTRAVENOUS PRN
Status: DISCONTINUED | OUTPATIENT
Start: 2019-12-06 | End: 2019-12-13 | Stop reason: HOSPADM

## 2019-12-06 RX ORDER — ARIPIPRAZOLE 15 MG/1
15 TABLET ORAL DAILY
Status: DISCONTINUED | OUTPATIENT
Start: 2019-12-06 | End: 2019-12-13 | Stop reason: HOSPADM

## 2019-12-06 RX ORDER — DEXTROSE MONOHYDRATE 25 G/50ML
12.5 INJECTION, SOLUTION INTRAVENOUS PRN
Status: DISCONTINUED | OUTPATIENT
Start: 2019-12-06 | End: 2019-12-13 | Stop reason: HOSPADM

## 2019-12-06 RX ORDER — ARIPIPRAZOLE 15 MG/1
15 TABLET ORAL DAILY
COMMUNITY
End: 2020-02-11 | Stop reason: SDUPTHER

## 2019-12-06 RX ORDER — SODIUM CHLORIDE 9 MG/ML
INJECTION, SOLUTION INTRAVENOUS CONTINUOUS
Status: DISCONTINUED | OUTPATIENT
Start: 2019-12-06 | End: 2019-12-08

## 2019-12-06 RX ORDER — ACETAMINOPHEN 325 MG/1
650 TABLET ORAL EVERY 4 HOURS PRN
Status: DISCONTINUED | OUTPATIENT
Start: 2019-12-06 | End: 2019-12-13 | Stop reason: HOSPADM

## 2019-12-06 RX ORDER — ATORVASTATIN CALCIUM 40 MG/1
1 TABLET, FILM COATED ORAL DAILY
Status: DISCONTINUED | OUTPATIENT
Start: 2019-12-06 | End: 2019-12-06 | Stop reason: SDUPTHER

## 2019-12-06 RX ORDER — LANCETS 28 GAUGE
1 EACH MISCELLANEOUS DAILY
Status: DISCONTINUED | OUTPATIENT
Start: 2019-12-06 | End: 2019-12-06 | Stop reason: CLARIF

## 2019-12-06 RX ADMIN — INSULIN LISPRO 2 UNITS: 100 INJECTION, SOLUTION INTRAVENOUS; SUBCUTANEOUS at 20:26

## 2019-12-06 RX ADMIN — SODIUM CHLORIDE: 9 INJECTION, SOLUTION INTRAVENOUS at 15:15

## 2019-12-06 RX ADMIN — BRIMONIDINE TARTRATE 1 DROP: 2 SOLUTION/ DROPS OPHTHALMIC at 20:21

## 2019-12-06 RX ADMIN — ACETAMINOPHEN 650 MG: 325 TABLET, FILM COATED ORAL at 02:27

## 2019-12-06 RX ADMIN — QUETIAPINE FUMARATE 400 MG: 200 TABLET ORAL at 20:21

## 2019-12-06 RX ADMIN — HYDROCHLOROTHIAZIDE 12.5 MG: 12.5 TABLET ORAL at 09:21

## 2019-12-06 RX ADMIN — METOPROLOL TARTRATE 50 MG: 50 TABLET, FILM COATED ORAL at 09:21

## 2019-12-06 RX ADMIN — DIVALPROEX SODIUM 1000 MG: 500 TABLET, DELAYED RELEASE ORAL at 20:21

## 2019-12-06 RX ADMIN — INSULIN LISPRO 2 UNITS: 100 INJECTION, SOLUTION INTRAVENOUS; SUBCUTANEOUS at 17:12

## 2019-12-06 RX ADMIN — INSULIN LISPRO 8 UNITS: 100 INJECTION, SOLUTION INTRAVENOUS; SUBCUTANEOUS at 12:05

## 2019-12-06 RX ADMIN — METFORMIN HYDROCHLORIDE 1000 MG: 1000 TABLET ORAL at 09:21

## 2019-12-06 RX ADMIN — SODIUM CHLORIDE, PRESERVATIVE FREE 10 ML: 5 INJECTION INTRAVENOUS at 20:22

## 2019-12-06 RX ADMIN — SODIUM CHLORIDE 1000 ML: 9 INJECTION, SOLUTION INTRAVENOUS at 04:30

## 2019-12-06 RX ADMIN — INSULIN HUMAN 6 UNITS: 100 INJECTION, SOLUTION PARENTERAL at 02:08

## 2019-12-06 RX ADMIN — CEFTRIAXONE SODIUM 1 G: 1 INJECTION, POWDER, FOR SOLUTION INTRAMUSCULAR; INTRAVENOUS at 03:40

## 2019-12-06 RX ADMIN — DIVALPROEX SODIUM 1000 MG: 500 TABLET, DELAYED RELEASE ORAL at 09:22

## 2019-12-06 RX ADMIN — ATORVASTATIN CALCIUM 40 MG: 40 TABLET, FILM COATED ORAL at 09:21

## 2019-12-06 RX ADMIN — TIMOLOL MALEATE 1 DROP: 5 SOLUTION OPHTHALMIC at 20:21

## 2019-12-06 RX ADMIN — METOPROLOL TARTRATE 25 MG: 25 TABLET ORAL at 05:55

## 2019-12-06 RX ADMIN — OLANZAPINE 10 MG: 10 TABLET, FILM COATED ORAL at 09:22

## 2019-12-06 RX ADMIN — SODIUM CHLORIDE: 9 INJECTION, SOLUTION INTRAVENOUS at 06:01

## 2019-12-06 RX ADMIN — BRIMONIDINE TARTRATE 1 DROP: 2 SOLUTION/ DROPS OPHTHALMIC at 09:23

## 2019-12-06 RX ADMIN — METFORMIN HYDROCHLORIDE 1000 MG: 1000 TABLET ORAL at 17:12

## 2019-12-06 RX ADMIN — ARIPIPRAZOLE 15 MG: 15 TABLET ORAL at 09:22

## 2019-12-06 RX ADMIN — LINAGLIPTIN 5 MG: 5 TABLET, FILM COATED ORAL at 09:21

## 2019-12-06 RX ADMIN — INSULIN LISPRO 4 UNITS: 100 INJECTION, SOLUTION INTRAVENOUS; SUBCUTANEOUS at 09:22

## 2019-12-06 RX ADMIN — LOSARTAN POTASSIUM 50 MG: 50 TABLET, FILM COATED ORAL at 09:23

## 2019-12-06 RX ADMIN — ACETAMINOPHEN 650 MG: 325 TABLET, FILM COATED ORAL at 05:55

## 2019-12-06 RX ADMIN — SODIUM CHLORIDE 1000 ML: 9 INJECTION, SOLUTION INTRAVENOUS at 02:00

## 2019-12-06 RX ADMIN — TIMOLOL MALEATE 1 DROP: 5 SOLUTION OPHTHALMIC at 09:23

## 2019-12-06 RX ADMIN — ENOXAPARIN SODIUM 40 MG: 40 INJECTION SUBCUTANEOUS at 09:21

## 2019-12-06 RX ADMIN — METOPROLOL TARTRATE 50 MG: 50 TABLET, FILM COATED ORAL at 20:21

## 2019-12-06 ASSESSMENT — PAIN SCALES - GENERAL
PAINLEVEL_OUTOF10: 0

## 2019-12-07 LAB
ALBUMIN SERPL-MCNC: 2.9 G/DL (ref 3.5–5.2)
ALP BLD-CCNC: 63 U/L (ref 35–104)
ALT SERPL-CCNC: 17 U/L (ref 0–32)
ANION GAP SERPL CALCULATED.3IONS-SCNC: 11 MMOL/L (ref 7–16)
AST SERPL-CCNC: 27 U/L (ref 0–31)
BILIRUB SERPL-MCNC: 0.2 MG/DL (ref 0–1.2)
BUN BLDV-MCNC: 26 MG/DL (ref 8–23)
CALCIUM SERPL-MCNC: 8.5 MG/DL (ref 8.6–10.2)
CHLORIDE BLD-SCNC: 107 MMOL/L (ref 98–107)
CO2: 24 MMOL/L (ref 22–29)
CREAT SERPL-MCNC: 1.2 MG/DL (ref 0.5–1)
GFR AFRICAN AMERICAN: 55
GFR NON-AFRICAN AMERICAN: 55 ML/MIN/1.73
GLUCOSE BLD-MCNC: 242 MG/DL (ref 74–99)
METER GLUCOSE: 215 MG/DL (ref 74–99)
METER GLUCOSE: 218 MG/DL (ref 74–99)
METER GLUCOSE: 246 MG/DL (ref 74–99)
METER GLUCOSE: 257 MG/DL (ref 74–99)
POTASSIUM SERPL-SCNC: 4.8 MMOL/L (ref 3.5–5)
SODIUM BLD-SCNC: 142 MMOL/L (ref 132–146)
TOTAL PROTEIN: 6.4 G/DL (ref 6.4–8.3)

## 2019-12-07 PROCEDURE — 2580000003 HC RX 258: Performed by: INTERNAL MEDICINE

## 2019-12-07 PROCEDURE — 2060000000 HC ICU INTERMEDIATE R&B

## 2019-12-07 PROCEDURE — 82962 GLUCOSE BLOOD TEST: CPT

## 2019-12-07 PROCEDURE — 36415 COLL VENOUS BLD VENIPUNCTURE: CPT

## 2019-12-07 PROCEDURE — 6360000002 HC RX W HCPCS: Performed by: INTERNAL MEDICINE

## 2019-12-07 PROCEDURE — 80053 COMPREHEN METABOLIC PANEL: CPT

## 2019-12-07 PROCEDURE — 6370000000 HC RX 637 (ALT 250 FOR IP): Performed by: INTERNAL MEDICINE

## 2019-12-07 RX ADMIN — DIVALPROEX SODIUM 1000 MG: 500 TABLET, DELAYED RELEASE ORAL at 09:16

## 2019-12-07 RX ADMIN — METOPROLOL TARTRATE 50 MG: 50 TABLET, FILM COATED ORAL at 21:31

## 2019-12-07 RX ADMIN — ARIPIPRAZOLE 15 MG: 15 TABLET ORAL at 09:15

## 2019-12-07 RX ADMIN — SODIUM CHLORIDE: 9 INJECTION, SOLUTION INTRAVENOUS at 21:32

## 2019-12-07 RX ADMIN — QUETIAPINE FUMARATE 400 MG: 200 TABLET ORAL at 21:32

## 2019-12-07 RX ADMIN — METFORMIN HYDROCHLORIDE 1000 MG: 1000 TABLET ORAL at 17:11

## 2019-12-07 RX ADMIN — METFORMIN HYDROCHLORIDE 1000 MG: 1000 TABLET ORAL at 09:15

## 2019-12-07 RX ADMIN — LINAGLIPTIN 5 MG: 5 TABLET, FILM COATED ORAL at 09:15

## 2019-12-07 RX ADMIN — BRIMONIDINE TARTRATE 1 DROP: 2 SOLUTION/ DROPS OPHTHALMIC at 09:14

## 2019-12-07 RX ADMIN — SODIUM CHLORIDE, PRESERVATIVE FREE 10 ML: 5 INJECTION INTRAVENOUS at 21:32

## 2019-12-07 RX ADMIN — SODIUM CHLORIDE: 9 INJECTION, SOLUTION INTRAVENOUS at 01:13

## 2019-12-07 RX ADMIN — DIVALPROEX SODIUM 1000 MG: 500 TABLET, DELAYED RELEASE ORAL at 21:32

## 2019-12-07 RX ADMIN — METOPROLOL TARTRATE 50 MG: 50 TABLET, FILM COATED ORAL at 09:15

## 2019-12-07 RX ADMIN — INSULIN LISPRO 4 UNITS: 100 INJECTION, SOLUTION INTRAVENOUS; SUBCUTANEOUS at 17:13

## 2019-12-07 RX ADMIN — INSULIN LISPRO 6 UNITS: 100 INJECTION, SOLUTION INTRAVENOUS; SUBCUTANEOUS at 11:52

## 2019-12-07 RX ADMIN — ATORVASTATIN CALCIUM 40 MG: 40 TABLET, FILM COATED ORAL at 09:15

## 2019-12-07 RX ADMIN — LOSARTAN POTASSIUM 50 MG: 50 TABLET, FILM COATED ORAL at 09:15

## 2019-12-07 RX ADMIN — ACETAMINOPHEN 650 MG: 325 TABLET, FILM COATED ORAL at 14:44

## 2019-12-07 RX ADMIN — SODIUM CHLORIDE, PRESERVATIVE FREE 10 ML: 5 INJECTION INTRAVENOUS at 09:15

## 2019-12-07 RX ADMIN — INSULIN LISPRO 4 UNITS: 100 INJECTION, SOLUTION INTRAVENOUS; SUBCUTANEOUS at 09:27

## 2019-12-07 RX ADMIN — TIMOLOL MALEATE 1 DROP: 5 SOLUTION OPHTHALMIC at 21:31

## 2019-12-07 RX ADMIN — HYDROCHLOROTHIAZIDE 12.5 MG: 12.5 TABLET ORAL at 09:15

## 2019-12-07 RX ADMIN — CEFTRIAXONE SODIUM 1 G: 1 INJECTION, POWDER, FOR SOLUTION INTRAMUSCULAR; INTRAVENOUS at 02:59

## 2019-12-07 RX ADMIN — BRIMONIDINE TARTRATE 1 DROP: 2 SOLUTION/ DROPS OPHTHALMIC at 21:31

## 2019-12-07 RX ADMIN — TIMOLOL MALEATE 1 DROP: 5 SOLUTION OPHTHALMIC at 09:14

## 2019-12-07 RX ADMIN — INSULIN LISPRO 2 UNITS: 100 INJECTION, SOLUTION INTRAVENOUS; SUBCUTANEOUS at 21:40

## 2019-12-07 RX ADMIN — ENOXAPARIN SODIUM 40 MG: 40 INJECTION SUBCUTANEOUS at 09:15

## 2019-12-07 RX ADMIN — SODIUM CHLORIDE: 9 INJECTION, SOLUTION INTRAVENOUS at 10:52

## 2019-12-07 ASSESSMENT — PAIN DESCRIPTION - LOCATION: LOCATION: KNEE

## 2019-12-07 ASSESSMENT — PAIN SCALES - GENERAL
PAINLEVEL_OUTOF10: 10
PAINLEVEL_OUTOF10: 0

## 2019-12-07 ASSESSMENT — PAIN - FUNCTIONAL ASSESSMENT: PAIN_FUNCTIONAL_ASSESSMENT: PREVENTS OR INTERFERES SOME ACTIVE ACTIVITIES AND ADLS

## 2019-12-07 ASSESSMENT — PAIN DESCRIPTION - ONSET: ONSET: ON-GOING

## 2019-12-07 ASSESSMENT — PAIN DESCRIPTION - PAIN TYPE: TYPE: ACUTE PAIN

## 2019-12-07 ASSESSMENT — PAIN DESCRIPTION - PROGRESSION: CLINICAL_PROGRESSION: NOT CHANGED

## 2019-12-07 ASSESSMENT — PAIN DESCRIPTION - FREQUENCY: FREQUENCY: CONTINUOUS

## 2019-12-07 ASSESSMENT — PAIN DESCRIPTION - ORIENTATION: ORIENTATION: RIGHT;LEFT

## 2019-12-07 ASSESSMENT — PAIN DESCRIPTION - DESCRIPTORS: DESCRIPTORS: ACHING;THROBBING

## 2019-12-08 ENCOUNTER — APPOINTMENT (OUTPATIENT)
Dept: ULTRASOUND IMAGING | Age: 64
DRG: 351 | End: 2019-12-08
Payer: COMMERCIAL

## 2019-12-08 ENCOUNTER — APPOINTMENT (OUTPATIENT)
Dept: GENERAL RADIOLOGY | Age: 64
DRG: 351 | End: 2019-12-08
Payer: COMMERCIAL

## 2019-12-08 LAB
APTT: 32.2 SEC (ref 24.5–35.1)
APTT: 75.9 SEC (ref 24.5–35.1)
BASOPHILS ABSOLUTE: 0.01 E9/L (ref 0–0.2)
BASOPHILS RELATIVE PERCENT: 0.2 % (ref 0–2)
D DIMER: 1005 NG/ML DDU
EOSINOPHILS ABSOLUTE: 0.04 E9/L (ref 0.05–0.5)
EOSINOPHILS RELATIVE PERCENT: 0.6 % (ref 0–6)
HCT VFR BLD CALC: 33.4 % (ref 34–48)
HEMOGLOBIN: 10.3 G/DL (ref 11.5–15.5)
IMMATURE GRANULOCYTES #: 0.05 E9/L
IMMATURE GRANULOCYTES %: 0.8 % (ref 0–5)
LYMPHOCYTES ABSOLUTE: 1.31 E9/L (ref 1.5–4)
LYMPHOCYTES RELATIVE PERCENT: 21.2 % (ref 20–42)
MCH RBC QN AUTO: 29.6 PG (ref 26–35)
MCHC RBC AUTO-ENTMCNC: 30.8 % (ref 32–34.5)
MCV RBC AUTO: 96 FL (ref 80–99.9)
METER GLUCOSE: 217 MG/DL (ref 74–99)
METER GLUCOSE: 256 MG/DL (ref 74–99)
METER GLUCOSE: 277 MG/DL (ref 74–99)
METER GLUCOSE: 282 MG/DL (ref 74–99)
MONOCYTES ABSOLUTE: 0.83 E9/L (ref 0.1–0.95)
MONOCYTES RELATIVE PERCENT: 13.4 % (ref 2–12)
NEUTROPHILS ABSOLUTE: 3.94 E9/L (ref 1.8–7.3)
NEUTROPHILS RELATIVE PERCENT: 63.8 % (ref 43–80)
PDW BLD-RTO: 15.8 FL (ref 11.5–15)
PLATELET # BLD: 140 E9/L (ref 130–450)
PMV BLD AUTO: 9.8 FL (ref 7–12)
PRO-BNP: 367 PG/ML (ref 0–125)
RBC # BLD: 3.48 E12/L (ref 3.5–5.5)
WBC # BLD: 6.2 E9/L (ref 4.5–11.5)

## 2019-12-08 PROCEDURE — 2060000000 HC ICU INTERMEDIATE R&B

## 2019-12-08 PROCEDURE — 85025 COMPLETE CBC W/AUTO DIFF WBC: CPT

## 2019-12-08 PROCEDURE — 6370000000 HC RX 637 (ALT 250 FOR IP): Performed by: INTERNAL MEDICINE

## 2019-12-08 PROCEDURE — 93970 EXTREMITY STUDY: CPT

## 2019-12-08 PROCEDURE — 2500000003 HC RX 250 WO HCPCS: Performed by: INTERNAL MEDICINE

## 2019-12-08 PROCEDURE — 2580000003 HC RX 258: Performed by: INTERNAL MEDICINE

## 2019-12-08 PROCEDURE — 73565 X-RAY EXAM OF KNEES: CPT

## 2019-12-08 PROCEDURE — 36415 COLL VENOUS BLD VENIPUNCTURE: CPT

## 2019-12-08 PROCEDURE — 85378 FIBRIN DEGRADE SEMIQUANT: CPT

## 2019-12-08 PROCEDURE — 6360000002 HC RX W HCPCS: Performed by: INTERNAL MEDICINE

## 2019-12-08 PROCEDURE — 82962 GLUCOSE BLOOD TEST: CPT

## 2019-12-08 PROCEDURE — 83880 ASSAY OF NATRIURETIC PEPTIDE: CPT

## 2019-12-08 PROCEDURE — 85730 THROMBOPLASTIN TIME PARTIAL: CPT

## 2019-12-08 RX ORDER — ANALGESIC BALM 1.74; 4.06 G/29G; G/29G
OINTMENT TOPICAL ONCE
Status: COMPLETED | OUTPATIENT
Start: 2019-12-08 | End: 2019-12-08

## 2019-12-08 RX ORDER — HEPARIN SODIUM 1000 [USP'U]/ML
80 INJECTION, SOLUTION INTRAVENOUS; SUBCUTANEOUS PRN
Status: DISCONTINUED | OUTPATIENT
Start: 2019-12-08 | End: 2019-12-09

## 2019-12-08 RX ORDER — LABETALOL 200 MG/1
200 TABLET, FILM COATED ORAL EVERY 8 HOURS SCHEDULED
Status: DISCONTINUED | OUTPATIENT
Start: 2019-12-08 | End: 2019-12-13 | Stop reason: HOSPADM

## 2019-12-08 RX ORDER — HEPARIN SODIUM 1000 [USP'U]/ML
80 INJECTION, SOLUTION INTRAVENOUS; SUBCUTANEOUS ONCE
Status: COMPLETED | OUTPATIENT
Start: 2019-12-08 | End: 2019-12-08

## 2019-12-08 RX ORDER — HEPARIN SODIUM 1000 [USP'U]/ML
40 INJECTION, SOLUTION INTRAVENOUS; SUBCUTANEOUS PRN
Status: DISCONTINUED | OUTPATIENT
Start: 2019-12-08 | End: 2019-12-09

## 2019-12-08 RX ORDER — LABETALOL HYDROCHLORIDE 5 MG/ML
10 INJECTION, SOLUTION INTRAVENOUS EVERY 4 HOURS PRN
Status: DISCONTINUED | OUTPATIENT
Start: 2019-12-08 | End: 2019-12-13 | Stop reason: HOSPADM

## 2019-12-08 RX ORDER — HEPARIN SODIUM 10000 [USP'U]/100ML
18 INJECTION, SOLUTION INTRAVENOUS CONTINUOUS
Status: DISCONTINUED | OUTPATIENT
Start: 2019-12-08 | End: 2019-12-09

## 2019-12-08 RX ADMIN — ACETAMINOPHEN 650 MG: 325 TABLET, FILM COATED ORAL at 08:31

## 2019-12-08 RX ADMIN — ENOXAPARIN SODIUM 40 MG: 40 INJECTION SUBCUTANEOUS at 08:03

## 2019-12-08 RX ADMIN — METFORMIN HYDROCHLORIDE 1000 MG: 1000 TABLET ORAL at 17:48

## 2019-12-08 RX ADMIN — SODIUM CHLORIDE, PRESERVATIVE FREE 10 ML: 5 INJECTION INTRAVENOUS at 08:03

## 2019-12-08 RX ADMIN — HEPARIN SODIUM 18 UNITS/KG/HR: 10000 INJECTION, SOLUTION INTRAVENOUS at 15:22

## 2019-12-08 RX ADMIN — INSULIN LISPRO 4 UNITS: 100 INJECTION, SOLUTION INTRAVENOUS; SUBCUTANEOUS at 08:04

## 2019-12-08 RX ADMIN — TIMOLOL MALEATE 1 DROP: 5 SOLUTION OPHTHALMIC at 21:08

## 2019-12-08 RX ADMIN — HEPARIN SODIUM 6610 UNITS: 1000 INJECTION, SOLUTION INTRAVENOUS; SUBCUTANEOUS at 15:22

## 2019-12-08 RX ADMIN — INSULIN LISPRO 3 UNITS: 100 INJECTION, SOLUTION INTRAVENOUS; SUBCUTANEOUS at 21:12

## 2019-12-08 RX ADMIN — Medication 10 ML: at 02:53

## 2019-12-08 RX ADMIN — CEFTRIAXONE SODIUM 1 G: 1 INJECTION, POWDER, FOR SOLUTION INTRAMUSCULAR; INTRAVENOUS at 02:52

## 2019-12-08 RX ADMIN — METOPROLOL TARTRATE 50 MG: 50 TABLET, FILM COATED ORAL at 08:02

## 2019-12-08 RX ADMIN — BRIMONIDINE TARTRATE 1 DROP: 2 SOLUTION/ DROPS OPHTHALMIC at 21:08

## 2019-12-08 RX ADMIN — DIVALPROEX SODIUM 1000 MG: 500 TABLET, DELAYED RELEASE ORAL at 21:08

## 2019-12-08 RX ADMIN — MENTHOL AND METHYL SALICYLATE: 7.6; 29 OINTMENT TOPICAL at 12:28

## 2019-12-08 RX ADMIN — DIVALPROEX SODIUM 1000 MG: 500 TABLET, DELAYED RELEASE ORAL at 08:02

## 2019-12-08 RX ADMIN — LINAGLIPTIN 5 MG: 5 TABLET, FILM COATED ORAL at 08:02

## 2019-12-08 RX ADMIN — QUETIAPINE FUMARATE 400 MG: 200 TABLET ORAL at 21:08

## 2019-12-08 RX ADMIN — METFORMIN HYDROCHLORIDE 1000 MG: 1000 TABLET ORAL at 08:02

## 2019-12-08 RX ADMIN — LABETALOL HYDROCHLORIDE 200 MG: 200 TABLET, FILM COATED ORAL at 15:04

## 2019-12-08 RX ADMIN — ARIPIPRAZOLE 15 MG: 15 TABLET ORAL at 08:02

## 2019-12-08 RX ADMIN — LABETALOL HYDROCHLORIDE 10 MG: 5 INJECTION INTRAVENOUS at 16:11

## 2019-12-08 RX ADMIN — TIMOLOL MALEATE 1 DROP: 5 SOLUTION OPHTHALMIC at 08:03

## 2019-12-08 RX ADMIN — LABETALOL HYDROCHLORIDE 10 MG: 5 INJECTION INTRAVENOUS at 08:06

## 2019-12-08 RX ADMIN — BRIMONIDINE TARTRATE 1 DROP: 2 SOLUTION/ DROPS OPHTHALMIC at 08:03

## 2019-12-08 RX ADMIN — Medication 10 ML: at 16:11

## 2019-12-08 RX ADMIN — INSULIN LISPRO 6 UNITS: 100 INJECTION, SOLUTION INTRAVENOUS; SUBCUTANEOUS at 17:44

## 2019-12-08 RX ADMIN — HYDROCHLOROTHIAZIDE 12.5 MG: 12.5 TABLET ORAL at 08:02

## 2019-12-08 RX ADMIN — INSULIN LISPRO 6 UNITS: 100 INJECTION, SOLUTION INTRAVENOUS; SUBCUTANEOUS at 12:27

## 2019-12-08 RX ADMIN — LABETALOL HYDROCHLORIDE 200 MG: 200 TABLET, FILM COATED ORAL at 21:08

## 2019-12-08 RX ADMIN — LOSARTAN POTASSIUM 50 MG: 50 TABLET, FILM COATED ORAL at 08:02

## 2019-12-08 RX ADMIN — ATORVASTATIN CALCIUM 40 MG: 40 TABLET, FILM COATED ORAL at 08:02

## 2019-12-08 ASSESSMENT — PAIN DESCRIPTION - ORIENTATION
ORIENTATION: RIGHT;LEFT
ORIENTATION: RIGHT;LEFT

## 2019-12-08 ASSESSMENT — PAIN DESCRIPTION - PROGRESSION
CLINICAL_PROGRESSION: NOT CHANGED
CLINICAL_PROGRESSION: NOT CHANGED

## 2019-12-08 ASSESSMENT — PAIN DESCRIPTION - PAIN TYPE
TYPE: ACUTE PAIN
TYPE: ACUTE PAIN

## 2019-12-08 ASSESSMENT — PAIN DESCRIPTION - ONSET
ONSET: ON-GOING
ONSET: ON-GOING

## 2019-12-08 ASSESSMENT — PAIN SCALES - GENERAL
PAINLEVEL_OUTOF10: 0
PAINLEVEL_OUTOF10: 10
PAINLEVEL_OUTOF10: 0
PAINLEVEL_OUTOF10: 10
PAINLEVEL_OUTOF10: 0

## 2019-12-08 ASSESSMENT — PAIN DESCRIPTION - DESCRIPTORS: DESCRIPTORS: ACHING;DISCOMFORT;PINS AND NEEDLES

## 2019-12-08 ASSESSMENT — PAIN DESCRIPTION - FREQUENCY
FREQUENCY: CONTINUOUS
FREQUENCY: CONTINUOUS

## 2019-12-08 ASSESSMENT — PAIN DESCRIPTION - LOCATION
LOCATION: KNEE
LOCATION: KNEE

## 2019-12-08 ASSESSMENT — PAIN - FUNCTIONAL ASSESSMENT
PAIN_FUNCTIONAL_ASSESSMENT: PREVENTS OR INTERFERES SOME ACTIVE ACTIVITIES AND ADLS
PAIN_FUNCTIONAL_ASSESSMENT: PREVENTS OR INTERFERES SOME ACTIVE ACTIVITIES AND ADLS

## 2019-12-09 ENCOUNTER — APPOINTMENT (OUTPATIENT)
Dept: CT IMAGING | Age: 64
DRG: 351 | End: 2019-12-09
Payer: COMMERCIAL

## 2019-12-09 LAB
ALBUMIN SERPL-MCNC: 2.9 G/DL (ref 3.5–5.2)
ALP BLD-CCNC: 54 U/L (ref 35–104)
ALT SERPL-CCNC: 24 U/L (ref 0–32)
ANION GAP SERPL CALCULATED.3IONS-SCNC: 17 MMOL/L (ref 7–16)
AST SERPL-CCNC: 28 U/L (ref 0–31)
BASOPHILS ABSOLUTE: 0.01 E9/L (ref 0–0.2)
BASOPHILS RELATIVE PERCENT: 0.2 % (ref 0–2)
BILIRUB SERPL-MCNC: <0.2 MG/DL (ref 0–1.2)
BUN BLDV-MCNC: 16 MG/DL (ref 8–23)
CALCIUM SERPL-MCNC: 8.7 MG/DL (ref 8.6–10.2)
CHLORIDE BLD-SCNC: 102 MMOL/L (ref 98–107)
CO2: 21 MMOL/L (ref 22–29)
CREAT SERPL-MCNC: 0.9 MG/DL (ref 0.5–1)
EOSINOPHILS ABSOLUTE: 0.03 E9/L (ref 0.05–0.5)
EOSINOPHILS RELATIVE PERCENT: 0.5 % (ref 0–6)
GFR AFRICAN AMERICAN: >60
GFR NON-AFRICAN AMERICAN: >60 ML/MIN/1.73
GLUCOSE BLD-MCNC: 257 MG/DL (ref 74–99)
HCT VFR BLD CALC: 30.9 % (ref 34–48)
HEMOGLOBIN: 9.6 G/DL (ref 11.5–15.5)
IMMATURE GRANULOCYTES #: 0.14 E9/L
IMMATURE GRANULOCYTES %: 2.2 % (ref 0–5)
LYMPHOCYTES ABSOLUTE: 1.51 E9/L (ref 1.5–4)
LYMPHOCYTES RELATIVE PERCENT: 24 % (ref 20–42)
MCH RBC QN AUTO: 29.4 PG (ref 26–35)
MCHC RBC AUTO-ENTMCNC: 31.1 % (ref 32–34.5)
MCV RBC AUTO: 94.5 FL (ref 80–99.9)
METER GLUCOSE: 255 MG/DL (ref 74–99)
METER GLUCOSE: 307 MG/DL (ref 74–99)
METER GLUCOSE: 337 MG/DL (ref 74–99)
METER GLUCOSE: 362 MG/DL (ref 74–99)
MONOCYTES ABSOLUTE: 0.97 E9/L (ref 0.1–0.95)
MONOCYTES RELATIVE PERCENT: 15.4 % (ref 2–12)
NEUTROPHILS ABSOLUTE: 3.62 E9/L (ref 1.8–7.3)
NEUTROPHILS RELATIVE PERCENT: 57.7 % (ref 43–80)
PDW BLD-RTO: 15.8 FL (ref 11.5–15)
PLATELET # BLD: 152 E9/L (ref 130–450)
PMV BLD AUTO: 9.7 FL (ref 7–12)
POTASSIUM SERPL-SCNC: 4.1 MMOL/L (ref 3.5–5)
RBC # BLD: 3.27 E12/L (ref 3.5–5.5)
SODIUM BLD-SCNC: 140 MMOL/L (ref 132–146)
TOTAL PROTEIN: 6.7 G/DL (ref 6.4–8.3)
WBC # BLD: 6.3 E9/L (ref 4.5–11.5)

## 2019-12-09 PROCEDURE — 80053 COMPREHEN METABOLIC PANEL: CPT

## 2019-12-09 PROCEDURE — 2580000003 HC RX 258: Performed by: INTERNAL MEDICINE

## 2019-12-09 PROCEDURE — 82962 GLUCOSE BLOOD TEST: CPT

## 2019-12-09 PROCEDURE — 6360000002 HC RX W HCPCS: Performed by: INTERNAL MEDICINE

## 2019-12-09 PROCEDURE — 71275 CT ANGIOGRAPHY CHEST: CPT

## 2019-12-09 PROCEDURE — 2060000000 HC ICU INTERMEDIATE R&B

## 2019-12-09 PROCEDURE — 36415 COLL VENOUS BLD VENIPUNCTURE: CPT

## 2019-12-09 PROCEDURE — 85025 COMPLETE CBC W/AUTO DIFF WBC: CPT

## 2019-12-09 PROCEDURE — 6360000004 HC RX CONTRAST MEDICATION: Performed by: RADIOLOGY

## 2019-12-09 PROCEDURE — 6370000000 HC RX 637 (ALT 250 FOR IP): Performed by: INTERNAL MEDICINE

## 2019-12-09 RX ADMIN — ATORVASTATIN CALCIUM 40 MG: 40 TABLET, FILM COATED ORAL at 10:34

## 2019-12-09 RX ADMIN — LABETALOL HYDROCHLORIDE 200 MG: 200 TABLET, FILM COATED ORAL at 20:51

## 2019-12-09 RX ADMIN — DIVALPROEX SODIUM 1000 MG: 500 TABLET, DELAYED RELEASE ORAL at 20:51

## 2019-12-09 RX ADMIN — HYDROCHLOROTHIAZIDE 12.5 MG: 12.5 TABLET ORAL at 10:34

## 2019-12-09 RX ADMIN — INSULIN LISPRO 6 UNITS: 100 INJECTION, SOLUTION INTRAVENOUS; SUBCUTANEOUS at 17:47

## 2019-12-09 RX ADMIN — TIMOLOL MALEATE 1 DROP: 5 SOLUTION OPHTHALMIC at 10:34

## 2019-12-09 RX ADMIN — HEPARIN SODIUM 18 UNITS/KG/HR: 10000 INJECTION, SOLUTION INTRAVENOUS at 09:29

## 2019-12-09 RX ADMIN — CEFTRIAXONE SODIUM 1 G: 1 INJECTION, POWDER, FOR SOLUTION INTRAMUSCULAR; INTRAVENOUS at 03:12

## 2019-12-09 RX ADMIN — Medication 10 ML: at 03:14

## 2019-12-09 RX ADMIN — LINAGLIPTIN 5 MG: 5 TABLET, FILM COATED ORAL at 10:34

## 2019-12-09 RX ADMIN — SODIUM CHLORIDE, PRESERVATIVE FREE 10 ML: 5 INJECTION INTRAVENOUS at 20:51

## 2019-12-09 RX ADMIN — QUETIAPINE FUMARATE 400 MG: 200 TABLET ORAL at 20:51

## 2019-12-09 RX ADMIN — INSULIN LISPRO 4 UNITS: 100 INJECTION, SOLUTION INTRAVENOUS; SUBCUTANEOUS at 20:51

## 2019-12-09 RX ADMIN — ARIPIPRAZOLE 15 MG: 15 TABLET ORAL at 10:34

## 2019-12-09 RX ADMIN — LABETALOL HYDROCHLORIDE 200 MG: 200 TABLET, FILM COATED ORAL at 14:33

## 2019-12-09 RX ADMIN — LABETALOL HYDROCHLORIDE 200 MG: 200 TABLET, FILM COATED ORAL at 05:49

## 2019-12-09 RX ADMIN — BRIMONIDINE TARTRATE 1 DROP: 2 SOLUTION/ DROPS OPHTHALMIC at 10:34

## 2019-12-09 RX ADMIN — LOSARTAN POTASSIUM 50 MG: 50 TABLET, FILM COATED ORAL at 10:34

## 2019-12-09 RX ADMIN — ENOXAPARIN SODIUM 40 MG: 40 INJECTION SUBCUTANEOUS at 14:33

## 2019-12-09 RX ADMIN — TIMOLOL MALEATE 1 DROP: 5 SOLUTION OPHTHALMIC at 20:51

## 2019-12-09 RX ADMIN — INSULIN LISPRO 8 UNITS: 100 INJECTION, SOLUTION INTRAVENOUS; SUBCUTANEOUS at 09:29

## 2019-12-09 RX ADMIN — DIVALPROEX SODIUM 1000 MG: 500 TABLET, DELAYED RELEASE ORAL at 10:34

## 2019-12-09 RX ADMIN — IOPAMIDOL 60 ML: 755 INJECTION, SOLUTION INTRAVENOUS at 07:29

## 2019-12-09 RX ADMIN — INSULIN LISPRO 10 UNITS: 100 INJECTION, SOLUTION INTRAVENOUS; SUBCUTANEOUS at 12:46

## 2019-12-09 RX ADMIN — BRIMONIDINE TARTRATE 1 DROP: 2 SOLUTION/ DROPS OPHTHALMIC at 20:51

## 2019-12-09 RX ADMIN — Medication 10 ML: at 07:29

## 2019-12-09 ASSESSMENT — PAIN SCALES - GENERAL
PAINLEVEL_OUTOF10: 0
PAINLEVEL_OUTOF10: 0
PAINLEVEL_OUTOF10: 5
PAINLEVEL_OUTOF10: 5
PAINLEVEL_OUTOF10: 6

## 2019-12-09 ASSESSMENT — PAIN DESCRIPTION - DESCRIPTORS
DESCRIPTORS: ACHING;CONSTANT;DISCOMFORT;SORE

## 2019-12-09 ASSESSMENT — PAIN DESCRIPTION - PROGRESSION
CLINICAL_PROGRESSION: NOT CHANGED

## 2019-12-09 ASSESSMENT — PAIN - FUNCTIONAL ASSESSMENT
PAIN_FUNCTIONAL_ASSESSMENT: PREVENTS OR INTERFERES SOME ACTIVE ACTIVITIES AND ADLS

## 2019-12-09 ASSESSMENT — PAIN DESCRIPTION - ONSET
ONSET: ON-GOING

## 2019-12-09 ASSESSMENT — PAIN DESCRIPTION - PAIN TYPE
TYPE: ACUTE PAIN

## 2019-12-09 ASSESSMENT — PAIN DESCRIPTION - LOCATION
LOCATION: KNEE

## 2019-12-09 ASSESSMENT — PAIN DESCRIPTION - FREQUENCY
FREQUENCY: CONTINUOUS

## 2019-12-09 ASSESSMENT — PAIN DESCRIPTION - ORIENTATION
ORIENTATION: RIGHT;LEFT

## 2019-12-10 LAB
ALBUMIN SERPL-MCNC: 2.8 G/DL (ref 3.5–5.2)
ALP BLD-CCNC: 68 U/L (ref 35–104)
ALT SERPL-CCNC: 27 U/L (ref 0–32)
ANION GAP SERPL CALCULATED.3IONS-SCNC: 13 MMOL/L (ref 7–16)
AST SERPL-CCNC: 30 U/L (ref 0–31)
BASOPHILS ABSOLUTE: 0.02 E9/L (ref 0–0.2)
BASOPHILS RELATIVE PERCENT: 0.3 % (ref 0–2)
BILIRUB SERPL-MCNC: <0.2 MG/DL (ref 0–1.2)
BUN BLDV-MCNC: 16 MG/DL (ref 8–23)
CALCIUM SERPL-MCNC: 8.8 MG/DL (ref 8.6–10.2)
CHLORIDE BLD-SCNC: 100 MMOL/L (ref 98–107)
CO2: 25 MMOL/L (ref 22–29)
CREAT SERPL-MCNC: 1.1 MG/DL (ref 0.5–1)
EOSINOPHILS ABSOLUTE: 0.04 E9/L (ref 0.05–0.5)
EOSINOPHILS RELATIVE PERCENT: 0.6 % (ref 0–6)
GFR AFRICAN AMERICAN: >60
GFR NON-AFRICAN AMERICAN: >60 ML/MIN/1.73
GLUCOSE BLD-MCNC: 350 MG/DL (ref 74–99)
HCT VFR BLD CALC: 28 % (ref 34–48)
HEMOGLOBIN: 8.7 G/DL (ref 11.5–15.5)
IMMATURE GRANULOCYTES #: 0.27 E9/L
IMMATURE GRANULOCYTES %: 3.9 % (ref 0–5)
LV EF: 67 %
LVEF MODALITY: NORMAL
LYMPHOCYTES ABSOLUTE: 1.68 E9/L (ref 1.5–4)
LYMPHOCYTES RELATIVE PERCENT: 24.4 % (ref 20–42)
MCH RBC QN AUTO: 29 PG (ref 26–35)
MCHC RBC AUTO-ENTMCNC: 31.1 % (ref 32–34.5)
MCV RBC AUTO: 93.3 FL (ref 80–99.9)
METER GLUCOSE: 373 MG/DL (ref 74–99)
METER GLUCOSE: 384 MG/DL (ref 74–99)
METER GLUCOSE: 387 MG/DL (ref 74–99)
METER GLUCOSE: 431 MG/DL (ref 74–99)
MONOCYTES ABSOLUTE: 1.29 E9/L (ref 0.1–0.95)
MONOCYTES RELATIVE PERCENT: 18.7 % (ref 2–12)
NEUTROPHILS ABSOLUTE: 3.59 E9/L (ref 1.8–7.3)
NEUTROPHILS RELATIVE PERCENT: 52.1 % (ref 43–80)
PDW BLD-RTO: 16 FL (ref 11.5–15)
PLATELET # BLD: 165 E9/L (ref 130–450)
PMV BLD AUTO: 9.3 FL (ref 7–12)
POTASSIUM SERPL-SCNC: 4.5 MMOL/L (ref 3.5–5)
RBC # BLD: 3 E12/L (ref 3.5–5.5)
SODIUM BLD-SCNC: 138 MMOL/L (ref 132–146)
TOTAL PROTEIN: 6.4 G/DL (ref 6.4–8.3)
WBC # BLD: 6.9 E9/L (ref 4.5–11.5)

## 2019-12-10 PROCEDURE — 6360000002 HC RX W HCPCS: Performed by: INTERNAL MEDICINE

## 2019-12-10 PROCEDURE — 80053 COMPREHEN METABOLIC PANEL: CPT

## 2019-12-10 PROCEDURE — 93306 TTE W/DOPPLER COMPLETE: CPT

## 2019-12-10 PROCEDURE — 6370000000 HC RX 637 (ALT 250 FOR IP): Performed by: INTERNAL MEDICINE

## 2019-12-10 PROCEDURE — 2580000003 HC RX 258: Performed by: INTERNAL MEDICINE

## 2019-12-10 PROCEDURE — 85025 COMPLETE CBC W/AUTO DIFF WBC: CPT

## 2019-12-10 PROCEDURE — 2060000000 HC ICU INTERMEDIATE R&B

## 2019-12-10 PROCEDURE — 97166 OT EVAL MOD COMPLEX 45 MIN: CPT

## 2019-12-10 PROCEDURE — 97162 PT EVAL MOD COMPLEX 30 MIN: CPT

## 2019-12-10 PROCEDURE — 97535 SELF CARE MNGMENT TRAINING: CPT

## 2019-12-10 PROCEDURE — 97530 THERAPEUTIC ACTIVITIES: CPT

## 2019-12-10 PROCEDURE — 36415 COLL VENOUS BLD VENIPUNCTURE: CPT

## 2019-12-10 PROCEDURE — 82962 GLUCOSE BLOOD TEST: CPT

## 2019-12-10 RX ORDER — CEPHALEXIN 500 MG/1
500 CAPSULE ORAL 3 TIMES DAILY
Qty: 21 CAPSULE | Refills: 0 | Status: SHIPPED | OUTPATIENT
Start: 2019-12-10 | End: 2019-12-13 | Stop reason: HOSPADM

## 2019-12-10 RX ORDER — LABETALOL 200 MG/1
200 TABLET, FILM COATED ORAL EVERY 8 HOURS SCHEDULED
Qty: 90 TABLET | Refills: 3 | Status: SHIPPED | OUTPATIENT
Start: 2019-12-10 | End: 2020-02-11 | Stop reason: SDUPTHER

## 2019-12-10 RX ORDER — DIVALPROEX SODIUM 500 MG/1
1000 TABLET, DELAYED RELEASE ORAL 2 TIMES DAILY
Qty: 90 TABLET | Refills: 3 | Status: SHIPPED | OUTPATIENT
Start: 2019-12-10 | End: 2020-02-11 | Stop reason: SDUPTHER

## 2019-12-10 RX ORDER — ATORVASTATIN CALCIUM 40 MG/1
40 TABLET, FILM COATED ORAL DAILY
Qty: 30 TABLET | Refills: 0 | Status: SHIPPED
Start: 2019-12-10 | End: 2020-03-10 | Stop reason: SDUPTHER

## 2019-12-10 RX ORDER — ARIPIPRAZOLE 30 MG/1
15 TABLET ORAL DAILY
Qty: 30 TABLET | Refills: 3 | Status: ON HOLD
Start: 2019-12-10 | End: 2020-03-31 | Stop reason: HOSPADM

## 2019-12-10 RX ADMIN — LINAGLIPTIN 5 MG: 5 TABLET, FILM COATED ORAL at 08:29

## 2019-12-10 RX ADMIN — BRIMONIDINE TARTRATE 1 DROP: 2 SOLUTION/ DROPS OPHTHALMIC at 08:29

## 2019-12-10 RX ADMIN — SODIUM CHLORIDE, PRESERVATIVE FREE 10 ML: 5 INJECTION INTRAVENOUS at 08:30

## 2019-12-10 RX ADMIN — DIVALPROEX SODIUM 1000 MG: 500 TABLET, DELAYED RELEASE ORAL at 21:48

## 2019-12-10 RX ADMIN — INSULIN LISPRO 10 UNITS: 100 INJECTION, SOLUTION INTRAVENOUS; SUBCUTANEOUS at 16:42

## 2019-12-10 RX ADMIN — LABETALOL HYDROCHLORIDE 200 MG: 200 TABLET, FILM COATED ORAL at 05:38

## 2019-12-10 RX ADMIN — Medication 10 ML: at 03:01

## 2019-12-10 RX ADMIN — INSULIN LISPRO 5 UNITS: 100 INJECTION, SOLUTION INTRAVENOUS; SUBCUTANEOUS at 21:49

## 2019-12-10 RX ADMIN — QUETIAPINE FUMARATE 400 MG: 200 TABLET ORAL at 21:48

## 2019-12-10 RX ADMIN — LOSARTAN POTASSIUM 50 MG: 50 TABLET, FILM COATED ORAL at 08:29

## 2019-12-10 RX ADMIN — TIMOLOL MALEATE 1 DROP: 5 SOLUTION OPHTHALMIC at 21:48

## 2019-12-10 RX ADMIN — HYDROCHLOROTHIAZIDE 12.5 MG: 12.5 TABLET ORAL at 08:29

## 2019-12-10 RX ADMIN — INSULIN LISPRO 12 UNITS: 100 INJECTION, SOLUTION INTRAVENOUS; SUBCUTANEOUS at 11:57

## 2019-12-10 RX ADMIN — LABETALOL HYDROCHLORIDE 200 MG: 200 TABLET, FILM COATED ORAL at 16:40

## 2019-12-10 RX ADMIN — INSULIN LISPRO 10 UNITS: 100 INJECTION, SOLUTION INTRAVENOUS; SUBCUTANEOUS at 08:55

## 2019-12-10 RX ADMIN — TIMOLOL MALEATE 1 DROP: 5 SOLUTION OPHTHALMIC at 08:29

## 2019-12-10 RX ADMIN — ENOXAPARIN SODIUM 40 MG: 40 INJECTION SUBCUTANEOUS at 08:28

## 2019-12-10 RX ADMIN — ATORVASTATIN CALCIUM 40 MG: 40 TABLET, FILM COATED ORAL at 08:29

## 2019-12-10 RX ADMIN — BRIMONIDINE TARTRATE 1 DROP: 2 SOLUTION/ DROPS OPHTHALMIC at 21:48

## 2019-12-10 RX ADMIN — SODIUM CHLORIDE, PRESERVATIVE FREE 10 ML: 5 INJECTION INTRAVENOUS at 21:49

## 2019-12-10 RX ADMIN — CEFTRIAXONE SODIUM 1 G: 1 INJECTION, POWDER, FOR SOLUTION INTRAMUSCULAR; INTRAVENOUS at 03:01

## 2019-12-10 RX ADMIN — DIVALPROEX SODIUM 1000 MG: 500 TABLET, DELAYED RELEASE ORAL at 08:29

## 2019-12-10 RX ADMIN — ARIPIPRAZOLE 15 MG: 15 TABLET ORAL at 08:30

## 2019-12-10 RX ADMIN — LABETALOL HYDROCHLORIDE 200 MG: 200 TABLET, FILM COATED ORAL at 21:48

## 2019-12-10 ASSESSMENT — PAIN SCALES - GENERAL
PAINLEVEL_OUTOF10: 0

## 2019-12-11 LAB
ALBUMIN SERPL-MCNC: 2.7 G/DL (ref 3.5–5.2)
ALP BLD-CCNC: 69 U/L (ref 35–104)
ALT SERPL-CCNC: 28 U/L (ref 0–32)
ANION GAP SERPL CALCULATED.3IONS-SCNC: 14 MMOL/L (ref 7–16)
AST SERPL-CCNC: 31 U/L (ref 0–31)
BASOPHILS ABSOLUTE: 0 E9/L (ref 0–0.2)
BASOPHILS RELATIVE PERCENT: 0.4 % (ref 0–2)
BILIRUB SERPL-MCNC: <0.2 MG/DL (ref 0–1.2)
BLOOD CULTURE, ROUTINE: NORMAL
BUN BLDV-MCNC: 18 MG/DL (ref 8–23)
BURR CELLS: ABNORMAL
CALCIUM SERPL-MCNC: 9 MG/DL (ref 8.6–10.2)
CHLORIDE BLD-SCNC: 99 MMOL/L (ref 98–107)
CO2: 25 MMOL/L (ref 22–29)
CREAT SERPL-MCNC: 1 MG/DL (ref 0.5–1)
CULTURE, BLOOD 2: NORMAL
EOSINOPHILS ABSOLUTE: 0 E9/L (ref 0.05–0.5)
EOSINOPHILS RELATIVE PERCENT: 0.2 % (ref 0–6)
GFR AFRICAN AMERICAN: >60
GFR NON-AFRICAN AMERICAN: >60 ML/MIN/1.73
GLUCOSE BLD-MCNC: 381 MG/DL (ref 74–99)
HCT VFR BLD CALC: 30.8 % (ref 34–48)
HEMOGLOBIN: 9.6 G/DL (ref 11.5–15.5)
LYMPHOCYTES ABSOLUTE: 1.68 E9/L (ref 1.5–4)
LYMPHOCYTES RELATIVE PERCENT: 20 % (ref 20–42)
MCH RBC QN AUTO: 29.5 PG (ref 26–35)
MCHC RBC AUTO-ENTMCNC: 31.2 % (ref 32–34.5)
MCV RBC AUTO: 94.8 FL (ref 80–99.9)
METER GLUCOSE: 241 MG/DL (ref 74–99)
METER GLUCOSE: 323 MG/DL (ref 74–99)
METER GLUCOSE: 352 MG/DL (ref 74–99)
METER GLUCOSE: 381 MG/DL (ref 74–99)
MONOCYTES ABSOLUTE: 1.43 E9/L (ref 0.1–0.95)
MONOCYTES RELATIVE PERCENT: 17.4 % (ref 2–12)
MYELOCYTE PERCENT: 2.6 % (ref 0–0)
NEUTROPHILS ABSOLUTE: 5.29 E9/L (ref 1.8–7.3)
NEUTROPHILS RELATIVE PERCENT: 60 % (ref 43–80)
OVALOCYTES: ABNORMAL
PDW BLD-RTO: 16.5 FL (ref 11.5–15)
PLATELET # BLD: 208 E9/L (ref 130–450)
PMV BLD AUTO: 9.3 FL (ref 7–12)
POIKILOCYTES: ABNORMAL
POLYCHROMASIA: ABNORMAL
POTASSIUM SERPL-SCNC: 4.3 MMOL/L (ref 3.5–5)
RBC # BLD: 3.25 E12/L (ref 3.5–5.5)
SODIUM BLD-SCNC: 138 MMOL/L (ref 132–146)
TOTAL PROTEIN: 7.1 G/DL (ref 6.4–8.3)
WBC # BLD: 8.4 E9/L (ref 4.5–11.5)

## 2019-12-11 PROCEDURE — 82962 GLUCOSE BLOOD TEST: CPT

## 2019-12-11 PROCEDURE — 6360000002 HC RX W HCPCS: Performed by: INTERNAL MEDICINE

## 2019-12-11 PROCEDURE — 6370000000 HC RX 637 (ALT 250 FOR IP): Performed by: INTERNAL MEDICINE

## 2019-12-11 PROCEDURE — 97110 THERAPEUTIC EXERCISES: CPT

## 2019-12-11 PROCEDURE — 97530 THERAPEUTIC ACTIVITIES: CPT

## 2019-12-11 PROCEDURE — 97535 SELF CARE MNGMENT TRAINING: CPT

## 2019-12-11 PROCEDURE — 2580000003 HC RX 258: Performed by: INTERNAL MEDICINE

## 2019-12-11 PROCEDURE — 85025 COMPLETE CBC W/AUTO DIFF WBC: CPT

## 2019-12-11 PROCEDURE — 36415 COLL VENOUS BLD VENIPUNCTURE: CPT

## 2019-12-11 PROCEDURE — 2060000000 HC ICU INTERMEDIATE R&B

## 2019-12-11 PROCEDURE — 80053 COMPREHEN METABOLIC PANEL: CPT

## 2019-12-11 RX ORDER — FUROSEMIDE 10 MG/ML
20 INJECTION INTRAMUSCULAR; INTRAVENOUS ONCE
Status: COMPLETED | OUTPATIENT
Start: 2019-12-11 | End: 2019-12-11

## 2019-12-11 RX ADMIN — LABETALOL HYDROCHLORIDE 200 MG: 200 TABLET, FILM COATED ORAL at 13:36

## 2019-12-11 RX ADMIN — ENOXAPARIN SODIUM 40 MG: 40 INJECTION SUBCUTANEOUS at 09:18

## 2019-12-11 RX ADMIN — ATORVASTATIN CALCIUM 40 MG: 40 TABLET, FILM COATED ORAL at 09:33

## 2019-12-11 RX ADMIN — INSULIN LISPRO 8 UNITS: 100 INJECTION, SOLUTION INTRAVENOUS; SUBCUTANEOUS at 17:24

## 2019-12-11 RX ADMIN — BRIMONIDINE TARTRATE 1 DROP: 2 SOLUTION/ DROPS OPHTHALMIC at 09:19

## 2019-12-11 RX ADMIN — CEFTRIAXONE SODIUM 1 G: 1 INJECTION, POWDER, FOR SOLUTION INTRAMUSCULAR; INTRAVENOUS at 05:41

## 2019-12-11 RX ADMIN — ARIPIPRAZOLE 15 MG: 15 TABLET ORAL at 09:18

## 2019-12-11 RX ADMIN — SODIUM CHLORIDE, PRESERVATIVE FREE 10 ML: 5 INJECTION INTRAVENOUS at 09:18

## 2019-12-11 RX ADMIN — HYDROCHLOROTHIAZIDE 12.5 MG: 12.5 TABLET ORAL at 09:17

## 2019-12-11 RX ADMIN — LINAGLIPTIN 5 MG: 5 TABLET, FILM COATED ORAL at 09:17

## 2019-12-11 RX ADMIN — ACETAMINOPHEN 650 MG: 325 TABLET, FILM COATED ORAL at 13:36

## 2019-12-11 RX ADMIN — DIVALPROEX SODIUM 1000 MG: 500 TABLET, DELAYED RELEASE ORAL at 09:17

## 2019-12-11 RX ADMIN — LOSARTAN POTASSIUM 50 MG: 50 TABLET, FILM COATED ORAL at 09:17

## 2019-12-11 RX ADMIN — INSULIN LISPRO 10 UNITS: 100 INJECTION, SOLUTION INTRAVENOUS; SUBCUTANEOUS at 11:55

## 2019-12-11 RX ADMIN — METFORMIN HYDROCHLORIDE 1000 MG: 1000 TABLET ORAL at 17:24

## 2019-12-11 RX ADMIN — METFORMIN HYDROCHLORIDE 1000 MG: 1000 TABLET ORAL at 09:17

## 2019-12-11 RX ADMIN — FUROSEMIDE 20 MG: 10 INJECTION, SOLUTION INTRAMUSCULAR; INTRAVENOUS at 09:33

## 2019-12-11 RX ADMIN — TIMOLOL MALEATE 1 DROP: 5 SOLUTION OPHTHALMIC at 09:19

## 2019-12-11 RX ADMIN — INSULIN LISPRO 10 UNITS: 100 INJECTION, SOLUTION INTRAVENOUS; SUBCUTANEOUS at 06:57

## 2019-12-11 RX ADMIN — LABETALOL HYDROCHLORIDE 200 MG: 200 TABLET, FILM COATED ORAL at 05:41

## 2019-12-11 ASSESSMENT — PAIN SCALES - GENERAL
PAINLEVEL_OUTOF10: 5
PAINLEVEL_OUTOF10: 0
PAINLEVEL_OUTOF10: 5
PAINLEVEL_OUTOF10: 0

## 2019-12-11 ASSESSMENT — PAIN DESCRIPTION - ORIENTATION: ORIENTATION: RIGHT;LEFT

## 2019-12-11 ASSESSMENT — PAIN DESCRIPTION - PAIN TYPE: TYPE: CHRONIC PAIN

## 2019-12-11 ASSESSMENT — PAIN DESCRIPTION - PROGRESSION: CLINICAL_PROGRESSION: NOT CHANGED

## 2019-12-11 ASSESSMENT — PAIN DESCRIPTION - LOCATION: LOCATION: KNEE

## 2019-12-12 LAB
ALBUMIN SERPL-MCNC: 2.5 G/DL (ref 3.5–5.2)
ALP BLD-CCNC: 57 U/L (ref 35–104)
ALT SERPL-CCNC: 25 U/L (ref 0–32)
ANION GAP SERPL CALCULATED.3IONS-SCNC: 13 MMOL/L (ref 7–16)
APPEARANCE FLUID: NORMAL
APPEARANCE FLUID: NORMAL
AST SERPL-CCNC: 23 U/L (ref 0–31)
BASOPHILS ABSOLUTE: 0 E9/L (ref 0–0.2)
BASOPHILS RELATIVE PERCENT: 0.2 % (ref 0–2)
BILIRUB SERPL-MCNC: <0.2 MG/DL (ref 0–1.2)
BUN BLDV-MCNC: 24 MG/DL (ref 8–23)
C-REACTIVE PROTEIN: 28.6 MG/DL (ref 0–0.4)
CALCIUM SERPL-MCNC: 8.8 MG/DL (ref 8.6–10.2)
CELL COUNT FLUID TYPE: NORMAL
CELL COUNT FLUID TYPE: NORMAL
CHLORIDE BLD-SCNC: 95 MMOL/L (ref 98–107)
CO2: 27 MMOL/L (ref 22–29)
COLOR FLUID: NORMAL
COLOR FLUID: YELLOW
CREAT SERPL-MCNC: 1.3 MG/DL (ref 0.5–1)
CRYSTALS, FLUID: NORMAL
CRYSTALS, FLUID: NORMAL
EOSINOPHILS ABSOLUTE: 0 E9/L (ref 0.05–0.5)
EOSINOPHILS RELATIVE PERCENT: 0.2 % (ref 0–6)
GFR AFRICAN AMERICAN: 50
GFR NON-AFRICAN AMERICAN: 50 ML/MIN/1.73
GLUCOSE BLD-MCNC: 228 MG/DL (ref 74–99)
HCT VFR BLD CALC: 28.2 % (ref 34–48)
HEMOGLOBIN: 8.6 G/DL (ref 11.5–15.5)
LYMPHOCYTES ABSOLUTE: 2.53 E9/L (ref 1.5–4)
LYMPHOCYTES RELATIVE PERCENT: 25.2 % (ref 20–42)
MCH RBC QN AUTO: 28.5 PG (ref 26–35)
MCHC RBC AUTO-ENTMCNC: 30.5 % (ref 32–34.5)
MCV RBC AUTO: 93.4 FL (ref 80–99.9)
METER GLUCOSE: 217 MG/DL (ref 74–99)
METER GLUCOSE: 293 MG/DL (ref 74–99)
METER GLUCOSE: 319 MG/DL (ref 74–99)
METER GLUCOSE: 383 MG/DL (ref 74–99)
MONOCYTE, FLUID: 4 %
MONOCYTE, FLUID: 5 %
MONOCYTES ABSOLUTE: 1.62 E9/L (ref 0.1–0.95)
MONOCYTES RELATIVE PERCENT: 15.7 % (ref 2–12)
MYELOCYTE PERCENT: 0.9 % (ref 0–0)
NEUTROPHIL, FLUID: 95 %
NEUTROPHIL, FLUID: 96 %
NEUTROPHILS ABSOLUTE: 5.86 E9/L (ref 1.8–7.3)
NEUTROPHILS RELATIVE PERCENT: 57.4 % (ref 43–80)
NUCLEATED CELLS FLUID: NORMAL /UL
NUCLEATED CELLS FLUID: NORMAL /UL
NUCLEATED RED BLOOD CELLS: 2.6 /100 WBC
OVALOCYTES: ABNORMAL
PDW BLD-RTO: 16.7 FL (ref 11.5–15)
PLATELET # BLD: 248 E9/L (ref 130–450)
PMV BLD AUTO: 9.2 FL (ref 7–12)
POIKILOCYTES: ABNORMAL
POLYCHROMASIA: ABNORMAL
POTASSIUM SERPL-SCNC: 5 MMOL/L (ref 3.5–5)
RBC # BLD: 3.02 E12/L (ref 3.5–5.5)
RBC FLUID: NORMAL /UL
RBC FLUID: NORMAL /UL
SEDIMENTATION RATE, ERYTHROCYTE: 137 MM/HR (ref 0–20)
SODIUM BLD-SCNC: 135 MMOL/L (ref 132–146)
SOURCE BODY FLUID: NORMAL
SOURCE BODY FLUID: NORMAL
TOTAL PROTEIN: 6.7 G/DL (ref 6.4–8.3)
WBC # BLD: 10.1 E9/L (ref 4.5–11.5)

## 2019-12-12 PROCEDURE — 0S9D0ZX DRAINAGE OF LEFT KNEE JOINT, OPEN APPROACH, DIAGNOSTIC: ICD-10-PCS | Performed by: INTERNAL MEDICINE

## 2019-12-12 PROCEDURE — 97530 THERAPEUTIC ACTIVITIES: CPT

## 2019-12-12 PROCEDURE — 89051 BODY FLUID CELL COUNT: CPT

## 2019-12-12 PROCEDURE — 6360000002 HC RX W HCPCS: Performed by: INTERNAL MEDICINE

## 2019-12-12 PROCEDURE — 99231 SBSQ HOSP IP/OBS SF/LOW 25: CPT | Performed by: ORTHOPAEDIC SURGERY

## 2019-12-12 PROCEDURE — 2580000003 HC RX 258: Performed by: INTERNAL MEDICINE

## 2019-12-12 PROCEDURE — 6370000000 HC RX 637 (ALT 250 FOR IP): Performed by: INTERNAL MEDICINE

## 2019-12-12 PROCEDURE — 82962 GLUCOSE BLOOD TEST: CPT

## 2019-12-12 PROCEDURE — 97535 SELF CARE MNGMENT TRAINING: CPT

## 2019-12-12 PROCEDURE — 87070 CULTURE OTHR SPECIMN AEROBIC: CPT

## 2019-12-12 PROCEDURE — 85651 RBC SED RATE NONAUTOMATED: CPT

## 2019-12-12 PROCEDURE — 87205 SMEAR GRAM STAIN: CPT

## 2019-12-12 PROCEDURE — 97110 THERAPEUTIC EXERCISES: CPT

## 2019-12-12 PROCEDURE — 36415 COLL VENOUS BLD VENIPUNCTURE: CPT

## 2019-12-12 PROCEDURE — 89060 EXAM SYNOVIAL FLUID CRYSTALS: CPT

## 2019-12-12 PROCEDURE — 80053 COMPREHEN METABOLIC PANEL: CPT

## 2019-12-12 PROCEDURE — 85025 COMPLETE CBC W/AUTO DIFF WBC: CPT

## 2019-12-12 PROCEDURE — 86140 C-REACTIVE PROTEIN: CPT

## 2019-12-12 PROCEDURE — 2060000000 HC ICU INTERMEDIATE R&B

## 2019-12-12 RX ADMIN — CEFTRIAXONE SODIUM 1 G: 1 INJECTION, POWDER, FOR SOLUTION INTRAMUSCULAR; INTRAVENOUS at 06:13

## 2019-12-12 RX ADMIN — LABETALOL HYDROCHLORIDE 200 MG: 200 TABLET, FILM COATED ORAL at 00:04

## 2019-12-12 RX ADMIN — INSULIN LISPRO 4 UNITS: 100 INJECTION, SOLUTION INTRAVENOUS; SUBCUTANEOUS at 10:07

## 2019-12-12 RX ADMIN — INSULIN LISPRO 6 UNITS: 100 INJECTION, SOLUTION INTRAVENOUS; SUBCUTANEOUS at 17:36

## 2019-12-12 RX ADMIN — QUETIAPINE FUMARATE 400 MG: 200 TABLET ORAL at 20:29

## 2019-12-12 RX ADMIN — DIVALPROEX SODIUM 1000 MG: 500 TABLET, DELAYED RELEASE ORAL at 20:51

## 2019-12-12 RX ADMIN — INSULIN LISPRO 4 UNITS: 100 INJECTION, SOLUTION INTRAVENOUS; SUBCUTANEOUS at 20:50

## 2019-12-12 RX ADMIN — INSULIN LISPRO 2 UNITS: 100 INJECTION, SOLUTION INTRAVENOUS; SUBCUTANEOUS at 00:04

## 2019-12-12 RX ADMIN — BRIMONIDINE TARTRATE 1 DROP: 2 SOLUTION/ DROPS OPHTHALMIC at 10:00

## 2019-12-12 RX ADMIN — QUETIAPINE FUMARATE 400 MG: 200 TABLET ORAL at 00:03

## 2019-12-12 RX ADMIN — ACETAMINOPHEN 650 MG: 325 TABLET, FILM COATED ORAL at 20:30

## 2019-12-12 RX ADMIN — TIMOLOL MALEATE 1 DROP: 5 SOLUTION OPHTHALMIC at 20:30

## 2019-12-12 RX ADMIN — ENOXAPARIN SODIUM 40 MG: 40 INJECTION SUBCUTANEOUS at 10:00

## 2019-12-12 RX ADMIN — LINAGLIPTIN 5 MG: 5 TABLET, FILM COATED ORAL at 09:59

## 2019-12-12 RX ADMIN — ACETAMINOPHEN 650 MG: 325 TABLET, FILM COATED ORAL at 09:58

## 2019-12-12 RX ADMIN — LOSARTAN POTASSIUM 50 MG: 50 TABLET, FILM COATED ORAL at 09:59

## 2019-12-12 RX ADMIN — ATORVASTATIN CALCIUM 40 MG: 40 TABLET, FILM COATED ORAL at 09:58

## 2019-12-12 RX ADMIN — HYDROCHLOROTHIAZIDE 12.5 MG: 12.5 TABLET ORAL at 09:59

## 2019-12-12 RX ADMIN — BRIMONIDINE TARTRATE 1 DROP: 2 SOLUTION/ DROPS OPHTHALMIC at 20:29

## 2019-12-12 RX ADMIN — METFORMIN HYDROCHLORIDE 1000 MG: 1000 TABLET ORAL at 17:36

## 2019-12-12 RX ADMIN — SODIUM CHLORIDE, PRESERVATIVE FREE 10 ML: 5 INJECTION INTRAVENOUS at 10:01

## 2019-12-12 RX ADMIN — METFORMIN HYDROCHLORIDE 1000 MG: 1000 TABLET ORAL at 09:59

## 2019-12-12 RX ADMIN — LABETALOL HYDROCHLORIDE 200 MG: 200 TABLET, FILM COATED ORAL at 20:50

## 2019-12-12 RX ADMIN — BRIMONIDINE TARTRATE 1 DROP: 2 SOLUTION/ DROPS OPHTHALMIC at 00:03

## 2019-12-12 RX ADMIN — LABETALOL HYDROCHLORIDE 200 MG: 200 TABLET, FILM COATED ORAL at 06:13

## 2019-12-12 RX ADMIN — LABETALOL HYDROCHLORIDE 200 MG: 200 TABLET, FILM COATED ORAL at 14:32

## 2019-12-12 RX ADMIN — TIMOLOL MALEATE 1 DROP: 5 SOLUTION OPHTHALMIC at 10:01

## 2019-12-12 RX ADMIN — INSULIN LISPRO 10 UNITS: 100 INJECTION, SOLUTION INTRAVENOUS; SUBCUTANEOUS at 12:35

## 2019-12-12 RX ADMIN — ARIPIPRAZOLE 15 MG: 15 TABLET ORAL at 09:59

## 2019-12-12 RX ADMIN — DIVALPROEX SODIUM 1000 MG: 500 TABLET, DELAYED RELEASE ORAL at 10:06

## 2019-12-12 RX ADMIN — TIMOLOL MALEATE 1 DROP: 5 SOLUTION OPHTHALMIC at 00:03

## 2019-12-12 RX ADMIN — DIVALPROEX SODIUM 1000 MG: 500 TABLET, DELAYED RELEASE ORAL at 00:04

## 2019-12-12 RX ADMIN — SODIUM CHLORIDE, PRESERVATIVE FREE 10 ML: 5 INJECTION INTRAVENOUS at 20:30

## 2019-12-12 ASSESSMENT — PAIN SCALES - GENERAL
PAINLEVEL_OUTOF10: 5
PAINLEVEL_OUTOF10: 7
PAINLEVEL_OUTOF10: 4
PAINLEVEL_OUTOF10: 0
PAINLEVEL_OUTOF10: 0
PAINLEVEL_OUTOF10: 6

## 2019-12-12 ASSESSMENT — PAIN DESCRIPTION - LOCATION
LOCATION: KNEE

## 2019-12-12 ASSESSMENT — PAIN DESCRIPTION - DESCRIPTORS
DESCRIPTORS: ACHING
DESCRIPTORS: ACHING;DISCOMFORT

## 2019-12-12 ASSESSMENT — PAIN DESCRIPTION - PAIN TYPE
TYPE: ACUTE PAIN;CHRONIC PAIN

## 2019-12-12 ASSESSMENT — PAIN DESCRIPTION - PROGRESSION: CLINICAL_PROGRESSION: NOT CHANGED

## 2019-12-12 ASSESSMENT — PAIN DESCRIPTION - ONSET: ONSET: ON-GOING

## 2019-12-12 ASSESSMENT — PAIN DESCRIPTION - FREQUENCY: FREQUENCY: CONTINUOUS

## 2019-12-12 ASSESSMENT — PAIN DESCRIPTION - ORIENTATION
ORIENTATION: RIGHT;LEFT
ORIENTATION: RIGHT

## 2019-12-12 ASSESSMENT — PAIN - FUNCTIONAL ASSESSMENT: PAIN_FUNCTIONAL_ASSESSMENT: PREVENTS OR INTERFERES SOME ACTIVE ACTIVITIES AND ADLS

## 2019-12-13 VITALS
OXYGEN SATURATION: 93 % | SYSTOLIC BLOOD PRESSURE: 123 MMHG | RESPIRATION RATE: 18 BRPM | HEIGHT: 63 IN | WEIGHT: 182 LBS | HEART RATE: 87 BPM | BODY MASS INDEX: 32.25 KG/M2 | DIASTOLIC BLOOD PRESSURE: 57 MMHG | TEMPERATURE: 97 F

## 2019-12-13 LAB
ALBUMIN SERPL-MCNC: 2.3 G/DL (ref 3.5–5.2)
ALP BLD-CCNC: 63 U/L (ref 35–104)
ALT SERPL-CCNC: 26 U/L (ref 0–32)
ANION GAP SERPL CALCULATED.3IONS-SCNC: 14 MMOL/L (ref 7–16)
ANISOCYTOSIS: ABNORMAL
ANTISTREPTOLYSIN-O: 50 IU/ML (ref 0–200)
AST SERPL-CCNC: 30 U/L (ref 0–31)
BASOPHILS ABSOLUTE: 0.07 E9/L (ref 0–0.2)
BASOPHILS RELATIVE PERCENT: 0.9 % (ref 0–2)
BILIRUB SERPL-MCNC: <0.2 MG/DL (ref 0–1.2)
BUN BLDV-MCNC: 36 MG/DL (ref 8–23)
CALCIUM SERPL-MCNC: 8.7 MG/DL (ref 8.6–10.2)
CHLORIDE BLD-SCNC: 95 MMOL/L (ref 98–107)
CO2: 25 MMOL/L (ref 22–29)
CREAT SERPL-MCNC: 1.3 MG/DL (ref 0.5–1)
EOSINOPHILS ABSOLUTE: 0 E9/L (ref 0.05–0.5)
EOSINOPHILS RELATIVE PERCENT: 0.4 % (ref 0–6)
GFR AFRICAN AMERICAN: 50
GFR NON-AFRICAN AMERICAN: 50 ML/MIN/1.73
GLUCOSE BLD-MCNC: 299 MG/DL (ref 74–99)
GRAM STAIN ORDERABLE: NORMAL
GRAM STAIN ORDERABLE: NORMAL
HCT VFR BLD CALC: 27.2 % (ref 34–48)
HEMOGLOBIN: 8.3 G/DL (ref 11.5–15.5)
LYMPHOCYTES ABSOLUTE: 2.43 E9/L (ref 1.5–4)
LYMPHOCYTES RELATIVE PERCENT: 29.6 % (ref 20–42)
MCH RBC QN AUTO: 29 PG (ref 26–35)
MCHC RBC AUTO-ENTMCNC: 30.5 % (ref 32–34.5)
MCV RBC AUTO: 95.1 FL (ref 80–99.9)
METAMYELOCYTES RELATIVE PERCENT: 0.9 % (ref 0–1)
METER GLUCOSE: 278 MG/DL (ref 74–99)
METER GLUCOSE: 281 MG/DL (ref 74–99)
MONOCYTES ABSOLUTE: 0.97 E9/L (ref 0.1–0.95)
MONOCYTES RELATIVE PERCENT: 12.2 % (ref 2–12)
MYELOCYTE PERCENT: 1.7 % (ref 0–0)
NEUTROPHILS ABSOLUTE: 4.62 E9/L (ref 1.8–7.3)
NEUTROPHILS RELATIVE PERCENT: 54.8 % (ref 43–80)
PDW BLD-RTO: 16.6 FL (ref 11.5–15)
PLATELET # BLD: 293 E9/L (ref 130–450)
PMV BLD AUTO: 8.9 FL (ref 7–12)
POLYCHROMASIA: ABNORMAL
POTASSIUM SERPL-SCNC: 4.7 MMOL/L (ref 3.5–5)
RBC # BLD: 2.86 E12/L (ref 3.5–5.5)
SODIUM BLD-SCNC: 134 MMOL/L (ref 132–146)
TOTAL PROTEIN: 6.7 G/DL (ref 6.4–8.3)
URIC ACID, SERUM: 8.6 MG/DL (ref 2.4–5.7)
WBC # BLD: 8.1 E9/L (ref 4.5–11.5)

## 2019-12-13 PROCEDURE — 36415 COLL VENOUS BLD VENIPUNCTURE: CPT

## 2019-12-13 PROCEDURE — 82962 GLUCOSE BLOOD TEST: CPT

## 2019-12-13 PROCEDURE — 86060 ANTISTREPTOLYSIN O TITER: CPT

## 2019-12-13 PROCEDURE — 85025 COMPLETE CBC W/AUTO DIFF WBC: CPT

## 2019-12-13 PROCEDURE — 6360000002 HC RX W HCPCS: Performed by: INTERNAL MEDICINE

## 2019-12-13 PROCEDURE — 84550 ASSAY OF BLOOD/URIC ACID: CPT

## 2019-12-13 PROCEDURE — 2580000003 HC RX 258: Performed by: INTERNAL MEDICINE

## 2019-12-13 PROCEDURE — 80053 COMPREHEN METABOLIC PANEL: CPT

## 2019-12-13 PROCEDURE — 6370000000 HC RX 637 (ALT 250 FOR IP): Performed by: INTERNAL MEDICINE

## 2019-12-13 PROCEDURE — 99231 SBSQ HOSP IP/OBS SF/LOW 25: CPT | Performed by: ORTHOPAEDIC SURGERY

## 2019-12-13 PROCEDURE — 6370000000 HC RX 637 (ALT 250 FOR IP): Performed by: SPECIALIST

## 2019-12-13 RX ORDER — COLCHICINE 0.6 MG/1
0.6 TABLET ORAL 2 TIMES DAILY
Status: DISCONTINUED | OUTPATIENT
Start: 2019-12-13 | End: 2019-12-13 | Stop reason: HOSPADM

## 2019-12-13 RX ORDER — COLCHICINE 0.6 MG/1
0.6 TABLET ORAL 2 TIMES DAILY
Qty: 30 TABLET | Refills: 3 | Status: SHIPPED | OUTPATIENT
Start: 2019-12-13 | End: 2020-02-11 | Stop reason: SDUPTHER

## 2019-12-13 RX ADMIN — INSULIN LISPRO 6 UNITS: 100 INJECTION, SOLUTION INTRAVENOUS; SUBCUTANEOUS at 07:57

## 2019-12-13 RX ADMIN — LABETALOL HYDROCHLORIDE 200 MG: 200 TABLET, FILM COATED ORAL at 05:12

## 2019-12-13 RX ADMIN — COLCHICINE 0.6 MG: 0.6 TABLET, FILM COATED ORAL at 14:51

## 2019-12-13 RX ADMIN — ACETAMINOPHEN 650 MG: 325 TABLET, FILM COATED ORAL at 01:01

## 2019-12-13 RX ADMIN — LOSARTAN POTASSIUM 50 MG: 50 TABLET, FILM COATED ORAL at 07:56

## 2019-12-13 RX ADMIN — ENOXAPARIN SODIUM 40 MG: 40 INJECTION SUBCUTANEOUS at 08:02

## 2019-12-13 RX ADMIN — LABETALOL HYDROCHLORIDE 200 MG: 200 TABLET, FILM COATED ORAL at 14:51

## 2019-12-13 RX ADMIN — METFORMIN HYDROCHLORIDE 1000 MG: 1000 TABLET ORAL at 07:56

## 2019-12-13 RX ADMIN — SODIUM CHLORIDE, PRESERVATIVE FREE 10 ML: 5 INJECTION INTRAVENOUS at 07:56

## 2019-12-13 RX ADMIN — HYDROCHLOROTHIAZIDE 12.5 MG: 12.5 TABLET ORAL at 07:56

## 2019-12-13 RX ADMIN — INSULIN LISPRO 6 UNITS: 100 INJECTION, SOLUTION INTRAVENOUS; SUBCUTANEOUS at 11:34

## 2019-12-13 RX ADMIN — BRIMONIDINE TARTRATE 1 DROP: 2 SOLUTION/ DROPS OPHTHALMIC at 07:56

## 2019-12-13 RX ADMIN — ARIPIPRAZOLE 15 MG: 15 TABLET ORAL at 07:56

## 2019-12-13 RX ADMIN — TIMOLOL MALEATE 1 DROP: 5 SOLUTION OPHTHALMIC at 07:56

## 2019-12-13 RX ADMIN — DIVALPROEX SODIUM 1000 MG: 500 TABLET, DELAYED RELEASE ORAL at 07:56

## 2019-12-13 RX ADMIN — ATORVASTATIN CALCIUM 40 MG: 40 TABLET, FILM COATED ORAL at 07:56

## 2019-12-13 RX ADMIN — LINAGLIPTIN 5 MG: 5 TABLET, FILM COATED ORAL at 07:56

## 2019-12-13 ASSESSMENT — PAIN SCALES - GENERAL
PAINLEVEL_OUTOF10: 0
PAINLEVEL_OUTOF10: 7
PAINLEVEL_OUTOF10: 0

## 2019-12-17 LAB
BODY FLUID CULTURE, STERILE: NORMAL
BODY FLUID CULTURE, STERILE: NORMAL
GRAM STAIN RESULT: NORMAL
GRAM STAIN RESULT: NORMAL

## 2020-01-23 ENCOUNTER — HOSPITAL ENCOUNTER (EMERGENCY)
Age: 65
Discharge: NURSING, REHAB OR LONG-TERM CARE | End: 2020-01-24
Attending: EMERGENCY MEDICINE
Payer: COMMERCIAL

## 2020-01-23 VITALS
HEIGHT: 63 IN | HEART RATE: 103 BPM | DIASTOLIC BLOOD PRESSURE: 81 MMHG | RESPIRATION RATE: 20 BRPM | BODY MASS INDEX: 32.25 KG/M2 | TEMPERATURE: 97 F | OXYGEN SATURATION: 99 % | SYSTOLIC BLOOD PRESSURE: 147 MMHG | WEIGHT: 182 LBS

## 2020-01-23 LAB
ALBUMIN SERPL-MCNC: 4 G/DL (ref 3.5–5.2)
ALP BLD-CCNC: 90 U/L (ref 35–104)
ALT SERPL-CCNC: 17 U/L (ref 0–32)
ANION GAP SERPL CALCULATED.3IONS-SCNC: 14 MMOL/L (ref 7–16)
AST SERPL-CCNC: 22 U/L (ref 0–31)
BASOPHILS ABSOLUTE: 0.04 E9/L (ref 0–0.2)
BASOPHILS RELATIVE PERCENT: 0.7 % (ref 0–2)
BILIRUB SERPL-MCNC: <0.2 MG/DL (ref 0–1.2)
BUN BLDV-MCNC: 17 MG/DL (ref 8–23)
CALCIUM SERPL-MCNC: 9.3 MG/DL (ref 8.6–10.2)
CHLORIDE BLD-SCNC: 98 MMOL/L (ref 98–107)
CO2: 23 MMOL/L (ref 22–29)
CREAT SERPL-MCNC: 1.1 MG/DL (ref 0.5–1)
EOSINOPHILS ABSOLUTE: 0.12 E9/L (ref 0.05–0.5)
EOSINOPHILS RELATIVE PERCENT: 2.2 % (ref 0–6)
GFR AFRICAN AMERICAN: >60
GFR AFRICAN AMERICAN: >60
GFR NON-AFRICAN AMERICAN: 50 ML/MIN/1.73
GFR NON-AFRICAN AMERICAN: >60 ML/MIN/1.73
GLUCOSE BLD-MCNC: 142 MG/DL (ref 74–99)
GLUCOSE BLD-MCNC: 149 MG/DL (ref 74–99)
HCT VFR BLD CALC: 32.9 % (ref 34–48)
HEMOGLOBIN: 10.1 G/DL (ref 11.5–15.5)
IMMATURE GRANULOCYTES #: 0.03 E9/L
IMMATURE GRANULOCYTES %: 0.5 % (ref 0–5)
LYMPHOCYTES ABSOLUTE: 2.72 E9/L (ref 1.5–4)
LYMPHOCYTES RELATIVE PERCENT: 49.3 % (ref 20–42)
MCH RBC QN AUTO: 28.2 PG (ref 26–35)
MCHC RBC AUTO-ENTMCNC: 30.7 % (ref 32–34.5)
MCV RBC AUTO: 91.9 FL (ref 80–99.9)
MONOCYTES ABSOLUTE: 0.52 E9/L (ref 0.1–0.95)
MONOCYTES RELATIVE PERCENT: 9.4 % (ref 2–12)
NEUTROPHILS ABSOLUTE: 2.09 E9/L (ref 1.8–7.3)
NEUTROPHILS RELATIVE PERCENT: 37.9 % (ref 43–80)
PDW BLD-RTO: 15.3 FL (ref 11.5–15)
PERFORMED ON: ABNORMAL
PLATELET # BLD: 514 E9/L (ref 130–450)
PMV BLD AUTO: 10.5 FL (ref 7–12)
POC CHLORIDE: 100 MMOL/L (ref 100–108)
POC CREATININE: 1.1 MG/DL (ref 0.5–1)
POC POTASSIUM: 6.1 MMOL/L (ref 3.5–5)
POC SODIUM: 135 MMOL/L (ref 132–146)
POTASSIUM SERPL-SCNC: 5 MMOL/L (ref 3.5–5)
RBC # BLD: 3.58 E12/L (ref 3.5–5.5)
SODIUM BLD-SCNC: 135 MMOL/L (ref 132–146)
TOTAL PROTEIN: 7 G/DL (ref 6.4–8.3)
WBC # BLD: 5.5 E9/L (ref 4.5–11.5)

## 2020-01-23 PROCEDURE — 82947 ASSAY GLUCOSE BLOOD QUANT: CPT

## 2020-01-23 PROCEDURE — 84132 ASSAY OF SERUM POTASSIUM: CPT

## 2020-01-23 PROCEDURE — 93005 ELECTROCARDIOGRAM TRACING: CPT | Performed by: EMERGENCY MEDICINE

## 2020-01-23 PROCEDURE — 80053 COMPREHEN METABOLIC PANEL: CPT

## 2020-01-23 PROCEDURE — 36415 COLL VENOUS BLD VENIPUNCTURE: CPT

## 2020-01-23 PROCEDURE — 85025 COMPLETE CBC W/AUTO DIFF WBC: CPT

## 2020-01-23 PROCEDURE — 84295 ASSAY OF SERUM SODIUM: CPT

## 2020-01-23 PROCEDURE — 99284 EMERGENCY DEPT VISIT MOD MDM: CPT

## 2020-01-23 PROCEDURE — 82435 ASSAY OF BLOOD CHLORIDE: CPT

## 2020-01-23 PROCEDURE — 82565 ASSAY OF CREATININE: CPT

## 2020-01-24 LAB
EKG ATRIAL RATE: 97 BPM
EKG P AXIS: 67 DEGREES
EKG P-R INTERVAL: 158 MS
EKG Q-T INTERVAL: 360 MS
EKG QRS DURATION: 72 MS
EKG QTC CALCULATION (BAZETT): 457 MS
EKG R AXIS: 30 DEGREES
EKG T AXIS: 24 DEGREES
EKG VENTRICULAR RATE: 97 BPM

## 2020-01-24 PROCEDURE — 93010 ELECTROCARDIOGRAM REPORT: CPT | Performed by: INTERNAL MEDICINE

## 2020-01-24 NOTE — ED PROVIDER NOTES
Department of Emergency Medicine   ED  Provider Note  Admit Date/RoomTime: 1/23/2020  7:16 PM  ED Room: 24/24          History of Present Illness:  1/23/20, Time: 10:47 PM  Chief Complaint   Patient presents with    Abnormal Lab     K 6.3                Willie Lee is a 60 y.o. female presenting to the ED for hyperkalemia. Patient had routine labs done and her potassium was 6.1. Does have chronic any disease, she is not on dialysis. She has no symptoms or complaints. Denies nausea, vomiting, chest pain, shortness of breath, or any other symptoms. Review of Systems:   Pertinent positives and negatives are stated within HPI, all other systems reviewed and are negative.        --------------------------------------------- PAST HISTORY ---------------------------------------------  Past Medical History:  has a past medical history of Bell's palsy, Bipolar disorder ([facility] [address] Carpal tunnel syndrome, Non-insulin dependent type 2 diabetes mellitus ([facility] [address] and Systemic primary arterial hypertension. Past Surgical History:  has a past surgical history that includes Echo Complete (6/22/2013). Social History:  reports that she has never smoked. She has never used smokeless tobacco. She reports that she does not drink alcohol or use drugs. Family History: family history includes Heart Disease (age of onset: 76) in her father; High Blood Pressure in her father. . Unless otherwise noted, family history is non contributory    The patients home medications have been reviewed. Allergies: Ace inhibitors;  Ibuprofen; and Latuda [lurasidone hcl]        ---------------------------------------------------PHYSICAL EXAM--------------------------------------    Constitutional/General: Alert and oriented x3  Head: Normocephalic and atraumatic  Eyes: PERRL, EOMI, sclera non icteric  Mouth: Oropharynx clear, handling secretions, no trismus, no asymmetry of the posterior oropharynx or uvular edema  Neck: Supple, full ROM, no - 146 mmol/L    Potassium 5.0 3.5 - 5.0 mmol/L    Chloride 98 98 - 107 mmol/L    CO2 23 22 - 29 mmol/L    Anion Gap 14 7 - 16 mmol/L    Glucose 149 (H) 74 - 99 mg/dL    BUN 17 8 - 23 mg/dL    CREATININE 1.1 (H) 0.5 - 1.0 mg/dL    GFR Non-African American >60 >=60 mL/min/1.73    GFR African American >60     Calcium 9.3 8.6 - 10.2 mg/dL    Total Protein 7.0 6.4 - 8.3 g/dL    Alb 4.0 3.5 - 5.2 g/dL    Total Bilirubin <0.2 0.0 - 1.2 mg/dL    Alkaline Phosphatase 90 35 - 104 U/L    ALT 17 0 - 32 U/L    AST 22 0 - 31 U/L   POCT Venous   Result Value Ref Range    POC Sodium 135 132 - 146 mmol/L    POC Potassium 6.1 (H) 3.5 - 5.0 mmol/L    POC Chloride 100 100 - 108 mmol/L    POC Glucose 142 (H) 74 - 99 mg/dl    POC Creatinine 1.1 (H) 0.5 - 1.0 mg/dL    GFR Non-African American 50 >=60 mL/min/1.73    GFR  >60     Performed on SEE BELOW    ,       RADIOLOGY:  Interpreted by Radiologist unless otherwise specified  No orders to display         EKG Interpretation  Interpreted by emergency department physician, Dr. Ledesma Samples   Sinus, rate 97, no STEMI        ------------------------- NURSING NOTES AND VITALS REVIEWED ---------------------------   The nursing notes within the ED encounter and vital signs as below have been reviewed by myself  BP (!) 147/81   Pulse 103   Temp 97 °F (36.1 °C)   Resp 20   Ht 5' 3\" (1.6 m)   Wt 182 lb (82.6 kg)   SpO2 99%   BMI 32.24 kg/m²     Oxygen Saturation Interpretation: Normal    The patients available past medical records and past encounters were reviewed. ------------------------------ ED COURSE/MEDICAL DECISION MAKING----------------------  Medications - No data to display        The cardiac monitor revealed sinus with a heart rate in the 90s as interpreted by me. The cardiac monitor was ordered secondary to the patient's hyperkalemia and to monitor the patient for dysrhythmia. [facility] [id number]         Medical Decision Making:    EKG shows no hyperacute T waves.   Labs

## 2020-01-24 NOTE — ED NOTES
Bed: 24  Expected date:   Expected time:   Means of arrival:   Comments:  ems     Tessa Christianson RN  01/23/20 1916

## 2020-03-17 ENCOUNTER — HOSPITAL ENCOUNTER (INPATIENT)
Age: 65
LOS: 14 days | Discharge: HOME | DRG: 753 | End: 2020-03-31
Attending: EMERGENCY MEDICINE | Admitting: PSYCHIATRY & NEUROLOGY
Payer: COMMERCIAL

## 2020-03-17 PROBLEM — F31.9 BIPOLAR DISORDER (HCC): Status: ACTIVE | Noted: 2020-03-17

## 2020-03-17 LAB
ACETAMINOPHEN LEVEL: <5 MCG/ML (ref 10–30)
ALBUMIN SERPL-MCNC: 3.9 G/DL (ref 3.5–5.2)
ALP BLD-CCNC: 68 U/L (ref 35–104)
ALT SERPL-CCNC: 25 U/L (ref 0–32)
AMPHETAMINE SCREEN, URINE: NOT DETECTED
ANION GAP SERPL CALCULATED.3IONS-SCNC: 10 MMOL/L (ref 7–16)
AST SERPL-CCNC: 28 U/L (ref 0–31)
BACTERIA: ABNORMAL /HPF
BARBITURATE SCREEN URINE: NOT DETECTED
BASOPHILS ABSOLUTE: 0.02 E9/L (ref 0–0.2)
BASOPHILS RELATIVE PERCENT: 0.4 % (ref 0–2)
BENZODIAZEPINE SCREEN, URINE: NOT DETECTED
BILIRUB SERPL-MCNC: 0.4 MG/DL (ref 0–1.2)
BILIRUBIN URINE: NEGATIVE
BLOOD, URINE: ABNORMAL
BUN BLDV-MCNC: 14 MG/DL (ref 8–23)
CALCIUM SERPL-MCNC: 9.5 MG/DL (ref 8.6–10.2)
CANNABINOID SCREEN URINE: NOT DETECTED
CHLORIDE BLD-SCNC: 102 MMOL/L (ref 98–107)
CLARITY: CLEAR
CO2: 27 MMOL/L (ref 22–29)
COCAINE METABOLITE SCREEN URINE: NOT DETECTED
COLOR: YELLOW
CREAT SERPL-MCNC: 0.8 MG/DL (ref 0.5–1)
EKG ATRIAL RATE: 100 BPM
EKG P AXIS: 54 DEGREES
EKG P-R INTERVAL: 166 MS
EKG Q-T INTERVAL: 354 MS
EKG QRS DURATION: 72 MS
EKG QTC CALCULATION (BAZETT): 456 MS
EKG R AXIS: 21 DEGREES
EKG T AXIS: 57 DEGREES
EKG VENTRICULAR RATE: 100 BPM
EOSINOPHILS ABSOLUTE: 0.06 E9/L (ref 0.05–0.5)
EOSINOPHILS RELATIVE PERCENT: 1.1 % (ref 0–6)
EPITHELIAL CELLS, UA: ABNORMAL /HPF
ETHANOL: <10 MG/DL (ref 0–0.08)
FENTANYL SCREEN, URINE: NOT DETECTED
GFR AFRICAN AMERICAN: >60
GFR NON-AFRICAN AMERICAN: >60 ML/MIN/1.73
GLUCOSE BLD-MCNC: 194 MG/DL (ref 74–99)
GLUCOSE URINE: NEGATIVE MG/DL
HCT VFR BLD CALC: 36.4 % (ref 34–48)
HEMOGLOBIN: 11.3 G/DL (ref 11.5–15.5)
IMMATURE GRANULOCYTES #: 0.02 E9/L
IMMATURE GRANULOCYTES %: 0.4 % (ref 0–5)
KETONES, URINE: ABNORMAL MG/DL
LEUKOCYTE ESTERASE, URINE: ABNORMAL
LYMPHOCYTES ABSOLUTE: 2.32 E9/L (ref 1.5–4)
LYMPHOCYTES RELATIVE PERCENT: 43.4 % (ref 20–42)
Lab: NORMAL
MCH RBC QN AUTO: 26.7 PG (ref 26–35)
MCHC RBC AUTO-ENTMCNC: 31 % (ref 32–34.5)
MCV RBC AUTO: 85.8 FL (ref 80–99.9)
METER GLUCOSE: 206 MG/DL (ref 74–99)
METER GLUCOSE: 239 MG/DL (ref 74–99)
METHADONE SCREEN, URINE: NOT DETECTED
MONOCYTES ABSOLUTE: 0.45 E9/L (ref 0.1–0.95)
MONOCYTES RELATIVE PERCENT: 8.4 % (ref 2–12)
NEUTROPHILS ABSOLUTE: 2.48 E9/L (ref 1.8–7.3)
NEUTROPHILS RELATIVE PERCENT: 46.3 % (ref 43–80)
NITRITE, URINE: POSITIVE
OPIATE SCREEN URINE: NOT DETECTED
OXYCODONE URINE: NOT DETECTED
PDW BLD-RTO: 14.6 FL (ref 11.5–15)
PH UA: 7 (ref 5–9)
PHENCYCLIDINE SCREEN URINE: NOT DETECTED
PLATELET # BLD: 219 E9/L (ref 130–450)
PMV BLD AUTO: 10.3 FL (ref 7–12)
POTASSIUM SERPL-SCNC: 4.4 MMOL/L (ref 3.5–5)
PROTEIN UA: NEGATIVE MG/DL
RBC # BLD: 4.24 E12/L (ref 3.5–5.5)
RBC UA: ABNORMAL /HPF (ref 0–2)
SALICYLATE, SERUM: <0.3 MG/DL (ref 0–30)
SODIUM BLD-SCNC: 139 MMOL/L (ref 132–146)
SPECIFIC GRAVITY UA: 1.02 (ref 1–1.03)
TOTAL PROTEIN: 7.1 G/DL (ref 6.4–8.3)
TRICYCLIC ANTIDEPRESSANTS SCREEN SERUM: NEGATIVE NG/ML
TROPONIN: 0.01 NG/ML (ref 0–0.03)
UROBILINOGEN, URINE: 0.2 E.U./DL
VALPROIC ACID LEVEL: 9 MCG/ML (ref 50–100)
WBC # BLD: 5.4 E9/L (ref 4.5–11.5)
WBC UA: ABNORMAL /HPF (ref 0–5)

## 2020-03-17 PROCEDURE — 82962 GLUCOSE BLOOD TEST: CPT

## 2020-03-17 PROCEDURE — 6370000000 HC RX 637 (ALT 250 FOR IP): Performed by: NURSE PRACTITIONER

## 2020-03-17 PROCEDURE — 84484 ASSAY OF TROPONIN QUANT: CPT

## 2020-03-17 PROCEDURE — 6370000000 HC RX 637 (ALT 250 FOR IP): Performed by: INTERNAL MEDICINE

## 2020-03-17 PROCEDURE — 81001 URINALYSIS AUTO W/SCOPE: CPT

## 2020-03-17 PROCEDURE — 80307 DRUG TEST PRSMV CHEM ANLYZR: CPT

## 2020-03-17 PROCEDURE — 85025 COMPLETE CBC W/AUTO DIFF WBC: CPT

## 2020-03-17 PROCEDURE — 6370000000 HC RX 637 (ALT 250 FOR IP)

## 2020-03-17 PROCEDURE — 80164 ASSAY DIPROPYLACETIC ACD TOT: CPT

## 2020-03-17 PROCEDURE — G0480 DRUG TEST DEF 1-7 CLASSES: HCPCS

## 2020-03-17 PROCEDURE — 93005 ELECTROCARDIOGRAM TRACING: CPT | Performed by: NURSE PRACTITIONER

## 2020-03-17 PROCEDURE — 6370000000 HC RX 637 (ALT 250 FOR IP): Performed by: PSYCHIATRY & NEUROLOGY

## 2020-03-17 PROCEDURE — 36415 COLL VENOUS BLD VENIPUNCTURE: CPT

## 2020-03-17 PROCEDURE — 1240000000 HC EMOTIONAL WELLNESS R&B

## 2020-03-17 PROCEDURE — 80053 COMPREHEN METABOLIC PANEL: CPT

## 2020-03-17 PROCEDURE — 99284 EMERGENCY DEPT VISIT MOD MDM: CPT

## 2020-03-17 RX ORDER — MAGNESIUM HYDROXIDE/ALUMINUM HYDROXICE/SIMETHICONE 120; 1200; 1200 MG/30ML; MG/30ML; MG/30ML
30 SUSPENSION ORAL PRN
Status: DISCONTINUED | OUTPATIENT
Start: 2020-03-17 | End: 2020-03-31 | Stop reason: HOSPADM

## 2020-03-17 RX ORDER — FUROSEMIDE 20 MG/1
20 TABLET ORAL ONCE
Status: COMPLETED | OUTPATIENT
Start: 2020-03-17 | End: 2020-03-17

## 2020-03-17 RX ORDER — TRAZODONE HYDROCHLORIDE 50 MG/1
25 TABLET ORAL NIGHTLY PRN
Status: DISCONTINUED | OUTPATIENT
Start: 2020-03-17 | End: 2020-03-31 | Stop reason: HOSPADM

## 2020-03-17 RX ORDER — ACETAMINOPHEN 325 MG/1
650 TABLET ORAL EVERY 4 HOURS PRN
Status: DISCONTINUED | OUTPATIENT
Start: 2020-03-17 | End: 2020-03-31 | Stop reason: HOSPADM

## 2020-03-17 RX ORDER — CLONIDINE HYDROCHLORIDE 0.1 MG/1
0.1 TABLET ORAL ONCE
Status: COMPLETED | OUTPATIENT
Start: 2020-03-17 | End: 2020-03-17

## 2020-03-17 RX ORDER — ERGOCALCIFEROL 1.25 MG/1
50000 CAPSULE ORAL WEEKLY
Status: DISCONTINUED | OUTPATIENT
Start: 2020-03-20 | End: 2020-03-31 | Stop reason: HOSPADM

## 2020-03-17 RX ORDER — LABETALOL 100 MG/1
200 TABLET, FILM COATED ORAL ONCE
Status: COMPLETED | OUTPATIENT
Start: 2020-03-17 | End: 2020-03-17

## 2020-03-17 RX ORDER — CLONIDINE HYDROCHLORIDE 0.1 MG/1
TABLET ORAL
Status: COMPLETED
Start: 2020-03-17 | End: 2020-03-17

## 2020-03-17 RX ORDER — LATANOPROST 50 UG/ML
1 SOLUTION/ DROPS OPHTHALMIC NIGHTLY
Status: DISCONTINUED | OUTPATIENT
Start: 2020-03-17 | End: 2020-03-31 | Stop reason: HOSPADM

## 2020-03-17 RX ORDER — DEXTROSE MONOHYDRATE 25 G/50ML
12.5 INJECTION, SOLUTION INTRAVENOUS PRN
Status: DISCONTINUED | OUTPATIENT
Start: 2020-03-17 | End: 2020-03-31 | Stop reason: HOSPADM

## 2020-03-17 RX ORDER — TIMOLOL MALEATE 5 MG/ML
1 SOLUTION/ DROPS OPHTHALMIC 2 TIMES DAILY
Status: DISCONTINUED | OUTPATIENT
Start: 2020-03-17 | End: 2020-03-31 | Stop reason: HOSPADM

## 2020-03-17 RX ORDER — NITROFURANTOIN 25; 75 MG/1; MG/1
100 CAPSULE ORAL EVERY 12 HOURS SCHEDULED
Status: DISCONTINUED | OUTPATIENT
Start: 2020-03-17 | End: 2020-03-20

## 2020-03-17 RX ORDER — HALOPERIDOL 5 MG/ML
3 INJECTION INTRAMUSCULAR EVERY 6 HOURS PRN
Status: DISCONTINUED | OUTPATIENT
Start: 2020-03-17 | End: 2020-03-31 | Stop reason: HOSPADM

## 2020-03-17 RX ORDER — CHLORAL HYDRATE 500 MG
3000 CAPSULE ORAL DAILY
Status: DISCONTINUED | OUTPATIENT
Start: 2020-03-17 | End: 2020-03-17 | Stop reason: CLARIF

## 2020-03-17 RX ORDER — FUROSEMIDE 40 MG/1
40 TABLET ORAL DAILY
Status: DISCONTINUED | OUTPATIENT
Start: 2020-03-17 | End: 2020-03-31 | Stop reason: HOSPADM

## 2020-03-17 RX ORDER — HALOPERIDOL 2 MG/1
3 TABLET ORAL EVERY 6 HOURS PRN
Status: DISCONTINUED | OUTPATIENT
Start: 2020-03-17 | End: 2020-03-31 | Stop reason: HOSPADM

## 2020-03-17 RX ORDER — NICOTINE POLACRILEX 4 MG
15 LOZENGE BUCCAL PRN
Status: DISCONTINUED | OUTPATIENT
Start: 2020-03-17 | End: 2020-03-31 | Stop reason: HOSPADM

## 2020-03-17 RX ORDER — FUROSEMIDE 20 MG/1
TABLET ORAL
Status: COMPLETED
Start: 2020-03-17 | End: 2020-03-17

## 2020-03-17 RX ORDER — LABETALOL 200 MG/1
200 TABLET, FILM COATED ORAL EVERY 8 HOURS SCHEDULED
Status: DISCONTINUED | OUTPATIENT
Start: 2020-03-17 | End: 2020-03-31 | Stop reason: HOSPADM

## 2020-03-17 RX ORDER — ALOGLIPTIN 25 MG/1
25 TABLET, FILM COATED ORAL DAILY
Status: DISCONTINUED | OUTPATIENT
Start: 2020-03-17 | End: 2020-03-22 | Stop reason: SDUPTHER

## 2020-03-17 RX ORDER — CLONIDINE HYDROCHLORIDE 0.1 MG/1
0.1 TABLET ORAL 2 TIMES DAILY
Status: DISCONTINUED | OUTPATIENT
Start: 2020-03-17 | End: 2020-03-31 | Stop reason: HOSPADM

## 2020-03-17 RX ORDER — LATANOPROST 50 UG/ML
1 SOLUTION/ DROPS OPHTHALMIC NIGHTLY
COMMUNITY

## 2020-03-17 RX ORDER — INSULIN GLARGINE 100 [IU]/ML
14 INJECTION, SOLUTION SUBCUTANEOUS DAILY
Status: DISCONTINUED | OUTPATIENT
Start: 2020-03-17 | End: 2020-03-31 | Stop reason: HOSPADM

## 2020-03-17 RX ORDER — DIVALPROEX SODIUM 250 MG/1
500 TABLET, DELAYED RELEASE ORAL DAILY
Status: DISCONTINUED | OUTPATIENT
Start: 2020-03-17 | End: 2020-03-18

## 2020-03-17 RX ORDER — COLCHICINE 0.6 MG/1
0.6 TABLET ORAL 2 TIMES DAILY
Status: DISCONTINUED | OUTPATIENT
Start: 2020-03-17 | End: 2020-03-31 | Stop reason: HOSPADM

## 2020-03-17 RX ORDER — BRIMONIDINE TARTRATE 2 MG/ML
1 SOLUTION/ DROPS OPHTHALMIC 2 TIMES DAILY
Status: DISCONTINUED | OUTPATIENT
Start: 2020-03-17 | End: 2020-03-31 | Stop reason: HOSPADM

## 2020-03-17 RX ORDER — ATORVASTATIN CALCIUM 40 MG/1
40 TABLET, FILM COATED ORAL DAILY
Status: DISCONTINUED | OUTPATIENT
Start: 2020-03-17 | End: 2020-03-31 | Stop reason: HOSPADM

## 2020-03-17 RX ORDER — DEXTROSE MONOHYDRATE 50 MG/ML
100 INJECTION, SOLUTION INTRAVENOUS PRN
Status: DISCONTINUED | OUTPATIENT
Start: 2020-03-17 | End: 2020-03-31 | Stop reason: HOSPADM

## 2020-03-17 RX ORDER — LABETALOL 100 MG/1
TABLET, FILM COATED ORAL
Status: COMPLETED
Start: 2020-03-17 | End: 2020-03-17

## 2020-03-17 RX ADMIN — LABETALOL HYDROCHLORIDE 200 MG: 100 TABLET, FILM COATED ORAL at 13:37

## 2020-03-17 RX ADMIN — INSULIN GLARGINE 14 UNITS: 100 INJECTION, SOLUTION SUBCUTANEOUS at 20:47

## 2020-03-17 RX ADMIN — BRIMONIDINE TARTRATE 1 DROP: 2 SOLUTION/ DROPS OPHTHALMIC at 20:40

## 2020-03-17 RX ADMIN — FUROSEMIDE 40 MG: 40 TABLET ORAL at 18:21

## 2020-03-17 RX ADMIN — COLCHICINE 0.6 MG: 0.6 TABLET, FILM COATED ORAL at 20:39

## 2020-03-17 RX ADMIN — TIMOLOL MALEATE 1 DROP: 5 SOLUTION OPHTHALMIC at 20:40

## 2020-03-17 RX ADMIN — INSULIN LISPRO 4 UNITS: 100 INJECTION, SOLUTION INTRAVENOUS; SUBCUTANEOUS at 18:22

## 2020-03-17 RX ADMIN — TRAZODONE HYDROCHLORIDE 25 MG: 50 TABLET ORAL at 20:39

## 2020-03-17 RX ADMIN — LABETALOL 200 MG: 100 TABLET, FILM COATED ORAL at 13:37

## 2020-03-17 RX ADMIN — NITROFURANTOIN MONOHYDRATE/MACROCRYSTALLINE 100 MG: 25; 75 CAPSULE ORAL at 20:40

## 2020-03-17 RX ADMIN — FUROSEMIDE 20 MG: 20 TABLET ORAL at 13:38

## 2020-03-17 RX ADMIN — DIVALPROEX SODIUM 500 MG: 250 TABLET, DELAYED RELEASE ORAL at 18:21

## 2020-03-17 RX ADMIN — CLONIDINE HYDROCHLORIDE 0.1 MG: 0.1 TABLET ORAL at 20:39

## 2020-03-17 RX ADMIN — ATORVASTATIN CALCIUM 40 MG: 40 TABLET, FILM COATED ORAL at 20:40

## 2020-03-17 RX ADMIN — LATANOPROST 1 DROP: 50 SOLUTION OPHTHALMIC at 20:40

## 2020-03-17 RX ADMIN — CLONIDINE HYDROCHLORIDE 0.1 MG: 0.1 TABLET ORAL at 13:35

## 2020-03-17 RX ADMIN — LABETALOL HYDROCHLORIDE 200 MG: 200 TABLET, FILM COATED ORAL at 18:22

## 2020-03-17 RX ADMIN — LABETALOL HYDROCHLORIDE 200 MG: 200 TABLET, FILM COATED ORAL at 20:39

## 2020-03-17 RX ADMIN — ALOGLIPTIN 25 MG: 25 TABLET, FILM COATED ORAL at 13:52

## 2020-03-17 RX ADMIN — INSULIN LISPRO 2 UNITS: 100 INJECTION, SOLUTION INTRAVENOUS; SUBCUTANEOUS at 20:46

## 2020-03-17 ASSESSMENT — SLEEP AND FATIGUE QUESTIONNAIRES
DIFFICULTY ARISING: NO
AVERAGE NUMBER OF SLEEP HOURS: 4
DIFFICULTY FALLING ASLEEP: NO
DO YOU USE A SLEEP AID: YES
DO YOU HAVE DIFFICULTY SLEEPING: NO
RESTFUL SLEEP: YES
DIFFICULTY STAYING ASLEEP: NO

## 2020-03-17 ASSESSMENT — LIFESTYLE VARIABLES: HISTORY_ALCOHOL_USE: NO

## 2020-03-17 ASSESSMENT — PAIN SCALES - GENERAL: PAINLEVEL_OUTOF10: 0

## 2020-03-17 NOTE — ED PROVIDER NOTES
-------------------------------------------------  All laboratory and imaging studies were reviewed by myself.     LABS:  Results for orders placed or performed during the hospital encounter of 03/17/20   CBC Auto Differential   Result Value Ref Range    WBC 5.4 4.5 - 11.5 E9/L    RBC 4.24 3.50 - 5.50 E12/L    Hemoglobin 11.3 (L) 11.5 - 15.5 g/dL    Hematocrit 36.4 34.0 - 48.0 %    MCV 85.8 80.0 - 99.9 fL    MCH 26.7 26.0 - 35.0 pg    MCHC 31.0 (L) 32.0 - 34.5 %    RDW 14.6 11.5 - 15.0 fL    Platelets 244 435 - 260 E9/L    MPV 10.3 7.0 - 12.0 fL    Neutrophils % 46.3 43.0 - 80.0 %    Immature Granulocytes % 0.4 0.0 - 5.0 %    Lymphocytes % 43.4 (H) 20.0 - 42.0 %    Monocytes % 8.4 2.0 - 12.0 %    Eosinophils % 1.1 0.0 - 6.0 %    Basophils % 0.4 0.0 - 2.0 %    Neutrophils Absolute 2.48 1.80 - 7.30 E9/L    Immature Granulocytes # 0.02 E9/L    Lymphocytes Absolute 2.32 1.50 - 4.00 E9/L    Monocytes Absolute 0.45 0.10 - 0.95 E9/L    Eosinophils Absolute 0.06 0.05 - 0.50 E9/L    Basophils Absolute 0.02 0.00 - 0.20 E9/L   Comprehensive Metabolic Panel   Result Value Ref Range    Sodium 139 132 - 146 mmol/L    Potassium 4.4 3.5 - 5.0 mmol/L    Chloride 102 98 - 107 mmol/L    CO2 27 22 - 29 mmol/L    Anion Gap 10 7 - 16 mmol/L    Glucose 194 (H) 74 - 99 mg/dL    BUN 14 8 - 23 mg/dL    CREATININE 0.8 0.5 - 1.0 mg/dL    GFR Non-African American >60 >=60 mL/min/1.73    GFR African American >60     Calcium 9.5 8.6 - 10.2 mg/dL    Total Protein 7.1 6.4 - 8.3 g/dL    Alb 3.9 3.5 - 5.2 g/dL    Total Bilirubin 0.4 0.0 - 1.2 mg/dL    Alkaline Phosphatase 68 35 - 104 U/L    ALT 25 0 - 32 U/L    AST 28 0 - 31 U/L   Urinalysis   Result Value Ref Range    Color, UA Yellow Straw/Yellow    Clarity, UA Clear Clear    Glucose, Ur Negative Negative mg/dL    Bilirubin Urine Negative Negative    Ketones, Urine TRACE (A) Negative mg/dL    Specific Gravity, UA 1.020 1.005 - 1.030    Blood, Urine TRACE-INTACT Negative    pH, UA 7.0 5.0 - 9.0
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
## 2020-03-18 PROBLEM — F31.13 BIPOLAR I DISORDER, CURRENT OR MOST RECENT EPISODE MANIC, SEVERE (HCC): Status: ACTIVE | Noted: 2018-08-17

## 2020-03-18 LAB
ALBUMIN SERPL-MCNC: 3.6 G/DL (ref 3.5–5.2)
ALP BLD-CCNC: 69 U/L (ref 35–104)
ALT SERPL-CCNC: 26 U/L (ref 0–32)
ANION GAP SERPL CALCULATED.3IONS-SCNC: 15 MMOL/L (ref 7–16)
AST SERPL-CCNC: 23 U/L (ref 0–31)
BILIRUB SERPL-MCNC: 0.3 MG/DL (ref 0–1.2)
BUN BLDV-MCNC: 15 MG/DL (ref 8–23)
CALCIUM SERPL-MCNC: 9.1 MG/DL (ref 8.6–10.2)
CHLORIDE BLD-SCNC: 98 MMOL/L (ref 98–107)
CO2: 25 MMOL/L (ref 22–29)
CREAT SERPL-MCNC: 0.8 MG/DL (ref 0.5–1)
GFR AFRICAN AMERICAN: >60
GFR NON-AFRICAN AMERICAN: >60 ML/MIN/1.73
GLUCOSE BLD-MCNC: 264 MG/DL (ref 74–99)
METER GLUCOSE: 100 MG/DL (ref 74–99)
METER GLUCOSE: 140 MG/DL (ref 74–99)
METER GLUCOSE: 149 MG/DL (ref 74–99)
METER GLUCOSE: 252 MG/DL (ref 74–99)
POTASSIUM SERPL-SCNC: 3.8 MMOL/L (ref 3.5–5)
SODIUM BLD-SCNC: 138 MMOL/L (ref 132–146)
TOTAL PROTEIN: 6.7 G/DL (ref 6.4–8.3)

## 2020-03-18 PROCEDURE — 6370000000 HC RX 637 (ALT 250 FOR IP): Performed by: INTERNAL MEDICINE

## 2020-03-18 PROCEDURE — 6370000000 HC RX 637 (ALT 250 FOR IP): Performed by: NURSE PRACTITIONER

## 2020-03-18 PROCEDURE — 87077 CULTURE AEROBIC IDENTIFY: CPT

## 2020-03-18 PROCEDURE — 87088 URINE BACTERIA CULTURE: CPT

## 2020-03-18 PROCEDURE — 82962 GLUCOSE BLOOD TEST: CPT

## 2020-03-18 PROCEDURE — 80053 COMPREHEN METABOLIC PANEL: CPT

## 2020-03-18 PROCEDURE — 87186 SC STD MICRODIL/AGAR DIL: CPT

## 2020-03-18 PROCEDURE — 1240000000 HC EMOTIONAL WELLNESS R&B

## 2020-03-18 PROCEDURE — 36415 COLL VENOUS BLD VENIPUNCTURE: CPT

## 2020-03-18 PROCEDURE — 99222 1ST HOSP IP/OBS MODERATE 55: CPT | Performed by: NURSE PRACTITIONER

## 2020-03-18 RX ORDER — ARIPIPRAZOLE 5 MG/1
5 TABLET ORAL 2 TIMES DAILY
Status: DISCONTINUED | OUTPATIENT
Start: 2020-03-18 | End: 2020-03-24

## 2020-03-18 RX ORDER — DIVALPROEX SODIUM 500 MG/1
500 TABLET, DELAYED RELEASE ORAL 2 TIMES DAILY
Status: DISCONTINUED | OUTPATIENT
Start: 2020-03-18 | End: 2020-03-22

## 2020-03-18 RX ADMIN — LABETALOL HYDROCHLORIDE 200 MG: 200 TABLET, FILM COATED ORAL at 22:01

## 2020-03-18 RX ADMIN — BRIMONIDINE TARTRATE 1 DROP: 2 SOLUTION/ DROPS OPHTHALMIC at 20:18

## 2020-03-18 RX ADMIN — ATORVASTATIN CALCIUM 40 MG: 40 TABLET, FILM COATED ORAL at 20:20

## 2020-03-18 RX ADMIN — ARIPIPRAZOLE 5 MG: 5 TABLET ORAL at 20:19

## 2020-03-18 RX ADMIN — TIMOLOL MALEATE 1 DROP: 5 SOLUTION OPHTHALMIC at 20:18

## 2020-03-18 RX ADMIN — LATANOPROST 1 DROP: 50 SOLUTION OPHTHALMIC at 20:18

## 2020-03-18 RX ADMIN — ARIPIPRAZOLE 5 MG: 5 TABLET ORAL at 12:36

## 2020-03-18 RX ADMIN — BRIMONIDINE TARTRATE 1 DROP: 2 SOLUTION/ DROPS OPHTHALMIC at 09:19

## 2020-03-18 RX ADMIN — COLCHICINE 0.6 MG: 0.6 TABLET, FILM COATED ORAL at 20:19

## 2020-03-18 RX ADMIN — DIVALPROEX SODIUM 500 MG: 250 TABLET, DELAYED RELEASE ORAL at 20:19

## 2020-03-18 RX ADMIN — CLONIDINE HYDROCHLORIDE 0.1 MG: 0.1 TABLET ORAL at 20:19

## 2020-03-18 RX ADMIN — NITROFURANTOIN MONOHYDRATE/MACROCRYSTALLINE 100 MG: 25; 75 CAPSULE ORAL at 09:16

## 2020-03-18 RX ADMIN — ALOGLIPTIN 25 MG: 25 TABLET, FILM COATED ORAL at 09:23

## 2020-03-18 RX ADMIN — TIMOLOL MALEATE 1 DROP: 5 SOLUTION OPHTHALMIC at 09:19

## 2020-03-18 RX ADMIN — LABETALOL HYDROCHLORIDE 200 MG: 200 TABLET, FILM COATED ORAL at 06:17

## 2020-03-18 RX ADMIN — METFORMIN HYDROCHLORIDE 1000 MG: 1000 TABLET, FILM COATED ORAL at 09:20

## 2020-03-18 RX ADMIN — CLONIDINE HYDROCHLORIDE 0.1 MG: 0.1 TABLET ORAL at 09:19

## 2020-03-18 RX ADMIN — FUROSEMIDE 40 MG: 40 TABLET ORAL at 09:19

## 2020-03-18 RX ADMIN — DIVALPROEX SODIUM 500 MG: 250 TABLET, DELAYED RELEASE ORAL at 09:16

## 2020-03-18 RX ADMIN — INSULIN LISPRO 6 UNITS: 100 INJECTION, SOLUTION INTRAVENOUS; SUBCUTANEOUS at 09:16

## 2020-03-18 RX ADMIN — INSULIN LISPRO 1 UNITS: 100 INJECTION, SOLUTION INTRAVENOUS; SUBCUTANEOUS at 20:15

## 2020-03-18 RX ADMIN — COLCHICINE 0.6 MG: 0.6 TABLET, FILM COATED ORAL at 09:19

## 2020-03-18 RX ADMIN — LABETALOL HYDROCHLORIDE 200 MG: 200 TABLET, FILM COATED ORAL at 16:14

## 2020-03-18 RX ADMIN — NITROFURANTOIN MONOHYDRATE/MACROCRYSTALLINE 100 MG: 25; 75 CAPSULE ORAL at 20:20

## 2020-03-18 RX ADMIN — INSULIN GLARGINE 14 UNITS: 100 INJECTION, SOLUTION SUBCUTANEOUS at 20:15

## 2020-03-18 RX ADMIN — METFORMIN HYDROCHLORIDE 1000 MG: 1000 TABLET, FILM COATED ORAL at 16:15

## 2020-03-18 ASSESSMENT — PAIN - FUNCTIONAL ASSESSMENT: PAIN_FUNCTIONAL_ASSESSMENT: 0-10

## 2020-03-18 ASSESSMENT — PAIN SCALES - GENERAL
PAINLEVEL_OUTOF10: 0

## 2020-03-18 ASSESSMENT — LIFESTYLE VARIABLES: HISTORY_ALCOHOL_USE: NO

## 2020-03-18 ASSESSMENT — SLEEP AND FATIGUE QUESTIONNAIRES
DO YOU HAVE DIFFICULTY SLEEPING: NO
DIFFICULTY ARISING: NO
DIFFICULTY FALLING ASLEEP: YES
DIFFICULTY STAYING ASLEEP: YES
DO YOU USE A SLEEP AID: YES
SLEEP PATTERN: DIFFICULTY FALLING ASLEEP
RESTFUL SLEEP: NO

## 2020-03-18 NOTE — GROUP NOTE
Group Therapy Note    Date: 3/17/2020    Group Start Time: 1930  Group End Time: 2030  Group Topic: Relaxation    [facility] [address]        Group Therapy Note    Attendees: 5/7         Signature:  Gaurav Caballero
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
## 2020-03-18 NOTE — H&P
daily  metFORMIN (GLUCOPHAGE) 1000 MG tablet, Take 1 tablet by mouth 2 times daily (with meals)  atorvastatin (LIPITOR) 40 MG tablet, Take 1 tablet by mouth daily  brimonidine (ALPHAGAN) 0.2 % ophthalmic solution,   Timolol (TIMOPTIC) 0.5 % (DAILY) SOLN ophthalmic solution, Place 1 drop into both eyes 2 times daily  ammonium lactate (AMLACTIN) 12 % cream, APPLY TO AFFECTED AREA EVERY DAY  insulin glargine (LANTUS SOLOSTAR) 100 UNIT/ML injection pen, Inject 14 Units into the skin daily (Patient not taking: Reported on 3/10/2020)  FREESTYLE LITE strip, USE TO TEST BLOOD SUGAR EVERY MORNING  ARIPiprazole (ABILIFY) 30 MG tablet, Take 0.5 tablets by mouth daily [facility] (Patient not taking: Reported on 3/10/2020)  blood glucose monitor kit and supplies, CHECK BLOOD GLUCOSE DAILY DX: E11.9 ONE TOUCH GLUCOMETER REQUESTED IF OK WITH [facility]  FREESTYLE LANCETS MISC, Inject 1 each into the skin daily    Allergies:    Ace inhibitors; Ibuprofen; and Latuda [lurasidone hcl]    Social History:    reports that she has never smoked. She has never used smokeless tobacco. She reports that she does not drink alcohol or use drugs. Family History:   family history includes Heart Disease (age of onset: 76) in her father; High Blood Pressure in her father. REVIEW OF SYSTEMS:  As above in the HPI, otherwise negative    PHYSICAL EXAM:    Vitals:  /62   Pulse 93   Temp 97.5 °F (36.4 °C) (Temporal)   Resp 19   Ht 5' 3\" (1.6 m)   Wt 169 lb 2 oz (76.7 kg)   SpO2 100%   BMI 29.96 kg/m²     General:  Awake, alert, oriented X 3. Well developed, well nourished, well groomed. No apparent distress. HEENT:  Normocephalic, atraumatic. Pupils equal, round, reactive to light. No scleral icterus. No conjunctival injection. Normal lips, t No nasal discharge.   Left facial weakness from previous Bell's palsy noted  Neck:  Supple  Heart:  RRR, no murmurs, gallops, rubs  Lungs:  CTA bilaterally, bilat symmetrical
meals)  atorvastatin (LIPITOR) 40 MG tablet, Take 1 tablet by mouth daily  brimonidine (ALPHAGAN) 0.2 % ophthalmic solution,   Timolol (TIMOPTIC) 0.5 % (DAILY) SOLN ophthalmic solution, Place 1 drop into both eyes 2 times daily  ammonium lactate (AMLACTIN) 12 % cream, APPLY TO AFFECTED AREA EVERY DAY  insulin glargine (LANTUS SOLOSTAR) 100 UNIT/ML injection pen, Inject 14 Units into the skin daily (Patient not taking: Reported on 3/10/2020)  FREESTYLE LITE strip, USE TO TEST BLOOD SUGAR EVERY MORNING  ARIPiprazole (ABILIFY) 30 MG tablet, Take 0.5 tablets by mouth daily Filled at [place] pharmacy (Patient not taking: Reported on 3/10/2020)  blood glucose monitor kit and supplies, CHECK BLOOD GLUCOSE DAILY DX: E11.9 ONE TOUCH GLUCOMETER REQUESTED IF OK WITH [facility]  FREESTYLE LANCETS MISC, Inject 1 each into the skin daily    Compliance: States she will be compliant with medication therapy    Psychiatric Review of Systems       Depression: Denies     Gretta or Hypomania:  yes -manic     Panic Attacks:  no     Phobias:  no     Obsessions and Compulsions:  no     PTSD : No     Hallucinations:  no     Delusions:  yes -that 1 of the patients are which    Substance Abuse History:  ETOH: Negative  Marijuana: Negative  Opiates: Negative  Other Drugs: Negative      Past Psychiatric History:  Prior Diagnosis: Bipolar disorder type I  Psychiatrist: Dr. Any Hong  Therapist: Paul Valle  Hospitalization: yes  Hx of Suicidal Attempts: no  Hx of violence:  no  ECT: no  Previous discontinued Psychiatric Med Trials: Unknown    Past Medical History:        Diagnosis Date    Bell's palsy     [facility] [address]     bipolar    Carpal tunnel syndrome 8/4/2016    Hyperlipidemia     Non-insulin dependent type 2 diabetes mellitus ([facility] [address]     Systemic primary arterial hypertension 8/4/2016       Past Surgical History:        Procedure Laterality Date    ECHO COMPLETE  6/22/2013            Allergies:   Ace inhibitors;  Ibuprofen; and

## 2020-03-18 NOTE — CARE COORDINATION
GLORY Locke   Biopsychosocial Assessment Note    Social work met with patient to complete the biopsychosocial assessment and CSSR-S.  was unable to complete the social assessment, pt was not cooperative. Mental Status Exam: Pt is alert and orient x3  Chief Complaint: Pt report the police rrived at her [facility], [facility], Pt report she and her mothere had a disagreement,    Patient Report:    Gender  [] Male [x] Female [] Transgender  [] Other    Sexual Orientation    [x] Heterosexual [] Homosexual [] Bisexual [] Other    Suicidal Ideation  [] Reports [x] Denies    Homicidal Ideation  [] Reports [] Denies      Hallucinations (Specify type)  [] Reports [x] Denies     Substance Use/Alcohol Use/Addiction  [] Reports [x] Denies     Trauma History  [] Reports [x] Denies     Collateral Contact (JUVE signed)  Name:  Debra Coyle  Relationship: Mother  Number:     Collateral Information:   received collateral information from patient's mot her. Follow up provider: Pt will follow up with [facility] for discharge (where they live can they return): Per  pt mother, pt can return home upon discharge.

## 2020-03-19 LAB
ALBUMIN SERPL-MCNC: 3.9 G/DL (ref 3.5–5.2)
ALP BLD-CCNC: 76 U/L (ref 35–104)
ALT SERPL-CCNC: 24 U/L (ref 0–32)
ANION GAP SERPL CALCULATED.3IONS-SCNC: 13 MMOL/L (ref 7–16)
AST SERPL-CCNC: 19 U/L (ref 0–31)
BILIRUB SERPL-MCNC: 0.3 MG/DL (ref 0–1.2)
BUN BLDV-MCNC: 21 MG/DL (ref 8–23)
CALCIUM SERPL-MCNC: 9.7 MG/DL (ref 8.6–10.2)
CHLORIDE BLD-SCNC: 97 MMOL/L (ref 98–107)
CO2: 28 MMOL/L (ref 22–29)
CREAT SERPL-MCNC: 1.2 MG/DL (ref 0.5–1)
GFR AFRICAN AMERICAN: 55
GFR NON-AFRICAN AMERICAN: 55 ML/MIN/1.73
GLUCOSE BLD-MCNC: 211 MG/DL (ref 74–99)
METER GLUCOSE: 114 MG/DL (ref 74–99)
METER GLUCOSE: 140 MG/DL (ref 74–99)
METER GLUCOSE: 151 MG/DL (ref 74–99)
METER GLUCOSE: 171 MG/DL (ref 74–99)
POTASSIUM SERPL-SCNC: 4.1 MMOL/L (ref 3.5–5)
SODIUM BLD-SCNC: 138 MMOL/L (ref 132–146)
TOTAL PROTEIN: 7 G/DL (ref 6.4–8.3)

## 2020-03-19 PROCEDURE — 6370000000 HC RX 637 (ALT 250 FOR IP): Performed by: INTERNAL MEDICINE

## 2020-03-19 PROCEDURE — 6370000000 HC RX 637 (ALT 250 FOR IP): Performed by: NURSE PRACTITIONER

## 2020-03-19 PROCEDURE — 80053 COMPREHEN METABOLIC PANEL: CPT

## 2020-03-19 PROCEDURE — 99233 SBSQ HOSP IP/OBS HIGH 50: CPT | Performed by: NURSE PRACTITIONER

## 2020-03-19 PROCEDURE — 82962 GLUCOSE BLOOD TEST: CPT

## 2020-03-19 PROCEDURE — 6370000000 HC RX 637 (ALT 250 FOR IP): Performed by: PSYCHIATRY & NEUROLOGY

## 2020-03-19 PROCEDURE — 1240000000 HC EMOTIONAL WELLNESS R&B

## 2020-03-19 PROCEDURE — 36415 COLL VENOUS BLD VENIPUNCTURE: CPT

## 2020-03-19 RX ADMIN — INSULIN LISPRO 2 UNITS: 100 INJECTION, SOLUTION INTRAVENOUS; SUBCUTANEOUS at 16:57

## 2020-03-19 RX ADMIN — BRIMONIDINE TARTRATE 1 DROP: 2 SOLUTION/ DROPS OPHTHALMIC at 20:58

## 2020-03-19 RX ADMIN — INSULIN LISPRO 1 UNITS: 100 INJECTION, SOLUTION INTRAVENOUS; SUBCUTANEOUS at 20:56

## 2020-03-19 RX ADMIN — BRIMONIDINE TARTRATE 1 DROP: 2 SOLUTION/ DROPS OPHTHALMIC at 09:14

## 2020-03-19 RX ADMIN — LATANOPROST 1 DROP: 50 SOLUTION OPHTHALMIC at 20:58

## 2020-03-19 RX ADMIN — TIMOLOL MALEATE 1 DROP: 5 SOLUTION OPHTHALMIC at 20:58

## 2020-03-19 RX ADMIN — LABETALOL HYDROCHLORIDE 200 MG: 200 TABLET, FILM COATED ORAL at 06:20

## 2020-03-19 RX ADMIN — NITROFURANTOIN MONOHYDRATE/MACROCRYSTALLINE 100 MG: 25; 75 CAPSULE ORAL at 09:12

## 2020-03-19 RX ADMIN — METFORMIN HYDROCHLORIDE 1000 MG: 1000 TABLET, FILM COATED ORAL at 09:13

## 2020-03-19 RX ADMIN — INSULIN LISPRO 2 UNITS: 100 INJECTION, SOLUTION INTRAVENOUS; SUBCUTANEOUS at 02:00

## 2020-03-19 RX ADMIN — ALOGLIPTIN 25 MG: 25 TABLET, FILM COATED ORAL at 09:13

## 2020-03-19 RX ADMIN — METFORMIN HYDROCHLORIDE 1000 MG: 1000 TABLET, FILM COATED ORAL at 16:57

## 2020-03-19 RX ADMIN — CLONIDINE HYDROCHLORIDE 0.1 MG: 0.1 TABLET ORAL at 09:13

## 2020-03-19 RX ADMIN — TRAZODONE HYDROCHLORIDE 25 MG: 50 TABLET ORAL at 20:55

## 2020-03-19 RX ADMIN — COLCHICINE 0.6 MG: 0.6 TABLET, FILM COATED ORAL at 09:13

## 2020-03-19 RX ADMIN — DIVALPROEX SODIUM 500 MG: 250 TABLET, DELAYED RELEASE ORAL at 20:55

## 2020-03-19 RX ADMIN — COLCHICINE 0.6 MG: 0.6 TABLET, FILM COATED ORAL at 20:55

## 2020-03-19 RX ADMIN — INSULIN GLARGINE 14 UNITS: 100 INJECTION, SOLUTION SUBCUTANEOUS at 20:56

## 2020-03-19 RX ADMIN — FUROSEMIDE 40 MG: 40 TABLET ORAL at 09:12

## 2020-03-19 RX ADMIN — NITROFURANTOIN MONOHYDRATE/MACROCRYSTALLINE 100 MG: 25; 75 CAPSULE ORAL at 20:54

## 2020-03-19 RX ADMIN — ARIPIPRAZOLE 5 MG: 5 TABLET ORAL at 09:14

## 2020-03-19 RX ADMIN — ARIPIPRAZOLE 5 MG: 5 TABLET ORAL at 20:55

## 2020-03-19 RX ADMIN — TIMOLOL MALEATE 1 DROP: 5 SOLUTION OPHTHALMIC at 09:14

## 2020-03-19 RX ADMIN — LABETALOL HYDROCHLORIDE 200 MG: 200 TABLET, FILM COATED ORAL at 21:00

## 2020-03-19 RX ADMIN — CLONIDINE HYDROCHLORIDE 0.1 MG: 0.1 TABLET ORAL at 20:56

## 2020-03-19 RX ADMIN — ATORVASTATIN CALCIUM 40 MG: 40 TABLET, FILM COATED ORAL at 20:56

## 2020-03-19 RX ADMIN — DIVALPROEX SODIUM 500 MG: 250 TABLET, DELAYED RELEASE ORAL at 09:15

## 2020-03-19 ASSESSMENT — PAIN SCALES - GENERAL
PAINLEVEL_OUTOF10: 0

## 2020-03-19 NOTE — PROGRESS NOTES
Group Therapy Note  Patient attended goals group and stated daily goal as to make others laugh.                                                                         Group Therapy Note     Date: 3/19/2020  Start Time:10:00  End Time:  10:30  Number of Participants: 7     Type of Group: Psychoeducation     Wellness Binder Information  Module Name:  Coping Skills  Session Number:  NA     Patient's Goal: To id positive coping skills to use on a daily basis.     Notes: Attended group and was able to participate. Status After Intervention:  Unchanged    Participation Level:  Active Listener and Interactive    Participation Quality: Attentive and Sharing      Speech:  hesitant      Thought Process/Content: Logical      Affective Functioning: incongruent      Mood: elevated      Level of consciousness:  Alert      Response to Learning: Progressing to goal      Endings: None Reported    Modes of Intervention: Education      Discipline Responsible: Psychoeducational Specialist      Signature:  Marisol Villar, LSW

## 2020-03-19 NOTE — PROGRESS NOTES
 Smoking status: Never Smoker    Smokeless tobacco: Never Used   Substance and Sexual Activity    Alcohol use: No    Drug use: No    Sexual activity: Not Currently   Lifestyle    Physical activity     Days per week: Not on file     Minutes per session: Not on file    Stress: Not on file   Relationships    Social connections     Talks on phone: Not on file     Gets together: Not on file     Attends Judaism service: Not on file     Active member of club or organization: Not on file     Attends meetings of clubs or organizations: Not on file     Relationship status: Not on file    Intimate partner violence     Fear of current or ex partner: Not on file     Emotionally abused: Not on file     Physically abused: Not on file     Forced sexual activity: Not on file   Other Topics Concern    Not on file   Social History Narrative    Not on file           ROS:  [x] All negative/unchanged except if checked.  Explain positive(checked items) below:  [] Constitutional  [] Eyes  [] Ear/Nose/Mouth/Throat  [] Respiratory  [] CV  [] GI  []   [] Musculoskeletal  [] Skin/Breast  [] Neurological  [] Endocrine  [] Heme/Lymph  [] Allergic/Immunologic    Explanation:     MEDICATIONS:    Current Facility-Administered Medications:     divalproex (DEPAKOTE) DR tablet 500 mg, 500 mg, Oral, BID, CHRISS Goodwin - CNP, 500 mg at 03/19/20 0915    ARIPiprazole (ABILIFY) tablet 5 mg, 5 mg, Oral, BID, CHRISS Goodwin - CNP, 5 mg at 03/19/20 0914    alogliptin (NESINA) tablet 25 mg, 25 mg, Oral, Daily, CHRISS Pretty - CNP, 25 mg at 03/19/20 0913    metFORMIN (GLUCOPHAGE) tablet 1,000 mg, 1,000 mg, Oral, BID WC, CHRISS Pretty - CNP, 1,000 mg at 03/19/20 0913    acetaminophen (TYLENOL) tablet 650 mg, 650 mg, Oral, Q4H PRN, Shay Moss MD    haloperidol lactate (HALDOL) injection 3 mg, 3 mg, Intramuscular, Q6H PRN **OR** haloperidol (HALDOL) tablet 3 mg, 3 mg, Oral, Q6H PRN, Shay Moss MD  [facility] 03/17/20  1044 03/18/20  0729 03/19/20  0730   BILITOT 0.4 0.3 0.3   ALKPHOS 68 69 76   AST 28 23 19   ALT 25 26 24     Lab Results   Component Value Date    LABAMPH NOT DETECTED 03/17/2020    LABAMPH NOT DETECTED 03/15/2011    BARBSCNU NOT DETECTED 03/17/2020    LABBENZ NOT DETECTED 03/17/2020    LABBENZ SEE BELOW 05/06/2014    CANNAB NOT DETECTED  03/15/2011    LABMETH NOT DETECTED 03/17/2020    OPIATESCREENURINE NOT DETECTED 03/17/2020    PHENCYCLIDINESCREENURINE NOT DETECTED 03/17/2020    ETOH <10 03/17/2020     Lab Results   Component Value Date    TSH 5.130 03/22/2019     No results found for: LITHIUM  Lab Results   Component Value Date    VALPROATE 9 (L) 03/17/2020       Treatment Plan:  Reviewed current Medications with the patient  Depakote  mg twice daily for mood stabilization  Abilify 5 mg twice daily for psychosis  Risks, benefits, side effects, drug-to-drug interactions and alternatives to treatment were discussed. Collateral information: To be obtained by  to insure patient safety on discharge  CD evaluation  Encourage patient to attend group and other milieu activities.   Discharge planning discussed with the patient and treatment team.    PSYCHOTHERAPY/COUNSELING:  [x] Therapeutic interview  [x] Supportive  [] CBT  [] Ongoing  [] Other    [x] Patient continues to need, on a daily basis, active treatment furnished directly by or requiring the supervision of inpatient psychiatric personnel      Anticipated Length of stay: 5 to 7 days            Electronically signed by CHRISS Roberson CNP on 3/19/2020 at 2:37 PM

## 2020-03-19 NOTE — PLAN OF CARE
1201 by Amalia Frances RN  Outcome: Not Met This Shift     Problem: Altered Mood, Manic Behavior:  Goal: Ability to demonstrate self-control will improve  Description: Ability to demonstrate self-control will improve  Outcome: Not Met This Shift     Problem: Altered Mood, Manic Behavior:  Goal: Mood stable  Description: Mood stable  3/19/2020 1201 by Amalia Frances RN  Outcome: Not Met This Shift

## 2020-03-20 LAB
ALBUMIN SERPL-MCNC: 3.8 G/DL (ref 3.5–5.2)
ALP BLD-CCNC: 68 U/L (ref 35–104)
ALT SERPL-CCNC: 20 U/L (ref 0–32)
ANION GAP SERPL CALCULATED.3IONS-SCNC: 14 MMOL/L (ref 7–16)
AST SERPL-CCNC: 17 U/L (ref 0–31)
BILIRUB SERPL-MCNC: 0.3 MG/DL (ref 0–1.2)
BUN BLDV-MCNC: 25 MG/DL (ref 8–23)
CALCIUM SERPL-MCNC: 9.8 MG/DL (ref 8.6–10.2)
CHLORIDE BLD-SCNC: 99 MMOL/L (ref 98–107)
CO2: 25 MMOL/L (ref 22–29)
CREAT SERPL-MCNC: 1 MG/DL (ref 0.5–1)
GFR AFRICAN AMERICAN: >60
GFR NON-AFRICAN AMERICAN: >60 ML/MIN/1.73
GLUCOSE BLD-MCNC: 107 MG/DL (ref 74–99)
METER GLUCOSE: 104 MG/DL (ref 74–99)
METER GLUCOSE: 132 MG/DL (ref 74–99)
METER GLUCOSE: 151 MG/DL (ref 74–99)
METER GLUCOSE: 175 MG/DL (ref 74–99)
ORGANISM: ABNORMAL
POTASSIUM SERPL-SCNC: 4.4 MMOL/L (ref 3.5–5)
SODIUM BLD-SCNC: 138 MMOL/L (ref 132–146)
TOTAL PROTEIN: 7 G/DL (ref 6.4–8.3)
URINE CULTURE, ROUTINE: ABNORMAL

## 2020-03-20 PROCEDURE — 80053 COMPREHEN METABOLIC PANEL: CPT

## 2020-03-20 PROCEDURE — 6370000000 HC RX 637 (ALT 250 FOR IP): Performed by: NURSE PRACTITIONER

## 2020-03-20 PROCEDURE — 6370000000 HC RX 637 (ALT 250 FOR IP): Performed by: INTERNAL MEDICINE

## 2020-03-20 PROCEDURE — 82962 GLUCOSE BLOOD TEST: CPT

## 2020-03-20 PROCEDURE — 1240000000 HC EMOTIONAL WELLNESS R&B

## 2020-03-20 PROCEDURE — 36415 COLL VENOUS BLD VENIPUNCTURE: CPT

## 2020-03-20 PROCEDURE — 99232 SBSQ HOSP IP/OBS MODERATE 35: CPT | Performed by: NURSE PRACTITIONER

## 2020-03-20 PROCEDURE — 6370000000 HC RX 637 (ALT 250 FOR IP): Performed by: PSYCHIATRY & NEUROLOGY

## 2020-03-20 RX ORDER — SULFAMETHOXAZOLE AND TRIMETHOPRIM 800; 160 MG/1; MG/1
1 TABLET ORAL EVERY 12 HOURS SCHEDULED
Status: DISCONTINUED | OUTPATIENT
Start: 2020-03-20 | End: 2020-03-28

## 2020-03-20 RX ADMIN — ARIPIPRAZOLE 5 MG: 5 TABLET ORAL at 09:03

## 2020-03-20 RX ADMIN — INSULIN GLARGINE 14 UNITS: 100 INJECTION, SOLUTION SUBCUTANEOUS at 20:48

## 2020-03-20 RX ADMIN — CLONIDINE HYDROCHLORIDE 0.1 MG: 0.1 TABLET ORAL at 20:42

## 2020-03-20 RX ADMIN — ERGOCALCIFEROL 50000 UNITS: 1.25 CAPSULE ORAL at 09:03

## 2020-03-20 RX ADMIN — ATORVASTATIN CALCIUM 40 MG: 40 TABLET, FILM COATED ORAL at 20:42

## 2020-03-20 RX ADMIN — TRAZODONE HYDROCHLORIDE 25 MG: 50 TABLET ORAL at 20:42

## 2020-03-20 RX ADMIN — LABETALOL HYDROCHLORIDE 200 MG: 200 TABLET, FILM COATED ORAL at 15:07

## 2020-03-20 RX ADMIN — TIMOLOL MALEATE 1 DROP: 5 SOLUTION OPHTHALMIC at 09:33

## 2020-03-20 RX ADMIN — METFORMIN HYDROCHLORIDE 1000 MG: 1000 TABLET, FILM COATED ORAL at 17:37

## 2020-03-20 RX ADMIN — LABETALOL HYDROCHLORIDE 200 MG: 200 TABLET, FILM COATED ORAL at 06:55

## 2020-03-20 RX ADMIN — COLCHICINE 0.6 MG: 0.6 TABLET, FILM COATED ORAL at 09:24

## 2020-03-20 RX ADMIN — DIVALPROEX SODIUM 500 MG: 250 TABLET, DELAYED RELEASE ORAL at 09:23

## 2020-03-20 RX ADMIN — SULFAMETHOXAZOLE AND TRIMETHOPRIM 1 TABLET: 800; 160 TABLET ORAL at 20:42

## 2020-03-20 RX ADMIN — DIVALPROEX SODIUM 500 MG: 250 TABLET, DELAYED RELEASE ORAL at 20:43

## 2020-03-20 RX ADMIN — ARIPIPRAZOLE 5 MG: 5 TABLET ORAL at 20:42

## 2020-03-20 RX ADMIN — INSULIN LISPRO 1 UNITS: 100 INJECTION, SOLUTION INTRAVENOUS; SUBCUTANEOUS at 20:49

## 2020-03-20 RX ADMIN — NITROFURANTOIN MONOHYDRATE/MACROCRYSTALLINE 100 MG: 25; 75 CAPSULE ORAL at 09:03

## 2020-03-20 RX ADMIN — COLCHICINE 0.6 MG: 0.6 TABLET, FILM COATED ORAL at 20:42

## 2020-03-20 RX ADMIN — BRIMONIDINE TARTRATE 1 DROP: 2 SOLUTION/ DROPS OPHTHALMIC at 09:07

## 2020-03-20 RX ADMIN — LATANOPROST 1 DROP: 50 SOLUTION OPHTHALMIC at 20:43

## 2020-03-20 RX ADMIN — TIMOLOL MALEATE 1 DROP: 5 SOLUTION OPHTHALMIC at 20:52

## 2020-03-20 RX ADMIN — LABETALOL HYDROCHLORIDE 200 MG: 200 TABLET, FILM COATED ORAL at 20:43

## 2020-03-20 RX ADMIN — INSULIN LISPRO 2 UNITS: 100 INJECTION, SOLUTION INTRAVENOUS; SUBCUTANEOUS at 09:27

## 2020-03-20 RX ADMIN — ALOGLIPTIN 25 MG: 25 TABLET, FILM COATED ORAL at 09:03

## 2020-03-20 RX ADMIN — FUROSEMIDE 40 MG: 40 TABLET ORAL at 09:03

## 2020-03-20 RX ADMIN — CLONIDINE HYDROCHLORIDE 0.1 MG: 0.1 TABLET ORAL at 09:22

## 2020-03-20 RX ADMIN — METFORMIN HYDROCHLORIDE 1000 MG: 1000 TABLET, FILM COATED ORAL at 09:03

## 2020-03-20 RX ADMIN — BRIMONIDINE TARTRATE 1 DROP: 2 SOLUTION/ DROPS OPHTHALMIC at 20:43

## 2020-03-20 ASSESSMENT — PAIN SCALES - GENERAL
PAINLEVEL_OUTOF10: 0
PAINLEVEL_OUTOF10: 0

## 2020-03-20 NOTE — PROGRESS NOTES
Shar Morse MD FACP  Progress notes      CHIEF COMPLAINT:  Manic episode    HISTORY OF PRESENT ILLNESS:    68-year-old -American American woman seen on the behavioral unit earlier this morning  Well-known to me with multiple medical problems and psychiatric problems  Known history of bipolar disorder  Recently discharged from the nursing home  Lives with mother with whom she does not get along  Brought because of manic episode with pressured speech agitation inability to focus  Denied suicidal ideation  Admitted for further management  She admitted that she was not taking her psychiatric medications as prescribed  On Macrobid for the UTI  Cultures showing Klebsiella only intermediate sensitivity to Macrobid  Overall she feels better  Past Medical History:    Past Medical History:   Diagnosis Date    Bell's palsy     B[facility] disorder ([facility] [address]     bipolar    Carpal tunnel syndrome 8/4/2016    Hyperlipidemia     Non-insulin dependent type 2 diabetes mellitus ([facility] [address]     Systemic primary arterial hypertension 8/4/2016       Past Surgical History:    Past Surgical History:   Procedure Laterality Date    ECHO COMPLETE  6/22/2013            Medications Prior to Admission:    Medications Prior to Admission: latanoprost (XALATAN) 0.005 % ophthalmic solution, Place 1 drop into both eyes nightly  colchicine (COLCRYS) 0.6 MG tablet, Take 1 tablet by mouth 2 times daily  labetalol (NORMODYNE) 200 MG tablet, Take 1 tablet by mouth every 8 hours  ARIPiprazole (ABILIFY) 15 MG tablet, Take 1 tablet by mouth daily  vitamin D (ERGOCALCIFEROL) 1.25 MG (97936 UT) CAPS capsule, Take 1 capsule by mouth once a week (Patient taking differently: Take 50,000 Units by mouth once a week fridays)  SITagliptin (JANUVIA) 100 MG tablet, Take 1 tablet by mouth daily  Omega-3 Fatty Acids (FISH OIL) 1000 MG CAPS, Take 3 capsules by mouth daily  furosemide (LASIX) 20 MG tablet, Take 1 tablet by mouth daily  cloNIDine (CATAPRES) 0.1 MG tablet, Take 1 tablet by mouth 2 times daily  divalproex (DEPAKOTE) 500 MG DR tablet, Take 1 tablet by mouth daily  metFORMIN (GLUCOPHAGE) 1000 MG tablet, Take 1 tablet by mouth 2 times daily (with meals)  atorvastatin (LIPITOR) 40 MG tablet, Take 1 tablet by mouth daily  brimonidine (ALPHAGAN) 0.2 % ophthalmic solution,   Timolol (TIMOPTIC) 0.5 % (DAILY) SOLN ophthalmic solution, Place 1 drop into both eyes 2 times daily  ammonium lactate (AMLACTIN) 12 % cream, APPLY TO AFFECTED AREA EVERY DAY  insulin glargine (LANTUS SOLOSTAR) 100 UNIT/ML injection pen, Inject 14 Units into the skin daily (Patient not taking: Reported on 3/10/2020)  FREESTYLE LITE strip, USE TO TEST BLOOD SUGAR EVERY MORNING  ARIPiprazole (ABILIFY) 30 MG tablet, Take 0.5 tablets by mouth daily [facility] (Patient not taking: Reported on 3/10/2020)  blood glucose monitor kit and supplies, CHECK BLOOD GLUCOSE DAILY DX: E11.9 ONE TOUCH GLUCOMETER REQUESTED IF OK WITH [facility]  FREESTYLE LANCETS MISC, Inject 1 each into the skin daily    Allergies:    Ace inhibitors; Ibuprofen; and Latuda [lurasidone hcl]    Social History:    reports that she has never smoked. She has never used smokeless tobacco. She reports that she does not drink alcohol or use drugs. Family History:   family history includes Heart Disease (age of onset: 76) in her father; High Blood Pressure in her father. REVIEW OF SYSTEMS:  As above in the HPI, otherwise negative    PHYSICAL EXAM:    Vitals:  /69   Pulse 105   Temp 97 °F (36.1 °C) (Temporal)   Resp 18   Ht 5' 3\" (1.6 m)   Wt 169 lb 2 oz (76.7 kg)   SpO2 96%   BMI 29.96 kg/m²     General:  Awake, alert, oriented X 3. Well developed, well nourished, well groomed. No apparent distress. HEENT:  Normocephalic, atraumatic. Pupils equal, round, reactive to light. No scleral icterus. No conjunctival injection. Normal lips, t No nasal discharge.   Left facial weakness from previous Bell's palsy

## 2020-03-20 NOTE — PROGRESS NOTES
100 mg, Oral, 2 times per day, [place]ia Rehman MD, 100 mg at 03/20/20 0903    glucose (GLUTOSE) 40 % oral gel 15 g, 15 g, Oral, PRN, [place]ia Rehman MD    dextrose 50 % IV solution, 12.5 g, Intravenous, PRN, Albania Rehman MD    glucagon (rDNA) injection 1 mg, 1 mg, Intramuscular, PRN, Albania Rehman MD    dextrose 5 % solution, 100 mL/hr, Intravenous, PRN, Albania Rehman MD      Examination:  /69   Pulse 105   Temp 97 °F (36.1 °C) (Temporal)   Resp 18   Ht 5' 3\" (1.6 m)   Wt 169 lb 2 oz (76.7 kg)   SpO2 96%   BMI 29.96 kg/m²   Gait - steady  Medication side effects(SE):  No medication side effects to be noted, patient was educated on signs and symptoms of medication side effects instructed to notify medical staff if any signs and symptoms occur. Patient has the capacity to understand this information and what I instructed her to do if medication side effects arise. Mental Status Examination:    Level of consciousness:  within normal limits   Appearance:  fair grooming and fair hygiene  Behavior/Motor: No psychomotor agitation or retardation noted  Attitude toward examiner: Calm cooperative  Speech: Pressured, loud  Mood: labile  Affect:  intense  Thought processes:  rapid, overabundance of ideas, flight of ideas and tangential   Thought content: Without hallucinations delusions or paranoia  Cognition:  oriented to person, place, and time   Concentration poor  Insight poor   Judgement poor     ASSESSMENT:   Patient symptoms are:  [] Well controlled  [] Improving  [] Worsening  [x] No change      Diagnosis:  Principal Problem:    Bipolar I disorder, current or most recent episode manic, severe (HCC)  Active Problems:    [facility]  Resolved Problems:    * No resolved hospital problems. *      LABS:    No results for input(s): WBC, HGB, PLT in the last 72 hours.   Recent Labs     03/18/20  0729 03/19/20  0730 03/20/20  0624    138 138   K 3.8 4.1 4.4   CL 98 97* 99   CO2 25 28 25 BUN 15 21 25*   CREATININE 0.8 1.2* 1.0   GLUCOSE 264* 211* 107*     Recent Labs     03/18/20  0729 03/19/20  0730 03/20/20  0624   BILITOT 0.3 0.3 0.3   ALKPHOS 69 76 68   AST 23 19 17   ALT 26 24 20     Lab Results   Component Value Date    LABAMPH NOT DETECTED 03/17/2020    LABAMPH NOT DETECTED 03/15/2011    BARBSCNU NOT DETECTED 03/17/2020    LABBENZ NOT DETECTED 03/17/2020    LABBENZ SEE BELOW 05/06/2014    CANNAB NOT DETECTED  03/15/2011    LABMETH NOT DETECTED 03/17/2020    OPIATESCREENURINE NOT DETECTED 03/17/2020    PHENCYCLIDINESCREENURINE NOT DETECTED 03/17/2020    ETOH <10 03/17/2020     Lab Results   Component Value Date    TSH 5.130 03/22/2019     No results found for: LITHIUM  Lab Results   Component Value Date    VALPROATE 9 (L) 03/17/2020       Treatment Plan:  Reviewed current Medications with the patient  Depakote  mg twice daily for mood stabilization  Abilify 5 mg twice daily for psychosis  Risks, benefits, side effects, drug-to-drug interactions and alternatives to treatment were discussed. [place] score of 16 could be skewed due to the patient's hira we will re-test over the weekend. Collateral information: To be obtained by  to insure patient safety on discharge  CD evaluation  Encourage patient to attend group and other milieu activities.   Discharge planning discussed with the patient and treatment team.    PSYCHOTHERAPY/COUNSELING:  [x] Therapeutic interview  [x] Supportive  [] CBT  [] Ongoing  [] Other    [x] Patient continues to need, on a daily basis, active treatment furnished directly by or requiring the supervision of inpatient psychiatric personnel      Anticipated Length of stay: 5 to 7 days            Electronically signed by CHRISS Peterson CNP on 3/20/2020 at 12:42 PM

## 2020-03-21 LAB
ALBUMIN SERPL-MCNC: 3.8 G/DL (ref 3.5–5.2)
ALP BLD-CCNC: 67 U/L (ref 35–104)
ALT SERPL-CCNC: 17 U/L (ref 0–32)
ANION GAP SERPL CALCULATED.3IONS-SCNC: 16 MMOL/L (ref 7–16)
AST SERPL-CCNC: 15 U/L (ref 0–31)
BILIRUB SERPL-MCNC: 0.3 MG/DL (ref 0–1.2)
BUN BLDV-MCNC: 23 MG/DL (ref 8–23)
CALCIUM SERPL-MCNC: 9.5 MG/DL (ref 8.6–10.2)
CHLORIDE BLD-SCNC: 99 MMOL/L (ref 98–107)
CO2: 26 MMOL/L (ref 22–29)
CREAT SERPL-MCNC: 0.8 MG/DL (ref 0.5–1)
GFR AFRICAN AMERICAN: >60
GFR NON-AFRICAN AMERICAN: >60 ML/MIN/1.73
GLUCOSE BLD-MCNC: 104 MG/DL (ref 74–99)
METER GLUCOSE: 104 MG/DL (ref 74–99)
METER GLUCOSE: 131 MG/DL (ref 74–99)
METER GLUCOSE: 168 MG/DL (ref 74–99)
METER GLUCOSE: 99 MG/DL (ref 74–99)
POTASSIUM SERPL-SCNC: 4.7 MMOL/L (ref 3.5–5)
SODIUM BLD-SCNC: 141 MMOL/L (ref 132–146)
TOTAL PROTEIN: 6.6 G/DL (ref 6.4–8.3)
VALPROIC ACID LEVEL: 66 MCG/ML (ref 50–100)

## 2020-03-21 PROCEDURE — 36415 COLL VENOUS BLD VENIPUNCTURE: CPT

## 2020-03-21 PROCEDURE — 80053 COMPREHEN METABOLIC PANEL: CPT

## 2020-03-21 PROCEDURE — 6370000000 HC RX 637 (ALT 250 FOR IP): Performed by: NURSE PRACTITIONER

## 2020-03-21 PROCEDURE — 1240000000 HC EMOTIONAL WELLNESS R&B

## 2020-03-21 PROCEDURE — 82962 GLUCOSE BLOOD TEST: CPT

## 2020-03-21 PROCEDURE — 80164 ASSAY DIPROPYLACETIC ACD TOT: CPT

## 2020-03-21 PROCEDURE — 6370000000 HC RX 637 (ALT 250 FOR IP): Performed by: INTERNAL MEDICINE

## 2020-03-21 PROCEDURE — 99232 SBSQ HOSP IP/OBS MODERATE 35: CPT | Performed by: NURSE PRACTITIONER

## 2020-03-21 PROCEDURE — 6370000000 HC RX 637 (ALT 250 FOR IP): Performed by: PSYCHIATRY & NEUROLOGY

## 2020-03-21 RX ADMIN — LATANOPROST 1 DROP: 50 SOLUTION OPHTHALMIC at 21:23

## 2020-03-21 RX ADMIN — ARIPIPRAZOLE 5 MG: 5 TABLET ORAL at 21:17

## 2020-03-21 RX ADMIN — COLCHICINE 0.6 MG: 0.6 TABLET, FILM COATED ORAL at 08:45

## 2020-03-21 RX ADMIN — TIMOLOL MALEATE 1 DROP: 5 SOLUTION OPHTHALMIC at 21:22

## 2020-03-21 RX ADMIN — DIVALPROEX SODIUM 500 MG: 250 TABLET, DELAYED RELEASE ORAL at 08:45

## 2020-03-21 RX ADMIN — LABETALOL HYDROCHLORIDE 200 MG: 200 TABLET, FILM COATED ORAL at 06:34

## 2020-03-21 RX ADMIN — ALOGLIPTIN 25 MG: 25 TABLET, FILM COATED ORAL at 08:46

## 2020-03-21 RX ADMIN — TRAZODONE HYDROCHLORIDE 25 MG: 50 TABLET ORAL at 21:17

## 2020-03-21 RX ADMIN — LABETALOL HYDROCHLORIDE 200 MG: 200 TABLET, FILM COATED ORAL at 14:16

## 2020-03-21 RX ADMIN — LABETALOL HYDROCHLORIDE 200 MG: 200 TABLET, FILM COATED ORAL at 21:16

## 2020-03-21 RX ADMIN — DIVALPROEX SODIUM 500 MG: 250 TABLET, DELAYED RELEASE ORAL at 21:16

## 2020-03-21 RX ADMIN — ATORVASTATIN CALCIUM 40 MG: 40 TABLET, FILM COATED ORAL at 21:16

## 2020-03-21 RX ADMIN — SULFAMETHOXAZOLE AND TRIMETHOPRIM 1 TABLET: 800; 160 TABLET ORAL at 08:46

## 2020-03-21 RX ADMIN — ARIPIPRAZOLE 5 MG: 5 TABLET ORAL at 08:46

## 2020-03-21 RX ADMIN — INSULIN GLARGINE 14 UNITS: 100 INJECTION, SOLUTION SUBCUTANEOUS at 21:27

## 2020-03-21 RX ADMIN — BRIMONIDINE TARTRATE 1 DROP: 2 SOLUTION/ DROPS OPHTHALMIC at 21:25

## 2020-03-21 RX ADMIN — TIMOLOL MALEATE 1 DROP: 5 SOLUTION OPHTHALMIC at 08:45

## 2020-03-21 RX ADMIN — CLONIDINE HYDROCHLORIDE 0.1 MG: 0.1 TABLET ORAL at 21:17

## 2020-03-21 RX ADMIN — FUROSEMIDE 40 MG: 40 TABLET ORAL at 08:46

## 2020-03-21 RX ADMIN — BRIMONIDINE TARTRATE 1 DROP: 2 SOLUTION/ DROPS OPHTHALMIC at 08:44

## 2020-03-21 RX ADMIN — SULFAMETHOXAZOLE AND TRIMETHOPRIM 1 TABLET: 800; 160 TABLET ORAL at 21:16

## 2020-03-21 RX ADMIN — CLONIDINE HYDROCHLORIDE 0.1 MG: 0.1 TABLET ORAL at 08:45

## 2020-03-21 RX ADMIN — COLCHICINE 0.6 MG: 0.6 TABLET, FILM COATED ORAL at 21:16

## 2020-03-21 RX ADMIN — METFORMIN HYDROCHLORIDE 1000 MG: 1000 TABLET, FILM COATED ORAL at 08:46

## 2020-03-21 RX ADMIN — METFORMIN HYDROCHLORIDE 1000 MG: 1000 TABLET, FILM COATED ORAL at 17:20

## 2020-03-21 ASSESSMENT — PAIN SCALES - GENERAL
PAINLEVEL_OUTOF10: 4
PAINLEVEL_OUTOF10: 0
PAINLEVEL_OUTOF10: 4
PAINLEVEL_OUTOF10: 0

## 2020-03-21 NOTE — GROUP NOTE
Group Therapy Note    Date: 3/21/2020    Group Start Time: 1600  Group End Time: 1700  Group Topic: Group Therapy    [facility] ACUTE  2    Rinku Harrison RN    Patient attended/participated in afternoon group.     Group Therapy Note    Attendees: 6/8           Signature:  Rinku Harrison RN

## 2020-03-22 LAB
ALBUMIN SERPL-MCNC: 3.3 G/DL (ref 3.5–5.2)
ALP BLD-CCNC: 60 U/L (ref 35–104)
ALT SERPL-CCNC: 15 U/L (ref 0–32)
ANION GAP SERPL CALCULATED.3IONS-SCNC: 14 MMOL/L (ref 7–16)
AST SERPL-CCNC: 14 U/L (ref 0–31)
BILIRUB SERPL-MCNC: 0.2 MG/DL (ref 0–1.2)
BUN BLDV-MCNC: 27 MG/DL (ref 8–23)
CALCIUM SERPL-MCNC: 9.1 MG/DL (ref 8.6–10.2)
CHLORIDE BLD-SCNC: 102 MMOL/L (ref 98–107)
CO2: 24 MMOL/L (ref 22–29)
CREAT SERPL-MCNC: 1.2 MG/DL (ref 0.5–1)
GFR AFRICAN AMERICAN: 55
GFR NON-AFRICAN AMERICAN: 55 ML/MIN/1.73
GLUCOSE BLD-MCNC: 70 MG/DL (ref 74–99)
METER GLUCOSE: 102 MG/DL (ref 74–99)
METER GLUCOSE: 111 MG/DL (ref 74–99)
METER GLUCOSE: 77 MG/DL (ref 74–99)
METER GLUCOSE: 98 MG/DL (ref 74–99)
POTASSIUM SERPL-SCNC: 4.3 MMOL/L (ref 3.5–5)
SODIUM BLD-SCNC: 140 MMOL/L (ref 132–146)
TOTAL PROTEIN: 6.2 G/DL (ref 6.4–8.3)

## 2020-03-22 PROCEDURE — 1240000000 HC EMOTIONAL WELLNESS R&B

## 2020-03-22 PROCEDURE — 6370000000 HC RX 637 (ALT 250 FOR IP): Performed by: NURSE PRACTITIONER

## 2020-03-22 PROCEDURE — 99232 SBSQ HOSP IP/OBS MODERATE 35: CPT | Performed by: NURSE PRACTITIONER

## 2020-03-22 PROCEDURE — 6370000000 HC RX 637 (ALT 250 FOR IP): Performed by: INTERNAL MEDICINE

## 2020-03-22 PROCEDURE — 6370000000 HC RX 637 (ALT 250 FOR IP): Performed by: PSYCHIATRY & NEUROLOGY

## 2020-03-22 PROCEDURE — 80053 COMPREHEN METABOLIC PANEL: CPT

## 2020-03-22 PROCEDURE — 36415 COLL VENOUS BLD VENIPUNCTURE: CPT

## 2020-03-22 PROCEDURE — 82962 GLUCOSE BLOOD TEST: CPT

## 2020-03-22 RX ORDER — DIVALPROEX SODIUM 500 MG/1
500 TABLET, DELAYED RELEASE ORAL DAILY
Status: DISCONTINUED | OUTPATIENT
Start: 2020-03-22 | End: 2020-03-31 | Stop reason: HOSPADM

## 2020-03-22 RX ORDER — DIVALPROEX SODIUM 250 MG/1
750 TABLET, DELAYED RELEASE ORAL NIGHTLY
Status: DISCONTINUED | OUTPATIENT
Start: 2020-03-22 | End: 2020-03-31 | Stop reason: HOSPADM

## 2020-03-22 RX ORDER — ALOGLIPTIN 12.5 MG/1
12.5 TABLET, FILM COATED ORAL DAILY
Status: DISCONTINUED | OUTPATIENT
Start: 2020-03-23 | End: 2020-03-31 | Stop reason: HOSPADM

## 2020-03-22 RX ADMIN — ARIPIPRAZOLE 5 MG: 5 TABLET ORAL at 09:36

## 2020-03-22 RX ADMIN — HALOPERIDOL 3 MG: 2 TABLET ORAL at 09:37

## 2020-03-22 RX ADMIN — TIMOLOL MALEATE 1 DROP: 5 SOLUTION OPHTHALMIC at 09:36

## 2020-03-22 RX ADMIN — ARIPIPRAZOLE 5 MG: 5 TABLET ORAL at 21:16

## 2020-03-22 RX ADMIN — CLONIDINE HYDROCHLORIDE 0.1 MG: 0.1 TABLET ORAL at 21:20

## 2020-03-22 RX ADMIN — LATANOPROST 1 DROP: 50 SOLUTION OPHTHALMIC at 21:22

## 2020-03-22 RX ADMIN — COLCHICINE 0.6 MG: 0.6 TABLET, FILM COATED ORAL at 21:21

## 2020-03-22 RX ADMIN — DIVALPROEX SODIUM 750 MG: 250 TABLET, DELAYED RELEASE ORAL at 21:20

## 2020-03-22 RX ADMIN — ATORVASTATIN CALCIUM 40 MG: 40 TABLET, FILM COATED ORAL at 21:21

## 2020-03-22 RX ADMIN — BRIMONIDINE TARTRATE 1 DROP: 2 SOLUTION/ DROPS OPHTHALMIC at 21:22

## 2020-03-22 RX ADMIN — INSULIN GLARGINE 14 UNITS: 100 INJECTION, SOLUTION SUBCUTANEOUS at 21:28

## 2020-03-22 RX ADMIN — LABETALOL HYDROCHLORIDE 200 MG: 200 TABLET, FILM COATED ORAL at 06:42

## 2020-03-22 RX ADMIN — TIMOLOL MALEATE 1 DROP: 5 SOLUTION OPHTHALMIC at 21:22

## 2020-03-22 RX ADMIN — METFORMIN HYDROCHLORIDE 1000 MG: 1000 TABLET, FILM COATED ORAL at 17:44

## 2020-03-22 RX ADMIN — LABETALOL HYDROCHLORIDE 200 MG: 200 TABLET, FILM COATED ORAL at 21:16

## 2020-03-22 RX ADMIN — DIVALPROEX SODIUM 500 MG: 250 TABLET, DELAYED RELEASE ORAL at 09:37

## 2020-03-22 RX ADMIN — BRIMONIDINE TARTRATE 1 DROP: 2 SOLUTION/ DROPS OPHTHALMIC at 09:36

## 2020-03-22 RX ADMIN — CLONIDINE HYDROCHLORIDE 0.1 MG: 0.1 TABLET ORAL at 09:36

## 2020-03-22 RX ADMIN — SULFAMETHOXAZOLE AND TRIMETHOPRIM 1 TABLET: 800; 160 TABLET ORAL at 21:20

## 2020-03-22 RX ADMIN — COLCHICINE 0.6 MG: 0.6 TABLET, FILM COATED ORAL at 09:37

## 2020-03-22 RX ADMIN — ALOGLIPTIN 25 MG: 25 TABLET, FILM COATED ORAL at 09:37

## 2020-03-22 RX ADMIN — TRAZODONE HYDROCHLORIDE 25 MG: 50 TABLET ORAL at 21:20

## 2020-03-22 RX ADMIN — FUROSEMIDE 40 MG: 40 TABLET ORAL at 09:38

## 2020-03-22 RX ADMIN — SULFAMETHOXAZOLE AND TRIMETHOPRIM 1 TABLET: 800; 160 TABLET ORAL at 09:37

## 2020-03-22 RX ADMIN — LABETALOL HYDROCHLORIDE 200 MG: 200 TABLET, FILM COATED ORAL at 13:52

## 2020-03-22 RX ADMIN — METFORMIN HYDROCHLORIDE 1000 MG: 1000 TABLET, FILM COATED ORAL at 09:37

## 2020-03-22 ASSESSMENT — PAIN SCALES - GENERAL
PAINLEVEL_OUTOF10: 0

## 2020-03-22 NOTE — PLAN OF CARE
Pt denies SI HI and hallucinations. Although pt admits to seeing people in the [place] and in her room. Pt remains loud, inappropriate, sexually preoccupied, FOI, labile, manic, delusional.  Pt is medication compliant. Pt is eating provided meals. No aggression. Pt is not attending groups. Pt remains argumentative with other patients on the unit. Pt was given 3 mg PO Haldol this morning with her morning medications. Pt slept for approximately 45 min. We will continue to provide support and comfort for the patient.      Problem: Falls - Risk of:  Goal: Will remain free from falls  Description: Will remain free from falls  3/22/2020 1037 by Leary Eisenmenger, RN  Outcome: Met This Shift     Problem: Altered Mood, Manic Behavior:  Goal: Ability to disclose and discuss suicidal ideas will improve  Description: Ability to disclose and discuss suicidal ideas will improve  Outcome: Met This Shift     Problem: Altered Mood, Manic Behavior:  Goal: Absence of self-harm  Description: Absence of self-harm  3/22/2020 1037 by Leary Eisenmenger, RN  Outcome: Met This Shift     Problem: Altered Mood, Manic Behavior:  Goal: Able to sleep  Description: Able to sleep  3/22/2020 1037 by Leary Eisenmenger, RN  Outcome: Not Met This Shift     Problem: Altered Mood, Manic Behavior:  Goal: Able to verbalize decrease in frequency and intensity of racing thoughts  Description: Able to verbalize decrease in frequency and intensity of racing thoughts  3/22/2020 1037 by Leary Eisenmenger, RN  Outcome: Not Met This Shift     Problem: Altered Mood, Manic Behavior:  Goal: Ability to interact with others will improve  Description: Ability to interact with others will improve  Outcome: Not Met This Shift     Problem: Altered Mood, Manic Behavior:  Goal: Ability to demonstrate self-control will improve  Description: Ability to demonstrate self-control will improve  3/22/2020 1037 by Leary Eisenmenger, RN  Outcome: Not Met This Shift     Problem: Altered Mood, Manic

## 2020-03-23 LAB
ALBUMIN SERPL-MCNC: 3.6 G/DL (ref 3.5–5.2)
ALP BLD-CCNC: 72 U/L (ref 35–104)
ALT SERPL-CCNC: 17 U/L (ref 0–32)
ANION GAP SERPL CALCULATED.3IONS-SCNC: 14 MMOL/L (ref 7–16)
AST SERPL-CCNC: 18 U/L (ref 0–31)
BILIRUB SERPL-MCNC: <0.2 MG/DL (ref 0–1.2)
BUN BLDV-MCNC: 27 MG/DL (ref 8–23)
CALCIUM SERPL-MCNC: 9.4 MG/DL (ref 8.6–10.2)
CHLORIDE BLD-SCNC: 97 MMOL/L (ref 98–107)
CO2: 25 MMOL/L (ref 22–29)
CREAT SERPL-MCNC: 1.3 MG/DL (ref 0.5–1)
GFR AFRICAN AMERICAN: 50
GFR NON-AFRICAN AMERICAN: 50 ML/MIN/1.73
GLUCOSE BLD-MCNC: 104 MG/DL (ref 74–99)
METER GLUCOSE: 104 MG/DL (ref 74–99)
METER GLUCOSE: 149 MG/DL (ref 74–99)
METER GLUCOSE: 74 MG/DL (ref 74–99)
METER GLUCOSE: 99 MG/DL (ref 74–99)
POTASSIUM SERPL-SCNC: 5.1 MMOL/L (ref 3.5–5)
SODIUM BLD-SCNC: 136 MMOL/L (ref 132–146)
TOTAL PROTEIN: 6.6 G/DL (ref 6.4–8.3)

## 2020-03-23 PROCEDURE — 6360000002 HC RX W HCPCS: Performed by: PSYCHIATRY & NEUROLOGY

## 2020-03-23 PROCEDURE — 6370000000 HC RX 637 (ALT 250 FOR IP): Performed by: INTERNAL MEDICINE

## 2020-03-23 PROCEDURE — 6370000000 HC RX 637 (ALT 250 FOR IP): Performed by: NURSE PRACTITIONER

## 2020-03-23 PROCEDURE — 1240000000 HC EMOTIONAL WELLNESS R&B

## 2020-03-23 PROCEDURE — 82962 GLUCOSE BLOOD TEST: CPT

## 2020-03-23 PROCEDURE — 6370000000 HC RX 637 (ALT 250 FOR IP): Performed by: PSYCHIATRY & NEUROLOGY

## 2020-03-23 PROCEDURE — 36415 COLL VENOUS BLD VENIPUNCTURE: CPT

## 2020-03-23 PROCEDURE — 80053 COMPREHEN METABOLIC PANEL: CPT

## 2020-03-23 PROCEDURE — 6370000000 HC RX 637 (ALT 250 FOR IP): Performed by: EMERGENCY MEDICINE

## 2020-03-23 RX ADMIN — METFORMIN HYDROCHLORIDE 1000 MG: 1000 TABLET, FILM COATED ORAL at 09:10

## 2020-03-23 RX ADMIN — TRAZODONE HYDROCHLORIDE 25 MG: 50 TABLET ORAL at 20:58

## 2020-03-23 RX ADMIN — INSULIN LISPRO 1 UNITS: 100 INJECTION, SOLUTION INTRAVENOUS; SUBCUTANEOUS at 21:04

## 2020-03-23 RX ADMIN — LABETALOL HYDROCHLORIDE 200 MG: 200 TABLET, FILM COATED ORAL at 06:58

## 2020-03-23 RX ADMIN — TIMOLOL MALEATE 1 DROP: 5 SOLUTION OPHTHALMIC at 20:59

## 2020-03-23 RX ADMIN — LATANOPROST 1 DROP: 50 SOLUTION OPHTHALMIC at 20:59

## 2020-03-23 RX ADMIN — COLCHICINE 0.6 MG: 0.6 TABLET, FILM COATED ORAL at 20:59

## 2020-03-23 RX ADMIN — METFORMIN HYDROCHLORIDE 1000 MG: 1000 TABLET, FILM COATED ORAL at 17:18

## 2020-03-23 RX ADMIN — CLONIDINE HYDROCHLORIDE 0.1 MG: 0.1 TABLET ORAL at 09:03

## 2020-03-23 RX ADMIN — ARIPIPRAZOLE 5 MG: 5 TABLET ORAL at 20:59

## 2020-03-23 RX ADMIN — HALOPERIDOL LACTATE 3 MG: 5 INJECTION INTRAMUSCULAR at 11:01

## 2020-03-23 RX ADMIN — LABETALOL HYDROCHLORIDE 200 MG: 200 TABLET, FILM COATED ORAL at 20:58

## 2020-03-23 RX ADMIN — SULFAMETHOXAZOLE AND TRIMETHOPRIM 1 TABLET: 800; 160 TABLET ORAL at 09:10

## 2020-03-23 RX ADMIN — INSULIN GLARGINE 14 UNITS: 100 INJECTION, SOLUTION SUBCUTANEOUS at 21:04

## 2020-03-23 RX ADMIN — COLCHICINE 0.6 MG: 0.6 TABLET, FILM COATED ORAL at 09:03

## 2020-03-23 RX ADMIN — FUROSEMIDE 40 MG: 40 TABLET ORAL at 09:03

## 2020-03-23 RX ADMIN — DIVALPROEX SODIUM 750 MG: 250 TABLET, DELAYED RELEASE ORAL at 20:58

## 2020-03-23 RX ADMIN — BRIMONIDINE TARTRATE 1 DROP: 2 SOLUTION/ DROPS OPHTHALMIC at 09:04

## 2020-03-23 RX ADMIN — ARIPIPRAZOLE 5 MG: 5 TABLET ORAL at 09:10

## 2020-03-23 RX ADMIN — DIVALPROEX SODIUM 500 MG: 250 TABLET, DELAYED RELEASE ORAL at 09:03

## 2020-03-23 RX ADMIN — ATORVASTATIN CALCIUM 40 MG: 40 TABLET, FILM COATED ORAL at 20:58

## 2020-03-23 RX ADMIN — BRIMONIDINE TARTRATE 1 DROP: 2 SOLUTION/ DROPS OPHTHALMIC at 20:59

## 2020-03-23 RX ADMIN — ALOGLIPTIN 12.5 MG: 12.5 TABLET, FILM COATED ORAL at 09:14

## 2020-03-23 RX ADMIN — TIMOLOL MALEATE 1 DROP: 5 SOLUTION OPHTHALMIC at 09:04

## 2020-03-23 RX ADMIN — SULFAMETHOXAZOLE AND TRIMETHOPRIM 1 TABLET: 800; 160 TABLET ORAL at 20:59

## 2020-03-23 RX ADMIN — CLONIDINE HYDROCHLORIDE 0.1 MG: 0.1 TABLET ORAL at 20:58

## 2020-03-23 RX ADMIN — LABETALOL HYDROCHLORIDE 200 MG: 200 TABLET, FILM COATED ORAL at 13:40

## 2020-03-23 ASSESSMENT — PAIN SCALES - GENERAL
PAINLEVEL_OUTOF10: 0

## 2020-03-23 NOTE — PROGRESS NOTES
Gena Solano MD FACP  Progress notes      CHIEF COMPLAINT:  Manic episode    HISTORY OF PRESENT ILLNESS:    77-year-old -American American woman seen on the behavioral unit earlier this morning  Well-known to me with multiple medical problems and psychiatric problems  Known history of bipolar disorder  Recently discharged from the nursing home  Lives with mother with whom she does not get along  Brought because of manic episode with pressured speech agitation inability to focus  Denied suicidal ideation  Admitted for further management  She admitted that she was not taking her psychiatric medications as prescribed  On bactrim for the UTI  Cultures showing Klebsiella   Overall she feels better  Past Medical History:    Past Medical History:   Diagnosis Date    Bell's palsy     B[facility] disorder ([facility]     bipolar    Carpal tunnel syndrome 8/4/2016    Hyperlipidemia     Non-insulin dependent type 2 diabetes mellitus ([facility]     Systemic primary arterial hypertension 8/4/2016       Past Surgical History:    Past Surgical History:   Procedure Laterality Date    ECHO COMPLETE  6/22/2013            Medications Prior to Admission:    Medications Prior to Admission: latanoprost (XALATAN) 0.005 % ophthalmic solution, Place 1 drop into both eyes nightly  colchicine (COLCRYS) 0.6 MG tablet, Take 1 tablet by mouth 2 times daily  labetalol (NORMODYNE) 200 MG tablet, Take 1 tablet by mouth every 8 hours  ARIPiprazole (ABILIFY) 15 MG tablet, Take 1 tablet by mouth daily  vitamin D (ERGOCALCIFEROL) 1.25 MG (82608 UT) CAPS capsule, Take 1 capsule by mouth once a week (Patient taking differently: Take 50,000 Units by mouth once a week fridays)  SITagliptin (JANUVIA) 100 MG tablet, Take 1 tablet by mouth daily  Omega-3 Fatty Acids (FISH OIL) 1000 MG CAPS, Take 3 capsules by mouth daily  furosemide (LASIX) 20 MG tablet, Take 1 tablet by mouth daily  cloNIDine (CATAPRES) 0.1 MG tablet, Take 1 tablet by mouth 2 times daily  divalproex (DEPAKOTE) 500 MG DR tablet, Take 1 tablet by mouth daily  metFORMIN (GLUCOPHAGE) 1000 MG tablet, Take 1 tablet by mouth 2 times daily (with meals)  atorvastatin (LIPITOR) 40 MG tablet, Take 1 tablet by mouth daily  brimonidine (ALPHAGAN) 0.2 % ophthalmic solution,   Timolol (TIMOPTIC) 0.5 % (DAILY) SOLN ophthalmic solution, Place 1 drop into both eyes 2 times daily  ammonium lactate (AMLACTIN) 12 % cream, APPLY TO AFFECTED AREA EVERY DAY  insulin glargine (LANTUS SOLOSTAR) 100 UNIT/ML injection pen, Inject 14 Units into the skin daily (Patient not taking: Reported on 3/10/2020)  FREESTYLE LITE strip, USE TO TEST BLOOD SUGAR EVERY MORNING  ARIPiprazole (ABILIFY) 30 MG tablet, Take 0.5 tablets by mouth daily [facility] at [facility] (Patient not taking: Reported on 3/10/2020)  blood glucose monitor kit and supplies, CHECK BLOOD GLUCOSE DAILY DX: E11.9 ONE TOUCH GLUCOMETER REQUESTED IF OK WITH INSRANCE  FREESTYLE LANCETS MISC, Inject 1 each into the skin daily    Allergies:    Ace inhibitors; Ibuprofen; and Latuda [lurasidone hcl]    Social History:    reports that she has never smoked. She has never used smokeless tobacco. She reports that she does not drink alcohol or use drugs. Family History:   family history includes Heart Disease (age of onset: 76) in her father; High Blood Pressure in her father. REVIEW OF SYSTEMS:  As above in the HPI, otherwise negative    PHYSICAL EXAM:    Vitals:  /61   Pulse 85   Temp 97.2 °F (36.2 °C) (Temporal)   Resp 16   Ht 5' 3\" (1.6 m)   Wt 169 lb 2 oz (76.7 kg)   SpO2 97%   BMI 29.96 kg/m²     General:  Awake, alert, oriented X 3. Well developed, well nourished, well groomed. No apparent distress. HEENT:  Normocephalic, atraumatic. Pupils equal, round, reactive to light. No scleral icterus. No conjunctival injection. Normal lips, t No nasal discharge.   Left facial weakness from previous Bell's palsy noted  Neck:  Supple  Heart:  RRR, no

## 2020-03-23 NOTE — PLAN OF CARE
improve  3/22/2020 2235 by Leonid Dumont RN  Outcome: Ongoing     Problem: Altered Mood, Manic Behavior:  Goal: Mood stable  Description: Mood stable  3/22/2020 2235 by Leonid Dumont RN  Outcome: Ongoing

## 2020-03-23 NOTE — PROGRESS NOTES
Patient decline to attend group therapy, pt argumentative with another pt in the group,they have been calling each other names all day.

## 2020-03-23 NOTE — PROGRESS NOTES
Patient awake sitting in the dinner area. Singing, talking to unseen others. Observing closely at this time. Therapeutic communication given by staff.

## 2020-03-23 NOTE — PROGRESS NOTES
Able to sit in group today and participate. Stopped herself from cursing a few times. Not banging and pounding and les irritable this evening .

## 2020-03-24 LAB
ALBUMIN SERPL-MCNC: 3.5 G/DL (ref 3.5–5.2)
ALP BLD-CCNC: 71 U/L (ref 35–104)
ALT SERPL-CCNC: 18 U/L (ref 0–32)
ANION GAP SERPL CALCULATED.3IONS-SCNC: 16 MMOL/L (ref 7–16)
AST SERPL-CCNC: 23 U/L (ref 0–31)
BILIRUB SERPL-MCNC: <0.2 MG/DL (ref 0–1.2)
BUN BLDV-MCNC: 24 MG/DL (ref 8–23)
CALCIUM SERPL-MCNC: 9.4 MG/DL (ref 8.6–10.2)
CHLORIDE BLD-SCNC: 95 MMOL/L (ref 98–107)
CO2: 25 MMOL/L (ref 22–29)
CREAT SERPL-MCNC: 1.3 MG/DL (ref 0.5–1)
GFR AFRICAN AMERICAN: 50
GFR NON-AFRICAN AMERICAN: 50 ML/MIN/1.73
GLUCOSE BLD-MCNC: 141 MG/DL (ref 74–99)
METER GLUCOSE: 127 MG/DL (ref 74–99)
METER GLUCOSE: 74 MG/DL (ref 74–99)
METER GLUCOSE: 78 MG/DL (ref 74–99)
POTASSIUM SERPL-SCNC: 4.7 MMOL/L (ref 3.5–5)
SODIUM BLD-SCNC: 136 MMOL/L (ref 132–146)
TOTAL PROTEIN: 6.5 G/DL (ref 6.4–8.3)

## 2020-03-24 PROCEDURE — 6370000000 HC RX 637 (ALT 250 FOR IP): Performed by: NURSE PRACTITIONER

## 2020-03-24 PROCEDURE — 36415 COLL VENOUS BLD VENIPUNCTURE: CPT

## 2020-03-24 PROCEDURE — 6370000000 HC RX 637 (ALT 250 FOR IP): Performed by: INTERNAL MEDICINE

## 2020-03-24 PROCEDURE — 80053 COMPREHEN METABOLIC PANEL: CPT

## 2020-03-24 PROCEDURE — 99232 SBSQ HOSP IP/OBS MODERATE 35: CPT | Performed by: NURSE PRACTITIONER

## 2020-03-24 PROCEDURE — 6370000000 HC RX 637 (ALT 250 FOR IP): Performed by: EMERGENCY MEDICINE

## 2020-03-24 PROCEDURE — 6370000000 HC RX 637 (ALT 250 FOR IP): Performed by: PSYCHIATRY & NEUROLOGY

## 2020-03-24 PROCEDURE — 82962 GLUCOSE BLOOD TEST: CPT

## 2020-03-24 PROCEDURE — 1240000000 HC EMOTIONAL WELLNESS R&B

## 2020-03-24 RX ORDER — RISPERIDONE 1 MG/1
1 TABLET, FILM COATED ORAL 2 TIMES DAILY
Status: DISCONTINUED | OUTPATIENT
Start: 2020-03-24 | End: 2020-03-31 | Stop reason: HOSPADM

## 2020-03-24 RX ADMIN — ARIPIPRAZOLE 5 MG: 5 TABLET ORAL at 08:45

## 2020-03-24 RX ADMIN — METFORMIN HYDROCHLORIDE 1000 MG: 1000 TABLET, FILM COATED ORAL at 08:44

## 2020-03-24 RX ADMIN — LABETALOL HYDROCHLORIDE 200 MG: 200 TABLET, FILM COATED ORAL at 20:48

## 2020-03-24 RX ADMIN — DIVALPROEX SODIUM 500 MG: 250 TABLET, DELAYED RELEASE ORAL at 08:45

## 2020-03-24 RX ADMIN — COLCHICINE 0.6 MG: 0.6 TABLET, FILM COATED ORAL at 20:41

## 2020-03-24 RX ADMIN — ALOGLIPTIN 12.5 MG: 12.5 TABLET, FILM COATED ORAL at 08:45

## 2020-03-24 RX ADMIN — TIMOLOL MALEATE 1 DROP: 5 SOLUTION OPHTHALMIC at 08:42

## 2020-03-24 RX ADMIN — ATORVASTATIN CALCIUM 40 MG: 40 TABLET, FILM COATED ORAL at 20:41

## 2020-03-24 RX ADMIN — LATANOPROST 1 DROP: 50 SOLUTION OPHTHALMIC at 20:40

## 2020-03-24 RX ADMIN — COLCHICINE 0.6 MG: 0.6 TABLET, FILM COATED ORAL at 08:45

## 2020-03-24 RX ADMIN — METFORMIN HYDROCHLORIDE 1000 MG: 1000 TABLET, FILM COATED ORAL at 17:15

## 2020-03-24 RX ADMIN — SULFAMETHOXAZOLE AND TRIMETHOPRIM 1 TABLET: 800; 160 TABLET ORAL at 08:45

## 2020-03-24 RX ADMIN — SULFAMETHOXAZOLE AND TRIMETHOPRIM 1 TABLET: 800; 160 TABLET ORAL at 20:48

## 2020-03-24 RX ADMIN — CLONIDINE HYDROCHLORIDE 0.1 MG: 0.1 TABLET ORAL at 20:41

## 2020-03-24 RX ADMIN — FUROSEMIDE 40 MG: 40 TABLET ORAL at 08:44

## 2020-03-24 RX ADMIN — TRAZODONE HYDROCHLORIDE 25 MG: 50 TABLET ORAL at 20:41

## 2020-03-24 RX ADMIN — LABETALOL HYDROCHLORIDE 200 MG: 200 TABLET, FILM COATED ORAL at 06:33

## 2020-03-24 RX ADMIN — DIVALPROEX SODIUM 750 MG: 250 TABLET, DELAYED RELEASE ORAL at 20:48

## 2020-03-24 RX ADMIN — CLONIDINE HYDROCHLORIDE 0.1 MG: 0.1 TABLET ORAL at 08:44

## 2020-03-24 RX ADMIN — RISPERIDONE 1 MG: 1 TABLET, FILM COATED ORAL at 20:47

## 2020-03-24 RX ADMIN — LABETALOL HYDROCHLORIDE 200 MG: 200 TABLET, FILM COATED ORAL at 14:00

## 2020-03-24 RX ADMIN — BRIMONIDINE TARTRATE 1 DROP: 2 SOLUTION/ DROPS OPHTHALMIC at 20:41

## 2020-03-24 RX ADMIN — TIMOLOL MALEATE 1 DROP: 5 SOLUTION OPHTHALMIC at 20:40

## 2020-03-24 RX ADMIN — BRIMONIDINE TARTRATE 1 DROP: 2 SOLUTION/ DROPS OPHTHALMIC at 08:43

## 2020-03-24 ASSESSMENT — PAIN SCALES - GENERAL
PAINLEVEL_OUTOF10: 0

## 2020-03-24 NOTE — PROGRESS NOTES
650 mg, Oral, Q4H PRN, Zola Boas, MD    haloperidol lactate (HALDOL) injection 3 mg, 3 mg, Intramuscular, Q6H PRN, 3 mg at 03/23/20 1101 **OR** haloperidol (HALDOL) tablet 3 mg, 3 mg, Oral, Q6H PRN, Zola Boas, MD, 3 mg at 03/22/20 9143    traZODone (DESYREL) tablet 25 mg, 25 mg, Oral, Nightly PRN, Zola Boas, MD, 25 mg at 03/23/20 2058    magnesium hydroxide (MILK OF MAGNESIA) 400 MG/5ML suspension 30 mL, 30 mL, Oral, Daily PRN, Zola Boas, MD    aluminum & magnesium hydroxide-simethicone (MAALOX) 200-200-20 MG/5ML suspension 30 mL, 30 mL, Oral, PRN, Zola Boas, MD    timolol (TIMOPTIC) 0.5 % ophthalmic solution 1 drop, 1 drop, Both Eyes, BID, Laureen Sheriff MD, 1 drop at 03/24/20 0842    atorvastatin (LIPITOR) tablet 40 mg, 40 mg, Oral, Daily, Laureen Sheriff MD, 40 mg at 03/23/20 2058    colchicine (COLCRYS) tablet 0.6 mg, 0.6 mg, Oral, BID, Laureen Sheriff MD, 0.6 mg at 03/24/20 0845    labetalol (NORMODYNE) tablet 200 mg, 200 mg, Oral, 3 times per day, Laureen Sheriff MD, 200 mg at 03/24/20 5322    vitamin D (ERGOCALCIFEROL) capsule 50,000 Units, 50,000 Units, Oral, Weekly, Laureen Sheriff MD, 50,000 Units at 03/20/20 0903    furosemide (LASIX) tablet 40 mg, 40 mg, Oral, Daily, Laureen Sheriff MD, 40 mg at 03/24/20 0844    cloNIDine (CATAPRES) tablet 0.1 mg, 0.1 mg, Oral, BID, Laureen Sheriff MD, 0.1 mg at 03/24/20 0844    latanoprost (XALATAN) 0.005 % ophthalmic solution 1 drop, 1 drop, Both Eyes, Nightly, Laureen Sheriff MD, 1 drop at 03/23/20 2059    insulin glargine (LANTUS) injection vial 14 Units, 14 Units, Subcutaneous, Daily, Laureen Sheriff MD, 14 Units at 03/23/20 2104    brimonidine (ALPHAGAN) 0.2 % ophthalmic solution 1 drop, 1 drop, Both Eyes, BID, Laureen Sheriff MD, 1 drop at 03/24/20 0843    insulin lispro (HUMALOG) injection vial 0-12 Units, 0-12 Units, Subcutaneous, TID WC, Laureen Sheriff MD, Stopped at 03/21/20 1715    insulin lispro (HUMALOG) injection

## 2020-03-24 NOTE — FLOWSHEET NOTE
Spiritual Support Group Note    Number of Participants in Group:                        Time:     Goal: Relief from isolation and loneliness             Susu Sharing             Self-understanding and gain insight              Acceptance and belonging            Recognize they are not alone                Socialization             Empowerment       Encouragement    Topic:  [x] Spiritual Wellness and Self Care                  [] Hope                     [] Connecting with Divine/Others        [] Thankfulness and Gratitude               [x]  Meaningfulness and Purpose               [] Forgiveness               [] Peace               [] Connect to [employer]      [] Other    Participation Level:   [] Active Listener   [x] Minimal   [] Monopolizing   [] Interactive   [] No Participation   []  Other:     Attention:   [] Alert   [x] Distractible   [] Drowsy   [] Poor   [] Other:    Manner:   [] Cooperative   [] Suspicious   [] Withdrawn   [] Guarded   [] Irritable   [] Inhospitable   [x] Other:     Others Comments from Group: low focus with subject matter of group

## 2020-03-24 NOTE — PLAN OF CARE
Compulsions  Hallucinations: None  Delusions: Yes  Delusions: Grandeur  Memory:Normal: No  Memory: Poor Recent  Insight and Judgment: No  Insight and Judgment: Poor Judgment, Poor Insight  Present Suicidal Ideation: No  Present Homicidal Ideation: No    Daily Assessment Last Entry:   Daily Sleep (WDL): Within Defined Limits         Patient Currently in Pain: Denies  Daily Nutrition (WDL): Within Defined Limits    Patient Monitoring:  Frequency of Checks: 4 times per hour, close    Psychiatric Symptoms:   Depression Symptoms  Depression Symptoms: Impaired concentration  Anxiety Symptoms  Anxiety Symptoms: Obsessions, Generalized  Gretta Symptoms  Gretta Symptoms: Less need to sleep, Poor judgment, Grandiosity, Flight of ideas     Psychosis Symptoms  Hallucination Type: No problems reported or observed.   Delusion Type: Grandeur, Paranoid    Suicide Risk CSSR-S:  1) Within the past month, have you wished you were dead or wished you could go to sleep and not wake up? : No  2) Have you actually had any thoughts of killing yourself? : No  6) Have you ever done anything, started to do anything, or prepared to do anything to end your life?: No  Change in Result no Change in Plan of care no    EDUCATION:   Learner Progress Toward Treatment Goals: Reviewed goals and plan of care    Method: Small group    Outcome: Needs reinforcement    PATIENT GOALS: refused goal    PLAN/TREATMENT RECOMMENDATIONS UPDATE: encourage group attendance and provide emotional support    GOALS UPDATE:  Time frame for Short-Term Goals: 1-3 days      Chris Middleton RN

## 2020-03-25 PROBLEM — F31.2 SEVERE MANIC BIPOLAR I DISORDER WITH PSYCHOTIC FEATURES (HCC): Status: RESOLVED | Noted: 2018-07-07 | Resolved: 2020-03-24

## 2020-03-25 LAB
ALBUMIN SERPL-MCNC: 3.8 G/DL (ref 3.5–5.2)
ALP BLD-CCNC: 77 U/L (ref 35–104)
ALT SERPL-CCNC: 18 U/L (ref 0–32)
ANION GAP SERPL CALCULATED.3IONS-SCNC: 15 MMOL/L (ref 7–16)
AST SERPL-CCNC: 22 U/L (ref 0–31)
BILIRUB SERPL-MCNC: <0.2 MG/DL (ref 0–1.2)
BUN BLDV-MCNC: 26 MG/DL (ref 8–23)
CALCIUM SERPL-MCNC: 9.5 MG/DL (ref 8.6–10.2)
CHLORIDE BLD-SCNC: 95 MMOL/L (ref 98–107)
CO2: 25 MMOL/L (ref 22–29)
CREAT SERPL-MCNC: 1.3 MG/DL (ref 0.5–1)
GFR AFRICAN AMERICAN: 50
GFR NON-AFRICAN AMERICAN: 50 ML/MIN/1.73
GLUCOSE BLD-MCNC: 160 MG/DL (ref 74–99)
METER GLUCOSE: 138 MG/DL (ref 74–99)
METER GLUCOSE: 156 MG/DL (ref 74–99)
METER GLUCOSE: 167 MG/DL (ref 74–99)
METER GLUCOSE: 97 MG/DL (ref 74–99)
POTASSIUM SERPL-SCNC: 4.9 MMOL/L (ref 3.5–5)
SODIUM BLD-SCNC: 135 MMOL/L (ref 132–146)
TOTAL PROTEIN: 6.8 G/DL (ref 6.4–8.3)
VALPROIC ACID LEVEL: 92 MCG/ML (ref 50–100)

## 2020-03-25 PROCEDURE — 6370000000 HC RX 637 (ALT 250 FOR IP): Performed by: INTERNAL MEDICINE

## 2020-03-25 PROCEDURE — 1240000000 HC EMOTIONAL WELLNESS R&B

## 2020-03-25 PROCEDURE — 6370000000 HC RX 637 (ALT 250 FOR IP): Performed by: PSYCHIATRY & NEUROLOGY

## 2020-03-25 PROCEDURE — 36415 COLL VENOUS BLD VENIPUNCTURE: CPT

## 2020-03-25 PROCEDURE — 80053 COMPREHEN METABOLIC PANEL: CPT

## 2020-03-25 PROCEDURE — 6370000000 HC RX 637 (ALT 250 FOR IP): Performed by: NURSE PRACTITIONER

## 2020-03-25 PROCEDURE — 82962 GLUCOSE BLOOD TEST: CPT

## 2020-03-25 PROCEDURE — 6370000000 HC RX 637 (ALT 250 FOR IP): Performed by: EMERGENCY MEDICINE

## 2020-03-25 PROCEDURE — 99232 SBSQ HOSP IP/OBS MODERATE 35: CPT | Performed by: NURSE PRACTITIONER

## 2020-03-25 PROCEDURE — 80164 ASSAY DIPROPYLACETIC ACD TOT: CPT

## 2020-03-25 RX ADMIN — DIVALPROEX SODIUM 500 MG: 250 TABLET, DELAYED RELEASE ORAL at 09:09

## 2020-03-25 RX ADMIN — FUROSEMIDE 40 MG: 40 TABLET ORAL at 09:09

## 2020-03-25 RX ADMIN — CLONIDINE HYDROCHLORIDE 0.1 MG: 0.1 TABLET ORAL at 21:44

## 2020-03-25 RX ADMIN — ALOGLIPTIN 12.5 MG: 12.5 TABLET, FILM COATED ORAL at 09:09

## 2020-03-25 RX ADMIN — LABETALOL HYDROCHLORIDE 200 MG: 200 TABLET, FILM COATED ORAL at 21:44

## 2020-03-25 RX ADMIN — LABETALOL HYDROCHLORIDE 200 MG: 200 TABLET, FILM COATED ORAL at 07:02

## 2020-03-25 RX ADMIN — SULFAMETHOXAZOLE AND TRIMETHOPRIM 1 TABLET: 800; 160 TABLET ORAL at 09:09

## 2020-03-25 RX ADMIN — METFORMIN HYDROCHLORIDE 1000 MG: 1000 TABLET, FILM COATED ORAL at 09:09

## 2020-03-25 RX ADMIN — ATORVASTATIN CALCIUM 40 MG: 40 TABLET, FILM COATED ORAL at 21:44

## 2020-03-25 RX ADMIN — INSULIN LISPRO 2 UNITS: 100 INJECTION, SOLUTION INTRAVENOUS; SUBCUTANEOUS at 09:09

## 2020-03-25 RX ADMIN — INSULIN LISPRO 2 UNITS: 100 INJECTION, SOLUTION INTRAVENOUS; SUBCUTANEOUS at 17:25

## 2020-03-25 RX ADMIN — BRIMONIDINE TARTRATE 1 DROP: 2 SOLUTION/ DROPS OPHTHALMIC at 21:46

## 2020-03-25 RX ADMIN — COLCHICINE 0.6 MG: 0.6 TABLET, FILM COATED ORAL at 09:09

## 2020-03-25 RX ADMIN — CLONIDINE HYDROCHLORIDE 0.1 MG: 0.1 TABLET ORAL at 09:09

## 2020-03-25 RX ADMIN — TIMOLOL MALEATE 1 DROP: 5 SOLUTION OPHTHALMIC at 21:46

## 2020-03-25 RX ADMIN — BRIMONIDINE TARTRATE 1 DROP: 2 SOLUTION/ DROPS OPHTHALMIC at 09:12

## 2020-03-25 RX ADMIN — METFORMIN HYDROCHLORIDE 1000 MG: 1000 TABLET, FILM COATED ORAL at 17:25

## 2020-03-25 RX ADMIN — RISPERIDONE 1 MG: 1 TABLET, FILM COATED ORAL at 21:44

## 2020-03-25 RX ADMIN — LATANOPROST 1 DROP: 50 SOLUTION OPHTHALMIC at 21:46

## 2020-03-25 RX ADMIN — SULFAMETHOXAZOLE AND TRIMETHOPRIM 1 TABLET: 800; 160 TABLET ORAL at 21:43

## 2020-03-25 RX ADMIN — TRAZODONE HYDROCHLORIDE 25 MG: 50 TABLET ORAL at 21:44

## 2020-03-25 RX ADMIN — INSULIN GLARGINE 14 UNITS: 100 INJECTION, SOLUTION SUBCUTANEOUS at 21:48

## 2020-03-25 RX ADMIN — TIMOLOL MALEATE 1 DROP: 5 SOLUTION OPHTHALMIC at 09:12

## 2020-03-25 RX ADMIN — COLCHICINE 0.6 MG: 0.6 TABLET, FILM COATED ORAL at 21:44

## 2020-03-25 RX ADMIN — DIVALPROEX SODIUM 750 MG: 250 TABLET, DELAYED RELEASE ORAL at 21:43

## 2020-03-25 RX ADMIN — RISPERIDONE 1 MG: 1 TABLET, FILM COATED ORAL at 09:09

## 2020-03-25 ASSESSMENT — PAIN SCALES - GENERAL
PAINLEVEL_OUTOF10: 0

## 2020-03-25 NOTE — PROGRESS NOTES
This nurse approached patient to ask if she wanted her afternoon medications. Patient immediately began cursing at this nurse, stating \"I don't want shit from you, you ugly ass bitch, get the [place] my face\". Patient was not able to be redirected and would not calm down. Patient was threatening to throw cane at this nurse. This nurse told patient if she threw her cane she would receive PRN medication to help her calm down if she wasn't able to calm down herself. Patient continue to curse at this nurse and then threw her cane at me. This nurse removed cane for safety of staff and patient and the patient received IM Haldol. Patient is still cursing and yelling every time this nurse walks by to complete unit rounds.  Patient was told she would receive her cane after she was able to calm down and not threaten staff

## 2020-03-25 NOTE — PROGRESS NOTES
BEHAVIORAL HEALTH FOLLOW-UP NOTE     3/25/2020     Patient was seen and examined in person, Chart reviewed   Patient's case discussed with staff/team    Chief Complaint: When can I get out here    Interim History: I met with the patient in common area today. The patient is less manic today than she was yesterday, she states that she is feeling a little bit better. .  I have not witnessed her or heard any loud yelling from this patient today, she seems slightly less intrusive and does not seem to be initiating fights with other patients. The patient denies suicidal homicidal ideation.   Appetite:  [x] Normal/Unchanged  [] Increased  [] Decreased      Sleep:       [x] Normal/Unchanged  [] Fair       [] Poor              Energy:    [x] Normal/Unchanged  [] Increased  [] Decreased        SI [] Present  [x] Absent    HI  []Present  [x] Absent     Aggression:  [] yes  [x] no    Patient is [x] able  [] unable to CONTRACT FOR SAFETY     PAST MEDICAL/PSYCHIATRIC HISTORY:   Past Medical History:   Diagnosis Date    Bell's palsy     [facility]     bipolar    Carpal tunnel syndrome 8/4/2016    Hyperlipidemia     Non-insulin dependent type 2 diabetes mellitus ([facility]     Systemic primary arterial hypertension 8/4/2016       FAMILY/SOCIAL HISTORY:  Family History   Problem Relation Age of Onset    Heart Disease Father 76    High Blood Pressure Father      Social History     Socioeconomic History    Marital status: Single     Spouse name: Not on file    Number of children: Not on file    Years of education: Not on file    Highest education level: Not on file   Occupational History    Not on file   Social Needs    Financial resource strain: Not on file    Food insecurity     Worry: Not on file     Inability: Not on file    Transportation needs     Medical: Not on file     Non-medical: Not on file   Tobacco Use    Smoking status: Never Smoker    Smokeless tobacco: Never Used   Substance and Sexual Oral, Q4H PRN, Sonya Andrews MD    haloperidol lactate (HALDOL) injection 3 mg, 3 mg, Intramuscular, Q6H PRN, 3 mg at 03/23/20 1101 **OR** haloperidol (HALDOL) tablet 3 mg, 3 mg, Oral, Q6H PRN, Sonya Andrews MD, 3 mg at 03/22/20 3197    traZODone (DESYREL) tablet 25 mg, 25 mg, Oral, Nightly PRN, Sonya Andrews MD, 25 mg at 03/24/20 2041    magnesium hydroxide (MILK OF MAGNESIA) 400 MG/5ML suspension 30 mL, 30 mL, Oral, Daily PRN, Sonya Andrews MD    aluminum & magnesium hydroxide-simethicone (MAALOX) 200-200-20 MG/5ML suspension 30 mL, 30 mL, Oral, PRN, Sonya Andrews MD    timolol (TIMOPTIC) 0.5 % ophthalmic solution 1 drop, 1 drop, Both Eyes, BID, Brenda Saenz MD, 1 drop at 03/25/20 0912    atorvastatin (LIPITOR) tablet 40 mg, 40 mg, Oral, Daily, Brenda Saenz MD, 40 mg at 03/24/20 2041    colchicine (COLCRYS) tablet 0.6 mg, 0.6 mg, Oral, BID, Brenda Saenz MD, 0.6 mg at 03/25/20 0909    labetalol (NORMODYNE) tablet 200 mg, 200 mg, Oral, 3 times per day, Brenda Saenz MD, 200 mg at 03/25/20 0702    vitamin D (ERGOCALCIFEROL) capsule 50,000 Units, 50,000 Units, Oral, Weekly, Brenda Saenz MD, 50,000 Units at 03/20/20 0903    furosemide (LASIX) tablet 40 mg, 40 mg, Oral, Daily, Brenda Saenz MD, 40 mg at 03/25/20 0909    cloNIDine (CATAPRES) tablet 0.1 mg, 0.1 mg, Oral, BID, Brenda Saenz MD, 0.1 mg at 03/25/20 0909    latanoprost (XALATAN) 0.005 % ophthalmic solution 1 drop, 1 drop, Both Eyes, Nightly, Brenda Saenz MD, 1 drop at 03/24/20 2040    insulin glargine (LANTUS) injection vial 14 Units, 14 Units, Subcutaneous, Daily, Brenda Saenz MD, 14 Units at 03/23/20 2104    brimonidine (ALPHAGAN) 0.2 % ophthalmic solution 1 drop, 1 drop, Both Eyes, BID, Brenda Saenz MD, 1 drop at 03/25/20 0912    insulin lispro (HUMALOG) injection vial 0-12 Units, 0-12 Units, Subcutaneous, TID WC, Brenda Saenz MD, 2 Units at 03/25/20 0909    insulin lispro (HUMALOG) injection vial 0-6

## 2020-03-25 NOTE — PLAN OF CARE
Problem: Altered Mood, Manic Behavior:  Goal: Able to sleep  Description: Able to sleep  Outcome: Ongoing     Problem: Altered Mood, Manic Behavior:  Goal: Ability to demonstrate self-control will improve  Description: Ability to demonstrate self-control will improve  3/25/2020 0336 by Len Tomas  Outcome: Ongoing

## 2020-03-25 NOTE — PROGRESS NOTES
daily  divalproex (DEPAKOTE) 500 MG DR tablet, Take 1 tablet by mouth daily  metFORMIN (GLUCOPHAGE) 1000 MG tablet, Take 1 tablet by mouth 2 times daily (with meals)  atorvastatin (LIPITOR) 40 MG tablet, Take 1 tablet by mouth daily  brimonidine (ALPHAGAN) 0.2 % ophthalmic solution,   Timolol (TIMOPTIC) 0.5 % (DAILY) SOLN ophthalmic solution, Place 1 drop into both eyes 2 times daily  ammonium lactate (AMLACTIN) 12 % cream, APPLY TO AFFECTED AREA EVERY DAY  insulin glargine (LANTUS SOLOSTAR) 100 UNIT/ML injection pen, Inject 14 Units into the skin daily (Patient not taking: Reported on 3/10/2020)  FREESTYLE LITE strip, USE TO TEST BLOOD SUGAR EVERY MORNING  ARIPiprazole (ABILIFY) 30 MG tablet, Take 0.5 tablets by mouth daily [facility] at [facility] (Patient not taking: Reported on 3/10/2020)  blood glucose monitor kit and supplies, CHECK BLOOD GLUCOSE DAILY DX: E11.9 ONE TOUCH GLUCOMETER REQUESTED IF OK WITH INSRANCE  FREESTYLE LANCETS MISC, Inject 1 each into the skin daily    Allergies:    Ace inhibitors; Ibuprofen; and Latuda [lurasidone hcl]    Social History:    reports that she has never smoked. She has never used smokeless tobacco. She reports that she does not drink alcohol or use drugs. Family History:   family history includes Heart Disease (age of onset: 76) in her father; High Blood Pressure in her father. REVIEW OF SYSTEMS:  As above in the HPI, otherwise negative    PHYSICAL EXAM:    Vitals:  /64   Pulse 85   Temp 96.8 °F (36 °C) (Temporal)   Resp 16   Ht 5' 3\" (1.6 m)   Wt 169 lb 2 oz (76.7 kg)   SpO2 100%   BMI 29.96 kg/m²     General:  Awake, alert, oriented X 3. Well developed, well nourished, well groomed. No apparent distress. HEENT:  Normocephalic, atraumatic. Pupils equal, round, reactive to light. No scleral icterus. No conjunctival injection. Normal lips, t No nasal discharge.   Left facial weakness from previous Bell's palsy noted  Neck:  Supple  Heart:  RRR, no

## 2020-03-26 LAB
ALBUMIN SERPL-MCNC: 3.5 G/DL (ref 3.5–5.2)
ALP BLD-CCNC: 93 U/L (ref 35–104)
ALT SERPL-CCNC: 16 U/L (ref 0–32)
ANION GAP SERPL CALCULATED.3IONS-SCNC: 13 MMOL/L (ref 7–16)
AST SERPL-CCNC: 18 U/L (ref 0–31)
BILIRUB SERPL-MCNC: <0.2 MG/DL (ref 0–1.2)
BUN BLDV-MCNC: 23 MG/DL (ref 8–23)
CALCIUM SERPL-MCNC: 9.4 MG/DL (ref 8.6–10.2)
CHLORIDE BLD-SCNC: 99 MMOL/L (ref 98–107)
CO2: 27 MMOL/L (ref 22–29)
CREAT SERPL-MCNC: 1.2 MG/DL (ref 0.5–1)
GFR AFRICAN AMERICAN: 55
GFR NON-AFRICAN AMERICAN: 55 ML/MIN/1.73
GLUCOSE BLD-MCNC: 198 MG/DL (ref 74–99)
METER GLUCOSE: 122 MG/DL (ref 74–99)
METER GLUCOSE: 160 MG/DL (ref 74–99)
METER GLUCOSE: 172 MG/DL (ref 74–99)
METER GLUCOSE: 99 MG/DL (ref 74–99)
POTASSIUM SERPL-SCNC: 5.6 MMOL/L (ref 3.5–5)
SODIUM BLD-SCNC: 139 MMOL/L (ref 132–146)
TOTAL PROTEIN: 6.3 G/DL (ref 6.4–8.3)

## 2020-03-26 PROCEDURE — 6370000000 HC RX 637 (ALT 250 FOR IP): Performed by: NURSE PRACTITIONER

## 2020-03-26 PROCEDURE — 36415 COLL VENOUS BLD VENIPUNCTURE: CPT

## 2020-03-26 PROCEDURE — 1240000000 HC EMOTIONAL WELLNESS R&B

## 2020-03-26 PROCEDURE — 6370000000 HC RX 637 (ALT 250 FOR IP): Performed by: INTERNAL MEDICINE

## 2020-03-26 PROCEDURE — 6370000000 HC RX 637 (ALT 250 FOR IP): Performed by: EMERGENCY MEDICINE

## 2020-03-26 PROCEDURE — 80053 COMPREHEN METABOLIC PANEL: CPT

## 2020-03-26 PROCEDURE — 6370000000 HC RX 637 (ALT 250 FOR IP): Performed by: PSYCHIATRY & NEUROLOGY

## 2020-03-26 PROCEDURE — 82962 GLUCOSE BLOOD TEST: CPT

## 2020-03-26 PROCEDURE — 99232 SBSQ HOSP IP/OBS MODERATE 35: CPT | Performed by: NURSE PRACTITIONER

## 2020-03-26 RX ORDER — SODIUM POLYSTYRENE SULFONATE 15 G/60ML
15 SUSPENSION ORAL; RECTAL ONCE
Status: COMPLETED | OUTPATIENT
Start: 2020-03-26 | End: 2020-03-26

## 2020-03-26 RX ADMIN — LABETALOL HYDROCHLORIDE 200 MG: 200 TABLET, FILM COATED ORAL at 20:47

## 2020-03-26 RX ADMIN — ATORVASTATIN CALCIUM 40 MG: 40 TABLET, FILM COATED ORAL at 20:48

## 2020-03-26 RX ADMIN — RISPERIDONE 1 MG: 1 TABLET, FILM COATED ORAL at 09:41

## 2020-03-26 RX ADMIN — DIVALPROEX SODIUM 750 MG: 250 TABLET, DELAYED RELEASE ORAL at 20:47

## 2020-03-26 RX ADMIN — BRIMONIDINE TARTRATE 1 DROP: 2 SOLUTION/ DROPS OPHTHALMIC at 20:48

## 2020-03-26 RX ADMIN — TIMOLOL MALEATE 1 DROP: 5 SOLUTION OPHTHALMIC at 09:42

## 2020-03-26 RX ADMIN — LABETALOL HYDROCHLORIDE 200 MG: 200 TABLET, FILM COATED ORAL at 06:39

## 2020-03-26 RX ADMIN — RISPERIDONE 1 MG: 1 TABLET, FILM COATED ORAL at 20:47

## 2020-03-26 RX ADMIN — CLONIDINE HYDROCHLORIDE 0.1 MG: 0.1 TABLET ORAL at 20:48

## 2020-03-26 RX ADMIN — FUROSEMIDE 40 MG: 40 TABLET ORAL at 09:41

## 2020-03-26 RX ADMIN — METFORMIN HYDROCHLORIDE 1000 MG: 1000 TABLET, FILM COATED ORAL at 09:41

## 2020-03-26 RX ADMIN — CLONIDINE HYDROCHLORIDE 0.1 MG: 0.1 TABLET ORAL at 09:41

## 2020-03-26 RX ADMIN — COLCHICINE 0.6 MG: 0.6 TABLET, FILM COATED ORAL at 20:47

## 2020-03-26 RX ADMIN — DIVALPROEX SODIUM 500 MG: 250 TABLET, DELAYED RELEASE ORAL at 09:41

## 2020-03-26 RX ADMIN — COLCHICINE 0.6 MG: 0.6 TABLET, FILM COATED ORAL at 09:41

## 2020-03-26 RX ADMIN — BRIMONIDINE TARTRATE 1 DROP: 2 SOLUTION/ DROPS OPHTHALMIC at 09:42

## 2020-03-26 RX ADMIN — SULFAMETHOXAZOLE AND TRIMETHOPRIM 1 TABLET: 800; 160 TABLET ORAL at 09:41

## 2020-03-26 RX ADMIN — TRAZODONE HYDROCHLORIDE 25 MG: 50 TABLET ORAL at 20:48

## 2020-03-26 RX ADMIN — SULFAMETHOXAZOLE AND TRIMETHOPRIM 1 TABLET: 800; 160 TABLET ORAL at 20:48

## 2020-03-26 RX ADMIN — INSULIN LISPRO 2 UNITS: 100 INJECTION, SOLUTION INTRAVENOUS; SUBCUTANEOUS at 09:45

## 2020-03-26 RX ADMIN — SODIUM POLYSTYRENE SULFONATE 15 G: 15 SUSPENSION ORAL; RECTAL at 09:54

## 2020-03-26 RX ADMIN — LABETALOL HYDROCHLORIDE 200 MG: 200 TABLET, FILM COATED ORAL at 14:18

## 2020-03-26 RX ADMIN — INSULIN LISPRO 2 UNITS: 100 INJECTION, SOLUTION INTRAVENOUS; SUBCUTANEOUS at 16:27

## 2020-03-26 RX ADMIN — ALOGLIPTIN 12.5 MG: 12.5 TABLET, FILM COATED ORAL at 09:41

## 2020-03-26 RX ADMIN — LATANOPROST 1 DROP: 50 SOLUTION OPHTHALMIC at 20:48

## 2020-03-26 RX ADMIN — METFORMIN HYDROCHLORIDE 1000 MG: 1000 TABLET, FILM COATED ORAL at 16:27

## 2020-03-26 RX ADMIN — INSULIN GLARGINE 14 UNITS: 100 INJECTION, SOLUTION SUBCUTANEOUS at 20:49

## 2020-03-26 RX ADMIN — TIMOLOL MALEATE 1 DROP: 5 SOLUTION OPHTHALMIC at 20:48

## 2020-03-26 ASSESSMENT — PAIN SCALES - GENERAL
PAINLEVEL_OUTOF10: 0
PAINLEVEL_OUTOF10: 1
PAINLEVEL_OUTOF10: 0

## 2020-03-26 ASSESSMENT — PAIN DESCRIPTION - PROGRESSION: CLINICAL_PROGRESSION: NOT CHANGED

## 2020-03-26 ASSESSMENT — PAIN DESCRIPTION - DESCRIPTORS: DESCRIPTORS: PATIENT UNABLE TO DESCRIBE

## 2020-03-26 ASSESSMENT — PAIN DESCRIPTION - PAIN TYPE: TYPE: CHRONIC PAIN

## 2020-03-26 ASSESSMENT — PAIN DESCRIPTION - LOCATION: LOCATION: GENERALIZED

## 2020-03-26 ASSESSMENT — PAIN DESCRIPTION - ONSET: ONSET: UNABLE TO TELL

## 2020-03-26 NOTE — PROGRESS NOTES
 Drug use: No    Sexual activity: Not Currently   Lifestyle    Physical activity     Days per week: Not on file     Minutes per session: Not on file    Stress: Not on file   Relationships    Social connections     Talks on phone: Not on file     Gets together: Not on file     Attends Hoahaoism service: Not on file     Active member of club or organization: Not on file     Attends meetings of clubs or organizations: Not on file     Relationship status: Not on file    Intimate partner violence     Fear of current or ex partner: Not on file     Emotionally abused: Not on file     Physically abused: Not on file     Forced sexual activity: Not on file   Other Topics Concern    Not on file   Social History Narrative    Not on file           ROS:  [x] All negative/unchanged except if checked.  Explain positive(checked items) below:  [] Constitutional  [] Eyes  [] Ear/Nose/Mouth/Throat  [] Respiratory  [] CV  [] GI  []   [] Musculoskeletal  [] Skin/Breast  [] Neurological  [] Endocrine  [] Heme/Lymph  [] Allergic/Immunologic    Explanation:     MEDICATIONS:    Current Facility-Administered Medications:     risperiDONE microspheres ER (RISPERDAL CONSTA) injection 12.5 mg, 12.5 mg, Intramuscular, Q14 Days, Airam Clink, APRN - CNP    risperiDONE (RISPERDAL) tablet 1 mg, 1 mg, Oral, BID, Airam Clink, APRN - CNP, 1 mg at 03/26/20 0941    divalproex (DEPAKOTE) DR tablet 500 mg, 500 mg, Oral, Daily, Airam Clink, APRN - CNP, 500 mg at 03/26/20 0941    divalproex (DEPAKOTE) DR tablet 750 mg, 750 mg, Oral, Nightly, Airam Clink, APRN - CNP, 750 mg at 03/25/20 2143    alogliptin (NESINA) tablet 12.5 mg, 12.5 mg, Oral, Daily, Faby Mei DO, 12.5 mg at 03/26/20 0941    sulfamethoxazole-trimethoprim (BACTRIM DS;SEPTRA DS) 800-160 MG per tablet 1 tablet, 1 tablet, Oral, 2 times per day, Janette Ha MD, 1 tablet at 03/26/20 0941    metFORMIN (GLUCOPHAGE) tablet 1,000 mg, 1,000 mg, Oral, BID Moira CHIRINOS Mark Sours, APRN - CNP, 1,000 mg at 03/26/20 0941    acetaminophen (TYLENOL) tablet 650 mg, 650 mg, Oral, Q4H PRN, Jose Luis Yusuf MD    haloperidol lactate (HALDOL) injection 3 mg, 3 mg, Intramuscular, Q6H PRN, 3 mg at 03/23/20 1101 **OR** haloperidol (HALDOL) tablet 3 mg, 3 mg, Oral, Q6H PRN, Jose Luis Yusuf MD, 3 mg at 03/22/20 1946    traZODone (DESYREL) tablet 25 mg, 25 mg, Oral, Nightly PRN, Jose Luis Yusuf MD, 25 mg at 03/25/20 2144    magnesium hydroxide (MILK OF MAGNESIA) 400 MG/5ML suspension 30 mL, 30 mL, Oral, Daily PRN, Jose Luis Yusuf MD    aluminum & magnesium hydroxide-simethicone (MAALOX) 200-200-20 MG/5ML suspension 30 mL, 30 mL, Oral, PRN, Jose Luis Yusuf MD    timolol (TIMOPTIC) 0.5 % ophthalmic solution 1 drop, 1 drop, Both Eyes, BID, Itz Aguilar MD, 1 drop at 03/26/20 0942    atorvastatin (LIPITOR) tablet 40 mg, 40 mg, Oral, Daily, Itz Aguilar MD, 40 mg at 03/25/20 2144    colchicine (COLCRYS) tablet 0.6 mg, 0.6 mg, Oral, BID, Itz Aguilar MD, 0.6 mg at 03/26/20 0941    labetalol (NORMODYNE) tablet 200 mg, 200 mg, Oral, 3 times per day, Itz Aguilar MD, 200 mg at 03/26/20 1418    vitamin D (ERGOCALCIFEROL) capsule 50,000 Units, 50,000 Units, Oral, Weekly, Itz Aguilar MD, 50,000 Units at 03/20/20 0903    furosemide (LASIX) tablet 40 mg, 40 mg, Oral, Daily, Itz Aguilar MD, 40 mg at 03/26/20 0941    cloNIDine (CATAPRES) tablet 0.1 mg, 0.1 mg, Oral, BID, Itz Aguilar MD, 0.1 mg at 03/26/20 0941    latanoprost (XALATAN) 0.005 % ophthalmic solution 1 drop, 1 drop, Both Eyes, Nightly, Itz Aguilar MD, 1 drop at 03/25/20 2146    insulin glargine (LANTUS) injection vial 14 Units, 14 Units, Subcutaneous, Daily, Itz Aguilar MD, 14 Units at 03/25/20 2148    brimonidine (ALPHAGAN) 0.2 % ophthalmic solution 1 drop, 1 drop, Both Eyes, BID, Itz Aguilar MD, 1 drop at 03/26/20 0942    insulin lispro (HUMALOG) injection vial 0-12 Units, 0-12 Units, Subcutaneous,

## 2020-03-26 NOTE — PLAN OF CARE
Problem: Falls - Risk of:  Goal: Will remain free from falls  Description: Will remain free from falls  Outcome: Met This Shift     Problem: Altered Mood, Manic Behavior:  Goal: Ability to disclose and discuss suicidal ideas will improve  Description: Ability to disclose and discuss suicidal ideas will improve  3/25/2020 2122 by Steve Payne RN  Outcome: Met This Shift     Problem: Altered Mood, Manic Behavior:  Goal: Absence of self-harm  Description: Absence of self-harm  3/25/2020 2122 by Steve Payne RN  Outcome: Met This Shift     Problem: Altered Mood, Manic Behavior:  Goal: Able to verbalize decrease in frequency and intensity of racing thoughts  Description: Able to verbalize decrease in frequency and intensity of racing thoughts  Outcome: Not Met This Shift     Problem: Altered Mood, Manic Behavior:  Goal: Ability to interact with others will improve  Description: Ability to interact with others will improve  Outcome: Not Met This Shift

## 2020-03-27 LAB
ALBUMIN SERPL-MCNC: 3.5 G/DL (ref 3.5–5.2)
ALP BLD-CCNC: 75 U/L (ref 35–104)
ALT SERPL-CCNC: 15 U/L (ref 0–32)
ANION GAP SERPL CALCULATED.3IONS-SCNC: 12 MMOL/L (ref 7–16)
AST SERPL-CCNC: 16 U/L (ref 0–31)
BILIRUB SERPL-MCNC: <0.2 MG/DL (ref 0–1.2)
BUN BLDV-MCNC: 22 MG/DL (ref 8–23)
CALCIUM SERPL-MCNC: 9.6 MG/DL (ref 8.6–10.2)
CHLORIDE BLD-SCNC: 98 MMOL/L (ref 98–107)
CO2: 28 MMOL/L (ref 22–29)
CREAT SERPL-MCNC: 1.2 MG/DL (ref 0.5–1)
FOLATE: 5.6 NG/ML (ref 4.8–24.2)
GFR AFRICAN AMERICAN: 55
GFR NON-AFRICAN AMERICAN: 55 ML/MIN/1.73
GLUCOSE BLD-MCNC: 188 MG/DL (ref 74–99)
METER GLUCOSE: 121 MG/DL (ref 74–99)
METER GLUCOSE: 187 MG/DL (ref 74–99)
METER GLUCOSE: 192 MG/DL (ref 74–99)
METER GLUCOSE: 83 MG/DL (ref 74–99)
POTASSIUM SERPL-SCNC: 4.8 MMOL/L (ref 3.5–5)
SODIUM BLD-SCNC: 138 MMOL/L (ref 132–146)
TOTAL PROTEIN: 6.4 G/DL (ref 6.4–8.3)
VITAMIN B-12: 281 PG/ML (ref 211–946)

## 2020-03-27 PROCEDURE — 82607 VITAMIN B-12: CPT

## 2020-03-27 PROCEDURE — 80053 COMPREHEN METABOLIC PANEL: CPT

## 2020-03-27 PROCEDURE — 82746 ASSAY OF FOLIC ACID SERUM: CPT

## 2020-03-27 PROCEDURE — 36415 COLL VENOUS BLD VENIPUNCTURE: CPT

## 2020-03-27 PROCEDURE — 1240000000 HC EMOTIONAL WELLNESS R&B

## 2020-03-27 PROCEDURE — 6370000000 HC RX 637 (ALT 250 FOR IP): Performed by: EMERGENCY MEDICINE

## 2020-03-27 PROCEDURE — 82962 GLUCOSE BLOOD TEST: CPT

## 2020-03-27 PROCEDURE — 6370000000 HC RX 637 (ALT 250 FOR IP): Performed by: NURSE PRACTITIONER

## 2020-03-27 PROCEDURE — 6370000000 HC RX 637 (ALT 250 FOR IP): Performed by: INTERNAL MEDICINE

## 2020-03-27 PROCEDURE — 6370000000 HC RX 637 (ALT 250 FOR IP): Performed by: PSYCHIATRY & NEUROLOGY

## 2020-03-27 PROCEDURE — 99232 SBSQ HOSP IP/OBS MODERATE 35: CPT | Performed by: NURSE PRACTITIONER

## 2020-03-27 RX ADMIN — DIVALPROEX SODIUM 500 MG: 250 TABLET, DELAYED RELEASE ORAL at 08:53

## 2020-03-27 RX ADMIN — SULFAMETHOXAZOLE AND TRIMETHOPRIM 1 TABLET: 800; 160 TABLET ORAL at 08:52

## 2020-03-27 RX ADMIN — TIMOLOL MALEATE 1 DROP: 5 SOLUTION OPHTHALMIC at 08:51

## 2020-03-27 RX ADMIN — CLONIDINE HYDROCHLORIDE 0.1 MG: 0.1 TABLET ORAL at 08:53

## 2020-03-27 RX ADMIN — LABETALOL HYDROCHLORIDE 200 MG: 200 TABLET, FILM COATED ORAL at 21:03

## 2020-03-27 RX ADMIN — BRIMONIDINE TARTRATE 1 DROP: 2 SOLUTION/ DROPS OPHTHALMIC at 21:07

## 2020-03-27 RX ADMIN — SULFAMETHOXAZOLE AND TRIMETHOPRIM 1 TABLET: 800; 160 TABLET ORAL at 21:02

## 2020-03-27 RX ADMIN — ALOGLIPTIN 12.5 MG: 12.5 TABLET, FILM COATED ORAL at 08:52

## 2020-03-27 RX ADMIN — BRIMONIDINE TARTRATE 1 DROP: 2 SOLUTION/ DROPS OPHTHALMIC at 08:52

## 2020-03-27 RX ADMIN — ATORVASTATIN CALCIUM 40 MG: 40 TABLET, FILM COATED ORAL at 21:02

## 2020-03-27 RX ADMIN — METFORMIN HYDROCHLORIDE 1000 MG: 1000 TABLET, FILM COATED ORAL at 17:02

## 2020-03-27 RX ADMIN — LATANOPROST 1 DROP: 50 SOLUTION OPHTHALMIC at 21:07

## 2020-03-27 RX ADMIN — INSULIN LISPRO 2 UNITS: 100 INJECTION, SOLUTION INTRAVENOUS; SUBCUTANEOUS at 17:03

## 2020-03-27 RX ADMIN — ERGOCALCIFEROL 50000 UNITS: 1.25 CAPSULE ORAL at 08:54

## 2020-03-27 RX ADMIN — RISPERIDONE 1 MG: 1 TABLET, FILM COATED ORAL at 21:02

## 2020-03-27 RX ADMIN — RISPERIDONE 1 MG: 1 TABLET, FILM COATED ORAL at 08:50

## 2020-03-27 RX ADMIN — TIMOLOL MALEATE 1 DROP: 5 SOLUTION OPHTHALMIC at 21:08

## 2020-03-27 RX ADMIN — LABETALOL HYDROCHLORIDE 200 MG: 200 TABLET, FILM COATED ORAL at 06:35

## 2020-03-27 RX ADMIN — COLCHICINE 0.6 MG: 0.6 TABLET, FILM COATED ORAL at 21:02

## 2020-03-27 RX ADMIN — INSULIN GLARGINE 14 UNITS: 100 INJECTION, SOLUTION SUBCUTANEOUS at 21:05

## 2020-03-27 RX ADMIN — INSULIN LISPRO 2 UNITS: 100 INJECTION, SOLUTION INTRAVENOUS; SUBCUTANEOUS at 08:55

## 2020-03-27 RX ADMIN — METFORMIN HYDROCHLORIDE 1000 MG: 1000 TABLET, FILM COATED ORAL at 08:51

## 2020-03-27 RX ADMIN — DIVALPROEX SODIUM 750 MG: 250 TABLET, DELAYED RELEASE ORAL at 21:03

## 2020-03-27 RX ADMIN — FUROSEMIDE 40 MG: 40 TABLET ORAL at 08:51

## 2020-03-27 RX ADMIN — CLONIDINE HYDROCHLORIDE 0.1 MG: 0.1 TABLET ORAL at 21:03

## 2020-03-27 RX ADMIN — LABETALOL HYDROCHLORIDE 200 MG: 200 TABLET, FILM COATED ORAL at 13:06

## 2020-03-27 RX ADMIN — TRAZODONE HYDROCHLORIDE 25 MG: 50 TABLET ORAL at 21:03

## 2020-03-27 RX ADMIN — COLCHICINE 0.6 MG: 0.6 TABLET, FILM COATED ORAL at 08:52

## 2020-03-27 ASSESSMENT — PAIN SCALES - GENERAL
PAINLEVEL_OUTOF10: 0

## 2020-03-27 NOTE — PROGRESS NOTES
Extremely irritable during safety rounds. Pt came out of room yelling, stating \"where the fuck did that neymar [employer]ale go, yeah I bet the fuck they ran out of here, I'll kick their motherfucking ass\". Pt is difficult to redirect d/t she talks over staff when instructing to keep her voice down and to stop using inappropriate language. Refusing care at this time.

## 2020-03-27 NOTE — PROGRESS NOTES
Attempted to attend afternoon leisure activity. Poor focus and concentration due to loud interruptive behavior. Walking in and out often.

## 2020-03-27 NOTE — PROGRESS NOTES
0.1 MG tablet, Take 1 tablet by mouth 2 times daily  divalproex (DEPAKOTE) 500 MG DR tablet, Take 1 tablet by mouth daily  metFORMIN (GLUCOPHAGE) 1000 MG tablet, Take 1 tablet by mouth 2 times daily (with meals)  atorvastatin (LIPITOR) 40 MG tablet, Take 1 tablet by mouth daily  brimonidine (ALPHAGAN) 0.2 % ophthalmic solution,   Timolol (TIMOPTIC) 0.5 % (DAILY) SOLN ophthalmic solution, Place 1 drop into both eyes 2 times daily  ammonium lactate (AMLACTIN) 12 % cream, APPLY TO AFFECTED AREA EVERY DAY  insulin glargine (LANTUS SOLOSTAR) 100 UNIT/ML injection pen, Inject 14 Units into the skin daily (Patient not taking: Reported on 3/10/2020)  FREESTYLE LITE strip, USE TO TEST BLOOD SUGAR EVERY MORNING  ARIPiprazole (ABILIFY) 30 MG tablet, Take 0.5 tablets by mouth daily [facility] (Patient not taking: Reported on 3/10/2020)  blood glucose monitor kit and supplies, CHECK BLOOD GLUCOSE DAILY DX: E11.9 ONE TOUCH GLUCOMETER REQUESTED IF OK WITH [facility]  FREESTYLE LANCETS MISC, Inject 1 each into the skin daily    Allergies:    Ace inhibitors; Ibuprofen; and Latuda [lurasidone hcl]    Social History:    reports that she has never smoked. She has never used smokeless tobacco. She reports that she does not drink alcohol or use drugs. Family History:   family history includes Heart Disease (age of onset: 76) in her father; High Blood Pressure in her father. REVIEW OF SYSTEMS:  As above in the HPI, otherwise negative    PHYSICAL EXAM:    Vitals:  /79   Pulse 101   Temp 97.2 °F (36.2 °C) (Temporal)   Resp 18   Ht 5' 3\" (1.6 m)   Wt 169 lb 2 oz (76.7 kg)   SpO2 100%   BMI 29.96 kg/m²     General:  Awake, alert, oriented X 3. Well developed, well nourished, well groomed. No apparent distress. HEENT:  Normocephalic, atraumatic. Pupils equal, round, reactive to light. No scleral icterus. No conjunctival injection. Normal lips, t No nasal discharge.   Left facial weakness from previous

## 2020-03-27 NOTE — PROGRESS NOTES
BEHAVIORAL HEALTH FOLLOW-UP NOTE     3/27/2020     Patient was seen and examined in person, Chart reviewed   Patient's case discussed with staff/team    Chief Complaint: When can I get out here    Interim History: I met with the patient in common area today. The patient is less manic today than she was yesterday, she states that she is feeling a little bit better. . The patient randomly yells out and sings loudly on the unit. The patient did well on p.o. Risperdal and she was given the Risperdal constant injection yesterday. .  Patient still remains manic and loud. I asked the patient if she feels like she is able to control her verbal outbursts and to be less vulgar with people and she stated that she will try.   Appetite:  [x] Normal/Unchanged  [] Increased  [] Decreased      Sleep:       [x] Normal/Unchanged  [] Fair       [] Poor              Energy:    [x] Normal/Unchanged  [] Increased  [] Decreased        SI [] Present  [x] Absent    HI  []Present  [x] Absent     Aggression:  [] yes  [x] no    Patient is [x] able  [] unable to CONTRACT FOR SAFETY     PAST MEDICAL/PSYCHIATRIC HISTORY:   Past Medical History:   Diagnosis Date    Bell's palsy     Bipolar disorder ([facility] [address]     bipolar    Carpal tunnel syndrome 8/4/2016    Hyperlipidemia     Non-insulin dependent type 2 diabetes mellitus ([facility] [address]     Systemic primary arterial hypertension 8/4/2016       FAMILY/SOCIAL HISTORY:  Family History   Problem Relation Age of Onset    Heart Disease Father 76    High Blood Pressure Father      Social History     Socioeconomic History    Marital status: Single     Spouse name: Not on file    Number of children: Not on file    Years of education: Not on file    Highest education level: Not on file   Occupational History    Not on file   Social Needs    Financial resource strain: Not on file    Food insecurity     Worry: Not on file     Inability: Not on file    Transportation needs     Medical: Not on file

## 2020-03-27 NOTE — PLAN OF CARE
Problem: Altered Mood, Manic Behavior:  Goal: Able to verbalize decrease in frequency and intensity of racing thoughts  Description: Able to verbalize decrease in frequency and intensity of racing thoughts  3/27/2020 1023 by Jeramy Grande RN  Outcome: Ongoing  3/26/2020 2128 by Donna Barry RN  Outcome: Not Met This Shift  Goal: Ability to disclose and discuss suicidal ideas will improve  Description: Ability to disclose and discuss suicidal ideas will improve  3/27/2020 1023 by Jeramy Grande RN  Outcome: Ongoing  3/26/2020 2128 by Donna Barry RN  Outcome: Met This Shift   pt denies si/hi and hallucinations. Pt denies hearing voices but is seen in her room talking to unseen others. Pt is loud and irritable frequently calling staff and other patients \"mother fuckers, hoes and bitches\". Pt taking meds including risperdol IM. Pt has not attended any groups yet today.

## 2020-03-27 NOTE — PLAN OF CARE
Problem: Falls - Risk of:  Goal: Will remain free from falls  Description: Will remain free from falls  Outcome: Met This Shift     Problem: Altered Mood, Manic Behavior:  Goal: Ability to disclose and discuss suicidal ideas will improve  Description: Ability to disclose and discuss suicidal ideas will improve  3/26/2020 2128 by Narinder Kimball RN  Outcome: Met This Shift     Problem: Altered Mood, Manic Behavior:  Goal: Absence of self-harm  Description: Absence of self-harm  3/26/2020 2128 by Narinder Kimball RN  Outcome: Met This Shift     Problem: Altered Mood, Manic Behavior:  Goal: Able to verbalize decrease in frequency and intensity of racing thoughts  Description: Able to verbalize decrease in frequency and intensity of racing thoughts  3/26/2020 2128 by Narinder Kimball RN  Outcome: Not Met This Shift     Problem: Altered Mood, Manic Behavior:  Goal: Ability to interact with others will improve  Description: Ability to interact with others will improve  Outcome: Not Met This Shift     Problem: Altered Mood, Manic Behavior:  Goal: Ability to demonstrate self-control will improve  Description: Ability to demonstrate self-control will improve  Outcome: Not Met This Shift

## 2020-03-28 LAB
ALBUMIN SERPL-MCNC: 3.8 G/DL (ref 3.5–5.2)
ALP BLD-CCNC: 89 U/L (ref 35–104)
ALT SERPL-CCNC: 16 U/L (ref 0–32)
ANION GAP SERPL CALCULATED.3IONS-SCNC: 11 MMOL/L (ref 7–16)
AST SERPL-CCNC: 16 U/L (ref 0–31)
BILIRUB SERPL-MCNC: <0.2 MG/DL (ref 0–1.2)
BUN BLDV-MCNC: 23 MG/DL (ref 8–23)
CALCIUM SERPL-MCNC: 9.7 MG/DL (ref 8.6–10.2)
CHLORIDE BLD-SCNC: 97 MMOL/L (ref 98–107)
CO2: 29 MMOL/L (ref 22–29)
CREAT SERPL-MCNC: 1.1 MG/DL (ref 0.5–1)
GFR AFRICAN AMERICAN: >60
GFR NON-AFRICAN AMERICAN: >60 ML/MIN/1.73
GLUCOSE BLD-MCNC: 111 MG/DL (ref 74–99)
METER GLUCOSE: 113 MG/DL (ref 74–99)
METER GLUCOSE: 120 MG/DL (ref 74–99)
METER GLUCOSE: 137 MG/DL (ref 74–99)
METER GLUCOSE: 192 MG/DL (ref 74–99)
POTASSIUM SERPL-SCNC: 5.5 MMOL/L (ref 3.5–5)
SODIUM BLD-SCNC: 137 MMOL/L (ref 132–146)
TOTAL PROTEIN: 6.9 G/DL (ref 6.4–8.3)

## 2020-03-28 PROCEDURE — 6370000000 HC RX 637 (ALT 250 FOR IP): Performed by: NURSE PRACTITIONER

## 2020-03-28 PROCEDURE — 80053 COMPREHEN METABOLIC PANEL: CPT

## 2020-03-28 PROCEDURE — 36415 COLL VENOUS BLD VENIPUNCTURE: CPT

## 2020-03-28 PROCEDURE — 1240000000 HC EMOTIONAL WELLNESS R&B

## 2020-03-28 PROCEDURE — 6370000000 HC RX 637 (ALT 250 FOR IP): Performed by: INTERNAL MEDICINE

## 2020-03-28 PROCEDURE — 82962 GLUCOSE BLOOD TEST: CPT

## 2020-03-28 PROCEDURE — 6370000000 HC RX 637 (ALT 250 FOR IP): Performed by: PSYCHIATRY & NEUROLOGY

## 2020-03-28 PROCEDURE — 6370000000 HC RX 637 (ALT 250 FOR IP): Performed by: EMERGENCY MEDICINE

## 2020-03-28 PROCEDURE — 99232 SBSQ HOSP IP/OBS MODERATE 35: CPT | Performed by: NURSE PRACTITIONER

## 2020-03-28 RX ORDER — SODIUM POLYSTYRENE SULFONATE 15 G/60ML
15 SUSPENSION ORAL; RECTAL ONCE
Status: COMPLETED | OUTPATIENT
Start: 2020-03-28 | End: 2020-03-28

## 2020-03-28 RX ADMIN — LABETALOL HYDROCHLORIDE 200 MG: 200 TABLET, FILM COATED ORAL at 16:23

## 2020-03-28 RX ADMIN — METFORMIN HYDROCHLORIDE 1000 MG: 1000 TABLET, FILM COATED ORAL at 08:58

## 2020-03-28 RX ADMIN — LATANOPROST 1 DROP: 50 SOLUTION OPHTHALMIC at 20:49

## 2020-03-28 RX ADMIN — BRIMONIDINE TARTRATE 1 DROP: 2 SOLUTION/ DROPS OPHTHALMIC at 08:58

## 2020-03-28 RX ADMIN — ALOGLIPTIN 12.5 MG: 12.5 TABLET, FILM COATED ORAL at 08:58

## 2020-03-28 RX ADMIN — DIVALPROEX SODIUM 750 MG: 250 TABLET, DELAYED RELEASE ORAL at 20:49

## 2020-03-28 RX ADMIN — SODIUM POLYSTYRENE SULFONATE 15 G: 15 SUSPENSION ORAL; RECTAL at 20:56

## 2020-03-28 RX ADMIN — CLONIDINE HYDROCHLORIDE 0.1 MG: 0.1 TABLET ORAL at 20:49

## 2020-03-28 RX ADMIN — RISPERIDONE 1 MG: 1 TABLET, FILM COATED ORAL at 20:49

## 2020-03-28 RX ADMIN — RISPERIDONE 1 MG: 1 TABLET, FILM COATED ORAL at 08:57

## 2020-03-28 RX ADMIN — COLCHICINE 0.6 MG: 0.6 TABLET, FILM COATED ORAL at 20:49

## 2020-03-28 RX ADMIN — ATORVASTATIN CALCIUM 40 MG: 40 TABLET, FILM COATED ORAL at 20:49

## 2020-03-28 RX ADMIN — SULFAMETHOXAZOLE AND TRIMETHOPRIM 1 TABLET: 800; 160 TABLET ORAL at 09:02

## 2020-03-28 RX ADMIN — TRAZODONE HYDROCHLORIDE 25 MG: 50 TABLET ORAL at 20:49

## 2020-03-28 RX ADMIN — LABETALOL HYDROCHLORIDE 200 MG: 200 TABLET, FILM COATED ORAL at 20:49

## 2020-03-28 RX ADMIN — METFORMIN HYDROCHLORIDE 1000 MG: 1000 TABLET, FILM COATED ORAL at 16:24

## 2020-03-28 RX ADMIN — CLONIDINE HYDROCHLORIDE 0.1 MG: 0.1 TABLET ORAL at 08:58

## 2020-03-28 RX ADMIN — TIMOLOL MALEATE 1 DROP: 5 SOLUTION OPHTHALMIC at 08:58

## 2020-03-28 RX ADMIN — COLCHICINE 0.6 MG: 0.6 TABLET, FILM COATED ORAL at 08:57

## 2020-03-28 RX ADMIN — DIVALPROEX SODIUM 500 MG: 250 TABLET, DELAYED RELEASE ORAL at 08:58

## 2020-03-28 RX ADMIN — INSULIN GLARGINE 14 UNITS: 100 INJECTION, SOLUTION SUBCUTANEOUS at 20:47

## 2020-03-28 RX ADMIN — FUROSEMIDE 40 MG: 40 TABLET ORAL at 08:58

## 2020-03-28 RX ADMIN — INSULIN LISPRO 1 UNITS: 100 INJECTION, SOLUTION INTRAVENOUS; SUBCUTANEOUS at 20:47

## 2020-03-28 RX ADMIN — BRIMONIDINE TARTRATE 1 DROP: 2 SOLUTION/ DROPS OPHTHALMIC at 20:49

## 2020-03-28 RX ADMIN — LABETALOL HYDROCHLORIDE 200 MG: 200 TABLET, FILM COATED ORAL at 05:59

## 2020-03-28 RX ADMIN — TIMOLOL MALEATE 1 DROP: 5 SOLUTION OPHTHALMIC at 20:49

## 2020-03-28 ASSESSMENT — PAIN SCALES - GENERAL
PAINLEVEL_OUTOF10: 0

## 2020-03-28 NOTE — PROGRESS NOTES
Noted consult for low K diet. Diet order modified. Pt inappropriate for education at this time. Will follow per consult.  Thank you  Electronically signed by Angi Mejia MS, RD, LD on 3/28/2020 at 8:55 AM

## 2020-03-28 NOTE — PROGRESS NOTES
BEHAVIORAL HEALTH FOLLOW-UP NOTE     3/28/2020     Patient was seen and examined in person, Chart reviewed   Patient's case discussed with staff/team    Chief Complaint: When can I get out here    Interim History: Met with the patient after eating breakfast today. The patient's hira has decreased. The patient is upset and focused on 1 of the nurses because the nurse took her cane away because she was trying to hit people with it. I explained to the patient that she needs to stay calm relaxed and to remain in control of her emotions and she stated that she would try her hardest to be in control of her emotions. Patient denies suicidal homicidal ideation. Patient has audible visual hallucinations. Patient does however remain loud and vulgar but has had a decrease in her yelling out recently.   Appetite:  [x] Normal/Unchanged  [] Increased  [] Decreased      Sleep:       [x] Normal/Unchanged  [] Fair       [] Poor              Energy:    [x] Normal/Unchanged  [] Increased  [] Decreased        SI [] Present  [x] Absent    HI  []Present  [x] Absent     Aggression:  [] yes  [x] no    Patient is [x] able  [] unable to CONTRACT FOR SAFETY     PAST MEDICAL/PSYCHIATRIC HISTORY:   Past Medical History:   Diagnosis Date    Bell's palsy     Bipolar disorder ([facility] [address]     bipolar    Carpal tunnel syndrome 8/4/2016    Hyperlipidemia     Non-insulin dependent type 2 diabetes mellitus ([facility] [address]     Systemic primary arterial hypertension 8/4/2016       FAMILY/SOCIAL HISTORY:  Family History   Problem Relation Age of Onset    Heart Disease Father 76    High Blood Pressure Father      Social History     Socioeconomic History    Marital status: Single     Spouse name: Not on file    Number of children: Not on file    Years of education: Not on file    Highest education level: Not on file   Occupational History    Not on file   Social Needs    Financial resource strain: Not on file    Food insecurity     Worry: Not on file 03/27/20 2103    alogliptin (NESINA) tablet 12.5 mg, 12.5 mg, Oral, Daily, Espiridion Fallow, DO, 12.5 mg at 03/28/20 0858    metFORMIN (GLUCOPHAGE) tablet 1,000 mg, 1,000 mg, Oral, BID WC, Moira Crump, APRN - CNP, 1,000 mg at 03/28/20 0858    acetaminophen (TYLENOL) tablet 650 mg, 650 mg, Oral, Q4H PRN, Ibrahima Khan MD    haloperidol lactate (HALDOL) injection 3 mg, 3 mg, Intramuscular, Q6H PRN, 3 mg at 03/23/20 1101 **OR** haloperidol (HALDOL) tablet 3 mg, 3 mg, Oral, Q6H PRN, Ibrahima Khan MD, 3 mg at 03/22/20 3035    traZODone (DESYREL) tablet 25 mg, 25 mg, Oral, Nightly PRN, Ibrahima hKan MD, 25 mg at 03/27/20 2103    magnesium hydroxide (MILK OF MAGNESIA) 400 MG/5ML suspension 30 mL, 30 mL, Oral, Daily PRN, Ibrahima Khan MD    aluminum & magnesium hydroxide-simethicone (MAALOX) 200-200-20 MG/5ML suspension 30 mL, 30 mL, Oral, PRN, Ibrahima Khan MD    timolol (TIMOPTIC) 0.5 % ophthalmic solution 1 drop, 1 drop, Both Eyes, BID, Kat Pozo MD, 1 drop at 03/28/20 0858    atorvastatin (LIPITOR) tablet 40 mg, 40 mg, Oral, Daily, Kat Pozo MD, 40 mg at 03/27/20 2102    colchicine (COLCRYS) tablet 0.6 mg, 0.6 mg, Oral, BID, Kat Pozo MD, 0.6 mg at 03/28/20 0857    labetalol (NORMODYNE) tablet 200 mg, 200 mg, Oral, 3 times per day, Kat Pozo MD, 200 mg at 03/28/20 0559    vitamin D (ERGOCALCIFEROL) capsule 50,000 Units, 50,000 Units, Oral, Weekly, Kat Pozo MD, 50,000 Units at 03/27/20 0854    furosemide (LASIX) tablet 40 mg, 40 mg, Oral, Daily, Kat Pozo MD, 40 mg at 03/28/20 0858    cloNIDine (CATAPRES) tablet 0.1 mg, 0.1 mg, Oral, BID, Kat Pozo MD, 0.1 mg at 03/28/20 0858    latanoprost (XALATAN) 0.005 % ophthalmic solution 1 drop, 1 drop, Both Eyes, Nightly, Kat Pozo MD, 1 drop at 03/27/20 2107    insulin glargine (LANTUS) injection vial 14 Units, 14 Units, Subcutaneous, Daily, Kat Pozo MD, 14 Units at 03/27/20 2105   brimonidine (ALPHAGAN) 0.2 % ophthalmic solution 1 drop, 1 drop, Both Eyes, BID, Lenora Rinaldi MD, 1 drop at 03/28/20 0858    insulin lispro (HUMALOG) injection vial 0-12 Units, 0-12 Units, Subcutaneous, TID WC, Lenora Rinaldi MD, 2 Units at 03/27/20 1703    insulin lispro (HUMALOG) injection vial 0-6 Units, 0-6 Units, Subcutaneous, Nightly, Lenora Rinaldi MD, 1 Units at 03/23/20 2104    glucose (GLUTOSE) 40 % oral gel 15 g, 15 g, Oral, PRN, Lenora Rinaldi MD    dextrose 50 % IV solution, 12.5 g, Intravenous, PRN, Lenora Rinaldi MD    glucagon (rDNA) injection 1 mg, 1 mg, Intramuscular, PRN, Lenora Rinaldi MD    dextrose 5 % solution, 100 mL/hr, Intravenous, PRN, Lenora Rinaldi MD      Examination:  /65   Pulse 95   Temp 97.6 °F (36.4 °C) (Temporal)   Resp 18   Ht 5' 3\" (1.6 m)   Wt 169 lb 2 oz (76.7 kg)   SpO2 99%   BMI 29.96 kg/m²   Gait - steady  Medication side effects(SE):  No medication side effects to be noted, patient was educated on signs and symptoms of medication side effects instructed to notify medical staff if any signs and symptoms occur. Patient has the capacity to understand this information and what I instructed her to do if medication side effects arise.     Mental Status Examination:    Level of consciousness:  within normal limits   Appearance:  fair grooming and fair hygiene  Behavior/Motor: No psychomotor agitation or retardation noted  Attitude toward examiner: Calm cooperative  Speech: Pressured, loud  Mood: labile  Affect:  intense  Thought processes: Tangential  Thought content: Without hallucinations delusions or paranoia  Cognition:  oriented to person, place, and time   Concentration poor  Insight poor   Judgement poor     ASSESSMENT:   Patient symptoms are:  [] Well controlled  [x] Improving  [] Worsening  [] No change      Diagnosis:  Principal Problem:    Bipolar I disorder, current or most recent episode manic, severe (HCC)  Active Problems:    Bipolar disorder supervision of inpatient psychiatric personnel      Anticipated Length of stay: 5 to 7 days            Electronically signed by CHRISS Lovelace CNP on 3/28/2020 at 11:12 AM

## 2020-03-28 NOTE — PLAN OF CARE
Problem: Falls - Risk of:  Goal: Will remain free from falls  Description: Will remain free from falls  Outcome: Ongoing     Problem: Falls - Risk of:  Goal: Absence of physical injury  Description: Absence of physical injury  Outcome: Ongoing     Problem: Altered Mood, Manic Behavior:  Goal: Able to sleep  Description: Able to sleep  Outcome: Ongoing     Problem: Altered Mood, Manic Behavior:  Goal: Mood stable  Description: Mood stable  Outcome: Ongoing     Denies SI/HI/AV hallucinations. Pt is agitated and irritable so far this shift. Continues to call staff names and use inappropriate language, refuses staffs direction. Loud, pressured speech continues. Impulsive and unpredictable at times. Focused on day shift staff, calling them \"bitches\" and \"hoes\", mocking them and verbally threatening to [facility] their motherfucking asses\". Refuses staffs direction and challenges unit policies. Will continue to monitor and support.

## 2020-03-28 NOTE — PROGRESS NOTES
Quietly sitting in dining room at this time. Pt is calmer and less irritable. Becomes agitated and begins to verbal threaten day shift staff during conversation, but is able to regain control at this time. Will continue to monitor and encourage pt to stay in control.

## 2020-03-28 NOTE — GROUP NOTE
Group Therapy Note    Date: 3/28/2020    Group Start Time: 1600  Group End Time: 3464  Group Topic: Healthy Living/Wellness    [facility]  Biju Martins RN        Group Therapy Note    Attendees: 5/5        Patient attended and participated in Wellness Group

## 2020-03-29 LAB
ALBUMIN SERPL-MCNC: 3.7 G/DL (ref 3.5–5.2)
ALP BLD-CCNC: 88 U/L (ref 35–104)
ALT SERPL-CCNC: 16 U/L (ref 0–32)
ANION GAP SERPL CALCULATED.3IONS-SCNC: 12 MMOL/L (ref 7–16)
AST SERPL-CCNC: 16 U/L (ref 0–31)
BILIRUB SERPL-MCNC: <0.2 MG/DL (ref 0–1.2)
BUN BLDV-MCNC: 22 MG/DL (ref 8–23)
CALCIUM SERPL-MCNC: 9.7 MG/DL (ref 8.6–10.2)
CHLORIDE BLD-SCNC: 95 MMOL/L (ref 98–107)
CO2: 30 MMOL/L (ref 22–29)
CREAT SERPL-MCNC: 1.2 MG/DL (ref 0.5–1)
GFR AFRICAN AMERICAN: 55
GFR NON-AFRICAN AMERICAN: 55 ML/MIN/1.73
GLUCOSE BLD-MCNC: 176 MG/DL (ref 74–99)
METER GLUCOSE: 123 MG/DL (ref 74–99)
METER GLUCOSE: 151 MG/DL (ref 74–99)
METER GLUCOSE: 222 MG/DL (ref 74–99)
METER GLUCOSE: 232 MG/DL (ref 74–99)
POTASSIUM SERPL-SCNC: 4.2 MMOL/L (ref 3.5–5)
SODIUM BLD-SCNC: 137 MMOL/L (ref 132–146)
TOTAL PROTEIN: 6.8 G/DL (ref 6.4–8.3)

## 2020-03-29 PROCEDURE — 99232 SBSQ HOSP IP/OBS MODERATE 35: CPT | Performed by: NURSE PRACTITIONER

## 2020-03-29 PROCEDURE — 1240000000 HC EMOTIONAL WELLNESS R&B

## 2020-03-29 PROCEDURE — 6370000000 HC RX 637 (ALT 250 FOR IP): Performed by: NURSE PRACTITIONER

## 2020-03-29 PROCEDURE — 6360000002 HC RX W HCPCS: Performed by: PSYCHIATRY & NEUROLOGY

## 2020-03-29 PROCEDURE — 82962 GLUCOSE BLOOD TEST: CPT

## 2020-03-29 PROCEDURE — 6370000000 HC RX 637 (ALT 250 FOR IP): Performed by: INTERNAL MEDICINE

## 2020-03-29 PROCEDURE — 80053 COMPREHEN METABOLIC PANEL: CPT

## 2020-03-29 PROCEDURE — 36415 COLL VENOUS BLD VENIPUNCTURE: CPT

## 2020-03-29 RX ADMIN — LATANOPROST 1 DROP: 50 SOLUTION OPHTHALMIC at 21:18

## 2020-03-29 RX ADMIN — TIMOLOL MALEATE 1 DROP: 5 SOLUTION OPHTHALMIC at 21:17

## 2020-03-29 RX ADMIN — BRIMONIDINE TARTRATE 1 DROP: 2 SOLUTION/ DROPS OPHTHALMIC at 21:17

## 2020-03-29 RX ADMIN — METFORMIN HYDROCHLORIDE 1000 MG: 1000 TABLET, FILM COATED ORAL at 16:40

## 2020-03-29 RX ADMIN — LABETALOL HYDROCHLORIDE 200 MG: 200 TABLET, FILM COATED ORAL at 21:16

## 2020-03-29 RX ADMIN — LABETALOL HYDROCHLORIDE 200 MG: 200 TABLET, FILM COATED ORAL at 06:36

## 2020-03-29 RX ADMIN — HALOPERIDOL LACTATE 3 MG: 5 INJECTION INTRAMUSCULAR at 08:59

## 2020-03-29 RX ADMIN — INSULIN LISPRO 2 UNITS: 100 INJECTION, SOLUTION INTRAVENOUS; SUBCUTANEOUS at 16:42

## 2020-03-29 RX ADMIN — ATORVASTATIN CALCIUM 40 MG: 40 TABLET, FILM COATED ORAL at 21:16

## 2020-03-29 RX ADMIN — INSULIN LISPRO 2 UNITS: 100 INJECTION, SOLUTION INTRAVENOUS; SUBCUTANEOUS at 21:22

## 2020-03-29 RX ADMIN — COLCHICINE 0.6 MG: 0.6 TABLET, FILM COATED ORAL at 21:16

## 2020-03-29 RX ADMIN — INSULIN GLARGINE 14 UNITS: 100 INJECTION, SOLUTION SUBCUTANEOUS at 21:21

## 2020-03-29 RX ADMIN — CLONIDINE HYDROCHLORIDE 0.1 MG: 0.1 TABLET ORAL at 21:16

## 2020-03-29 RX ADMIN — RISPERIDONE 1 MG: 1 TABLET, FILM COATED ORAL at 21:16

## 2020-03-29 ASSESSMENT — PAIN SCALES - GENERAL
PAINLEVEL_OUTOF10: 0
PAINLEVEL_OUTOF10: 0

## 2020-03-29 NOTE — PROGRESS NOTES
tablet, Take 1 tablet by mouth 2 times daily  divalproex (DEPAKOTE) 500 MG DR tablet, Take 1 tablet by mouth daily  metFORMIN (GLUCOPHAGE) 1000 MG tablet, Take 1 tablet by mouth 2 times daily (with meals)  atorvastatin (LIPITOR) 40 MG tablet, Take 1 tablet by mouth daily  brimonidine (ALPHAGAN) 0.2 % ophthalmic solution,   Timolol (TIMOPTIC) 0.5 % (DAILY) SOLN ophthalmic solution, Place 1 drop into both eyes 2 times daily  ammonium lactate (AMLACTIN) 12 % cream, APPLY TO AFFECTED AREA EVERY DAY  insulin glargine (LANTUS SOLOSTAR) 100 UNIT/ML injection pen, Inject 14 Units into the skin daily (Patient not taking: Reported on 3/10/2020)  FREESTYLE LITE strip, USE TO TEST BLOOD SUGAR EVERY MORNING  ARIPiprazole (ABILIFY) 30 MG tablet, Take 0.5 tablets by mouth daily [facility] (Patient not taking: Reported on 3/10/2020)  blood glucose monitor kit and supplies, CHECK BLOOD GLUCOSE DAILY DX: E11.9 ONE TOUCH GLUCOMETER REQUESTED IF OK WITH [facility]  FREESTYLE LANCETS MISC, Inject 1 each into the skin daily    Allergies:    Ace inhibitors; Ibuprofen; and Latuda [lurasidone hcl]    Social History:    reports that she has never smoked. She has never used smokeless tobacco. She reports that she does not drink alcohol or use drugs. Family History:   family history includes Heart Disease (age of onset: 76) in her father; High Blood Pressure in her father. REVIEW OF SYSTEMS:  As above in the HPI, otherwise negative    PHYSICAL EXAM:    Vitals:  BP (!) 150/75   Pulse 95   Temp 97.3 °F (36.3 °C) (Temporal)   Resp 18   Ht 5' 3\" (1.6 m)   Wt 169 lb 2 oz (76.7 kg)   SpO2 99%   BMI 29.96 kg/m²     General:  Awake, alert, oriented X 3. Well developed, well nourished, well groomed. No apparent distress. HEENT:  Normocephalic, atraumatic. Pupils equal, round, reactive to light. No scleral icterus. No conjunctival injection. Normal lips, t No nasal discharge.   Left facial weakness from previous Bell's palsy noted  Neck:  Supple  Heart:  RRR, no murmurs, gallops, rubs  Lungs:  CTA bilaterally, bilat symmetrical expansion, no wheeze, rales, or rhonchi  Abdomen:   Bowel sounds present, soft, nontender, no masses, no organomegaly, no peritoneal signs  Extremities:  No clubbing, cyanosis,   Bilateral lower extremity edema is chronic and unchanged  Skin:  Warm and dry, no open lesions or rash  Neuro: Left facial weakness from prior Bell's palsy present  Breast: deferred  Rectal: deferred  Genitalia:  deferred    LABS:  Data reviewed in detail    ASSESSMENT:      Active Problems:    Bipolar disorder ([address]  Currently  manic episode  UTI with Klebsiella  Hyperkalemia likely from RTA  Comorbidities present and past as listed below  Patient Active Problem List   Diagnosis    Severe bipolar affective disorder with psychosis ([address]    Carpal tunnel syndrome    Hyperlipidemia    Depression    Vitamin D deficiency    Systemic primary arterial hypertension    Acute psychosis ([address]    Bipolar I disorder, current or most recent episode manic, severe ([address]    Severe manic bipolar I disorder with psychotic features ([address]    Non-insulin dependent type 2 diabetes mellitus ([address]    Sepsis ([address]    Bipolar disorder ([address]       PLAN:  Kayexalate was ordered yesterday   Hyperkalemia resolved  Low potassium diet recommended  Glucose monitoring  Resumed home medications  Inpatient psychiatric care as outlined/defer to psychiatry  Reviewed in detail with the patient    Audrey Hassan  11:28 AM  3/29/2020

## 2020-03-29 NOTE — PROGRESS NOTES
Patient awake in her room. During environmental rounding, another staff member noted that patient had dumper lotion all over her floor. Patient told staff member \"you're Marlee Feng clean it up. \"  Patient repeatedly uses the call light to ask for more supplies, all of which were given to her earlier in the shift. This nurse went down to patients' room to ask what she has been using all of the supplies on and patient began screaming at this nurse and stated \"it's none of your motherfucking business,\" and told this nurse to \"shut your fucking mouth. \"  Patient is dismissive of staff and is reminded to keep her emotions in control and patient calmed herself down. Will continue to monitor.

## 2020-03-29 NOTE — PROGRESS NOTES
Patient continues to be vulgar. This nurse asked patient to come out to get her blood pressure taken and patient stated that she would come sit out in her chair at the table. This nurse observed patient walking up towards nurses station and asked patient where she was going and she stated \"to go get socks bitch. \"  This nurse told patient why don't you have a seat and I'll get your socks for you and patient stated \"you Miguel Martinez fight bitch? I'll fucking knock you out bitch. \"  Patient did go sit down at the table but continues to be belligerent.

## 2020-03-29 NOTE — PROGRESS NOTES
Units, Subcutaneous, Nightly, Viktoria Portillo MD, 1 Units at 03/28/20 2047    glucose (GLUTOSE) 40 % oral gel 15 g, 15 g, Oral, PRN, Viktoria Portillo MD    dextrose 50 % IV solution, 12.5 g, Intravenous, PRN, Viktoria Portillo MD    glucagon (rDNA) injection 1 mg, 1 mg, Intramuscular, PRN, Viktoria Portillo MD    dextrose 5 % solution, 100 mL/hr, Intravenous, PRN, Viktoria Portillo MD      Examination:  BP (!) 150/75   Pulse 95   Temp 97.3 °F (36.3 °C) (Temporal)   Resp 18   Ht 5' 3\" (1.6 m)   Wt 169 lb 2 oz (76.7 kg)   SpO2 99%   BMI 29.96 kg/m²   Gait - steady  Medication side effects(SE):  No medication side effects to be noted, patient was educated on signs and symptoms of medication side effects instructed to notify medical staff if any signs and symptoms occur. Patient has the capacity to understand this information and what I instructed her to do if medication side effects arise. Mental Status Examination:    Level of consciousness:  within normal limits   Appearance:  fair grooming and fair hygiene  Behavior/Motor: No psychomotor agitation or retardation noted  Attitude toward examiner: Calm cooperative  Speech: Pressured, loud  Mood: labile  Affect:  intense  Thought processes: Tangential  Thought content: Without hallucinations delusions or paranoia  Cognition:  oriented to person, place, and time   Concentration poor  Insight poor   Judgement poor     ASSESSMENT:   Patient symptoms are:  [] Well controlled  [x] Improving  [] Worsening  [] No change      Diagnosis:  Principal Problem:    Bipolar I disorder, current or most recent episode manic, severe (HCC)  Active Problems:    [facility]  Resolved Problems:    * No resolved hospital problems. *      LABS:    No results for input(s): WBC, HGB, PLT in the last 72 hours.   Recent Labs     03/27/20  0604 03/28/20  0505 03/29/20  0451    137 137   K 4.8 5.5* 4.2   CL 98 97* 95*   CO2 28 29 30*   BUN 22 23 22   CREATININE 1.2* 1.1* 1.2*

## 2020-03-30 LAB
ALBUMIN SERPL-MCNC: 3.8 G/DL (ref 3.5–5.2)
ALP BLD-CCNC: 77 U/L (ref 35–104)
ALT SERPL-CCNC: 17 U/L (ref 0–32)
ANION GAP SERPL CALCULATED.3IONS-SCNC: 11 MMOL/L (ref 7–16)
AST SERPL-CCNC: 21 U/L (ref 0–31)
BILIRUB SERPL-MCNC: 0.3 MG/DL (ref 0–1.2)
BUN BLDV-MCNC: 19 MG/DL (ref 8–23)
CALCIUM SERPL-MCNC: 9.9 MG/DL (ref 8.6–10.2)
CHLORIDE BLD-SCNC: 101 MMOL/L (ref 98–107)
CO2: 29 MMOL/L (ref 22–29)
CREAT SERPL-MCNC: 1 MG/DL (ref 0.5–1)
GFR AFRICAN AMERICAN: >60
GFR NON-AFRICAN AMERICAN: >60 ML/MIN/1.73
GLUCOSE BLD-MCNC: 138 MG/DL (ref 74–99)
METER GLUCOSE: 149 MG/DL (ref 74–99)
METER GLUCOSE: 152 MG/DL (ref 74–99)
METER GLUCOSE: 166 MG/DL (ref 74–99)
METER GLUCOSE: 90 MG/DL (ref 74–99)
POTASSIUM SERPL-SCNC: 5.1 MMOL/L (ref 3.5–5)
SODIUM BLD-SCNC: 141 MMOL/L (ref 132–146)
TOTAL PROTEIN: 6.8 G/DL (ref 6.4–8.3)

## 2020-03-30 PROCEDURE — 6360000002 HC RX W HCPCS: Performed by: PSYCHIATRY & NEUROLOGY

## 2020-03-30 PROCEDURE — 80053 COMPREHEN METABOLIC PANEL: CPT

## 2020-03-30 PROCEDURE — 99232 SBSQ HOSP IP/OBS MODERATE 35: CPT | Performed by: NURSE PRACTITIONER

## 2020-03-30 PROCEDURE — 6370000000 HC RX 637 (ALT 250 FOR IP): Performed by: EMERGENCY MEDICINE

## 2020-03-30 PROCEDURE — 6370000000 HC RX 637 (ALT 250 FOR IP): Performed by: NURSE PRACTITIONER

## 2020-03-30 PROCEDURE — 82962 GLUCOSE BLOOD TEST: CPT

## 2020-03-30 PROCEDURE — 36415 COLL VENOUS BLD VENIPUNCTURE: CPT

## 2020-03-30 PROCEDURE — 6370000000 HC RX 637 (ALT 250 FOR IP): Performed by: PSYCHIATRY & NEUROLOGY

## 2020-03-30 PROCEDURE — 6370000000 HC RX 637 (ALT 250 FOR IP): Performed by: INTERNAL MEDICINE

## 2020-03-30 PROCEDURE — 1240000000 HC EMOTIONAL WELLNESS R&B

## 2020-03-30 RX ORDER — LORAZEPAM 2 MG/ML
1 INJECTION INTRAMUSCULAR ONCE
Status: COMPLETED | OUTPATIENT
Start: 2020-03-30 | End: 2020-03-30

## 2020-03-30 RX ORDER — HALOPERIDOL 5 MG/ML
5 INJECTION INTRAMUSCULAR ONCE
Status: COMPLETED | OUTPATIENT
Start: 2020-03-30 | End: 2020-03-30

## 2020-03-30 RX ADMIN — HALOPERIDOL LACTATE 5 MG: 5 INJECTION INTRAMUSCULAR at 09:01

## 2020-03-30 RX ADMIN — LORAZEPAM 1 MG: 2 INJECTION INTRAMUSCULAR; INTRAVENOUS at 09:01

## 2020-03-30 RX ADMIN — BRIMONIDINE TARTRATE 1 DROP: 2 SOLUTION/ DROPS OPHTHALMIC at 08:52

## 2020-03-30 RX ADMIN — TRAZODONE HYDROCHLORIDE 25 MG: 50 TABLET ORAL at 20:37

## 2020-03-30 RX ADMIN — TIMOLOL MALEATE 1 DROP: 5 SOLUTION OPHTHALMIC at 08:52

## 2020-03-30 RX ADMIN — INSULIN LISPRO 2 UNITS: 100 INJECTION, SOLUTION INTRAVENOUS; SUBCUTANEOUS at 08:58

## 2020-03-30 RX ADMIN — METFORMIN HYDROCHLORIDE 1000 MG: 1000 TABLET, FILM COATED ORAL at 16:59

## 2020-03-30 RX ADMIN — TIMOLOL MALEATE 1 DROP: 5 SOLUTION OPHTHALMIC at 20:39

## 2020-03-30 RX ADMIN — COLCHICINE 0.6 MG: 0.6 TABLET, FILM COATED ORAL at 20:38

## 2020-03-30 RX ADMIN — BRIMONIDINE TARTRATE 1 DROP: 2 SOLUTION/ DROPS OPHTHALMIC at 20:39

## 2020-03-30 RX ADMIN — CLONIDINE HYDROCHLORIDE 0.1 MG: 0.1 TABLET ORAL at 20:38

## 2020-03-30 RX ADMIN — LABETALOL HYDROCHLORIDE 200 MG: 200 TABLET, FILM COATED ORAL at 06:32

## 2020-03-30 RX ADMIN — CLONIDINE HYDROCHLORIDE 0.1 MG: 0.1 TABLET ORAL at 08:53

## 2020-03-30 RX ADMIN — METFORMIN HYDROCHLORIDE 1000 MG: 1000 TABLET, FILM COATED ORAL at 08:54

## 2020-03-30 RX ADMIN — RISPERIDONE 1 MG: 1 TABLET, FILM COATED ORAL at 20:38

## 2020-03-30 RX ADMIN — LABETALOL HYDROCHLORIDE 200 MG: 200 TABLET, FILM COATED ORAL at 20:38

## 2020-03-30 RX ADMIN — LATANOPROST 1 DROP: 50 SOLUTION OPHTHALMIC at 20:39

## 2020-03-30 RX ADMIN — RISPERIDONE 1 MG: 1 TABLET, FILM COATED ORAL at 08:53

## 2020-03-30 RX ADMIN — LABETALOL HYDROCHLORIDE 200 MG: 200 TABLET, FILM COATED ORAL at 14:31

## 2020-03-30 RX ADMIN — ATORVASTATIN CALCIUM 40 MG: 40 TABLET, FILM COATED ORAL at 20:38

## 2020-03-30 RX ADMIN — FUROSEMIDE 40 MG: 40 TABLET ORAL at 08:54

## 2020-03-30 RX ADMIN — INSULIN LISPRO 1 UNITS: 100 INJECTION, SOLUTION INTRAVENOUS; SUBCUTANEOUS at 20:46

## 2020-03-30 RX ADMIN — ALOGLIPTIN 12.5 MG: 12.5 TABLET, FILM COATED ORAL at 08:54

## 2020-03-30 RX ADMIN — INSULIN GLARGINE 14 UNITS: 100 INJECTION, SOLUTION SUBCUTANEOUS at 20:46

## 2020-03-30 RX ADMIN — COLCHICINE 0.6 MG: 0.6 TABLET, FILM COATED ORAL at 08:54

## 2020-03-30 ASSESSMENT — PAIN SCALES - GENERAL
PAINLEVEL_OUTOF10: 0
PAINLEVEL_OUTOF10: 0
PAINLEVEL_OUTOF10: 6

## 2020-03-30 NOTE — GROUP NOTE
Group Therapy Note    Date: 3/29/2020    Group Start Time: 1930  Group End Time: 2030  Group Topic: Relaxation    [facility] [address]        Group Therapy Note    Attendees: 3/6           Signature:  Buck Cordova

## 2020-03-30 NOTE — PROGRESS NOTES
At 0840 this morning patient was yelling at staff members calling them inappropriate names and threatening to assault them. Patient was sent to her to decrease stimulation. This did not work as patient was slamming her bathroom door over and over again. Orders obtained to medicated with ativan and haldol IM. At this time patient is in her room asleep easily arousalable with verbal stimuli. Patient does deny SI/HI and not reporting auditory or visual hallucinations. Continues to have pressured speech with grandiose ideas about her self.

## 2020-03-30 NOTE — PLAN OF CARE
Patient has been irritable, impulsive and verbally aggressive. Patient is loud and boisterous while on the unit and shouts profanities and insults towards staff at times. Patient is out on unit and is social with peers, although can be inappropriate at times. Patient attended evening relaxation group. Patient refused Depakote, but did take other nighttime medications without issue. Patient stated \"Fuck Depakote\". No unit problems reported. Will continue to observe and support.      Problem: Altered Mood, Manic Behavior:  Goal: Absence of self-harm  Description: Absence of self-harm  3/29/2020 5827 by Jocy Ramos, RN  Outcome: Met This Shift  3/29/2020 1441 by Jose Cintron, RN  Outcome: Ongoing     Problem: Altered Mood, Manic Behavior:  Goal: Ability to interact with others will improve  Description: Ability to interact with others will improve  Outcome: Not Met This Shift

## 2020-03-30 NOTE — PROGRESS NOTES
Substance and Sexual Activity    Alcohol use: No    Drug use: No    Sexual activity: Not Currently   Lifestyle    Physical activity     Days per week: Not on file     Minutes per session: Not on file    Stress: Not on file   Relationships    Social connections     Talks on phone: Not on file     Gets together: Not on file     Attends Yazidi service: Not on file     Active member of club or organization: Not on file     Attends meetings of clubs or organizations: Not on file     Relationship status: Not on file    Intimate partner violence     Fear of current or ex partner: Not on file     Emotionally abused: Not on file     Physically abused: Not on file     Forced sexual activity: Not on file   Other Topics Concern    Not on file   Social History Narrative    Not on file           ROS:  [x] All negative/unchanged except if checked.  Explain positive(checked items) below:  [] Constitutional  [] Eyes  [] Ear/Nose/Mouth/Throat  [] Respiratory  [] CV  [] GI  []   [] Musculoskeletal  [] Skin/Breast  [] Neurological  [] Endocrine  [] Heme/Lymph  [] Allergic/Immunologic    Explanation:     MEDICATIONS:    Current Facility-Administered Medications:     risperiDONE microspheres ER (RISPERDAL CONSTA) injection 12.5 mg, 12.5 mg, Intramuscular, Q14 Days, Elenor Cowden, MD, 12.5 mg at 03/27/20 1227    risperiDONE (RISPERDAL) tablet 1 mg, 1 mg, Oral, BID, CHRISS Castle CNP, 1 mg at 03/30/20 8321    divalproex (DEPAKOTE)  tablet 500 mg, 500 mg, Oral, Daily, CHRISS Castle CNP, 500 mg at 03/28/20 0858    divalproex (DEPAKOTE)  tablet 750 mg, 750 mg, Oral, Nightly, CHRISS Castle - CNP, 750 mg at 03/28/20 2049    alogliptin (NESINA) tablet 12.5 mg, 12.5 mg, Oral, Daily, Roni De La O DO, 12.5 mg at 03/30/20 0854    metFORMIN (GLUCOPHAGE) tablet 1,000 mg, 1,000 mg, Oral, BID TARAN, CHRISS Pretty - CNP, 1,000 mg at 03/30/20 0854    acetaminophen (TYLENOL) tablet 650 mg,

## 2020-03-30 NOTE — PROGRESS NOTES
Patient rested poorly this shift. Up most of the night. Continues to be irritable and shout insults  at staff but is redirectable. Denies SI, HI and hallucinations.

## 2020-03-31 VITALS
HEART RATE: 98 BPM | OXYGEN SATURATION: 100 % | SYSTOLIC BLOOD PRESSURE: 139 MMHG | HEIGHT: 63 IN | WEIGHT: 169.13 LBS | DIASTOLIC BLOOD PRESSURE: 64 MMHG | TEMPERATURE: 97.3 F | RESPIRATION RATE: 16 BRPM | BODY MASS INDEX: 29.97 KG/M2

## 2020-03-31 LAB
METER GLUCOSE: 109 MG/DL (ref 74–99)
METER GLUCOSE: 165 MG/DL (ref 74–99)

## 2020-03-31 PROCEDURE — 6370000000 HC RX 637 (ALT 250 FOR IP): Performed by: INTERNAL MEDICINE

## 2020-03-31 PROCEDURE — 82962 GLUCOSE BLOOD TEST: CPT

## 2020-03-31 PROCEDURE — 6370000000 HC RX 637 (ALT 250 FOR IP): Performed by: NURSE PRACTITIONER

## 2020-03-31 PROCEDURE — 99239 HOSP IP/OBS DSCHRG MGMT >30: CPT | Performed by: NURSE PRACTITIONER

## 2020-03-31 PROCEDURE — 6370000000 HC RX 637 (ALT 250 FOR IP): Performed by: EMERGENCY MEDICINE

## 2020-03-31 RX ORDER — RISPERIDONE 1 MG/1
1 TABLET, FILM COATED ORAL 2 TIMES DAILY
Qty: 60 TABLET | Refills: 0 | Status: SHIPPED | OUTPATIENT
Start: 2020-03-31 | End: 2021-08-09

## 2020-03-31 RX ORDER — DIVALPROEX SODIUM 250 MG/1
750 TABLET, DELAYED RELEASE ORAL NIGHTLY
Qty: 90 TABLET | Refills: 0 | Status: ON HOLD | OUTPATIENT
Start: 2020-03-31 | End: 2020-06-22 | Stop reason: HOSPADM

## 2020-03-31 RX ORDER — DIVALPROEX SODIUM 500 MG/1
500 TABLET, DELAYED RELEASE ORAL DAILY
Qty: 30 TABLET | Refills: 0 | Status: ON HOLD | OUTPATIENT
Start: 2020-04-01 | End: 2020-06-22 | Stop reason: HOSPADM

## 2020-03-31 RX ORDER — SODIUM POLYSTYRENE SULFONATE 15 G/60ML
15 SUSPENSION ORAL; RECTAL
Qty: 1 BOTTLE | Refills: 3 | Status: SHIPPED | OUTPATIENT
Start: 2020-04-01 | End: 2020-04-02 | Stop reason: SDUPTHER

## 2020-03-31 RX ADMIN — LABETALOL HYDROCHLORIDE 200 MG: 200 TABLET, FILM COATED ORAL at 06:45

## 2020-03-31 RX ADMIN — FUROSEMIDE 40 MG: 40 TABLET ORAL at 08:28

## 2020-03-31 RX ADMIN — RISPERIDONE 1 MG: 1 TABLET, FILM COATED ORAL at 08:28

## 2020-03-31 RX ADMIN — COLCHICINE 0.6 MG: 0.6 TABLET, FILM COATED ORAL at 08:28

## 2020-03-31 RX ADMIN — TIMOLOL MALEATE 1 DROP: 5 SOLUTION OPHTHALMIC at 08:29

## 2020-03-31 RX ADMIN — INSULIN LISPRO 2 UNITS: 100 INJECTION, SOLUTION INTRAVENOUS; SUBCUTANEOUS at 12:50

## 2020-03-31 RX ADMIN — BRIMONIDINE TARTRATE 1 DROP: 2 SOLUTION/ DROPS OPHTHALMIC at 08:29

## 2020-03-31 RX ADMIN — LABETALOL HYDROCHLORIDE 200 MG: 200 TABLET, FILM COATED ORAL at 14:05

## 2020-03-31 RX ADMIN — ALOGLIPTIN 12.5 MG: 12.5 TABLET, FILM COATED ORAL at 08:28

## 2020-03-31 RX ADMIN — CLONIDINE HYDROCHLORIDE 0.1 MG: 0.1 TABLET ORAL at 08:28

## 2020-03-31 RX ADMIN — METFORMIN HYDROCHLORIDE 1000 MG: 1000 TABLET, FILM COATED ORAL at 08:28

## 2020-03-31 ASSESSMENT — PAIN SCALES - GENERAL
PAINLEVEL_OUTOF10: 0
PAINLEVEL_OUTOF10: 0

## 2020-03-31 NOTE — PLAN OF CARE
Preoccupations  Hallucinations: None  Delusions: Yes  Delusions: Grandeur, Persecution  Memory:Normal: No  Memory: Poor Recent  Insight and Judgment: No  Insight and Judgment: Poor Judgment, Poor Insight  Present Suicidal Ideation: No  Present Homicidal Ideation: No    Daily Assessment Last Entry:   Daily Sleep (WDL): Exceptions to WDL         Patient Currently in Pain: Denies  Daily Nutrition (WDL): Exceptions to WDL  Appetite Change: Normal for patient  Barriers to Nutrition: Blood sugar control  Level of Assistance: Independent/Self    Patient Monitoring:  Frequency of Checks: 4 times per hour, close    Psychiatric Symptoms:   Depression Symptoms  Depression Symptoms: Increased irritability  Anxiety Symptoms  Anxiety Symptoms: Generalized  Gretta Symptoms  Gretta Symptoms: Flight of ideas, Poor judgment, Labile     Psychosis Symptoms  Hallucination Type: No problems reported or observed. Delusion Type: Controlling, Grandeur, Persecutory    Suicide Risk CSSR-S:  1) Within the past month, have you wished you were dead or wished you could go to sleep and not wake up? : No  2) Have you actually had any thoughts of killing yourself? : No  6) Have you ever done anything, started to do anything, or prepared to do anything to end your life?: No  Change in Result No. Change in Plan of care No.    EDUCATION:   Learner Progress Toward Treatment Goals: Reviewed results and recommendations of this team    Method: Small group    Outcome: Verbalized understanding    PATIENT GOALS: Have a good day    PLAN/TREATMENT RECOMMENDATIONS UPDATE: Encourage group participation and continue to provide emotional support.     GOALS UPDATE:  Time frame for Short-Term Goals: 1-3 days      Junior Michelle RN

## 2020-03-31 NOTE — PROGRESS NOTES
tablet by mouth 2 times daily  divalproex (DEPAKOTE) 500 MG DR tablet, Take 1 tablet by mouth daily  metFORMIN (GLUCOPHAGE) 1000 MG tablet, Take 1 tablet by mouth 2 times daily (with meals)  atorvastatin (LIPITOR) 40 MG tablet, Take 1 tablet by mouth daily  brimonidine (ALPHAGAN) 0.2 % ophthalmic solution,   Timolol (TIMOPTIC) 0.5 % (DAILY) SOLN ophthalmic solution, Place 1 drop into both eyes 2 times daily  ammonium lactate (AMLACTIN) 12 % cream, APPLY TO AFFECTED AREA EVERY DAY  insulin glargine (LANTUS SOLOSTAR) 100 UNIT/ML injection pen, Inject 14 Units into the skin daily (Patient not taking: Reported on 3/10/2020)  FREESTYLE LITE strip, USE TO TEST BLOOD SUGAR EVERY MORNING  ARIPiprazole (ABILIFY) 30 MG tablet, Take 0.5 tablets by mouth daily [facility] (Patient not taking: Reported on 3/10/2020)  blood glucose monitor kit and supplies, CHECK BLOOD GLUCOSE DAILY DX: E11.9 ONE TOUCH GLUCOMETER REQUESTED IF OK WITH INSRANCE  FREESTYLE LANCETS MISC, Inject 1 each into the skin daily    Allergies:    Ace inhibitors; Ibuprofen; and Latuda [lurasidone hcl]    Social History:    reports that she has never smoked. She has never used smokeless tobacco. She reports that she does not drink alcohol or use drugs. Family History:   family history includes Heart Disease (age of onset: 76) in her father; High Blood Pressure in her father. REVIEW OF SYSTEMS:  As above in the HPI, otherwise negative    PHYSICAL EXAM:    Vitals:  /63   Pulse 85   Temp 97.3 °F (36.3 °C) (Temporal)   Resp 16   Ht 5' 3\" (1.6 m)   Wt 169 lb 2 oz (76.7 kg)   SpO2 100%   BMI 29.96 kg/m²     General:  Awake, alert, oriented X 3. Well developed, well nourished, well groomed. No apparent distress. HEENT:  Normocephalic, atraumatic. Pupils equal, round, reactive to light. No scleral icterus. No conjunctival injection. Normal lips, t No nasal discharge.   Left facial weakness from previous Bell's palsy noted  Neck:

## 2020-03-31 NOTE — PLAN OF CARE
Pt denies SI HI and hallucinations. Pt remains irritable, controlling, loud, labile, inappropriate, behavioral. Pt began yelling and was verbally threatening this staff member after she was asked if she needed help filling out her menu, pt stated \"shut up bitch. Shut your fucking mouth you hoe. Don't fucking talk to me bitch. \" Pt was asked to stop being inappropriate more than once. Pt reminded that she has yelled at almost every single staff member on this unit and she cannot continue with her behavior. Pt continued to be inappropriate and scream at this staff member. This nurse again tried to gain control of the conversation and make the patient aware of her behavior. Pt was called into [employer] by Nurse Practitioner Ana Maria Stout. Since that time, pt has since been polite, appropriate and quiet. Pt continues to refuse Depakote. Pt is eating provided meals. No groups. We will continue to provide support and comfort for the patient.      Problem: Falls - Risk of:  Goal: Will remain free from falls  Description: Will remain free from falls  Outcome: Met This Shift     Problem: Altered Mood, Manic Behavior:  Goal: Able to sleep  Description: Able to sleep  Outcome: Met This Shift     Problem: Altered Mood, Manic Behavior:  Goal: Absence of self-harm  Description: Absence of self-harm  3/31/2020 1027 by Rogelio Neumann RN  Outcome: Met This Shift     Problem: Altered Mood, Manic Behavior:  Goal: Able to verbalize decrease in frequency and intensity of racing thoughts  Description: Able to verbalize decrease in frequency and intensity of racing thoughts  Outcome: Not Met This Shift     Problem: Altered Mood, Manic Behavior:  Goal: Ability to interact with others will improve  Description: Ability to interact with others will improve  Outcome: Not Met This Shift     Problem: Altered Mood, Manic Behavior:  Goal: Ability to demonstrate self-control will improve  Description: Ability to demonstrate self-control will improve  Outcome: Not Met This Shift     Problem: Altered Mood, Manic Behavior:  Goal: Mood stable  Description: Mood stable  3/31/2020 1027 by Mile Garrison RN  Outcome: Not Met This Shift

## 2020-03-31 NOTE — PROGRESS NOTES
CLINICAL PHARMACY NOTE: MEDS TO [address] Select Patient?: No  Total # of Prescriptions Filled: 3   The following medications were delivered to the patient:  · Risperidone 1  · Divalproex 500  · Divalproex 250  Total # of Interventions Completed: 3  Time Spent (min): 30    Additional Documentation:

## 2020-03-31 NOTE — DISCHARGE SUMMARY
Fear of current or ex partner: Not on file     Emotionally abused: Not on file     Physically abused: Not on file     Forced sexual activity: Not on file   Other Topics Concern    Not on file   Social History Narrative    Not on file       MEDICATIONS:    Current Facility-Administered Medications:     risperiDONE microspheres ER (RISPERDAL CONSTA) injection 12.5 mg, 12.5 mg, Intramuscular, Q14 Days, Eligio Morales MD, 12.5 mg at 03/27/20 1227    risperiDONE (RISPERDAL) tablet 1 mg, 1 mg, Oral, BID, Rawland Medici, APRN - CNP, 1 mg at 03/31/20 4239    divalproex (DEPAKOTE) DR tablet 500 mg, 500 mg, Oral, Daily, Rawland Medici, APRN - CNP, 500 mg at 03/28/20 0858    divalproex (DEPAKOTE) DR tablet 750 mg, 750 mg, Oral, Nightly, Rawland Medici, APRN - CNP, 750 mg at 03/28/20 2049    alogliptin (NESINA) tablet 12.5 mg, 12.5 mg, Oral, Daily, Mine Kala, DO, 12.5 mg at 03/31/20 0833    metFORMIN (GLUCOPHAGE) tablet 1,000 mg, 1,000 mg, Oral, BID WC, Moira Crump, APRN - CNP, 1,000 mg at 03/31/20 4996    acetaminophen (TYLENOL) tablet 650 mg, 650 mg, Oral, Q4H PRN, Eligio Morales MD    haloperidol lactate (HALDOL) injection 3 mg, 3 mg, Intramuscular, Q6H PRN, 3 mg at 03/29/20 0859 **OR** haloperidol (HALDOL) tablet 3 mg, 3 mg, Oral, Q6H PRN, Eligio Morales MD, 3 mg at 03/22/20 9056    traZODone (DESYREL) tablet 25 mg, 25 mg, Oral, Nightly PRN, Eligio Morales MD, 25 mg at 03/30/20 2037    magnesium hydroxide (MILK OF MAGNESIA) 400 MG/5ML suspension 30 mL, 30 mL, Oral, Daily PRN, Eligio Morales MD    aluminum & magnesium hydroxide-simethicone (MAALOX) 200-200-20 MG/5ML suspension 30 mL, 30 mL, Oral, PRN, Eligio Morales MD    timolol (TIMOPTIC) 0.5 % ophthalmic solution 1 drop, 1 drop, Both Eyes, BID, Artur Howell MD, 1 drop at 03/31/20 0829    atorvastatin (LIPITOR) tablet 40 mg, 40 mg, Oral, Daily, Artur Howell MD, 40 mg at 03/30/20 2038    colchicine (COLCRYS) tablet 0.6 treatment. Significant progress in the symptoms since admission. Denies AVH or paranoid thoughts  Denies hopeless or worthless feeling  No active SI/HI  Appetite:  [x] Normal  [] Increased  [] Decreased    Sleep:       [x] Normal  [] Fair       [] Poor            Energy:    [x] Normal  [] Increased  [] Decreased     SI [] Present  [x] Absent  HI  []Present  [x] Absent   Aggression:  [] yes  [x] no  Patient is [x] able  [] unable to CONTRACT FOR SAFETY   Medication side effects(SE):  [x] None(Psych. Meds.) [] Other      Mental Status Examination on discharge:    Level of consciousness:  within normal limits   Appearance:  well-appearing  Behavior/Motor:  no abnormalities noted  Attitude toward examiner:  attentive and good eye contact  Speech:  spontaneous, normal rate and normal volume   Mood: euthymic  Affect:  mood congruent  Thought processes:  linear and goal directed   Thought content: Without hallucinations delusions or paranoia  Cognition:  oriented to person, place, and time   Concentration intact  Memory intact  Insight good   Judgement fair   Fund of Knowledge adequate      ASSESSMENT:  Patient symptoms are:  [x] Well controlled  [x] Improving  [] Worsening  [] No change      Diagnosis:  Principal Problem:    Bipolar I disorder, current or most recent episode manic, severe (HCC)  Active Problems:    Bipolar disorder ([facility] [address]  Resolved Problems:    * No resolved hospital problems. *      LABS:    No results for input(s): WBC, HGB, PLT in the last 72 hours.   Recent Labs     03/29/20  0451 03/30/20  0627    141   K 4.2 5.1*   CL 95* 101   CO2 30* 29   BUN 22 19   CREATININE 1.2* 1.0   GLUCOSE 176* 138*     Recent Labs     03/29/20  0451 03/30/20  0627   BILITOT <0.2 0.3   ALKPHOS 88 77   AST 16 21   ALT 16 17     Lab Results   Component Value Date    LABAMPH NOT DETECTED 03/17/2020    LABAMPH NOT DETECTED 03/15/2011    BARBSCNU NOT DETECTED 03/17/2020    LABBENZ NOT DETECTED 03/17/2020    LABBENZ SEE BELOW 05/06/2014    CANNAB NOT DETECTED  03/15/2011    LABMETH NOT DETECTED 03/17/2020    OPIATESCREENURINE NOT DETECTED 03/17/2020    PHENCYCLIDINESCREENURINE NOT DETECTED 03/17/2020    ETOH <10 03/17/2020     Lab Results   Component Value Date    TSH 5.130 03/22/2019     No results found for: LITHIUM  Lab Results   Component Value Date    VALPROATE 92 03/25/2020       RISK ASSESSMENT AT DISCHARGE: Low risk for suicide and homicide. Treatment Plan:  Reviewed current Medications with the patient. Education provided on the complaince with treatment. Risks, benefits, side effects, drug-to-drug interactions and alternatives to treatment were discussed. Encourage patient to attend outpatient follow up appointment and therapy. Patient was advised to call the outpatient provider, visit the nearest ED or call 911 if symptoms are not manageable. Patient's family member was contacted prior to the discharge. Medication List      START taking these medications    risperiDONE 1 MG tablet  Commonly known as:  RISPERDAL  Take 1 tablet by mouth 2 times daily     risperiDONE microspheres ER 12.5 MG injection  Commonly known as:  RISPERDAL CONSTA  Inject 2 mLs into the muscle every 14 days for 1 dose  Start taking on:  April 9, 2020     sodium polystyrene 15 GM/60ML suspension  Commonly known as:  SPS  Take 60 mLs by mouth three times a week MON WED FRI  Start taking on:  April 1, 2020        CHANGE how you take these medications    * divalproex 250 MG DR tablet  Commonly known as:  DEPAKOTE  Take 3 tablets by mouth nightly  What changed: You were already taking a medication with the same name, and this prescription was added. Make sure you understand how and when to take each. * divalproex 500 MG DR tablet  Commonly known as:  DEPAKOTE  Take 1 tablet by mouth daily  Start taking on:  April 1, 2020  What changed:  Another medication with the same name was added.  Make sure you understand how and when to take each. vitamin D 1.25 MG (23304 UT) Caps capsule  Commonly known as:  ERGOCALCIFEROL  Take 1 capsule by mouth once a week  What changed:  additional instructions         * This list has 2 medication(s) that are the same as other medications prescribed for you. Read the directions carefully, and ask your doctor or other care provider to review them with you.             CONTINUE taking these medications    ammonium lactate 12 % cream  Commonly known as:  AMLACTIN  APPLY TO AFFECTED AREA EVERY DAY     atorvastatin 40 MG tablet  Commonly known as:  LIPITOR  Take 1 tablet by mouth daily     blood glucose monitor kit and supplies  CHECK BLOOD GLUCOSE DAILY DX: E11.9  ONE TOUCH GLUCOMETER REQUESTED IF OK WITH INSRANCE     brimonidine 0.2 % ophthalmic solution  Commonly known as:  ALPHAGAN     cloNIDine 0.1 MG tablet  Commonly known as:  CATAPRES  Take 1 tablet by mouth 2 times daily     colchicine 0.6 MG tablet  Commonly known as:  COLCRYS  Take 1 tablet by mouth 2 times daily     fish oil 1000 MG Caps  Take 3 capsules by mouth daily     FreeStyle Lancets Misc  Inject 1 each into the skin daily     FREESTYLE LITE strip  Generic drug:  blood glucose test strips  USE TO TEST BLOOD SUGAR EVERY MORNING     furosemide 20 MG tablet  Commonly known as:  LASIX  Take 1 tablet by mouth daily     insulin glargine 100 UNIT/ML injection pen  Commonly known as:  Lantus SoloStar  Inject 14 Units into the skin daily     labetalol 200 MG tablet  Commonly known as:  NORMODYNE  Take 1 tablet by mouth every 8 hours     latanoprost 0.005 % ophthalmic solution  Commonly known as:  XALATAN     metFORMIN 1000 MG tablet  Commonly known as:  GLUCOPHAGE  Take 1 tablet by mouth 2 times daily (with meals)     SITagliptin 100 MG tablet  Commonly known as:  JANUVIA  Take 1 tablet by mouth daily     Timolol 0.5 % (DAILY) Soln ophthalmic solution  Commonly known as:  TIMOPTIC        STOP taking these medications    ARIPiprazole 15 MG tablet  Commonly known as:  ABILIFY     ARIPiprazole 30 MG tablet  Commonly known as:  ABILIFY     cholestyramine 4 g packet  Commonly known as:  Questran     ofloxacin 0.3 % solution  Commonly known as:  OCUFLOX     OLANZapine 10 MG tablet  Commonly known as:  ZYPREXA           Where to Get Your Medications      These medications were sent to [facility] [address] [address]    Phone:  [phone]   · divalproex 250 MG DR tablet  · divalproex 500 MG DR tablet  · risperiDONE 1 MG tablet  · risperiDONE microspheres ER 12.5 MG injection  · sodium polystyrene 15 GM/60ML suspension           TIME SPEND - 35 MINUTES TO COMPLETE THE EVALUATION, DISCHARGE SUMMARY, MEDICATION RECONCILIATION AND FOLLOW UP CARE     Signed:  Warren Browne  3/31/2020  4:27 PM

## 2020-04-01 NOTE — CARE COORDINATION
Attempted to reach pt for follow-up since d/c from hospital. LVM for pt.        Electronically signed by Merilee Phoenix on 4/1/2020 at 2:33 PM

## 2020-04-02 ENCOUNTER — HOSPITAL ENCOUNTER (EMERGENCY)
Age: 65
Discharge: PSYCHIATRIC OR SUBSTANCE USE FACILITY | End: 2020-04-02
Attending: EMERGENCY MEDICINE
Payer: COMMERCIAL

## 2020-04-02 VITALS
TEMPERATURE: 97.6 F | OXYGEN SATURATION: 100 % | HEIGHT: 63 IN | BODY MASS INDEX: 29.96 KG/M2 | HEART RATE: 104 BPM | RESPIRATION RATE: 18 BRPM | SYSTOLIC BLOOD PRESSURE: 165 MMHG | DIASTOLIC BLOOD PRESSURE: 98 MMHG

## 2020-04-02 LAB
ACETAMINOPHEN LEVEL: <5 MCG/ML (ref 10–30)
ALBUMIN SERPL-MCNC: 4 G/DL (ref 3.5–5.2)
ALP BLD-CCNC: 74 U/L (ref 35–104)
ALT SERPL-CCNC: 17 U/L (ref 0–32)
AMPHETAMINE SCREEN, URINE: NOT DETECTED
ANION GAP SERPL CALCULATED.3IONS-SCNC: 12 MMOL/L (ref 7–16)
AST SERPL-CCNC: 19 U/L (ref 0–31)
BACTERIA: NORMAL /HPF
BARBITURATE SCREEN URINE: NOT DETECTED
BASOPHILS ABSOLUTE: 0.01 E9/L (ref 0–0.2)
BASOPHILS RELATIVE PERCENT: 0.2 % (ref 0–2)
BENZODIAZEPINE SCREEN, URINE: NOT DETECTED
BILIRUB SERPL-MCNC: 0.3 MG/DL (ref 0–1.2)
BILIRUBIN URINE: NEGATIVE
BLOOD, URINE: NEGATIVE
BUN BLDV-MCNC: 24 MG/DL (ref 8–23)
CALCIUM SERPL-MCNC: 9.8 MG/DL (ref 8.6–10.2)
CANNABINOID SCREEN URINE: NOT DETECTED
CHLORIDE BLD-SCNC: 104 MMOL/L (ref 98–107)
CLARITY: CLEAR
CO2: 26 MMOL/L (ref 22–29)
COCAINE METABOLITE SCREEN URINE: NOT DETECTED
COLOR: YELLOW
CREAT SERPL-MCNC: 0.8 MG/DL (ref 0.5–1)
EKG ATRIAL RATE: 106 BPM
EKG P AXIS: 62 DEGREES
EKG P-R INTERVAL: 154 MS
EKG Q-T INTERVAL: 334 MS
EKG QRS DURATION: 72 MS
EKG QTC CALCULATION (BAZETT): 443 MS
EKG R AXIS: 21 DEGREES
EKG T AXIS: 53 DEGREES
EKG VENTRICULAR RATE: 106 BPM
EOSINOPHILS ABSOLUTE: 0.05 E9/L (ref 0.05–0.5)
EOSINOPHILS RELATIVE PERCENT: 0.8 % (ref 0–6)
ETHANOL: <10 MG/DL (ref 0–0.08)
FENTANYL SCREEN, URINE: NOT DETECTED
GFR AFRICAN AMERICAN: >60
GFR NON-AFRICAN AMERICAN: >60 ML/MIN/1.73
GLUCOSE BLD-MCNC: 204 MG/DL (ref 74–99)
GLUCOSE URINE: NEGATIVE MG/DL
HCT VFR BLD CALC: 33.8 % (ref 34–48)
HEMOGLOBIN: 10.9 G/DL (ref 11.5–15.5)
IMMATURE GRANULOCYTES #: 0.02 E9/L
IMMATURE GRANULOCYTES %: 0.3 % (ref 0–5)
KETONES, URINE: NEGATIVE MG/DL
LEUKOCYTE ESTERASE, URINE: ABNORMAL
LYMPHOCYTES ABSOLUTE: 2.45 E9/L (ref 1.5–4)
LYMPHOCYTES RELATIVE PERCENT: 41.4 % (ref 20–42)
Lab: NORMAL
MCH RBC QN AUTO: 27.3 PG (ref 26–35)
MCHC RBC AUTO-ENTMCNC: 32.2 % (ref 32–34.5)
MCV RBC AUTO: 84.7 FL (ref 80–99.9)
METHADONE SCREEN, URINE: NOT DETECTED
MONOCYTES ABSOLUTE: 0.59 E9/L (ref 0.1–0.95)
MONOCYTES RELATIVE PERCENT: 10 % (ref 2–12)
NEUTROPHILS ABSOLUTE: 2.8 E9/L (ref 1.8–7.3)
NEUTROPHILS RELATIVE PERCENT: 47.3 % (ref 43–80)
NITRITE, URINE: NEGATIVE
OPIATE SCREEN URINE: NOT DETECTED
OXYCODONE URINE: NOT DETECTED
PDW BLD-RTO: 14.8 FL (ref 11.5–15)
PH UA: 7 (ref 5–9)
PHENCYCLIDINE SCREEN URINE: NOT DETECTED
PLATELET # BLD: 245 E9/L (ref 130–450)
PMV BLD AUTO: 9.6 FL (ref 7–12)
POTASSIUM SERPL-SCNC: 4.1 MMOL/L (ref 3.5–5)
PROTEIN UA: NEGATIVE MG/DL
RBC # BLD: 3.99 E12/L (ref 3.5–5.5)
RBC UA: NORMAL /HPF (ref 0–2)
SALICYLATE, SERUM: <0.3 MG/DL (ref 0–30)
SODIUM BLD-SCNC: 142 MMOL/L (ref 132–146)
SPECIFIC GRAVITY UA: 1.02 (ref 1–1.03)
TOTAL PROTEIN: 6.8 G/DL (ref 6.4–8.3)
TRICYCLIC ANTIDEPRESSANTS SCREEN SERUM: NEGATIVE NG/ML
UROBILINOGEN, URINE: 0.2 E.U./DL
VALPROIC ACID LEVEL: 28 MCG/ML (ref 50–100)
WBC # BLD: 5.9 E9/L (ref 4.5–11.5)
WBC UA: NORMAL /HPF (ref 0–5)

## 2020-04-02 PROCEDURE — 80307 DRUG TEST PRSMV CHEM ANLYZR: CPT

## 2020-04-02 PROCEDURE — 93005 ELECTROCARDIOGRAM TRACING: CPT | Performed by: EMERGENCY MEDICINE

## 2020-04-02 PROCEDURE — 85025 COMPLETE CBC W/AUTO DIFF WBC: CPT

## 2020-04-02 PROCEDURE — 6370000000 HC RX 637 (ALT 250 FOR IP): Performed by: EMERGENCY MEDICINE

## 2020-04-02 PROCEDURE — 93010 ELECTROCARDIOGRAM REPORT: CPT | Performed by: INTERNAL MEDICINE

## 2020-04-02 PROCEDURE — 80053 COMPREHEN METABOLIC PANEL: CPT

## 2020-04-02 PROCEDURE — 99284 EMERGENCY DEPT VISIT MOD MDM: CPT

## 2020-04-02 PROCEDURE — 81001 URINALYSIS AUTO W/SCOPE: CPT

## 2020-04-02 PROCEDURE — 80164 ASSAY DIPROPYLACETIC ACD TOT: CPT

## 2020-04-02 PROCEDURE — G0480 DRUG TEST DEF 1-7 CLASSES: HCPCS

## 2020-04-02 RX ORDER — RISPERIDONE 1 MG/1
1 TABLET, FILM COATED ORAL ONCE
Status: COMPLETED | OUTPATIENT
Start: 2020-04-02 | End: 2020-04-02

## 2020-04-02 RX ORDER — CLONIDINE HYDROCHLORIDE 0.1 MG/1
0.1 TABLET ORAL ONCE
Status: COMPLETED | OUTPATIENT
Start: 2020-04-02 | End: 2020-04-02

## 2020-04-02 RX ADMIN — CLONIDINE HYDROCHLORIDE 0.1 MG: 0.1 TABLET ORAL at 21:22

## 2020-04-02 RX ADMIN — RISPERIDONE 1 MG: 1 TABLET, FILM COATED ORAL at 16:21

## 2020-04-02 ASSESSMENT — ENCOUNTER SYMPTOMS
EYE REDNESS: 0
COUGH: 0
DIARRHEA: 0
SHORTNESS OF BREATH: 0
EYE PAIN: 0
ABDOMINAL DISTENTION: 0
WHEEZING: 0
VOMITING: 0
NAUSEA: 0
SINUS PRESSURE: 0
SORE THROAT: 0
BACK PAIN: 0
EYE DISCHARGE: 0

## 2020-04-02 NOTE — ED PROVIDER NOTES
Appearance: She is well-developed. HENT:      Head: Normocephalic and atraumatic. Eyes:      Pupils: Pupils are equal, round, and reactive to light. Neck:      Musculoskeletal: Normal range of motion and neck supple. Cardiovascular:      Rate and Rhythm: Normal rate and regular rhythm. Pulmonary:      Effort: Pulmonary effort is normal. No respiratory distress. Breath sounds: Normal breath sounds. No wheezing or rales. Abdominal:      General: Bowel sounds are normal.      Palpations: Abdomen is soft. Tenderness: There is no abdominal tenderness. There is no guarding or rebound. Skin:     General: Skin is warm and dry. Neurological:      Mental Status: She is alert and oriented to person, place, and time. Cranial Nerves: No cranial nerve deficit. Coordination: Coordination normal.   Psychiatric:      Comments: Manic, flight of ideas. Tangential but can redirect for her to answer your question and then rants more. Pressured speech          Procedures     MDM   Patient presents emergency department for psych eval.  Patient has been hospitalized recently for manic episodes. She is not taking her home medication today. On evaluation patient is manic in the room and very tangential.  She is redirectable. Lab work obtained and patient medically cleared. She was pink slipped by [facility] for homicidal ideations. Will be evaluated by social work and [facility] pending social work evaluation.        --------------------------------------------- PAST HISTORY ---------------------------------------------  Past Medical History:  has a past medical history of Bell's palsy, Bipolar disorder ([facility] [address] Carpal tunnel syndrome, Hyperlipidemia, Non-insulin dependent type 2 diabetes mellitus ([address] and Systemic primary arterial hypertension. Past Surgical History:  has a past surgical history that includes Echo Complete (6/22/2013).     Social History:  reports that she has never 1:24 PM  ED Bed Assignment:  30/-30    The nursing notes within the ED encounter and vital signs as below have been reviewed. BP (!) 141/95   Pulse 108   Temp 98.3 °F (36.8 °C) (Oral)   Resp 18   Ht 5' 3\" (1.6 m)   SpO2 98%   BMI 29.96 kg/m²   Oxygen Saturation Interpretation: Normal      ------------------------------------------ PROGRESS NOTES ------------------------------------------  ED COURSE MEDICATIONS:              Medications - No data to display    I have spoken with the patient and discussed todays results, in addition to providing specific details for the plan of care and counseling regarding the diagnosis and prognosis. Their questions are answered at this time and they are agreeable with the plan. I discussed at length with them reasons for immediate return here for re evaluation. They will followup with primary care by calling their office tomorrow. --------------------------------- ADDITIONAL PROVIDER NOTES ---------------------------------  At this time the patient is without objective evidence of an acute process requiring hospitalization or inpatient management. They have remained hemodynamically stable throughout their entire ED visit and are stable for discharge with outpatient follow-up. The plan has been discussed in detail and they are aware of the specific conditions for emergent return, as well as the importance of follow-up. New Prescriptions    No medications on file       Diagnosis:  1. Manic episode ([facility] [address]        Disposition:  Patient's disposition: Discharge pending social work evaluation  Patient's condition is stable.          Jesús Narayan DO  Resident  04/02/20 3479

## 2020-04-05 ENCOUNTER — HOSPITAL ENCOUNTER (EMERGENCY)
Age: 65
Discharge: HOME | End: 2020-04-05
Attending: EMERGENCY MEDICINE
Payer: COMMERCIAL

## 2020-04-05 ENCOUNTER — APPOINTMENT (OUTPATIENT)
Dept: GENERAL RADIOLOGY | Age: 65
End: 2020-04-05
Payer: COMMERCIAL

## 2020-04-05 VITALS
OXYGEN SATURATION: 99 % | RESPIRATION RATE: 17 BRPM | BODY MASS INDEX: 30.12 KG/M2 | HEIGHT: 63 IN | DIASTOLIC BLOOD PRESSURE: 80 MMHG | HEART RATE: 83 BPM | WEIGHT: 170 LBS | TEMPERATURE: 97.9 F | SYSTOLIC BLOOD PRESSURE: 124 MMHG

## 2020-04-05 PROCEDURE — 99283 EMERGENCY DEPT VISIT LOW MDM: CPT

## 2020-04-05 PROCEDURE — 73130 X-RAY EXAM OF HAND: CPT

## 2020-04-05 PROCEDURE — 6360000002 HC RX W HCPCS: Performed by: EMERGENCY MEDICINE

## 2020-04-05 PROCEDURE — 96372 THER/PROPH/DIAG INJ SC/IM: CPT

## 2020-04-05 RX ORDER — LORAZEPAM 2 MG/ML
2 INJECTION INTRAMUSCULAR ONCE
Status: COMPLETED | OUTPATIENT
Start: 2020-04-05 | End: 2020-04-05

## 2020-04-05 RX ORDER — CEPHALEXIN 500 MG/1
500 CAPSULE ORAL 3 TIMES DAILY
Qty: 21 CAPSULE | Refills: 0 | Status: SHIPPED | OUTPATIENT
Start: 2020-04-05 | End: 2020-04-12

## 2020-04-05 RX ADMIN — LORAZEPAM 2 MG: 2 INJECTION INTRAMUSCULAR; INTRAVENOUS at 11:34

## 2020-04-05 ASSESSMENT — PAIN DESCRIPTION - LOCATION: LOCATION: FINGER (COMMENT WHICH ONE)

## 2020-04-05 ASSESSMENT — PAIN DESCRIPTION - PAIN TYPE: TYPE: ACUTE PAIN

## 2020-04-05 ASSESSMENT — PAIN DESCRIPTION - ORIENTATION: ORIENTATION: LEFT

## 2020-04-05 NOTE — ED PROVIDER NOTES
HPI:  4/5/20,   Time: 11:32 AM EDT       Guillaume Alexandra is a 63 y.o. female presenting to the ED for rings stuck on fingers, beginning unk ago. The complaint has been persistent, mild in severity, and worsened by nothing. From generations, admitted 2 days ago, psych issues, pt poor historian. Sent due to rings that can't get off at facility on left hand. Review of Systems:   Pertinent positives and negatives are stated within HPI, all other systems reviewed and are negative.          --------------------------------------------- PAST HISTORY ---------------------------------------------  Past Medical History:  has a past medical history of Bell's palsy, Bipolar disorder ([facility] [address] Carpal tunnel syndrome, Hyperlipidemia, Non-insulin dependent type 2 diabetes mellitus ([facility] [address] and Systemic primary arterial hypertension. Past Surgical History:  has a past surgical history that includes Echo Complete (6/22/2013). Social History:  reports that she has never smoked. She has never used smokeless tobacco. She reports that she does not drink alcohol or use drugs. Family History: family history includes Heart Disease (age of onset: 76) in her father; High Blood Pressure in her father. The patients home medications have been reviewed. Allergies: Ace inhibitors; Ibuprofen; and Aloha Burnett hcl]        ---------------------------------------------------PHYSICAL EXAM--------------------------------------    Constitutional/General: Alert and oriented x3, well appearing, non toxic in NAD  Head: Normocephalic and atraumatic  Eyes: PERRL, EOMI, conjunctive normal, sclera non icteric  Mouth: Oropharynx clear, handling secretions, no trismus, no asymmetry of the posterior oropharynx or uvular edema  Neck: Supple, full ROM, non tender to palpation in the midline, no stridor, no crepitus, no meningeal signs  Respiratory: Lungs clear to auscultation bilaterally, no wheezes, rales, or rhonchi.  Not in respiratory

## 2020-05-18 ENCOUNTER — HOSPITAL ENCOUNTER (EMERGENCY)
Age: 65
Discharge: PSYCHIATRIC OR SUBSTANCE USE FACILITY | End: 2020-05-19
Attending: EMERGENCY MEDICINE
Payer: COMMERCIAL

## 2020-05-18 ENCOUNTER — HOSPITAL ENCOUNTER (INPATIENT)
Dept: HOSPITAL 83 - 3N | Age: 65
LOS: 23 days | Discharge: HOME | DRG: 750 | End: 2020-06-10
Attending: PSYCHIATRY & NEUROLOGY | Admitting: PSYCHIATRY & NEUROLOGY
Payer: COMMERCIAL

## 2020-05-18 VITALS — WEIGHT: 165.44 LBS | BODY MASS INDEX: 29.31 KG/M2 | HEIGHT: 63 IN

## 2020-05-18 VITALS
SYSTOLIC BLOOD PRESSURE: 174 MMHG | HEART RATE: 112 BPM | WEIGHT: 170 LBS | DIASTOLIC BLOOD PRESSURE: 87 MMHG | RESPIRATION RATE: 18 BRPM | BODY MASS INDEX: 30.11 KG/M2 | TEMPERATURE: 97.4 F | OXYGEN SATURATION: 100 %
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
## 2020-05-18 DIAGNOSIS — I10: ICD-10-CM
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
## 2020-05-18 DIAGNOSIS — Z79.4: ICD-10-CM

## 2020-05-18 DIAGNOSIS — Z90.710: ICD-10-CM

## 2020-05-18 DIAGNOSIS — Z79.899: ICD-10-CM

## 2020-05-18 DIAGNOSIS — Z98.49: ICD-10-CM

## 2020-05-18 DIAGNOSIS — Z88.8: ICD-10-CM
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
## 2020-05-18 DIAGNOSIS — S81.801A: ICD-10-CM

## 2020-05-18 DIAGNOSIS — G51.0: ICD-10-CM

## 2020-05-18 DIAGNOSIS — R00.0: ICD-10-CM

## 2020-05-18 DIAGNOSIS — F41.9: ICD-10-CM
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
## 2020-05-18 DIAGNOSIS — E11.51: ICD-10-CM

## 2020-05-18 DIAGNOSIS — E78.5: ICD-10-CM

## 2020-05-18 DIAGNOSIS — Z82.49: ICD-10-CM

## 2020-05-18 DIAGNOSIS — G56.00: ICD-10-CM

## 2020-05-18 DIAGNOSIS — S81.802A: ICD-10-CM

## 2020-05-18 DIAGNOSIS — F25.0: Primary | ICD-10-CM

## 2020-05-18 LAB
ACETAMINOPHEN LEVEL: <5 MCG/ML (ref 10–30)
AMPHETAMINE SCREEN, URINE: NOT DETECTED
ANION GAP SERPL CALCULATED.3IONS-SCNC: 8 MMOL/L (ref 7–16)
BACTERIA: ABNORMAL /HPF
BARBITURATE SCREEN URINE: NOT DETECTED
BASOPHILS ABSOLUTE: 0.03 E9/L (ref 0–0.2)
BASOPHILS RELATIVE PERCENT: 0.4 % (ref 0–2)
BENZODIAZEPINE SCREEN, URINE: NOT DETECTED
BILIRUBIN URINE: NEGATIVE
BLOOD, URINE: NEGATIVE
BUN BLDV-MCNC: 22 MG/DL (ref 8–23)
CALCIUM SERPL-MCNC: 9.6 MG/DL (ref 8.6–10.2)
CANNABINOID SCREEN URINE: NOT DETECTED
CHLORIDE BLD-SCNC: 102 MMOL/L (ref 98–107)
CLARITY: CLEAR
CO2: 26 MMOL/L (ref 22–29)
COCAINE METABOLITE SCREEN URINE: NOT DETECTED
COLOR: YELLOW
CREAT SERPL-MCNC: 0.8 MG/DL (ref 0.5–1)
EOSINOPHILS ABSOLUTE: 0.18 E9/L (ref 0.05–0.5)
EOSINOPHILS RELATIVE PERCENT: 2.5 % (ref 0–6)
EPITHELIAL CELLS, UA: ABNORMAL /HPF
ETHANOL: <10 MG/DL (ref 0–0.08)
FENTANYL SCREEN, URINE: NOT DETECTED
GFR AFRICAN AMERICAN: >60
GFR NON-AFRICAN AMERICAN: >60 ML/MIN/1.73
GLUCOSE BLD-MCNC: 194 MG/DL (ref 74–99)
GLUCOSE URINE: 250 MG/DL
HCT VFR BLD CALC: 34.7 % (ref 34–48)
HEMOGLOBIN: 10.9 G/DL (ref 11.5–15.5)
IMMATURE GRANULOCYTES #: 0.02 E9/L
IMMATURE GRANULOCYTES %: 0.3 % (ref 0–5)
KETONES, URINE: NEGATIVE MG/DL
LEUKOCYTE ESTERASE, URINE: ABNORMAL
LITHIUM DOSE AMOUNT: ABNORMAL
LITHIUM LEVEL: 0.23 MMOL/L (ref 0.5–1.5)
LYMPHOCYTES ABSOLUTE: 2.27 E9/L (ref 1.5–4)
LYMPHOCYTES RELATIVE PERCENT: 31.9 % (ref 20–42)
Lab: NORMAL
MCH RBC QN AUTO: 26.5 PG (ref 26–35)
MCHC RBC AUTO-ENTMCNC: 31.4 % (ref 32–34.5)
MCV RBC AUTO: 84.2 FL (ref 80–99.9)
METHADONE SCREEN, URINE: NOT DETECTED
MONOCYTES ABSOLUTE: 0.53 E9/L (ref 0.1–0.95)
MONOCYTES RELATIVE PERCENT: 7.4 % (ref 2–12)
NEUTROPHILS ABSOLUTE: 4.09 E9/L (ref 1.8–7.3)
NEUTROPHILS RELATIVE PERCENT: 57.5 % (ref 43–80)
NITRITE, URINE: NEGATIVE
OPIATE SCREEN URINE: NOT DETECTED
OXYCODONE URINE: NOT DETECTED
PDW BLD-RTO: 16 FL (ref 11.5–15)
PH UA: 7 (ref 5–9)
PHENCYCLIDINE SCREEN URINE: NOT DETECTED
PLATELET # BLD: 247 E9/L (ref 130–450)
PMV BLD AUTO: 8.9 FL (ref 7–12)
POTASSIUM REFLEX MAGNESIUM: 5 MMOL/L (ref 3.5–5)
PROTEIN UA: NEGATIVE MG/DL
RBC # BLD: 4.12 E12/L (ref 3.5–5.5)
RBC UA: ABNORMAL /HPF (ref 0–2)
SALICYLATE, SERUM: <0.3 MG/DL (ref 0–30)
SARS-COV-2, NAAT: NOT DETECTED
SODIUM BLD-SCNC: 136 MMOL/L (ref 132–146)
SPECIFIC GRAVITY UA: 1.01 (ref 1–1.03)
TRICYCLIC ANTIDEPRESSANTS SCREEN SERUM: NEGATIVE NG/ML
UROBILINOGEN, URINE: 0.2 E.U./DL
VALPROIC ACID LEVEL: 3 MCG/ML (ref 50–100)
WBC # BLD: 7.1 E9/L (ref 4.5–11.5)
WBC UA: ABNORMAL /HPF (ref 0–5)

## 2020-05-18 PROCEDURE — 99285 EMERGENCY DEPT VISIT HI MDM: CPT

## 2020-05-18 PROCEDURE — 96372 THER/PROPH/DIAG INJ SC/IM: CPT

## 2020-05-18 PROCEDURE — 80048 BASIC METABOLIC PNL TOTAL CA: CPT

## 2020-05-18 PROCEDURE — U0002 COVID-19 LAB TEST NON-CDC: HCPCS

## 2020-05-18 PROCEDURE — 80164 ASSAY DIPROPYLACETIC ACD TOT: CPT

## 2020-05-18 PROCEDURE — 80307 DRUG TEST PRSMV CHEM ANLYZR: CPT

## 2020-05-18 PROCEDURE — G0480 DRUG TEST DEF 1-7 CLASSES: HCPCS

## 2020-05-18 PROCEDURE — 81001 URINALYSIS AUTO W/SCOPE: CPT

## 2020-05-18 PROCEDURE — 85025 COMPLETE CBC W/AUTO DIFF WBC: CPT

## 2020-05-18 PROCEDURE — 80178 ASSAY OF LITHIUM: CPT

## 2020-05-18 PROCEDURE — 6370000000 HC RX 637 (ALT 250 FOR IP): Performed by: STUDENT IN AN ORGANIZED HEALTH CARE EDUCATION/TRAINING PROGRAM

## 2020-05-18 PROCEDURE — 6360000002 HC RX W HCPCS: Performed by: EMERGENCY MEDICINE

## 2020-05-18 RX ORDER — RISPERIDONE 1 MG/1
1 TABLET, FILM COATED ORAL ONCE
Status: COMPLETED | OUTPATIENT
Start: 2020-05-18 | End: 2020-05-18

## 2020-05-18 RX ORDER — HALOPERIDOL 5 MG/ML
5 INJECTION INTRAMUSCULAR ONCE
Status: COMPLETED | OUTPATIENT
Start: 2020-05-18 | End: 2020-05-18

## 2020-05-18 RX ADMIN — RISPERIDONE 1 MG: 1 TABLET, FILM COATED ORAL at 13:36

## 2020-05-18 RX ADMIN — HALOPERIDOL LACTATE 5 MG: 5 INJECTION INTRAMUSCULAR at 21:08

## 2020-05-18 NOTE — ED PROVIDER NOTES
Department of Emergency Medicine   ED  Provider Note  Admit Date/RoomTime: 5/18/2020 10:48 AM  ED Room: 16/16 5/18/20  11:06 AM EDT    History of Present Illness:   Elvis Cortez is a 63 y.o. female presenting to the ED for combative, non compliant, beginning 1week. The complaint has been persistent, severe in severity, and worsened by nothing. Patient states that she was brought in because she was singing to the [employer]. States that she does take her medication, that she is not take her Depakote. However when talking with the nurse, she has been admitted to be pink slipped from [facility], secondary to not taking her medications. Patient is also been combative and threatening to the staff at [address] [facility]. Review of Systems:   Pertinent positives and negatives are stated within HPI, all other systems reviewed and are negative.          --------------------------------------------- PAST HISTORY ---------------------------------------------  Past Medical History:  has a past medical history of Bell's palsy, Bipolar disorder ([facility] [address] Carpal tunnel syndrome, Hyperlipidemia, Non-insulin dependent type 2 diabetes mellitus ([facility] [address] and Systemic primary arterial hypertension. Past Surgical History:  has a past surgical history that includes Echo Complete (6/22/2013). Social History:  reports that she has never smoked. She has never used smokeless tobacco. She reports that she does not drink alcohol or use drugs. Family History: family history includes Heart Disease (age of onset: 76) in her father; High Blood Pressure in her father. The patients home medications have been reviewed. Allergies: Ace inhibitors;  Ibuprofen; and Latuda [lurasidone hcl]    -------------------------------------------------- RESULTS -------------------------------------------------  All laboratory and radiology results have been personally reviewed by myself   LABS:  No results found for this visit on 05/18/20. RADIOLOGY:  Interpreted by Radiologist.  No orders to display       ------------------------- NURSING NOTES AND VITALS REVIEWED ---------------------------   The nursing notes within the ED encounter and vital signs as below have been reviewed. BP (!) 150/82   Pulse 93   Temp 98.2 °F (36.8 °C) (Tympanic)   Resp 18   Wt 170 lb (77.1 kg)   SpO2 100%   BMI 30.11 kg/m²   Oxygen Saturation Interpretation: Normal      ---------------------------------------------------PHYSICAL EXAM--------------------------------------    Constitutional/General: Alert and oriented x3, well appearing, non toxic in NAD  Head: Normocephalic and atraumatic  Eyes: PERRL, EOMI, conjunctiva normal, sclera non icteric  Mouth: Oropharynx clear, handling secretions, no trismus, no asymmetry of the posterior oropharynx or uvular edema  Neck: Supple, full ROM, non tender to palpation in the midline, no stridor, no crepitus, no meningeal signs  Respiratory: Lungs clear to auscultation bilaterally, no wheezes, rales, or rhonchi. Not in respiratory distress  Cardiovascular:  Regular rate. Regular rhythm. No murmurs, gallops, or rubs. 2+ distal pulses  Chest: No chest wall tenderness  GI:  Abdomen Soft, Non tender, Non distended. +BS. No organomegaly, no palpable masses,  No rebound, guarding, or rigidity. Musculoskeletal: Moves all extremities x 4. Warm and well perfused, no clubbing, cyanosis. 2+ edema. Capillary refill <3 seconds  Integument: skin warm and dry. No rashes. Lymphatic: no lymphadenopathy noted  Neurologic: GCS 15, no focal deficits, symmetric strength 5/5 in the upper and lower extremities bilaterally  Psychiatric: Normal Affect      ------------------------------ ED COURSE/MEDICAL DECISION MAKING----------------------  Medications - No data to display      Medical Decision Making:    Patient is a 82-year-old female coming in today for combative behavior, and hallucinations.   Patient has been noncompliant with

## 2020-05-18 NOTE — ED NOTES
KAREEN placed phone call to [facility] [phone] and spoke with Rep. Guzman. Initiated referral for inpatient mental health.      Mariusz Porras, MSW, LSW  05/18/20 1809

## 2020-05-18 NOTE — ED NOTES
Called lab informed that analyzer machine is down therefore Depakote level is unable to be resulted.   Informed dr Sean Iverson of this note     Lg Liao RN  05/18/20 0004

## 2020-05-19 VITALS — DIASTOLIC BLOOD PRESSURE: 79 MMHG | SYSTOLIC BLOOD PRESSURE: 156 MMHG

## 2020-05-19 VITALS — DIASTOLIC BLOOD PRESSURE: 79 MMHG

## 2020-05-19 VITALS — SYSTOLIC BLOOD PRESSURE: 141 MMHG | DIASTOLIC BLOOD PRESSURE: 84 MMHG

## 2020-05-19 VITALS — DIASTOLIC BLOOD PRESSURE: 73 MMHG

## 2020-05-19 LAB
25(OH)D3 SERPL-MCNC: 71.2 NG/ML (ref 30–100)
ALBUMIN SERPL-MCNC: 3.1 GM/DL (ref 3.1–4.5)
ALP SERPL-CCNC: 76 U/L (ref 45–117)
ALT SERPL W P-5'-P-CCNC: 22 U/L (ref 12–78)
APPEARANCE UR: CLEAR
AST SERPL-CCNC: 15 IU/L (ref 3–35)
BACTERIA #/AREA URNS HPF: (no result) /[HPF]
BASOPHILS # BLD AUTO: 0 10*3/UL (ref 0–0.1)
BASOPHILS NFR BLD AUTO: 0.3 % (ref 0–1)
BILIRUB UR QL STRIP: NEGATIVE
BUN SERPL-MCNC: 16 MG/DL (ref 7–24)
CHLORIDE SERPL-SCNC: 106 MMOL/L (ref 98–107)
CHOLEST SERPL-MCNC: 187 MG/DL (ref ?–200)
COLOR UR: YELLOW
CREAT SERPL-MCNC: 0.89 MG/DL (ref 0.55–1.02)
EOSINOPHIL # BLD AUTO: 0.2 10*3/UL (ref 0–0.4)
EOSINOPHIL # BLD AUTO: 3.4 % (ref 1–4)
ERYTHROCYTE [DISTWIDTH] IN BLOOD BY AUTOMATED COUNT: 16 % (ref 0–14.5)
GLUCOSE UR QL: NEGATIVE
HCT VFR BLD AUTO: 35 % (ref 37–47)
HDLC SERPL-MCNC: 68 MG/DL (ref 40–60)
HGB UR QL STRIP: NEGATIVE
KETONES UR QL STRIP: NEGATIVE
LDLC SERPL DIRECT ASSAY-MCNC: 100 MG/DL (ref 9–159)
LEUKOCYTE ESTERASE UR QL STRIP: (no result)
LYMPHOCYTES # BLD AUTO: 2.5 10*3/UL (ref 1.3–4.4)
LYMPHOCYTES NFR BLD AUTO: 38.7 % (ref 27–41)
MCH RBC QN AUTO: 27.2 PG (ref 27–31)
MCHC RBC AUTO-ENTMCNC: 32.6 G/DL (ref 33–37)
MCV RBC AUTO: 83.5 FL (ref 81–99)
MONOCYTES # BLD AUTO: 0.5 10*3/UL (ref 0.1–1)
MONOCYTES NFR BLD MANUAL: 8 % (ref 3–9)
NEUT #: 3.2 10*3/UL (ref 2.3–7.9)
NEUT %: 49.4 % (ref 47–73)
NITRITE UR QL STRIP: NEGATIVE
NRBC BLD QL AUTO: 0 % (ref 0–0)
PH UR STRIP: 7 [PH] (ref 5–9)
PLATELET # BLD AUTO: 240 10*3/UL (ref 130–400)
PMV BLD AUTO: 8.8 FL (ref 9.6–12.3)
POTASSIUM SERPL-SCNC: 4.9 MMOL/L (ref 3.5–5.1)
PROT SERPL-MCNC: 7.1 GM/DL (ref 6.4–8.2)
RBC # BLD AUTO: 4.19 10*6/UL (ref 4.1–5.1)
SODIUM SERPL-SCNC: 138 MMOL/L (ref 136–145)
SP GR UR: 1.01 (ref 1–1.03)
TRIGL SERPL-MCNC: 96 MG/DL (ref ?–150)
TSH SERPL DL<=0.005 MIU/L-ACNC: 2.8 UIU/ML (ref 0.36–4.75)
UROBILINOGEN UR STRIP-MCNC: 0.2 E.U./DL (ref 0.2–1)
VLDLC SERPL CALC-MCNC: 19 MG/DL (ref 6–40)
WBC #/AREA URNS HPF: (no result) WBC/HPF (ref 0–5)
WBC NRBC COR # BLD AUTO: 6.5 10*3/UL (ref 4.8–10.8)

## 2020-05-19 NOTE — NUR
EMMANUEL VOSS [id number]
 
Please refer to the physician's history and physical for past medical history,
comorbid conditions, and allergies.
   Diagnosis: SCHIZOEFFECTIVE DISORDER
 
   Mal Score: 22,LOW OR NO RISK
 
WOUND DESCRIPTIONS:
Nurse caring for patient states that she had wraps to bilateral lower
extremities and refused for them to be removed and only wanted the doctor to
look at them. This nurse went to evaluate patient and patient stated I was
able to remove the dressings. After removal of dressing to bilateral lower
extremities 4 wounds noted at this time.
 
Wound Number: 1
Location of the wound: medial aspect of left lower extremity
Thickness: Full
Size: 2.2cm x 2.5cm x 0.1cm
Tunneling: none
Undermining: none
Sinus Tract: none
Presence of Exudate: Serosanguineous
Amount: Light
Color: Red
Odor: None
Periwound Skin Appearance: Edema
Wound edges: approximated
Pain (associated with wound): none at time of assessment
How does patient state this happened? pt states she has had these for a while
and uses cream on them
 
Wound Number: 2
Location of the wound: lateral aspect of right lower extremity (right side)
Type of wound:
Thickness: Full
Size: 0.9cm x 1.5cm x 0.1cm
Tunneling: none
Undermining: none
Sinus Tract: none
Presence of Exudate: Serosanguineous
Amount: Light
Color: Red, yellow
Odor: None
Periwound Skin Appearance: Edema
Wound edges: approximated
Pain (associated with wound): none at time of assessment
How does patient state this happened? pt states she has had these for a while
and uses cream on them
 
Wound Number: 3
Location of the wound: lateral aspect of right lower extremity (middle wound)
Thickness: Full
Size: 1.5cm x 0.6cm x 0.1cm
Tunneling: none
Undermining: none
Sinus Tract: none
Presence of Exudate: Serosanguineous
Amount: Light
Color: Red, yellow
Odor: None
Periwound Skin Appearance: Edema
Wound edges: approximated
Pain (associated with wound): none at time of assessment
How does patient state this happened? pt states she has had these for a while
 
Wound Number: 4
Location of the wound: lateral aspect of right lower extremity (left wound)
Thickness: Full
Size: 0.7cm x 0.5cm x 0.1cm
Tunneling: none
Undermining: none
Sinus Tract: none
Presence of Exudate: Serosanguineous
Amount: Light
Color: Red, yellow
Odor: None
Periwound Skin Appearance: Edema
Wound edges: approximated
Pain (associated with wound): none at time of assessment
How does patient state this happened? pt states she has had these for a while
and uses cream on them
and uses cream on them
   Surface the patient is resting on: Proform
 
SKIN PREVENTION RECOMMENDATION:
   1.  Pressure redistribution support surface as appropriate
   2.  Elevate heels
   3.  Remove boots/TEDS every shift and reapply
   4.  Head of bed 30 degrees as tolerated
   5.  Assess nutrition and hydration
   6.  Manage moisture
   7.  Avoid the use of containment devices while in bed
   8.  Use absorptive products on surfaces limit layers of linens on bed
   9.  Turn and reposition every 1-2 hours in bed and every 1 hour in chair as
       tolerated
   10. Weight shifts every 15 minutes while up in chair
   11. Offloading with pillows or device to keep heels elevated off bed
   12. Monitor skin at least every shift
   13. Inspect under medical devices twice a day
 
WOUND TREATMENT RECOMMENDATIONS:
Full thickness guidelines: Cleanse areas to right and left lower extremity
with nss and apply sureprep around the wound therahoney to wound bed and cover
with optifoam gentle every 2 days and prn for soiling.
Heel raiser pro boots to bilateral feet while in bed.
Wheelcushion when oob for prevention
Cleanse periarea and buttocks with soap and water and apply hydraguard every
shift and prn for soiling for prevention.
Venous and arterial studies of bilateral lower extremites.
Consult podiatry for possible debridement if studies allow.

## 2020-05-19 NOTE — NUR
EMMANUEL VOSS a 65 year old F admitted via
stretcher from the ADMITTING as a emergency 72 hr. hold admission.
Arrived on unit at 0200AM.
ALLERGIES: IBUPROFEN, ACE INHIBITOR,LATUDA.
Vital signs are: 98.6-110-18 156/79.
CLIENT IS PINK SLIPPED AND REFUSED TO REVIEW OR SIGN ANY OTHER PAPERS.
 AS IN : Authorization
For The Release of Medical Information, Clothing List, Consent to Voluntary
Admission and Hospitalization, Consent and Release Forms/Receipt of Rights,
Acknowledgement of Advance Directive Information, Behavioral Health Consent
Form, and Informed Consent of Medications. Admitted under the services of Dr. STACY WILLIAMSON,[facility]. A search was conducted and hazardous articles were removed.
Client was oriented to the unit.
CELE CALLOWAY

## 2020-05-19 NOTE — NUR
Patient eating breakfast in dining room with peers.  Respirations easy and
regular.  Vital signs stable. No overt distress.
LEA GORMAN &  updated on pt progress.

## 2020-05-19 NOTE — ED NOTES
Patient continues sitting at bedside with rambling speech and flight of ideas with 1:1 ineffective. Raising voice intermittently and swearing at staff.       Narendra Sesay RN  05/18/20 5675

## 2020-05-19 NOTE — NUR
Treatment Plan meeting was held via telephone with Dr. Yip, KEYUR Anthony, PORFIRIO,
LISW-S and Discharge Planner. Plan for discharge Next week. Pt. came to [facility]
from Home with Mother.

## 2020-05-19 NOTE — ED NOTES
Received notification from [facility]. Patient is accepted to [facility] by Dr. Nallely Renae    Patient room assignment to be determined. N2N: [id number] Shanta Pereyra to arrange transportation.          MAMI Olivera, SOUMYAW  05/18/20 8944

## 2020-05-19 NOTE — NUR
PT ELATED ON ARRIVAL TO UNIT. SPEAKING ENGLISH, Guamanian & Tajik. STATED
ENGLISH IS HER MAIN LANGUAGE. FLIGHT OF IDEAS, NONSENSICAL SPEECH, CURSING.
ALERT TO PERSON, MONTH, YEAR & STATED NAME OF PRESIDENT IS "LORENZO HOOK".
PT STATED SHE WANTS TO GET OUT OF HERE. WHILE GETTING OFF OF THE STRETCHER,
SHE STATED THAT SHE IS READY TO DANCE ALL NIGHT & PUT HER HANDS IN THE AIR &
DID DANCE MOVEMENTS. PT HAD SEVERAL RINGS, BRACELETS & NECKLACES ON &
REQUIRED MUCH ENCOURAGEMENT TO REMOVE THEM. SHE MADE CLENCHED FISTS & STATED
SHE WAS NOT TAKING HER JEWELRY OFF. MUCH PROMPTING REQUIRED. PT DID REMOVE
SOME OF JEWELRY. SHE DID KEEP 4 RINGS & 1 BRACELET ON. SHE WAS NOTED TO HAVE
BOTH OF HER LOWER LEGS WRAPPED & REFUSED TO LET THIS RN UNWRAP THEM FOR
ASSESSMENT. SHE STATED THAT SHE WANTED A DR. TO LOOK AT THEM.  WAS CONTINENT
OF URINE PRIOR TO GOING TO BED.

## 2020-05-19 NOTE — ED NOTES
Rosangela with [facility] called with update on transportation for patient to [facility]. [facility] to transport, ETA in 45 minutes. RN Linda bansal.       Ally Prather  05/18/20 5482

## 2020-05-19 NOTE — NUR
EMMANUEL VOSS a 65 year old F admitted via
stretcher from the ADMITTING as a emergency 72 hr. hold admission.
Arrived on unit at 0200AM.
ALLERGIES: IBUPROPHEN, ACE INHIBITORS, LATUDA.
Vital signs are: 98.6-110-18 156/79.
The client signed the following forms with stated understanding: Authorization
For The Release of Medical Information, Clothing List, Consent to Voluntary
Admission and Hospitalization, Consent and Release Forms/Receipt of Rights,
Acknowledgement of Advance Directive Information, Behavioral Health Consent
Form, and Informed Consent of Medications. Admitted under the services of Dr. STACY WILLIAMSON,[facility]. A search was conducted and hazardous articles were removed.
Client was oriented to the unit.
CELE CALLOWAY

## 2020-05-19 NOTE — NUR
Occupational Therapy evaluation completed on 3N with full evaluation to
follow.  Recommend occupational therapy per plan of care and return home with
home health and 25 hr supervision upon discharge. Thank you for this referral.
Kerry Obrien OTR/L

## 2020-05-19 NOTE — NUR
PT IS ALERT AND ORIENTED TO PERSON, STATES SHE KNOWS SHE IS IN THE HOSPITAL TO
GET HELP. MOOD LABILE, AFFECT ANIMATED AT TIMES. SPEECH IS SOFT, RAPID,
DISJOINTED WITH FLIGHT OF IDEAS NOTED. FREQUENTLY TALKING ABOUT BIRDS.  PT
OFTEN SPEAKING IN SHORT PHRASES OF OTHER LANGUAGES SUCH AS Occitan AND Thai.
PT DENIES SI/HI, INTENT OR PLAN.  PT HAS BEEN MEDICATION COMPLIANT WITHOUT
DIFFICULTY. NO OVERT HALLUCINATIONS, PARANOIA OR DELUSIONS NOTED. PT REFUSED
TO SPEAK WITH HER MOTHER ON THE PHONE OR ALLOW THIS NURSE TO DISCLOSE
INFORMATION TO MOTHER, PT BECAME SLIGHTLY AGITATED AND REPEATEDLY STATED "NO,
I DON'T KNOW HER. TELL HER BONJOUR. TELL HER SHE CAN KISS MY ASS". NO
AGGRESSIVE BEHAVIORS DISPLAYED. NO DISTRESS NOTED. PLAN TO CONTINUE CURRENT
TREATMENT.

## 2020-05-19 NOTE — NUR
EMMANUEL VOSS a 65 year old F admitted via
stretcher from the ADMITTING as a emergency 72 hr. hold admission.
Arrived on unit at 0200AM.
ALLERGIES: IBUPROPHEN, ACE INHIBITORS, LATUDA.
Vital signs are: 98.6-110-18 156/79.
The client signed the following forms with stated understanding: Authorization
For The Release of Medical Information, Clothing List, Consent to Voluntary
Admission and Hospitalization, Consent and Release Forms/Receipt of Rights,
Acknowledgement of Advance Directive Information, Behavioral Health Consent
Form, and Informed Consent of Medications. Admitted under the services of Dr. STACY WILLIAMSON,[facility]. A search was conducted and hazardous articles were removed.
Client was oriented to the unit.
CELE CALLOWAY

## 2020-05-20 VITALS — DIASTOLIC BLOOD PRESSURE: 76 MMHG

## 2020-05-20 VITALS — SYSTOLIC BLOOD PRESSURE: 132 MMHG | DIASTOLIC BLOOD PRESSURE: 61 MMHG

## 2020-05-20 NOTE — NUR
PT HAS SLEPT APPROX 3 HOURS THROUGHOUT THE NIGHT. UP TALKING FREQUENTLY TO HER
ROOM MATE DESPITE REDIRECTION.

## 2020-05-20 NOTE — NUR
PHYSICAL THERAPY
 
Patient seen this am for therapy visit and was sitting up in activity room
chair upon therapist arrival. Patient identified by name /  and reports no
new c/o's at this time. OT assistant was present this morning for observation
only as patient was Independent this session with all transfers / mobilty
without AD. Patient demonstrated decreased B arm swing during gait ex,
ambulating 100'x 2 to her room. Patient tolerated eyes open / closed without
LOB and able walk backwards 4-5 step prior to returning to activity room
chair. Patient remained in chair awaiting  breakfast, under [facility] staff
Supervision and will continue per POC as tolerated.  Total treatment time 15
minutes.   Patrick De Leon, PTA

## 2020-05-20 NOTE — NUR
PT A&O X3. PT REMAINS MANIC WITH SINGING, DANCING, AND HYPERVERBAL.
INTERACTIVE AND PARTICIPATING. EUTHYMIC MOOD. FOI NOTED. NO HALLUCINATIONS OR
DELUSIONS NOTED. AMBULATORY WITH A STEADY GAIT. BEHAVIORS MONITORED WITH Q15
MINUTE SAFETY CHECKS. MEDICATION COMPLIANT WITHOUT DIFFICULTY.

## 2020-05-20 NOTE — NUR
Treatment plan meeting was held via telephone with Dr. Yip, KEYUR Anthony, PORFIRIO,
LISW-S and Discharge Planner. Plan for discharge Next week. Pt. will return
home with her Mother.

## 2020-05-20 NOTE — NUR
PT AWAKE AT THIS TIME & STOPPED AT NURSES STATION SAYING GOOD MORNING. STARTED
SWINGING HER HIPS & STATED, "STALLINGS, SHAKE YOUR BOOTIE & DO THE WOOCHI WOOCHI".

## 2020-05-20 NOTE — NUR
P-LABILE, MANIC BEHAVORS
 
I-1:1 PROVIDED FOR VENTILATION OF FEELINGS. ADMINISTER MEDS, MONITOR SLEEP
 
R-LABILE MOOD, MOSTLY ELATED WITH ANIMATED SINGING, LAUGHING & BEING VERY
JOVIAL. IS MUCH CALMER THAN PREVIOUS NIGHT WHEN ADMITTED BY THIS RN. ALERT &
ORIENTED TO PERSON, PLACE & TIME. AVOIDS SOME QUESTIONS & QUICKLY CHANGES
TOPIC OF CONVERSATION WITH NON-SENSICAL SPEECH, FLIGHT OF IDEAS OR SPEAKS IN
Mongolian OR North Korean. WHEN INITIALLY ASKED WHERE SHE WAS, SHE STATED, "[place],
[facility] [employer], BAB[place]" & THEN BEGAN SINGING THE SONG, [facility] [employer] [facility] [place], LAUGHED &
STATED SHE WAS IN [place]. WHEN ASKED HOW SHE IS DOING THIS EVENING, SHE
STATED, "ARE YOU CUTTIN THE FOOL?" LAUGHED IT OFF & & STATED "I'M DOIN REAL
GOOD MACDONALD. REAL GOOD. BONJOUR'." DENIES SENSORY DISTURBANCE & NONE IS EVIDENT.
DENIES SUICIDAL FEELINGS. PTS MOTHER DID CALL THE UNIT AGAIN IN AN ATTEMPT TO
TALK TO HER & PT CONTINUED TO REFUSE TO TAKE HER CALL. NO AGITATION. ATE
SNACK. HS BEDSIDE GLUCOSE 104. COMPLIANT WITH ALL HS MEDICATIOS. AMBULATES
INDEPENDENTLY & ATTENDS TO OWN ADLS.
 
P-CONTINUE TO MONITOR

## 2020-05-21 VITALS — DIASTOLIC BLOOD PRESSURE: 72 MMHG | SYSTOLIC BLOOD PRESSURE: 136 MMHG

## 2020-05-21 VITALS — DIASTOLIC BLOOD PRESSURE: 60 MMHG

## 2020-05-21 NOTE — NUR
OT NOTE
Prior to coming to the floor spoke with nurse Chacon and reported that therapy
was coming to the floor to treat this pt. Pt was seen this A.M. 1:1 for 15
minute OT session with PTA and nursing staff present for observation only. Sit
to stand completed from chair level with supervision followed by functional
mobility to the bathroom with supervision. There she transferred on/off
standard commode and completed all clothing management with supervision.
Challenged pt's dynamic standing tolerance needed for increased I in self care
tasks and functional transfers, pt was able to tolerate aprox 8 minutes at a
time before sitting due to fatigue. Pt was left sitting upright in the dining
bermudez under [facility] staff supervision. Continue with rec D/C plan to return home
with 24/7 supervision.
 
ALANA Denis/JOE

## 2020-05-21 NOTE — NUR
Late Entry: Pt friendly and talkative with this writer yesterday AM. Pt was
intrusive toward other pts but did attempt to be supportive toward them.

## 2020-05-21 NOTE — NUR
P-LABILE, FOI
 
I-ASSESS ORIENTATION, MOOD, AND BEHAVIOR. PROVIDE 1:1 WITH THERAPEUTIC
INTERVENTIONS. ENCOURAGE MEDICATION COMPLIANCE AND EDUCATE. MONITOR SLEEP.
 
R-PATIENT ALERT AND ORIENTED X3 WITH CONFUSION. PT SINGING AND DANCING THIS
HS, PLEASANT, INTERACTIVE WITH STAFF. PT HYPERVERBAL WITH FOI, NONSENSICAL AT
TIMES. INTERMITTENT IRRITABILITY/YELLING OUT NOTED WHEN APPROACHED BY PEERS
STATING "THEY NEED TO GROW UP". EASILY REDIRECTED. PT VOICES NO SI/HI,
HALLUCINATIONS, OR DELUSIONS. NO NOTED RESPONDING TO INTERNAL STIMULI.
MEDICATION COMPLIANT WITHOUT DIFFICULTY AFTER REVIEW. NO PHYSICAL COMPLAINTS
NOTED. PT AMBULATORY WITH A STEADY GAIT, INDEPENDENT IN ADL'S WITH PROMPTING,
CONTINENT OF BOWEL AND BLADDER.
 
P-CONTINUE TO MONITOR MOOD AND BEHAVIORS. MAINTAIN Q 15 MIN CHECKS AND PRN FOR
SAFETY.

## 2020-05-21 NOTE — NUR
Pt continues to be hypeverbal. However, this writer did observe pt sitting for
short periods of time in silence in the activity room. Pt was pleasant during
interaction with this writer. Pt did state at one time, "My sister is bat shit
crazy. She needs locked up." Pt then walked away laughing.

## 2020-05-21 NOTE — NUR
PHYSICAL THERAPY
 
Patient seen this am for therapy visit and was sitting up in activity room
chair upon therapist arrival. Patient identified by name /  and reports no
c/o's pain at this time. Patient also presents with increased B foot edema and
ace wrap edema support. OT assistant was present for observation only this
session as patient was Independent with all transfers. Patient ambulated
without AD, ad lida in hallway to her room, 75'x 2, demonstrating slow chapo,
"shuffling" gait pattern. Patient returned to activity room chair with no LOB
and remained under [facility] staff Supervision. Will continue per POC as tolerated,
total treatment time 16 minutes.  Patrick De Leon, PTA

## 2020-05-21 NOTE — NUR
Treatment Plan meeting was held via telephone with Dr. Yip RN, LISW-S and
Discharge Planner. Plan for discharge Next Week. Pt. will return home at
discharge with her Mother.

## 2020-05-21 NOTE — NUR
PT OBSERVED ON Q 15 MIN CHECKS TO HAVE SLEPT APPROX 5 HOURS UNINTERRUPTED. NO
SIGNS OR SYMPTOMS OF DISTRESS NOTED.

## 2020-05-21 NOTE — NUR
P- Flight of Ideas, Hyperverbal, Labile, Confusion noted at times.
 
I- Assess mood, orientation, SI/HI, hallucinations, delusions or pain. Provide
reorientation when confusin is noted. Provide medications on time with
education on each. 1:1 therapeutic intervention provided when necessary.
Redirection provided.
 
R- Patient is alert to person and time. Patient thought that she was in
[place]. Reorientation effective. Mood labile, mostly pleasant and
joking. Patient becomes irritable with other patients at times, redirection
effective. Denies SI/HI, hallucinations, or pain. No s/s of interacting with
internal stimuli; no delusional or paranoid thought process noted. No s/s of
distress noted, resps even and unlabored on room air. Eating and drinking
adequately. Medication compliant. Patients is interactive, intrussive at
times. Flight of ideas remain and hyperverbal. Patients speech becomes slurred
at times due to patient talking so fast. Gait steady while ambulating.
 
P- Assess mood, orientation, SI/HI, hallucinations, delusions or pain every
shift. Reorient, reassure, 1:1 interaction provided when necessary. Provide
medications on time with education on each. Provide reorientation when
confusion is noted. Q15 minute checks maintained for safety.

## 2020-05-22 VITALS — SYSTOLIC BLOOD PRESSURE: 129 MMHG | DIASTOLIC BLOOD PRESSURE: 58 MMHG

## 2020-05-22 VITALS — SYSTOLIC BLOOD PRESSURE: 122 MMHG | DIASTOLIC BLOOD PRESSURE: 88 MMHG

## 2020-05-22 NOTE — NUR
OT NOTE
Prior to coming to the floor spoke with nurse Chu and reported that
therapy was coming to the floor to treat this pt. Pt was seen this A.M. 1:1
for 15 minute OT session with PTA and nursing staff present for observation
only. Upon arrival pt was supine in bed. Pt identified by name and  and had
no complaints at this time. Pt transferred supine to sit EOB with supervision.
While sitting EOB pt donned pants with supervision. Sit to stand then
completed from the bed level with supervision. Challenged pt's dynamic
standing balance while weight shifting, crossing midline, and reaching over
all planes. Pt was able to maintain G- standing balance throughout. Functional
mobility was then completed from her room to the dining bermudez with supervision
where she was left sitting upright under [facility] staff supervision. Continue with
rec D/C plan to home with return home with  supervision.
 
ALANA Denis/JOE

## 2020-05-22 NOTE — NUR
PHYSICAL THERAPY
 
Patient seen this am for therapy visit and was standing in her room with OT
assistant upon therapist arrival. Patient identified by name /  and voices
no new c/o's at this time. Patient ambulates without use of AD, ad lida in
hallway, [facility], demonstrating "shuffling" gait pattern and decreased stride.
Patient still presents with increased B Dorsum of feet edema, however no c/o
of pain. Patient total gait distance 70'x 1, while needing v/c to increase
stride / B heel strike. Patient returned to activity room chair and performed
several seated B LE therex, x 10 reps each without c/o. Patient remained in
chair at table awaiting breakfast, under [facility] staff Supervision. Will continue
per POC as tolerated, total treatment time 16 minutes.   Patrick De Leon, PTA

## 2020-05-22 NOTE — NUR
OCCUPATIONAL THERAPY CO-SIGN
 
I approve of the Occupational Therapy notes written above.
 
KENDRA RIDLEY, OTR/L

## 2020-05-22 NOTE — NUR
Patient eating breakfast in dining room with peers.
Respirations easy and regular.
Vital signs stable. No overt distress.
 
GRISELDA Puente, assessed pt via telehealth.  Updates provided.
 
DARVIN GARY

## 2020-05-22 NOTE — NUR
PHYSICAL THERAPY CO-SIGN
 
 
I approve of the Physical Therapy notes written above.
 
 
 
Sharmin Junior PT

## 2020-05-22 NOTE — NUR
P-FOI
 
I-ASSESS ORIENTATION, MOOD, AND BEHAVIOR. PROVIDE 1:1 WITH THERAPEUTIC
INTERVENTIONS. ENCOURAGE MEDICATION COMPLIANCE AND EDUCATE. MONITOR SLEEP.
 
R-PATIENT ALERT AND ORIENTED X3 WITH CONFUSION. PT EUTHYMIC THIS HS, PLEASANT,
INTERACTIVE. PT NOTED TO BE LESS HYPER VERBAL WITH FOI THAN THE PREVIOUS SHIFT
THIS RN WORKED. ATE SNACK. WATCHED A MOVIE WITH PEERS. NO AGITATION NOTED. PT
SPOKE TO BROTHER GUY THIS HS, BUT CONTINUES TO REFUSED PHONE CALLS FROM HER
MOTHER STATING "NOPE, CALL THE ". PT VOICES NO SI/HI, HALLUCINATIONS, OR
DELUSIONS. NO NOTED RESPONDING TO INTERNAL STIMULI. MEDICATION COMPLIANT
WITHOUT DIFFICULTY AFTER REVIEW. NO PHYSICAL COMPLAINTS VOICED. PT AMBULATORY
WITH A STEADY GAIT, INDEPENDENT IN ADL'S, CONTINENT OF BOWEL AND BLADDER. NO
DISTRESS NOTED.
 
P-CONTINUE TO MONITOR MOOD AND BEHAVIORS. MAINTAIN Q 15 MIN CHECKS AND PRN FOR
SAFETY.

## 2020-05-22 NOTE — NUR
PATIENT OBSERVED ON Q 15 MIN CHECKS TO HAVE SLEPT APPROX 4.5 HOURS
UNINTERRUPTED. NO DISTRESS NOTED.

## 2020-05-23 VITALS — SYSTOLIC BLOOD PRESSURE: 117 MMHG | DIASTOLIC BLOOD PRESSURE: 62 MMHG

## 2020-05-23 VITALS — DIASTOLIC BLOOD PRESSURE: 62 MMHG

## 2020-05-23 NOTE — NUR
P-HYPERVERBAL, EUPHORIC
 
I-REDIRECTION WITH 1:1 THERAPEUTIC INTERVENTIONS AND PRESENT REALITY. EDUCATE
AND ENCOURAGE MEDICATION COMPLIANCE.
 
R-PATIENT MEDICATION COMPLIANT AT HS. PATIENT PROVIDED NOURISHMENT AND FLUIDS
AT HS. PATIENT WITH NO SUICIDAL OR HOMICIDAL IDEATIONS. PATIENT WITH NO
DELUSIONS OR HALLUCINATIONS AT THIS TIME. PATIENT HYPERVERBAL IN DINING AREA
AND WITH FLIGHT OF IDEAS. PATIENT IN EUPHORIC MOOD AND SINGS A SONG ABOUT OR
MAKING REFERENCES TO SONGS ABOUT WHAT IS HEARD OR SEEN. PATIENT INTERMITTENTLY
SINGING "IF YOU WANT IT, COME AND GET IT". PATIENT ALSO SEEN A NURSES SHOES
AND STATING "THAT'S PURPLE RAIN".
 
P-CONTINUE TO ENCOURAGE MEDICATION COMPLIANCE, CONTINUE TO PRESENT REALITY,
ENCOURAGE GROUP THERAPY WHILE AWAKE

## 2020-05-23 NOTE — NUR
PATIENT SLEPT 6 HOURS OF INTERRUPTED SLEEP THROUGHOUT SHIFT. Q 15 MINUTE
CHECKS MAINTAINED. 24 HR chart check completed.

## 2020-05-24 VITALS — DIASTOLIC BLOOD PRESSURE: 66 MMHG | SYSTOLIC BLOOD PRESSURE: 112 MMHG

## 2020-05-24 VITALS — DIASTOLIC BLOOD PRESSURE: 59 MMHG

## 2020-05-24 NOTE — NUR
P- FLIGHT OF IDEAS, HYPERVERBAL, IRRITABLE WITH FEMALE PEER
I- ORIENTATION, MOOD AND BEHAVIORS ASSESSED. ASSESSED PT FOR SI/HI, INTENT OR
PLAN. ASSESSED PT FOR S/S HALLUCINATIONS, PARANOIA AND/OR DELUSIONS.
MEDICATIONS ADMINISTERED AS PER PHYSICIAN'S ORDERS. ASSISTANCE WITH ADL CARE
PROVIDED AS NEEDED. ENCOURAGED PT TO ATTEND AND PARTICIPATE IN [facility]
GROUPS AND ACTIVITIES.
R- PT IS ALERT AND ORIENTED WITH CONFUSION NOTED. MEMORY APPEARS TO BE INTACT.
RESPS EASY AND EVEN ON ROOM AIR. MOOD LABILE, PT BECOMES EASILY IRRITABLE WITH
FEMALE PEER, REQUIRING FREQUENT REDIRECTION THROUGHOUT THE SHIFT. PT DENIES
SI/HI, INTENT OR PLAN. NO PARANOIA OR DELUSIONS NOTED. PT STATED SHE SEES
BIRDS AND A "GREEN SIGN FOR MEMORIAL DAY" OUT THE WINDOW. NO SUCH SIGN OR BIRD
WAS NOTED BY THIS RN. PT REMAINS HYPERVEBAL WITH FLIGHT OF IDEAS AND LOOSE
ASSOCIATIONS NOTED. PT BREAKS OUT INTO RANDOM SONG, FREQUENTLY SINGING "TAKE
IT TO THE LIMIT! CAN I GET A WITNESS?1" MED COMPLIANT WITHOUT DIFFICULTY. NO
DISTRESS NOTED.
P- PLAN TO CONTINUE CURRENT TX, CONTINUE TO MONITOR MOOD AND BEHAVIORS,
PROVIDE APPROPRIATE REORIENTATION, REDIRECTION AND 1:1 AS NEEDED. CONTINUE TO
ENCOURAGE MED COMPLIANCE AS WELL AS GROUP ATTENDANCE AND PARTICIPATION.

## 2020-05-24 NOTE — NUR
Invega Sustenna 156mg IM given to right deltoid at this time. Pt tolerated
well, expressed understanding of rationale behind medication and took
injection willingly. Pt states "that's fantastic".

## 2020-05-24 NOTE — NUR
Patient eating breakfast in dining room with peers. Respirations
easy and regular.  Vital signs stable. No overt distress.
LEA GORMAN

## 2020-05-24 NOTE — NUR
PATIENT SLEPT 5 HOURS OF INTERRUPTED SLEEP THROUGHOUT SHIFT. Q 15 MINUTE
CHECKS MAINTAINED. 24 HR chart check completed.

## 2020-05-24 NOTE — NUR
P-LABILE, FOI
 
I-ASSESS ORIENTATION, MOOD, AND BEHAVIOR. PROVIDE 1:1 WITH THERAPEUTIC
INTERVENTIONS. ENCOURAGE MEDICATION COMPLIANCE AND EDUCATE. MONITOR SLEEP.
 
R-PATIENT ALERT AND ORIENTED X3 WITH CONFUSION. PT REMAINS HYPERVERBAL WITH
FOI, NONSENSICAL AT TIMES. PT WITH INTERMITTENT IRRITABILITY/YELLING OUT NOTED
WHEN APPROACHED BY CERTAIN PEERS, EASILY REDIRECTABLE AS NEEDED. PT OTHERWISE
CALM, PLEASANT, INTERACTIVE WITH STAFF. PT VOICES NO SI/HI, HALLUCINATIONS, OR
DELUSIONS. NO NOTED RESPONDING TO INTERNAL STIMULI. PT MEDICATION COMPLIANT
WITHOUT DIFFICULTY AFTER REVIEW. NO PHYSICAL COMPLAINTS NOTED. PT AMBULATORY
WITH A STEADY GAIT, INDEPENDENT IN ADL'S, CONTINENT OF BOWEL AND BLADDER.
 
P-CONTINUE TO MONITOR MOOD AND BEHAVIORS. MAINTAIN Q 15 MIN CHECKS AND PRN FOR
SAFETY.

## 2020-05-25 VITALS — DIASTOLIC BLOOD PRESSURE: 68 MMHG | SYSTOLIC BLOOD PRESSURE: 124 MMHG

## 2020-05-25 VITALS — DIASTOLIC BLOOD PRESSURE: 66 MMHG | SYSTOLIC BLOOD PRESSURE: 148 MMHG

## 2020-05-25 NOTE — NUR
P: LABILE MOOD, IRRITABLE, ANGRY, DEMANDING, YELLING AT STAFF,
ATTEMPTING TO GET OUT OF TING CHAIR. ACTING OUT WHEN TAKEN INTO DINNING ROOM
WITH OTHER PATIENTS. MANIPULATIVE WITH STAFF. WHEN PATIENT DOESN'T GET HIS WAY
HE STATED THE FOLLOW "I'LL HURT MYSELF, YOU JUST WATCH" INFORMED ELSY,
NURSE PRACTITIONER- DENIES HI/SI. ADMITS TO BEING DEPRESSED. VERBALLY
AGGRESSIVE WITH NURSE. ATTEMPTED TO REFUSE MEDICATION.
I: ONE ON ONE, REDIRECTION, CONTRACT FOR SAFETY, SPACE PROVIDED.
R: EFFECTIVE. PATIENT APOLOGIZED FOR HIS BEHAVIORS AFTER PROVIDING SPACE.
PATIENT IS ALERT AND ORIENT TO PERSON, PLACE, TIME AND SITUATION. ABLE TO
VOICE NEEDS. MOOD IS DEPRESSED, LABILE MOOD.  REAPPROACHED WITH MEDICATION AND
COMPLAINT. EDUCATION PROVIDED. Q 15 MINUTE SAFETY CHECKS MAINTAINED. 2 PERSON
ASSIST WITH ACTIVITIES OF DAILY LIVING, CONTINENT OF BOWEL AND BLADDER. SET UP
FOR MEALS, INTACTS ARE GOOD WITH ADEQUATE FLUIDS.  UP IN TING CHAIR FOR
COMFORT. AFTER LUNCH PATIENT ASSISTED TO BED. COMPLAINT WITH SECOND APPROACH
TO GET LABS DRAWN. INTERACTIVE WITH STAFF, IN DINING ROOM WITH OTHER PATIENT;
NO OUTBURST WHILE IN DINING ROOM WITH OTHER PATIENTS.
P: CONTINUE TO MONITOR FOR MOOD, SUICIDAL IDEATIONS, ATTEMPTS TO HURT SELF AND
MEDICAITON COMPLAINCE.  PROVIDE ONE ON ONE FOR EMOTIONAL SUPPORT. REDIRECTION,
2 PERSON ASSIST AT ALL TIMES DUE TO BEING ACCUSTORY OF STAFF.

## 2020-05-25 NOTE — NUR
REFUSED TO COME OUT OF ROOM, SPEECH REMAINS HYPERVERBAL. TOOK ALL MEDICATIONS
EXCEPT DEKOTE WHICH SHE HAS BEEN DOING. STATES SHE AIN'T GOING AROUND THAT
CRAZY GIRL. EMOTIONAL SUPPORT PROVIDED. WILL CONTINUE Q 15 MINUTE AND PRN
MONITORING FOR SAFETY

## 2020-05-25 NOTE — NUR
P: RESPONDING TO INTERNAL STIMULI, SITTING IN ROOM TALKING TO SELF. MEDICATION
NON COMPLIANCE. DURING PATIENT INTERACTION, PATIENT RESPONDING TO QUESTIONS
WITH INAPPROPRIATE LAUGHTER. HYPER VERBAL WHEN TALKED TALKED TO.
I: ONE ON ONE, REDIRECTION AND ENCOURAGE MEDICATION COMPLIANCE.
R: EFFECTIVE. PATIENT IS ALERT TO PERSON, PLACE, TIME AND SITUATION. MOOD IS
LABILE. DENIES ANY HALLUCINATIONS, DELUSIONS, HI/SI OR PAIN. RESPONDING TO
INTERNAL STIMULI. 1 PERSON ASSIST WITH ACTIVITIES OF DAILY LIVING, CONTINENT
OF BOWEL AND BLADDER. TOOK ALL MEDICATIONS EXCEPT DEPAKOTE. Q 15 MINUTE SAFETY
CHECKS.
P: CONTINUE TO MONITOR FOR MOOD CHANGES, HALLUCINATIONS AND MEDICATION
COMPLAINCE. PROVIDE ONE ON ONE, REDIRECTION AS NEEDED.

## 2020-05-26 VITALS — SYSTOLIC BLOOD PRESSURE: 151 MMHG | DIASTOLIC BLOOD PRESSURE: 67 MMHG

## 2020-05-26 VITALS — SYSTOLIC BLOOD PRESSURE: 149 MMHG | DIASTOLIC BLOOD PRESSURE: 73 MMHG

## 2020-05-26 NOTE — NUR
HAS BEEN AWAKE ALL NIGHT EITHER SINGING, FLUSHING TOILETS OR LOOKING FOR PEER
TO YELL AT. CLIENT SLEPT ZERO HOURS. ALL ATTEMPTS AT RELAXATION UNSUCCESSFUL

## 2020-05-26 NOTE — NUR
OUT IN FIELDS YELLING ABOUT FEMALE PEER ALWAYS "IN HER FACE". SAYING I HATE HER
AND SHE IS GOING TO pOINTING DOWN.  DIFFICULT TO REDIRECT CLIENT. ASSISTED TO
QUIET ROOM AS SHE WANTS TO LOOK OUT WINDOW.

## 2020-05-26 NOTE — NUR
HAPPY AND SINGING. NOT AS HYPERVERBAL AS YESTERDAY. COMPLIANT WITH ALL
MEDICATIONS AFTER WE REVIEWED THEM. DANCED AND SANG TO HER ROOM.

## 2020-05-26 NOTE — NUR
P: HYPERVERBAL, SINGING RANDOMLY, REFUSING MEDICATIONS WITHOUT JUSTIFICATION,
RESPONDS IN SONG EX "STOP WHATS THAT SOUND", "I DON'T WANNNNTTT NOOOO
DEPAKOTE" "PUT THE LIME IN THE COCONUT SHOVE IT UP YOUR BUMBYBUMBY BUMMMM"
 
I: INFORM CNP, of pt behaviors, redirect as needed, pt encouraged to take
medications, she also was talked to about being manic and no sleep for several
day,
R: pt took medications, cooperative in all care. Continue to encourage pt to
take medications and break manic cycle, monitor for medication effectiveness.
no SI/HI noted, pt is noted to talk to self throughout the day,
 
p: Monitor all aspects of pt response of medications, monitor for side
effects, update doctor or cnp as needed.

## 2020-05-26 NOTE — NUR
PHYSICAL THERAPY
 
Patient seen this am for therapy visit and was sitting in her room upon
therapist arrival. Patient identified by name /  and was very talkative
this morning. OT assistant was also present for observation only this session
as patient  was Independent with all transfers / mobilty. Patient ambulated
35'x 1 to Quiet room and performed seated B LE therex, all planes, x 15 reps
each to increase LE strength secondary to c/o of LE weakness during gait ex.
Patient also completed 5 consecutive sit to stand transfers in 13.85 seconds,
B UE support, requiring v/c to complete full upright standing posture prior to
sitting back down between reps. Patient unable to complete ex without B UE
support secondary to POOR transfer technique. Patient ambulated additional
40'x 1 to activity room, no AD, and reamiend in chair at table awaiting
breakfast, under [facility] staff Supervision. Will continue per POC as tolerated,
total treatment time 18 minutes.    Patrick De Leon, PTA

## 2020-05-26 NOTE — NUR
PREOCCUPIED WITH FLUSHING TOILET. FLUSHES 5-7 TIMES EVERYTIME SHE GOES. VERY
DIFFICULT TO REDIRECT. WILL CONTINUE TO MONITOR

## 2020-05-26 NOTE — NUR
OT NOTE
Prior to coming to the floor spoke with nurse Chacon and reported that therapy
was coming to the floor to treat this pt. Pt was seen this A.M. 1:1 for 20
minute OT session with PTA and nursing staff present for observation only.
Upon arrival pt was sitting upright in the bedside chair in her bedroom. Pt
identified by name and  and had no complaints at this time. While seated pt
donned B socks with supervision. Sit to stand completed from chair level I
followed by functional mobility to the quiet room I. Challenged pt's dynamic
standing tolerance needed for increased I in self care tasks and functional
transfers and pt was able to tolerate aprox 8 minutes before sitting due to
fatigue. Then challenged pt's dynamic standing balance while weight shifting,
crossing midline, and reaching over all planes and pt was able to maintain
F+/G- standing balance throughout. Throughout entire session pt presented with
hyperverbal speech requiring verbal prompts for attention to tasks. Functional
mobility was then completed back to the dining bermudez I where she was left
sitting upright under [facility] staff supervision. Continue with rec D/C plan to
return home with  supervision.
 
ALANA Denis/JOE

## 2020-05-26 NOTE — NUR
EMMANUEL VOSS [id number]
 
Please refer to the physician's history and physical for past medical history,
comorbid conditions, and allergies.
   Diagnosis: SCHIZOEFFECTIVE DISORDER
 
   Mal Score: 22,LOW OR NO RISK
 
WOUND DESCRIPTIONS:
Wound Number: 1
Location of the wound: medial aspect of left lower extremity
Thickness: Full
Size: 1.2cm x 1.5cm x 0.1cm
Tunneling: none
Undermining: none
Sinus Tract: none
Presence of Exudate: Serosanguineous
Amount: Light
Color: Red
Odor: None
Periwound Skin Appearance: Edema
Wound edges: approximated
Pain (associated with wound): none at time of assessment
How does patient state this happened? pt states she has had these for a while
and uses cream on them
 
Wound Number: 2
Location of the wound: lateral aspect of right lower extremity (right side) no
open areas noted at time of asseessment. No drainage noted at time of
assessment
 
Wound Number: 3
Location of the wound: lateral aspect of right lower extremity (middle wound)
no open areas noted at time of assessment. No draiange noted at time of
assessment
 
Wound Number: 4
Location: lateral apsect of right lower extremity (left wound) no open areas
noted at time of assessment. No drainage noted at time of assessment
   Surface the patient is resting on: Proform
 
SKIN PREVENTION RECOMMENDATION:
   1.  Pressure redistribution support surface as appropriate
   2.  Elevate heels
   3.  Remove boots/TEDS every shift and reapply
   4.  Head of bed 30 degrees as tolerated
   5.  Assess nutrition and hydration
   6.  Manage moisture
   7.  Avoid the use of containment devices while in bed
   8.  Use absorptive products on surfaces limit layers of linens on bed
   9.  Turn and reposition every 1-2 hours in bed and every 1 hour in chair as
       tolerated
   10. Weight shifts every 15 minutes while up in chair
   11. Offloading with pillows or device to keep heels elevated off bed
   12. Monitor skin at least every shift
   13. Inspect under medical devices twice a day
 
WOUND TREATMENT RECOMMENDATIONS:
D/C Full thickness guidelines: Cleanse areas to right and left lower extremity
with nss and apply sureprep around the wound therahoney to wound bed and cover
with optifoam gentle every 2 days and prn for soiling.
Partial thickness guidelines: Cleanse medial aspect of left lower extremity
with nss and apply sureprep around the wound hydrogel to wound bed and cover
with optifoam gentle every 2 days and prn for soiling.
Continue Heel raiser pro boots to bilateral feet while in bed.
Continue Wheelcushion when oob for prevention
Continue hydraguard to periarea and buttocks every shift and prn for soiling
for prevention.
Podiatry is already on consult

## 2020-05-27 VITALS — DIASTOLIC BLOOD PRESSURE: 62 MMHG | SYSTOLIC BLOOD PRESSURE: 145 MMHG

## 2020-05-27 VITALS — DIASTOLIC BLOOD PRESSURE: 60 MMHG | SYSTOLIC BLOOD PRESSURE: 108 MMHG

## 2020-05-27 NOTE — NUR
OT NOTE
Pt was seen this A.M. 1:1 for 15 minute OT session with PTA and nursing staff
present for observation only. Upon arrival pt was sitting upright in the
dining bermudez. Pt identified by name and  and had no complaints at this time.
Sit to stand completed from chair level followed by functional mobility to the
bathroom I. There she stood sink side while washing her hands and face I.
Challenged pt's dynamic standing tolerance needed for increased I in self care
tasks and functional transfers, pt was able to tolerate aprox 10 minutes at a
time before sitting due to fatigue. Also challenged pt's dynamic standing
balance while weight shifiting, crossing midline, and reaching over all planes
and pt was able to maintain G- standing balance throughout. Pt was left
sitting upright in the dining room for breakfast under [facility] staff supervision.
Continue with rec D/C plan to return home with  supervision.
 
ALANA Denis/JOE

## 2020-05-27 NOTE — NUR
Treatment Plan meeting was held via telephone with Dr. Yip, KEYUR Anthony RN,
LISW-S and Discharge Planner. Plan for discharge Next Week.

## 2020-05-27 NOTE — NUR
Family meeting held via phone with pt's mother Nannette Wild. Nannette stated
that she has been dealing with her own health issues and was hospitalized.
Nannette reported that she and pt are very close. Aishwarya has lived with her
mother her entire life. Nannette reports that pt's family is very close. Pt's
brothers live nearby and are supportive. Aishwarya's only sister Farnaz  in
, and Nannette believes that pt has never gotten over the loss of her
sister. Discussed this further. Nannette confirmed that pt is current with
[facility] with a new provider who Nannette is unsure of the
name. Nannette reports that when pt is doing well, pt attends [employer] every
, never swears, is very mild-mannered, and loving toward Nannette and her
entire family.

## 2020-05-27 NOTE — NUR
PHYSICAL THERAPY
 
Patient seen this am for therapy visit and was sitting in activity room chair
upon therapist arrival. Patient identified by name /  and reports no new
c/o's. OT assistant was also present for observation only this session as
patient was very pleasant this morning and Independent with all transfers.
Patient ambulates without AD, slow steady chapo, > 150'x 1, demonstrating no
LOB. Patient able to side step and walk backwards 5'x 2 with caution. Patient
returned to activity room chair at table awaiting breakfast, under [facility] staff
Supervision. Will continue per POC as tolerated, total treatment time 16
minutes.    Patrick De Leon, PTA

## 2020-05-27 NOTE — NUR
P-HYPERVERBAL
 
I-REDIRECTION WITH 1:1 THERAPEUTIC INTERVENTIONS AND PRESENT REALITY. EDUCATE
AND ENCOURAGE MEDICATION COMPLIANCE.
 
R-PATIENT MEDICATION COMPLIANT AT HS. PATIENT PROVIDED NOURISHMENT AND FLUIDS
AT HS. PATIENT WITH NO SUICIDAL OR HOMICIDAL IDEATIONS. PATIENT WITH NO
DELUSIONS OR HALLUCINATIONS AT THIS TIME. PATIENT HYPERVERBAL AT TIMES DURING
HS AND ISOLATING TO ROOM.
 
P-CONTINUE TO ENCOURAGE MEDICATION COMPLIANCE, CONTINUE TO PRESENT REALITY,
ENCOURAGE GROUP THERAPY WHILE AWAKE

## 2020-05-27 NOTE — NUR
REMAINS AWAKE AT THIS TIME. INTERMITTENTLY IN CHAIR AND BED. CONTINUE TO
MONITOR. 24 HR chart check completed.

## 2020-05-28 VITALS — DIASTOLIC BLOOD PRESSURE: 75 MMHG

## 2020-05-28 VITALS — DIASTOLIC BLOOD PRESSURE: 66 MMHG | SYSTOLIC BLOOD PRESSURE: 140 MMHG

## 2020-05-28 NOTE — NUR
Pt has met stated goals and is (I) throughout U for ADLs and transfers. Pt
to be d/c from occupational therapy services at this time. No further OT
indicated. Thank you for referral.
Kerry Saleem OTR/L

## 2020-05-28 NOTE — NUR
PHYSICAL THERAPY
 
Patient progressed well and met all stated goals for PT services. Patient is
independent throughout [facility]. No further PT needs indicated. Thank you for this
referral. Hailee Lopez,PT,DPT

## 2020-05-28 NOTE — NUR
PHYSICAL THERAPY
 
Patient seen this am for therapy visit and was sitting up on EOB upon
therapist arrival. Patient identified by name /  and was joined by OT
assistant who was also present for observation only this session. Patient
voices no new c/o's at this time and was Independent with all transfers.
Patient ambulates without AD, ad lida throughout the [facility] wing, demonstrating no
LOB and Good safety awareness. Patient able to walk backwards, side step, bend
over to  objects from floor and "dance".  Patient completed all
treatment without c/o and returned to activity room chair at table awaiting
breakfast, under [facility] staff Supervision.  Will consult with supervising
therapist regarding possible d/c planning as patient is Independent with all
activities.         Patrick De Leon, PTA

## 2020-05-28 NOTE — NUR
Treatment Plan meeting was held via telephone with Dr. Yip RN, LISW-S and
Discharge Planner. Plan for discharge Next Week. Pt. will return home with her
Mother.

## 2020-05-28 NOTE — NUR
OT NOTE
Prior to coming to the floor spoke with nurse Robledo and reported that therapy
was coming to the floor to treat this pt. Pt was seen this A.M. 1:1 for 20
minute OT session with PTA and nursing staff present for observation only.
Upon arrival pt was sitting upright on the EOB. Pt identified by name and 
and had no complaints at this time. While seated pt donned B socks and shirt
MI. Pt then stood while completing IADL task of making her bed and fixing her
blankets in the room I. Functional mobility was then completed to the bathroom
where she completed toileting task, clothing management, and toilet hygiene I.
After completing task without any verbal prompts for sequencing pt self
initated proper hand hygiene at the sink and then completed hair care I.
Challenged pt's dynamic standing balance while weight shifting, crossing
midline, and reaching over all planes and pt was able to maintain G standing
balance throughout. Overall pt tolerated aprox 15 minutes of activity before
sitting due to fatigue. Pt was left sitting upright in the dining bermudez under
[facility] staff supervision. Continue with rec D/C plan to return home with 
supervision.
 
ALANA Denis/JOE

## 2020-05-28 NOTE — NUR
OCCUPATIONAL THERAPY CO-SIGN
 
I approve of the Occupational Therapy notes written above.
Kerry Obrien OTR/L

## 2020-05-28 NOTE — NUR
PATIENT COMPLAINT OF BILATERAL LOWER EXTREMITY STIFFNESS AND PAIN. PATIENT
MEDICATED WITH TYLENOL 650MG WITH SOMEWHAT EFFECTIVE RESULTS. PATIENT STATING
"I FEEL BETTER BUT I STILL AM LIMPING". PATIENT AMBULATING IN ROOM WITHOUT
ASSISTANCE AND STEADY GAIT

## 2020-05-28 NOTE — NUR
PHYSICAL THERAPY CO-SIGN
 
 
I approve of the Physical Therapy notes written above.
 
                                LIANE ECHEVARRIA PT,DPT

## 2020-05-29 VITALS — DIASTOLIC BLOOD PRESSURE: 72 MMHG

## 2020-05-29 NOTE — NUR
Patient slept approx. 4.5 hours throughout shift with a few awakenings. Q 15
minute safety checks continued and maintained.

## 2020-05-29 NOTE — NUR
P-ISOLATIVE
 
I-REDIRECTION WITH 1:1 THERAPEUTIC INTERVENTIONS AND PRESENT REALITY. EDUCATE
AND ENCOURAGE MEDICATION COMPLIANCE.
 
R-PATIENT MEDICATION COMPLIANT AT HS. PATIENT REFUSED NOURISHMENT AT HS BUT
PROVIDED FLUIDS.  PATIENT WITH NO SUICIDAL OR HOMICIDAL IDEATIONS. PATIENT
WITH NO DELUSIONS OR HALLUCINATIONS AT THIS TIME. PATIENT ISOLATIVE IN ROOM
THIS SHIFT. PATIENT WITH EMESIS X 1 AT HS. PATIENT WITH MEDIUM EMESIS, BROWN,
LIQUID, AND FOOD CHUNKS THROUGHOUT.  PATIENT MEDICATED WITH ZOFRAN FOR NAUSEA.
ZOFRAN WITH EFFECTIVE RESULTS AT THIS TIME.
 
P-CONTINUE TO ENCOURAGE MEDICATION COMPLIANCE, CONTINUE TO PRESENT REALITY,
ENCOURAGE GROUP THERAPY WHILE AWAKE

## 2020-05-29 NOTE — NUR
Treatment Plan meeting was held via telephone with KEYUR Anthony RN, LISW-S and
Discharge Planner. Plan for discharge Next Week. Pt. will return home with her
Mother and Follow up care arranged.

## 2020-05-29 NOTE — NUR
Spoke with pt's mother Nannette Wild and provided update. Nannette stated that
she has a completed Statement of Expert Evaluation of pt and Nannette plans on
pursuing guardianship. This has been delayed due to Nannette having some
medical issues.

## 2020-05-29 NOTE — NUR
Patient in dining room eating breakfast with peers.
Respirations easy and regular.
Vital signs stable. No overt distress.
 
Telehealth assessment by GRISELDA Puente. Updates provided.
 
DARVIN GARY

## 2020-05-29 NOTE — NUR
P-ISOLATIVE
 
I-REDIRECTION WITH 1:1 THERAPEUTIC INTERVENTIONS AND PRESENT REALITY. EDUCATE
AND ENCOURAGE MEDICATION COMPLIANCE.
 
R-PATIENT [id number] COMPLIANT AT HS. PATIENT REFUSED NOURISHMENT AT HS BUT
PROVIDED FLUIDS.  PATIENT WITH NO SUICIDAL OR HOMICIDAL IDEATIONS. PATIENT
WITH NO DELUSIONS OR HALLUCINATIONS AT THIS TIME. PATIENT ISOLATIVE IN ROOM
THIS SHIFT. PATIENT RESTING IN BED THROUGHOUT SHIFT
 
P-CONTINUE TO ENCOURAGE MEDICATION COMPLIANCE, CONTINUE TO PRESENT REALITY,
ENCOURAGE GROUP THERAPY WHILE AWAKE

## 2020-05-29 NOTE — NUR
GROUP THERAPY
 
PT DECLINED TO ATTEND GROUP THIS AFTERNOON, CHOOSING TO REMAIN IN ROOM RESTING
QUIETLY.

## 2020-05-30 VITALS — DIASTOLIC BLOOD PRESSURE: 76 MMHG

## 2020-05-30 VITALS — SYSTOLIC BLOOD PRESSURE: 114 MMHG | DIASTOLIC BLOOD PRESSURE: 61 MMHG

## 2020-05-30 NOTE — NUR
P: VISUAL HALLUCINATIONS. PATIENT YELLING OUT IN DINING ROOM. PATIENT STATED
"I'M YELLING AT HER OUTSIDE"
I: PATIENT REORIENTED TO REALITY AND REDIRECTED.
R: EFFECTIVE. PATIENT IS ALERT AND ORIENTED TO PERSON, PLACE, TIME AND
SITUATION. ABLE TO VOICE NEEDS. MOOD IS LABILE BUT LESS MANIC. NO LONGER
HYPERVERBAL. WIIL SING AT TIMES. MEDICATION COMPLIANT. DENIES ANY
HALLUCINATIONS, DELUSIONS, HI/SI OR PAIN. Q 15 MINUTE SAFETY CHECKS
MAINTAINED. 1 PERSON ASSIST, VERBAL CUEING WITH ACTIVITIES OF DAILY LIVING,
CONTINENT OF BOWEL AND BLADDER. SET UP FOR MEALS, INTAKES VARY WITH MUCH
ENCOURAGEMENT. INTERACTIVE WITH STAFF AND PARTICIPATED IN GROUP SESSION.
AMBULATORY WITH STEADY GAIT.
P: CONTINUE TO MONITOR FOR HALLUCINATIONS, DELUSIONS AND MEDICATION
COMPLIANCE. PROVIDE ONE ON ONE, REDIRECTION/REORIENTATION AS NEEDED.

## 2020-05-31 VITALS — SYSTOLIC BLOOD PRESSURE: 124 MMHG | DIASTOLIC BLOOD PRESSURE: 70 MMHG

## 2020-05-31 VITALS — DIASTOLIC BLOOD PRESSURE: 64 MMHG | SYSTOLIC BLOOD PRESSURE: 126 MMHG

## 2020-05-31 NOTE — NUR
P-FOI
 
I-ASSESS ORIENTATION, MOOD, AND BEHAVIOR. PROVIDE 1:1 WITH THERAPEUTIC
INTERVENTIONS. ENCOURAGE MEDICATION COMPLIANCE AND EDUCATE. MONITOR SLEEP.
 
R-PATIENT ALERT AND ORIENTED X3 WITH CONFUSION. PT EUTHYMIC THIS HS, PLEASANT,
INTERACTIVE. PT CONTINUES TO HAVE FOI WITH INTERMITTENT SINGING BUT IS NOTED
TO BE LESS HYPERVERBAL. PT PREOCCUPIED WITH CALL LIGHT AT TIMES DESPITE NEEDS
BEING MET, EASILY REDIRECTABLE. NO AGITATION NOTED. PT VOICES NO SI/HI,
HALLUCINATIONS, OR DELUSIONS. NO NOTED RESPONDING TO INTERNAL STIMULI.
MEDICATION COMPLIANT WITHOUT DIFFICULTY AFTER REVIEW. NO PHYSICAL COMPLAINTS
VOICED. PT AMBULATORY WITH A STEADY GAIT, INDEPENDENT IN ADL'S, CONTINENT OF
BOWEL AND BLADDER. NO DISTRESS NOTED.
 
P-CONTINUE TO MONITOR MOOD AND BEHAVIORS. MAINTAIN Q 15 MIN CHECKS AND PRN FOR
SAFETY.

## 2020-06-01 VITALS — DIASTOLIC BLOOD PRESSURE: 57 MMHG

## 2020-06-01 VITALS — DIASTOLIC BLOOD PRESSURE: 68 MMHG

## 2020-06-01 NOTE — NUR
P-FOI
 
I-ASSESS ORIENTATION, MOOD, AND BEHAVIOR. PROVIDE 1:1 WITH THERAPEUTIC
INTERVENTIONS. ENCOURAGE MEDICATION COMPLIANCE AND EDUCATE. MONITOR SLEEP.
 
R-PATIENT ALERT AND ORIENTED X3 WITH CONFUSION. PT EUTHYMIC THIS HS, PLEASANT,
INTERACTIVE, ISOLATIVE AT TIMES. PT CONTINUES TO HAVE FOI WITH INTERMITTENT
SINGING BUT REMAINS LESS HYPERVERBAL. PT PREOCCUPIED WITH VARIOUS ITEMS (CUPS,
WIPES, TISSUES) AND ATTEMPTS TO CHRIS THEM IN ROOM, EASILY REDIRECTABLE. NO
AGITATION NOTED. PT VOICES NO SI/HI, HALLUCINATIONS, OR DELUSIONS. NO NOTED
RESPONDING TO INTERNAL STIMULI.  MEDICATION COMPLIANT WITHOUT DIFFICULTY AFTER
REVIEW. NO PHYSICAL COMPLAINTS VOICED. PT AMBULATORY WITH A STEADY GAIT,
INDEPENDENT IN ADL'S, CONTINENT OF BOWEL AND BLADDER. NO DISTRESS NOTED.
 
P-CONTINUE TO MONITOR MOOD AND BEHAVIORS. MAINTAIN Q 15 MIN CHECKS AND PRN FOR
SAFETY.

## 2020-06-01 NOTE — NUR
Treatment Plan meeting was held via telephone with Dr. Yip, KEYUR Anthony, PORFIRIO,
LISW-S and Discharge Planner. Plan for discharge at the end of the week. Pt.
will return home with her Mother.

## 2020-06-01 NOTE — NUR
P: TALKING TO UNSEEN OTHERS, FLIGHT OF IDEAS, INAPPROPRIATE SINGING.
I: ONE ON ONE FOR EMOTIONAL SUPPORT, REDIRECTION AS NEEDED.
R: EFFECTIVE. PATIENT IS ALERT TO PERSON, PLACE, TIME AND SITUATION; ABLE TO
VOICE NEEDS. MOOD IS STABLE, PLEASANT DEMEANOR.  DENIES ANY HALLUCINATIONS,
DELUSIONS, HI/SI OR PAIN. MEDICATION COMPLAINT. Q 15 MINUTE SAFETY CHECKS
MAINTAINED. 1 PERSON ASSIST FOR CUEING WITH ACTIVITIES OF DAILY LIVING,
CONTINENT OF BOWEL AND BLADDER, SET UP FOR MEALS, INTAKES VARY WITH MUCH
ENCOURAGEMENT. INTERACTIVE WITH STAFF AND OTHER PATIENTS. PARTICIPATES IN
GROUP SESSION.
P: CONTINUE TO MONITOR FOR HALLUCINATIONS, DELUSIONS, MEDICATION COMPLIANCE
AND MEAL INTAKE. PROVIDE ONE ON ONE FOR EMOTIONAL SUPPORT, REDIRECT, ENCOURAGE
MEDICATION COMPLIANCE AND MEAL INTAKES.

## 2020-06-01 NOTE — NUR
Patient eating breakfast in dining room with peers.  Respirations easy and
regular.  Vital signs stable. No overt distress.
[facility] at this time, update given.

## 2020-06-02 VITALS — SYSTOLIC BLOOD PRESSURE: 114 MMHG | DIASTOLIC BLOOD PRESSURE: 56 MMHG

## 2020-06-02 VITALS — DIASTOLIC BLOOD PRESSURE: 57 MMHG

## 2020-06-02 NOTE — NUR
LEAH VOSSESA [id number]
 
Please refer to the physician's history and physical for past medical history,
comorbid conditions, and allergies.
   Diagnosis: SCHIZOEFFECTIVE DISORDER
 
   Mal Score: 22,LOW OR NO RISK
 
WOUND DESCRIPTIONS:
This nurse along with with Lois Canales RN evaluated patient for skin
impairments.
 
Wound Number: 1
Location: Medial apsect of left lower extremity no open areas noted at time of
assessment. No drainage noted at time of assessment
 
Wound Number: 2
Location of the wound: lateral aspect of right lower extremity (right side) no
open areas noted at time of asseessment. No drainage noted at time of
assessment
 
Wound Number: 3
Location of the wound: lateral aspect of right lower extremity (middle wound)
no open areas noted at time of assessment. No draiange noted at time of
assessment
 
Wound Number: 4
Location: lateral apsect of right lower extremity (left wound) no open areas
noted at time of assessment. No drainage noted at time of assessment
Surface the patient is resting on: Proform
 
SKIN PREVENTION RECOMMENDATION:
   1.  Pressure redistribution support surface as appropriate
   2.  Elevate heels
   3.  Remove boots/TEDS every shift and reapply
   4.  Head of bed 30 degrees as tolerated
   5.  Assess nutrition and hydration
   6.  Manage moisture
   7.  Avoid the use of containment devices while in bed
   8.  Use absorptive products on surfaces limit layers of linens on bed
   9.  Turn and reposition every 1-2 hours in bed and every 1 hour in chair as
       tolerated
   10. Weight shifts every 15 minutes while up in chair
   11. Offloading with pillows or device to keep heels elevated off bed
   12. Monitor skin at least every shift
   13. Inspect under medical devices twice a day
 
WOUND TREATMENT RECOMMENDATIONS:
D/C partial thickness guidelines
Continue Heel raiser pro boots to bilateral feet while in bed.
Continue Wheelcushion when oob for prevention
Continue hydraguard to periarea and buttocks every shift and prn for soiling
for prevention.
Podiatry is already on consult

## 2020-06-02 NOTE — NUR
Patient alert and oriented x3 with confusion noted. Mood is calm,cooperative
and pleasant. Patient having FOI with intermttent singing. Patient denies any
hallucinations at this time. No overt s/s of any responding to internal
stimuli noted. Patient compliant with HS medications without any difficulty.
Provided emotional support. Redirected when needed and patient receptive. Plan
to continue to encourage medication compliance. Also continue to offer
emotional support and redirect when needed/appropriate. Will continue to
monitor moods/behaviors. Q 15 minute safety checks continued and maintained.
See [facility] for further documentation.

## 2020-06-02 NOTE — NUR
Treatment Plan meeting was held this a.m. via telephone with Dr. Yip, KEYUR Anthony RN and Discharge Planner. Plan for discharge Next Week. Pt. will
return home with her Mother and Follow up care arranged.

## 2020-06-02 NOTE — NUR
PRN Tylenol 650mg PO given at this time for c/o arthritis pain to her right
knee, not rated on pain scale. Will monitor for effect.

## 2020-06-02 NOTE — NUR
Time spent with pt this afternoon. Pt spoke about the rings on her fingers and
told this writer that there was a picture of a blonde villasenor in the one ring.
Pt asked if this writer could see the villasenor and then acted surprised when
this writer replied no. Pt spoke of her brother who is a  and of
her sister who . Pt was briefly tearful when she spoke of her sister.
Observed pt being supportive of a female peer.

## 2020-06-02 NOTE — NUR
P:HYPERVERBAL AND DEMEANING. PT NEGATIVELY COMMENTING ON OTHER PATIENTS.
 
I: PT REDIRECTED AND EXPLAINED IT IS NOT NICE TO SAY MEAN THINGS ABOUT OTHER
PEOPLE WHO ARE NOT SPEAKING ABOUT HER. 1:1 PROVIDED FOR THERAPEUTIC
COMMUNICATION. ENCOURAGED MEDICATION COMPLIANCE. MONITORED BEHAVIORS WITH Q15
MINUTE SAFETY CHECKS.
 
R: PT CHANGED SUBJECT. EASILY REDIRECTED. MEDICATION COMPLIANT.NO FURTHER
BEHAVIORS NOTED.
 
P: CONTINUE TO REDIRECT WHEN NEEDED. ENCOURAGE PT TO SAY NICE THINGS ABOUT
PEERS. PROVIDE 1:1 WHEN NEEDED. ENCOURAGE MEDICATION COMPLIANCE. CONTINUE TO
MONITOR BEHAVIORS WITH Q15 MINUTE SAFETY CHECKS.
 
FALL PRECAUTIONS MAINTAINED. SEE [facility] FLOWSHEET FOR SPECIFIC MONITORING.

## 2020-06-02 NOTE — NUR
P-FOI
 
I-ASSESS ORIENTATION, MOOD, AND BEHAVIOR. PROVIDE 1:1 WITH THERAPEUTIC
INTERVENTIONS. ENCOURAGE MEDICATION COMPLIANCE AND EDUCATE. MONITOR SLEEP.
 
R-PATIENT ALERT AND ORIENTED X3 WITH CONFUSION. PT EUTHYMIC THIS HS, PLEASANT,
INTERACTIVE. ATE HS SNACK AND WATCHED A MOVIE WITH PEERS.  PT CONTINUES TO
HAVE FOI WITH INTERMITTENT SINGING, HYPERVERBAL AT TIMES. PT EASILY
REDIRECTABLE DURING TIMES OF YELLING OUT AND/OR CURSING, NO AGITATION NOTED.
PT VOICES NO SI/HI, HALLUCINATIONS, OR DELUSIONS. NO NOTED RESPONDING TO
INTERNAL STIMULI.  MEDICATION COMPLIANT WITHOUT DIFFICULTY AFTER REVIEW. NO
PHYSICAL COMPLAINTS VOICED. PT AMBULATORY WITH A STEADY GAIT, INDEPENDENT IN
ADL'S, CONTINENT OF BOWEL AND BLADDER. NO DISTRESS NOTED.
 
P-CONTINUE TO MONITOR MOOD AND BEHAVIORS. MAINTAIN Q 15 MIN CHECKS AND PRN FOR
SAFETY.

## 2020-06-03 VITALS — DIASTOLIC BLOOD PRESSURE: 75 MMHG

## 2020-06-03 VITALS — DIASTOLIC BLOOD PRESSURE: 61 MMHG | SYSTOLIC BLOOD PRESSURE: 142 MMHG

## 2020-06-03 NOTE — NUR
Patient alert and oriented x3 with confusion noted. Mood is calm,cooperative
and pleasant. Patient having less FOI with intermttent singing. Patient
denies any hallucinations at this time. No overt s/s of any responding to
internal stimuli noted. Patient compliant with HS medications without any
difficulty.  Provided emotional support. Redirected when needed and patient
receptive. Plan to continue to encourage medication compliance. Also continue
to offer emotional support and redirect when needed/appropriate. Will continue
to monitor moods/behaviors. Q 15 minute safety checks continued and
maintained.  See [facility] for further documentation.

## 2020-06-03 NOTE — NUR
PATIENT IS ALERT TO PERSON, PLACE, TIME AND SITUATION; ABLE TO VOICE NEEDS.
MOOD IS STABLE, PLEASANT DEMEANOR.  DENIES ANY HALLUCINATIONS, DELUSIONS,
HI/SI OR PAIN. MEDICATION COMPLAINT. Q 15 MINUTE SAFETY CHECKS
MAINTAINED. 1 PERSON ASSIST FOR CUEING WITH ACTIVITIES OF DAILY LIVING,
CONTINENT OF BOWEL AND BLADDER, SET UP FOR MEALS, INTAKES VARY WITH MUCH
ENCOURAGEMENT. INTERACTIVE WITH STAFF AND OTHER PATIENTS. PARTICIPATES IN
GROUP SESSION.  CONTINUE TO MONITOR FOR HALLUCINATIONS, DELUSIONS .
MEDICATION COMPLIANE AND MEAL INTAKE. PROVIDE ONE ON ONE FOR EMOTIONAL
SUPPORT, REDIRECT, ENCOURAGE MEDICATION COMPLIANCE AND MEAL INTAKES.

## 2020-06-03 NOTE — NUR
Treatment Plan meeting was held via telephone with Dr. Yip, KEYUR Anthony RN,
LISW-S and Discharge Planner in attendance. Plan for discharge End of the week
or next week.

## 2020-06-03 NOTE — NUR
PATIENT GETTING READY TO GO TO RADIOLOGY FOR HEAD AND NECK CT'S. UPON SITTING
UP PATIENT HAD MEDIUM EMESIS. DR. GARZA NOTIFIED.

## 2020-06-03 NOTE — NUR
Spoke with Gabrielle Martinez NP via telephone. Pt. would benefit from Partial
Hospitalization Program at Discharge and Pt. will require appointment to
receive Monthly Injection post Discharge.

## 2020-06-03 NOTE — NUR
PATIENT C/O FEELING DIZZY. CO NURSE CHECKS VTIALS AND BLOOD SUGAR. BLOOD SUGAR
111. PATINT INSTRUCTED BY NURSE TO STAY SEATING IN CHAIR. MEDICATIONS REVIEW.
UPON ENTERING ROOM PATIENT WAS FOUND IN SITTING ON FLOOR AND STATED I BUMP MY
NECK. CUSHION CHAIR CLOSE TO PATIENT. ORTHO BP: LAYING 126/76, 84; SITTING
118/65, 74; STANDING 132/70 70.  PATIENT ASSISTED TO BED STILL COMPLAINING OF
BEING DIZZY AND HUNGRY. DR GARZA NOTIFIED WITH NEW ORDERS. PATIENT OWN
PERSON AND AWARE. JOSUÉ SUPERVISOR NOTIFIED

## 2020-06-04 VITALS — DIASTOLIC BLOOD PRESSURE: 64 MMHG | SYSTOLIC BLOOD PRESSURE: 124 MMHG

## 2020-06-04 VITALS — DIASTOLIC BLOOD PRESSURE: 68 MMHG | SYSTOLIC BLOOD PRESSURE: 140 MMHG

## 2020-06-04 NOTE — NUR
THIS NURSE WAS SITTING AT THE NURSES STATION WHILE THE PT WAS IN THE QUIET
ROOM USING THE TELEPHONE TO MAKE A PERSONAL CALL.  PT WAS STANDING IN THE
DOORWAY, WHEN SHE LOST HER BALANCE HITTING THE DOOR JAM AND SLIDING ONTO THE
FLOOR.  PT DID NOT HIT HER HEAD, NO S/S OF PAIN NOTED, ROM WNL.  VS=
[id number]/%[id number]. DR GARZA, NURSING SUPERVISOR ANTIONETTE, [facility] DIRECTOR
ANGEL ALL UPDATED. MESSAGE LEFT FOR DR KUMAR TO RETURN CALL.

## 2020-06-04 NOTE — NUR
Treatment Plan meeting was held via telephone with Dr. Yip RN and Discharge
Planner. Plan for discharge Next Week. Pt. will return Home with her Mother
and Follow with [facility] in [place].

## 2020-06-04 NOTE — NUR
PT NON-COMPLIANT WITH SAFETY MEASURES, REMOVED BODY ALARM AND ATTEMPTING TO
TRANSFER SELF WITHOUT ASSISTANCE. PT EDUCATED ON NEED FOR SAFETY MEASURES.

## 2020-06-04 NOTE — NUR
PT HAS RESTED IN BED SINCE THE ONSET OF THE SHIFT. ALERT & ORIENTED TO PERSON,
PLACE, TIME & SITUATON TO FOLLOW SAFETY FALL PRECAUTIONS. SHE HAS BEEN
COMPLIANT THUS FAR. MOOD IS PLEASANT & STATED SHE "FEELS GOOD". SHE HAS BEEN
REMINDED SEVERAL TIMES TO USE CALL PEÑA FOR ANY ASSISTANCE NEEDED. ATE SNACK.
COMPLAINT WITH MEDS.

## 2020-06-04 NOTE — NUR
PT NON-COMPLIANT WITH SAFETY MEASURES, SUCH AS USING A WHEELCHAIR AND CLIP
ALARM.
 
PROVIDED EDUCATION ON IMPORTANCE OF UTILIZING SAFETY MEASURES, ENCOURAGE
COMPLIANCE
 
PT COMPLIANT WITH MUCH ENCOURAGEMENT
 
STAFF WILL CONTINUE TO ENCOURAGE COMPLIANCE WITH SAFETY MEASURE, PROVIDE
EMOTIONAL SUPPORT AND 1:1 FOR PT TO VOICE FEELINGS, MONITOR PT BEHAVIORS ON
Q15 MIN SAFETY CHECKS

## 2020-06-05 VITALS — DIASTOLIC BLOOD PRESSURE: 70 MMHG | SYSTOLIC BLOOD PRESSURE: 130 MMHG

## 2020-06-05 VITALS — SYSTOLIC BLOOD PRESSURE: 119 MMHG | DIASTOLIC BLOOD PRESSURE: 87 MMHG

## 2020-06-05 NOTE — NUR
PT HAS BEEN AWAKE THROUGHOUT MOST OF THE SHIFT SLEEPING APPROX 2 HOURS TOTAL.
SHE HAS BEEN ASSISTED TO THE BATHROOM X 2. HAS BEEN REMINDED OF SAFETY
PRECAUTIONS & FOR THE MOST PART HAS BEEN COMPLAINT. HOWEVER APPROX 0500 SHE
WANTED TO GET OUT OF BED ON HER OWN WHILE STAFF WAS WITH ANOTHER PATIENT &
WHEN REMINDED SHE CURSED BRIEFLY BUT REMAINED IN BED.

## 2020-06-05 NOTE — NUR
P: PT HAS FLIGHT OF IDEAS, VISUAL HALLUCINATIONS  STATING "THERE IS A MAN ON
THE ROOF AND HIS PANTS WERE TIGHT." PT MANIC, TALKING NON-STOP.
 
I: PROVIDE EMOTIONAL SUPPORT AND 1:1 FOR PT TO VOICE FEELINGS, ENCOURAGE MED
COMPLIANCE AND PROVIDE MED EDUCATION, RE-ORIENT AND PRESENT REALITY.
 
R: PT ALERT TO PERSON, PLACE AND TIME. PT MED COMPLIANT WITHOUT DIFFICULTY,
MED EDUCATION PROVIDED. PT CALM, CONTINUES WITH FLIGHT OF IDEAS AND NON-STOP
TALKING. PT CONTINUES TO EXPERIENCE VISUAL HALLUCINATIONS. PT UP TO A
WHEELCHAIR, REQUIRING 1 STAFF ASSIST FOR TRANSFERS AND CARE D/T PT FEELING
DIZZY. PT CONTINENT OF BOWEL AND BLADDER.
 
P: MONITOR PT BEHAVIORS ON Q15 MIN SAFETY CHECKS, ENCOURAGE MED COMPLIANCE AND
PROVIDE MED EDUCATION, ENCOURAGE GROUP PARTICIPATION AND SOCIALIZATION,
RE-ORIENT AND PRESENT REALITY.

## 2020-06-05 NOTE — NUR
P-FLIGHT OF IDEAS
 
I-REMIND PT OF SAFETY PRECAUTIONS & TO USE CALL PEÑA FOR ASSISTANCE, MAINTAIN
FALL PRECAUTIONS, ADMINISTER MEDS, MONITOR SLEEP,
 
R-PT COMPLIANT & SAT IN THE DINING ROOM IN A WHEELCHAIR. 1 STAFF ASSIST.
ALERT & ORIENTED TO PERSON, PLACE, TIME & SITUATION. FLIGHT OF IDEAS PRESENT.
MOOD IS PLEASANT & STATED, "I FEEL JUST FINE BABY. JUST FINE".  ATE SNACK.
COMPLAINT WITH MEDS.
 
P-CONTINUE TO MONITOR

## 2020-06-06 VITALS — DIASTOLIC BLOOD PRESSURE: 64 MMHG | SYSTOLIC BLOOD PRESSURE: 137 MMHG

## 2020-06-06 VITALS — SYSTOLIC BLOOD PRESSURE: 105 MMHG | DIASTOLIC BLOOD PRESSURE: 62 MMHG

## 2020-06-06 LAB
BASOPHILS # BLD AUTO: 0 10*3/UL (ref 0–0.1)
BASOPHILS NFR BLD AUTO: 0.4 % (ref 0–1)
BUN SERPL-MCNC: 23 MG/DL (ref 7–24)
CHLORIDE SERPL-SCNC: 105 MMOL/L (ref 98–107)
CREAT SERPL-MCNC: 0.81 MG/DL (ref 0.55–1.02)
EOSINOPHIL # BLD AUTO: 0.1 10*3/UL (ref 0–0.4)
EOSINOPHIL # BLD AUTO: 1.6 % (ref 1–4)
ERYTHROCYTE [DISTWIDTH] IN BLOOD BY AUTOMATED COUNT: 15.8 % (ref 0–14.5)
HCT VFR BLD AUTO: 36.6 % (ref 37–47)
LYMPHOCYTES # BLD AUTO: 2.9 10*3/UL (ref 1.3–4.4)
LYMPHOCYTES NFR BLD AUTO: 52 % (ref 27–41)
MCH RBC QN AUTO: 26.6 PG (ref 27–31)
MCHC RBC AUTO-ENTMCNC: 31.1 G/DL (ref 33–37)
MCV RBC AUTO: 85.3 FL (ref 81–99)
MONOCYTES # BLD AUTO: 0.5 10*3/UL (ref 0.1–1)
MONOCYTES NFR BLD MANUAL: 8.7 % (ref 3–9)
NEUT #: 2.1 10*3/UL (ref 2.3–7.9)
NEUT %: 37.1 % (ref 47–73)
NRBC BLD QL AUTO: 0 % (ref 0–0)
PLATELET # BLD AUTO: 352 10*3/UL (ref 130–400)
PMV BLD AUTO: 9.8 FL (ref 9.6–12.3)
POTASSIUM SERPL-SCNC: 5.1 MMOL/L (ref 3.5–5.1)
RBC # BLD AUTO: 4.29 10*6/UL (ref 4.1–5.1)
SODIUM SERPL-SCNC: 140 MMOL/L (ref 136–145)
WBC NRBC COR # BLD AUTO: 5.6 10*3/UL (ref 4.8–10.8)

## 2020-06-06 NOTE — NUR
PT HAS RESTED IN BED & HAS SLEPT INTERMITTENTLY FOR APPRX 4- 4.5 HOURS.
UP TO BATHROOM X 2. 1 WITH ASSIST & 1 WITHOUT. SHE HAS BEEN ATTENTION SEEKING
& DISRUPTIVE TO THE UNIT WITH SOME BEHAVIORS.  CLAPPING HER HANDS & TALKING
LOUDLY & LAUGHING.  SHE IS ALERT & ORIENTED X 4. HAS REQUIRED REDIRECTION &
LAUGHS AT STAFF. WHEN FIRMLY REDIRECTLY SHE GETS QUIET & IS MORE CO-OPERATIVE.
HAS BEEN NON COMPLIANT WITH FALL PRECAUTIONS GETTING OUT OF BED &
WANTING TO COME OUT IN THE FIELDS WITH STAFF. INFORMED HER SHE CAN NOT
HAVE THESE BEHAVIORS WHILE OTHER PATIENTS ARE SLEEPING. STATED, "I'M GOING TO
SLEEP ALL DAY". FLIGHT OF IDEAS AT TIMES.

## 2020-06-06 NOTE — NUR
P: PT MOOD IS LABILE. PT IS IRRITABLE AND INTRUSIVE/DISRUPTIVE WITH STAFF AND
PEERS. PT IS RESTLESS AND DEMANDING. PT HAS PRESSURED SPEECH.
 
I: PROVIDE EMOTIONAL SUPPORT AND 1:1 FOR PT TO VOICE FEELINGS, OFFER OPTIONS
FOR MORE APPROPRIATE BEHAVIORS, OFFER DIVERSIONAL ACTIVITES, RE-ORIENT AND
PRESENT REALITY
 
R: PT ALERT TO PERSON, PLACE, TIME AND SITUATION. PT MED COMPLIANT WITHOUT
DIFFICULTY, MED EDUCATION PROVIDED. PT REMAINS RESTLESS, IRRITABLE,
INTRUSIVE/DISRUPTIVE AT TIMES, STATING "IM ACTING LIKE THIS BECAUSE I WANT TO
GO HOME, I DON'T KNOW WHY I AM STUCK HERE".  PT CONTINUES WITH PRESSURED
SPEECH. NO HALLUCINATIONS OR DELUSIONS NOTED. PT DENIES ANY SUCIDIAL THOUGHTS.
PT UP TO A WHEELCHAIR, REQUIRES 1 STAFF ASSIST FOR TRANSFERS AND CARE. PT
CONTINENT OF BOWEL AND BLADDER.
 
P: MONITOR PT BEHAVIORS ON Q15 MIN SAFETY CHECKS, ENCOURAGE MED COMPLIANCE AND
PROVIDE MED EDUCATION, PROVIDE EMOTIONAL SUPPORT AND 1:1 FOR PT TO VOICE
FEELINGS, RE-ORIENT AND PRESENT REALITY, CONTINUE TO OFFER PT OPTIONS FOR
MORE APPROPRIATE BEHAVIORS, OFFER DIVERSIONAL ACTIVITIES.

## 2020-06-06 NOTE — NUR
P-PRESSURED SPEECH, MILDLY ELATED
 
I-REMIND PT OF SAFETY PRECAUTIONS & TO USE CALL PEÑA FOR ASSISTANCE, MAINTAIN
FALL PRECAUTIONS, ADMINISTER MEDS, MONITOR SLEEP,
 
R-PT COMPLIANT & SAT IN THE DINING ROOM. HAS MOVED INDEPENDENTLY VIA
WHEELCHAIR.  ALERT & ORIENTED TO PERSON, PLACE, TIME & SITUATION.  MOOD IS
PLEASANT & MILDLY ELATED AT TIMES WITH PRESSURED SPEECH. HAS BEEN CO-OPERATIVE
THUS FAR. STATED UNDERSTANDING WHEN REMINDED OF FALL PRECAUTIONS. COMPLAINT
WITH MEDS. ATE SNACK.
 
P-CONTINUE TO MONITOR

## 2020-06-07 VITALS — DIASTOLIC BLOOD PRESSURE: 64 MMHG | SYSTOLIC BLOOD PRESSURE: 149 MMHG

## 2020-06-07 VITALS — DIASTOLIC BLOOD PRESSURE: 57 MMHG

## 2020-06-07 NOTE — NUR
PATIENT IS ALERT TO PERSON, PLACE, TIME AND SITUATION; ABLE TO VOICE NEEDS.
MOOD IS STABLE, PLEASANT DEMEANOR.  DENIES ANY HALLUCINATIONS, DELUSIONS,
HI/SI OR PAIN. MEDICATION COMPLAINT. Q 15 MINUTE SAFETY CHECKS
MAINTAINED. 1 PERSON ASSIST FOR CUEING WITH ACTIVITIES OF DAILY LIVING,
CONTINENT OF BOWEL AND BLADDER, SET UP FOR MEALS, INTAKES VARY WITH MUCH
ENCOURAGEMENT. PERSONAL ALARM AND FALL SAFETY PRECAUTIONS IN PLACE.
INTERACTIVE WITH STAFF AND OTHER PATIENTS. PARTICIPATES IN
GROUP SESSION.  CONTINUE TO MONITOR FOR HALLUCINATIONS, DELUSIONS .
MEDICATION COMPLIANE AND MEAL INTAKE. PROVIDE ONE ON ONE FOR EMOTIONAL
SUPPORT, REDIRECT, ENCOURAGE MEDICATION COMPLIANCE AND MEAL INTAKES.

## 2020-06-07 NOTE — NUR
PT HAS SLEPT FROM 5735 -4660. UP TO THE BATHROOM X2. RESTED IN BED THE REST
OF THE SHIFT. MINIMAL REDIRECTION. MORE CO-OPERATIVE.

## 2020-06-08 VITALS — DIASTOLIC BLOOD PRESSURE: 59 MMHG

## 2020-06-08 VITALS — SYSTOLIC BLOOD PRESSURE: 122 MMHG | DIASTOLIC BLOOD PRESSURE: 64 MMHG

## 2020-06-08 NOTE — NUR
PATIENT OBSERVED ON Q 15 MIN CHECKS TO HAVE SLEPT APPROX 6 HOURS WITH X2 BRIEF
AWAKEINGS TO USE THE RESTROOM WITH ASSISTANCE. NO SIGNS OR SYMPTOMS OF
DISTRESS NOTED.

## 2020-06-08 NOTE — NUR
Pt was pleasant with this writer earlier today during conversation. Pt spoke
of her concern for her aunt who is ill. Pt was appropriate in conversation
voicing no hallucinations.

## 2020-06-08 NOTE — NUR
PATIENT IS ALERT TO PERSON, PLACE, TIME AND SITUATION; ABLE TO VOICE NEEDS.
MOOD IS STABLE, PLEASANT DEMEANOR.  DENIES ANY HALLUCINATIONS, DELUSIONS,
HI/SI OR PAIN. MEDICATION COMPLAINT. Q 15 MINUTE SAFETY CHECKS
MAINTAINED. 1 PERSON ASSIST FOR CUEING WITH ACTIVITIES OF DAILY LIVING,
CONTINENT OF BOWEL AND BLADDER, SET UP FOR MEALS, INTAKES VARY WITH MUCH
ENCOURAGEMENT. PERSONAL ALARM AND FALL SAFETY PRECAUTIONS IN PLACE.
NON-COMPLAINT WITH FALL PRECAUTIONS, ONE ON ONE PROVIDED.  INTERACTIVE WITH
STAFF AND OTHER PATIENTS. PARTICIPATES IN GROUP SESSION.  CONTINUE TO MONITOR
FOR HALLUCINATIONS, DELUSIONS.  MEDICATION COMPLIANE AND MEAL INTAKE. PROVIDE
ONE ON ONE FOR EMOTIONAL SUPPORT, REDIRECT, ENCOURAGE MEDICATION COMPLIANCE
AND MEAL INTAKES.

## 2020-06-08 NOTE — NUR
P-FOI
 
I-ASSESS ORIENTATION, MOOD, AND BEHAVIOR. PROVIDE 1:1 WITH THERAPEUTIC
INTERVENTIONS. ENCOURAGE MEDICATION COMPLIANCE AND EDUCATE. MONITOR SLEEP.
 
R-PATIENT ALERT AND ORIENTED X3 WITH CONFUSION. PT EUTHYMIC THIS HS, PLEASANT,
INTERACTIVE, ISOLATIVE AT TIMES. PT CONTINUES TO HAVE FOI WITH INTERMITTENT
SINGING BUT SPEECH REMAINS LESS HYPERVERBAL, RAPID. NO AGITATION NOTED. PT
VOICES NO SI/HI, HALLUCINATIONS, OR DELUSIONS. NO NOTED RESPONDING TO INTERNAL
STIMULI. MEDICATION COMPLIANT WITHOUT DIFFICULTY AFTER REVIEW. NO PHYSICAL
COMPLAINTS VOICED. PT STAND BY ASSIST IN ADL'S, CONTINENT OF BOWEL AND
BLADDER, FALL PRECAUTIONS CONTINUED. NO DISTRESS NOTED.
 
P-CONTINUE TO MONITOR MOOD AND BEHAVIORS. MAINTAIN Q 15 MIN CHECKS AND PRN FOR
SAFETY.

## 2020-06-09 VITALS — DIASTOLIC BLOOD PRESSURE: 87 MMHG | SYSTOLIC BLOOD PRESSURE: 114 MMHG

## 2020-06-09 VITALS — DIASTOLIC BLOOD PRESSURE: 70 MMHG

## 2020-06-09 PROBLEM — M10.9 ACUTE GOUTY ARTHRITIS: Status: ACTIVE | Noted: 2020-06-09

## 2020-06-09 NOTE — NUR
MOOD IS STABLE. PT PLEASANT. ALERT & ORIENTED PERSON, PLACE , TIME & SOMEWHAT
SITUATION. SPEECH IS PRESSURED WITH FLIGHT OF IDEAS. PT DID RECEIVE A PHONE
CALL & SPOKE WITH HER AUNT VIGNESH. PT WAS DISSAPOINTED THAT SHE DID NOT
RECEIVE A PHONE CALL FROM HER BROTHER GUY. STATED SHE WILL HAVE TO TALK TO
JOSE IN THE MORNING. MOVES ABOUT VIA WHEELCHAIR. INDEPENDENTLY TOILETS
HERSELF. ATE SNACK. COMPLIANT WITH MEDS

## 2020-06-09 NOTE — NUR
GROUP B
PT ATTENDED GROUP THERAPY AND PARTICIPATED BY WORKING ON A CRAFT AND LISTENING
TO MUSIC. PT WAS HYPERVERBAL WHEN NOT SINGING ALONG. PT EXPRESSED NO DELUSIONS
OR HALLUCINATIONS WHILE IN GROUP.

## 2020-06-09 NOTE — NUR
Pt pleasant with this writer today. Discussed pt's discharge needs. Pt stated
that she is willing to go IOP program at [facility]. Discussed pt returning
home with her mother. Pt spoke of what she is looking forward to once at home.
Pt was appropriate in conversation. Phoned pt's mother Nannette and informed
her that pt is discharging tomorrow.

## 2020-06-09 NOTE — NUR
PATIENT OBSERVED ON Q 15 CHECKS TO HAVE SLEPT APPROX 2 HOURS INTERRUPTED. PT
REQUIRES FREQUENT REMINDERS OF FALL PRECAUTIONS DUE TO NONCOMPLIANCE, BECOMES
ARGUMENTATIVE WITH REDIRECTION. WILL MONITOR FOR ESCULATING BEHAVIORS, NO
DISTRESS NOTED.

## 2020-06-09 NOTE — NUR
No adverse moods or behaviors this shift. Pt is alert and oriented, resps easy
and even on room air. Mood stable, elated at times. Pt continues with rapid
speech, FOI and intermittent singing. Pt is pleasant and cooperative.
Medication compliant without difficulty. Pt denies SI/HI, intent or plan. Pt
denies hallucinations, no response to internal stimuli noted. No paranoia or
delusions noted. No distress noted. Plan to continue current treatment.

## 2020-06-09 NOTE — NUR
P-FOI
 
I-ASSESS ORIENTATION, MOOD, AND BEHAVIOR. PROVIDE 1:1 WITH THERAPEUTIC
INTERVENTIONS. ENCOURAGE MEDICATION COMPLIANCE AND EDUCATE. MONITOR SLEEP.
 
R-PATIENT ALERT AND ORIENTED X3 WITH CONFUSION. PT EUTHYMIC THIS HS, PLEASANT
INTERACTIVE. PT CONTINUES TO HAVE FOI WITH INTERMITTENT SINGING BUT SPEECH
REMAINS LESS HYPERVERBAL. NO AGITATION NOTED. PT VOICES NO SI/HI,
HALLUCINATIONS, OR DELUSIONS. NO NOTED RESPONDING TO INTERNAL STIMULI.
MEDICATION COMPLIANT WITHOUT DIFFICULTY AFTER REVIEW. NO PHYSICAL
COMPLAINTS VOICED. PT STAND BY ASSIST IN ADL'S, CONTINENT OF BOWEL AND
BLADDER, FALL PRECAUTIONS CONTINUED. NO DISTRESS NOTED.
 
P-CONTINUE TO MONITOR MOOD AND BEHAVIORS. MAINTAIN Q 15 MIN CHECKS AND PRN FOR
SAFETY.

## 2020-06-10 VITALS — DIASTOLIC BLOOD PRESSURE: 58 MMHG

## 2020-06-10 NOTE — NUR
Pt discharged at this time to home via [facility] provided transport. All
personal belongings were sent with the pt including lock box items. Discharge
instructions reviewed with pt prior to discharge with pt's stated
understanding of all. Pt left the unit in stable condition at 1222 escorted by
[facility] staff.

## 2020-06-10 NOTE — NUR
Met with pt this AM prior to pt's discharge. Pt was pleasant and pleased that
she was being discharged. Discussed discharge plan and follow-ups. Pt was
appropriate in conversation. No delusions or hallucinations were voiced by pt.

## 2020-06-10 NOTE — NUR
Patient discharged today to home with her mother. Follow-up was scheduled with
[facility]. A referral was also made to the IOP program at
[facility]. While at [facility], pt's moni lessened and her
delusions resolved. Pt participated in programming. Pt was pleasant and
cooperative at discharge.

## 2020-06-10 NOTE — NUR
No adverse moods or behaviors this shift. Pt is alert and oriented, resps easy
and even on room air. Mood stable, euthymic. Pt continues with rapid speech,
FOI and intermittent singing. Pt is very pleasant and cooperative, pt is
looking forward to going home today. Medication compliant without difficulty.
Pt denies SI/HI, intent or plan. Pt denies hallucinations, no response to
internal stimuli noted. No paranoia or delusions noted. No distress noted.
Plan to continue current treatment.

## 2020-06-10 NOTE — NUR
Patient eating breakfast in dining room with peers. Feeding self.
Respirations easy and regular.  Vital signs stable. No overt distress.
LEA GORMAN PMDYLAN-BC on unit to see pt at this time, update given.

## 2020-06-10 NOTE — NUR
Treatment Plan meeting was held with Dr. Yip on the Telephone, KEYUR Anthony,
RN, AT, LISW-S and Discharge Planner in attendance. Plan for discharge today
with return home. Follow Up appointments scheduled with [facility] 6/15/2020 9:30 a.m. for Mental Health Follow Up, 6/29/20 10:30 a.m.
for Medication Management at [facility] with Britany Swan,
6/23/2020 with Dr. Fortune for Primary Care follow up. Reinforced that she would
need to fast for this appointment for Blood Work. [facility]
with Call patient to set up appointment. Transportation provided with [employer]
a Ride to transport with  time between 12:15 and 2:58 through Pt.
[employer].

## 2020-06-10 NOTE — NUR
AM GROUP
PT ATTENDED MORNING GROUP THERAPY AND PARTICIPATED IN ALL ACTIVITIES. PT WAS
LESS HYPERVERBAL AND ON TASK. PT EXPRESSED NO A V HALLUCINATIONS WHILE IN
GROUP.

## 2020-06-15 ENCOUNTER — HOSPITAL ENCOUNTER (INPATIENT)
Age: 65
LOS: 8 days | Discharge: ANOTHER HOSPITAL | DRG: 753 | End: 2020-06-23
Attending: EMERGENCY MEDICINE | Admitting: PSYCHIATRY & NEUROLOGY
Payer: COMMERCIAL

## 2020-06-15 LAB
ACETAMINOPHEN LEVEL: <5 MCG/ML (ref 10–30)
ALBUMIN SERPL-MCNC: 3.9 G/DL (ref 3.5–5.2)
ALP BLD-CCNC: 96 U/L (ref 35–104)
ALT SERPL-CCNC: 11 U/L (ref 0–32)
AMPHETAMINE SCREEN, URINE: NOT DETECTED
ANION GAP SERPL CALCULATED.3IONS-SCNC: 12 MMOL/L (ref 7–16)
AST SERPL-CCNC: 10 U/L (ref 0–31)
BACTERIA: ABNORMAL /HPF
BARBITURATE SCREEN URINE: NOT DETECTED
BASOPHILS ABSOLUTE: 0.05 E9/L (ref 0–0.2)
BASOPHILS RELATIVE PERCENT: 0.8 % (ref 0–2)
BENZODIAZEPINE SCREEN, URINE: NOT DETECTED
BILIRUB SERPL-MCNC: <0.2 MG/DL (ref 0–1.2)
BILIRUBIN URINE: NEGATIVE
BLOOD, URINE: NEGATIVE
BUN BLDV-MCNC: 18 MG/DL (ref 8–23)
CALCIUM SERPL-MCNC: 9.4 MG/DL (ref 8.6–10.2)
CANNABINOID SCREEN URINE: NOT DETECTED
CHLORIDE BLD-SCNC: 104 MMOL/L (ref 98–107)
CLARITY: CLEAR
CO2: 28 MMOL/L (ref 22–29)
COCAINE METABOLITE SCREEN URINE: NOT DETECTED
COLOR: YELLOW
CREAT SERPL-MCNC: 0.9 MG/DL (ref 0.5–1)
EOSINOPHILS ABSOLUTE: 0.17 E9/L (ref 0.05–0.5)
EOSINOPHILS RELATIVE PERCENT: 2.8 % (ref 0–6)
ETHANOL: <10 MG/DL (ref 0–0.08)
FENTANYL SCREEN, URINE: NOT DETECTED
GFR AFRICAN AMERICAN: >60
GFR NON-AFRICAN AMERICAN: >60 ML/MIN/1.73
GLUCOSE BLD-MCNC: 164 MG/DL (ref 74–99)
GLUCOSE URINE: NEGATIVE MG/DL
HCT VFR BLD CALC: 35.4 % (ref 34–48)
HEMOGLOBIN: 11 G/DL (ref 11.5–15.5)
HYALINE CASTS: ABNORMAL /LPF (ref 0–2)
IMMATURE GRANULOCYTES #: 0.02 E9/L
IMMATURE GRANULOCYTES %: 0.3 % (ref 0–5)
KETONES, URINE: NEGATIVE MG/DL
LEUKOCYTE ESTERASE, URINE: ABNORMAL
LYMPHOCYTES ABSOLUTE: 2.52 E9/L (ref 1.5–4)
LYMPHOCYTES RELATIVE PERCENT: 42.1 % (ref 20–42)
Lab: NORMAL
MCH RBC QN AUTO: 26.7 PG (ref 26–35)
MCHC RBC AUTO-ENTMCNC: 31.1 % (ref 32–34.5)
MCV RBC AUTO: 85.9 FL (ref 80–99.9)
METHADONE SCREEN, URINE: NOT DETECTED
MONOCYTES ABSOLUTE: 0.47 E9/L (ref 0.1–0.95)
MONOCYTES RELATIVE PERCENT: 7.8 % (ref 2–12)
NEUTROPHILS ABSOLUTE: 2.76 E9/L (ref 1.8–7.3)
NEUTROPHILS RELATIVE PERCENT: 46.2 % (ref 43–80)
NITRITE, URINE: NEGATIVE
OPIATE SCREEN URINE: NOT DETECTED
OXYCODONE URINE: NOT DETECTED
PDW BLD-RTO: 15.9 FL (ref 11.5–15)
PH UA: 5 (ref 5–9)
PHENCYCLIDINE SCREEN URINE: NOT DETECTED
PLATELET # BLD: 268 E9/L (ref 130–450)
PMV BLD AUTO: 9.2 FL (ref 7–12)
POTASSIUM SERPL-SCNC: 5 MMOL/L (ref 3.5–5)
PROTEIN UA: NEGATIVE MG/DL
RBC # BLD: 4.12 E12/L (ref 3.5–5.5)
RBC UA: ABNORMAL /HPF (ref 0–2)
SALICYLATE, SERUM: <0.3 MG/DL (ref 0–30)
SARS-COV-2, NAAT: NOT DETECTED
SODIUM BLD-SCNC: 144 MMOL/L (ref 132–146)
SPECIFIC GRAVITY UA: 1.02 (ref 1–1.03)
TOTAL PROTEIN: 7.1 G/DL (ref 6.4–8.3)
TRICYCLIC ANTIDEPRESSANTS SCREEN SERUM: NEGATIVE NG/ML
UROBILINOGEN, URINE: 0.2 E.U./DL
WBC # BLD: 6 E9/L (ref 4.5–11.5)
WBC UA: ABNORMAL /HPF (ref 0–5)

## 2020-06-15 PROCEDURE — 80053 COMPREHEN METABOLIC PANEL: CPT

## 2020-06-15 PROCEDURE — 99285 EMERGENCY DEPT VISIT HI MDM: CPT

## 2020-06-15 PROCEDURE — 81001 URINALYSIS AUTO W/SCOPE: CPT

## 2020-06-15 PROCEDURE — 80307 DRUG TEST PRSMV CHEM ANLYZR: CPT

## 2020-06-15 PROCEDURE — G0480 DRUG TEST DEF 1-7 CLASSES: HCPCS

## 2020-06-15 PROCEDURE — 93005 ELECTROCARDIOGRAM TRACING: CPT | Performed by: EMERGENCY MEDICINE

## 2020-06-15 PROCEDURE — 85025 COMPLETE CBC W/AUTO DIFF WBC: CPT

## 2020-06-15 PROCEDURE — U0002 COVID-19 LAB TEST NON-CDC: HCPCS

## 2020-06-15 PROCEDURE — 1240000000 HC EMOTIONAL WELLNESS R&B

## 2020-06-15 NOTE — ED NOTES
Completed patient assessment. Patient denies SI. Patient admits to HI - towards her mother.     Patient is a 63year old, female presenting to ED for increased manic behaviors and threatening to kill her family.      Patient has a mental health hx of bipolar disorder, currently in treatment with Dr. Lelia Villaseñor at [address] [place] [address] - patient reports that she has been taking her psychiatric medications; and patient last psychiatric admission was on 5/18/2020 at [facility].     Patient reports that she has not been sleeping, ok appetite, & denies feelings of hopelessness/helplessness. Patient hx of suicide attempts or self injurious behaviors are unknown. Patient denies any drug/alcohol use.     Patient cooperative, oriented x 4, labile mood, labile affect, thought process has some fleet of ideas & delusions, speech rambling/rapid. Patient denies A/V hallucinations - patient can be observed taking to herself. Patient was pink slipped by PD.     SW will pursue inpatient admission for safety/stabilization.      Nelia Sims, MSW, LSW  06/15/20 [id number]

## 2020-06-15 NOTE — ED NOTES
Bed: 24  Expected date:   Expected time:   Means of arrival:   Comments:  LORETTA Stringer RN  06/15/20 1267

## 2020-06-15 NOTE — ED PROVIDER NOTES
HPI:  6/15/20, Time: 3:51 PM EDT         Stu Rodgers is a 63 y.o. female with a history of bipolar disorder presenting to the ED for homicidal ideations, beginning prior to arrival.  The complaint has been persistent, mild in severity, and worsened by nothing. Patient admits to threatening to kill her family members in the ED. EDIS pink slipped the patient. Patient has flight of ideas in the room. Able to bring her back to the conversation. She denies homicidal ideations, delusions, hallucinations. ROS:   Pertinent positives and negatives are stated within HPI, all other systems reviewed and are negative.  --------------------------------------------- PAST HISTORY ---------------------------------------------  Past Medical History:  has a past medical history of Bell's palsy, Bipolar disorder ([facility] [address] Carpal tunnel syndrome, Hyperlipidemia, Non-insulin dependent type 2 diabetes mellitus ([facility] [address] and Systemic primary arterial hypertension. Past Surgical History:  has a past surgical history that includes Echo Complete (6/22/2013). Social History:  reports that she has never smoked. She has never used smokeless tobacco. She reports that she does not drink alcohol or use drugs. Family History: family history includes Heart Disease (age of onset: 76) in her father; High Blood Pressure in her father. The patients home medications have been reviewed. Allergies: Ace inhibitors; Ibuprofen; and Latuda [lurasidone hcl]    ---------------------------------------------------PHYSICAL EXAM--------------------------------------    Constitutional/General: Alert and oriented x3, well appearing, non toxic in NAD  Head: Normocephalic and atraumatic  Eyes: PERRL, EOMI  Mouth: Oropharynx clear, handling secretions, no trismus  Neck: Supple, full ROM, non tender to palpation in the midline, no stridor, no crepitus, no meningeal signs  Pulmonary: Lungs clear to auscultation bilaterally, no wheezes, rales, or rhonchi. (H) 20.0 - 42.0 %    Monocytes % 7.8 2.0 - 12.0 %    Eosinophils % 2.8 0.0 - 6.0 %    Basophils % 0.8 0.0 - 2.0 %    Neutrophils Absolute 2.76 1.80 - 7.30 E9/L    Immature Granulocytes # 0.02 E9/L    Lymphocytes Absolute 2.52 1.50 - 4.00 E9/L    Monocytes Absolute 0.47 0.10 - 0.95 E9/L    Eosinophils Absolute 0.17 0.05 - 0.50 E9/L    Basophils Absolute 0.05 0.00 - 0.20 E9/L   Serum Drug Screen   Result Value Ref Range    Ethanol Lvl <10 mg/dL    Acetaminophen Level <5.0 (L) 10.0 - 99.0 mcg/mL    Salicylate, Serum <7.1 0.0 - 30.0 mg/dL    TCA Scrn NEGATIVE Cutoff:300 ng/mL   Urine Drug Screen   Result Value Ref Range    Amphetamine Screen, Urine NOT DETECTED Negative <1000 ng/mL    Barbiturate Screen, Ur NOT DETECTED Negative < 200 ng/mL    Benzodiazepine Screen, Urine NOT DETECTED Negative < 200 ng/mL    Cannabinoid Scrn, Ur NOT DETECTED Negative < 50ng/mL    Cocaine Metabolite Screen, Urine NOT DETECTED Negative < 300 ng/mL    Opiate Scrn, Ur NOT DETECTED Negative < 300ng/mL    PCP Screen, Urine NOT DETECTED Negative < 25 ng/mL    Methadone Screen, Urine NOT DETECTED Negative <300 ng/mL    Oxycodone Urine NOT DETECTED Negative <100 ng/mL    FENTANYL SCREEN, URINE NOT DETECTED Negative <1 ng/mL    Drug Screen Comment: see below    Urinalysis   Result Value Ref Range    Color, UA Yellow Straw/Yellow    Clarity, UA Clear Clear    Glucose, Ur Negative Negative mg/dL    Bilirubin Urine Negative Negative    Ketones, Urine Negative Negative mg/dL    Specific Gravity, UA 1.020 1.005 - 1.030    Blood, Urine Negative Negative    pH, UA 5.0 5.0 - 9.0    Protein, UA Negative Negative mg/dL    Urobilinogen, Urine 0.2 <2.0 E.U./dL    Nitrite, Urine Negative Negative    Leukocyte Esterase, Urine SMALL (A) Negative   COVID-19   Result Value Ref Range    SARS-CoV-2, NAAT Not Detected Not Detected   Microscopic Urinalysis   Result Value Ref Range    Hyaline Casts, UA 0-2 0 - 2 /LPF    WBC, UA 2-5 0 - 5 /HPF    RBC, UA 0-1 0 - 2 /HPF

## 2020-06-16 PROBLEM — F31.2 SEVERE MANIC BIPOLAR 1 DISORDER WITH PSYCHOTIC BEHAVIOR (HCC): Status: ACTIVE | Noted: 2018-08-17

## 2020-06-16 LAB
EKG ATRIAL RATE: 105 BPM
EKG P AXIS: 60 DEGREES
EKG P-R INTERVAL: 162 MS
EKG Q-T INTERVAL: 320 MS
EKG QRS DURATION: 68 MS
EKG QTC CALCULATION (BAZETT): 422 MS
EKG R AXIS: 23 DEGREES
EKG T AXIS: 56 DEGREES
EKG VENTRICULAR RATE: 105 BPM
METER GLUCOSE: 148 MG/DL (ref 74–99)
METER GLUCOSE: 158 MG/DL (ref 74–99)

## 2020-06-16 PROCEDURE — 1240000000 HC EMOTIONAL WELLNESS R&B

## 2020-06-16 PROCEDURE — 6370000000 HC RX 637 (ALT 250 FOR IP): Performed by: PSYCHIATRY & NEUROLOGY

## 2020-06-16 PROCEDURE — 6370000000 HC RX 637 (ALT 250 FOR IP): Performed by: NURSE PRACTITIONER

## 2020-06-16 PROCEDURE — 82962 GLUCOSE BLOOD TEST: CPT

## 2020-06-16 PROCEDURE — 6370000000 HC RX 637 (ALT 250 FOR IP): Performed by: INTERNAL MEDICINE

## 2020-06-16 PROCEDURE — 93010 ELECTROCARDIOGRAM REPORT: CPT | Performed by: INTERNAL MEDICINE

## 2020-06-16 PROCEDURE — 99222 1ST HOSP IP/OBS MODERATE 55: CPT | Performed by: NURSE PRACTITIONER

## 2020-06-16 RX ORDER — CARBAMAZEPINE 200 MG/1
300 TABLET ORAL 3 TIMES DAILY
Status: DISCONTINUED | OUTPATIENT
Start: 2020-06-16 | End: 2020-06-23 | Stop reason: HOSPADM

## 2020-06-16 RX ORDER — BRIMONIDINE TARTRATE 2 MG/ML
1 SOLUTION/ DROPS OPHTHALMIC 2 TIMES DAILY
Status: DISCONTINUED | OUTPATIENT
Start: 2020-06-16 | End: 2020-06-23 | Stop reason: HOSPADM

## 2020-06-16 RX ORDER — CLONIDINE HYDROCHLORIDE 0.1 MG/1
0.1 TABLET ORAL 2 TIMES DAILY
Status: DISCONTINUED | OUTPATIENT
Start: 2020-06-16 | End: 2020-06-23 | Stop reason: HOSPADM

## 2020-06-16 RX ORDER — DIVALPROEX SODIUM 250 MG/1
750 TABLET, DELAYED RELEASE ORAL NIGHTLY
Status: DISCONTINUED | OUTPATIENT
Start: 2020-06-16 | End: 2020-06-21

## 2020-06-16 RX ORDER — MIRTAZAPINE 15 MG/1
15 TABLET, FILM COATED ORAL NIGHTLY
Status: DISCONTINUED | OUTPATIENT
Start: 2020-06-16 | End: 2020-06-16

## 2020-06-16 RX ORDER — RISPERIDONE 1 MG/1
1 TABLET, FILM COATED ORAL 2 TIMES DAILY
Status: DISCONTINUED | OUTPATIENT
Start: 2020-06-16 | End: 2020-06-23 | Stop reason: HOSPADM

## 2020-06-16 RX ORDER — OLANZAPINE 10 MG/1
10 TABLET ORAL NIGHTLY
Status: ON HOLD | COMMUNITY
End: 2020-06-22 | Stop reason: HOSPADM

## 2020-06-16 RX ORDER — CARBAMAZEPINE 200 MG/1
300 TABLET ORAL 3 TIMES DAILY
Status: ON HOLD | COMMUNITY
End: 2020-06-22 | Stop reason: HOSPADM

## 2020-06-16 RX ORDER — DIVALPROEX SODIUM 250 MG/1
750 TABLET, DELAYED RELEASE ORAL NIGHTLY
Status: DISCONTINUED | OUTPATIENT
Start: 2020-06-16 | End: 2020-06-16 | Stop reason: ALTCHOICE

## 2020-06-16 RX ORDER — SODIUM POLYSTYRENE SULFONATE 15 G/60ML
15 SUSPENSION ORAL; RECTAL
Status: DISCONTINUED | OUTPATIENT
Start: 2020-06-16 | End: 2020-06-23 | Stop reason: HOSPADM

## 2020-06-16 RX ORDER — ALOGLIPTIN 25 MG/1
25 TABLET, FILM COATED ORAL DAILY
Status: DISCONTINUED | OUTPATIENT
Start: 2020-06-16 | End: 2020-06-23 | Stop reason: HOSPADM

## 2020-06-16 RX ORDER — CARBAMAZEPINE 100 MG/1
300 TABLET, EXTENDED RELEASE ORAL 3 TIMES DAILY
Status: ON HOLD | COMMUNITY
End: 2020-06-16

## 2020-06-16 RX ORDER — DEXTROSE MONOHYDRATE 25 G/50ML
12.5 INJECTION, SOLUTION INTRAVENOUS PRN
Status: DISCONTINUED | OUTPATIENT
Start: 2020-06-16 | End: 2020-06-23 | Stop reason: HOSPADM

## 2020-06-16 RX ORDER — AMMONIUM LACTATE 12 G/100G
LOTION TOPICAL 2 TIMES DAILY
Status: DISCONTINUED | OUTPATIENT
Start: 2020-06-16 | End: 2020-06-23 | Stop reason: HOSPADM

## 2020-06-16 RX ORDER — DIVALPROEX SODIUM 250 MG/1
500 TABLET, DELAYED RELEASE ORAL 2 TIMES DAILY
Status: DISCONTINUED | OUTPATIENT
Start: 2020-06-16 | End: 2020-06-16

## 2020-06-16 RX ORDER — DIVALPROEX SODIUM 250 MG/1
500 TABLET, DELAYED RELEASE ORAL DAILY
Status: DISCONTINUED | OUTPATIENT
Start: 2020-06-17 | End: 2020-06-21

## 2020-06-16 RX ORDER — OLANZAPINE 5 MG/1
5 TABLET ORAL NIGHTLY
Status: DISCONTINUED | OUTPATIENT
Start: 2020-06-16 | End: 2020-06-23 | Stop reason: HOSPADM

## 2020-06-16 RX ORDER — ATORVASTATIN CALCIUM 40 MG/1
40 TABLET, FILM COATED ORAL DAILY
Status: DISCONTINUED | OUTPATIENT
Start: 2020-06-16 | End: 2020-06-23 | Stop reason: HOSPADM

## 2020-06-16 RX ORDER — ERGOCALCIFEROL 1.25 MG/1
50000 CAPSULE ORAL WEEKLY
Status: DISCONTINUED | OUTPATIENT
Start: 2020-06-21 | End: 2020-06-23 | Stop reason: HOSPADM

## 2020-06-16 RX ORDER — COLCHICINE 0.6 MG/1
0.6 TABLET ORAL 2 TIMES DAILY
Status: DISCONTINUED | OUTPATIENT
Start: 2020-06-16 | End: 2020-06-23 | Stop reason: HOSPADM

## 2020-06-16 RX ORDER — HALOPERIDOL 5 MG/ML
5 INJECTION INTRAMUSCULAR EVERY 6 HOURS PRN
Status: DISCONTINUED | OUTPATIENT
Start: 2020-06-16 | End: 2020-06-23 | Stop reason: HOSPADM

## 2020-06-16 RX ORDER — HYDROXYZINE PAMOATE 50 MG/1
50 CAPSULE ORAL 3 TIMES DAILY PRN
Status: DISCONTINUED | OUTPATIENT
Start: 2020-06-16 | End: 2020-06-23 | Stop reason: HOSPADM

## 2020-06-16 RX ORDER — TRAZODONE HYDROCHLORIDE 50 MG/1
50 TABLET ORAL NIGHTLY PRN
Status: DISCONTINUED | OUTPATIENT
Start: 2020-06-16 | End: 2020-06-23 | Stop reason: HOSPADM

## 2020-06-16 RX ORDER — MAGNESIUM HYDROXIDE/ALUMINUM HYDROXICE/SIMETHICONE 120; 1200; 1200 MG/30ML; MG/30ML; MG/30ML
30 SUSPENSION ORAL PRN
Status: DISCONTINUED | OUTPATIENT
Start: 2020-06-16 | End: 2020-06-23 | Stop reason: HOSPADM

## 2020-06-16 RX ORDER — NICOTINE POLACRILEX 4 MG
15 LOZENGE BUCCAL PRN
Status: DISCONTINUED | OUTPATIENT
Start: 2020-06-16 | End: 2020-06-23 | Stop reason: HOSPADM

## 2020-06-16 RX ORDER — DEXTROSE MONOHYDRATE 50 MG/ML
100 INJECTION, SOLUTION INTRAVENOUS PRN
Status: DISCONTINUED | OUTPATIENT
Start: 2020-06-16 | End: 2020-06-23 | Stop reason: HOSPADM

## 2020-06-16 RX ORDER — CHLORAL HYDRATE 500 MG
3000 CAPSULE ORAL DAILY
Status: DISCONTINUED | OUTPATIENT
Start: 2020-06-16 | End: 2020-06-16 | Stop reason: RX

## 2020-06-16 RX ORDER — TIMOLOL MALEATE 5 MG/ML
1 SOLUTION/ DROPS OPHTHALMIC 2 TIMES DAILY
Status: DISCONTINUED | OUTPATIENT
Start: 2020-06-16 | End: 2020-06-23 | Stop reason: HOSPADM

## 2020-06-16 RX ORDER — ACETAMINOPHEN 325 MG/1
650 TABLET ORAL EVERY 6 HOURS PRN
Status: DISCONTINUED | OUTPATIENT
Start: 2020-06-16 | End: 2020-06-23 | Stop reason: HOSPADM

## 2020-06-16 RX ORDER — TRIHEXYPHENIDYL HYDROCHLORIDE 2 MG/1
2 TABLET ORAL 3 TIMES DAILY
Status: DISCONTINUED | OUTPATIENT
Start: 2020-06-16 | End: 2020-06-23 | Stop reason: HOSPADM

## 2020-06-16 RX ORDER — FUROSEMIDE 20 MG/1
20 TABLET ORAL DAILY
Status: DISCONTINUED | OUTPATIENT
Start: 2020-06-16 | End: 2020-06-23 | Stop reason: HOSPADM

## 2020-06-16 RX ORDER — TRIHEXYPHENIDYL HYDROCHLORIDE 2 MG/1
2 TABLET ORAL 3 TIMES DAILY
Status: ON HOLD | COMMUNITY
End: 2020-06-23 | Stop reason: SDUPTHER

## 2020-06-16 RX ORDER — HALOPERIDOL 5 MG
5 TABLET ORAL EVERY 6 HOURS PRN
Status: DISCONTINUED | OUTPATIENT
Start: 2020-06-16 | End: 2020-06-23 | Stop reason: HOSPADM

## 2020-06-16 RX ORDER — MIRTAZAPINE 15 MG/1
15 TABLET, FILM COATED ORAL NIGHTLY
Status: ON HOLD | COMMUNITY
End: 2020-06-22 | Stop reason: HOSPADM

## 2020-06-16 RX ORDER — INSULIN GLARGINE 100 [IU]/ML
14 INJECTION, SOLUTION SUBCUTANEOUS DAILY
Status: DISCONTINUED | OUTPATIENT
Start: 2020-06-16 | End: 2020-06-23 | Stop reason: HOSPADM

## 2020-06-16 RX ORDER — LABETALOL 200 MG/1
200 TABLET, FILM COATED ORAL EVERY 8 HOURS SCHEDULED
Status: DISCONTINUED | OUTPATIENT
Start: 2020-06-16 | End: 2020-06-23 | Stop reason: HOSPADM

## 2020-06-16 RX ORDER — LATANOPROST 50 UG/ML
1 SOLUTION/ DROPS OPHTHALMIC NIGHTLY
Status: DISCONTINUED | OUTPATIENT
Start: 2020-06-16 | End: 2020-06-23 | Stop reason: HOSPADM

## 2020-06-16 RX ORDER — OLANZAPINE 10 MG/1
10 TABLET ORAL NIGHTLY
Status: DISCONTINUED | OUTPATIENT
Start: 2020-06-16 | End: 2020-06-16

## 2020-06-16 RX ADMIN — Medication: at 14:10

## 2020-06-16 RX ADMIN — TIMOLOL MALEATE 1 DROP: 5 SOLUTION OPHTHALMIC at 21:41

## 2020-06-16 RX ADMIN — CARBAMAZEPINE 300 MG: 200 TABLET ORAL at 21:39

## 2020-06-16 RX ADMIN — METFORMIN HYDROCHLORIDE 1000 MG: 1000 TABLET, FILM COATED ORAL at 09:32

## 2020-06-16 RX ADMIN — LABETALOL HYDROCHLORIDE 200 MG: 200 TABLET, FILM COATED ORAL at 14:08

## 2020-06-16 RX ADMIN — TRIHEXYPHENIDYL HYDROCHLORIDE 2 MG: 2 TABLET ORAL at 08:29

## 2020-06-16 RX ADMIN — ATORVASTATIN CALCIUM 40 MG: 40 TABLET, FILM COATED ORAL at 08:28

## 2020-06-16 RX ADMIN — BRIMONIDINE TARTRATE 1 DROP: 2 SOLUTION/ DROPS OPHTHALMIC at 21:41

## 2020-06-16 RX ADMIN — METFORMIN HYDROCHLORIDE 1000 MG: 1000 TABLET, FILM COATED ORAL at 16:31

## 2020-06-16 RX ADMIN — INSULIN GLARGINE 14 UNITS: 100 INJECTION, SOLUTION SUBCUTANEOUS at 08:19

## 2020-06-16 RX ADMIN — BRIMONIDINE TARTRATE 1 DROP: 2 SOLUTION/ DROPS OPHTHALMIC at 08:27

## 2020-06-16 RX ADMIN — RISPERIDONE 1 MG: 1 TABLET, FILM COATED ORAL at 21:39

## 2020-06-16 RX ADMIN — ALOGLIPTIN 25 MG: 25 TABLET, FILM COATED ORAL at 08:26

## 2020-06-16 RX ADMIN — FUROSEMIDE 20 MG: 20 TABLET ORAL at 08:16

## 2020-06-16 RX ADMIN — LABETALOL HYDROCHLORIDE 200 MG: 200 TABLET, FILM COATED ORAL at 21:37

## 2020-06-16 RX ADMIN — CARBAMAZEPINE 300 MG: 200 TABLET ORAL at 09:51

## 2020-06-16 RX ADMIN — ACETAMINOPHEN 650 MG: 325 TABLET ORAL at 02:39

## 2020-06-16 RX ADMIN — TIMOLOL MALEATE 1 DROP: 5 SOLUTION OPHTHALMIC at 10:28

## 2020-06-16 RX ADMIN — LABETALOL HYDROCHLORIDE 200 MG: 200 TABLET, FILM COATED ORAL at 08:27

## 2020-06-16 RX ADMIN — CLONIDINE HYDROCHLORIDE 0.1 MG: 0.1 TABLET ORAL at 08:15

## 2020-06-16 RX ADMIN — Medication: at 21:41

## 2020-06-16 RX ADMIN — LATANOPROST 1 DROP: 50 SOLUTION/ DROPS OPHTHALMIC at 21:42

## 2020-06-16 RX ADMIN — SODIUM POLYSTYRENE SULFONATE 15 G: 15 SUSPENSION ORAL; RECTAL at 09:33

## 2020-06-16 RX ADMIN — TRIHEXYPHENIDYL HYDROCHLORIDE 2 MG: 2 TABLET ORAL at 21:39

## 2020-06-16 RX ADMIN — CARBAMAZEPINE 300 MG: 200 TABLET ORAL at 14:08

## 2020-06-16 RX ADMIN — RISPERIDONE 1 MG: 1 TABLET, FILM COATED ORAL at 08:16

## 2020-06-16 RX ADMIN — COLCHICINE 0.6 MG: 0.6 TABLET ORAL at 21:39

## 2020-06-16 RX ADMIN — CLONIDINE HYDROCHLORIDE 0.1 MG: 0.1 TABLET ORAL at 21:39

## 2020-06-16 RX ADMIN — COLCHICINE 0.6 MG: 0.6 TABLET ORAL at 08:28

## 2020-06-16 RX ADMIN — OLANZAPINE 5 MG: 5 TABLET, FILM COATED ORAL at 21:39

## 2020-06-16 ASSESSMENT — SLEEP AND FATIGUE QUESTIONNAIRES
DO YOU USE A SLEEP AID: NO
DO YOU HAVE DIFFICULTY SLEEPING: NO
AVERAGE NUMBER OF SLEEP HOURS: 4
DO YOU USE A SLEEP AID: NO
AVERAGE NUMBER OF SLEEP HOURS: 4
DO YOU HAVE DIFFICULTY SLEEPING: NO

## 2020-06-16 ASSESSMENT — PAIN SCALES - GENERAL
PAINLEVEL_OUTOF10: 0
PAINLEVEL_OUTOF10: 2
PAINLEVEL_OUTOF10: 0
PAINLEVEL_OUTOF10: 8

## 2020-06-16 ASSESSMENT — LIFESTYLE VARIABLES
HISTORY_ALCOHOL_USE: NO
HISTORY_ALCOHOL_USE: NO

## 2020-06-16 NOTE — PLAN OF CARE
Problem: Anger Management/Homicidal Ideation:  Goal: Ability to verbalize frustrations and anger appropriately will improve  Description: Ability to verbalize frustrations and anger appropriately will improve  Outcome: Ongoing     Problem: Anger Management/Homicidal Ideation:  Goal: Absence of angry outbursts  Description: Absence of angry outbursts  Outcome: Ongoing     Problem: Altered Mood, Manic Behavior:  Goal: Able to sleep  Description: Able to sleep  Outcome: Ongoing     Problem: Altered Mood, Manic Behavior:  Goal: Mood stable  Description: Mood stable  Outcome: Ongoing

## 2020-06-16 NOTE — H&P
(SPS) 15 GM/60ML suspension, Take 60 mLs by mouth three times a week MON WED FRI  divalproex (DEPAKOTE) 500 MG DR tablet, Take 1 tablet by mouth daily  divalproex (DEPAKOTE) 250 MG DR tablet, Take 3 tablets by mouth nightly  risperiDONE (RISPERDAL) 1 MG tablet, Take 1 tablet by mouth 2 times daily  risperiDONE microspheres ER (RISPERDAL CONSTA) 12.5 MG injection, Inject 2 mLs into the muscle every 14 days for 1 dose  latanoprost (XALATAN) 0.005 % ophthalmic solution, Place 1 drop into both eyes nightly  ammonium lactate (AMLACTIN) 12 % cream, APPLY TO AFFECTED AREA EVERY DAY  Omega-3 Fatty Acids (FISH OIL) 1000 MG CAPS, Take 3 capsules by mouth daily  insulin glargine (LANTUS SOLOSTAR) 100 UNIT/ML injection pen, Inject 14 Units into the skin daily  brimonidine (ALPHAGAN) 0.2 % ophthalmic solution,   blood glucose monitor kit and supplies, CHECK BLOOD GLUCOSE DAILY DX: E11.9 ONE TOUCH GLUCOMETER REQUESTED IF OK WITH [facility]SR[facility]  FREESTYLE LANCETS MISC, Inject 1 each into the skin daily  Timolol (TIMOPTIC) 0.5 % (DAILY) SOLN ophthalmic solution, Place 1 drop into both eyes 2 times daily    Past Surgical History:        Procedure Laterality Date    ECHO COMPLETE  6/22/2013            Allergies:   Ace inhibitors; Ibuprofen; Latuda [lurasidone hcl]; and Haldol [haloperidol]    Family History  Family History   Problem Relation Age of Onset    Heart Disease Father 76    High Blood Pressure Father              EXAMINATION:    REVIEW OF SYSTEMS:    ROS:  [x] All negative/unchanged except if checked.  Explain positive(checked items) below:  [] Constitutional  [] Eyes  [] Ear/Nose/Mouth/Throat  [] Respiratory  [] CV  [] GI  []   [] Musculoskeletal  [] Skin/Breast  [] Neurological  [] Endocrine  [] Heme/Lymph  [] Allergic/Immunologic    Explanation:     Vitals:  /66   Pulse 99   Temp 96.8 °F (36 °C) (Temporal)   Resp 19   Ht 5' 3\" (1.6 m)   Wt 170 lb (77.1 kg)   SpO2 100%   BMI 30.11 kg/m²      Physical Examination:   Head: x  Atraumatic: x normocephalic  Skin and Mucosa        Moist x  Dry   Pale  x Normal   Neck:  Thyroid  Palpable   x  Not palpable   venus distention   adenopathy   Chest: x Clear   Rhonchi     Wheezing   CV:  xS1   xS2    xNo murmer   Abdomen:  x  Soft    Tender    Viceromegaly   Extremities:  x No Edema     Edema     Cranial Nerves Examination:   CN II:   xPupils are reactive to light  Pupils are non reactive to light  CN III, IV, VI:  xNo eye deviation    No diplopia or ptosis   CN V:    xFacial Sensation is intact     Facial Sensation is not intact   CN IIIV:   x Hearing is normal to rubbing fingers   CN IX, X:     xNormal gag reflex and phonation   CN XI:   xShoulder shrug and neck rotation is normal  CNXII:    xTongue is midline no deviation or atrophy    Mental Status Examination:    Level of consciousness:  within normal limits   Appearance:  seated in bed  Behavior/Motor:  hyperactive  Attitude toward examiner:  cooperative  Speech:  rapid and pressured   Mood: labile  Affect:  mood congruent  Thought processes: Rapid, disorganized with flights of ideas  Thought content: Misperceiving paranoid about her mom  Cognition:  oriented to person, place, and time   Concentration poor  Memory intact  Insight poor   Judgement poor   Fund of Knowledge limited      DIAGNOSIS:  Bipolar 1 manic severe with psychotic features      LABS: REVIEWED TODAY:  Recent Labs     06/15/20  1640   WBC 6.0   HGB 11.0*        Recent Labs     06/15/20  1640      K 5.0      CO2 28   BUN 18   CREATININE 0.9   GLUCOSE 164*     Recent Labs     06/15/20  1640   BILITOT <0.2   ALKPHOS 96   AST 10   ALT 11     Lab Results   Component Value Date    LABAMPH NOT DETECTED 06/15/2020    LABAMPH NOT DETECTED 03/15/2011    BARBSCNU NOT DETECTED 06/15/2020    LABBENZ NOT DETECTED 06/15/2020    LABBENZ SEE BELOW 05/06/2014    CANNAB NOT DETECTED  03/15/2011    LABMETH NOT DETECTED 06/15/2020    OPIATESCREENURINE NOT DETECTED 06/15/2020    PHENCYCLIDINESCREENURINE NOT DETECTED 06/15/2020    ETOH <10 06/15/2020     Lab Results   Component Value Date    TSH 5.130 03/22/2019     Lab Results   Component Value Date    LITHIUM 0.23 (L) 05/18/2020     Lab Results   Component Value Date    VALPROATE 3 (L) 05/18/2020     Lab Results   Component Value Date    LITHIUM 0.23 05/18/2020    VALPROATE 3 05/18/2020         Radiology No results found. TREATMENT PLAN:    Risk Management: Based on the diagnosis and assessment biopsychosocial treatment model was presented to the patient and was given the opportunity to ask any question. The patient was agreeable to the plan and all the patient's questions were answered to the patient's satisfaction. I discussed with the patient the risk, benefit, alternative and common side effects for the proposed medication treatment. The patient is consenting to this treatment. Collateral Information:  Will obtain collateral information from the family or friends. Will obtain medical records as appropriate from out patient providers  Will consult the hospitalist for a physical exam to rule out any co-morbid physical condition. Home medication Reconciled   Patient seen and plan discussed with Dr. Phoenix Donaldson  Continue Depakote 500 mg daily and 750 mg at bedtime for mood stabilization  Continue Tegretol 300 mg 3 times daily for mood stabilization  Continue Resporal 1 mg twice daily for psychosis and mood  We will lower the Zyprexa to 5 mg at bedtime for mood and psychosis  Patient is also on the Invega sustaina injection 156 mg IM every 30 days next injection due 7/10/2020    New Medications started during this admission :        Prn Haldol 5mg and Vistaril 50mg q6hr for extreme agitation.   Trazodone as ordered for insomnia  Vistaril as ordered for anxiety      Psychotherapy:   Encourage participation in milieu and group therapy  Individual therapy as needed        [employer]

## 2020-06-16 NOTE — PROGRESS NOTES
routine, distraction)                                                           ( )  Basic information about quitting (benefits of quitting, techniques in how to quit, available resources  ( ) Referral for counseling faxed to [facility]                                           ( ) Patient refused counseling  ( x) Patient has not smoked in the last 30 days    Metabolic Screening:    Lab Results   Component Value Date    LABA1C 7.5 (H) 03/10/2020       Lab Results   Component Value Date    CHOL 115 03/10/2020    CHOL 163 03/22/2019    CHOL 153 10/04/2018    CHOL 124 08/09/2018    CHOL 125 08/06/2018    CHOL 164 01/26/2018    CHOL 267 (H) 02/21/2017     Lab Results   Component Value Date    TRIG 60 03/10/2020    TRIG 106 03/22/2019    TRIG 92 10/04/2018    TRIG 79 08/09/2018    TRIG 76 08/06/2018    TRIG 90 01/26/2018    TRIG 276 (H) 02/21/2017     Lab Results   Component Value Date    HDL 50 03/10/2020    HDL 58 09/13/2019    HDL 45 03/22/2019    HDL 53 10/04/2018    HDL 45 08/09/2018    HDL 46 08/06/2018    HDL 54 01/26/2018    HDL 53 02/21/2017     No components found for: LDLCAL  Lab Results   Component Value Date    LABVLDL 21 03/22/2019    LABVLDL 18 10/04/2018    LABVLDL 15 08/06/2018    LABVLDL 18 01/26/2018    LABVLDL 55 02/21/2017         Body mass index is 30.11 kg/m². BP Readings from Last 2 Encounters:   06/16/20 (!) 168/77   05/18/20 (!) 174/87           Pt admitted with followings belongings:  Dentures: None  Vision - Corrective Lenses: None  Hearing Aid: None  Jewelry: Ring, Necklace, Bracelet  Body Piercings Removed: No  Clothing: Footwear, Pants, Shirt, Sweater  Were All Patient Medications Collected?: Not Applicable  Other Valuables: Money (Comment), Wallet       Patient oriented to surroundings and program expectations and copy of patient rights given. Received admission packet:  yes. Consents reviewed, signed yes. Refused no. Patient verbalize understanding:  yes.     Patient

## 2020-06-16 NOTE — GROUP NOTE
Group Therapy Note    Date: 6/16/2020    Group Start Time: 1115  Group End Time: 3270  Group Topic: Recovery    [facility] [address], MSW, LSW        Group Therapy Note    Attendees: 14         Patient's Goal:  Pt will gain understanding and insight into communication style; including passive, aggressive, and assertive communication. Notes:  Pt engaged in group discussion. Status After Intervention:  Unchanged    Participation Level:  Active Listener and Interactive    Participation Quality: Intrusive      Speech:  normal      Thought Process/Content: Logical      Affective Functioning: Congruent      Mood: anxious      Level of consciousness:  Alert, Oriented x4 and Attentive      Response to Learning: Able to verbalize current knowledge/experience      Endings: None Reported    Modes of Intervention: Education, Support, Socialization and Exploration      Discipline Responsible: /Counselor      Signature:  Sandra Dewey MSW, LSW

## 2020-06-16 NOTE — ED NOTES
Notified Dr. Alondra Wang of need for admission. pt accepted to [facility].       Joe Jennings RN  06/15/20 8316

## 2020-06-17 LAB — METER GLUCOSE: 244 MG/DL (ref 74–99)

## 2020-06-17 PROCEDURE — 1240000000 HC EMOTIONAL WELLNESS R&B

## 2020-06-17 PROCEDURE — 99232 SBSQ HOSP IP/OBS MODERATE 35: CPT | Performed by: NURSE PRACTITIONER

## 2020-06-17 PROCEDURE — 82962 GLUCOSE BLOOD TEST: CPT

## 2020-06-17 PROCEDURE — 6370000000 HC RX 637 (ALT 250 FOR IP): Performed by: INTERNAL MEDICINE

## 2020-06-17 PROCEDURE — 6370000000 HC RX 637 (ALT 250 FOR IP): Performed by: PSYCHIATRY & NEUROLOGY

## 2020-06-17 PROCEDURE — 6370000000 HC RX 637 (ALT 250 FOR IP): Performed by: NURSE PRACTITIONER

## 2020-06-17 RX ADMIN — COLCHICINE 0.6 MG: 0.6 TABLET ORAL at 08:54

## 2020-06-17 RX ADMIN — LABETALOL HYDROCHLORIDE 200 MG: 200 TABLET, FILM COATED ORAL at 06:43

## 2020-06-17 RX ADMIN — CARBAMAZEPINE 300 MG: 200 TABLET ORAL at 13:57

## 2020-06-17 RX ADMIN — METFORMIN HYDROCHLORIDE 1000 MG: 1000 TABLET, FILM COATED ORAL at 16:29

## 2020-06-17 RX ADMIN — Medication: at 10:39

## 2020-06-17 RX ADMIN — ATORVASTATIN CALCIUM 40 MG: 40 TABLET, FILM COATED ORAL at 08:34

## 2020-06-17 RX ADMIN — LABETALOL HYDROCHLORIDE 200 MG: 200 TABLET, FILM COATED ORAL at 20:53

## 2020-06-17 RX ADMIN — FUROSEMIDE 20 MG: 20 TABLET ORAL at 08:36

## 2020-06-17 RX ADMIN — COLCHICINE 0.6 MG: 0.6 TABLET ORAL at 20:52

## 2020-06-17 RX ADMIN — BRIMONIDINE TARTRATE 1 DROP: 2 SOLUTION/ DROPS OPHTHALMIC at 08:33

## 2020-06-17 RX ADMIN — LABETALOL HYDROCHLORIDE 200 MG: 200 TABLET, FILM COATED ORAL at 13:56

## 2020-06-17 RX ADMIN — Medication: at 20:54

## 2020-06-17 RX ADMIN — ALOGLIPTIN 25 MG: 25 TABLET, FILM COATED ORAL at 08:33

## 2020-06-17 RX ADMIN — OLANZAPINE 5 MG: 5 TABLET, FILM COATED ORAL at 20:53

## 2020-06-17 RX ADMIN — BRIMONIDINE TARTRATE 1 DROP: 2 SOLUTION/ DROPS OPHTHALMIC at 20:54

## 2020-06-17 RX ADMIN — TRIHEXYPHENIDYL HYDROCHLORIDE 2 MG: 2 TABLET ORAL at 13:57

## 2020-06-17 RX ADMIN — TRIHEXYPHENIDYL HYDROCHLORIDE 2 MG: 2 TABLET ORAL at 08:54

## 2020-06-17 RX ADMIN — CARBAMAZEPINE 300 MG: 200 TABLET ORAL at 08:53

## 2020-06-17 RX ADMIN — RISPERIDONE 1 MG: 1 TABLET, FILM COATED ORAL at 08:38

## 2020-06-17 RX ADMIN — INSULIN GLARGINE 14 UNITS: 100 INJECTION, SOLUTION SUBCUTANEOUS at 08:40

## 2020-06-17 RX ADMIN — LATANOPROST 1 DROP: 50 SOLUTION/ DROPS OPHTHALMIC at 20:54

## 2020-06-17 RX ADMIN — TIMOLOL MALEATE 1 DROP: 5 SOLUTION OPHTHALMIC at 20:54

## 2020-06-17 RX ADMIN — TIMOLOL MALEATE 1 DROP: 5 SOLUTION OPHTHALMIC at 08:38

## 2020-06-17 RX ADMIN — METFORMIN HYDROCHLORIDE 1000 MG: 1000 TABLET, FILM COATED ORAL at 08:36

## 2020-06-17 RX ADMIN — CARBAMAZEPINE 300 MG: 200 TABLET ORAL at 20:52

## 2020-06-17 RX ADMIN — RISPERIDONE 1 MG: 1 TABLET, FILM COATED ORAL at 20:53

## 2020-06-17 RX ADMIN — CLONIDINE HYDROCHLORIDE 0.1 MG: 0.1 TABLET ORAL at 20:52

## 2020-06-17 RX ADMIN — CLONIDINE HYDROCHLORIDE 0.1 MG: 0.1 TABLET ORAL at 08:35

## 2020-06-17 RX ADMIN — TRIHEXYPHENIDYL HYDROCHLORIDE 2 MG: 2 TABLET ORAL at 20:53

## 2020-06-17 ASSESSMENT — PAIN SCALES - GENERAL
PAINLEVEL_OUTOF10: 0
PAINLEVEL_OUTOF10: 0

## 2020-06-17 NOTE — GROUP NOTE
Group Therapy Note    Date: 6/17/2020    Group Start Time: 1120  Group End Time: 4164  Group Topic: Cognitive Skills    [facility]    BRENDA Yeh        Group Therapy Note    Attendees:   16         Patient's Goal: Pt will be able to discuss importance of forgiving self and letting go of past and identify ways to do that. Notes:  PT active in class discussion. She was pleasant but would interrupt frequently with singing and other comments. She was judgemental with another peer calling her a crackhead which led to argument with the other peer. Both were resistive to sw attempts to get them to stop argument during class time. Status After Intervention:  Improved    Participation Level:  Active Listener and Interactive    Participation Quality: Appropriate, Attentive, Sharing and Supportive      Speech:  normal      Thought Process/Content: Logical      Affective Functioning: Blunted      Mood: depressed      Level of consciousness:  Alert, Oriented x4 and Attentive      Response to Learning: Able to verbalize current knowledge/experience, Able to verbalize/acknowledge new learning and Progressing to goal      Endings: None Reported    Modes of Intervention: Education, Support, Socialization and Exploration      Discipline Responsible: /Counselor      Signature:  BRENDA Paul

## 2020-06-17 NOTE — PLAN OF CARE
Pt is stable and cooperative, pleasant. Pt denies suicidal or homicidal ideations. Pt denies hallucinations. Remains with loose, flight of ideas. Pt reports a goal,\" to get a new place to stay\" pr pt. Will follow and monitor.

## 2020-06-17 NOTE — PROGRESS NOTES
groomed. No apparent distress. HEENT:  Normocephalic, atraumatic. Pupils equal, round, reactive to light. No scleral icterus. No conjunctival injection. No nasal discharge. Neck:  Supple  Heart:  RRR, no murmurs, gallops, rubs  Lungs:  CTA bilaterally, bilat symmetrical expansion, no wheeze, rales, or rhonchi  Abdomen:   Bowel sounds present, soft, nontender, no masses, no organomegaly, no peritoneal signs  Extremities:  No clubbing, cyanosis,   Bilateral chronic lower extremity edema unchanged  Skin:  Warm and dry,  Superficial sores noted over the lower extremities that are chronic  Neuro:  Cranial nerves 2-12 intact, no focal deficits  Breast: deferred  Rectal: deferred  Genitalia:  deferred    LABS:  Data reviewed in detail      ASSESSMENT:      Active Problems:    Bipolar I disorder, current or most recent episode manic, severe (HCC)  Patient Active Problem List   Diagnosis    Severe bipolar affective disorder with psychosis ([address]    Carpal tunnel syndrome    Hyperlipidemia    Depression    Vitamin D deficiency    Systemic primary arterial hypertension    Severe manic bipolar 1 disorder with psychotic behavior ([address]    Severe manic bipolar I disorder with psychotic features ([address]    Non-insulin dependent type 2 diabetes mellitus ([address]    Sepsis ([address]    Bipolar disorder ([address]    Acute gouty arthritis           PLAN:  Overall medically stable  Glucose monitoring  Data reviewed in detail with the patient  Resumed home medications  Psychiatric management  We will follow peripherally  Questions answered to her satisfaction    Cori Crenshaw  1:30 PM  6/17/2020

## 2020-06-18 LAB
CARBAMAZEPINE DOSE: ABNORMAL
CARBAMAZEPINE LEVEL: 10.3 MCG/ML (ref 4–10)
METER GLUCOSE: 237 MG/DL (ref 74–99)

## 2020-06-18 PROCEDURE — 6370000000 HC RX 637 (ALT 250 FOR IP): Performed by: PSYCHIATRY & NEUROLOGY

## 2020-06-18 PROCEDURE — 82962 GLUCOSE BLOOD TEST: CPT

## 2020-06-18 PROCEDURE — 99232 SBSQ HOSP IP/OBS MODERATE 35: CPT | Performed by: NURSE PRACTITIONER

## 2020-06-18 PROCEDURE — 80156 ASSAY CARBAMAZEPINE TOTAL: CPT

## 2020-06-18 PROCEDURE — 6370000000 HC RX 637 (ALT 250 FOR IP): Performed by: NURSE PRACTITIONER

## 2020-06-18 PROCEDURE — 36415 COLL VENOUS BLD VENIPUNCTURE: CPT

## 2020-06-18 PROCEDURE — 6370000000 HC RX 637 (ALT 250 FOR IP): Performed by: INTERNAL MEDICINE

## 2020-06-18 PROCEDURE — 1240000000 HC EMOTIONAL WELLNESS R&B

## 2020-06-18 RX ADMIN — TRIHEXYPHENIDYL HYDROCHLORIDE 2 MG: 2 TABLET ORAL at 10:38

## 2020-06-18 RX ADMIN — RISPERIDONE 1 MG: 1 TABLET, FILM COATED ORAL at 21:03

## 2020-06-18 RX ADMIN — COLCHICINE 0.6 MG: 0.6 TABLET ORAL at 21:06

## 2020-06-18 RX ADMIN — LABETALOL HYDROCHLORIDE 200 MG: 200 TABLET, FILM COATED ORAL at 21:04

## 2020-06-18 RX ADMIN — CLONIDINE HYDROCHLORIDE 0.1 MG: 0.1 TABLET ORAL at 21:04

## 2020-06-18 RX ADMIN — INSULIN GLARGINE 14 UNITS: 100 INJECTION, SOLUTION SUBCUTANEOUS at 10:37

## 2020-06-18 RX ADMIN — TIMOLOL MALEATE 1 DROP: 5 SOLUTION OPHTHALMIC at 10:50

## 2020-06-18 RX ADMIN — TIMOLOL MALEATE 1 DROP: 5 SOLUTION OPHTHALMIC at 21:10

## 2020-06-18 RX ADMIN — CLONIDINE HYDROCHLORIDE 0.1 MG: 0.1 TABLET ORAL at 10:40

## 2020-06-18 RX ADMIN — ALOGLIPTIN 25 MG: 25 TABLET, FILM COATED ORAL at 10:38

## 2020-06-18 RX ADMIN — CARBAMAZEPINE 300 MG: 200 TABLET ORAL at 10:38

## 2020-06-18 RX ADMIN — FUROSEMIDE 20 MG: 20 TABLET ORAL at 10:38

## 2020-06-18 RX ADMIN — ATORVASTATIN CALCIUM 40 MG: 40 TABLET, FILM COATED ORAL at 10:50

## 2020-06-18 RX ADMIN — TRIHEXYPHENIDYL HYDROCHLORIDE 2 MG: 2 TABLET ORAL at 21:04

## 2020-06-18 RX ADMIN — RISPERIDONE 1 MG: 1 TABLET, FILM COATED ORAL at 10:38

## 2020-06-18 RX ADMIN — CARBAMAZEPINE 300 MG: 200 TABLET ORAL at 21:03

## 2020-06-18 RX ADMIN — TRAZODONE HYDROCHLORIDE 50 MG: 50 TABLET ORAL at 21:02

## 2020-06-18 RX ADMIN — TRIHEXYPHENIDYL HYDROCHLORIDE 2 MG: 2 TABLET ORAL at 14:42

## 2020-06-18 RX ADMIN — OLANZAPINE 5 MG: 5 TABLET, FILM COATED ORAL at 21:06

## 2020-06-18 RX ADMIN — LABETALOL HYDROCHLORIDE 200 MG: 200 TABLET, FILM COATED ORAL at 06:33

## 2020-06-18 RX ADMIN — COLCHICINE 0.6 MG: 0.6 TABLET ORAL at 10:39

## 2020-06-18 RX ADMIN — BRIMONIDINE TARTRATE 1 DROP: 2 SOLUTION/ DROPS OPHTHALMIC at 10:50

## 2020-06-18 RX ADMIN — METFORMIN HYDROCHLORIDE 1000 MG: 1000 TABLET, FILM COATED ORAL at 17:00

## 2020-06-18 RX ADMIN — CARBAMAZEPINE 300 MG: 200 TABLET ORAL at 14:42

## 2020-06-18 RX ADMIN — BRIMONIDINE TARTRATE 1 DROP: 2 SOLUTION/ DROPS OPHTHALMIC at 21:10

## 2020-06-18 RX ADMIN — LABETALOL HYDROCHLORIDE 200 MG: 200 TABLET, FILM COATED ORAL at 14:42

## 2020-06-18 RX ADMIN — Medication: at 10:40

## 2020-06-18 RX ADMIN — LATANOPROST 1 DROP: 50 SOLUTION/ DROPS OPHTHALMIC at 21:09

## 2020-06-18 RX ADMIN — METFORMIN HYDROCHLORIDE 1000 MG: 1000 TABLET, FILM COATED ORAL at 10:39

## 2020-06-18 ASSESSMENT — PAIN SCALES - GENERAL
PAINLEVEL_OUTOF10: 0
PAINLEVEL_OUTOF10: 9
PAINLEVEL_OUTOF10: 0

## 2020-06-18 NOTE — PROGRESS NOTES
Group Therapy Note    Date: 6/18/2020  Start Time: 2:00  End Time:  2:45  Number of Participants: 7    Type of Group: Psychotherapy    Wellness Binder Information  Module Name:    Session Number:      Patient's Goal: Gain insight into interpersonal relationships  By interacting with others    Notes: Pt will be able to explore strong feelings regarding embracing their support sytem for assistance during a crisis. Pt was able to give feedback and support from the group.     Status After Intervention:  Improved    Participation Level: Interactive    Participation Quality: Attentive and Sharing      Speech: Normal      Thought Process/Content: Logical      Affective Functioning: Congruent      Mood: anxious and depressed      Level of consciousness:  Oriented x4 and Attentive      Response to Learning: Able to verbalize/acknowledge new learning      Endings: None Reported    Modes of Intervention: Support and Socialization      Discipline Responsible: /Counselor      Signature:  GLORY Reeves

## 2020-06-18 NOTE — PROGRESS NOTES
Tobacco Use    Smoking status: Never Smoker    Smokeless tobacco: Never Used   Substance and Sexual Activity    Alcohol use: No    Drug use: No    Sexual activity: Not Currently   Lifestyle    Physical activity     Days per week: Not on file     Minutes per session: Not on file    Stress: Not on file   Relationships    Social connections     Talks on phone: Not on file     Gets together: Not on file     Attends Nondenominational service: Not on file     Active member of club or organization: Not on file     Attends meetings of clubs or organizations: Not on file     Relationship status: Not on file    Intimate partner violence     Fear of current or ex partner: Not on file     Emotionally abused: Not on file     Physically abused: Not on file     Forced sexual activity: Not on file   Other Topics Concern    Not on file   Social History Narrative    Not on file           ROS:  [x] All negative/unchanged except if checked.  Explain positive(checked items) below:  [] Constitutional  [] Eyes  [] Ear/Nose/Mouth/Throat  [] Respiratory  [] CV  [] GI  []   [] Musculoskeletal  [] Skin/Breast  [] Neurological  [] Endocrine  [] Heme/Lymph  [] Allergic/Immunologic    Explanation:     MEDICATIONS:    Current Facility-Administered Medications:     acetaminophen (TYLENOL) tablet 650 mg, 650 mg, Oral, Q6H PRN, Leandro Mckeon MD, 650 mg at 06/16/20 0239    hydrOXYzine (VISTARIL) capsule 50 mg, 50 mg, Oral, TID PRN, Leandro Mckeon MD    haloperidol lactate (HALDOL) injection 5 mg, 5 mg, Intramuscular, Q6H PRN **OR** haloperidol (HALDOL) tablet 5 mg, 5 mg, Oral, Q6H PRN, Leandro Mckeon MD    traZODone (DESYREL) tablet 50 mg, 50 mg, Oral, Nightly PRN, Leandro Mckeon MD    magnesium hydroxide (MILK OF MAGNESIA) 400 MG/5ML suspension 30 mL, 30 mL, Oral, Daily PRN, Leandro Mckeon MD    aluminum & magnesium hydroxide-simethicone (MAALOX) 705-295-00 MG/5ML suspension 30 mL, 30 mL, Oral, PRN, Leandro Mckeon MD    risperiDONE (RISPERDAL) tablet 1 mg, 1 mg, Oral, BID, Tyler Jj MD, 1 mg at 06/18/20 1038    atorvastatin (LIPITOR) tablet 40 mg, 40 mg, Oral, Daily, Rodrigo Oh MD, 40 mg at 06/18/20 1050    brimonidine (ALPHAGAN) 0.2 % ophthalmic solution 1 drop, 1 drop, Both Eyes, BID, Rodrigo Oh MD, 1 drop at 06/18/20 1050    carBAMazepine (TEGRETOL) tablet 300 mg, 300 mg, Oral, TID, Rodrigo Oh MD, 300 mg at 06/18/20 1442    cloNIDine (CATAPRES) tablet 0.1 mg, 0.1 mg, Oral, BID, Rodrigo Oh MD, 0.1 mg at 06/18/20 1040    colchicine (COLCRYS) tablet 0.6 mg, 0.6 mg, Oral, BID, Rodrigo Oh MD, 0.6 mg at 06/18/20 1039    furosemide (LASIX) tablet 20 mg, 20 mg, Oral, Daily, Rodrigo Oh MD, 20 mg at 06/18/20 1038    insulin glargine (LANTUS) injection vial 14 Units, 14 Units, Subcutaneous, Daily, Rodrigo Oh MD, 14 Units at 06/18/20 1037    alogliptin (NESINA) tablet 25 mg, 25 mg, Oral, Daily, Rodrigo Oh MD, 25 mg at 06/18/20 1038    labetalol (NORMODYNE) tablet 200 mg, 200 mg, Oral, 3 times per day, Rodrigo Oh MD, 200 mg at 06/18/20 1442    latanoprost (XALATAN) 0.005 % ophthalmic solution 1 drop, 1 drop, Both Eyes, Nightly, Rodrigo Oh MD, 1 drop at 06/17/20 2054    metFORMIN (GLUCOPHAGE) tablet 1,000 mg, 1,000 mg, Oral, BID WC, Rodrigo Oh MD, 1,000 mg at 06/18/20 1039    [START ON 7/10/2020] paliperidone palmitate ER (INVEGA SUSTENNA) IM injection 156 mg, 156 mg, Intramuscular, Q30 Days, Rodrigo Oh MD    sodium polystyrene (KAYEXALATE) 15 GM/60ML suspension 15 g, 15 g, Oral, Once per day on Mon Wed Fri, Rodrigo Oh MD, 15 g at 06/16/20 0933    timolol (TIMOPTIC) 0.5 % ophthalmic solution 1 drop, 1 drop, Both Eyes, BID, Rodrigo Oh MD, 1 drop at 06/18/20 1050    trihexyphenidyl (ARTANE) tablet 2 mg, 2 mg, Oral, TID, Rodrigo Oh MD, 2 mg at 06/18/20 1442    [START ON 6/21/2020] vitamin D (ERGOCALCIFEROL) capsule 50,000 Units, 50,000 Units, Oral, Weekly, Christina Polanco MD    glucose (GLUTOSE) 40 % oral gel 15 g, 15 g, Oral, PRN, Christina Polanco MD    dextrose 50 % IV solution, 12.5 g, Intravenous, PRN, Christina Polanco MD    glucagon (rDNA) injection 1 mg, 1 mg, Intramuscular, PRN, Christina Polanco MD    dextrose 5 % solution, 100 mL/hr, Intravenous, PRN, Christina Polanco MD    divalproex (DEPAKOTE) DR tablet 500 mg, 500 mg, Oral, Daily, Bernarda Noyola MD    divalproex (DEPAKOTE) DR tablet 750 mg, 750 mg, Oral, Nightly, Bernarda Noyola MD    OLANZapine (ZYPREXA) tablet 5 mg, 5 mg, Oral, Nightly, Leatha Washington, APRN - CNP, 5 mg at 20    ammonium lactate (LAC-HYDRIN) 12 % lotion, , Topical, BID, Christina Polanco MD      Examination:  /63   Pulse 81   Temp 97.2 °F (36.2 °C) (Temporal)   Resp 13   Ht 5' 3\" (1.6 m)   Wt 170 lb (77.1 kg)   SpO2 100%   BMI 30.11 kg/m²   Gait - steady  Medication side effects(SE): Denies    Mental Status Examination:    Level of consciousness:  within normal limits   Appearance:  fair grooming and fair hygiene  Behavior/Motor:  hyperactive  Attitude toward examiner:  cooperative  Speech:  rapid, loud, hyperverbal and pressured   Mood: labile  Affect:  mood congruent  Thought processes:  circumstantial and perservative   Thought content: Continues to ruminate and be paranoid about her mom  Cognition:  oriented to person, place, and time   Concentration poor  Insight fair   Judgement poor     ASSESSMENT:   Patient symptoms are:  [] Well controlled  [] Improving  [] Worsening  [x] No change      Diagnosis:   Active Problems:    Severe manic bipolar 1 disorder with psychotic behavior ([facility] [address]  Resolved Problems:    * No resolved hospital problems. *      LABS:    No results for input(s): WBC, HGB, PLT in the last 72 hours. No results for input(s): NA, K, CL, CO2, BUN, CREATININE, GLUCOSE in the last 72 hours. No results for input(s): BILITOT, ALKPHOS, AST, ALT in the last 72 hours.   Lab Results   Component Value Date LABAMPH NOT DETECTED 06/15/2020    LABAMPH NOT DETECTED 03/15/2011    BARBSCNU NOT DETECTED 06/15/2020    LABBENZ NOT DETECTED 06/15/2020    LABBENZ SEE BELOW 05/06/2014    CANNAB NOT DETECTED  03/15/2011    LABMETH NOT DETECTED 06/15/2020    OPIATESCREENURINE NOT DETECTED 06/15/2020    PHENCYCLIDINESCREENURINE NOT DETECTED 06/15/2020    ETOH <10 06/15/2020     Lab Results   Component Value Date    TSH 5.130 03/22/2019     Lab Results   Component Value Date    LITHIUM 0.23 (L) 05/18/2020     Lab Results   Component Value Date    VALPROATE 3 (L) 05/18/2020    CBMZ 10.3 (H) 06/18/2020           Treatment Plan:  Reviewed current Medications with the patient. Risks, benefits, side effects, drug-to-drug interactions and alternatives to treatment were discussed. Collateral information:   CD evaluation  Encourage patient to attend group and other milieu activities.   Discharge planning discussed with the patient and treatment team.    Continue Tegretol 300 mg 3 times daily for mood stabilization we will check a carbamazepine level tomorrow  Continue Depakote 500 mg daily and 750 mg at bedtime for mood stabilization  Continue Zyprexa 5 mg at bedtime for psychosis and mood  Continue Risperdal 1 mg twice daily for psychosis  Continue Invega sustaina 156 mg IM we need confirmation that the next injection is due 7/10/2020    PSYCHOTHERAPY/COUNSELING:  [x] Therapeutic interview  [x] Supportive  [] CBT  [] Ongoing  [] Other    [x] Patient continues to need, on a daily basis, active treatment furnished directly by or requiring the supervision of inpatient psychiatric personnel      Anticipated Length of stay: 5 to 7 days based on stability            Electronically signed by CHRISS Mclain CNP on 6/18/2020 at 4:41 PM

## 2020-06-19 LAB
METER GLUCOSE: 158 MG/DL (ref 74–99)
METER GLUCOSE: 178 MG/DL (ref 74–99)

## 2020-06-19 PROCEDURE — 1240000000 HC EMOTIONAL WELLNESS R&B

## 2020-06-19 PROCEDURE — 6370000000 HC RX 637 (ALT 250 FOR IP): Performed by: NURSE PRACTITIONER

## 2020-06-19 PROCEDURE — 6370000000 HC RX 637 (ALT 250 FOR IP): Performed by: INTERNAL MEDICINE

## 2020-06-19 PROCEDURE — 99232 SBSQ HOSP IP/OBS MODERATE 35: CPT | Performed by: NURSE PRACTITIONER

## 2020-06-19 PROCEDURE — 82962 GLUCOSE BLOOD TEST: CPT

## 2020-06-19 PROCEDURE — 6370000000 HC RX 637 (ALT 250 FOR IP): Performed by: PSYCHIATRY & NEUROLOGY

## 2020-06-19 RX ADMIN — CLONIDINE HYDROCHLORIDE 0.1 MG: 0.1 TABLET ORAL at 21:37

## 2020-06-19 RX ADMIN — LABETALOL HYDROCHLORIDE 200 MG: 200 TABLET, FILM COATED ORAL at 12:37

## 2020-06-19 RX ADMIN — FUROSEMIDE 20 MG: 20 TABLET ORAL at 09:18

## 2020-06-19 RX ADMIN — ATORVASTATIN CALCIUM 40 MG: 40 TABLET, FILM COATED ORAL at 09:19

## 2020-06-19 RX ADMIN — METFORMIN HYDROCHLORIDE 1000 MG: 1000 TABLET, FILM COATED ORAL at 09:19

## 2020-06-19 RX ADMIN — CARBAMAZEPINE 300 MG: 200 TABLET ORAL at 09:19

## 2020-06-19 RX ADMIN — SODIUM POLYSTYRENE SULFONATE 15 G: 15 SUSPENSION ORAL; RECTAL at 09:19

## 2020-06-19 RX ADMIN — TRIHEXYPHENIDYL HYDROCHLORIDE 2 MG: 2 TABLET ORAL at 12:36

## 2020-06-19 RX ADMIN — BRIMONIDINE TARTRATE 1 DROP: 2 SOLUTION/ DROPS OPHTHALMIC at 21:41

## 2020-06-19 RX ADMIN — METFORMIN HYDROCHLORIDE 1000 MG: 1000 TABLET, FILM COATED ORAL at 17:18

## 2020-06-19 RX ADMIN — TRIHEXYPHENIDYL HYDROCHLORIDE 2 MG: 2 TABLET ORAL at 09:18

## 2020-06-19 RX ADMIN — RISPERIDONE 1 MG: 1 TABLET, FILM COATED ORAL at 21:39

## 2020-06-19 RX ADMIN — COLCHICINE 0.6 MG: 0.6 TABLET ORAL at 21:37

## 2020-06-19 RX ADMIN — COLCHICINE 0.6 MG: 0.6 TABLET ORAL at 09:18

## 2020-06-19 RX ADMIN — LATANOPROST 1 DROP: 50 SOLUTION/ DROPS OPHTHALMIC at 21:56

## 2020-06-19 RX ADMIN — INSULIN GLARGINE 14 UNITS: 100 INJECTION, SOLUTION SUBCUTANEOUS at 09:10

## 2020-06-19 RX ADMIN — TRAZODONE HYDROCHLORIDE 50 MG: 50 TABLET ORAL at 21:38

## 2020-06-19 RX ADMIN — CLONIDINE HYDROCHLORIDE 0.1 MG: 0.1 TABLET ORAL at 09:19

## 2020-06-19 RX ADMIN — LABETALOL HYDROCHLORIDE 200 MG: 200 TABLET, FILM COATED ORAL at 06:37

## 2020-06-19 RX ADMIN — CARBAMAZEPINE 300 MG: 200 TABLET ORAL at 12:36

## 2020-06-19 RX ADMIN — LABETALOL HYDROCHLORIDE 200 MG: 200 TABLET, FILM COATED ORAL at 21:38

## 2020-06-19 RX ADMIN — RISPERIDONE 1 MG: 1 TABLET, FILM COATED ORAL at 09:33

## 2020-06-19 RX ADMIN — Medication: at 09:18

## 2020-06-19 RX ADMIN — CARBAMAZEPINE 300 MG: 200 TABLET ORAL at 21:37

## 2020-06-19 RX ADMIN — OLANZAPINE 5 MG: 5 TABLET, FILM COATED ORAL at 21:39

## 2020-06-19 RX ADMIN — TIMOLOL MALEATE 1 DROP: 5 SOLUTION OPHTHALMIC at 21:41

## 2020-06-19 RX ADMIN — TRIHEXYPHENIDYL HYDROCHLORIDE 2 MG: 2 TABLET ORAL at 21:39

## 2020-06-19 RX ADMIN — ALOGLIPTIN 25 MG: 25 TABLET, FILM COATED ORAL at 09:18

## 2020-06-19 RX ADMIN — BRIMONIDINE TARTRATE 1 DROP: 2 SOLUTION/ DROPS OPHTHALMIC at 09:19

## 2020-06-19 RX ADMIN — TIMOLOL MALEATE 1 DROP: 5 SOLUTION OPHTHALMIC at 09:19

## 2020-06-19 ASSESSMENT — PAIN SCALES - GENERAL
PAINLEVEL_OUTOF10: 6
PAINLEVEL_OUTOF10: 6

## 2020-06-19 NOTE — PROGRESS NOTES
BEHAVIORAL HEALTH FOLLOW-UP NOTE     6/19/2020     Patient was seen and examined in person, Chart reviewed   Patient's case discussed with staff/team    Chief Complaint: Manic, hyperverbal pressured speech    Interim History: Patient up on the unit attending groups socializing with peers has rapid pressured speech and very poor limited sign judgment to hospitalization need for treatment she continues to ruminate about her mother states she wants to find somewhere else to live does not want to return home to her mom. Patient is currently refusing her Depakote for some unknown reason. When I asked why she was refusing the Depakote she just sing a song with the words Depakote and it as a response. Despite the patient's presentation she is less hostile, aggressive than she has been in previous admissions. Patient seen singing dancing in the hallways and rambling when speaking and sometimes responds to in song or rap.   Appetite:   [x] Normal/Unchanged  [] Increased  [] Decreased      Sleep:       [x] Normal/Unchanged  [] Fair       [] Poor              Energy:    [x] Normal/Unchanged  [] Increased  [] Decreased        SI [] Present  [x] Absent    HI  []Present  [x] Absent     Aggression:  [] yes  [x] no    Patient is [x] able  [] unable to CONTRACT FOR SAFETY     PAST MEDICAL/PSYCHIATRIC HISTORY:   Past Medical History:   Diagnosis Date    Bell's palsy     Bipolar disorder ([facility] [address]     bipolar    Carpal tunnel syndrome 8/4/2016    Hyperlipidemia     Non-insulin dependent type 2 diabetes mellitus ([facility] [address]     Systemic primary arterial hypertension 8/4/2016       FAMILY/SOCIAL HISTORY:  Family History   Problem Relation Age of Onset    Heart Disease Father 76    High Blood Pressure Father      Social History     Socioeconomic History    Marital status: Single     Spouse name: Not on file    Number of children: Not on file    Years of education: Not on file    Highest education level: Not on file   Occupational History    Not on file   Social Needs    Financial resource strain: Not on file    Food insecurity     Worry: Not on file     Inability: Not on file    Transportation needs     Medical: Not on file     Non-medical: Not on file   Tobacco Use    Smoking status: Never Smoker    Smokeless tobacco: Never Used   Substance and Sexual Activity    Alcohol use: No    Drug use: No    Sexual activity: Not Currently   Lifestyle    Physical activity     Days per week: Not on file     Minutes per session: Not on file    Stress: Not on file   Relationships    Social connections     Talks on phone: Not on file     Gets together: Not on file     Attends Congregation service: Not on file     Active member of club or organization: Not on file     Attends meetings of clubs or organizations: Not on file     Relationship status: Not on file    Intimate partner violence     Fear of current or ex partner: Not on file     Emotionally abused: Not on file     Physically abused: Not on file     Forced sexual activity: Not on file   Other Topics Concern    Not on file   Social History Narrative    Not on file           ROS:  [x] All negative/unchanged except if checked.  Explain positive(checked items) below:  [] Constitutional  [] Eyes  [] Ear/Nose/Mouth/Throat  [] Respiratory  [] CV  [] GI  []   [] Musculoskeletal  [] Skin/Breast  [] Neurological  [] Endocrine  [] Heme/Lymph  [] Allergic/Immunologic    Explanation:     MEDICATIONS:    Current Facility-Administered Medications:     acetaminophen (TYLENOL) tablet 650 mg, 650 mg, Oral, Q6H PRN, Rocio Olson MD, 650 mg at 06/16/20 0239    hydrOXYzine (VISTARIL) capsule 50 mg, 50 mg, Oral, TID PRN, Rocio Olson MD    haloperidol lactate (HALDOL) injection 5 mg, 5 mg, Intramuscular, Q6H PRN **OR** haloperidol (HALDOL) tablet 5 mg, 5 mg, Oral, Q6H PRN, Rocio Olson MD    traZODone (DESYREL) tablet 50 mg, 50 mg, Oral, Nightly PRN, Rocio Olson MD, 50 mg at 06/18/20

## 2020-06-19 NOTE — PROGRESS NOTES
Attended afternoon leisure activity. Pleasant and social, interacting with peers. Was 1 of 8 in attendance.

## 2020-06-19 NOTE — GROUP NOTE
Group Therapy Note    Date: 6/19/2020    Group Start Time: 1000  Group End Time: 1571  Group Topic: Psychoeducation    [facility]        Group Therapy Note    Attendees: 6         Patient's Goal:  Get my own place    Notes:  Pleasant and active during discussion on self care. Able to identify ways to help herself and hopeful to leave soon. Appreciative of support. Status After Intervention:  Improved    Participation Level:  Active Listener and Interactive    Participation Quality: Appropriate and Attentive      Speech:  pressured and loud      Thought Process/Content: Perseverating      Affective Functioning: Exaggerated      Mood: elevated      Level of consciousness:  Alert      Response to Learning: Progressing to goal      Endings: None Reported    Modes of Intervention: Education, Support, Socialization and Exploration      Discipline Responsible: Psychoeducational Specialist      Signature:  Patty Lynn detailed exam

## 2020-06-19 NOTE — PROGRESS NOTES
Edi Lamb MD FACP  Progress notes      CHIEF COMPLAINT: Medical management      HISTORY OF PRESENT ILLNESS:    61-year-old -American woman well-known to me from office and prior admissions  Patient with extensive psychiatric and medical history  Readmitted to psychiatric unit for behavioral changes  Data reviewed in detail  Patient was seen on the unit earlier this morning  Medically she states she feels fine  Tolerating medications well  Past Medical History:    Past Medical History:   Diagnosis Date    Bell's palsy     Bipolar disorder ([facility] [address]     bipolar    Carpal tunnel syndrome 8/4/2016    Hyperlipidemia     Non-insulin dependent type 2 diabetes mellitus ([facility] [address]     Systemic primary arterial hypertension 8/4/2016       Past Surgical History:    Past Surgical History:   Procedure Laterality Date    ECHO COMPLETE  6/22/2013            Medications Prior to Admission:    Medications Prior to Admission: OLANZapine (ZYPREXA) 10 MG tablet, Take 10 mg by mouth nightly  paliperidone palmitate ER (INVEGA SUSTENNA) 156 MG/ML DEEP IM injection, Inject 156 mg into the muscle every 30 days  mirtazapine (REMERON) 15 MG tablet, Take 15 mg by mouth nightly  trihexyphenidyl (ARTANE) 2 MG tablet, Take 2 mg by mouth 3 times daily  carBAMazepine (TEGRETOL) 200 MG tablet, Take 300 mg by mouth 3 times daily  [DISCONTINUED] carBAMazepine (TEGRETOL XR) 100 MG extended release tablet, Take 300 mg by mouth 3 times daily  atorvastatin (LIPITOR) 40 MG tablet, TAKE 1 TABLET BY MOUTH EVERY DAY  furosemide (LASIX) 20 MG tablet, TAKE 1 TABLET BY MOUTH EVERY DAY  JANUVIA 100 MG tablet, TAKE 1 TABLET BY MOUTH EVERY DAY  cloNIDine (CATAPRES) 0.1 MG tablet, TAKE 1 TABLET BY MOUTH TWICE A DAY  labetalol (NORMODYNE) 200 MG tablet, TAKE 1 TABLET BY MOUTH EVERY 8 HOURS  colchicine (COLCRYS) 0.6 MG tablet, TAKE 1 TABLET BY MOUTH TWICE A DAY  vitamin D (ERGOCALCIFEROL) 1.25 MG (65725 UT) CAPS capsule, TAKE 1 CAPSULE BY MOUTH ONE TIME PER WEEK  metFORMIN (GLUCOPHAGE) 1000 MG tablet, TAKE 1 TABLET BY MOUTH TWICE A DAY WITH MEALS  FREESTYLE LITE strip, USE TO TEST BLOOD SUGAR EVERY MORNING  sodium polystyrene (SPS) 15 GM/60ML suspension, Take 60 mLs by mouth three times a week MON WED FRI  divalproex (DEPAKOTE) 500 MG DR tablet, Take 1 tablet by mouth daily  divalproex (DEPAKOTE) 250 MG DR tablet, Take 3 tablets by mouth nightly  risperiDONE (RISPERDAL) 1 MG tablet, Take 1 tablet by mouth 2 times daily  risperiDONE microspheres ER (RISPERDAL CONSTA) 12.5 MG injection, Inject 2 mLs into the muscle every 14 days for 1 dose  latanoprost (XALATAN) 0.005 % ophthalmic solution, Place 1 drop into both eyes nightly  ammonium lactate (AMLACTIN) 12 % cream, APPLY TO AFFECTED AREA EVERY DAY  Omega-3 Fatty Acids (FISH OIL) 1000 MG CAPS, Take 3 capsules by mouth daily  insulin glargine (LANTUS SOLOSTAR) 100 UNIT/ML injection pen, Inject 14 Units into the skin daily  brimonidine (ALPHAGAN) 0.2 % ophthalmic solution,   blood glucose monitor kit and supplies, CHECK BLOOD GLUCOSE DAILY DX: E11.9 ONE TOUCH GLUCOMETER REQUESTED IF OK WITH INSRANCE  FREESTYLE LANCETS MISC, Inject 1 each into the skin daily  Timolol (TIMOPTIC) 0.5 % (DAILY) SOLN ophthalmic solution, Place 1 drop into both eyes 2 times daily    Allergies:    Ace inhibitors; Ibuprofen; Latuda [lurasidone hcl]; and Haldol [haloperidol]    Social History:    reports that she has never smoked. She has never used smokeless tobacco. She reports that she does not drink alcohol or use drugs. Family History:   family history includes Heart Disease (age of onset: 76) in her father; High Blood Pressure in her father. REVIEW OF SYSTEMS:  As above in the HPI, otherwise negative    PHYSICAL EXAM:    Vitals:  BP (!) 145/68   Pulse 101   Temp 99.2 °F (37.3 °C) (Temporal)   Resp 14   Ht 5' 3\" (1.6 m)   Wt 170 lb (77.1 kg)   SpO2 99%   BMI 30.11 kg/m²     General:  Awake, alert, oriented X 3.   Well developed,

## 2020-06-20 LAB
METER GLUCOSE: 128 MG/DL (ref 74–99)
METER GLUCOSE: 128 MG/DL (ref 74–99)

## 2020-06-20 PROCEDURE — 6370000000 HC RX 637 (ALT 250 FOR IP): Performed by: INTERNAL MEDICINE

## 2020-06-20 PROCEDURE — 6370000000 HC RX 637 (ALT 250 FOR IP): Performed by: NURSE PRACTITIONER

## 2020-06-20 PROCEDURE — 1240000000 HC EMOTIONAL WELLNESS R&B

## 2020-06-20 PROCEDURE — 6370000000 HC RX 637 (ALT 250 FOR IP): Performed by: PSYCHIATRY & NEUROLOGY

## 2020-06-20 PROCEDURE — 99232 SBSQ HOSP IP/OBS MODERATE 35: CPT | Performed by: NURSE PRACTITIONER

## 2020-06-20 PROCEDURE — 82962 GLUCOSE BLOOD TEST: CPT

## 2020-06-20 RX ADMIN — HYDROXYZINE PAMOATE 50 MG: 50 CAPSULE ORAL at 21:05

## 2020-06-20 RX ADMIN — COLCHICINE 0.6 MG: 0.6 TABLET ORAL at 21:06

## 2020-06-20 RX ADMIN — CLONIDINE HYDROCHLORIDE 0.1 MG: 0.1 TABLET ORAL at 09:04

## 2020-06-20 RX ADMIN — RISPERIDONE 1 MG: 1 TABLET, FILM COATED ORAL at 09:05

## 2020-06-20 RX ADMIN — LATANOPROST 1 DROP: 50 SOLUTION/ DROPS OPHTHALMIC at 21:15

## 2020-06-20 RX ADMIN — ATORVASTATIN CALCIUM 40 MG: 40 TABLET, FILM COATED ORAL at 09:05

## 2020-06-20 RX ADMIN — TRAZODONE HYDROCHLORIDE 50 MG: 50 TABLET ORAL at 21:09

## 2020-06-20 RX ADMIN — RISPERIDONE 1 MG: 1 TABLET, FILM COATED ORAL at 21:06

## 2020-06-20 RX ADMIN — Medication: at 09:09

## 2020-06-20 RX ADMIN — TIMOLOL MALEATE 1 DROP: 5 SOLUTION OPHTHALMIC at 21:16

## 2020-06-20 RX ADMIN — BRIMONIDINE TARTRATE 1 DROP: 2 SOLUTION/ DROPS OPHTHALMIC at 09:05

## 2020-06-20 RX ADMIN — CARBAMAZEPINE 300 MG: 200 TABLET ORAL at 09:04

## 2020-06-20 RX ADMIN — METFORMIN HYDROCHLORIDE 1000 MG: 1000 TABLET, FILM COATED ORAL at 09:05

## 2020-06-20 RX ADMIN — FUROSEMIDE 20 MG: 20 TABLET ORAL at 09:05

## 2020-06-20 RX ADMIN — LABETALOL HYDROCHLORIDE 200 MG: 200 TABLET, FILM COATED ORAL at 07:15

## 2020-06-20 RX ADMIN — METFORMIN HYDROCHLORIDE 1000 MG: 1000 TABLET, FILM COATED ORAL at 17:05

## 2020-06-20 RX ADMIN — TRIHEXYPHENIDYL HYDROCHLORIDE 2 MG: 2 TABLET ORAL at 13:29

## 2020-06-20 RX ADMIN — ALOGLIPTIN 25 MG: 25 TABLET, FILM COATED ORAL at 09:05

## 2020-06-20 RX ADMIN — Medication: at 21:16

## 2020-06-20 RX ADMIN — LABETALOL HYDROCHLORIDE 200 MG: 200 TABLET, FILM COATED ORAL at 22:20

## 2020-06-20 RX ADMIN — BRIMONIDINE TARTRATE 1 DROP: 2 SOLUTION/ DROPS OPHTHALMIC at 21:16

## 2020-06-20 RX ADMIN — INSULIN GLARGINE 14 UNITS: 100 INJECTION, SOLUTION SUBCUTANEOUS at 09:07

## 2020-06-20 RX ADMIN — COLCHICINE 0.6 MG: 0.6 TABLET ORAL at 09:05

## 2020-06-20 RX ADMIN — TIMOLOL MALEATE 1 DROP: 5 SOLUTION OPHTHALMIC at 09:05

## 2020-06-20 RX ADMIN — OLANZAPINE 5 MG: 5 TABLET, FILM COATED ORAL at 22:55

## 2020-06-20 RX ADMIN — LABETALOL HYDROCHLORIDE 200 MG: 200 TABLET, FILM COATED ORAL at 13:29

## 2020-06-20 RX ADMIN — CLONIDINE HYDROCHLORIDE 0.1 MG: 0.1 TABLET ORAL at 21:05

## 2020-06-20 RX ADMIN — TRIHEXYPHENIDYL HYDROCHLORIDE 2 MG: 2 TABLET ORAL at 21:06

## 2020-06-20 RX ADMIN — CARBAMAZEPINE 300 MG: 200 TABLET ORAL at 13:29

## 2020-06-20 RX ADMIN — CARBAMAZEPINE 300 MG: 200 TABLET ORAL at 21:08

## 2020-06-20 RX ADMIN — TRIHEXYPHENIDYL HYDROCHLORIDE 2 MG: 2 TABLET ORAL at 09:05

## 2020-06-20 ASSESSMENT — PAIN SCALES - GENERAL
PAINLEVEL_OUTOF10: 0
PAINLEVEL_OUTOF10: 7

## 2020-06-20 NOTE — PROGRESS NOTES
Patient out on unit in recliner. Patients gait is per her usual continues to deny pain. Remains alert and oriented .  Will continue to monitor and observe

## 2020-06-21 LAB
METER GLUCOSE: 142 MG/DL (ref 74–99)
METER GLUCOSE: 142 MG/DL (ref 74–99)
METER GLUCOSE: 94 MG/DL (ref 74–99)

## 2020-06-21 PROCEDURE — 6370000000 HC RX 637 (ALT 250 FOR IP): Performed by: NURSE PRACTITIONER

## 2020-06-21 PROCEDURE — 6370000000 HC RX 637 (ALT 250 FOR IP): Performed by: INTERNAL MEDICINE

## 2020-06-21 PROCEDURE — 6370000000 HC RX 637 (ALT 250 FOR IP): Performed by: PSYCHIATRY & NEUROLOGY

## 2020-06-21 PROCEDURE — 1240000000 HC EMOTIONAL WELLNESS R&B

## 2020-06-21 PROCEDURE — 82962 GLUCOSE BLOOD TEST: CPT

## 2020-06-21 PROCEDURE — 99232 SBSQ HOSP IP/OBS MODERATE 35: CPT | Performed by: NURSE PRACTITIONER

## 2020-06-21 RX ADMIN — RISPERIDONE 1 MG: 1 TABLET, FILM COATED ORAL at 21:14

## 2020-06-21 RX ADMIN — COLCHICINE 0.6 MG: 0.6 TABLET ORAL at 21:14

## 2020-06-21 RX ADMIN — CLONIDINE HYDROCHLORIDE 0.1 MG: 0.1 TABLET ORAL at 21:14

## 2020-06-21 RX ADMIN — LATANOPROST 1 DROP: 50 SOLUTION/ DROPS OPHTHALMIC at 21:12

## 2020-06-21 RX ADMIN — OLANZAPINE 5 MG: 5 TABLET, FILM COATED ORAL at 21:13

## 2020-06-21 RX ADMIN — TRAZODONE HYDROCHLORIDE 50 MG: 50 TABLET ORAL at 21:13

## 2020-06-21 RX ADMIN — LABETALOL HYDROCHLORIDE 200 MG: 200 TABLET, FILM COATED ORAL at 21:14

## 2020-06-21 RX ADMIN — METFORMIN HYDROCHLORIDE 1000 MG: 1000 TABLET, FILM COATED ORAL at 16:35

## 2020-06-21 RX ADMIN — TIMOLOL MALEATE 1 DROP: 5 SOLUTION OPHTHALMIC at 21:12

## 2020-06-21 RX ADMIN — BRIMONIDINE TARTRATE 1 DROP: 2 SOLUTION/ DROPS OPHTHALMIC at 21:11

## 2020-06-21 RX ADMIN — TRIHEXYPHENIDYL HYDROCHLORIDE 2 MG: 2 TABLET ORAL at 13:31

## 2020-06-21 RX ADMIN — METFORMIN HYDROCHLORIDE 1000 MG: 1000 TABLET, FILM COATED ORAL at 09:12

## 2020-06-21 RX ADMIN — CARBAMAZEPINE 300 MG: 200 TABLET ORAL at 13:31

## 2020-06-21 RX ADMIN — CARBAMAZEPINE 300 MG: 200 TABLET ORAL at 21:13

## 2020-06-21 RX ADMIN — CARBAMAZEPINE 300 MG: 200 TABLET ORAL at 09:13

## 2020-06-21 RX ADMIN — ATORVASTATIN CALCIUM 40 MG: 40 TABLET, FILM COATED ORAL at 09:14

## 2020-06-21 RX ADMIN — TIMOLOL MALEATE 1 DROP: 5 SOLUTION OPHTHALMIC at 09:13

## 2020-06-21 RX ADMIN — LABETALOL HYDROCHLORIDE 200 MG: 200 TABLET, FILM COATED ORAL at 13:31

## 2020-06-21 RX ADMIN — ALOGLIPTIN 25 MG: 25 TABLET, FILM COATED ORAL at 09:12

## 2020-06-21 RX ADMIN — FUROSEMIDE 20 MG: 20 TABLET ORAL at 09:13

## 2020-06-21 RX ADMIN — CLONIDINE HYDROCHLORIDE 0.1 MG: 0.1 TABLET ORAL at 09:13

## 2020-06-21 RX ADMIN — Medication: at 09:13

## 2020-06-21 RX ADMIN — Medication: at 21:10

## 2020-06-21 RX ADMIN — TRIHEXYPHENIDYL HYDROCHLORIDE 2 MG: 2 TABLET ORAL at 21:14

## 2020-06-21 RX ADMIN — COLCHICINE 0.6 MG: 0.6 TABLET ORAL at 09:12

## 2020-06-21 RX ADMIN — ERGOCALCIFEROL 50000 UNITS: 1.25 CAPSULE ORAL at 09:13

## 2020-06-21 RX ADMIN — TRIHEXYPHENIDYL HYDROCHLORIDE 2 MG: 2 TABLET ORAL at 09:13

## 2020-06-21 RX ADMIN — INSULIN GLARGINE 14 UNITS: 100 INJECTION, SOLUTION SUBCUTANEOUS at 09:16

## 2020-06-21 RX ADMIN — BRIMONIDINE TARTRATE 1 DROP: 2 SOLUTION/ DROPS OPHTHALMIC at 09:13

## 2020-06-21 RX ADMIN — LABETALOL HYDROCHLORIDE 200 MG: 200 TABLET, FILM COATED ORAL at 06:38

## 2020-06-21 RX ADMIN — RISPERIDONE 1 MG: 1 TABLET, FILM COATED ORAL at 09:13

## 2020-06-21 ASSESSMENT — PAIN SCALES - GENERAL
PAINLEVEL_OUTOF10: 0

## 2020-06-21 NOTE — PROGRESS NOTES
Lifestyle    Physical activity     Days per week: Not on file     Minutes per session: Not on file    Stress: Not on file   Relationships    Social connections     Talks on phone: Not on file     Gets together: Not on file     Attends Scientologist service: Not on file     Active member of club or organization: Not on file     Attends meetings of clubs or organizations: Not on file     Relationship status: Not on file    Intimate partner violence     Fear of current or ex partner: Not on file     Emotionally abused: Not on file     Physically abused: Not on file     Forced sexual activity: Not on file   Other Topics Concern    Not on file   Social History Narrative    Not on file           ROS:  [x] All negative/unchanged except if checked.  Explain positive(checked items) below:  [] Constitutional  [] Eyes  [] Ear/Nose/Mouth/Throat  [] Respiratory  [] CV  [] GI  []   [] Musculoskeletal  [] Skin/Breast  [] Neurological  [] Endocrine  [] Heme/Lymph  [] Allergic/Immunologic    Explanation:     MEDICATIONS:    Current Facility-Administered Medications:     acetaminophen (TYLENOL) tablet 650 mg, 650 mg, Oral, Q6H PRN, Denise Rushing MD, 650 mg at 06/16/20 0239    hydrOXYzine (VISTARIL) capsule 50 mg, 50 mg, Oral, TID PRN, Denise Rushing MD, 50 mg at 06/20/20 2105    haloperidol lactate (HALDOL) injection 5 mg, 5 mg, Intramuscular, Q6H PRN **OR** haloperidol (HALDOL) tablet 5 mg, 5 mg, Oral, Q6H PRN, Denise Rushing MD    traZODone (DESYREL) tablet 50 mg, 50 mg, Oral, Nightly PRN, Denise Rushing MD, 50 mg at 06/20/20 2109    magnesium hydroxide (MILK OF MAGNESIA) 400 MG/5ML suspension 30 mL, 30 mL, Oral, Daily PRN, Denise Rushing MD    aluminum & magnesium hydroxide-simethicone (MAALOX) 200-200-20 MG/5ML suspension 30 mL, 30 mL, Oral, PRN, Denise Rushing MD    risperiDONE (RISPERDAL) tablet 1 mg, 1 mg, Oral, BID, Denise Rushing MD, 1 mg at 06/21/20 0913    atorvastatin (LIPITOR) tablet 40 PHENCYCLIDINESCREENURINE NOT DETECTED 06/15/2020    ETOH <10 06/15/2020     Lab Results   Component Value Date    TSH 5.130 03/22/2019     Lab Results   Component Value Date    LITHIUM 0.23 (L) 05/18/2020     Lab Results   Component Value Date    VALPROATE 3 (L) 05/18/2020    CBMZ 10.3 (H) 06/18/2020           Treatment Plan:  Reviewed current Medications with the patient. Risks, benefits, side effects, drug-to-drug interactions and alternatives to treatment were discussed. Collateral information:   CD evaluation  Encourage patient to attend group and other milieu activities.   Discharge planning discussed with the patient and treatment team.    Continue Tegretol 300 mg 3 times daily for mood stabilization we will check a carbamazepine level tomorrow  Continue Depakote 500 mg daily and 750 mg at bedtime for mood stabilization  Continue Zyprexa 5 mg at bedtime for psychosis and mood  Continue Risperdal 1 mg twice daily for psychosis  Continue Invega sustaina 156 mg IM we need confirmation that the next injection is due 7/10/2020    PSYCHOTHERAPY/COUNSELING:  [x] Therapeutic interview  [x] Supportive  [] CBT  [] Ongoing  [] Other    [x] Patient continues to need, on a daily basis, active treatment furnished directly by or requiring the supervision of inpatient psychiatric personnel      Anticipated Length of stay: 5 to 7 days based on stability            Electronically signed by CHRISS Peters CNP on 6/21/2020 at 3:28 PM

## 2020-06-21 NOTE — PLAN OF CARE
Problem: Anger Management/Homicidal Ideation:  Goal: Ability to verbalize frustrations and anger appropriately will improve  Description: Ability to verbalize frustrations and anger appropriately will improve  6/20/2020 2124 by Ne Reyes RN  Outcome: Met This Shift     Problem: Anger Management/Homicidal Ideation:  Goal: Absence of angry outbursts  Description: Absence of angry outbursts  6/20/2020 2124 by Ne Reyes RN  Outcome: Met This Shift     Problem: Altered Mood, Manic Behavior:  Goal: Ability to demonstrate self-control will improve  Description: Ability to demonstrate self-control will improve  Outcome: Met This Shift     Problem: Anger Management/Homicidal Ideation:  Goal: Able to display appropriate communication and problem solving  Description: Able to display appropriate communication and problem solving  6/20/2020 2124 by Ne Reyes RN  Outcome: Not Met This Shift     Problem: Altered Mood, Manic Behavior:  Goal: Ability to interact with others will improve  Description: Ability to interact with others will improve  6/20/2020 2124 by Ne Reyse RN  Outcome: Not Met This Shift     Problem: Altered Mood, Psychotic Behavior:  Goal: Ability to interact with others will improve  Description: Ability to interact with others will improve  6/20/2020 2124 by Ne Reyes RN  Outcome: Not Met This Shift

## 2020-06-22 LAB — METER GLUCOSE: 138 MG/DL (ref 74–99)

## 2020-06-22 PROCEDURE — 6370000000 HC RX 637 (ALT 250 FOR IP): Performed by: INTERNAL MEDICINE

## 2020-06-22 PROCEDURE — 1240000000 HC EMOTIONAL WELLNESS R&B

## 2020-06-22 PROCEDURE — 6370000000 HC RX 637 (ALT 250 FOR IP): Performed by: PSYCHIATRY & NEUROLOGY

## 2020-06-22 PROCEDURE — 6370000000 HC RX 637 (ALT 250 FOR IP): Performed by: NURSE PRACTITIONER

## 2020-06-22 PROCEDURE — 82962 GLUCOSE BLOOD TEST: CPT

## 2020-06-22 RX ORDER — OLANZAPINE 5 MG/1
5 TABLET ORAL NIGHTLY
Qty: 30 TABLET | Refills: 0 | Status: SHIPPED | OUTPATIENT
Start: 2020-06-22 | End: 2022-08-01

## 2020-06-22 RX ORDER — CARBAMAZEPINE 200 MG/1
300 TABLET ORAL 3 TIMES DAILY
Qty: 135 TABLET | Refills: 0 | Status: SHIPPED | OUTPATIENT
Start: 2020-06-22 | End: 2021-04-23 | Stop reason: ALTCHOICE

## 2020-06-22 RX ADMIN — ALOGLIPTIN 25 MG: 25 TABLET, FILM COATED ORAL at 08:48

## 2020-06-22 RX ADMIN — FUROSEMIDE 20 MG: 20 TABLET ORAL at 08:48

## 2020-06-22 RX ADMIN — INSULIN GLARGINE 14 UNITS: 100 INJECTION, SOLUTION SUBCUTANEOUS at 08:52

## 2020-06-22 RX ADMIN — LATANOPROST 1 DROP: 50 SOLUTION/ DROPS OPHTHALMIC at 21:30

## 2020-06-22 RX ADMIN — CLONIDINE HYDROCHLORIDE 0.1 MG: 0.1 TABLET ORAL at 08:48

## 2020-06-22 RX ADMIN — METFORMIN HYDROCHLORIDE 1000 MG: 1000 TABLET, FILM COATED ORAL at 08:48

## 2020-06-22 RX ADMIN — CARBAMAZEPINE 300 MG: 200 TABLET ORAL at 21:32

## 2020-06-22 RX ADMIN — LABETALOL HYDROCHLORIDE 200 MG: 200 TABLET, FILM COATED ORAL at 06:29

## 2020-06-22 RX ADMIN — BRIMONIDINE TARTRATE 1 DROP: 2 SOLUTION/ DROPS OPHTHALMIC at 21:30

## 2020-06-22 RX ADMIN — RISPERIDONE 1 MG: 1 TABLET, FILM COATED ORAL at 08:48

## 2020-06-22 RX ADMIN — CARBAMAZEPINE 300 MG: 200 TABLET ORAL at 13:34

## 2020-06-22 RX ADMIN — Medication: at 08:51

## 2020-06-22 RX ADMIN — LABETALOL HYDROCHLORIDE 200 MG: 200 TABLET, FILM COATED ORAL at 21:33

## 2020-06-22 RX ADMIN — COLCHICINE 0.6 MG: 0.6 TABLET ORAL at 21:34

## 2020-06-22 RX ADMIN — RISPERIDONE 1 MG: 1 TABLET, FILM COATED ORAL at 21:34

## 2020-06-22 RX ADMIN — METFORMIN HYDROCHLORIDE 1000 MG: 1000 TABLET, FILM COATED ORAL at 16:49

## 2020-06-22 RX ADMIN — TIMOLOL MALEATE 1 DROP: 5 SOLUTION OPHTHALMIC at 21:30

## 2020-06-22 RX ADMIN — BRIMONIDINE TARTRATE 1 DROP: 2 SOLUTION/ DROPS OPHTHALMIC at 08:48

## 2020-06-22 RX ADMIN — TRAZODONE HYDROCHLORIDE 50 MG: 50 TABLET ORAL at 21:31

## 2020-06-22 RX ADMIN — CARBAMAZEPINE 300 MG: 200 TABLET ORAL at 08:48

## 2020-06-22 RX ADMIN — OLANZAPINE 5 MG: 5 TABLET, FILM COATED ORAL at 21:32

## 2020-06-22 RX ADMIN — TRIHEXYPHENIDYL HYDROCHLORIDE 2 MG: 2 TABLET ORAL at 13:35

## 2020-06-22 RX ADMIN — TIMOLOL MALEATE 1 DROP: 5 SOLUTION OPHTHALMIC at 08:48

## 2020-06-22 RX ADMIN — TRIHEXYPHENIDYL HYDROCHLORIDE 2 MG: 2 TABLET ORAL at 21:32

## 2020-06-22 RX ADMIN — TRIHEXYPHENIDYL HYDROCHLORIDE 2 MG: 2 TABLET ORAL at 08:48

## 2020-06-22 RX ADMIN — CLONIDINE HYDROCHLORIDE 0.1 MG: 0.1 TABLET ORAL at 21:33

## 2020-06-22 RX ADMIN — LABETALOL HYDROCHLORIDE 200 MG: 200 TABLET, FILM COATED ORAL at 13:34

## 2020-06-22 RX ADMIN — COLCHICINE 0.6 MG: 0.6 TABLET ORAL at 08:48

## 2020-06-22 RX ADMIN — ATORVASTATIN CALCIUM 40 MG: 40 TABLET, FILM COATED ORAL at 08:48

## 2020-06-22 RX ADMIN — SODIUM POLYSTYRENE SULFONATE 15 G: 15 SUSPENSION ORAL; RECTAL at 08:48

## 2020-06-22 ASSESSMENT — PAIN SCALES - GENERAL
PAINLEVEL_OUTOF10: 0

## 2020-06-22 NOTE — CARE COORDINATION
spoke with pt's mother  Who report  She can not locate pt medication as she throws everything all over her room.  spoke with pt's brother report he will look for med but he can not  guaranteed he can find them today.

## 2020-06-22 NOTE — PROGRESS NOTES
Javier Vieyra MD FACP  Progress notes      CHIEF COMPLAINT: Medical management      HISTORY OF PRESENT ILLNESS:    53-year-old -American woman well-known to me from office and prior admissions  Patient with extensive psychiatric and medical history  Readmitted to psychiatric unit for behavioral changes  Data reviewed in detail  Patient was seen on the unit earlier this morning  Medically she states she feels fine  Tolerating medications well  Past Medical History:    Past Medical History:   Diagnosis Date    Bell's palsy     Bipolar disorder ([facility] [address]     bipolar    Carpal tunnel syndrome 8/4/2016    Hyperlipidemia     Non-insulin dependent type 2 diabetes mellitus ([facility] [address]     Systemic primary arterial hypertension 8/4/2016       Past Surgical History:    Past Surgical History:   Procedure Laterality Date    ECHO COMPLETE  6/22/2013            Medications Prior to Admission:    Medications Prior to Admission: trihexyphenidyl (ARTANE) 2 MG tablet, Take 2 mg by mouth 3 times daily  [DISCONTINUED] OLANZapine (ZYPREXA) 10 MG tablet, Take 10 mg by mouth nightly  [DISCONTINUED] paliperidone palmitate ER (INVEGA SUSTENNA) 156 MG/ML DEEP IM injection, Inject 156 mg into the muscle every 30 days  [DISCONTINUED] mirtazapine (REMERON) 15 MG tablet, Take 15 mg by mouth nightly  [DISCONTINUED] carBAMazepine (TEGRETOL XR) 100 MG extended release tablet, Take 300 mg by mouth 3 times daily  [DISCONTINUED] carBAMazepine (TEGRETOL) 200 MG tablet, Take 300 mg by mouth 3 times daily  atorvastatin (LIPITOR) 40 MG tablet, TAKE 1 TABLET BY MOUTH EVERY DAY  furosemide (LASIX) 20 MG tablet, TAKE 1 TABLET BY MOUTH EVERY DAY  JANUVIA 100 MG tablet, TAKE 1 TABLET BY MOUTH EVERY DAY  cloNIDine (CATAPRES) 0.1 MG tablet, TAKE 1 TABLET BY MOUTH TWICE A DAY  labetalol (NORMODYNE) 200 MG tablet, TAKE 1 TABLET BY MOUTH EVERY 8 HOURS  colchicine (COLCRYS) 0.6 MG tablet, TAKE 1 TABLET BY MOUTH TWICE A DAY  vitamin D (ERGOCALCIFEROL) 1.25 MG (30561 UT) CAPS capsule, TAKE 1 CAPSULE BY MOUTH ONE TIME PER WEEK  metFORMIN (GLUCOPHAGE) 1000 MG tablet, TAKE 1 TABLET BY MOUTH TWICE A DAY WITH MEALS  FREESTYLE LITE strip, USE TO TEST BLOOD SUGAR EVERY MORNING  sodium polystyrene (SPS) 15 GM/60ML suspension, Take 60 mLs by mouth three times a week MON WED FRI  risperiDONE (RISPERDAL) 1 MG tablet, Take 1 tablet by mouth 2 times daily  [DISCONTINUED] divalproex (DEPAKOTE) 500 MG DR tablet, Take 1 tablet by mouth daily  [DISCONTINUED] divalproex (DEPAKOTE) 250 MG DR tablet, Take 3 tablets by mouth nightly  [DISCONTINUED] risperiDONE microspheres ER (RISPERDAL CONSTA) 12.5 MG injection, Inject 2 mLs into the muscle every 14 days for 1 dose  latanoprost (XALATAN) 0.005 % ophthalmic solution, Place 1 drop into both eyes nightly  ammonium lactate (AMLACTIN) 12 % cream, APPLY TO AFFECTED AREA EVERY DAY  Omega-3 Fatty Acids (FISH OIL) 1000 MG CAPS, Take 3 capsules by mouth daily  insulin glargine (LANTUS SOLOSTAR) 100 UNIT/ML injection pen, Inject 14 Units into the skin daily  brimonidine (ALPHAGAN) 0.2 % ophthalmic solution,   blood glucose monitor kit and supplies, CHECK BLOOD GLUCOSE DAILY DX: E11.9 ONE TOUCH GLUCOMETER REQUESTED IF OK WITH INSRANCE  FREESTYLE LANCETS MISC, Inject 1 each into the skin daily  Timolol (TIMOPTIC) 0.5 % (DAILY) SOLN ophthalmic solution, Place 1 drop into both eyes 2 times daily    Allergies:    Ace inhibitors; Ibuprofen; Latuda [lurasidone hcl]; and Haldol [haloperidol]    Social History:    reports that she has never smoked. She has never used smokeless tobacco. She reports that she does not drink alcohol or use drugs. Family History:   family history includes Heart Disease (age of onset: 76) in her father; High Blood Pressure in her father.     REVIEW OF SYSTEMS:  As above in the HPI, otherwise negative    PHYSICAL EXAM:    Vitals:  /62   Pulse 87   Temp 98 °F (36.7 °C) (Temporal)   Resp 16   Ht 5' 3\" (1.6 m)   Wt 170 lb

## 2020-06-22 NOTE — PLAN OF CARE
Pt denies SI HI and hallucinations. Pt is pleasant, FOI, cooperative, social with staff and others. No outbursts. Not being inappropriate today. Pt is medication compliant, eating provided meals. We will continue to provide support and comfort for the patient.      Problem: Anger Management/Homicidal Ideation:  Goal: Able to display appropriate communication and problem solving  Description: Able to display appropriate communication and problem solving  Outcome: Met This Shift     Problem: Anger Management/Homicidal Ideation:  Goal: Absence of angry outbursts  Description: Absence of angry outbursts  Outcome: Met This Shift     Problem: Anger Management/Homicidal Ideation:  Goal: Absence of homicidal ideation  Description: Absence of homicidal ideation  Outcome: Met This Shift     Problem: Altered Mood, Manic Behavior:  Goal: Absence of self-harm  Description: Absence of self-harm  Outcome: Met This Shift     Problem: Altered Mood, Manic Behavior:  Goal: Ability to interact with others will improve  Description: Ability to interact with others will improve  Outcome: Met This Shift     Problem: Altered Mood, Manic Behavior:  Goal: Ability to demonstrate self-control will improve  Description: Ability to demonstrate self-control will improve  Outcome: Met This Shift     Problem: Altered Mood, Manic Behavior:  Goal: Mood stable  Description: Mood stable  Outcome: Met This Shift     Problem: Falls - Risk of:  Goal: Will remain free from falls  Description: Will remain free from falls  Outcome: Met This Shift     Problem: Altered Mood, Psychotic Behavior:  Goal: Ability to interact with others will improve  Description: Ability to interact with others will improve  Outcome: Met This Shift

## 2020-06-22 NOTE — PLAN OF CARE
Tangential  Thought Content:Normal: Yes  Thought Content: Preoccupations  Hallucinations: None  Delusions: Yes  Delusions: Grandeur  Memory:Normal: No  Memory: Poor Recent  Insight and Judgment: No  Insight and Judgment: Poor Judgment, Poor Insight  Present Suicidal Ideation: No  Present Homicidal Ideation: No    Daily Assessment Last Entry:   Daily Sleep (WDL): Exceptions to WDL         Patient Currently in Pain: Denies  Daily Nutrition (WDL): Within Defined Limits    Patient Monitoring:  Frequency of Checks: 4 times per hour, close    Psychiatric Symptoms:   Depression Symptoms  Depression Symptoms: Impaired concentration  Anxiety Symptoms  Anxiety Symptoms: Generalized, Obsessions  Gretta Symptoms  Gretta Symptoms: No problems reported or observed. Psychosis Symptoms  Delusion Type: No problems reported or observed. Suicide Risk CSSR-S:  1) Within the past month, have you wished you were dead or wished you could go to sleep and not wake up? : No  2) Have you actually had any thoughts of killing yourself? : No  6) Have you ever done anything, started to do anything, or prepared to do anything to end your life?: No  Change in Result No. Change in Plan of care No.    EDUCATION:   Learner Progress Toward Treatment Goals: Reviewed results and recommendations of this team    Method: Small group    Outcome: Verbalized understanding    PATIENT GOALS: Go home    PLAN/TREATMENT RECOMMENDATIONS UPDATE:  Encourage group participation and continue to provide emotional support.     GOALS UPDATE:  Time frame for Short-Term Goals: 1-3 days,      Caty Berger RN

## 2020-06-22 NOTE — PROGRESS NOTES
Patient has been in her room for most of this shift. Patient denies all and states that \"I'm just tired. \"  Patient did come out for a bit to get snack and was pleasant, calm, and cooperative. Patient is encouraged to continue to work towards discharge goal by complying with medications, attending groups and to seek staff if feelings are overwhelming. Environmental rounds completed per unit policy to maintain safety of everyone on the unit. Staff will offer support and interventions as requested or required.

## 2020-06-22 NOTE — PROGRESS NOTES
CLINICAL PHARMACY NOTE: MEDS TO [address] Select Patient?: No  Total # of Prescriptions Filled: 2   The following medications were delivered to the patient:  · Carbamazepine 200  · Olanzapine 5  Total # of Interventions Completed: 3  Time Spent (min): 30    Additional Documentation:

## 2020-06-23 VITALS
BODY MASS INDEX: 30.12 KG/M2 | HEIGHT: 63 IN | RESPIRATION RATE: 16 BRPM | OXYGEN SATURATION: 100 % | SYSTOLIC BLOOD PRESSURE: 122 MMHG | TEMPERATURE: 97.8 F | WEIGHT: 170 LBS | HEART RATE: 88 BPM | DIASTOLIC BLOOD PRESSURE: 74 MMHG

## 2020-06-23 LAB — METER GLUCOSE: 120 MG/DL (ref 74–99)

## 2020-06-23 PROCEDURE — 82962 GLUCOSE BLOOD TEST: CPT

## 2020-06-23 PROCEDURE — 6370000000 HC RX 637 (ALT 250 FOR IP): Performed by: PSYCHIATRY & NEUROLOGY

## 2020-06-23 PROCEDURE — 6370000000 HC RX 637 (ALT 250 FOR IP): Performed by: INTERNAL MEDICINE

## 2020-06-23 PROCEDURE — 99239 HOSP IP/OBS DSCHRG MGMT >30: CPT | Performed by: NURSE PRACTITIONER

## 2020-06-23 RX ORDER — SODIUM POLYSTYRENE SULFONATE 15 G/60ML
15 SUSPENSION ORAL; RECTAL
Qty: 1 BOTTLE | Refills: 0 | Status: SHIPPED | OUTPATIENT
Start: 2020-06-24 | End: 2021-08-09

## 2020-06-23 RX ORDER — CHLORAL HYDRATE 500 MG
3000 CAPSULE ORAL DAILY
Qty: 30 CAPSULE | Refills: 3 | Status: SHIPPED | OUTPATIENT
Start: 2020-06-23 | End: 2022-03-28 | Stop reason: ALTCHOICE

## 2020-06-23 RX ORDER — AMMONIUM LACTATE 12 G/100G
CREAM TOPICAL
Qty: 385 G | Refills: 0 | Status: SHIPPED | OUTPATIENT
Start: 2020-06-23

## 2020-06-23 RX ORDER — INSULIN GLARGINE 100 [IU]/ML
14 INJECTION, SOLUTION SUBCUTANEOUS DAILY
Qty: 5 PEN | Refills: 0 | Status: SHIPPED | OUTPATIENT
Start: 2020-06-23 | End: 2020-10-20

## 2020-06-23 RX ORDER — ERGOCALCIFEROL 1.25 MG/1
CAPSULE ORAL
Qty: 4 CAPSULE | Refills: 0 | Status: SHIPPED | OUTPATIENT
Start: 2020-06-23

## 2020-06-23 RX ORDER — LANCETS 28 GAUGE
1 EACH MISCELLANEOUS DAILY
Qty: 100 EACH | Refills: 0 | Status: SHIPPED | OUTPATIENT
Start: 2020-06-23 | End: 2022-08-01

## 2020-06-23 RX ORDER — CLONIDINE HYDROCHLORIDE 0.1 MG/1
TABLET ORAL
Qty: 60 TABLET | Refills: 0 | Status: SHIPPED | OUTPATIENT
Start: 2020-06-23 | End: 2021-08-09

## 2020-06-23 RX ORDER — TRIHEXYPHENIDYL HYDROCHLORIDE 2 MG/1
2 TABLET ORAL 3 TIMES DAILY
Qty: 90 TABLET | Refills: 0 | Status: SHIPPED | OUTPATIENT
Start: 2020-06-23 | End: 2020-06-23 | Stop reason: HOSPADM

## 2020-06-23 RX ORDER — COLCHICINE 0.6 MG/1
TABLET ORAL
Qty: 30 TABLET | Refills: 0 | Status: SHIPPED | OUTPATIENT
Start: 2020-06-23 | End: 2022-03-28 | Stop reason: ALTCHOICE

## 2020-06-23 RX ORDER — BLOOD-GLUCOSE METER
KIT MISCELLANEOUS
Qty: 50 STRIP | Refills: 0 | Status: SHIPPED | OUTPATIENT
Start: 2020-06-23

## 2020-06-23 RX ORDER — FUROSEMIDE 20 MG/1
TABLET ORAL
Qty: 30 TABLET | Refills: 0 | Status: SHIPPED | OUTPATIENT
Start: 2020-06-23 | End: 2020-08-25

## 2020-06-23 RX ORDER — ATORVASTATIN CALCIUM 40 MG/1
TABLET, FILM COATED ORAL
Qty: 30 TABLET | Refills: 0 | Status: SHIPPED | OUTPATIENT
Start: 2020-06-23 | End: 2020-10-28 | Stop reason: SDUPTHER

## 2020-06-23 RX ORDER — LABETALOL 200 MG/1
200 TABLET, FILM COATED ORAL EVERY 8 HOURS SCHEDULED
Qty: 90 TABLET | Refills: 0 | Status: SHIPPED | OUTPATIENT
Start: 2020-06-23 | End: 2020-06-29

## 2020-06-23 RX ADMIN — METFORMIN HYDROCHLORIDE 1000 MG: 1000 TABLET, FILM COATED ORAL at 08:56

## 2020-06-23 RX ADMIN — ALOGLIPTIN 25 MG: 25 TABLET, FILM COATED ORAL at 08:56

## 2020-06-23 RX ADMIN — INSULIN GLARGINE 14 UNITS: 100 INJECTION, SOLUTION SUBCUTANEOUS at 08:55

## 2020-06-23 RX ADMIN — COLCHICINE 0.6 MG: 0.6 TABLET ORAL at 08:56

## 2020-06-23 RX ADMIN — TRIHEXYPHENIDYL HYDROCHLORIDE 2 MG: 2 TABLET ORAL at 08:56

## 2020-06-23 RX ADMIN — TIMOLOL MALEATE 1 DROP: 5 SOLUTION OPHTHALMIC at 08:56

## 2020-06-23 RX ADMIN — Medication: at 08:56

## 2020-06-23 RX ADMIN — RISPERIDONE 1 MG: 1 TABLET, FILM COATED ORAL at 08:56

## 2020-06-23 RX ADMIN — CLONIDINE HYDROCHLORIDE 0.1 MG: 0.1 TABLET ORAL at 08:56

## 2020-06-23 RX ADMIN — TRIHEXYPHENIDYL HYDROCHLORIDE 2 MG: 2 TABLET ORAL at 12:44

## 2020-06-23 RX ADMIN — FUROSEMIDE 20 MG: 20 TABLET ORAL at 08:56

## 2020-06-23 RX ADMIN — BRIMONIDINE TARTRATE 1 DROP: 2 SOLUTION/ DROPS OPHTHALMIC at 08:56

## 2020-06-23 RX ADMIN — LABETALOL HYDROCHLORIDE 200 MG: 200 TABLET, FILM COATED ORAL at 06:52

## 2020-06-23 RX ADMIN — CARBAMAZEPINE 300 MG: 200 TABLET ORAL at 13:00

## 2020-06-23 RX ADMIN — CARBAMAZEPINE 300 MG: 200 TABLET ORAL at 08:56

## 2020-06-23 RX ADMIN — ATORVASTATIN CALCIUM 40 MG: 40 TABLET, FILM COATED ORAL at 08:56

## 2020-06-23 RX ADMIN — LABETALOL HYDROCHLORIDE 200 MG: 200 TABLET, FILM COATED ORAL at 13:00

## 2020-06-23 ASSESSMENT — PAIN SCALES - GENERAL
PAINLEVEL_OUTOF10: 0
PAINLEVEL_OUTOF10: 0

## 2020-06-23 NOTE — DISCHARGE SUMMARY
organizations: Not on file     Relationship status: Not on file    Intimate partner violence     Fear of current or ex partner: Not on file     Emotionally abused: Not on file     Physically abused: Not on file     Forced sexual activity: Not on file   Other Topics Concern    Not on file   Social History Narrative    Not on file       MEDICATIONS:    Current Facility-Administered Medications:     acetaminophen (TYLENOL) tablet 650 mg, 650 mg, Oral, Q6H PRN, Adeline Garcia MD, 650 mg at 06/16/20 0239    hydrOXYzine (VISTARIL) capsule 50 mg, 50 mg, Oral, TID PRN, Adeline Garcia MD, 50 mg at 06/20/20 2105    haloperidol lactate (HALDOL) injection 5 mg, 5 mg, Intramuscular, Q6H PRN **OR** haloperidol (HALDOL) tablet 5 mg, 5 mg, Oral, Q6H PRN, Adeline Garcia MD    traZODone (DESYREL) tablet 50 mg, 50 mg, Oral, Nightly PRN, Adeline Garcia MD, 50 mg at 06/22/20 2131    magnesium hydroxide (MILK OF MAGNESIA) 400 MG/5ML suspension 30 mL, 30 mL, Oral, Daily PRN, Adeline Garcia MD    aluminum & magnesium hydroxide-simethicone (MAALOX) 200-200-20 MG/5ML suspension 30 mL, 30 mL, Oral, PRN, Adeline Garcia MD    risperiDONE (RISPERDAL) tablet 1 mg, 1 mg, Oral, BID, Adeline Garcia MD, 1 mg at 06/23/20 0856    atorvastatin (LIPITOR) tablet 40 mg, 40 mg, Oral, Daily, Muna Meier MD, 40 mg at 06/23/20 0856    brimonidine (ALPHAGAN) 0.2 % ophthalmic solution 1 drop, 1 drop, Both Eyes, BID, Muna Meier MD, 1 drop at 06/23/20 0856    carBAMazepine (TEGRETOL) tablet 300 mg, 300 mg, Oral, TID, Muna Meier MD, 300 mg at 06/23/20 1300    cloNIDine (CATAPRES) tablet 0.1 mg, 0.1 mg, Oral, BID, Muna Meier MD, 0.1 mg at 06/23/20 0856    colchicine (COLCRYS) tablet 0.6 mg, 0.6 mg, Oral, BID, Muna Meier MD, 0.6 mg at 06/23/20 0856    furosemide (LASIX) tablet 20 mg, 20 mg, Oral, Daily, Muna Meier MD, 20 mg at 06/23/20 0856    insulin glargine (LANTUS) injection vial 14 Units, 14 Units, Subcutaneous, Daily, Susanna Kaiser MD, 14 Units at 06/23/20 0855    alogliptin (NESINA) tablet 25 mg, 25 mg, Oral, Daily, Susanna Kaiser MD, 25 mg at 06/23/20 0856    labetalol (NORMODYNE) tablet 200 mg, 200 mg, Oral, 3 times per day, Susanna Kaiser MD, 200 mg at 06/23/20 1300    latanoprost (XALATAN) 0.005 % ophthalmic solution 1 drop, 1 drop, Both Eyes, Nightly, Susanna Kaiser MD, 1 drop at 06/22/20 2130    metFORMIN (GLUCOPHAGE) tablet 1,000 mg, 1,000 mg, Oral, BID WC, Susanna Kaiser MD, 1,000 mg at 06/23/20 0856    [START ON 7/10/2020] paliperidone palmitate ER (INVEGA SUSTENNA) IM injection 156 mg, 156 mg, Intramuscular, Q30 Days, Susanna Kaiser MD    sodium polystyrene (KAYEXALATE) 15 GM/60ML suspension 15 g, 15 g, Oral, Once per day on Mon Wed Fri, Susanna Kaiser MD, 15 g at 06/22/20 0848    timolol (TIMOPTIC) 0.5 % ophthalmic solution 1 drop, 1 drop, Both Eyes, BID, Susanna Kaiser MD, 1 drop at 06/23/20 0856    trihexyphenidyl (ARTANE) tablet 2 mg, 2 mg, Oral, TID, Susanna Kaiser MD, 2 mg at 06/23/20 1244    vitamin D (ERGOCALCIFEROL) capsule 50,000 Units, 50,000 Units, Oral, Weekly, Susanna Kaiser MD, 50,000 Units at 06/21/20 0913    glucose (GLUTOSE) 40 % oral gel 15 g, 15 g, Oral, PRN, Susanna Kaiser MD    dextrose 50 % IV solution, 12.5 g, Intravenous, PRN, Susanna Kaiser MD    glucagon (rDNA) injection 1 mg, 1 mg, Intramuscular, PRN, Susanna Kaiser MD    dextrose 5 % solution, 100 mL/hr, Intravenous, PRN, Susanna Kaiser MD    OLANZapine (ZYPREXA) tablet 5 mg, 5 mg, Oral, Nightly, Delfina Aponte, APRN - CNP, 5 mg at 06/22/20 2132    ammonium lactate (LAC-HYDRIN) 12 % lotion, , Topical, BID, Susanna Kaiser MD    Examination:  /74   Pulse 88   Temp 97.8 °F (36.6 °C) (Temporal)   Resp 16   Ht 5' 3\" (1.6 m)   Wt 170 lb (77.1 kg)   SpO2 100%   BMI 30.11 kg/m²   Gait - steady    HOSPITAL COURSE[de-identified]  Following admission to the hospital, patient had a complete physical exam and blood suicidal homicidal ideation or intent to harm. The patient has not displayed any self-injurious behavior. The patient does not appear to be overtly or covertly psychotic. The patient does not exhibit any neurovegetative signs of depression. The patient vehemently denies any audible or visual hallucinations, depressive symptoms, anxiety, and paranoia. The patient does not appear to be going through any acute psychiatric issue or process at this time nor does the patient endorse any. The patient was highly grateful for the help that she received on the unit from the medical staff. The patient stated that the medical staff was a great help to her and that we \" gave her a new lease on life and a chance of making her a better person for her family \". The patient stated that the medical staff helped her so much to better quality of her life and to help her achieve mental stabilization. The patient stated that she learned a significant amount of information from attending groups. The patient stated that she learned a lot of helpful coping skills from attending group. The patient stated that she will continue to use her coping skills to  help her handle any stressful situation that she comes across in his life. The patient was extremely grateful and stated that she learned so much and that she received the help she needed here. Patient stated willingness to go to the crisis center upon discharge for further stabilization. Patient has been compliant with treatment. The patient has no more suicidal, homicidal, psychotic, or manic symptomology. The patient received the required treatment with medication, participated in group milieu, remained engaged in unit activities, and learned appropriate coping skills. The patient was seen watching TV, playing games, socializing with peers, and calling friends and family. There were no mention or gestures of self-harm or harm to others.   The patient's mental status returned to baseline. Treatment team felt that the patient has obtained maximal benefit out of this hospitalization does not meet the criteria for inpatient hospitalization anymore. However the patient will continue to benefit from outpatient and follow-up treatment to maintain stability. Collateral information was obtained and reconciled, there were no concerns about the patient's safety. The patient has no access to guns or weapons at this time. The patient was discharged to the crisis unit in [place] [address]. Patient was monitored closely with suicide precaution  Patient was discharged on   Tegretol 300 mg 3 times daily for mood stabilization  Zyprexa 5 mg at bedtime for psychosis and mood  Resporal 1 mg twice daily for psychosis  Invega Sustenna 156 mg IM on 7/10/2020  Was encouraged to participate in group and other milieu activity  Patient started to feel better with this combination of treatment. Significant progress in the symptoms since admission. Denies AVH or paranoid thoughts  Denies hopeless or worthless feeling  No active SI/HI  Appetite:  [x] Normal  [] Increased  [] Decreased    Sleep:       [x] Normal  [] Fair       [] Poor            Energy:    [x] Normal  [] Increased  [] Decreased     SI [] Present  [x] Absent  HI  []Present  [x] Absent   Aggression:  [] yes  [x] no  Patient is [x] able  [] unable to CONTRACT FOR SAFETY   Medication side effects(SE):  [x] None(Psych.  Meds.) [] Other      Mental Status Examination on discharge:    Level of consciousness:  within normal limits   Appearance:  well-appearing  Behavior/Motor:  no abnormalities noted  Attitude toward examiner:  attentive and good eye contact  Speech:  spontaneous, normal rate and normal volume   Mood: Euthymic  Affect: Mood congruent  Thought processes: Linear, goal-directed, coherent  Thought content: Without hallucinations delusions or paranoia  Cognition:  oriented to person, place, and time   Concentration intact  Memory intact  Insight good   Judgement fair   Fund of Knowledge adequate    Reason for more than one antipsychotic:  [] N/A  [x] 3 failed monotherapy attempts ( drugs tried Zyprexa, Haldol, Seroquel)  [] Crossover to a new antipsychotic  [] Taper to monotherapy from polypharmacy   [] Augmentation of Clozapine therapy due to Tx resistance to single therapy      ASSESSMENT:  Patient symptoms are:  [x] Well controlled  [x] Improving  [] Worsening  [] No change      Diagnosis:  Active Problems:    Severe manic bipolar 1 disorder with psychotic behavior ([facility] [address]  Resolved Problems:    * No resolved hospital problems. *      LABS:    No results for input(s): WBC, HGB, PLT in the last 72 hours. No results for input(s): NA, K, CL, CO2, BUN, CREATININE, GLUCOSE in the last 72 hours. No results for input(s): BILITOT, ALKPHOS, AST, ALT in the last 72 hours. Lab Results   Component Value Date    LABAMPH NOT DETECTED 06/15/2020    LABAMPH NOT DETECTED 03/15/2011    BARBSCNU NOT DETECTED 06/15/2020    LABBENZ NOT DETECTED 06/15/2020    LABBENZ SEE BELOW 05/06/2014    CANNAB NOT DETECTED  03/15/2011    LABMETH NOT DETECTED 06/15/2020    OPIATESCREENURINE NOT DETECTED 06/15/2020    PHENCYCLIDINESCREENURINE NOT DETECTED 06/15/2020    ETOH <10 06/15/2020     Lab Results   Component Value Date    TSH 5.130 03/22/2019     Lab Results   Component Value Date    LITHIUM 0.23 (L) 05/18/2020     Lab Results   Component Value Date    VALPROATE 3 (L) 05/18/2020    CBMZ 10.3 (H) 06/18/2020       RISK ASSESSMENT AT DISCHARGE: Low risk for suicide and homicide. Treatment Plan:  Reviewed current Medications with the patient. Education provided on the complaince with treatment. Risks, benefits, side effects, drug-to-drug interactions and alternatives to treatment were discussed. Encourage patient to attend outpatient follow up appointment and therapy.     Patient was advised to call the outpatient provider, visit the nearest ED medications from any pharmacy    Bring a paper prescription for each of these medications  · blood glucose monitor kit and supplies           TIME SPEND - 35 MINUTES TO COMPLETE THE EVALUATION, DISCHARGE SUMMARY, MEDICATION RECONCILIATION AND FOLLOW UP CARE     Signed:  Ira Browne  6/23/2020  3:07 PM

## 2020-06-23 NOTE — PROGRESS NOTES
CLINICAL PHARMACY NOTE: MEDS TO [address] Select Patient?: No  Total # of Prescriptions Filled: 3   The following medications were delivered to the patient:  · Januvia 100 mg  · Clonidine 0.1 mg  · Metformin 1000 mg  Total # of Interventions Completed: 3  Time Spent (min): 30    Additional Documentation:

## 2020-06-23 NOTE — CARE COORDINATION
In order to ensure appropriate transition and discharge planning is in place, the following documents have been transmitted to [facility], as the new outpatient provider:     The d/c diagnosis was transmitted to the next care provider   The reason for hospitalization was transmitted to the next care provider   The d/c medications (dosage and indication) were transmitted to the next care provider    The continuing care plan was transmitted to the next care provider

## 2020-06-23 NOTE — PLAN OF CARE
Problem: Anger Management/Homicidal Ideation:  Goal: Absence of angry outbursts  Description: Absence of angry outbursts  6/23/2020 1425 by Bre Hamm RN  Outcome: Met This Shift  6/23/2020 0137 by Deric Trujillo RN  Outcome: Met This Shift     Problem: Anger Management/Homicidal Ideation:  Goal: Absence of homicidal ideation  Description: Absence of homicidal ideation  Outcome: Met This Shift     Problem: Altered Mood, Manic Behavior:  Goal: Ability to disclose and discuss suicidal ideas will improve  Description: Ability to disclose and discuss suicidal ideas will improve  Outcome: Met This Shift     Problem: Altered Mood, Manic Behavior:  Goal: Absence of self-harm  Description: Absence of self-harm  Outcome: Met This Shift     Problem: Altered Mood, Manic Behavior:  Goal: Ability to achieve adequate nutritional intake will improve  Description: Ability to achieve adequate nutritional intake will improve  Outcome: Met This Shift     Problem: Falls - Risk of:  Goal: Absence of physical injury  Description: Absence of physical injury  Outcome: Met This Shift     Problem: Anger Management/Homicidal Ideation:  Goal: Able to display appropriate communication and problem solving  Description: Able to display appropriate communication and problem solving  Outcome: Ongoing     Problem: Anger Management/Homicidal Ideation:  Goal: Participates in care planning  Description: Participates in care planning  Outcome: Ongoing     Problem: Altered Mood, Psychotic Behavior:  Goal: Able to demonstrate trust by eating, participating in treatment and following staff's direction  Description: Able to demonstrate trust by eating, participating in treatment and following staff's direction  Outcome: Ongoing     Patient denies SI/HI/Hallucinations. Patient spends a majority of her time out on the unit, bright and social with her peers. Patient randomly heard singing and laughing on the unit.  Patient pleasant and cooperative during conversation. Patient eager for discharge today. Patient taking prescribed medications, eating provided meals, and attending groups.

## 2020-07-12 ENCOUNTER — HOSPITAL ENCOUNTER (EMERGENCY)
Age: 65
Discharge: ANOTHER HOSPITAL | End: 2020-07-12
Attending: EMERGENCY MEDICINE
Payer: COMMERCIAL

## 2020-07-12 VITALS
RESPIRATION RATE: 20 BRPM | SYSTOLIC BLOOD PRESSURE: 141 MMHG | HEART RATE: 99 BPM | DIASTOLIC BLOOD PRESSURE: 77 MMHG | TEMPERATURE: 98 F | OXYGEN SATURATION: 99 %

## 2020-07-12 LAB
ACETAMINOPHEN LEVEL: <5 MCG/ML (ref 10–30)
ALBUMIN SERPL-MCNC: 3.9 G/DL (ref 3.5–5.2)
ALP BLD-CCNC: 77 U/L (ref 35–104)
ALT SERPL-CCNC: 12 U/L (ref 0–32)
AMPHETAMINE SCREEN, URINE: NOT DETECTED
ANION GAP SERPL CALCULATED.3IONS-SCNC: 12 MMOL/L (ref 7–16)
AST SERPL-CCNC: 15 U/L (ref 0–31)
BACTERIA: NORMAL /HPF
BARBITURATE SCREEN URINE: NOT DETECTED
BASOPHILS ABSOLUTE: 0.03 E9/L (ref 0–0.2)
BASOPHILS RELATIVE PERCENT: 0.6 % (ref 0–2)
BENZODIAZEPINE SCREEN, URINE: NOT DETECTED
BILIRUB SERPL-MCNC: <0.2 MG/DL (ref 0–1.2)
BILIRUBIN URINE: NEGATIVE
BLOOD, URINE: NEGATIVE
BUN BLDV-MCNC: 14 MG/DL (ref 8–23)
CALCIUM SERPL-MCNC: 9.3 MG/DL (ref 8.6–10.2)
CANNABINOID SCREEN URINE: NOT DETECTED
CHLORIDE BLD-SCNC: 98 MMOL/L (ref 98–107)
CLARITY: CLEAR
CO2: 24 MMOL/L (ref 22–29)
COCAINE METABOLITE SCREEN URINE: NOT DETECTED
COLOR: YELLOW
CREAT SERPL-MCNC: 0.6 MG/DL (ref 0.5–1)
EKG ATRIAL RATE: 99 BPM
EKG P AXIS: 57 DEGREES
EKG P-R INTERVAL: 168 MS
EKG Q-T INTERVAL: 352 MS
EKG QRS DURATION: 70 MS
EKG QTC CALCULATION (BAZETT): 451 MS
EKG R AXIS: 26 DEGREES
EKG T AXIS: 62 DEGREES
EKG VENTRICULAR RATE: 99 BPM
EOSINOPHILS ABSOLUTE: 0.13 E9/L (ref 0.05–0.5)
EOSINOPHILS RELATIVE PERCENT: 2.4 % (ref 0–6)
ETHANOL: <10 MG/DL (ref 0–0.08)
FENTANYL SCREEN, URINE: NOT DETECTED
GFR AFRICAN AMERICAN: >60
GFR NON-AFRICAN AMERICAN: >60 ML/MIN/1.73
GLUCOSE BLD-MCNC: 200 MG/DL (ref 74–99)
GLUCOSE URINE: NEGATIVE MG/DL
HCT VFR BLD CALC: 36.9 % (ref 34–48)
HEMOGLOBIN: 11.8 G/DL (ref 11.5–15.5)
IMMATURE GRANULOCYTES #: 0.02 E9/L
IMMATURE GRANULOCYTES %: 0.4 % (ref 0–5)
KETONES, URINE: NEGATIVE MG/DL
LEUKOCYTE ESTERASE, URINE: ABNORMAL
LYMPHOCYTES ABSOLUTE: 2.35 E9/L (ref 1.5–4)
LYMPHOCYTES RELATIVE PERCENT: 43.8 % (ref 20–42)
Lab: NORMAL
MCH RBC QN AUTO: 26.3 PG (ref 26–35)
MCHC RBC AUTO-ENTMCNC: 32 % (ref 32–34.5)
MCV RBC AUTO: 82.2 FL (ref 80–99.9)
METHADONE SCREEN, URINE: NOT DETECTED
MONOCYTES ABSOLUTE: 0.38 E9/L (ref 0.1–0.95)
MONOCYTES RELATIVE PERCENT: 7.1 % (ref 2–12)
NEUTROPHILS ABSOLUTE: 2.45 E9/L (ref 1.8–7.3)
NEUTROPHILS RELATIVE PERCENT: 45.7 % (ref 43–80)
NITRITE, URINE: NEGATIVE
OPIATE SCREEN URINE: NOT DETECTED
OXYCODONE URINE: NOT DETECTED
PDW BLD-RTO: 14.8 FL (ref 11.5–15)
PH UA: 7.5 (ref 5–9)
PHENCYCLIDINE SCREEN URINE: NOT DETECTED
PLATELET # BLD: 214 E9/L (ref 130–450)
PMV BLD AUTO: 10.6 FL (ref 7–12)
POTASSIUM SERPL-SCNC: 4.1 MMOL/L (ref 3.5–5)
PROTEIN UA: NEGATIVE MG/DL
RBC # BLD: 4.49 E12/L (ref 3.5–5.5)
RBC UA: NORMAL /HPF (ref 0–2)
SALICYLATE, SERUM: <0.3 MG/DL (ref 0–30)
SODIUM BLD-SCNC: 134 MMOL/L (ref 132–146)
SPECIFIC GRAVITY UA: 1.01 (ref 1–1.03)
TOTAL PROTEIN: 6.8 G/DL (ref 6.4–8.3)
TRICYCLIC ANTIDEPRESSANTS SCREEN SERUM: NEGATIVE NG/ML
TROPONIN: <0.01 NG/ML (ref 0–0.03)
UROBILINOGEN, URINE: 0.2 E.U./DL
WBC # BLD: 5.4 E9/L (ref 4.5–11.5)
WBC UA: NORMAL /HPF (ref 0–5)

## 2020-07-12 PROCEDURE — 80053 COMPREHEN METABOLIC PANEL: CPT

## 2020-07-12 PROCEDURE — 93005 ELECTROCARDIOGRAM TRACING: CPT | Performed by: NURSE PRACTITIONER

## 2020-07-12 PROCEDURE — 96372 THER/PROPH/DIAG INJ SC/IM: CPT

## 2020-07-12 PROCEDURE — G0480 DRUG TEST DEF 1-7 CLASSES: HCPCS

## 2020-07-12 PROCEDURE — 99285 EMERGENCY DEPT VISIT HI MDM: CPT

## 2020-07-12 PROCEDURE — 93010 ELECTROCARDIOGRAM REPORT: CPT | Performed by: INTERNAL MEDICINE

## 2020-07-12 PROCEDURE — 84484 ASSAY OF TROPONIN QUANT: CPT

## 2020-07-12 PROCEDURE — 80307 DRUG TEST PRSMV CHEM ANLYZR: CPT

## 2020-07-12 PROCEDURE — 81001 URINALYSIS AUTO W/SCOPE: CPT

## 2020-07-12 PROCEDURE — 85025 COMPLETE CBC W/AUTO DIFF WBC: CPT

## 2020-07-12 PROCEDURE — 6360000002 HC RX W HCPCS: Performed by: NURSE PRACTITIONER

## 2020-07-12 RX ORDER — DIPHENHYDRAMINE HYDROCHLORIDE 50 MG/ML
50 INJECTION INTRAMUSCULAR; INTRAVENOUS ONCE
Status: COMPLETED | OUTPATIENT
Start: 2020-07-12 | End: 2020-07-12

## 2020-07-12 RX ORDER — LORAZEPAM 2 MG/ML
2 INJECTION INTRAMUSCULAR ONCE
Status: COMPLETED | OUTPATIENT
Start: 2020-07-12 | End: 2020-07-12

## 2020-07-12 RX ORDER — HALOPERIDOL 5 MG/ML
5 INJECTION INTRAMUSCULAR ONCE
Status: COMPLETED | OUTPATIENT
Start: 2020-07-12 | End: 2020-07-12

## 2020-07-12 RX ADMIN — LORAZEPAM 2 MG: 2 INJECTION INTRAMUSCULAR; INTRAVENOUS at 17:05

## 2020-07-12 RX ADMIN — HALOPERIDOL LACTATE 5 MG: 5 INJECTION INTRAMUSCULAR at 16:12

## 2020-07-12 RX ADMIN — DIPHENHYDRAMINE HYDROCHLORIDE 50 MG: 50 INJECTION, SOLUTION INTRAMUSCULAR; INTRAVENOUS at 17:06

## 2020-07-12 NOTE — ED NOTES
The pt was accepted to [facility] by Dr Charleen Bass. - room 1702A. N to N to be called to Dorothea Arellano at [phone].      [facility]  07/12/20 8907

## 2020-07-12 NOTE — ED NOTES
Bed: [facility]  Expected date:   Expected time:   Means of arrival:   Comments:  LANES Lella Dire, RN  07/12/20 0119

## 2020-07-12 NOTE — ED NOTES
Completed patient assessment. Patient denies SI. Patient admits to HI - towards her mother.     Patient is a 63year old, female presenting to ED for chronic manic behaviors and threatening harm towards her elderly mother. Patient reportedly has pressed her mother's life alert button 3x today which has dispatched the fire dept & PD. Patient reportedly was rambling incoherently and talking to unseen others.     Patient has a mental health hx of bipolar disorder, currently in treatment with Dr. Julius Cloud at [address] [place] [address] - patient reportedly has not been taking psychiatric medications; and patient last psychiatric admission was on 6/15/2020 on [facility].     Patient reports poor sleep, ok appetite, & denies feelings of hopelessness/helplessness. Patient hx of suicide attempts or self injurious behaviors are unknown. Patient denies any drug/alcohol use.     Patient cooperative, oriented x 4, labile mood, labile affect, thought process has some fleet of ideas & delusions, speech rambling/rapid. Patient denies A/V hallucinations - patient can be observed talking to unseen others at times.     Patient was pink slipped by PD.     SW will pursue inpatient admission for safety/stabilization, once patient is medically cleared.      Harlow Snellen, MAMI, RICCI  07/12/20 9563

## 2020-07-12 NOTE — ED PROVIDER NOTES
performed during the hospital encounter of 07/12/20   Troponin   Result Value Ref Range    Troponin <0.01 0.00 - 0.03 ng/mL   CBC Auto Differential   Result Value Ref Range    WBC 5.4 4.5 - 11.5 E9/L    RBC 4.49 3.50 - 5.50 E12/L    Hemoglobin 11.8 11.5 - 15.5 g/dL    Hematocrit 36.9 34.0 - 48.0 %    MCV 82.2 80.0 - 99.9 fL    MCH 26.3 26.0 - 35.0 pg    MCHC 32.0 32.0 - 34.5 %    RDW 14.8 11.5 - 15.0 fL    Platelets 262 898 - 796 E9/L    MPV 10.6 7.0 - 12.0 fL    Neutrophils % 45.7 43.0 - 80.0 %    Immature Granulocytes % 0.4 0.0 - 5.0 %    Lymphocytes % 43.8 (H) 20.0 - 42.0 %    Monocytes % 7.1 2.0 - 12.0 %    Eosinophils % 2.4 0.0 - 6.0 %    Basophils % 0.6 0.0 - 2.0 %    Neutrophils Absolute 2.45 1.80 - 7.30 E9/L    Immature Granulocytes # 0.02 E9/L    Lymphocytes Absolute 2.35 1.50 - 4.00 E9/L    Monocytes Absolute 0.38 0.10 - 0.95 E9/L    Eosinophils Absolute 0.13 0.05 - 0.50 E9/L    Basophils Absolute 0.03 0.00 - 0.20 E9/L   Comprehensive Metabolic Panel   Result Value Ref Range    Sodium 134 132 - 146 mmol/L    Potassium 4.1 3.5 - 5.0 mmol/L    Chloride 98 98 - 107 mmol/L    CO2 24 22 - 29 mmol/L    Anion Gap 12 7 - 16 mmol/L    Glucose 200 (H) 74 - 99 mg/dL    BUN 14 8 - 23 mg/dL    CREATININE 0.6 0.5 - 1.0 mg/dL    GFR Non-African American >60 >=60 mL/min/1.73    GFR African American >60     Calcium 9.3 8.6 - 10.2 mg/dL    Total Protein 6.8 6.4 - 8.3 g/dL    Alb 3.9 3.5 - 5.2 g/dL    Total Bilirubin <0.2 0.0 - 1.2 mg/dL    Alkaline Phosphatase 77 35 - 104 U/L    ALT 12 0 - 32 U/L    AST 15 0 - 31 U/L   Urinalysis   Result Value Ref Range    Color, UA Yellow Straw/Yellow    Clarity, UA Clear Clear    Glucose, Ur Negative Negative mg/dL    Bilirubin Urine Negative Negative    Ketones, Urine Negative Negative mg/dL    Specific Gravity, UA 1.015 1.005 - 1.030    Blood, Urine Negative Negative    pH, UA 7.5 5.0 - 9.0    Protein, UA Negative Negative mg/dL    Urobilinogen, Urine 0.2 <2.0 E.U./dL    Nitrite, Urine Negative Negative    Leukocyte Esterase, Urine SMALL (A) Negative   Urine Drug Screen   Result Value Ref Range    Amphetamine Screen, Urine NOT DETECTED Negative <1000 ng/mL    Barbiturate Screen, Ur NOT DETECTED Negative < 200 ng/mL    Benzodiazepine Screen, Urine NOT DETECTED Negative < 200 ng/mL    Cannabinoid Scrn, Ur NOT DETECTED Negative < 50ng/mL    Cocaine Metabolite Screen, Urine NOT DETECTED Negative < 300 ng/mL    Opiate Scrn, Ur NOT DETECTED Negative < 300ng/mL    PCP Screen, Urine NOT DETECTED Negative < 25 ng/mL    Methadone Screen, Urine NOT DETECTED Negative <300 ng/mL    Oxycodone Urine NOT DETECTED Negative <100 ng/mL    FENTANYL SCREEN, URINE NOT DETECTED Negative <1 ng/mL    Drug Screen Comment: see below    Serum Drug Screen   Result Value Ref Range    Ethanol Lvl <10 mg/dL    Acetaminophen Level <5.0 (L) 10.0 - 90.7 mcg/mL    Salicylate, Serum <6.3 0.0 - 30.0 mg/dL    TCA Scrn NEGATIVE Cutoff:300 ng/mL   Microscopic Urinalysis   Result Value Ref Range    WBC, UA 2-5 0 - 5 /HPF    RBC, UA NONE 0 - 2 /HPF    Bacteria, UA NONE SEEN None Seen /HPF   EKG 12 Lead   Result Value Ref Range    Ventricular Rate 99 BPM    Atrial Rate 99 BPM    P-R Interval 168 ms    QRS Duration 70 ms    Q-T Interval 352 ms    QTc Calculation (Bazett) 451 ms    P Axis 57 degrees    R Axis 26 degrees    T Axis 62 degrees       RADIOLOGY:  Interpreted by Radiologist.  No orders to display       EKG Interpretation  Interpreted by emergency department physician    Rhythm: normal sinus   Rate: normal  Axis: normal  Conduction: normal  ST Segments: no acute change  T Waves: no acute change    Clinical Impression: no acute changes  Comparison to Old EKG  unchanged          ------------------------- NURSING NOTES AND VITALS REVIEWED ---------------------------   The nursing notes within the ED encounter and vital signs as below have been reviewed.    BP (!) 145/75   Pulse 105   Temp 98 °F (36.7 °C)   Resp 20   SpO2 99% Oxygen Saturation Interpretation: Normal            ---------------------------------------------------PHYSICAL EXAM--------------------------------------      Constitutional/General: Alert and oriented x3, well appearing, non toxic in NAD  Head: Normocephalic, atraumatic  Eyes: PERRL, EOMI  Mouth: Oropharynx clear, handling secretions, no trismus  Neck: Supple, full ROM, non tender to palpation in the midline, no stridor, no crepitus, no meningeal signs  Pulmonary: Lungs clear to auscultation bilaterally, no wheezes, rales, or rhonchi. Not in respiratory distress  Cardiovascular:  Regular rate and rhythm, no murmurs, gallops, or rubs. 2+ distal pulses  Abdomen: Soft, non tender, non distended, +BS, no rebound, guarding, or rigidity. No pulsatile masses appreciated  Extremities: Moves all extremities x 4. Warm and well perfused, no clubbing, cyanosis, or edema. Capillary refill <3 seconds  Skin: warm and dry without rash  Neurologic: GCS 15, CN 2-12 grossly intact, no focal deficits, symmetric strength 5/5 in the upper and lower extremities bilaterally  Psych: normal Affect, patient is rambling and yelling out continuously      ------------------------------------------ PROGRESS NOTES ------------------------------------------     Medical decision making:  Exam by Dr. Yanique Cohen disposition per social service recommendation. Consultations:   Social work     Re-Evaluations:        Counseling: The emergency provider has spoken with thepatient and discussed todays results, in addition to providing specific details for the plan of care and counseling regarding the diagnosis and prognosis. Questions are answered at this time and they are agreeable with the plan.         --------------------------------- IMPRESSION AND DISPOSITION ---------------------------------    IMPRESSION  1.  Bipolar 1 disorder ([facility], [place] [facility]        DISPOSITION  Disposition: as per consultation   Patient condition is stable             Moira Engle,

## 2021-01-14 ENCOUNTER — OFFICE VISIT (OUTPATIENT)
Dept: SURGERY | Age: 66
End: 2021-01-14
Payer: COMMERCIAL

## 2021-01-14 VITALS
TEMPERATURE: 97.2 F | HEART RATE: 87 BPM | WEIGHT: 159 LBS | OXYGEN SATURATION: 98 % | HEIGHT: 63 IN | DIASTOLIC BLOOD PRESSURE: 64 MMHG | BODY MASS INDEX: 28.17 KG/M2 | SYSTOLIC BLOOD PRESSURE: 122 MMHG

## 2021-01-14 DIAGNOSIS — S01.319A TEAR OF EARLOBE, UNSPECIFIED LATERALITY, INITIAL ENCOUNTER: Primary | ICD-10-CM

## 2021-01-14 PROCEDURE — 3017F COLORECTAL CA SCREEN DOC REV: CPT | Performed by: PLASTIC SURGERY

## 2021-01-14 PROCEDURE — G8417 CALC BMI ABV UP PARAM F/U: HCPCS | Performed by: PLASTIC SURGERY

## 2021-01-14 PROCEDURE — 99204 OFFICE O/P NEW MOD 45 MIN: CPT | Performed by: PLASTIC SURGERY

## 2021-01-14 PROCEDURE — 1036F TOBACCO NON-USER: CPT | Performed by: PLASTIC SURGERY

## 2021-01-14 PROCEDURE — G8427 DOCREV CUR MEDS BY ELIG CLIN: HCPCS | Performed by: PLASTIC SURGERY

## 2021-01-14 PROCEDURE — G8482 FLU IMMUNIZE ORDER/ADMIN: HCPCS | Performed by: PLASTIC SURGERY

## 2021-01-14 NOTE — PROGRESS NOTES
Department of Plastic Surgery - Adult  Attending Consult Note      CHIEF COMPLAINT:  Bilateral ear lobe abnormality     History Obtained From:  patient    HISTORY OF PRESENT ILLNESS:                The patient is a 63 y.o. female who presents bilateral ear love abnormalities. She is missing most of her lobules bilaterally. She states that she had earrings in ears ago and when she was at her  she used a \"chemical\" on her lobules and the piercing hole began to widen bilaterally. She denies trauma. She is a uncontrolled diabetic with A1C of 11+. Not a smoker. Does have a history of bipolar disorder.        Past Medical History:    Past Medical History:   Diagnosis Date    Bell's palsy     Bipolar disorder ([facility] [address]     bipolar    Carpal tunnel syndrome 8/4/2016    Hyperlipidemia     Non-insulin dependent type 2 diabetes mellitus ([facility] [address]     Systemic primary arterial hypertension 8/4/2016     Past Surgical History:    Past Surgical History:   Procedure Laterality Date    ECHO COMPLETE  6/22/2013          Current Medications:      Current Outpatient Medications   Medication Sig Dispense Refill    furosemide (LASIX) 20 MG tablet TAKE 1 TABLET BY MOUTH EVERY DAY 30 tablet 0    labetalol (NORMODYNE) 200 MG tablet TAKE 1 TABLET BY MOUTH EVERY 8 HOURS 90 tablet 0    SITagliptin (JANUVIA) 100 MG tablet TAKE 1 TABLET BY MOUTH EVERY DAY 30 tablet 3    metFORMIN (GLUCOPHAGE) 1000 MG tablet TAKE 1 TABLET BY MOUTH TWICE A DAY WITH MEALS 60 tablet 3    atorvastatin (LIPITOR) 40 MG tablet TAKE 1 TABLET BY MOUTH EVERY DAY 30 tablet 3    benztropine (COGENTIN) 1 MG tablet TAKE 1 TABLET BY MOUTH EVERY DAY AT NIGHT      divalproex (DEPAKOTE) 500 MG DR tablet TAKE 2 TABLETS BY MOUTH EVERY DAY      latanoprost (XALATAN) 0.005 % ophthalmic solution Place 1 drop into both eyes nightly      brimonidine (ALPHAGAN) 0.2 % ophthalmic solution       Timolol (TIMOPTIC) 0.5 % (DAILY) SOLN ophthalmic solution Place 1 drop into both eyes 2 times daily      cloNIDine (CATAPRES) 0.1 MG tablet TAKE 1 TABLET BY MOUTH TWICE A DAY (Patient not taking: Reported on 10/20/2020) 60 tablet 0    sodium polystyrene (SPS) 15 GM/60ML suspension Take 60 mLs by mouth three times a week MON WED FRI 1 Bottle 0    ammonium lactate (AMLACTIN) 12 % cream APPLY TO AFFECTED AREA EVERY DAY (Patient not taking: Reported on 10/20/2020) 385 g 0    blood glucose test strips (FREESTYLE LITE) strip USE TO TEST BLOOD SUGAR EVERY MORNING (Patient not taking: Reported on 10/20/2020) 50 strip 0    colchicine (COLCRYS) 0.6 MG tablet TAKE 1 TABLET BY MOUTH TWICE A DAY (Patient not taking: Reported on 10/20/2020) 30 tablet 0    blood glucose monitor kit and supplies CHECK BLOOD GLUCOSE DAILY DX: E11.9  ONE TOUCH GLUCOMETER REQUESTED IF OK WITH [facility] (Patient not taking: Reported on 10/20/2020) 1 kit 0    FreeStyle Lancets MISC Inject 1 each into the skin daily 100 each 0    Omega-3 Fatty Acids (FISH OIL) 1000 MG CAPS Take 3 capsules by mouth daily (Patient not taking: Reported on 10/20/2020) 30 capsule 3    vitamin D (ERGOCALCIFEROL) 1.25 MG (48975 UT) CAPS capsule TAKE 1 CAPSULE BY MOUTH ONE TIME PER WEEK (Patient not taking: Reported on 10/20/2020) 4 capsule 0    carBAMazepine (TEGRETOL) 200 MG tablet Take 1.5 tablets by mouth 3 times daily 135 tablet 0    OLANZapine (ZYPREXA) 5 MG tablet Take 1 tablet by mouth nightly 30 tablet 0    paliperidone palmitate ER (INVEGA SUSTENNA) 156 MG/ML DEEP IM injection Inject 156 mg into the muscle every 30 days for 1 dose 1.2 mg 0    risperiDONE (RISPERDAL) 1 MG tablet Take 1 tablet by mouth 2 times daily 60 tablet 0     No current facility-administered medications for this visit.       Allergies:  Ace inhibitors, Ibuprofen, Latuda [lurasidone hcl], Lurasidone, and Haldol [haloperidol]    Social History:   Social History     Socioeconomic History    Marital status: Single     Spouse name: Not on file    Number of children: Not on file    Years of education: Not on file    Highest education level: Not on file   Occupational History    Not on file   Social Needs    Financial resource strain: Not on file    Food insecurity     Worry: Not on file     Inability: Not on file    Transportation needs     Medical: Not on file     Non-medical: Not on file   Tobacco Use    Smoking status: Never Smoker    Smokeless tobacco: Never Used   Substance and Sexual Activity    Alcohol use: No    Drug use: No    Sexual activity: Not Currently   Lifestyle    Physical activity     Days per week: Not on file     Minutes per session: Not on file    Stress: Not on file   Relationships    Social connections     Talks on phone: Not on file     Gets together: Not on file     Attends Jewish service: Not on file     Active member of club or organization: Not on file     Attends meetings of clubs or organizations: Not on file     Relationship status: Not on file    Intimate partner violence     Fear of current or ex partner: Not on file     Emotionally abused: Not on file     Physically abused: Not on file     Forced sexual activity: Not on file   Other Topics Concern    Not on file   Social History Narrative    Not on file     Family History:   Family History   Problem Relation Age of Onset    No Known Problems Mother     Heart Disease Father 76    High Blood Pressure Father     No Known Problems Brother        REVIEW OF SYSTEMS:    CONSTITUTIONAL:  negative for  fevers, chills, sweats and fatigue  EYES: negative for dipolpia or acute vision loss. RESPIRATORY:  negative for  dry cough, cough with sputum, dyspnea, wheezing and chest pain  HENT:negative for pain, headache, difficulty swallowing or nose bleeds.   CARDIOVASCULAR:  negative for  chest pain, dyspnea, palpitations, syncope  GASTROINTESTINAL:  negative for nausea, vomiting, change in bowel habits, diarrhea, constipation and abdominal pain  EXTREMITIES: negative for edema  MUSCULOSKELETAL: negative for muscle weakness  SKIN: positive for lesion,negative for itching or rashes. HEME: negative for easy brusing or bleeding  BEHAVIOR/PSYCH:  negative for poor appetite, increased appetite, decreased sleep and poor concentration      PHYSICAL EXAM:        VITALS:  /64 (Site: Left Upper Arm, Position: Sitting, Cuff Size: Medium Adult)   Pulse 87   Temp 97.2 °F (36.2 °C) (Temporal)   Ht 5' 3\" (1.6 m)   Wt 159 lb (72.1 kg)   SpO2 98%   Breastfeeding No   BMI 28.17 kg/m²   CONSTITUTIONAL:  awake, alert, cooperative, no apparent distress, and appears stated age  EYES: PERRLA, EOMI, no signs of occular infection  LUNGS:  No increased work of breathing, good air exchange  CARDIOVASCULAR:  regular rate and rhythm   EXTREMITIES: no signs of clubbing or cyanosis. MUSCULOSKELETAL: negative for flaccid muscle tone or spastic movements. NEURO: Cranial nerves II-XII grossly intact. No signs of agitated mood. Ears: bilateral ear lobules missing 90% on the left and 75% on the right.            DATA:    Labs: CBC:   Lab Results   Component Value Date    WBC 7.0 10/23/2020    RBC 4.94 10/23/2020    HGB 13.7 10/23/2020    HCT 42.5 10/23/2020    MCV 86.0 10/23/2020    MCH 27.7 10/23/2020    MCHC 32.2 10/23/2020    RDW 14.9 10/23/2020     10/23/2020    MPV 10.4 10/23/2020     BMP:    Lab Results   Component Value Date     10/23/2020    K 4.8 10/23/2020    K 5.0 05/18/2020     10/23/2020    CO2 29 10/23/2020    BUN 20 10/23/2020    LABALBU 4.1 10/23/2020    LABALBU 4.3 05/17/2012    CREATININE 1.00 10/23/2020    CALCIUM 10.1 10/23/2020    GFRAA >60 07/12/2020    LABGLOM >60 07/12/2020    GLUCOSE 262 10/23/2020    GLUCOSE 122 03/23/2011             IMPRESSION/RECOMMENDATIONS:        Diagnosis  -) Bilateral ear lobule abnormality- appears traumatic   Discussion involving the cosmetic nature of the procedure and that there are not effective ways to restore the lobule completely once it is missing. Also, her A1C is uncontrolled and before she underwent any elective/cosmetic procedure she would need to have that controlled before we would consider it. Patient understands. Follow up PRN    Electronically signed by Saul Gutiérrez DO on 1/14/2021 at 9:58 AM        Photos obtained      Attending Physician Statement  I have discussed the case, including pertinent history and exam findings with the resident. I have seen and examined the patient and the key elements of all parts of the encounter have been performed by me. I agree with the assessment, exam, plan and orders as documented by the resident. I attest that the patient was seen and examined by me, and concur with the documentation above. I agree with the assessment and the plan outlined. This document is generated, in part, by voice recognition software and thus  syntax and grammatical errors are possible.     Dorine Primrose

## 2021-04-23 DIAGNOSIS — E78.2 MIXED HYPERLIPIDEMIA: ICD-10-CM

## 2021-04-23 DIAGNOSIS — Z11.59 NEED FOR HEPATITIS C SCREENING TEST: ICD-10-CM

## 2021-04-23 DIAGNOSIS — E11.8 TYPE 2 DIABETES MELLITUS WITH COMPLICATION, WITHOUT LONG-TERM CURRENT USE OF INSULIN (HCC): ICD-10-CM

## 2021-04-23 LAB
ALBUMIN SERPL-MCNC: 4.2 G/DL (ref 3.5–5.2)
ALP BLD-CCNC: 104 U/L (ref 35–104)
ALT SERPL-CCNC: 15 U/L (ref 0–32)
ANION GAP SERPL CALCULATED.3IONS-SCNC: 13 MMOL/L (ref 7–16)
AST SERPL-CCNC: 16 U/L (ref 0–31)
BILIRUB SERPL-MCNC: 0.5 MG/DL (ref 0–1.2)
BUN BLDV-MCNC: 20 MG/DL (ref 6–23)
CALCIUM SERPL-MCNC: 9.8 MG/DL (ref 8.6–10.2)
CHLORIDE BLD-SCNC: 97 MMOL/L (ref 98–107)
CHOLESTEROL, TOTAL: 128 MG/DL (ref 0–199)
CO2: 29 MMOL/L (ref 22–29)
CREAT SERPL-MCNC: 1.1 MG/DL (ref 0.5–1)
GFR AFRICAN AMERICAN: >60
GFR NON-AFRICAN AMERICAN: >60 ML/MIN/1.73
GLUCOSE BLD-MCNC: 346 MG/DL (ref 74–99)
HBA1C MFR BLD: 11.5 % (ref 4–5.6)
HDLC SERPL-MCNC: 44 MG/DL
LDL CHOLESTEROL CALCULATED: 58 MG/DL (ref 0–99)
POTASSIUM SERPL-SCNC: 5.3 MMOL/L (ref 3.5–5)
SODIUM BLD-SCNC: 139 MMOL/L (ref 132–146)
TOTAL PROTEIN: 7.3 G/DL (ref 6.4–8.3)
TRIGL SERPL-MCNC: 132 MG/DL (ref 0–149)
VLDLC SERPL CALC-MCNC: 26 MG/DL

## 2021-04-26 ENCOUNTER — IMMUNIZATION (OUTPATIENT)
Dept: PRIMARY CARE CLINIC | Age: 66
End: 2021-04-26
Payer: COMMERCIAL

## 2021-04-26 LAB — HEPATITIS C ANTIBODY INTERPRETATION: NORMAL

## 2021-04-26 PROCEDURE — 0001A COVID-19, PFIZER VACCINE 30MCG/0.3ML DOSE: CPT | Performed by: NURSE PRACTITIONER

## 2021-04-26 PROCEDURE — 91300 COVID-19, PFIZER VACCINE 30MCG/0.3ML DOSE: CPT | Performed by: NURSE PRACTITIONER

## 2021-05-17 ENCOUNTER — IMMUNIZATION (OUTPATIENT)
Dept: PRIMARY CARE CLINIC | Age: 66
End: 2021-05-17
Payer: COMMERCIAL

## 2021-05-17 PROCEDURE — 0002A COVID-19, PFIZER VACCINE 30MCG/0.3ML DOSE: CPT | Performed by: NURSE PRACTITIONER

## 2021-05-17 PROCEDURE — 91300 COVID-19, PFIZER VACCINE 30MCG/0.3ML DOSE: CPT | Performed by: NURSE PRACTITIONER

## 2021-09-10 ENCOUNTER — HOSPITAL ENCOUNTER (OUTPATIENT)
Dept: GENERAL RADIOLOGY | Age: 66
Discharge: HOME | End: 2021-09-12
Payer: COMMERCIAL

## 2021-09-10 DIAGNOSIS — Z12.31 SCREENING MAMMOGRAM, ENCOUNTER FOR: ICD-10-CM

## 2021-09-10 PROCEDURE — 77063 BREAST TOMOSYNTHESIS BI: CPT

## 2021-11-19 DIAGNOSIS — E11.8 TYPE 2 DIABETES MELLITUS WITH COMPLICATION, WITHOUT LONG-TERM CURRENT USE OF INSULIN (HCC): ICD-10-CM

## 2021-11-19 DIAGNOSIS — Z12.11 COLON CANCER SCREENING: ICD-10-CM

## 2021-11-19 LAB
ALBUMIN SERPL-MCNC: 4.1 G/DL (ref 3.5–5.2)
ALP BLD-CCNC: 138 U/L (ref 35–104)
ALT SERPL-CCNC: 18 U/L (ref 0–32)
ANION GAP SERPL CALCULATED.3IONS-SCNC: 15 MMOL/L (ref 7–16)
AST SERPL-CCNC: 18 U/L (ref 0–31)
BILIRUB SERPL-MCNC: 0.4 MG/DL (ref 0–1.2)
BUN BLDV-MCNC: 25 MG/DL (ref 6–23)
CALCIUM SERPL-MCNC: 9.9 MG/DL (ref 8.6–10.2)
CHLORIDE BLD-SCNC: 103 MMOL/L (ref 98–107)
CO2: 25 MMOL/L (ref 22–29)
CREAT SERPL-MCNC: 1.2 MG/DL (ref 0.5–1)
GFR AFRICAN AMERICAN: 55
GFR NON-AFRICAN AMERICAN: 55 ML/MIN/1.73
GLUCOSE BLD-MCNC: 297 MG/DL (ref 74–99)
HBA1C MFR BLD: 10.4 % (ref 4–5.6)
POTASSIUM SERPL-SCNC: 5.5 MMOL/L (ref 3.5–5)
SODIUM BLD-SCNC: 143 MMOL/L (ref 132–146)
TOTAL PROTEIN: 7.5 G/DL (ref 6.4–8.3)

## 2022-02-23 LAB
AVERAGE GLUCOSE: NORMAL
HBA1C MFR BLD: 8.5 %

## 2022-03-17 RX ORDER — EMPAGLIFLOZIN 10 MG/1
TABLET, FILM COATED ORAL
Qty: 30 TABLET | Refills: 0 | Status: SHIPPED
Start: 2022-03-17 | End: 2022-04-04

## 2022-03-18 RX ORDER — EMPAGLIFLOZIN 10 MG/1
TABLET, FILM COATED ORAL
Qty: 30 TABLET | Refills: 0 | OUTPATIENT
Start: 2022-03-18

## 2022-03-28 ENCOUNTER — OFFICE VISIT (OUTPATIENT)
Dept: FAMILY MEDICINE CLINIC | Age: 67
End: 2022-03-28
Payer: COMMERCIAL

## 2022-03-28 VITALS
DIASTOLIC BLOOD PRESSURE: 74 MMHG | BODY MASS INDEX: 32.76 KG/M2 | OXYGEN SATURATION: 97 % | TEMPERATURE: 97 F | HEIGHT: 62 IN | HEART RATE: 91 BPM | WEIGHT: 178 LBS | SYSTOLIC BLOOD PRESSURE: 110 MMHG | RESPIRATION RATE: 18 BRPM

## 2022-03-28 DIAGNOSIS — Z00.00 ENCOUNTER FOR MEDICAL EXAMINATION TO ESTABLISH CARE: Primary | ICD-10-CM

## 2022-03-28 DIAGNOSIS — N95.9 MENOPAUSAL DISORDER: ICD-10-CM

## 2022-03-28 DIAGNOSIS — K13.0 INTERTRIGO LABIALIS: ICD-10-CM

## 2022-03-28 PROCEDURE — G8427 DOCREV CUR MEDS BY ELIG CLIN: HCPCS | Performed by: STUDENT IN AN ORGANIZED HEALTH CARE EDUCATION/TRAINING PROGRAM

## 2022-03-28 PROCEDURE — 99203 OFFICE O/P NEW LOW 30 MIN: CPT | Performed by: STUDENT IN AN ORGANIZED HEALTH CARE EDUCATION/TRAINING PROGRAM

## 2022-03-28 PROCEDURE — 3017F COLORECTAL CA SCREEN DOC REV: CPT | Performed by: STUDENT IN AN ORGANIZED HEALTH CARE EDUCATION/TRAINING PROGRAM

## 2022-03-28 PROCEDURE — G8482 FLU IMMUNIZE ORDER/ADMIN: HCPCS | Performed by: STUDENT IN AN ORGANIZED HEALTH CARE EDUCATION/TRAINING PROGRAM

## 2022-03-28 PROCEDURE — G8400 PT W/DXA NO RESULTS DOC: HCPCS | Performed by: STUDENT IN AN ORGANIZED HEALTH CARE EDUCATION/TRAINING PROGRAM

## 2022-03-28 PROCEDURE — 4040F PNEUMOC VAC/ADMIN/RCVD: CPT | Performed by: STUDENT IN AN ORGANIZED HEALTH CARE EDUCATION/TRAINING PROGRAM

## 2022-03-28 PROCEDURE — 1036F TOBACCO NON-USER: CPT | Performed by: STUDENT IN AN ORGANIZED HEALTH CARE EDUCATION/TRAINING PROGRAM

## 2022-03-28 PROCEDURE — G8417 CALC BMI ABV UP PARAM F/U: HCPCS | Performed by: STUDENT IN AN ORGANIZED HEALTH CARE EDUCATION/TRAINING PROGRAM

## 2022-03-28 PROCEDURE — 1090F PRES/ABSN URINE INCON ASSESS: CPT | Performed by: STUDENT IN AN ORGANIZED HEALTH CARE EDUCATION/TRAINING PROGRAM

## 2022-03-28 PROCEDURE — 1123F ACP DISCUSS/DSCN MKR DOCD: CPT | Performed by: STUDENT IN AN ORGANIZED HEALTH CARE EDUCATION/TRAINING PROGRAM

## 2022-03-28 ASSESSMENT — ENCOUNTER SYMPTOMS
SHORTNESS OF BREATH: 0
DIARRHEA: 0
SINUS PRESSURE: 0
RHINORRHEA: 0
SINUS PAIN: 0
EYE PAIN: 0
BACK PAIN: 0
EYE REDNESS: 0
SORE THROAT: 0
COUGH: 0
VOMITING: 0
CONSTIPATION: 0
ABDOMINAL PAIN: 0
NAUSEA: 0

## 2022-03-28 ASSESSMENT — PATIENT HEALTH QUESTIONNAIRE - PHQ9
1. LITTLE INTEREST OR PLEASURE IN DOING THINGS: 1
SUM OF ALL RESPONSES TO PHQ QUESTIONS 1-9: 2
2. FEELING DOWN, DEPRESSED OR HOPELESS: 1
SUM OF ALL RESPONSES TO PHQ9 QUESTIONS 1 & 2: 2
SUM OF ALL RESPONSES TO PHQ QUESTIONS 1-9: 2

## 2022-03-28 NOTE — PROGRESS NOTES
3/28/2022    HPI  Chief Complaint   Patient presents with    New Patient     est pcp / former pcp DR. Kathryn Hernandez is a 74 y.o. female who  has a past medical history of Bell's palsy, Bipolar disorder ([facility] [address] Carpal tunnel syndrome, Hyperlipidemia, Non-insulin dependent type 2 diabetes mellitus ([facility] [address] and Systemic primary arterial hypertension. DM2:   Patient is here to fu regarding DM2. Patient is not controlled. Taking all medications and tolerating well. Patient is not taking ASA and Ace Inhibitor/ARB. Patient is  on appropriately-dosed statin. LDL is  at goal.  BP is  controlled. does not hypoglycemic episodes. Patient does not see Podiatry regularly. Patient is aware that it is necessary to see an Eye Dr yearly. Patient does not smoke. Most recent labs reviewed with patient. Patient does not have complaints or concerns today. Lab Results   Component Value Date    LABA1C 8.5 02/23/2022       Lab Results   Component Value Date    [address] 61 08/09/2021      Hypertension:  Patient is here for follow up chronic hypertension. This is  generally controlled on current medication regimen. Takes meds as directed and tolerates them well. Most recent labs reviewed with patient and are remarkable. No symptoms from htn standpoint per ROS. Patient is  compliant with lifestyle modifications. Patient does not smoke. Comorbid conditions include DM. Review of Systems   Constitutional: Negative for chills, fatigue and fever. HENT: Negative for congestion, ear pain, postnasal drip, rhinorrhea, sinus pressure, sinus pain and sore throat. Eyes: Negative for pain and redness. Respiratory: Negative for cough and shortness of breath. Cardiovascular: Negative for chest pain. Gastrointestinal: Negative for abdominal pain, constipation, diarrhea, nausea and vomiting. Genitourinary: Negative for difficulty urinating and dysuria.    Musculoskeletal: Negative for back pain, myalgias and neck pain. Skin: Negative for rash. Neurological: Negative for dizziness, light-headedness and numbness. Psychiatric/Behavioral: The patient is not nervous/anxious. Prior to Visit Medications    Medication Sig Taking?  Authorizing Provider   terconazole (TERAZOL 3) 0.8 % vaginal cream INSERT 1 APPLICATORFUL VAGINALLY AT BEDTIME FOR 3 DAYS Yes Sheryl Wade DO   JARDIANCE 10 MG tablet TAKE 1 TABLET BY MOUTH EVERY DAY Yes CHRISS Quintero CNP   UNIFINE PENTIPS 31G X 5 MM MISC USE DAILY TO INJECT 20 UNITS AT BEDTIME Yes CHRISS Quintero CNP   SITagliptin (JANUVIA) 100 MG tablet TAKE 1 TABLET BY MOUTH EVERY DAY Yes CHRISS Quintero CNP   glimepiride (AMARYL) 2 MG tablet TAKE 1 TABLET BY MOUTH EVERY MORNING BEFORE BREAKFAST Yes CHRISS Quintero CNP   metFORMIN (GLUCOPHAGE) 1000 MG tablet TAKE 1 TABLET BY MOUTH TWICE A DAY WITH MEALS Yes CHRISS Quintero CNP   atorvastatin (LIPITOR) 40 MG tablet TAKE 1 TABLET BY MOUTH EVERY DAY Yes CHRISS Quintero CNP   labetalol (NORMODYNE) 200 MG tablet TAKE 1 TABLET BY MOUTH EVERY 8 HOURS Yes Madeline Paige MD   furosemide (LASIX) 20 MG tablet TAKE 1 TABLET BY MOUTH EVERY DAY Yes Madeline Paige MD   timolol (TIMOPTIC) 0.5 % ophthalmic solution  Yes Historical Provider, MD   metoprolol tartrate (LOPRESSOR) 25 MG tablet Take 1 tablet by mouth Yes Historical Provider, MD   benztropine (COGENTIN) 1 MG tablet TAKE 1 TABLET BY MOUTH EVERY DAY AT NIGHT Yes Historical Provider, MD   ammonium lactate (AMLACTIN) 12 % cream APPLY TO AFFECTED AREA EVERY DAY Yes CHRISS Smith CNP   blood glucose test strips (FREESTYLE LITE) strip USE TO TEST BLOOD SUGAR EVERY MORNING Yes CHRISS Smith CNP   blood glucose monitor kit and supplies CHECK BLOOD GLUCOSE DAILY DX: E11.9  ONE TOUCH GLUCOMETER REQUESTED IF OK WITH INSRANCE Yes CHRISS Smith CNP   vitamin D (ERGOCALCIFEROL) 1.25 MG (89538 UT) CAPS capsule TAKE 1 CAPSULE BY MOUTH ONE TIME PER WEEK Yes Agapitoon Crispin APRN - CNP   OLANZapine (ZYPREXA) 5 MG tablet Take 1 tablet by mouth nightly Yes June Crispin APRN - CNP   brimonidine (ALPHAGAN) 0.2 % ophthalmic solution  Yes Historical Provider, MD Connoryle Lancets MISC Inject 1 each into the skin daily  June Crispin APRN - CNP   latanoprost (XALATAN) 0.005 % ophthalmic solution Place 1 drop into both eyes nightly  Historical Provider, MD        Allergies   Allergen Reactions    Ace Inhibitors Other (See Comments)     ?  Ibuprofen Other (See Comments)     hallucinate    Latuda [Lurasidone Hcl] Other (See Comments)     ?  Lurasidone Other (See Comments)     ?     Haloperidol Palpitations and Other (See Comments)     \"It makes my heart race\"        Past Medical History:   Diagnosis Date    Bell's palsy     Bipolar disorder ([facility] [address]     bipolar    Carpal tunnel syndrome 8/4/2016    Hyperlipidemia     Non-insulin dependent type 2 diabetes mellitus ([facility] [address]     Systemic primary arterial hypertension 8/4/2016       Past Surgical History:   Procedure Laterality Date    ECHO COMPLETE  6/22/2013            Social History     Socioeconomic History    Marital status: Single     Spouse name: Not on file    Number of children: Not on file    Years of education: Not on file    Highest education level: Not on file   Occupational History    Not on file   Tobacco Use    Smoking status: Never Smoker    Smokeless tobacco: Never Used   Vaping Use    Vaping Use: Never used   Substance and Sexual Activity    Alcohol use: No    Drug use: No    Sexual activity: Not Currently   Other Topics Concern    Not on file   Social History Narrative    Not on file     Social Determinants of Health     Financial Resource Strain: Low Risk     Difficulty of Paying Living Expenses: Not hard at all   Food Insecurity: No Food Insecurity    Worried About Running Out of Food in the Last Year: Never true    [address] in the Last Year: Never true   Transportation Needs: No Transportation Needs    Lack of Transportation (Medical): No    Lack of Transportation (Non-Medical): No   Physical Activity:     Days of Exercise per Week: Not on file    Minutes of Exercise per Session: Not on file   Stress:     Feeling of Stress : Not on file   Social Connections:     Frequency of Communication with Friends and Family: Not on file    Frequency of Social Gatherings with Friends and Family: Not on file    Attends Druze Services: Not on file    Active Member of [address] [place] [address] [employer] or Organizations: Not on file    Attends Club or Organization Meetings: Not on file    Marital Status: Not on file   Intimate Partner Violence:     Fear of Current or Ex-Partner: Not on file    Emotionally Abused: Not on file    Physically Abused: Not on file    Sexually Abused: Not on file   Housing Stability:     Unable to Pay for Housing in the Last Year: Not on file    Number of [place] in the Last Year: Not on file    Unstable Housing in the Last Year: Not on file        Family History   Problem Relation Age of Onset    No Known Problems Mother     Heart Disease Father 76    High Blood Pressure Father     No Known Problems Brother            Vitals:    03/28/22 1303   BP: 110/74   Pulse: 91   Resp: 18   Temp: 97 °F (36.1 °C)   TempSrc: Temporal   SpO2: 97%   Weight: 178 lb (80.7 kg)   Height: 5' 2\" (1.575 m)     Estimated body mass index is 32.56 kg/m² as calculated from the following:    Height as of this encounter: 5' 2\" (1.575 m). Weight as of this encounter: 178 lb (80.7 kg).     Most Recent Labs  CBC  Lab Results   Component Value Date    WBC 8.3 08/09/2021    WBC 7.0 10/23/2020    WBC 5.4 07/12/2020    RBC 5.28 08/09/2021    RBC 4.94 10/23/2020    RBC 4.49 07/12/2020    HGB 15.0 08/09/2021    HGB 13.7 10/23/2020    HGB 11.8 07/12/2020    HCT 47.2 08/09/2021    HCT 42.5 10/23/2020    HCT 36.9 07/12/2020    MCV 89.4 08/09/2021    MCV 86.0 10/23/2020    MCV 82.2 07/12/2020     08/09/2021     10/23/2020     07/12/2020      CMP  Lab Results   Component Value Date     11/19/2021     08/09/2021     04/23/2021    K 5.5 11/19/2021    K 4.9 08/09/2021    K 5.3 04/23/2021    K 5.0 05/18/2020     11/19/2021     08/09/2021    CL 97 04/23/2021    CO2 25 11/19/2021    CO2 27 08/09/2021    CO2 29 04/23/2021    ANIONGAP 15 11/19/2021    ANIONGAP 12 08/09/2021    ANIONGAP 13 04/23/2021    GLUCOSE 297 11/19/2021    GLUCOSE 258 08/09/2021    GLUCOSE 346 04/23/2021    GLUCOSE 122 03/23/2011    GLUCOSE 104 03/19/2011    GLUCOSE 142 03/18/2011    BUN 25 11/19/2021    BUN 17 08/09/2021    BUN 20 04/23/2021    CREATININE 1.2 11/19/2021    CREATININE 0.9 08/09/2021    CREATININE 1.1 04/23/2021    LABGLOM 55 11/19/2021    LABGLOM >60 08/09/2021    LABGLOM >60 04/23/2021    GFRAA 55 11/19/2021    GFRAA >60 08/09/2021    GFRAA >60 04/23/2021    CALCIUM 9.9 11/19/2021    CALCIUM 9.8 08/09/2021    CALCIUM 9.8 04/23/2021    PROT 7.5 11/19/2021    PROT 7.4 08/09/2021    PROT 7.3 04/23/2021    LABALBU 4.1 11/19/2021    LABALBU 4.0 08/09/2021    LABALBU 4.2 04/23/2021    LABALBU 4.3 05/17/2012    LABALBU 4.4 07/26/2011    LABALBU 3.9 03/28/2011    BILITOT 0.4 11/19/2021    BILITOT 0.5 08/09/2021    BILITOT 0.5 04/23/2021    ALKPHOS 138 11/19/2021    ALKPHOS 101 08/09/2021    ALKPHOS 104 04/23/2021    AST 18 11/19/2021    AST 22 08/09/2021    AST 16 04/23/2021    ALT 18 11/19/2021    ALT 15 08/09/2021    ALT 15 04/23/2021     A1C  Lab Results   Component Value Date    LABA1C 8.5 02/23/2022    LABA1C 10.4 11/19/2021    LABA1C 10.9 08/09/2021     TSH  Lab Results   Component Value Date    TSH 5.130 03/22/2019    TSH 6.120 10/04/2018    TSH 4.060 08/09/2018     FREET4  Lab Results   Component Value Date    F3QUDNJ 6.0 08/09/2018    N5WGBQN 5.6 07/06/2018    O3SDXNW 7.0 02/04/2015     LIPID  Lab Results   Component Value Date    CHOL 116 08/09/2021 reviewed. Constitutional:       Appearance: Normal appearance. HENT:      Head: Normocephalic and atraumatic. Right Ear: External ear normal.      Left Ear: External ear normal.   Eyes:      Extraocular Movements: Extraocular movements intact. Conjunctiva/sclera: Conjunctivae normal.      Pupils: Pupils are equal, round, and reactive to light. Cardiovascular:      Rate and Rhythm: Normal rate and regular rhythm. Pulses: Normal pulses. Heart sounds: Normal heart sounds. Pulmonary:      Effort: Pulmonary effort is normal.      Breath sounds: Normal breath sounds. Abdominal:      General: Bowel sounds are normal.      Palpations: Abdomen is soft. Musculoskeletal:         General: Normal range of motion. Cervical back: Normal range of motion and neck supple. Skin:     General: Skin is warm and dry. Capillary Refill: Capillary refill takes less than 2 seconds. Neurological:      General: No focal deficit present. Mental Status: She is alert and oriented to person, place, and time. Psychiatric:         Mood and Affect: Mood normal.         Behavior: Behavior normal.         Thought Content: Thought content normal.         ASSESSMENT/PLAN:  Raphael Wise was seen today for new patient. Diagnoses and all orders for this visit:    Encounter for medical examination to establish care    Menopausal disorder  -     DEXA BONE DENSITY AXIAL SKELETON; Future    Intertrigo labialis  -     terconazole (TERAZOL 3) 0.8 % vaginal cream; INSERT 1 APPLICATORFUL VAGINALLY AT BEDTIME FOR 3 DAYS       Patient to call insurance about shingles vaccine and see if they want it done in office or at pharmacy     As above. Call or go to the nearest ED immediately if symptoms worsen or persist.  Educational materials and/or home exercises printed for patient's review and were included in patient instructions on his/her After Visit Summary and given to patient at the end of visit.        Counseled regarding above diagnosis, including possible risks and complications,  especially if left uncontrolled. Counseled regarding the possible side effects, risks, benefits and alternatives to treatment; patient and/or guardian verbalizes understanding, agrees, feels comfortable with and wishes to proceed with above treatment plan. Advised patient to call with any new medication issues, and read all Rx info from pharmacy to assure aware of all possible risks and side effects of medication before taking. Reviewed age and gender appropriate health screening exams and vaccinations. Advised patient regarding importance of keeping up with recommended health maintenance and to schedule as soon as possible if overdue, as this is important in assessing for undiagnosed pathology, especially cancer, as well as protecting against potentially harmful/life threatening disease. Patient and/or guardian verbalizes understanding and agrees with above counseling, assessment and plan. All questions answered. Return in about 3 months (around 6/28/2022) for diabetes. An  electronic signature was used to authenticate this note. --Azalia Humphries DO on 3/28/2022 at 2:23 PM    This document was prepared at least partially through the use of voice recognition software. Although effort is taken to assure the accuracy of this document, it is possible that grammatical, syntax,  or spelling errors may occur.

## 2022-04-04 RX ORDER — EMPAGLIFLOZIN 10 MG/1
TABLET, FILM COATED ORAL
Qty: 90 TABLET | Refills: 1 | Status: SHIPPED
Start: 2022-04-04 | End: 2022-08-01 | Stop reason: DRUGHIGH

## 2022-04-15 ENCOUNTER — HOSPITAL ENCOUNTER (OUTPATIENT)
Dept: GENERAL RADIOLOGY | Age: 67
Discharge: HOME | End: 2022-04-17
Payer: COMMERCIAL

## 2022-04-15 VITALS — HEIGHT: 63 IN | BODY MASS INDEX: 32.07 KG/M2 | WEIGHT: 181 LBS

## 2022-04-15 DIAGNOSIS — N95.9 MENOPAUSAL DISORDER: ICD-10-CM

## 2022-04-15 PROCEDURE — 77080 DXA BONE DENSITY AXIAL: CPT

## 2022-04-23 DIAGNOSIS — E11.9 TYPE 2 DIABETES MELLITUS WITHOUT COMPLICATION, UNSPECIFIED WHETHER LONG TERM INSULIN USE (HCC): ICD-10-CM

## 2022-04-25 RX ORDER — PEN NEEDLE, DIABETIC 31 GX3/16"
NEEDLE, DISPOSABLE MISCELLANEOUS
Qty: 100 EACH | Refills: 3 | Status: SHIPPED | OUTPATIENT
Start: 2022-04-25

## 2022-04-29 RX ORDER — GLIMEPIRIDE 2 MG/1
TABLET ORAL
Qty: 90 TABLET | Refills: 0 | Status: SHIPPED
Start: 2022-04-29 | End: 2022-08-02

## 2022-06-14 RX ORDER — FUROSEMIDE 20 MG/1
TABLET ORAL
Qty: 90 TABLET | Refills: 1 | Status: SHIPPED
Start: 2022-06-14 | End: 2022-09-20

## 2022-06-14 NOTE — TELEPHONE ENCOUNTER
Last Appointment:  3/28/2022  Future Appointments   Date Time Provider Ricardo Pichardo   7/5/2022  2:00 PM Sheryl Wade  [address]

## 2022-07-11 RX ORDER — ATORVASTATIN CALCIUM 40 MG/1
TABLET, FILM COATED ORAL
Qty: 90 TABLET | Refills: 1 | Status: SHIPPED
Start: 2022-07-11 | End: 2022-09-26

## 2022-08-01 ENCOUNTER — OFFICE VISIT (OUTPATIENT)
Dept: FAMILY MEDICINE CLINIC | Age: 67
End: 2022-08-01
Payer: COMMERCIAL

## 2022-08-01 VITALS
DIASTOLIC BLOOD PRESSURE: 74 MMHG | RESPIRATION RATE: 16 BRPM | SYSTOLIC BLOOD PRESSURE: 134 MMHG | OXYGEN SATURATION: 95 % | HEIGHT: 63 IN | TEMPERATURE: 97.1 F | WEIGHT: 180 LBS | HEART RATE: 104 BPM | BODY MASS INDEX: 31.89 KG/M2

## 2022-08-01 DIAGNOSIS — E11.9 TYPE 2 DIABETES MELLITUS WITHOUT COMPLICATION, UNSPECIFIED WHETHER LONG TERM INSULIN USE (HCC): Primary | ICD-10-CM

## 2022-08-01 DIAGNOSIS — E78.00 PURE HYPERCHOLESTEROLEMIA: ICD-10-CM

## 2022-08-01 DIAGNOSIS — I10 SYSTEMIC PRIMARY ARTERIAL HYPERTENSION: ICD-10-CM

## 2022-08-01 LAB — HBA1C MFR BLD: 13.1 %

## 2022-08-01 PROCEDURE — 3078F DIAST BP <80 MM HG: CPT | Performed by: STUDENT IN AN ORGANIZED HEALTH CARE EDUCATION/TRAINING PROGRAM

## 2022-08-01 PROCEDURE — 3075F SYST BP GE 130 - 139MM HG: CPT | Performed by: STUDENT IN AN ORGANIZED HEALTH CARE EDUCATION/TRAINING PROGRAM

## 2022-08-01 PROCEDURE — 83036 HEMOGLOBIN GLYCOSYLATED A1C: CPT | Performed by: STUDENT IN AN ORGANIZED HEALTH CARE EDUCATION/TRAINING PROGRAM

## 2022-08-01 PROCEDURE — 3046F HEMOGLOBIN A1C LEVEL >9.0%: CPT | Performed by: STUDENT IN AN ORGANIZED HEALTH CARE EDUCATION/TRAINING PROGRAM

## 2022-08-01 PROCEDURE — 1123F ACP DISCUSS/DSCN MKR DOCD: CPT | Performed by: STUDENT IN AN ORGANIZED HEALTH CARE EDUCATION/TRAINING PROGRAM

## 2022-08-01 PROCEDURE — 99214 OFFICE O/P EST MOD 30 MIN: CPT | Performed by: STUDENT IN AN ORGANIZED HEALTH CARE EDUCATION/TRAINING PROGRAM

## 2022-08-01 SDOH — ECONOMIC STABILITY: FOOD INSECURITY: WITHIN THE PAST 12 MONTHS, YOU WORRIED THAT YOUR FOOD WOULD RUN OUT BEFORE YOU GOT MONEY TO BUY MORE.: NEVER TRUE

## 2022-08-01 SDOH — ECONOMIC STABILITY: FOOD INSECURITY: WITHIN THE PAST 12 MONTHS, THE FOOD YOU BOUGHT JUST DIDN'T LAST AND YOU DIDN'T HAVE MONEY TO GET MORE.: NEVER TRUE

## 2022-08-01 ASSESSMENT — ENCOUNTER SYMPTOMS
DIARRHEA: 0
SHORTNESS OF BREATH: 0
SINUS PRESSURE: 0
COUGH: 0
SORE THROAT: 0
CONSTIPATION: 0
EYE REDNESS: 0
BACK PAIN: 0
VOMITING: 0
SINUS PAIN: 0
EYE PAIN: 0
ABDOMINAL PAIN: 0
NAUSEA: 0
RHINORRHEA: 0

## 2022-08-01 ASSESSMENT — SOCIAL DETERMINANTS OF HEALTH (SDOH): HOW HARD IS IT FOR YOU TO PAY FOR THE VERY BASICS LIKE FOOD, HOUSING, MEDICAL CARE, AND HEATING?: NOT HARD AT ALL

## 2022-08-01 NOTE — PROGRESS NOTES
8/1/2022    HPI  Chief Complaint   Patient presents with    Diabetes     3 month follow up. Due for A1C       Andres Naranjo is a 74 y.o. female who  has a past medical history of Bell's palsy, Bipolar disorder ([facility] [address] Carpal tunnel syndrome, Hyperlipidemia, Non-insulin dependent type 2 diabetes mellitus ([facility] [address] and Systemic primary arterial hypertension. DM2:   Patient is here to fu regarding DM2. Patient is not controlled. Taking all medications and tolerating well. Patient is not taking ASA and Ace Inhibitor/ARB. Patient is  on appropriately-dosed statin. LDL is  at goal.  BP is  controlled. does not hypoglycemic episodes. Patient does see Podiatry regularly. Patient is aware that it is necessary to see an Eye Dr yearly. Patient does not smoke. Most recent labs reviewed with patient. Patient does not have complaints or concerns today. Lab Results   Component Value Date    LABA1C 13.1 08/01/2022       Lab Results   Component Value Date    [address] 61 08/09/2021         Hypertension:  Patient is here for follow up chronic hypertension. This is  generally controlled on current medication regimen. Takes meds as directed and tolerates them well. Most recent labs reviewed with patient and are remarkable. No symptoms from htn standpoint per ROS. Patient is  compliant with lifestyle modifications. Patient does not smoke. Comorbid conditions include DM hyperlipidemia. Review of Systems   Constitutional:  Negative for chills, fatigue and fever. HENT:  Negative for congestion, ear pain, postnasal drip, rhinorrhea, sinus pressure, sinus pain and sore throat. Eyes:  Negative for pain and redness. Respiratory:  Negative for cough and shortness of breath. Cardiovascular:  Negative for chest pain. Gastrointestinal:  Negative for abdominal pain, constipation, diarrhea, nausea and vomiting. Genitourinary:  Negative for difficulty urinating and dysuria.    Musculoskeletal:  Negative for back pain, myalgias and neck pain. Skin:  Negative for rash. Neurological:  Negative for dizziness, light-headedness and numbness. Psychiatric/Behavioral:  The patient is not nervous/anxious. Prior to Visit Medications    Medication Sig Taking? Authorizing Provider   empagliflozin (JARDIANCE) 25 MG tablet Take 1 tablet by mouth in the morning.  Yes Sheryl Wade DO   atorvastatin (LIPITOR) 40 MG tablet TAKE 1 TABLET BY MOUTH EVERY DAY Yes Sheryl Wade DO   furosemide (LASIX) 20 MG tablet Take 1 tablet by mouth daily Yes Sheryl Wade DO   glimepiride (AMARYL) 2 MG tablet TAKE 1 TABLET BY MOUTH EVERY DAY BEFORE BREAKFAST Yes Sheryl Wade DO   TECHLITE PEN NEEDLES 31G X 5 MM MISC USE DAILY TO INJECT 20 UNITS AT BEDTIME Yes Sheryl Wade DO   SITagliptin (JANUVIA) 100 MG tablet TAKE 1 TABLET BY MOUTH EVERY DAY Yes Sheryl Wade DO   terconazole (TERAZOL 3) 0.8 % vaginal cream INSERT 1 APPLICATORFUL VAGINALLY AT BEDTIME FOR 3 DAYS Yes Sheryl Wade DO   metFORMIN (GLUCOPHAGE) 1000 MG tablet TAKE 1 TABLET BY MOUTH TWICE A DAY WITH MEALS Yes Ned  1560, APRN - CNP   labetalol (NORMODYNE) 200 MG tablet TAKE 1 TABLET BY MOUTH EVERY 8 HOURS Yes Drea Awan MD   timolol (TIMOPTIC) 0.5 % ophthalmic solution  Yes Historical Provider, MD   metoprolol tartrate (LOPRESSOR) 25 MG tablet Take 1 tablet by mouth Yes Historical Provider, MD   benztropine (COGENTIN) 1 MG tablet TAKE 1 TABLET BY MOUTH EVERY DAY AT NIGHT Yes Historical Provider, MD   ammonium lactate (AMLACTIN) 12 % cream APPLY TO AFFECTED AREA EVERY DAY Yes CHRISS Jones CNP   blood glucose test strips (FREESTYLE LITE) strip USE TO TEST BLOOD SUGAR EVERY MORNING Yes CHRISS Jones CNP   blood glucose monitor kit and supplies CHECK BLOOD GLUCOSE DAILY DX: E11.9  ONE TOUCH GLUCOMETER REQUESTED IF OK WITH INSRANCE Yes CHRISS Jones CNP   FreeStyle Lancets MISC Inject 1 each into the skin daily Yes Ruthy Mattson APRN - CNP   OLANZapine (ZYPREXA) 5 MG tablet Take 1 tablet by mouth nightly Yes CHRISS Simmons CNP   latanoprost (XALATAN) 0.005 % ophthalmic solution Place 1 drop into both eyes nightly Yes Historical Provider, MD   brimonidine (ALPHAGAN) 0.2 % ophthalmic solution  Yes Historical Provider, MD   vitamin D (ERGOCALCIFEROL) 1.25 MG (46070 UT) CAPS capsule TAKE 1 CAPSULE BY MOUTH ONE TIME PER WEEK  Patient not taking: Reported on 8/1/2022  CHRISS Simmons CNP        Allergies   Allergen Reactions    Ace Inhibitors Other (See Comments)     ? Ibuprofen Other (See Comments)     hallucinate    Latuda [Lurasidone Hcl] Other (See Comments)     ? Lurasidone Other (See Comments)     ?     Haloperidol Palpitations and Other (See Comments)     \"It makes my heart race\"        Past Medical History:   Diagnosis Date    Bell's palsy     Bipolar disorder ([facility] [address]     bipolar    Carpal tunnel syndrome 8/4/2016    Hyperlipidemia     Non-insulin dependent type 2 diabetes mellitus ([facility] [address]     Systemic primary arterial hypertension 8/4/2016       Past Surgical History:   Procedure Laterality Date    ECHO COMPLETE  6/22/2013            Social History     Socioeconomic History    Marital status: Single     Spouse name: Not on file    Number of children: Not on file    Years of education: Not on file    Highest education level: Not on file   Occupational History    Not on file   Tobacco Use    Smoking status: Never    Smokeless tobacco: Never   Vaping Use    Vaping Use: Never used   Substance and Sexual Activity    Alcohol use: No    Drug use: No    Sexual activity: Not Currently   Other Topics Concern    Not on file   Social History Narrative    Not on file     Social Determinants of Health     Financial Resource Strain: Low Risk     Difficulty of Paying Living Expenses: Not hard at all   Food Insecurity: No Food Insecurity    Worried About Running Out of Food in the Last Year: Never true    [address] in the Last Year: Never true   Transportation Needs: Not on file   Physical Activity: Not on file   Stress: Not on file   Social Connections: Not on file   Intimate Partner Violence: Not on file   Housing Stability: Not on file        Family History   Problem Relation Age of Onset    No Known Problems Mother     Heart Disease Father 76    High Blood Pressure Father     No Known Problems Brother            Vitals:    08/01/22 1548   BP: 134/74   Pulse: (!) 104   Resp: 16   Temp: 97.1 °F (36.2 °C)   TempSrc: Temporal   SpO2: 95%   Weight: 180 lb (81.6 kg)   Height: 5' 3\" (1.6 m)     Estimated body mass index is 31.89 kg/m² as calculated from the following:    Height as of this encounter: 5' 3\" (1.6 m). Weight as of this encounter: 180 lb (81.6 kg).     Most Recent Labs  CBC  Lab Results   Component Value Date/Time    WBC 8.3 08/09/2021 12:00 PM    WBC 7.0 10/23/2020 12:00 AM    WBC 5.4 07/12/2020 02:06 PM    RBC 5.28 08/09/2021 12:00 PM    RBC 4.94 10/23/2020 12:00 AM    RBC 4.49 07/12/2020 02:06 PM    HGB 15.0 08/09/2021 12:00 PM    HGB 13.7 10/23/2020 12:00 AM    HGB 11.8 07/12/2020 02:06 PM    HCT 47.2 08/09/2021 12:00 PM    HCT 42.5 10/23/2020 12:00 AM    HCT 36.9 07/12/2020 02:06 PM    MCV 89.4 08/09/2021 12:00 PM    MCV 86.0 10/23/2020 12:00 AM    MCV 82.2 07/12/2020 02:06 PM     08/09/2021 12:00 PM     10/23/2020 12:00 AM     07/12/2020 02:06 PM      CMP  Lab Results   Component Value Date/Time     11/19/2021 12:00 PM     08/09/2021 12:00 PM     04/23/2021 12:00 PM    K 5.5 11/19/2021 12:00 PM    K 4.9 08/09/2021 12:00 PM    K 5.3 04/23/2021 12:00 PM    K 5.0 05/18/2020 11:14 AM     11/19/2021 12:00 PM     08/09/2021 12:00 PM    CL 97 04/23/2021 12:00 PM    CO2 25 11/19/2021 12:00 PM    CO2 27 08/09/2021 12:00 PM    CO2 29 04/23/2021 12:00 PM    ANIONGAP 15 11/19/2021 12:00 PM    ANIONGAP 12 08/09/2021 12:00 PM    ANIONGAP 13 04/23/2021 12:00 PM    GLUCOSE 297 11/19/2021 12:00 PM    GLUCOSE 258 08/09/2021 12:00 PM    GLUCOSE 346 04/23/2021 12:00 PM    GLUCOSE 122 03/23/2011 10:15 AM    GLUCOSE 104 03/19/2011 05:00 AM    GLUCOSE 142 03/18/2011 05:10 AM    BUN 25 11/19/2021 12:00 PM    BUN 17 08/09/2021 12:00 PM    BUN 20 04/23/2021 12:00 PM    CREATININE 1.2 11/19/2021 12:00 PM    CREATININE 0.9 08/09/2021 12:00 PM    CREATININE 1.1 04/23/2021 12:00 PM    LABGLOM 55 11/19/2021 12:00 PM    LABGLOM >60 08/09/2021 12:00 PM    LABGLOM >60 04/23/2021 12:00 PM    GFRAA 55 11/19/2021 12:00 PM    GFRAA >60 08/09/2021 12:00 PM    GFRAA >60 04/23/2021 12:00 PM    CALCIUM 9.9 11/19/2021 12:00 PM    CALCIUM 9.8 08/09/2021 12:00 PM    CALCIUM 9.8 04/23/2021 12:00 PM    PROT 7.5 11/19/2021 12:00 PM    PROT 7.4 08/09/2021 12:00 PM    PROT 7.3 04/23/2021 12:00 PM    LABALBU 4.1 11/19/2021 12:00 PM    LABALBU 4.0 08/09/2021 12:00 PM    LABALBU 4.2 04/23/2021 12:00 PM    LABALBU 4.3 05/17/2012 10:32 AM    LABALBU 4.4 07/26/2011 09:02 AM    LABALBU 3.9 03/28/2011 08:10 AM    BILITOT 0.4 11/19/2021 12:00 PM    BILITOT 0.5 08/09/2021 12:00 PM    BILITOT 0.5 04/23/2021 12:00 PM    ALKPHOS 138 11/19/2021 12:00 PM    ALKPHOS 101 08/09/2021 12:00 PM    ALKPHOS 104 04/23/2021 12:00 PM    AST 18 11/19/2021 12:00 PM    AST 22 08/09/2021 12:00 PM    AST 16 04/23/2021 12:00 PM    ALT 18 11/19/2021 12:00 PM    ALT 15 08/09/2021 12:00 PM    ALT 15 04/23/2021 12:00 PM     A1C  Lab Results   Component Value Date/Time    LABA1C 13.1 08/01/2022 04:02 PM    LABA1C 8.5 02/23/2022 12:00 AM    LABA1C 10.4 11/19/2021 12:00 PM     TSH  Lab Results   Component Value Date/Time    TSH 5.130 03/22/2019 09:29 AM    TSH 6.120 10/04/2018 09:05 AM    TSH 4.060 08/09/2018 05:40 AM     FREET4  Lab Results   Component Value Date/Time    Q5SLFUX 6.0 08/09/2018 05:40 AM    U1TIQXI 5.6 07/06/2018 10:20 AM    E2ZVLWG 7.0 02/04/2015 03:30 PM     LIPID  Lab Results   Component Value Date/Time    CHOL 116 08/09/2021 12:00 PM    CHOL 128 04/23/2021 12:00 PM    CHOL 283 10/23/2020 12:00 AM    HDL 37 08/09/2021 12:00 PM    HDL 44 04/23/2021 12:00 PM    HDL 56 10/23/2020 12:00 AM    LDLCALC 61 08/09/2021 12:00 PM    LDLCALC 58 04/23/2021 12:00 PM    LDLCALC 187 10/23/2020 12:00 AM    TRIG 91 08/09/2021 12:00 PM    TRIG 132 04/23/2021 12:00 PM    TRIG 210 10/23/2020 12:00 AM    CHOLHDLRATIO 5.1 10/23/2020 12:00 AM    CHOLHDLRATIO 2.3 03/10/2020 12:00 AM     VITAMIN D  Lab Results   Component Value Date/Time    VITD25 45.1 08/09/2018 05:40 AM     MAGNESIUM  Lab Results   Component Value Date/Time    MG 2.3 07/06/2018 10:20 AM    MG 1.7 07/22/2014 02:40 PM       HEPATITIS C  Lab Results   Component Value Date/Time    HCVABI Non-Reactive 04/23/2021 12:00 PM     UA  Lab Results   Component Value Date/Time    COLORU Yellow 07/12/2020 02:32 PM    COLORU Yellow 06/15/2020 04:40 PM    COLORU Yellow 05/18/2020 06:34 PM    CLARITYU Clear 07/12/2020 02:32 PM    CLARITYU Clear 06/15/2020 04:40 PM    CLARITYU Clear 05/18/2020 06:34 PM    GLUCOSEU Negative 07/12/2020 02:32 PM    GLUCOSEU Negative 06/15/2020 04:40 PM    GLUCOSEU 250 05/18/2020 06:34 PM    GLUCOSEU NEGATIVE 03/15/2011 09:10 PM    BILIRUBINUR Negative 07/12/2020 02:32 PM    BILIRUBINUR Negative 06/15/2020 04:40 PM    BILIRUBINUR Negative 05/18/2020 06:34 PM    BILIRUBINUR NEGATIVE 03/15/2011 09:10 PM    KETUA Negative 07/12/2020 02:32 PM    KETUA Negative 06/15/2020 04:40 PM    KETUA Negative 05/18/2020 06:34 PM    SPECGRAV 1.015 07/12/2020 02:32 PM    SPECGRAV 1.020 06/15/2020 04:40 PM    SPECGRAV 1.015 05/18/2020 06:34 PM    BLOODU Negative 07/12/2020 02:32 PM    BLOODU Negative 06/15/2020 04:40 PM    BLOODU Negative 05/18/2020 06:34 PM    PHUR 7.5 07/12/2020 02:32 PM    PHUR 5.0 06/15/2020 04:40 PM    PHUR 7.0 05/18/2020 06:34 PM    PROTEINU Negative 07/12/2020 02:32 PM    PROTEINU Negative 06/15/2020 04:40 PM    PROTEINU Negative 05/18/2020 06:34 PM    UROBILINOGEN 0.2 07/12/2020 02:32 PM    UROBILINOGEN 0.2 06/15/2020 04:40 PM    UROBILINOGEN 0.2 05/18/2020 06:34 PM    NITRU Negative 07/12/2020 02:32 PM    NITRU Negative 06/15/2020 04:40 PM    NITRU Negative 05/18/2020 06:34 PM    LEUKOCYTESUR SMALL 07/12/2020 02:32 PM    LEUKOCYTESUR SMALL 06/15/2020 04:40 PM    LEUKOCYTESUR SMALL 05/18/2020 06:34 PM     Urine Micro/Albumin Ratio  Lab Results   Component Value Date/Time    MALBCR - 08/09/2021 12:00 PM    MALBCR 10mg/L 06/02/2017 01:11 PM    MALBCR <30 05/24/2016 12:00 AM        Physical Exam  Vitals and nursing note reviewed. Constitutional:       Appearance: Normal appearance. HENT:      Head: Normocephalic and atraumatic. Right Ear: External ear normal.      Left Ear: External ear normal.   Eyes:      Extraocular Movements: Extraocular movements intact. Conjunctiva/sclera: Conjunctivae normal.      Pupils: Pupils are equal, round, and reactive to light. Cardiovascular:      Rate and Rhythm: Normal rate and regular rhythm. Pulses: Normal pulses. Heart sounds: Normal heart sounds. Pulmonary:      Effort: Pulmonary effort is normal.      Breath sounds: Normal breath sounds. Abdominal:      General: Bowel sounds are normal.      Palpations: Abdomen is soft. Musculoskeletal:         General: Normal range of motion. Cervical back: Normal range of motion and neck supple. Skin:     General: Skin is warm and dry. Capillary Refill: Capillary refill takes less than 2 seconds. Neurological:      General: No focal deficit present. Mental Status: She is alert and oriented to person, place, and time. Psychiatric:         Mood and Affect: Mood normal.         Behavior: Behavior normal.         Thought Content: Thought content normal.       ASSESSMENT/PLAN:  Lula Blancas was seen today for diabetes.     Diagnoses and all orders for this visit:    Type 2 diabetes mellitus without complication, unspecified whether long term insulin use (HCC)  -     POCT glycosylated hemoglobin (Hb A1C)  - Microalbumin / Creatinine Urine Ratio; Future  -     empagliflozin (JARDIANCE) 25 MG tablet; Take 1 tablet by mouth in the morning. Systemic primary arterial hypertension  -     CBC with Auto Differential; Future  -     Comprehensive Metabolic Panel; Future  -     TSH; Future    Pure hypercholesterolemia  -     Lipid Panel; Future     Blood pressure well controlled and at goal  All medications reviewed by myself personally, continue current meds  Diet and exercise were discussed and recommended to the patient. Labs ordered     As above. Call or go to the nearest ED immediately if symptoms worsen or persist.  Educational materials and/or home exercises printed for patient's review and were included in patient instructions on his/her After Visit Summary and given to patient at the end of visit. Counseled regarding above diagnosis, including possible risks and complications,  especially if left uncontrolled. Counseled regarding the possible side effects, risks, benefits and alternatives to treatment; patient and/or guardian verbalizes understanding, agrees, feels comfortable with and wishes to proceed with above treatment plan. Advised patient to call with any new medication issues, and read all Rx info from pharmacy to assure aware of all possible risks and side effects of medication before taking. Reviewed age and gender appropriate health screening exams and vaccinations. Advised patient regarding importance of keeping up with recommended health maintenance and to schedule as soon as possible if overdue, as this is important in assessing for undiagnosed pathology, especially cancer, as well as protecting against potentially harmful/life threatening disease. Patient and/or guardian verbalizes understanding and agrees with above counseling, assessment and plan. All questions answered. No follow-ups on file.     An  electronic signature was used to authenticate this note.    --Alessio Giron DO on 8/1/2022 at 4:04 PM    This document was prepared at least partially through the use of voice recognition software. Although effort is taken to assure the accuracy of this document, it is possible that grammatical, syntax,  or spelling errors may occur.

## 2022-08-02 RX ORDER — GLIMEPIRIDE 2 MG/1
TABLET ORAL
Qty: 90 TABLET | Refills: 0 | Status: SHIPPED
Start: 2022-08-02 | End: 2022-10-24

## 2022-08-03 RX ORDER — GLIMEPIRIDE 2 MG/1
TABLET ORAL
Qty: 90 TABLET | Refills: 0 | OUTPATIENT
Start: 2022-08-03

## 2022-09-06 RX ORDER — LABETALOL 200 MG/1
200 TABLET, FILM COATED ORAL EVERY 8 HOURS SCHEDULED
Qty: 30 TABLET | Refills: 3 | Status: SHIPPED | OUTPATIENT
Start: 2022-09-06 | End: 2022-10-06

## 2022-09-06 NOTE — TELEPHONE ENCOUNTER
Last Appointment:  8/1/2022  Future Appointments   Date Time Provider Ricardo Pichardo   11/1/2022  9:00 AM SCHEDULE, [facility]

## 2022-09-20 RX ORDER — FUROSEMIDE 20 MG/1
TABLET ORAL
Qty: 90 TABLET | Refills: 0 | Status: SHIPPED | OUTPATIENT
Start: 2022-09-20

## 2022-09-20 NOTE — TELEPHONE ENCOUNTER
Last Appointment:  8/1/2022  Future Appointments   Date Time Provider Ricardo Pichardo   11/1/2022  9:00 AM SCHEDULE, [facility]

## 2022-09-26 RX ORDER — ATORVASTATIN CALCIUM 40 MG/1
TABLET, FILM COATED ORAL
Qty: 90 TABLET | Refills: 1 | Status: SHIPPED | OUTPATIENT
Start: 2022-09-26

## 2022-10-24 RX ORDER — GLIMEPIRIDE 2 MG/1
TABLET ORAL
Qty: 90 TABLET | Refills: 0 | Status: SHIPPED
Start: 2022-10-24 | End: 2022-11-21

## 2022-10-26 RX ORDER — GLIMEPIRIDE 2 MG/1
TABLET ORAL
Qty: 90 TABLET | Refills: 0 | OUTPATIENT
Start: 2022-10-26

## 2022-10-28 RX ORDER — GLIMEPIRIDE 2 MG/1
TABLET ORAL
Qty: 90 TABLET | Refills: 0 | OUTPATIENT
Start: 2022-10-28

## 2022-11-03 NOTE — PROGRESS NOTES
Current Vent Settings: A/C 10 500 +5 21%  Spontaneous Volumes: unable to tolerate  No changes made, small amount of secretions.   Patient sitting in the TV area, yelling into the nurses station and demanding that someone let her look through her locker. Both nurses were attending to other patients, and the patient yelled out \"You don't want to help me because I'm black! \" The patient was informed that her color had nothing to do with why the nurses were unable to let her look through her looker on demand.

## 2022-11-21 RX ORDER — GLIMEPIRIDE 2 MG/1
TABLET ORAL
Qty: 90 TABLET | Refills: 0 | Status: SHIPPED | OUTPATIENT
Start: 2022-11-21

## 2022-12-02 ENCOUNTER — OFFICE VISIT (OUTPATIENT)
Dept: FAMILY MEDICINE CLINIC | Age: 67
End: 2022-12-02
Payer: COMMERCIAL

## 2022-12-02 VITALS
SYSTOLIC BLOOD PRESSURE: 122 MMHG | HEIGHT: 63 IN | DIASTOLIC BLOOD PRESSURE: 70 MMHG | TEMPERATURE: 97.2 F | OXYGEN SATURATION: 99 % | RESPIRATION RATE: 16 BRPM | HEART RATE: 91 BPM | BODY MASS INDEX: 31.54 KG/M2 | WEIGHT: 178 LBS

## 2022-12-02 DIAGNOSIS — Z23 NEED FOR PNEUMOCOCCAL VACCINE: ICD-10-CM

## 2022-12-02 DIAGNOSIS — I10 SYSTEMIC PRIMARY ARTERIAL HYPERTENSION: ICD-10-CM

## 2022-12-02 DIAGNOSIS — L30.4 INTERTRIGO: ICD-10-CM

## 2022-12-02 DIAGNOSIS — E11.9 TYPE 2 DIABETES MELLITUS WITHOUT COMPLICATION, WITH LONG-TERM CURRENT USE OF INSULIN (HCC): Primary | ICD-10-CM

## 2022-12-02 DIAGNOSIS — Z23 FLU VACCINE NEED: ICD-10-CM

## 2022-12-02 DIAGNOSIS — E78.00 PURE HYPERCHOLESTEROLEMIA: ICD-10-CM

## 2022-12-02 DIAGNOSIS — Z79.4 TYPE 2 DIABETES MELLITUS WITHOUT COMPLICATION, WITH LONG-TERM CURRENT USE OF INSULIN (HCC): Primary | ICD-10-CM

## 2022-12-02 DIAGNOSIS — Z12.11 COLON CANCER SCREENING: ICD-10-CM

## 2022-12-02 LAB
ALBUMIN SERPL-MCNC: 4 G/DL (ref 3.5–5.2)
ALP BLD-CCNC: 163 U/L (ref 35–104)
ALT SERPL-CCNC: 24 U/L (ref 0–32)
ANION GAP SERPL CALCULATED.3IONS-SCNC: 19 MMOL/L (ref 7–16)
AST SERPL-CCNC: 22 U/L (ref 0–31)
BASOPHILS ABSOLUTE: 0.03 E9/L (ref 0–0.2)
BASOPHILS RELATIVE PERCENT: 0.4 % (ref 0–2)
BILIRUB SERPL-MCNC: 0.4 MG/DL (ref 0–1.2)
BUN BLDV-MCNC: 14 MG/DL (ref 6–23)
CALCIUM SERPL-MCNC: 9.6 MG/DL (ref 8.6–10.2)
CHLORIDE BLD-SCNC: 98 MMOL/L (ref 98–107)
CHOLESTEROL, TOTAL: 122 MG/DL (ref 0–199)
CO2: 19 MMOL/L (ref 22–29)
CREAT SERPL-MCNC: 1.1 MG/DL (ref 0.5–1)
EOSINOPHILS ABSOLUTE: 0.11 E9/L (ref 0.05–0.5)
EOSINOPHILS RELATIVE PERCENT: 1.5 % (ref 0–6)
GFR SERPL CREATININE-BSD FRML MDRD: 56 ML/MIN/1.73
GLUCOSE BLD-MCNC: 463 MG/DL (ref 74–99)
HBA1C MFR BLD: 14.2 %
HCT VFR BLD CALC: 47.7 % (ref 34–48)
HDLC SERPL-MCNC: 36 MG/DL
HEMOGLOBIN: 15.6 G/DL (ref 11.5–15.5)
IMMATURE GRANULOCYTES #: 0.03 E9/L
IMMATURE GRANULOCYTES %: 0.4 % (ref 0–5)
LDL CHOLESTEROL CALCULATED: 57 MG/DL (ref 0–99)
LYMPHOCYTES ABSOLUTE: 1.57 E9/L (ref 1.5–4)
LYMPHOCYTES RELATIVE PERCENT: 22.1 % (ref 20–42)
MCH RBC QN AUTO: 29.2 PG (ref 26–35)
MCHC RBC AUTO-ENTMCNC: 32.7 % (ref 32–34.5)
MCV RBC AUTO: 89.3 FL (ref 80–99.9)
MONOCYTES ABSOLUTE: 0.45 E9/L (ref 0.1–0.95)
MONOCYTES RELATIVE PERCENT: 6.3 % (ref 2–12)
NEUTROPHILS ABSOLUTE: 4.91 E9/L (ref 1.8–7.3)
NEUTROPHILS RELATIVE PERCENT: 69.3 % (ref 43–80)
PDW BLD-RTO: 13.2 FL (ref 11.5–15)
PLATELET # BLD: 302 E9/L (ref 130–450)
PMV BLD AUTO: 10.7 FL (ref 7–12)
POTASSIUM SERPL-SCNC: 4.4 MMOL/L (ref 3.5–5)
RBC # BLD: 5.34 E12/L (ref 3.5–5.5)
SODIUM BLD-SCNC: 136 MMOL/L (ref 132–146)
TOTAL PROTEIN: 7.3 G/DL (ref 6.4–8.3)
TRIGL SERPL-MCNC: 144 MG/DL (ref 0–149)
TSH SERPL DL<=0.05 MIU/L-ACNC: 1.38 UIU/ML (ref 0.27–4.2)
VLDLC SERPL CALC-MCNC: 29 MG/DL
WBC # BLD: 7.1 E9/L (ref 4.5–11.5)

## 2022-12-02 PROCEDURE — 3046F HEMOGLOBIN A1C LEVEL >9.0%: CPT | Performed by: STUDENT IN AN ORGANIZED HEALTH CARE EDUCATION/TRAINING PROGRAM

## 2022-12-02 PROCEDURE — 90472 IMMUNIZATION ADMIN EACH ADD: CPT | Performed by: STUDENT IN AN ORGANIZED HEALTH CARE EDUCATION/TRAINING PROGRAM

## 2022-12-02 PROCEDURE — G8399 PT W/DXA RESULTS DOCUMENT: HCPCS | Performed by: STUDENT IN AN ORGANIZED HEALTH CARE EDUCATION/TRAINING PROGRAM

## 2022-12-02 PROCEDURE — 3078F DIAST BP <80 MM HG: CPT | Performed by: STUDENT IN AN ORGANIZED HEALTH CARE EDUCATION/TRAINING PROGRAM

## 2022-12-02 PROCEDURE — G8484 FLU IMMUNIZE NO ADMIN: HCPCS | Performed by: STUDENT IN AN ORGANIZED HEALTH CARE EDUCATION/TRAINING PROGRAM

## 2022-12-02 PROCEDURE — 3017F COLORECTAL CA SCREEN DOC REV: CPT | Performed by: STUDENT IN AN ORGANIZED HEALTH CARE EDUCATION/TRAINING PROGRAM

## 2022-12-02 PROCEDURE — 90471 IMMUNIZATION ADMIN: CPT | Performed by: STUDENT IN AN ORGANIZED HEALTH CARE EDUCATION/TRAINING PROGRAM

## 2022-12-02 PROCEDURE — G8427 DOCREV CUR MEDS BY ELIG CLIN: HCPCS | Performed by: STUDENT IN AN ORGANIZED HEALTH CARE EDUCATION/TRAINING PROGRAM

## 2022-12-02 PROCEDURE — 1123F ACP DISCUSS/DSCN MKR DOCD: CPT | Performed by: STUDENT IN AN ORGANIZED HEALTH CARE EDUCATION/TRAINING PROGRAM

## 2022-12-02 PROCEDURE — 3074F SYST BP LT 130 MM HG: CPT | Performed by: STUDENT IN AN ORGANIZED HEALTH CARE EDUCATION/TRAINING PROGRAM

## 2022-12-02 PROCEDURE — 90677 PCV20 VACCINE IM: CPT | Performed by: STUDENT IN AN ORGANIZED HEALTH CARE EDUCATION/TRAINING PROGRAM

## 2022-12-02 PROCEDURE — 1090F PRES/ABSN URINE INCON ASSESS: CPT | Performed by: STUDENT IN AN ORGANIZED HEALTH CARE EDUCATION/TRAINING PROGRAM

## 2022-12-02 PROCEDURE — 83036 HEMOGLOBIN GLYCOSYLATED A1C: CPT | Performed by: STUDENT IN AN ORGANIZED HEALTH CARE EDUCATION/TRAINING PROGRAM

## 2022-12-02 PROCEDURE — 99214 OFFICE O/P EST MOD 30 MIN: CPT | Performed by: STUDENT IN AN ORGANIZED HEALTH CARE EDUCATION/TRAINING PROGRAM

## 2022-12-02 PROCEDURE — G8417 CALC BMI ABV UP PARAM F/U: HCPCS | Performed by: STUDENT IN AN ORGANIZED HEALTH CARE EDUCATION/TRAINING PROGRAM

## 2022-12-02 PROCEDURE — 2022F DILAT RTA XM EVC RTNOPTHY: CPT | Performed by: STUDENT IN AN ORGANIZED HEALTH CARE EDUCATION/TRAINING PROGRAM

## 2022-12-02 PROCEDURE — 1036F TOBACCO NON-USER: CPT | Performed by: STUDENT IN AN ORGANIZED HEALTH CARE EDUCATION/TRAINING PROGRAM

## 2022-12-02 PROCEDURE — 90694 VACC AIIV4 NO PRSRV 0.5ML IM: CPT | Performed by: STUDENT IN AN ORGANIZED HEALTH CARE EDUCATION/TRAINING PROGRAM

## 2022-12-02 RX ORDER — INSULIN GLARGINE 100 [IU]/ML
10 INJECTION, SOLUTION SUBCUTANEOUS NIGHTLY
Qty: 5 ADJUSTABLE DOSE PRE-FILLED PEN SYRINGE | Refills: 3 | Status: SHIPPED | OUTPATIENT
Start: 2022-12-02

## 2022-12-02 RX ORDER — OLANZAPINE 20 MG/1
TABLET ORAL
COMMUNITY
Start: 2022-11-19

## 2022-12-02 RX ORDER — KETOCONAZOLE 20 MG/G
CREAM TOPICAL
Qty: 30 G | Refills: 1 | Status: SHIPPED | OUTPATIENT
Start: 2022-12-02

## 2022-12-02 ASSESSMENT — ENCOUNTER SYMPTOMS
COUGH: 0
EYE PAIN: 0
NAUSEA: 0
EYE REDNESS: 0
CONSTIPATION: 0
DIARRHEA: 0
SINUS PAIN: 0
RHINORRHEA: 0
ABDOMINAL PAIN: 0
SORE THROAT: 0
SINUS PRESSURE: 0
BACK PAIN: 0
SHORTNESS OF BREATH: 0
VOMITING: 0

## 2022-12-02 ASSESSMENT — PATIENT HEALTH QUESTIONNAIRE - PHQ9
1. LITTLE INTEREST OR PLEASURE IN DOING THINGS: 0
2. FEELING DOWN, DEPRESSED OR HOPELESS: 0
6. FEELING BAD ABOUT YOURSELF - OR THAT YOU ARE A FAILURE OR HAVE LET YOURSELF OR YOUR FAMILY DOWN: 0
SUM OF ALL RESPONSES TO PHQ QUESTIONS 1-9: 0
SUM OF ALL RESPONSES TO PHQ9 QUESTIONS 1 & 2: 0
5. POOR APPETITE OR OVEREATING: 0
4. FEELING TIRED OR HAVING LITTLE ENERGY: 0
SUM OF ALL RESPONSES TO PHQ QUESTIONS 1-9: 0
9. THOUGHTS THAT YOU WOULD BE BETTER OFF DEAD, OR OF HURTING YOURSELF: 0
10. IF YOU CHECKED OFF ANY PROBLEMS, HOW DIFFICULT HAVE THESE PROBLEMS MADE IT FOR YOU TO DO YOUR WORK, TAKE CARE OF THINGS AT HOME, OR GET ALONG WITH OTHER PEOPLE: 0
8. MOVING OR SPEAKING SO SLOWLY THAT OTHER PEOPLE COULD HAVE NOTICED. OR THE OPPOSITE, BEING SO FIGETY OR RESTLESS THAT YOU HAVE BEEN MOVING AROUND A LOT MORE THAN USUAL: 0
3. TROUBLE FALLING OR STAYING ASLEEP: 0
SUM OF ALL RESPONSES TO PHQ QUESTIONS 1-9: 0
SUM OF ALL RESPONSES TO PHQ QUESTIONS 1-9: 0
7. TROUBLE CONCENTRATING ON THINGS, SUCH AS READING THE NEWSPAPER OR WATCHING TELEVISION: 0

## 2022-12-02 NOTE — PROGRESS NOTES
12/2/2022    HPI  Chief Complaint   Patient presents with    Diabetes    Rash     Under bilateral breasts       Eric Pinto is a 74 y.o. female who  has a past medical history of Bell's palsy, Bipolar disorder ([facility] [address] Carpal tunnel syndrome, Hyperlipidemia, Non-insulin dependent type 2 diabetes mellitus ([facility] [address] and Systemic primary arterial hypertension. DM2:   Patient is here to fu regarding DM2. Patient is not controlled. Taking all medications and tolerating well. Patient is not taking ASA and Ace Inhibitor/ARB. Patient is  on appropriately-dosed statin. LDL is  at goal.  BP is  controlled. does not hypoglycemic episodes. Patient does not see Podiatry regularly. Patient is aware that it is necessary to see an Eye Dr yearly. Patient does not smoke. Most recent labs reviewed with patient. Patient does have complaints or concerns today. Diet and exercise discussed at length today. Patient states that she is eating a lot of ice cream including klondikes and drumsticks     Lab Results   Component Value Date    LABA1C 14.2 12/02/2022       Lab Results   Component Value Date    LDLCALC 61 08/09/2021         Rash: Eric Pinto is a 74 y.o. female who presents with a few month history of a localized rash. Rash first presented on the  under the breasts  and has not spread. Rash is red and is pruritic, painful, and macular. Associated symptoms include none. Patient has tried corn starch. New exposures: none. Patient has not had contacts with similar symptoms. Prior history of similar symptoms: no.      Review of Systems   Constitutional:  Negative for chills, fatigue and fever. HENT:  Negative for congestion, ear pain, postnasal drip, rhinorrhea, sinus pressure, sinus pain and sore throat. Eyes:  Negative for pain and redness. Respiratory:  Negative for cough and shortness of breath. Cardiovascular:  Negative for chest pain.    Gastrointestinal:  Negative for abdominal pain, constipation, diarrhea, nausea and vomiting. Genitourinary:  Negative for difficulty urinating and dysuria. Musculoskeletal:  Negative for back pain, myalgias and neck pain. Skin:  Positive for rash. Neurological:  Negative for dizziness, light-headedness and numbness. Psychiatric/Behavioral:  The patient is not nervous/anxious. Prior to Visit Medications    Medication Sig Taking? Authorizing Provider   OLANZapine (ZYPREXA) 20 MG tablet TAKE 1 TABLET BY MOUTH EVERY DAY AT NIGHT Yes Historical Provider, MD   insulin glargine (LANTUS SOLOSTAR) 100 UNIT/ML injection pen Inject 10 Units into the skin nightly Yes Sheryl Wade DO   ketoconazole (NIZORAL) 2 % cream Apply topically daily. Yes Sheryl Wade DO   glimepiride (AMARYL) 2 MG tablet TAKE 1 TABLET BY MOUTH EVERY DAY BEFORE BREAKFAST Yes Sheryl Wade DO   metFORMIN (GLUCOPHAGE) 1000 MG tablet TAKE 1 TABLET BY MOUTH TWICE A DAY WITH MEALS Yes Sheryl Wade DO   atorvastatin (LIPITOR) 40 MG tablet TAKE 1 TABLET BY MOUTH EVERY DAY Yes Sheryl Wade DO   furosemide (LASIX) 20 MG tablet TAKE 1 TABLET BY MOUTH EVERY DAY Yes Sheryl Wade DO   labetalol (NORMODYNE) 200 MG tablet Take 1 tablet by mouth every 8 hours  Sheryl Wade DO   SITagliptin (JANUVIA) 100 MG tablet TAKE 1 TABLET BY MOUTH EVERY DAY Yes Sheryl Wade DO   empagliflozin (JARDIANCE) 25 MG tablet Take 1 tablet by mouth in the morning.  Yes Sheryl Wade DO   TECHLITE PEN NEEDLES 31G X 5 MM MISC USE DAILY TO INJECT 20 UNITS AT BEDTIME Yes Sheryl Wade DO   terconazole (TERAZOL 3) 0.8 % vaginal cream INSERT 1 APPLICATORFUL VAGINALLY AT BEDTIME FOR 3 DAYS Yes Sheryl Wade DO   timolol (TIMOPTIC) 0.5 % ophthalmic solution  Yes Historical Provider, MD   metoprolol tartrate (LOPRESSOR) 25 MG tablet Take 1 tablet by mouth Yes Historical Provider, MD   benztropine (COGENTIN) 1 MG tablet TAKE 1 TABLET BY MOUTH EVERY DAY AT NIGHT Yes Historical Provider, MD   ammonium lactate (AMLACTIN) 12 % cream APPLY TO AFFECTED AREA EVERY DAY Yes CHRISS Andino CNP   blood glucose test strips (FREESTYLE LITE) strip USE TO TEST BLOOD SUGAR EVERY MORNING Yes CHRISS Andino CNP   blood glucose monitor kit and supplies CHECK BLOOD GLUCOSE DAILY DX: E11.9  ONE TOUCH GLUCOMETER REQUESTED IF OK WITH INSRANCE Yes CHRISS Andino CNP   vitamin D (ERGOCALCIFEROL) 1.25 MG (84456 UT) CAPS capsule TAKE 1 CAPSULE BY MOUTH ONE TIME PER WEEK Yes CHRISS Andino CNP   latanoprost (XALATAN) 0.005 % ophthalmic solution Place 1 drop into both eyes nightly Yes Historical Provider, MD   brimonidine (ALPHAGAN) 0.2 % ophthalmic solution  Yes Historical Provider, MD   FreeStyle Lancets MISC Inject 1 each into the skin daily  CHRISS Andino CNP        Allergies   Allergen Reactions    Ace Inhibitors Other (See Comments)     ? Ibuprofen Other (See Comments)     hallucinate    Latuda [Lurasidone Hcl] Other (See Comments)     ? Lurasidone Other (See Comments)     ?     Haloperidol Palpitations and Other (See Comments)     \"It makes my heart race\"        Past Medical History:   Diagnosis Date    Bell's palsy     Bipolar disorder ([facility] [address]     bipolar    Carpal tunnel syndrome 8/4/2016    Hyperlipidemia     Non-insulin dependent type 2 diabetes mellitus ([facility] [address]     Systemic primary arterial hypertension 8/4/2016       Past Surgical History:   Procedure Laterality Date    ECHO COMPLETE  6/22/2013            Social History     Socioeconomic History    Marital status: Single     Spouse name: Not on file    Number of children: Not on file    Years of education: Not on file    Highest education level: Not on file   Occupational History    Not on file   Tobacco Use    Smoking status: Never    Smokeless tobacco: Never   Vaping Use    Vaping Use: Never used   Substance and Sexual Activity    Alcohol use: No    Drug use: No    Sexual activity: Not Currently   Other Topics Concern    Not on file   Social History Narrative    Not on file     Social Determinants of Health     Financial Resource Strain: Low Risk     Difficulty of Paying Living Expenses: Not hard at all   Food Insecurity: No Food Insecurity    Worried About Running Out of Food in the Last Year: Never true    Ran Out of Food in the Last Year: Never true   Transportation Needs: Not on file   Physical Activity: Not on file   Stress: Not on file   Social Connections: Not on file   Intimate Partner Violence: Not on file   Housing Stability: Not on file        Family History   Problem Relation Age of Onset    No Known Problems Mother     Heart Disease Father 76    High Blood Pressure Father     No Known Problems Brother            Vitals:    12/02/22 1051   BP: 122/70   Pulse: 91   Resp: 16   Temp: 97.2 °F (36.2 °C)   TempSrc: Temporal   SpO2: 99%   Weight: 178 lb (80.7 kg)   Height: 5' 3\" (1.6 m)     Estimated body mass index is 31.53 kg/m² as calculated from the following:    Height as of this encounter: 5' 3\" (1.6 m). Weight as of this encounter: 178 lb (80.7 kg).     Most Recent Labs  CBC  Lab Results   Component Value Date/Time    WBC 8.3 08/09/2021 12:00 PM    WBC 7.0 10/23/2020 12:00 AM    WBC 5.4 07/12/2020 02:06 PM    RBC 5.28 08/09/2021 12:00 PM    RBC 4.94 10/23/2020 12:00 AM    RBC 4.49 07/12/2020 02:06 PM    HGB 15.0 08/09/2021 12:00 PM    HGB 13.7 10/23/2020 12:00 AM    HGB 11.8 07/12/2020 02:06 PM    HCT 47.2 08/09/2021 12:00 PM    HCT 42.5 10/23/2020 12:00 AM    HCT 36.9 07/12/2020 02:06 PM    MCV 89.4 08/09/2021 12:00 PM    MCV 86.0 10/23/2020 12:00 AM    MCV 82.2 07/12/2020 02:06 PM     08/09/2021 12:00 PM     10/23/2020 12:00 AM     07/12/2020 02:06 PM      CMP  Lab Results   Component Value Date/Time     11/19/2021 12:00 PM     08/09/2021 12:00 PM     04/23/2021 12:00 PM    K 5.5 11/19/2021 12:00 PM    K 4.9 08/09/2021 12:00 PM    K 5.3 04/23/2021 12:00 PM    K 5.0 05/18/2020 11:14 AM     11/19/2021 12:00 PM     08/09/2021 12:00 PM    CL 97 04/23/2021 12:00 PM    CO2 25 11/19/2021 12:00 PM    CO2 27 08/09/2021 12:00 PM    CO2 29 04/23/2021 12:00 PM    ANIONGAP 15 11/19/2021 12:00 PM    ANIONGAP 12 08/09/2021 12:00 PM    ANIONGAP 13 04/23/2021 12:00 PM    GLUCOSE 297 11/19/2021 12:00 PM    GLUCOSE 258 08/09/2021 12:00 PM    GLUCOSE 346 04/23/2021 12:00 PM    GLUCOSE 122 03/23/2011 10:15 AM    GLUCOSE 104 03/19/2011 05:00 AM    GLUCOSE 142 03/18/2011 05:10 AM    BUN 25 11/19/2021 12:00 PM    BUN 17 08/09/2021 12:00 PM    BUN 20 04/23/2021 12:00 PM    CREATININE 1.2 11/19/2021 12:00 PM    CREATININE 0.9 08/09/2021 12:00 PM    CREATININE 1.1 04/23/2021 12:00 PM    LABGLOM 55 11/19/2021 12:00 PM    LABGLOM >60 08/09/2021 12:00 PM    LABGLOM >60 04/23/2021 12:00 PM    GFRAA 55 11/19/2021 12:00 PM    GFRAA >60 08/09/2021 12:00 PM    GFRAA >60 04/23/2021 12:00 PM    CALCIUM 9.9 11/19/2021 12:00 PM    CALCIUM 9.8 08/09/2021 12:00 PM    CALCIUM 9.8 04/23/2021 12:00 PM    PROT 7.5 11/19/2021 12:00 PM    PROT 7.4 08/09/2021 12:00 PM    PROT 7.3 04/23/2021 12:00 PM    LABALBU 4.1 11/19/2021 12:00 PM    LABALBU 4.0 08/09/2021 12:00 PM    LABALBU 4.2 04/23/2021 12:00 PM    LABALBU 4.3 05/17/2012 10:32 AM    LABALBU 4.4 07/26/2011 09:02 AM    LABALBU 3.9 03/28/2011 08:10 AM    BILITOT 0.4 11/19/2021 12:00 PM    BILITOT 0.5 08/09/2021 12:00 PM    BILITOT 0.5 04/23/2021 12:00 PM    ALKPHOS 138 11/19/2021 12:00 PM    ALKPHOS 101 08/09/2021 12:00 PM    ALKPHOS 104 04/23/2021 12:00 PM    AST 18 11/19/2021 12:00 PM    AST 22 08/09/2021 12:00 PM    AST 16 04/23/2021 12:00 PM    ALT 18 11/19/2021 12:00 PM    ALT 15 08/09/2021 12:00 PM    ALT 15 04/23/2021 12:00 PM     A1C  Lab Results   Component Value Date/Time    LABA1C 14.2 12/02/2022 11:01 AM    LABA1C 13.1 08/01/2022 04:02 PM    LABA1C 8.5 02/23/2022 12:00 AM     TSH  Lab Results   Component Value Date/Time    TSH 5.130 03/22/2019 09:29 AM    TSH 6.120 10/04/2018 09:05 AM TSH 4.060 08/09/2018 05:40 AM     FREET4  Lab Results   Component Value Date/Time    Q2WBBET 6.0 08/09/2018 05:40 AM    B9GOAWJ 5.6 07/06/2018 10:20 AM    K5QHCJK 7.0 02/04/2015 03:30 PM     LIPID  Lab Results   Component Value Date/Time    CHOL 116 08/09/2021 12:00 PM    CHOL 128 04/23/2021 12:00 PM    CHOL 283 10/23/2020 12:00 AM    HDL 37 08/09/2021 12:00 PM    HDL 44 04/23/2021 12:00 PM    HDL 56 10/23/2020 12:00 AM    LDLCALC 61 08/09/2021 12:00 PM    LDLCALC 58 04/23/2021 12:00 PM    LDLCALC 187 10/23/2020 12:00 AM    TRIG 91 08/09/2021 12:00 PM    TRIG 132 04/23/2021 12:00 PM    TRIG 210 10/23/2020 12:00 AM    CHOLHDLRATIO 5.1 10/23/2020 12:00 AM    CHOLHDLRATIO 2.3 03/10/2020 12:00 AM     VITAMIN D  Lab Results   Component Value Date/Time    VITD25 45.1 08/09/2018 05:40 AM     MAGNESIUM  Lab Results   Component Value Date/Time    MG 2.3 07/06/2018 10:20 AM    MG 1.7 07/22/2014 02:40 PM       HEPATITIS C  Lab Results   Component Value Date/Time    HCVABI Non-Reactive 04/23/2021 12:00 PM     UA  Lab Results   Component Value Date/Time    COLORU Yellow 07/12/2020 02:32 PM    COLORU Yellow 06/15/2020 04:40 PM    COLORU Yellow 05/18/2020 06:34 PM    CLARITYU Clear 07/12/2020 02:32 PM    CLARITYU Clear 06/15/2020 04:40 PM    CLARITYU Clear 05/18/2020 06:34 PM    GLUCOSEU Negative 07/12/2020 02:32 PM    GLUCOSEU Negative 06/15/2020 04:40 PM    GLUCOSEU 250 05/18/2020 06:34 PM    GLUCOSEU NEGATIVE 03/15/2011 09:10 PM    BILIRUBINUR Negative 07/12/2020 02:32 PM    BILIRUBINUR Negative 06/15/2020 04:40 PM    BILIRUBINUR Negative 05/18/2020 06:34 PM    BILIRUBINUR NEGATIVE 03/15/2011 09:10 PM    KETUA Negative 07/12/2020 02:32 PM    KETUA Negative 06/15/2020 04:40 PM    KETUA Negative 05/18/2020 06:34 PM    SPECGRAV 1.015 07/12/2020 02:32 PM    SPECGRAV 1.020 06/15/2020 04:40 PM    SPECGRAV 1.015 05/18/2020 06:34 PM    BLOODU Negative 07/12/2020 02:32 PM    BLOODU Negative 06/15/2020 04:40 PM    BLOODU Negative Mood normal.         Behavior: Behavior normal.         Thought Content: Thought content normal.       ASSESSMENT/PLAN:  Britany Holliday was seen today for diabetes and rash. Diagnoses and all orders for this visit:    Type 2 diabetes mellitus without complication, with long-term current use of insulin (HCC)  -     POCT glycosylated hemoglobin (Hb A1C)  -     Microalbumin / Creatinine Urine Ratio; Future  -     insulin glargine (LANTUS SOLOSTAR) 100 UNIT/ML injection pen; Inject 10 Units into the skin nightly    Systemic primary arterial hypertension  -     CBC with Auto Differential; Future  -     Comprehensive Metabolic Panel; Future  -     TSH; Future    Pure hypercholesterolemia  -     Lipid Panel; Future    Need for pneumococcal vaccine  -     Pneumococcal, PCV20, PREVNAR 20, (age 25 yrs+), IM, PF    Flu vaccine need  -     Influenza, FLUAD, (age 72 y+), IM, PF, 0.5 mL    Colon cancer screening  -     Cologuard (Fecal DNA Colorectal Cancer Screening)    Intertrigo  -     ketoconazole (NIZORAL) 2 % cream; Apply topically daily. The patient is asked to make an attempt to improve diet and exercise patterns to aid in medical management of this problem. A1c not at goal and we discussed the effects of what could happen due to patient eating all the sweets that she has been eating. As above. Call or go to the nearest ED immediately if symptoms worsen or persist.  Educational materials and/or home exercises printed for patient's review and were included in patient instructions on his/her After Visit Summary and given to patient at the end of visit. Counseled regarding above diagnosis, including possible risks and complications,  especially if left uncontrolled. Counseled regarding the possible side effects, risks, benefits and alternatives to treatment; patient and/or guardian verbalizes understanding, agrees, feels comfortable with and wishes to proceed with above treatment plan.      Advised patient to call with any new medication issues, and read all Rx info from pharmacy to assure aware of all possible risks and side effects of medication before taking. Reviewed age and gender appropriate health screening exams and vaccinations. Advised patient regarding importance of keeping up with recommended health maintenance and to schedule as soon as possible if overdue, as this is important in assessing for undiagnosed pathology, especially cancer, as well as protecting against potentially harmful/life threatening disease. Patient and/or guardian verbalizes understanding and agrees with above counseling, assessment and plan. All questions answered. Return in about 3 months (around 3/2/2023) for diabetes, HTN. An  electronic signature was used to authenticate this note. --Marimar Ospina,  on 12/2/2022 at 11:36 AM    This document was prepared at least partially through the use of voice recognition software. Although effort is taken to assure the accuracy of this document, it is possible that grammatical, syntax,  or spelling errors may occur.

## 2022-12-13 RX ORDER — FUROSEMIDE 20 MG/1
TABLET ORAL
Qty: 90 TABLET | Refills: 0 | Status: SHIPPED | OUTPATIENT
Start: 2022-12-13

## 2022-12-19 DIAGNOSIS — E11.9 TYPE 2 DIABETES MELLITUS WITHOUT COMPLICATION, UNSPECIFIED WHETHER LONG TERM INSULIN USE (HCC): ICD-10-CM

## 2022-12-19 RX ORDER — LABETALOL 200 MG/1
200 TABLET, FILM COATED ORAL EVERY 8 HOURS SCHEDULED
Qty: 30 TABLET | Refills: 3 | Status: SHIPPED | OUTPATIENT
Start: 2022-12-19 | End: 2023-01-18

## 2022-12-19 RX ORDER — EMPAGLIFLOZIN 25 MG/1
TABLET, FILM COATED ORAL
Qty: 30 TABLET | Refills: 5 | Status: SHIPPED | OUTPATIENT
Start: 2022-12-19

## 2022-12-22 RX ORDER — LABETALOL 200 MG/1
200 TABLET, FILM COATED ORAL EVERY 8 HOURS SCHEDULED
Qty: 90 TABLET | Refills: 1 | OUTPATIENT
Start: 2022-12-22 | End: 2023-01-21

## 2023-01-26 RX ORDER — GLIMEPIRIDE 2 MG/1
TABLET ORAL
Qty: 90 TABLET | Refills: 0 | Status: SHIPPED | OUTPATIENT
Start: 2023-01-26

## 2023-01-26 RX ORDER — LABETALOL 200 MG/1
200 TABLET, FILM COATED ORAL EVERY 8 HOURS SCHEDULED
Qty: 90 TABLET | Refills: 1 | Status: SHIPPED | OUTPATIENT
Start: 2023-01-26 | End: 2023-02-25

## 2023-02-17 RX ORDER — LABETALOL 200 MG/1
200 TABLET, FILM COATED ORAL EVERY 8 HOURS SCHEDULED
Qty: 90 TABLET | Refills: 1 | Status: SHIPPED | OUTPATIENT
Start: 2023-02-17 | End: 2023-03-19

## 2023-03-03 ENCOUNTER — OFFICE VISIT (OUTPATIENT)
Dept: FAMILY MEDICINE CLINIC | Age: 68
End: 2023-03-03

## 2023-03-03 VITALS
TEMPERATURE: 96.7 F | OXYGEN SATURATION: 99 % | SYSTOLIC BLOOD PRESSURE: 132 MMHG | WEIGHT: 176 LBS | HEART RATE: 87 BPM | DIASTOLIC BLOOD PRESSURE: 82 MMHG | HEIGHT: 63 IN | BODY MASS INDEX: 31.18 KG/M2 | RESPIRATION RATE: 18 BRPM

## 2023-03-03 DIAGNOSIS — E11.9 TYPE 2 DIABETES MELLITUS WITHOUT COMPLICATION, UNSPECIFIED WHETHER LONG TERM INSULIN USE (HCC): Primary | ICD-10-CM

## 2023-03-03 DIAGNOSIS — Z79.4 TYPE 2 DIABETES MELLITUS WITHOUT COMPLICATION, WITH LONG-TERM CURRENT USE OF INSULIN (HCC): ICD-10-CM

## 2023-03-03 DIAGNOSIS — I10 SYSTEMIC PRIMARY ARTERIAL HYPERTENSION: ICD-10-CM

## 2023-03-03 DIAGNOSIS — T23.111A SUPERFICIAL BURN OF RIGHT THUMB, INITIAL ENCOUNTER: ICD-10-CM

## 2023-03-03 DIAGNOSIS — E78.00 PURE HYPERCHOLESTEROLEMIA: ICD-10-CM

## 2023-03-03 DIAGNOSIS — E11.9 TYPE 2 DIABETES MELLITUS WITHOUT COMPLICATION, WITH LONG-TERM CURRENT USE OF INSULIN (HCC): ICD-10-CM

## 2023-03-03 PROBLEM — A41.9 SEPSIS (HCC): Status: RESOLVED | Noted: 2019-12-06 | Resolved: 2023-03-03

## 2023-03-03 PROBLEM — F31.2 SEVERE MANIC BIPOLAR I DISORDER WITH PSYCHOTIC FEATURES (HCC): Status: RESOLVED | Noted: 2018-08-18 | Resolved: 2023-03-03

## 2023-03-03 PROBLEM — M10.9 ACUTE GOUTY ARTHRITIS: Status: RESOLVED | Noted: 2020-06-09 | Resolved: 2023-03-03

## 2023-03-03 LAB — HBA1C MFR BLD: 13.5 %

## 2023-03-03 RX ORDER — GLIMEPIRIDE 4 MG/1
4 TABLET ORAL
Qty: 90 TABLET | Refills: 1 | Status: SHIPPED | OUTPATIENT
Start: 2023-03-03

## 2023-03-03 RX ORDER — INSULIN GLARGINE 100 [IU]/ML
20 INJECTION, SOLUTION SUBCUTANEOUS NIGHTLY
Qty: 5 ADJUSTABLE DOSE PRE-FILLED PEN SYRINGE | Refills: 3 | Status: SHIPPED | OUTPATIENT
Start: 2023-03-03

## 2023-03-03 SDOH — ECONOMIC STABILITY: FOOD INSECURITY: WITHIN THE PAST 12 MONTHS, YOU WORRIED THAT YOUR FOOD WOULD RUN OUT BEFORE YOU GOT MONEY TO BUY MORE.: NEVER TRUE

## 2023-03-03 SDOH — ECONOMIC STABILITY: FOOD INSECURITY: WITHIN THE PAST 12 MONTHS, THE FOOD YOU BOUGHT JUST DIDN'T LAST AND YOU DIDN'T HAVE MONEY TO GET MORE.: NEVER TRUE

## 2023-03-03 SDOH — ECONOMIC STABILITY: INCOME INSECURITY: HOW HARD IS IT FOR YOU TO PAY FOR THE VERY BASICS LIKE FOOD, HOUSING, MEDICAL CARE, AND HEATING?: NOT VERY HARD

## 2023-03-03 SDOH — ECONOMIC STABILITY: HOUSING INSECURITY
IN THE LAST 12 MONTHS, WAS THERE A TIME WHEN YOU DID NOT HAVE A STEADY PLACE TO SLEEP OR SLEPT IN A SHELTER (INCLUDING NOW)?: YES

## 2023-03-03 ASSESSMENT — PATIENT HEALTH QUESTIONNAIRE - PHQ9
SUM OF ALL RESPONSES TO PHQ9 QUESTIONS 1 & 2: 0
SUM OF ALL RESPONSES TO PHQ QUESTIONS 1-9: 2
SUM OF ALL RESPONSES TO PHQ QUESTIONS 1-9: 2
1. LITTLE INTEREST OR PLEASURE IN DOING THINGS: 0
9. THOUGHTS THAT YOU WOULD BE BETTER OFF DEAD, OR OF HURTING YOURSELF: 0
5. POOR APPETITE OR OVEREATING: 0
SUM OF ALL RESPONSES TO PHQ QUESTIONS 1-9: 2
3. TROUBLE FALLING OR STAYING ASLEEP: 0
7. TROUBLE CONCENTRATING ON THINGS, SUCH AS READING THE NEWSPAPER OR WATCHING TELEVISION: 0
4. FEELING TIRED OR HAVING LITTLE ENERGY: 2
8. MOVING OR SPEAKING SO SLOWLY THAT OTHER PEOPLE COULD HAVE NOTICED. OR THE OPPOSITE, BEING SO FIGETY OR RESTLESS THAT YOU HAVE BEEN MOVING AROUND A LOT MORE THAN USUAL: 0
2. FEELING DOWN, DEPRESSED OR HOPELESS: 0
SUM OF ALL RESPONSES TO PHQ QUESTIONS 1-9: 2
6. FEELING BAD ABOUT YOURSELF - OR THAT YOU ARE A FAILURE OR HAVE LET YOURSELF OR YOUR FAMILY DOWN: 0
10. IF YOU CHECKED OFF ANY PROBLEMS, HOW DIFFICULT HAVE THESE PROBLEMS MADE IT FOR YOU TO DO YOUR WORK, TAKE CARE OF THINGS AT HOME, OR GET ALONG WITH OTHER PEOPLE: 0

## 2023-03-03 ASSESSMENT — ENCOUNTER SYMPTOMS
CONSTIPATION: 0
EYE PAIN: 0
VOMITING: 0
DIARRHEA: 0
COUGH: 0
SINUS PRESSURE: 0
SINUS PAIN: 0
BACK PAIN: 0
RHINORRHEA: 0
SHORTNESS OF BREATH: 0
NAUSEA: 0
SORE THROAT: 0
ABDOMINAL PAIN: 0
EYE REDNESS: 0

## 2023-03-03 NOTE — PROGRESS NOTES
3/3/2023    HPI  Chief Complaint   Patient presents with    Diabetes    Hypertension    Hand Pain     Thumb pain       Shelbi Lainez is a 74 y.o. female who  has a past medical history of Acute gouty arthritis, Bell's palsy, Bipolar disorder ([facility] [address] Carpal tunnel syndrome, Hyperlipidemia, Non-insulin dependent type 2 diabetes mellitus ([facility] [address] Sepsis ([facility] [address] and Systemic primary arterial hypertension. Hypertension:  Patient is here for follow up chronic hypertension. This is  generally controlled on current medication regimen. Takes meds as directed and tolerates them well. Most recent labs reviewed with patient and are remarkable. No symptoms from htn standpoint per ROS. Patient is  compliant with lifestyle modifications. Patient does not smoke. DM2:   Patient is here to fu regarding DM2. Patient is not controlled. Taking all medications and tolerating well. Fasting sugars are running 130s. Patient is  taking ASA and Ace Inhibitor/ARB. Patient is  on appropriately-dosed statin. LDL is  at goal.  BP is  controlled. does not hypoglycemic episodes. Patient does see Podiatry regularly. Patient is aware that it is necessary to see an Eye Dr yearly. Patient does not smoke. Most recent labs reviewed with patient. Patient does have complaints or concerns today. Lab Results   Component Value Date    LABA1C 13.5 03/03/2023       Lab Results   Component Value Date    LDLCALC 57 12/02/2022        Review of Systems   Constitutional:  Negative for chills, fatigue and fever. HENT:  Negative for congestion, ear pain, postnasal drip, rhinorrhea, sinus pressure, sinus pain and sore throat. Eyes:  Negative for pain and redness. Respiratory:  Negative for cough and shortness of breath. Cardiovascular:  Negative for chest pain. Gastrointestinal:  Negative for abdominal pain, constipation, diarrhea, nausea and vomiting. Genitourinary:  Negative for difficulty urinating and dysuria. Musculoskeletal:  Positive for arthralgias (right thumb pain). Negative for back pain, myalgias and neck pain. Skin:  Positive for wound (burn right thumb). Negative for rash. Neurological:  Negative for dizziness, light-headedness and numbness. Psychiatric/Behavioral:  The patient is not nervous/anxious. Prior to Visit Medications    Medication Sig Taking? Authorizing Provider   glimepiride (AMARYL) 4 MG tablet Take 1 tablet by mouth every morning (before breakfast) Yes Sheryl Wade DO   insulin glargine (LANTUS SOLOSTAR) 100 UNIT/ML injection pen Inject 20 Units into the skin nightly Yes Sheryl Wade DO   silver sulfADIAZINE (SILVADENE) 1 % cream Apply topically daily. Yes Sheryl Wade DO   labetalol (NORMODYNE) 200 MG tablet TAKE 1 TABLET BY MOUTH EVERY 8 HOURS Yes Sheryl Wade DO   JARDIANCE 25 MG tablet TAKE 1 TABLET BY MOUTH EVERY DAY IN THE MORNING Yes Sheryl Wade DO   furosemide (LASIX) 20 MG tablet TAKE 1 TABLET BY MOUTH EVERY DAY Yes Sheryl Wade DO   metFORMIN (GLUCOPHAGE) 1000 MG tablet TAKE 1 TABLET BY MOUTH TWICE A DAY WITH MEALS Yes Sheryl Wade DO   SITagliptin (JANUVIA) 100 MG tablet TAKE 1 TABLET BY MOUTH EVERY DAY Yes Sheryl Wade DO   OLANZapine (ZYPREXA) 20 MG tablet TAKE 1 TABLET BY MOUTH EVERY DAY AT NIGHT Yes Historical Provider, MD   ketoconazole (NIZORAL) 2 % cream Apply topically daily.  Yes Sheryl Wade DO   atorvastatin (LIPITOR) 40 MG tablet TAKE 1 TABLET BY MOUTH EVERY DAY Yes Sheryl Wade DO   TECHLITE PEN NEEDLES 31G X 5 MM MISC USE DAILY TO INJECT 20 UNITS AT BEDTIME Yes Sheryl Wade DO   terconazole (TERAZOL 3) 0.8 % vaginal cream INSERT 1 APPLICATORFUL VAGINALLY AT BEDTIME FOR 3 DAYS Yes Sheryl Wade DO   timolol (TIMOPTIC) 0.5 % ophthalmic solution  Yes Historical Provider, MD   metoprolol tartrate (LOPRESSOR) 25 MG tablet Take 1 tablet by mouth Yes Historical Provider, MD   benztropine (COGENTIN) 1 MG tablet TAKE 1 TABLET BY MOUTH EVERY DAY AT NIGHT Yes Historical Provider, MD   ammonium lactate (AMLACTIN) 12 % cream APPLY TO AFFECTED AREA EVERY DAY Yes CHRISS Carcamo CNP   blood glucose test strips (FREESTYLE LITE) strip USE TO TEST BLOOD SUGAR EVERY MORNING Yes CHRISS Carcamo CNP   blood glucose monitor kit and supplies CHECK BLOOD GLUCOSE DAILY DX: E11.9  ONE TOUCH GLUCOMETER REQUESTED IF OK WITH INSRANCE Yes CHRISS Carcamo CNP   vitamin D (ERGOCALCIFEROL) 1.25 MG (79994 UT) CAPS capsule TAKE 1 CAPSULE BY MOUTH ONE TIME PER WEEK Yes CHRISS Carcamo CNP   latanoprost (XALATAN) 0.005 % ophthalmic solution Place 1 drop into both eyes nightly Yes Historical Provider, MD   brimonidine (ALPHAGAN) 0.2 % ophthalmic solution  Yes Historical Provider, MD Connoryle Lancets MISC Inject 1 each into the skin daily  CHRISS Carcamo CNP        Allergies   Allergen Reactions    Ace Inhibitors Other (See Comments)     ? Ibuprofen Other (See Comments)     hallucinate    Latuda [Lurasidone Hcl] Other (See Comments)     ? Lurasidone Other (See Comments)     ?     Divalproex Sodium     Haloperidol Palpitations and Other (See Comments)     \"It makes my heart race\"        Past Medical History:   Diagnosis Date    Acute gouty arthritis 6/9/2020    Bell's palsy     Bipolar disorder ([facility] [address]     bipolar    Carpal tunnel syndrome 8/4/2016    Hyperlipidemia     Non-insulin dependent type 2 diabetes mellitus ([facility] [address]     Sepsis ([facility] [address] 12/6/2019    Systemic primary arterial hypertension 8/4/2016       Past Surgical History:   Procedure Laterality Date    ECHO COMPLETE  6/22/2013            Social History     Socioeconomic History    Marital status: Single     Spouse name: Not on file    Number of children: Not on file    Years of education: Not on file    Highest education level: Not on file   Occupational History    Not on file   Tobacco Use    Smoking status: Never    Smokeless tobacco: Never   Vaping Use    Vaping Use: Never used Substance and Sexual Activity    Alcohol use: No    Drug use: No    Sexual activity: Not Currently   Other Topics Concern    Not on file   Social History Narrative    Not on file     Social Determinants of Health     Financial Resource Strain: Low Risk     Difficulty of Paying Living Expenses: Not very hard   Food Insecurity: No Food Insecurity    Worried About Running Out of Food in the Last Year: Never true    Ran Out of Food in the Last Year: Never true   Transportation Needs: Unknown    Lack of Transportation (Medical): Not on file    Lack of Transportation (Non-Medical): No   Physical Activity: Not on file   Stress: Not on file   Social Connections: Not on file   Intimate Partner Violence: Not on file   Housing Stability: High Risk    Unable to Pay for Housing in the Last Year: Not on file    Number of Places Lived in the Last Year: Not on file    Unstable Housing in the Last Year: Yes        Family History   Problem Relation Age of Onset    No Known Problems Mother     Heart Disease Father 76    High Blood Pressure Father     No Known Problems Brother            Vitals:    03/03/23 1123   BP: 132/82   Pulse: 87   Resp: 18   Temp: (!) 96.7 °F (35.9 °C)   SpO2: 99%   Weight: 176 lb (79.8 kg)   Height: 5' 3\" (1.6 m)     Estimated body mass index is 31.18 kg/m² as calculated from the following:    Height as of this encounter: 5' 3\" (1.6 m). Weight as of this encounter: 176 lb (79.8 kg).     Most Recent Labs  CBC  Lab Results   Component Value Date/Time    WBC 7.1 12/02/2022 12:00 PM    WBC 8.3 08/09/2021 12:00 PM    WBC 7.0 10/23/2020 12:00 AM    RBC 5.34 12/02/2022 12:00 PM    RBC 5.28 08/09/2021 12:00 PM    RBC 4.94 10/23/2020 12:00 AM    HGB 15.6 12/02/2022 12:00 PM    HGB 15.0 08/09/2021 12:00 PM    HGB 13.7 10/23/2020 12:00 AM    HCT 47.7 12/02/2022 12:00 PM    HCT 47.2 08/09/2021 12:00 PM    HCT 42.5 10/23/2020 12:00 AM    MCV 89.3 12/02/2022 12:00 PM    MCV 89.4 08/09/2021 12:00 PM    MCV 86.0 10/23/2020 12:00 AM     12/02/2022 12:00 PM     08/09/2021 12:00 PM     10/23/2020 12:00 AM      CMP  Lab Results   Component Value Date/Time     12/02/2022 12:00 PM     11/19/2021 12:00 PM     08/09/2021 12:00 PM    K 4.4 12/02/2022 12:00 PM    K 5.5 11/19/2021 12:00 PM    K 4.9 08/09/2021 12:00 PM    K 5.0 05/18/2020 11:14 AM    CL 98 12/02/2022 12:00 PM     11/19/2021 12:00 PM     08/09/2021 12:00 PM    CO2 19 12/02/2022 12:00 PM    CO2 25 11/19/2021 12:00 PM    CO2 27 08/09/2021 12:00 PM    ANIONGAP 19 12/02/2022 12:00 PM    ANIONGAP 15 11/19/2021 12:00 PM    ANIONGAP 12 08/09/2021 12:00 PM    GLUCOSE 463 12/02/2022 12:00 PM    GLUCOSE 297 11/19/2021 12:00 PM    GLUCOSE 258 08/09/2021 12:00 PM    GLUCOSE 122 03/23/2011 10:15 AM    GLUCOSE 104 03/19/2011 05:00 AM    GLUCOSE 142 03/18/2011 05:10 AM    BUN 14 12/02/2022 12:00 PM    BUN 25 11/19/2021 12:00 PM    BUN 17 08/09/2021 12:00 PM    CREATININE 1.1 12/02/2022 12:00 PM    CREATININE 1.2 11/19/2021 12:00 PM    CREATININE 0.9 08/09/2021 12:00 PM    LABGLOM 56 12/02/2022 12:00 PM    LABGLOM 55 11/19/2021 12:00 PM    LABGLOM >60 08/09/2021 12:00 PM    LABGLOM >60 04/23/2021 12:00 PM    GFRAA 55 11/19/2021 12:00 PM    GFRAA >60 08/09/2021 12:00 PM    GFRAA >60 04/23/2021 12:00 PM    CALCIUM 9.6 12/02/2022 12:00 PM    CALCIUM 9.9 11/19/2021 12:00 PM    CALCIUM 9.8 08/09/2021 12:00 PM    PROT 7.3 12/02/2022 12:00 PM    PROT 7.5 11/19/2021 12:00 PM    PROT 7.4 08/09/2021 12:00 PM    LABALBU 4.0 12/02/2022 12:00 PM    LABALBU 4.1 11/19/2021 12:00 PM    LABALBU 4.0 08/09/2021 12:00 PM    LABALBU 4.3 05/17/2012 10:32 AM    LABALBU 4.4 07/26/2011 09:02 AM    LABALBU 3.9 03/28/2011 08:10 AM    BILITOT 0.4 12/02/2022 12:00 PM    BILITOT 0.4 11/19/2021 12:00 PM    BILITOT 0.5 08/09/2021 12:00 PM    ALKPHOS 163 12/02/2022 12:00 PM    ALKPHOS 138 11/19/2021 12:00 PM    ALKPHOS 101 08/09/2021 12:00 PM    AST 22 12/02/2022 12:00 PM    AST 18 11/19/2021 12:00 PM    AST 22 08/09/2021 12:00 PM    ALT 24 12/02/2022 12:00 PM    ALT 18 11/19/2021 12:00 PM    ALT 15 08/09/2021 12:00 PM     A1C  Lab Results   Component Value Date/Time    LABA1C 13.5 03/03/2023 11:29 AM    LABA1C 14.2 12/02/2022 11:01 AM    LABA1C 13.1 08/01/2022 04:02 PM     TSH  Lab Results   Component Value Date/Time    TSH 1.380 12/02/2022 12:00 PM    TSH 5.130 03/22/2019 09:29 AM    TSH 6.120 10/04/2018 09:05 AM     FREET4  Lab Results   Component Value Date/Time    D4VWICB 6.0 08/09/2018 05:40 AM    H7PCNEU 5.6 07/06/2018 10:20 AM    G8RPWIT 7.0 02/04/2015 03:30 PM     LIPID  Lab Results   Component Value Date/Time    CHOL 122 12/02/2022 12:00 PM    CHOL 116 08/09/2021 12:00 PM    CHOL 128 04/23/2021 12:00 PM    HDL 36 12/02/2022 12:00 PM    HDL 37 08/09/2021 12:00 PM    HDL 44 04/23/2021 12:00 PM    LDLCALC 57 12/02/2022 12:00 PM    LDLCALC 61 08/09/2021 12:00 PM    LDLCALC 58 04/23/2021 12:00 PM    TRIG 144 12/02/2022 12:00 PM    TRIG 91 08/09/2021 12:00 PM    TRIG 132 04/23/2021 12:00 PM    CHOLHDLRATIO 5.1 10/23/2020 12:00 AM    CHOLHDLRATIO 2.3 03/10/2020 12:00 AM     VITAMIN D  Lab Results   Component Value Date/Time    VITD25 45.1 08/09/2018 05:40 AM     MAGNESIUM  Lab Results   Component Value Date/Time    MG 2.3 07/06/2018 10:20 AM    MG 1.7 07/22/2014 02:40 PM      PHOS  No results found for: PHOS   VIC   No results found for: VIC  RHEUMATOID FACTOR  No results found for: RF   HEPATITIS C  Lab Results   Component Value Date/Time    HCVABI Non-Reactive 04/23/2021 12:00 PM     HIV  No results found for: ZES4EZJ, HIV1QT  UA  Lab Results   Component Value Date/Time    COLORU Yellow 07/12/2020 02:32 PM    COLORU Yellow 06/15/2020 04:40 PM    COLORU Yellow 05/18/2020 06:34 PM    CLARITYU Clear 07/12/2020 02:32 PM    CLARITYU Clear 06/15/2020 04:40 PM    CLARITYU Clear 05/18/2020 06:34 PM    GLUCOSEU Negative 07/12/2020 02:32 PM    GLUCOSEU Negative 06/15/2020 04:40 PM    GLUCOSEU 250 05/18/2020 06:34 PM    GLUCOSEU NEGATIVE 03/15/2011 09:10 PM    BILIRUBINUR Negative 07/12/2020 02:32 PM    BILIRUBINUR Negative 06/15/2020 04:40 PM    BILIRUBINUR Negative 05/18/2020 06:34 PM    BILIRUBINUR NEGATIVE 03/15/2011 09:10 PM    KETUA Negative 07/12/2020 02:32 PM    KETUA Negative 06/15/2020 04:40 PM    KETUA Negative 05/18/2020 06:34 PM    SPECGRAV 1.015 07/12/2020 02:32 PM    SPECGRAV 1.020 06/15/2020 04:40 PM    SPECGRAV 1.015 05/18/2020 06:34 PM    BLOODU Negative 07/12/2020 02:32 PM    BLOODU Negative 06/15/2020 04:40 PM    BLOODU Negative 05/18/2020 06:34 PM    PHUR 7.5 07/12/2020 02:32 PM    PHUR 5.0 06/15/2020 04:40 PM    PHUR 7.0 05/18/2020 06:34 PM    PROTEINU Negative 07/12/2020 02:32 PM    PROTEINU Negative 06/15/2020 04:40 PM    PROTEINU Negative 05/18/2020 06:34 PM    UROBILINOGEN 0.2 07/12/2020 02:32 PM    UROBILINOGEN 0.2 06/15/2020 04:40 PM    UROBILINOGEN 0.2 05/18/2020 06:34 PM    NITRU Negative 07/12/2020 02:32 PM    NITRU Negative 06/15/2020 04:40 PM    NITRU Negative 05/18/2020 06:34 PM    LEUKOCYTESUR SMALL 07/12/2020 02:32 PM    LEUKOCYTESUR SMALL 06/15/2020 04:40 PM    LEUKOCYTESUR SMALL 05/18/2020 06:34 PM     Urine Micro/Albumin Ratio  Lab Results   Component Value Date/Time    MALBCR - 08/09/2021 12:00 PM    MALBCR 10mg/L 06/02/2017 01:11 PM    MALBCR <30 05/24/2016 12:00 AM        Physical Exam  Vitals and nursing note reviewed. Constitutional:       Appearance: Normal appearance. HENT:      Head: Normocephalic and atraumatic. Right Ear: External ear normal.      Left Ear: External ear normal.   Eyes:      Extraocular Movements: Extraocular movements intact. Conjunctiva/sclera: Conjunctivae normal.      Pupils: Pupils are equal, round, and reactive to light. Cardiovascular:      Rate and Rhythm: Normal rate and regular rhythm. Pulses: Normal pulses. Heart sounds: Normal heart sounds.    Pulmonary:      Effort: Pulmonary effort is normal.      Breath sounds: Normal breath sounds. Abdominal:      General: Bowel sounds are normal.      Palpations: Abdomen is soft. Musculoskeletal:         General: Normal range of motion. Cervical back: Normal range of motion and neck supple. Skin:     General: Skin is warm and dry. Capillary Refill: Capillary refill takes less than 2 seconds. Findings: Erythema (very small area of redness on right thumb, very superficial burn, no blisters or streaking) present. Neurological:      General: No focal deficit present. Mental Status: She is alert and oriented to person, place, and time. Psychiatric:         Mood and Affect: Mood normal.         Behavior: Behavior normal.         Thought Content: Thought content normal.       ASSESSMENT/PLAN:  Deanne Ardon was seen today for diabetes, hypertension and hand pain. Diagnoses and all orders for this visit:    Type 2 diabetes mellitus without complication, unspecified whether long term insulin use (HCC)  -     POCT glycosylated hemoglobin (Hb A1C)  -     glimepiride (AMARYL) 4 MG tablet; Take 1 tablet by mouth every morning (before breakfast)    Systemic primary arterial hypertension    Pure hypercholesterolemia    Type 2 diabetes mellitus without complication, with long-term current use of insulin (HCC)  -     insulin glargine (LANTUS SOLOSTAR) 100 UNIT/ML injection pen; Inject 20 Units into the skin nightly    Superficial burn of right thumb, initial encounter  -     silver sulfADIAZINE (SILVADENE) 1 % cream; Apply topically daily. A1c not at goal, will increase glimiperide to 4 mg and increase insulin to 20 units nightly. Likely need to add on mealtime insulin in the future     As above.   Call or go to the nearest ED immediately if symptoms worsen or persist.  Educational materials and/or home exercises printed for patient's review and were included in patient instructions on his/her After Visit Summary and given to patient at the end of visit. Counseled regarding above diagnosis, including possible risks and complications,  especially if left uncontrolled. Counseled regarding the possible side effects, risks, benefits and alternatives to treatment; patient and/or guardian verbalizes understanding, agrees, feels comfortable with and wishes to proceed with above treatment plan. Advised patient to call with any new medication issues, and read all Rx info from pharmacy to assure aware of all possible risks and side effects of medication before taking. Reviewed age and gender appropriate health screening exams and vaccinations. Advised patient regarding importance of keeping up with recommended health maintenance and to schedule as soon as possible if overdue, as this is important in assessing for undiagnosed pathology, especially cancer, as well as protecting against potentially harmful/life threatening disease. Patient and/or guardian verbalizes understanding and agrees with above counseling, assessment and plan. All questions answered. Return in about 3 months (around 6/3/2023) for HTN, diabetes. An  electronic signature was used to authenticate this note. --Mahi Parker, DO on 3/3/2023 at 11:49 AM    This document was prepared at least partially through the use of voice recognition software. Although effort is taken to assure the accuracy of this document, it is possible that grammatical, syntax,  or spelling errors may occur.

## 2023-03-13 RX ORDER — LABETALOL 200 MG/1
200 TABLET, FILM COATED ORAL EVERY 8 HOURS SCHEDULED
Qty: 90 TABLET | Refills: 1 | Status: SHIPPED | OUTPATIENT
Start: 2023-03-13 | End: 2023-04-12

## 2023-03-24 RX ORDER — FUROSEMIDE 20 MG/1
TABLET ORAL
Qty: 90 TABLET | Refills: 0 | Status: SHIPPED | OUTPATIENT
Start: 2023-03-24

## 2023-04-05 RX ORDER — LABETALOL 200 MG/1
200 TABLET, FILM COATED ORAL EVERY 8 HOURS SCHEDULED
Qty: 90 TABLET | Refills: 1 | Status: SHIPPED | OUTPATIENT
Start: 2023-04-05 | End: 2023-05-05

## 2023-05-04 RX ORDER — LABETALOL 200 MG/1
200 TABLET, FILM COATED ORAL EVERY 8 HOURS SCHEDULED
Qty: 90 TABLET | Refills: 1 | Status: SHIPPED | OUTPATIENT
Start: 2023-05-04 | End: 2023-06-03

## 2023-06-16 PROBLEM — B35.1 ONYCHOMYCOSIS: Status: ACTIVE | Noted: 2023-05-31

## 2023-06-16 PROBLEM — L84 CALLUS OF FOOT: Status: ACTIVE | Noted: 2023-05-31

## 2023-06-16 PROBLEM — E11.51 TYPE 2 DIABETES WITH PERIPHERAL CIRCULATORY DISORDER, CONTROLLED (HCC): Status: ACTIVE | Noted: 2023-05-25

## 2023-06-17 DIAGNOSIS — E11.9 TYPE 2 DIABETES MELLITUS WITHOUT COMPLICATION, UNSPECIFIED WHETHER LONG TERM INSULIN USE (HCC): ICD-10-CM

## 2023-06-19 RX ORDER — EMPAGLIFLOZIN 25 MG/1
TABLET, FILM COATED ORAL
Qty: 30 TABLET | Refills: 5 | Status: SHIPPED | OUTPATIENT
Start: 2023-06-19

## 2023-06-19 RX ORDER — GLIMEPIRIDE 4 MG/1
TABLET ORAL
Qty: 90 TABLET | Refills: 1 | Status: SHIPPED | OUTPATIENT
Start: 2023-06-19

## 2023-06-21 ENCOUNTER — TELEPHONE (OUTPATIENT)
Dept: FAMILY MEDICINE CLINIC | Age: 68
End: 2023-06-21

## 2023-06-21 NOTE — TELEPHONE ENCOUNTER
Lvm for pt to call office regarding whether or not she still has the cologuard. She received kit on [phone], company never received completed cologuard.

## 2023-07-03 RX ORDER — ATORVASTATIN CALCIUM 40 MG/1
TABLET, FILM COATED ORAL
Qty: 90 TABLET | Refills: 1 | Status: SHIPPED | OUTPATIENT
Start: 2023-07-03

## 2023-07-03 NOTE — TELEPHONE ENCOUNTER
Last Appointment:  6/16/2023  Future Appointments   Date Time Provider [address]   9/22/2023 10:30 AM DO Hayde Beltran [facility]

## 2023-07-06 RX ORDER — EMPAGLIFLOZIN 10 MG/1
TABLET, FILM COATED ORAL
Qty: 90 TABLET | Refills: 1 | OUTPATIENT
Start: 2023-07-06

## 2023-08-08 NOTE — PROGRESS NOTES
DATE OF SERVICE:     7/12/2018    Landry Oro seen today for the purpose of continuation of care. Nursing, social work reports, laboratory studies and vital signs are reviewed. Patient chief complaint today is:             [] Depression      [x] Anxiety        [x] Psychosis         [] Suicidal/Homicidal                         [] Delusions           [] Aggression          Subjective: Today patient states that \"I feel good this morning. \" Denies SI, HI, AHVH. Sleep:  [] Good [x] Fair  [] Poor  Appetite:  [x] Good [] Fair  [] Poor    Depression:  [] Mild [] Moderate [] Severe                [] Constant [] Sporadic     Anxiety: [] Mild [] Moderate [x] Severe    [x] Constant [] Sporadic     Delusions: [] Mild [] Moderate [x] Severe     [x] Constant [] Sporadic     [] Paranoid [] Somatic [] Grandiose     Hallucinations: [] Mild [] Moderate [] Severe     [] Constant [] Sporadic    [x] Auditory  [] Visual [] Tactile        Suicidal: [] Constant [] Sporadic  Homicidal: [] Constant [] Sporadic    Unscheduled Medications     [] Patient Receiving Emergency Medications \" Chemical Restraint\"   [] Requesting PRN medications for anxiety    Medical Review of Systems:     All other than marked systmes have been reviewed and are all negative.     Constitutional Symptoms: []  fever []  Chills  Skin Symptoms: [] rash []  Pruritus   Eye Symptoms: [] Vision unchanged []  recent vision problems[] blurred vision   Respiratory Symptoms:[] shortness of breath [] cough  Cardiovascular Symptoms:  [] chest pain   [] palpitations   Gastrointestinal Symptoms: []  abdominal pain []  nausea []  vomiting []  diarrhea  Genitourinary Symptoms: []  dysuria  []  hematuria   Musculoskeletal Symptoms: []  back pain []  muscle pain []  joint pain  Neurologic Symptoms: []  headache []  dizziness  Hematolymphoid Symptoms: [] Adenopathy [] Bruises   [] Schimosis       Psychiatric Review of systems  Delusions:  [] Denies [] Endorses decreased jose/decreased step length

## 2023-08-23 RX ORDER — LABETALOL 200 MG/1
200 TABLET, FILM COATED ORAL EVERY 8 HOURS SCHEDULED
Qty: 90 TABLET | Refills: 1 | Status: SHIPPED | OUTPATIENT
Start: 2023-08-23 | End: 2023-09-22

## 2023-09-22 ENCOUNTER — OFFICE VISIT (OUTPATIENT)
Dept: FAMILY MEDICINE CLINIC | Age: 68
End: 2023-09-22
Payer: COMMERCIAL

## 2023-09-22 VITALS
WEIGHT: 174 LBS | HEART RATE: 80 BPM | HEIGHT: 63 IN | BODY MASS INDEX: 30.83 KG/M2 | TEMPERATURE: 97.2 F | OXYGEN SATURATION: 97 % | DIASTOLIC BLOOD PRESSURE: 68 MMHG | SYSTOLIC BLOOD PRESSURE: 120 MMHG | RESPIRATION RATE: 18 BRPM

## 2023-09-22 DIAGNOSIS — E11.9 TYPE 2 DIABETES MELLITUS WITHOUT COMPLICATION, UNSPECIFIED WHETHER LONG TERM INSULIN USE (HCC): Primary | ICD-10-CM

## 2023-09-22 DIAGNOSIS — E11.9 TYPE 2 DIABETES MELLITUS WITHOUT COMPLICATION, UNSPECIFIED WHETHER LONG TERM INSULIN USE (HCC): ICD-10-CM

## 2023-09-22 DIAGNOSIS — I10 SYSTEMIC PRIMARY ARTERIAL HYPERTENSION: ICD-10-CM

## 2023-09-22 DIAGNOSIS — E78.00 PURE HYPERCHOLESTEROLEMIA: ICD-10-CM

## 2023-09-22 DIAGNOSIS — Z23 FLU VACCINE NEED: ICD-10-CM

## 2023-09-22 DIAGNOSIS — Z12.31 BREAST CANCER SCREENING BY MAMMOGRAM: ICD-10-CM

## 2023-09-22 LAB
ALBUMIN SERPL-MCNC: 4.3 G/DL (ref 3.5–5.2)
ALP BLD-CCNC: 133 U/L (ref 35–104)
ALT SERPL-CCNC: 22 U/L (ref 0–32)
ANION GAP SERPL CALCULATED.3IONS-SCNC: 13 MMOL/L (ref 7–16)
AST SERPL-CCNC: 22 U/L (ref 0–31)
BILIRUB SERPL-MCNC: 0.4 MG/DL (ref 0–1.2)
BUN BLDV-MCNC: 17 MG/DL (ref 6–23)
CALCIUM SERPL-MCNC: 9.5 MG/DL (ref 8.6–10.2)
CHLORIDE BLD-SCNC: 98 MMOL/L (ref 98–107)
CHOLESTEROL, FASTING: 125 MG/DL
CO2: 25 MMOL/L (ref 22–29)
CREAT SERPL-MCNC: 1.1 MG/DL (ref 0.5–1)
GFR SERPL CREATININE-BSD FRML MDRD: 54 ML/MIN/1.73M2
GLUCOSE FASTING: 363 MG/DL (ref 74–99)
HBA1C MFR BLD: 11.3 %
HCT VFR BLD CALC: 49.5 % (ref 34–48)
HDLC SERPL-MCNC: 39 MG/DL
HEMOGLOBIN: 16.1 G/DL (ref 11.5–15.5)
LDL CHOLESTEROL: 60 MG/DL
MCH RBC QN AUTO: 29.3 PG (ref 26–35)
MCHC RBC AUTO-ENTMCNC: 32.5 G/DL (ref 32–34.5)
MCV RBC AUTO: 90.2 FL (ref 80–99.9)
PDW BLD-RTO: 13.3 % (ref 11.5–15)
PLATELET # BLD: 274 K/UL (ref 130–450)
PMV BLD AUTO: 10.7 FL (ref 7–12)
POTASSIUM SERPL-SCNC: 4.7 MMOL/L (ref 3.5–5)
RBC # BLD: 5.49 M/UL (ref 3.5–5.5)
SODIUM BLD-SCNC: 136 MMOL/L (ref 132–146)
TOTAL PROTEIN: 7.4 G/DL (ref 6.4–8.3)
TRIGLYCERIDE, FASTING: 129 MG/DL
TSH SERPL DL<=0.05 MIU/L-ACNC: 1.63 UIU/ML (ref 0.27–4.2)
VLDLC SERPL CALC-MCNC: 26 MG/DL
WBC # BLD: 7.5 K/UL (ref 4.5–11.5)

## 2023-09-22 PROCEDURE — 3078F DIAST BP <80 MM HG: CPT | Performed by: STUDENT IN AN ORGANIZED HEALTH CARE EDUCATION/TRAINING PROGRAM

## 2023-09-22 PROCEDURE — 3074F SYST BP LT 130 MM HG: CPT | Performed by: STUDENT IN AN ORGANIZED HEALTH CARE EDUCATION/TRAINING PROGRAM

## 2023-09-22 PROCEDURE — G8399 PT W/DXA RESULTS DOCUMENT: HCPCS | Performed by: STUDENT IN AN ORGANIZED HEALTH CARE EDUCATION/TRAINING PROGRAM

## 2023-09-22 PROCEDURE — 1090F PRES/ABSN URINE INCON ASSESS: CPT | Performed by: STUDENT IN AN ORGANIZED HEALTH CARE EDUCATION/TRAINING PROGRAM

## 2023-09-22 PROCEDURE — 3017F COLORECTAL CA SCREEN DOC REV: CPT | Performed by: STUDENT IN AN ORGANIZED HEALTH CARE EDUCATION/TRAINING PROGRAM

## 2023-09-22 PROCEDURE — 36000 PLACE NEEDLE IN VEIN: CPT | Performed by: STUDENT IN AN ORGANIZED HEALTH CARE EDUCATION/TRAINING PROGRAM

## 2023-09-22 PROCEDURE — 2022F DILAT RTA XM EVC RTNOPTHY: CPT | Performed by: STUDENT IN AN ORGANIZED HEALTH CARE EDUCATION/TRAINING PROGRAM

## 2023-09-22 PROCEDURE — 99214 OFFICE O/P EST MOD 30 MIN: CPT | Performed by: STUDENT IN AN ORGANIZED HEALTH CARE EDUCATION/TRAINING PROGRAM

## 2023-09-22 PROCEDURE — 83036 HEMOGLOBIN GLYCOSYLATED A1C: CPT | Performed by: STUDENT IN AN ORGANIZED HEALTH CARE EDUCATION/TRAINING PROGRAM

## 2023-09-22 PROCEDURE — 90471 IMMUNIZATION ADMIN: CPT | Performed by: STUDENT IN AN ORGANIZED HEALTH CARE EDUCATION/TRAINING PROGRAM

## 2023-09-22 PROCEDURE — 90694 VACC AIIV4 NO PRSRV 0.5ML IM: CPT | Performed by: STUDENT IN AN ORGANIZED HEALTH CARE EDUCATION/TRAINING PROGRAM

## 2023-09-22 PROCEDURE — 3046F HEMOGLOBIN A1C LEVEL >9.0%: CPT | Performed by: STUDENT IN AN ORGANIZED HEALTH CARE EDUCATION/TRAINING PROGRAM

## 2023-09-22 PROCEDURE — G8427 DOCREV CUR MEDS BY ELIG CLIN: HCPCS | Performed by: STUDENT IN AN ORGANIZED HEALTH CARE EDUCATION/TRAINING PROGRAM

## 2023-09-22 PROCEDURE — 1123F ACP DISCUSS/DSCN MKR DOCD: CPT | Performed by: STUDENT IN AN ORGANIZED HEALTH CARE EDUCATION/TRAINING PROGRAM

## 2023-09-22 PROCEDURE — 1036F TOBACCO NON-USER: CPT | Performed by: STUDENT IN AN ORGANIZED HEALTH CARE EDUCATION/TRAINING PROGRAM

## 2023-09-22 PROCEDURE — G8417 CALC BMI ABV UP PARAM F/U: HCPCS | Performed by: STUDENT IN AN ORGANIZED HEALTH CARE EDUCATION/TRAINING PROGRAM

## 2023-09-22 ASSESSMENT — PATIENT HEALTH QUESTIONNAIRE - PHQ9
SUM OF ALL RESPONSES TO PHQ QUESTIONS 1-9: 0
9. THOUGHTS THAT YOU WOULD BE BETTER OFF DEAD, OR OF HURTING YOURSELF: 0
6. FEELING BAD ABOUT YOURSELF - OR THAT YOU ARE A FAILURE OR HAVE LET YOURSELF OR YOUR FAMILY DOWN: 0
SUM OF ALL RESPONSES TO PHQ QUESTIONS 1-9: 0
5. POOR APPETITE OR OVEREATING: 0
3. TROUBLE FALLING OR STAYING ASLEEP: 0
8. MOVING OR SPEAKING SO SLOWLY THAT OTHER PEOPLE COULD HAVE NOTICED. OR THE OPPOSITE, BEING SO FIGETY OR RESTLESS THAT YOU HAVE BEEN MOVING AROUND A LOT MORE THAN USUAL: 0
7. TROUBLE CONCENTRATING ON THINGS, SUCH AS READING THE NEWSPAPER OR WATCHING TELEVISION: 0
SUM OF ALL RESPONSES TO PHQ9 QUESTIONS 1 & 2: 0
4. FEELING TIRED OR HAVING LITTLE ENERGY: 0
SUM OF ALL RESPONSES TO PHQ QUESTIONS 1-9: 0
1. LITTLE INTEREST OR PLEASURE IN DOING THINGS: 0
2. FEELING DOWN, DEPRESSED OR HOPELESS: 0
SUM OF ALL RESPONSES TO PHQ QUESTIONS 1-9: 0
10. IF YOU CHECKED OFF ANY PROBLEMS, HOW DIFFICULT HAVE THESE PROBLEMS MADE IT FOR YOU TO DO YOUR WORK, TAKE CARE OF THINGS AT HOME, OR GET ALONG WITH OTHER PEOPLE: 0

## 2023-09-22 ASSESSMENT — ENCOUNTER SYMPTOMS
BACK PAIN: 0
EYE REDNESS: 0
SINUS PAIN: 0
SORE THROAT: 0
COUGH: 0
EYE PAIN: 0
CONSTIPATION: 0
VOMITING: 0
SINUS PRESSURE: 0
NAUSEA: 0
SHORTNESS OF BREATH: 0
DIARRHEA: 0
ABDOMINAL PAIN: 0
RHINORRHEA: 0

## 2023-09-22 NOTE — PATIENT INSTRUCTIONS
You are prescribed insulin three times per day with meals. You must test your blood sugar fasting, 15 minutes prior to each meal, and before going to bed at night. Keep a detailed and accurate log of sugars. You will adjust your insulin dose at mealtime based on the following chart    As an example, you have a low dose sliding scale, the blood sugar before lunch is 241, you will take 4 units of insulin and then eat a meal... you MUST ALWAYS EAT YOUR MEAL IF TAKING INSULIN.

## 2023-09-22 NOTE — PROGRESS NOTES
Constitutional:       General: She is not in acute distress. Appearance: Normal appearance. She is not ill-appearing. HENT:      Head: Normocephalic and atraumatic. Right Ear: External ear normal.      Left Ear: External ear normal.   Eyes:      Extraocular Movements: Extraocular movements intact. Conjunctiva/sclera: Conjunctivae normal.      Pupils: Pupils are equal, round, and reactive to light. Cardiovascular:      Rate and Rhythm: Normal rate and regular rhythm. Pulses: Normal pulses. Heart sounds: Normal heart sounds. Pulmonary:      Effort: Pulmonary effort is normal.      Breath sounds: Normal breath sounds. Abdominal:      General: Bowel sounds are normal.      Palpations: Abdomen is soft. Musculoskeletal:         General: Normal range of motion. Cervical back: Normal range of motion and neck supple. Skin:     General: Skin is warm and dry. Capillary Refill: Capillary refill takes less than 2 seconds. Neurological:      General: No focal deficit present. Mental Status: She is alert and oriented to person, place, and time. Psychiatric:         Mood and Affect: Mood normal.         Behavior: Behavior normal.         Thought Content: Thought content normal.         ASSESSMENT/PLAN:  Edith Hall was seen today for diabetes. Diagnoses and all orders for this visit:    Type 2 diabetes mellitus without complication, unspecified whether long term insulin use (HCC)  -     POCT glycosylated hemoglobin (Hb A1C)  -     Comprehensive Metabolic Panel, Fasting; Future  -     CBC; Future  Not controlled  Will have her on a sliding scale insulin at meal time  Repeat in 3 months and then we will increase long acting insulin at that time if needed  Breast cancer screening by mammogram  -     [facility] LIAN DIGITAL SCREEN BILATERAL; Future    Flu vaccine need  -     Influenza, FLUAD, (age 72 y+), IM, Preservative Free, 0.5 mL    Pure hypercholesterolemia  -     TSH;  Future  -

## 2023-10-03 DIAGNOSIS — E11.9 TYPE 2 DIABETES MELLITUS WITHOUT COMPLICATION, UNSPECIFIED WHETHER LONG TERM INSULIN USE (HCC): ICD-10-CM

## 2023-10-03 DIAGNOSIS — N18.31 STAGE 3A CHRONIC KIDNEY DISEASE (HCC): Primary | ICD-10-CM

## 2023-10-04 ENCOUNTER — TELEPHONE (OUTPATIENT)
Dept: FAMILY MEDICINE CLINIC | Age: 68
End: 2023-10-04

## 2023-10-04 DIAGNOSIS — E11.9 TYPE 2 DIABETES MELLITUS WITHOUT COMPLICATION, UNSPECIFIED WHETHER LONG TERM INSULIN USE (HCC): Primary | ICD-10-CM

## 2023-10-04 NOTE — TELEPHONE ENCOUNTER
Pt called in and said she still did not get her metformin sent to [facility] on [address]  She called yesterday as well  ty

## 2023-10-06 RX ORDER — LABETALOL 200 MG/1
200 TABLET, FILM COATED ORAL EVERY 8 HOURS SCHEDULED
Qty: 180 TABLET | Refills: 1 | Status: SHIPPED | OUTPATIENT
Start: 2023-10-06 | End: 2024-02-03

## 2023-10-27 DIAGNOSIS — E11.9 TYPE 2 DIABETES MELLITUS WITHOUT COMPLICATION, UNSPECIFIED WHETHER LONG TERM INSULIN USE (HCC): ICD-10-CM

## 2023-10-27 RX ORDER — LABETALOL 200 MG/1
200 TABLET, FILM COATED ORAL EVERY 8 HOURS SCHEDULED
Qty: 270 TABLET | Refills: 1 | Status: SHIPPED | OUTPATIENT
Start: 2023-10-27 | End: 2024-02-24

## 2023-10-27 RX ORDER — EMPAGLIFLOZIN 25 MG/1
TABLET, FILM COATED ORAL
Qty: 90 TABLET | Refills: 1 | Status: SHIPPED | OUTPATIENT
Start: 2023-10-27

## 2023-12-31 DIAGNOSIS — E11.9 TYPE 2 DIABETES MELLITUS WITHOUT COMPLICATION, UNSPECIFIED WHETHER LONG TERM INSULIN USE (HCC): ICD-10-CM

## 2024-01-02 DIAGNOSIS — E11.9 TYPE 2 DIABETES MELLITUS WITHOUT COMPLICATION, UNSPECIFIED WHETHER LONG TERM INSULIN USE (HCC): ICD-10-CM

## 2024-01-02 RX ORDER — GLIMEPIRIDE 4 MG/1
TABLET ORAL
Qty: 90 TABLET | Refills: 1 | Status: SHIPPED | OUTPATIENT
Start: 2024-01-02

## 2024-01-02 RX ORDER — ATORVASTATIN CALCIUM 40 MG/1
TABLET, FILM COATED ORAL
Qty: 90 TABLET | Refills: 1 | Status: SHIPPED | OUTPATIENT
Start: 2024-01-02

## 2024-01-04 ENCOUNTER — HOSPITAL ENCOUNTER (OUTPATIENT)
Dept: ULTRASOUND IMAGING | Age: 69
Discharge: HOME | End: 2024-01-06
Attending: INTERNAL MEDICINE
Payer: COMMERCIAL

## 2024-01-04 ENCOUNTER — HOSPITAL ENCOUNTER (OUTPATIENT)
Age: 69
Discharge: HOME | End: 2024-01-04
Attending: INTERNAL MEDICINE
Payer: COMMERCIAL

## 2024-01-04 DIAGNOSIS — E11.9 DIABETES MELLITUS WITHOUT COMPLICATION (HCC): ICD-10-CM

## 2024-01-04 DIAGNOSIS — N18.31 CHRONIC KIDNEY DISEASE (CKD) STAGE G3A/A1, MODERATELY DECREASED GLOMERULAR FILTRATION RATE (GFR) BETWEEN 45-59 ML/MIN/1.73 SQUARE METER AND ALBUMINURIA CREATININE RATIO LESS THAN 30 MG/G (HCC): ICD-10-CM

## 2024-01-04 DIAGNOSIS — I10 ESSENTIAL HYPERTENSION, MALIGNANT: ICD-10-CM

## 2024-01-04 DIAGNOSIS — R80.8 NEPHROGENOUS PROTEINURIA: ICD-10-CM

## 2024-01-04 DIAGNOSIS — E66.8 HYPERPLASTIC-HYPERTROPHIC OBESITY: ICD-10-CM

## 2024-01-04 DIAGNOSIS — E11.69 TYPE 2 DIABETES MELLITUS WITH OTHER SPECIFIED COMPLICATION, UNSPECIFIED WHETHER LONG TERM INSULIN USE (HCC): ICD-10-CM

## 2024-01-04 LAB
ALBUMIN SERPL-MCNC: 3.9 G/DL (ref 3.5–5.2)
ALP SERPL-CCNC: 93 U/L (ref 35–104)
ALT SERPL-CCNC: 17 U/L (ref 0–32)
ANION GAP SERPL CALCULATED.3IONS-SCNC: 12 MMOL/L (ref 7–16)
AST SERPL-CCNC: 19 U/L (ref 0–31)
BASOPHILS # BLD: 0.05 K/UL (ref 0–0.2)
BASOPHILS NFR BLD: 1 % (ref 0–2)
BILIRUB SERPL-MCNC: 0.5 MG/DL (ref 0–1.2)
BUN SERPL-MCNC: 15 MG/DL (ref 6–23)
CALCIUM SERPL-MCNC: 9.6 MG/DL (ref 8.6–10.2)
CHLORIDE SERPL-SCNC: 102 MMOL/L (ref 98–107)
CO2 SERPL-SCNC: 27 MMOL/L (ref 22–29)
CREAT SERPL-MCNC: 1.1 MG/DL (ref 0.5–1)
EOSINOPHIL # BLD: 0.16 K/UL (ref 0.05–0.5)
EOSINOPHILS RELATIVE PERCENT: 2 % (ref 0–6)
ERYTHROCYTE [DISTWIDTH] IN BLOOD BY AUTOMATED COUNT: 13.2 % (ref 11.5–15)
GFR SERPL CREATININE-BSD FRML MDRD: 59 ML/MIN/1.73M2
GLUCOSE SERPL-MCNC: 221 MG/DL (ref 74–99)
HBA1C MFR BLD: 10.9 % (ref 4–5.6)
HCT VFR BLD AUTO: 45.8 % (ref 34–48)
HGB BLD-MCNC: 14.9 G/DL (ref 11.5–15.5)
IMM GRANULOCYTES # BLD AUTO: 0.03 K/UL (ref 0–0.58)
IMM GRANULOCYTES NFR BLD: 0 % (ref 0–5)
LYMPHOCYTES NFR BLD: 1.33 K/UL (ref 1.5–4)
LYMPHOCYTES RELATIVE PERCENT: 18 % (ref 20–42)
MAGNESIUM SERPL-MCNC: 2.1 MG/DL (ref 1.6–2.6)
MCH RBC QN AUTO: 28.7 PG (ref 26–35)
MCHC RBC AUTO-ENTMCNC: 32.5 G/DL (ref 32–34.5)
MCV RBC AUTO: 88.2 FL (ref 80–99.9)
MONOCYTES NFR BLD: 0.37 K/UL (ref 0.1–0.95)
MONOCYTES NFR BLD: 5 % (ref 2–12)
NEUTROPHILS NFR BLD: 73 % (ref 43–80)
NEUTS SEG NFR BLD: 5.31 K/UL (ref 1.8–7.3)
PHOSPHATE SERPL-MCNC: 4 MG/DL (ref 2.5–4.5)
PLATELET # BLD AUTO: 259 K/UL (ref 130–450)
PMV BLD AUTO: 9.8 FL (ref 7–12)
POTASSIUM SERPL-SCNC: 4.7 MMOL/L (ref 3.5–5)
PROT SERPL-MCNC: 7.2 G/DL (ref 6.4–8.3)
RBC # BLD AUTO: 5.19 M/UL (ref 3.5–5.5)
SODIUM SERPL-SCNC: 141 MMOL/L (ref 132–146)
URATE SERPL-MCNC: 6.4 MG/DL (ref 2.4–5.7)
WBC OTHER # BLD: 7.3 K/UL (ref 4.5–11.5)

## 2024-01-04 PROCEDURE — 76770 US EXAM ABDO BACK WALL COMP: CPT

## 2024-01-04 PROCEDURE — 84100 ASSAY OF PHOSPHORUS: CPT

## 2024-01-04 PROCEDURE — 93975 VASCULAR STUDY: CPT

## 2024-01-04 PROCEDURE — 85025 COMPLETE CBC W/AUTO DIFF WBC: CPT

## 2024-01-04 PROCEDURE — 83036 HEMOGLOBIN GLYCOSYLATED A1C: CPT

## 2024-01-04 PROCEDURE — 84550 ASSAY OF BLOOD/URIC ACID: CPT

## 2024-01-04 PROCEDURE — 83735 ASSAY OF MAGNESIUM: CPT

## 2024-01-04 PROCEDURE — 80053 COMPREHEN METABOLIC PANEL: CPT

## 2024-01-04 PROCEDURE — 36415 COLL VENOUS BLD VENIPUNCTURE: CPT

## 2024-01-31 RX ORDER — EMPAGLIFLOZIN 10 MG/1
10 TABLET, FILM COATED ORAL DAILY
Qty: 90 TABLET | Refills: 1 | OUTPATIENT
Start: 2024-01-31

## 2024-02-06 RX ORDER — EMPAGLIFLOZIN 10 MG/1
10 TABLET, FILM COATED ORAL DAILY
Qty: 90 TABLET | Refills: 1 | OUTPATIENT
Start: 2024-02-06

## 2024-02-07 RX ORDER — EMPAGLIFLOZIN 10 MG/1
10 TABLET, FILM COATED ORAL DAILY
Qty: 90 TABLET | Refills: 1 | OUTPATIENT
Start: 2024-02-07

## 2024-02-20 DIAGNOSIS — I10 SYSTEMIC PRIMARY ARTERIAL HYPERTENSION: ICD-10-CM

## 2024-02-20 RX ORDER — FUROSEMIDE 20 MG/1
20 TABLET ORAL DAILY
Qty: 90 TABLET | Refills: 0 | Status: SHIPPED | OUTPATIENT
Start: 2024-02-20 | End: 2024-05-20

## 2024-02-26 LAB — DIABETIC RETINOPATHY: POSITIVE

## 2024-03-05 DIAGNOSIS — E11.9 TYPE 2 DIABETES MELLITUS WITHOUT COMPLICATION, UNSPECIFIED WHETHER LONG TERM INSULIN USE (HCC): ICD-10-CM

## 2024-03-05 RX ORDER — PIOGLITAZONEHYDROCHLORIDE 45 MG/1
45 TABLET ORAL DAILY
Qty: 90 TABLET | Refills: 1 | Status: SHIPPED | OUTPATIENT
Start: 2024-03-05

## 2024-03-22 ENCOUNTER — OFFICE VISIT (OUTPATIENT)
Dept: FAMILY MEDICINE CLINIC | Age: 69
End: 2024-03-22
Payer: COMMERCIAL

## 2024-03-22 ENCOUNTER — TELEPHONE (OUTPATIENT)
Dept: FAMILY MEDICINE CLINIC | Age: 69
End: 2024-03-22

## 2024-03-22 VITALS
HEART RATE: 83 BPM | TEMPERATURE: 96.9 F | WEIGHT: 184.4 LBS | RESPIRATION RATE: 18 BRPM | SYSTOLIC BLOOD PRESSURE: 124 MMHG | DIASTOLIC BLOOD PRESSURE: 64 MMHG | OXYGEN SATURATION: 99 % | BODY MASS INDEX: 32.66 KG/M2

## 2024-03-22 DIAGNOSIS — Z12.31 BREAST CANCER SCREENING BY MAMMOGRAM: ICD-10-CM

## 2024-03-22 DIAGNOSIS — F31.77 BIPOLAR DISORDER, IN PARTIAL REMISSION, MOST RECENT EPISODE MIXED (HCC): ICD-10-CM

## 2024-03-22 DIAGNOSIS — E11.3293 TYPE 2 DIABETES MELLITUS WITH BOTH EYES AFFECTED BY MILD NONPROLIFERATIVE RETINOPATHY WITHOUT MACULAR EDEMA, WITHOUT LONG-TERM CURRENT USE OF INSULIN (HCC): Primary | ICD-10-CM

## 2024-03-22 DIAGNOSIS — Z12.11 COLON CANCER SCREENING: ICD-10-CM

## 2024-03-22 PROBLEM — E11.51 TYPE 2 DIABETES WITH PERIPHERAL CIRCULATORY DISORDER, CONTROLLED (HCC): Status: RESOLVED | Noted: 2023-05-25 | Resolved: 2024-03-22

## 2024-03-22 PROCEDURE — 3074F SYST BP LT 130 MM HG: CPT | Performed by: STUDENT IN AN ORGANIZED HEALTH CARE EDUCATION/TRAINING PROGRAM

## 2024-03-22 PROCEDURE — G8484 FLU IMMUNIZE NO ADMIN: HCPCS | Performed by: STUDENT IN AN ORGANIZED HEALTH CARE EDUCATION/TRAINING PROGRAM

## 2024-03-22 PROCEDURE — 3046F HEMOGLOBIN A1C LEVEL >9.0%: CPT | Performed by: STUDENT IN AN ORGANIZED HEALTH CARE EDUCATION/TRAINING PROGRAM

## 2024-03-22 PROCEDURE — 3017F COLORECTAL CA SCREEN DOC REV: CPT | Performed by: STUDENT IN AN ORGANIZED HEALTH CARE EDUCATION/TRAINING PROGRAM

## 2024-03-22 PROCEDURE — 1036F TOBACCO NON-USER: CPT | Performed by: STUDENT IN AN ORGANIZED HEALTH CARE EDUCATION/TRAINING PROGRAM

## 2024-03-22 PROCEDURE — 3078F DIAST BP <80 MM HG: CPT | Performed by: STUDENT IN AN ORGANIZED HEALTH CARE EDUCATION/TRAINING PROGRAM

## 2024-03-22 PROCEDURE — G8427 DOCREV CUR MEDS BY ELIG CLIN: HCPCS | Performed by: STUDENT IN AN ORGANIZED HEALTH CARE EDUCATION/TRAINING PROGRAM

## 2024-03-22 PROCEDURE — 2022F DILAT RTA XM EVC RTNOPTHY: CPT | Performed by: STUDENT IN AN ORGANIZED HEALTH CARE EDUCATION/TRAINING PROGRAM

## 2024-03-22 PROCEDURE — 1090F PRES/ABSN URINE INCON ASSESS: CPT | Performed by: STUDENT IN AN ORGANIZED HEALTH CARE EDUCATION/TRAINING PROGRAM

## 2024-03-22 PROCEDURE — 1123F ACP DISCUSS/DSCN MKR DOCD: CPT | Performed by: STUDENT IN AN ORGANIZED HEALTH CARE EDUCATION/TRAINING PROGRAM

## 2024-03-22 PROCEDURE — G8399 PT W/DXA RESULTS DOCUMENT: HCPCS | Performed by: STUDENT IN AN ORGANIZED HEALTH CARE EDUCATION/TRAINING PROGRAM

## 2024-03-22 PROCEDURE — G8417 CALC BMI ABV UP PARAM F/U: HCPCS | Performed by: STUDENT IN AN ORGANIZED HEALTH CARE EDUCATION/TRAINING PROGRAM

## 2024-03-22 PROCEDURE — 99214 OFFICE O/P EST MOD 30 MIN: CPT | Performed by: STUDENT IN AN ORGANIZED HEALTH CARE EDUCATION/TRAINING PROGRAM

## 2024-03-22 PROCEDURE — 82044 UR ALBUMIN SEMIQUANTITATIVE: CPT | Performed by: STUDENT IN AN ORGANIZED HEALTH CARE EDUCATION/TRAINING PROGRAM

## 2024-03-22 RX ORDER — SEMAGLUTIDE 0.68 MG/ML
INJECTION, SOLUTION SUBCUTANEOUS
Qty: 12 ML | Refills: 1 | Status: SHIPPED | OUTPATIENT
Start: 2024-03-22 | End: 2024-05-21

## 2024-03-22 SDOH — ECONOMIC STABILITY: HOUSING INSECURITY
IN THE LAST 12 MONTHS, WAS THERE A TIME WHEN YOU DID NOT HAVE A STEADY PLACE TO SLEEP OR SLEPT IN A SHELTER (INCLUDING NOW)?: NO

## 2024-03-22 SDOH — ECONOMIC STABILITY: FOOD INSECURITY: WITHIN THE PAST 12 MONTHS, YOU WORRIED THAT YOUR FOOD WOULD RUN OUT BEFORE YOU GOT MONEY TO BUY MORE.: NEVER TRUE

## 2024-03-22 SDOH — ECONOMIC STABILITY: INCOME INSECURITY: HOW HARD IS IT FOR YOU TO PAY FOR THE VERY BASICS LIKE FOOD, HOUSING, MEDICAL CARE, AND HEATING?: NOT HARD AT ALL

## 2024-03-22 SDOH — ECONOMIC STABILITY: FOOD INSECURITY: WITHIN THE PAST 12 MONTHS, THE FOOD YOU BOUGHT JUST DIDN'T LAST AND YOU DIDN'T HAVE MONEY TO GET MORE.: NEVER TRUE

## 2024-03-22 ASSESSMENT — PATIENT HEALTH QUESTIONNAIRE - PHQ9
10. IF YOU CHECKED OFF ANY PROBLEMS, HOW DIFFICULT HAVE THESE PROBLEMS MADE IT FOR YOU TO DO YOUR WORK, TAKE CARE OF THINGS AT HOME, OR GET ALONG WITH OTHER PEOPLE: NOT DIFFICULT AT ALL
SUM OF ALL RESPONSES TO PHQ QUESTIONS 1-9: 0
9. THOUGHTS THAT YOU WOULD BE BETTER OFF DEAD, OR OF HURTING YOURSELF: NOT AT ALL
SUM OF ALL RESPONSES TO PHQ9 QUESTIONS 1 & 2: 0
6. FEELING BAD ABOUT YOURSELF - OR THAT YOU ARE A FAILURE OR HAVE LET YOURSELF OR YOUR FAMILY DOWN: NOT AT ALL
8. MOVING OR SPEAKING SO SLOWLY THAT OTHER PEOPLE COULD HAVE NOTICED. OR THE OPPOSITE, BEING SO FIGETY OR RESTLESS THAT YOU HAVE BEEN MOVING AROUND A LOT MORE THAN USUAL: NOT AT ALL
SUM OF ALL RESPONSES TO PHQ QUESTIONS 1-9: 0
5. POOR APPETITE OR OVEREATING: NOT AT ALL
7. TROUBLE CONCENTRATING ON THINGS, SUCH AS READING THE NEWSPAPER OR WATCHING TELEVISION: NOT AT ALL
3. TROUBLE FALLING OR STAYING ASLEEP: NOT AT ALL
1. LITTLE INTEREST OR PLEASURE IN DOING THINGS: NOT AT ALL
4. FEELING TIRED OR HAVING LITTLE ENERGY: NOT AT ALL
2. FEELING DOWN, DEPRESSED OR HOPELESS: NOT AT ALL

## 2024-03-22 ASSESSMENT — ENCOUNTER SYMPTOMS
SHORTNESS OF BREATH: 0
EYE PAIN: 0
SORE THROAT: 0
ABDOMINAL PAIN: 0
EYE REDNESS: 0
SINUS PRESSURE: 0
CONSTIPATION: 0
RHINORRHEA: 0
VOMITING: 0
DIARRHEA: 0
NAUSEA: 0
BACK PAIN: 0
SINUS PAIN: 0
COUGH: 0

## 2024-03-22 NOTE — PROGRESS NOTES
note reviewed.   Constitutional:       General: She is not in acute distress.     Appearance: Normal appearance. She is not ill-appearing.   HENT:      Head: Normocephalic and atraumatic.      Right Ear: External ear normal.      Left Ear: External ear normal.   Eyes:      Extraocular Movements: Extraocular movements intact.      Conjunctiva/sclera: Conjunctivae normal.      Pupils: Pupils are equal, round, and reactive to light.   Cardiovascular:      Rate and Rhythm: Normal rate and regular rhythm.      Pulses: Normal pulses.      Heart sounds: Normal heart sounds.   Pulmonary:      Effort: Pulmonary effort is normal.      Breath sounds: Normal breath sounds.   Abdominal:      General: Bowel sounds are normal.      Palpations: Abdomen is soft.   Musculoskeletal:         General: Normal range of motion.      Cervical back: Normal range of motion and neck supple.   Skin:     General: Skin is warm and dry.      Capillary Refill: Capillary refill takes less than 2 seconds.   Neurological:      General: No focal deficit present.      Mental Status: She is alert and oriented to person, place, and time.   Psychiatric:         Behavior: Behavior normal.         ASSESSMENT/PLAN:  Ana Paula was seen today for diabetes.    Diagnoses and all orders for this visit:    Type 2 diabetes mellitus with both eyes affected by mild nonproliferative retinopathy without macular edema, without long-term current use of insulin (HCC)  -     Semaglutide,0.25 or 0.5MG/DOS, (OZEMPIC, 0.25 OR 0.5 MG/DOSE,) 2 MG/3ML SOPN; Inject 0.25 mg into the skin once a week for 30 days, THEN 0.5 mg once a week.  -     POCT microalbumin  Diabetes not controlled  Will add on Ozempic     Bipolar disorder, in partial remission, most recent episode mixed (HCC)  Stable  Continue current treatment  Continue with psychiatry     Breast cancer screening by mammogram  -     [facility] LIAN DIGITAL SCREEN BILATERAL; Future    Colon cancer screening  -     [facility]

## 2024-03-25 DIAGNOSIS — E11.3293 TYPE 2 DIABETES MELLITUS WITH BOTH EYES AFFECTED BY MILD NONPROLIFERATIVE RETINOPATHY WITHOUT MACULAR EDEMA, WITHOUT LONG-TERM CURRENT USE OF INSULIN (HCC): Primary | ICD-10-CM

## 2024-03-25 RX ORDER — DULAGLUTIDE 0.75 MG/.5ML
0.75 INJECTION, SOLUTION SUBCUTANEOUS WEEKLY
Qty: 4 ADJUSTABLE DOSE PRE-FILLED PEN SYRINGE | Refills: 1 | Status: SHIPPED | OUTPATIENT
Start: 2024-03-25

## 2024-04-20 DIAGNOSIS — E11.3293 TYPE 2 DIABETES MELLITUS WITH BOTH EYES AFFECTED BY MILD NONPROLIFERATIVE RETINOPATHY WITHOUT MACULAR EDEMA, WITHOUT LONG-TERM CURRENT USE OF INSULIN (HCC): ICD-10-CM

## 2024-04-22 RX ORDER — DULAGLUTIDE 0.75 MG/.5ML
INJECTION, SOLUTION SUBCUTANEOUS
Qty: 4 ADJUSTABLE DOSE PRE-FILLED PEN SYRINGE | Refills: 1 | Status: SHIPPED | OUTPATIENT
Start: 2024-04-22

## 2024-05-03 ENCOUNTER — HOSPITAL ENCOUNTER (OUTPATIENT)
Dept: GENERAL RADIOLOGY | Age: 69
End: 2024-05-03
Payer: COMMERCIAL

## 2024-05-03 VITALS — BODY MASS INDEX: 30.12 KG/M2 | HEIGHT: 63 IN | WEIGHT: 170 LBS

## 2024-05-03 DIAGNOSIS — Z12.31 BREAST CANCER SCREENING BY MAMMOGRAM: ICD-10-CM

## 2024-05-03 DIAGNOSIS — E11.9 TYPE 2 DIABETES MELLITUS WITHOUT COMPLICATION, UNSPECIFIED WHETHER LONG TERM INSULIN USE (HCC): ICD-10-CM

## 2024-05-03 PROCEDURE — 77063 BREAST TOMOSYNTHESIS BI: CPT

## 2024-05-06 RX ORDER — EMPAGLIFLOZIN 25 MG/1
TABLET, FILM COATED ORAL
Qty: 90 TABLET | Refills: 1 | Status: SHIPPED | OUTPATIENT
Start: 2024-05-06

## 2024-06-10 NOTE — PROGRESS NOTES
Just received Artane from pharmacy for patients 1400hr dose. Pt. Is refusing medication at this time. Will get updated echo as last one was 5/2021. Treat according to results

## 2024-06-13 DIAGNOSIS — E11.9 TYPE 2 DIABETES MELLITUS WITHOUT COMPLICATION, UNSPECIFIED WHETHER LONG TERM INSULIN USE (HCC): ICD-10-CM

## 2024-06-13 RX ORDER — GLIMEPIRIDE 4 MG/1
TABLET ORAL
Qty: 90 TABLET | Refills: 1 | Status: SHIPPED | OUTPATIENT
Start: 2024-06-13

## 2024-06-13 RX ORDER — LABETALOL 200 MG/1
200 TABLET, FILM COATED ORAL EVERY 8 HOURS SCHEDULED
Qty: 270 TABLET | Refills: 1 | Status: SHIPPED | OUTPATIENT
Start: 2024-06-13 | End: 2024-10-11

## 2024-06-13 RX ORDER — PIOGLITAZONEHYDROCHLORIDE 45 MG/1
45 TABLET ORAL DAILY
Qty: 90 TABLET | Refills: 1 | Status: SHIPPED | OUTPATIENT
Start: 2024-06-13

## 2024-06-20 RX ORDER — BENZTROPINE MESYLATE 1 MG/1
1 TABLET ORAL NIGHTLY
Qty: 90 TABLET | Refills: 1 | Status: SHIPPED | OUTPATIENT
Start: 2024-06-20

## 2024-06-20 RX ORDER — ATORVASTATIN CALCIUM 40 MG/1
TABLET, FILM COATED ORAL
Qty: 90 TABLET | Refills: 1 | OUTPATIENT
Start: 2024-06-20

## 2024-07-03 RX ORDER — ATORVASTATIN CALCIUM 40 MG/1
40 TABLET, FILM COATED ORAL DAILY
Qty: 90 TABLET | Refills: 1 | Status: SHIPPED | OUTPATIENT
Start: 2024-07-03

## 2024-07-22 ENCOUNTER — HOSPITAL ENCOUNTER (OUTPATIENT)
Age: 69
Discharge: HOME | End: 2024-07-22
Payer: COMMERCIAL

## 2024-07-22 LAB
ANION GAP SERPL CALCULATED.3IONS-SCNC: 13 MMOL/L (ref 7–16)
BASOPHILS # BLD: 0.03 K/UL (ref 0–0.2)
BASOPHILS NFR BLD: 0 % (ref 0–2)
BUN SERPL-MCNC: 20 MG/DL (ref 6–23)
CALCIUM SERPL-MCNC: 9.4 MG/DL (ref 8.6–10.2)
CHLORIDE SERPL-SCNC: 105 MMOL/L (ref 98–107)
CO2 SERPL-SCNC: 24 MMOL/L (ref 22–29)
CREAT SERPL-MCNC: 1.2 MG/DL (ref 0.5–1)
EOSINOPHIL # BLD: 0.11 K/UL (ref 0.05–0.5)
EOSINOPHILS RELATIVE PERCENT: 2 % (ref 0–6)
ERYTHROCYTE [DISTWIDTH] IN BLOOD BY AUTOMATED COUNT: 15.1 % (ref 11.5–15)
GFR, ESTIMATED: 48 ML/MIN/1.73M2
GLUCOSE SERPL-MCNC: 173 MG/DL (ref 74–99)
HCT VFR BLD AUTO: 44.5 % (ref 34–48)
HGB BLD-MCNC: 14.2 G/DL (ref 11.5–15.5)
IMM GRANULOCYTES # BLD AUTO: 0.05 K/UL (ref 0–0.58)
IMM GRANULOCYTES NFR BLD: 1 % (ref 0–5)
LYMPHOCYTES NFR BLD: 1.39 K/UL (ref 1.5–4)
LYMPHOCYTES RELATIVE PERCENT: 20 % (ref 20–42)
MAGNESIUM SERPL-MCNC: 2 MG/DL (ref 1.6–2.6)
MCH RBC QN AUTO: 29.2 PG (ref 26–35)
MCHC RBC AUTO-ENTMCNC: 31.9 G/DL (ref 32–34.5)
MCV RBC AUTO: 91.6 FL (ref 80–99.9)
MONOCYTES NFR BLD: 0.51 K/UL (ref 0.1–0.95)
MONOCYTES NFR BLD: 7 % (ref 2–12)
NEUTROPHILS NFR BLD: 70 % (ref 43–80)
NEUTS SEG NFR BLD: 4.97 K/UL (ref 1.8–7.3)
PHOSPHATE SERPL-MCNC: 3.6 MG/DL (ref 2.5–4.5)
PLATELET # BLD AUTO: 238 K/UL (ref 130–450)
PMV BLD AUTO: 9.4 FL (ref 7–12)
POTASSIUM SERPL-SCNC: 5 MMOL/L (ref 3.5–5)
RBC # BLD AUTO: 4.86 M/UL (ref 3.5–5.5)
SODIUM SERPL-SCNC: 142 MMOL/L (ref 132–146)
URATE SERPL-MCNC: 6.7 MG/DL (ref 2.4–5.7)
WBC OTHER # BLD: 7.1 K/UL (ref 4.5–11.5)

## 2024-07-22 PROCEDURE — 84100 ASSAY OF PHOSPHORUS: CPT

## 2024-07-22 PROCEDURE — 85025 COMPLETE CBC W/AUTO DIFF WBC: CPT

## 2024-07-22 PROCEDURE — 36415 COLL VENOUS BLD VENIPUNCTURE: CPT

## 2024-07-22 PROCEDURE — 80048 BASIC METABOLIC PNL TOTAL CA: CPT

## 2024-07-22 PROCEDURE — 83735 ASSAY OF MAGNESIUM: CPT

## 2024-07-22 PROCEDURE — 84550 ASSAY OF BLOOD/URIC ACID: CPT

## 2024-07-24 ENCOUNTER — OFFICE VISIT (OUTPATIENT)
Dept: FAMILY MEDICINE CLINIC | Age: 69
End: 2024-07-24
Payer: COMMERCIAL

## 2024-07-24 VITALS
HEART RATE: 82 BPM | HEIGHT: 63 IN | DIASTOLIC BLOOD PRESSURE: 70 MMHG | WEIGHT: 183 LBS | OXYGEN SATURATION: 97 % | TEMPERATURE: 97.4 F | RESPIRATION RATE: 18 BRPM | BODY MASS INDEX: 32.43 KG/M2 | SYSTOLIC BLOOD PRESSURE: 118 MMHG

## 2024-07-24 DIAGNOSIS — R60.0 BILATERAL LOWER EXTREMITY EDEMA: ICD-10-CM

## 2024-07-24 DIAGNOSIS — I10 SYSTEMIC PRIMARY ARTERIAL HYPERTENSION: ICD-10-CM

## 2024-07-24 DIAGNOSIS — Z12.11 COLON CANCER SCREENING: ICD-10-CM

## 2024-07-24 DIAGNOSIS — E11.9 TYPE 2 DIABETES MELLITUS WITHOUT COMPLICATION, UNSPECIFIED WHETHER LONG TERM INSULIN USE (HCC): Primary | ICD-10-CM

## 2024-07-24 LAB — HBA1C MFR BLD: 7 %

## 2024-07-24 PROCEDURE — 1036F TOBACCO NON-USER: CPT | Performed by: STUDENT IN AN ORGANIZED HEALTH CARE EDUCATION/TRAINING PROGRAM

## 2024-07-24 PROCEDURE — 1090F PRES/ABSN URINE INCON ASSESS: CPT | Performed by: STUDENT IN AN ORGANIZED HEALTH CARE EDUCATION/TRAINING PROGRAM

## 2024-07-24 PROCEDURE — 3017F COLORECTAL CA SCREEN DOC REV: CPT | Performed by: STUDENT IN AN ORGANIZED HEALTH CARE EDUCATION/TRAINING PROGRAM

## 2024-07-24 PROCEDURE — 2022F DILAT RTA XM EVC RTNOPTHY: CPT | Performed by: STUDENT IN AN ORGANIZED HEALTH CARE EDUCATION/TRAINING PROGRAM

## 2024-07-24 PROCEDURE — G8417 CALC BMI ABV UP PARAM F/U: HCPCS | Performed by: STUDENT IN AN ORGANIZED HEALTH CARE EDUCATION/TRAINING PROGRAM

## 2024-07-24 PROCEDURE — G8399 PT W/DXA RESULTS DOCUMENT: HCPCS | Performed by: STUDENT IN AN ORGANIZED HEALTH CARE EDUCATION/TRAINING PROGRAM

## 2024-07-24 PROCEDURE — G8427 DOCREV CUR MEDS BY ELIG CLIN: HCPCS | Performed by: STUDENT IN AN ORGANIZED HEALTH CARE EDUCATION/TRAINING PROGRAM

## 2024-07-24 PROCEDURE — 83036 HEMOGLOBIN GLYCOSYLATED A1C: CPT | Performed by: STUDENT IN AN ORGANIZED HEALTH CARE EDUCATION/TRAINING PROGRAM

## 2024-07-24 PROCEDURE — 3078F DIAST BP <80 MM HG: CPT | Performed by: STUDENT IN AN ORGANIZED HEALTH CARE EDUCATION/TRAINING PROGRAM

## 2024-07-24 PROCEDURE — 3051F HG A1C>EQUAL 7.0%<8.0%: CPT | Performed by: STUDENT IN AN ORGANIZED HEALTH CARE EDUCATION/TRAINING PROGRAM

## 2024-07-24 PROCEDURE — 1123F ACP DISCUSS/DSCN MKR DOCD: CPT | Performed by: STUDENT IN AN ORGANIZED HEALTH CARE EDUCATION/TRAINING PROGRAM

## 2024-07-24 PROCEDURE — 99214 OFFICE O/P EST MOD 30 MIN: CPT | Performed by: STUDENT IN AN ORGANIZED HEALTH CARE EDUCATION/TRAINING PROGRAM

## 2024-07-24 PROCEDURE — 3074F SYST BP LT 130 MM HG: CPT | Performed by: STUDENT IN AN ORGANIZED HEALTH CARE EDUCATION/TRAINING PROGRAM

## 2024-07-24 RX ORDER — FUROSEMIDE 20 MG/1
20 TABLET ORAL EVERY OTHER DAY
Qty: 90 TABLET | Refills: 0 | Status: SHIPPED | OUTPATIENT
Start: 2024-07-24 | End: 2024-10-22

## 2024-07-24 ASSESSMENT — ENCOUNTER SYMPTOMS
ABDOMINAL PAIN: 0
DIARRHEA: 0
BACK PAIN: 0
SHORTNESS OF BREATH: 0
NAUSEA: 0
VOMITING: 0
SINUS PAIN: 0
EYE REDNESS: 0
SINUS PRESSURE: 0
EYE PAIN: 0
RHINORRHEA: 0
CONSTIPATION: 0
SORE THROAT: 0
COUGH: 0

## 2024-07-24 NOTE — PROGRESS NOTES
7/24/2024    HPI  Chief Complaint   Patient presents with    Diabetes       Ana Paula Kunz is a 69 y.o. female who  has a past medical history of Acute gouty arthritis, Bell's palsy, Bipolar disorder (HCC), Carpal tunnel syndrome, Hyperlipidemia, Non-insulin dependent type 2 diabetes mellitus (HCC), Sepsis (HCC), Systemic primary arterial hypertension, and Type 2 diabetes with peripheral circulatory disorder, controlled (HCC).     DM2:   Patient is here to fu regarding DM2. Patient is controlled. Taking all medications and tolerating well.   Patient is taking ASA and Ace Inhibitor/ARB. Patient is  on appropriately-dosed statin.  LDL is  at goal.  BP is  controlled.  No hypoglycemic episodes. Patient does see Podiatry regularly.  Saw an Eye Dr yearly. Patient is aware that it is necessary to see an Eye Dr yearly.  Patient does not smoke.  Most recent labs reviewed with patient.  Patient does not have complaints or concerns today.     Lab Results   Component Value Date    LABA1C 7.0 07/24/2024       Edema  Patient complains of edema in both ankles and feet. The edema has been mild. Onset of symptoms was several months ago, and patient reports symptoms have stabilized since that time. The edema is present intermittently. The patient states the problem is long-standing. The swelling has been aggravated by dependency of involved area and hot weather. The swelling has been relieved by diuretics, elevation of involved area. Associated factors include: nothing. Cardiac risk factors include advanced age (older than 55 for men, 65 for women), diabetes mellitus, dyslipidemia, hypertension, obesity (BMI >= 30 kg/m2), and sedentary lifestyle.    Pt states when she was getting out of her car she went to go step on the curb and she did trip.  She states she did fall backwards and hit her head on the car door.  She states that she did hit it pretty hard.  She denies any dizziness or lightheadedness.  She does not have any pain

## 2024-07-25 ENCOUNTER — TELEPHONE (OUTPATIENT)
Dept: FAMILY MEDICINE CLINIC | Age: 69
End: 2024-07-25

## 2024-08-05 NOTE — NUR
PHYSICAL THERAPY
 
Physical Therapy evaluation completed on 3N with full evaluation to follow.
Low complexity PT evaluation per chart review and evaluation, [id number].
Recommend physical therapy per plan of care and Home health with 24hr care and
assist upon discharge.  Thank you for this referral. Hailee Lopez,PT,DPT Initial Size Of Lesion: 0.8

## 2024-08-27 ENCOUNTER — PREP FOR PROCEDURE (OUTPATIENT)
Dept: SURGERY | Age: 69
End: 2024-08-27

## 2024-08-27 ENCOUNTER — OFFICE VISIT (OUTPATIENT)
Dept: SURGERY | Age: 69
End: 2024-08-27
Payer: COMMERCIAL

## 2024-08-27 ENCOUNTER — TELEPHONE (OUTPATIENT)
Dept: SURGERY | Age: 69
End: 2024-08-27

## 2024-08-27 VITALS
HEART RATE: 83 BPM | RESPIRATION RATE: 16 BRPM | SYSTOLIC BLOOD PRESSURE: 137 MMHG | OXYGEN SATURATION: 99 % | WEIGHT: 179 LBS | BODY MASS INDEX: 31.71 KG/M2 | HEIGHT: 63 IN | DIASTOLIC BLOOD PRESSURE: 63 MMHG

## 2024-08-27 DIAGNOSIS — Z91.89 AT RISK FOR COLON CANCER: ICD-10-CM

## 2024-08-27 DIAGNOSIS — Z91.89 AT RISK FOR COLON CANCER: Primary | ICD-10-CM

## 2024-08-27 PROCEDURE — S0285 CNSLT BEFORE SCREEN COLONOSC: HCPCS | Performed by: SURGERY

## 2024-08-27 RX ORDER — SODIUM PICOSULFATE, MAGNESIUM OXIDE, AND ANHYDROUS CITRIC ACID 12; 3.5; 1 G/175ML; G/175ML; MG/175ML
175 LIQUID ORAL ONCE
Qty: 2 EACH | Refills: 0 | Status: SHIPPED | OUTPATIENT
Start: 2024-08-27 | End: 2024-08-27

## 2024-08-27 NOTE — TELEPHONE ENCOUNTER
Scheduled pt for Colonoscopy 10/17/24 at 8:45AM. Pt needs to arrive at [facility] at 7:45AM. Patient confirmed date and time, address and directions given in office. Prep instructions given in office, patient understood.      Prior Authorization Form:      DEMOGRAPHICS:                     Patient Name:  Ketan Kunz  Patient :  1955            Insurance:  Payor: [facility] / Plan: [facility] MEDICAID / Product Type: *No Product type* /   Insurance ID Number:    Payer/Plan Subscr  Sex Relation Sub. Ins. ID Effective Group Num   1. [facility] - * KETAN KUNZ 1955 Female Self [id number] [address]         DIAGNOSIS & PROCEDURE:                       Procedure/Operation: COLONOSCOPY           CPT Code: 34913    Diagnosis:  AT RISK FOR COLON CANCER    ICD10 Code: Z91.89    Location:  [facility]    Surgeon:  ELISABETH STALEY    SCHEDULING INFORMATION:                          Date: 10/17/24    Time: 8:45AM              Anesthesia:  LMAC                                                       Status:  OUTPATIENT       Special Comments:  N/A       Electronically signed by Sneha Escobar MA on 2024 at 2:08 PM

## 2024-09-13 LAB — DIABETIC RETINOPATHY: POSITIVE

## 2024-10-07 NOTE — NUR
Assessment: Chronic fluctuating electrolyte imbalances in setting of diuretic use and fluid/feed adjustments post-operatively. Off TPN 9/26.  Restarted on KCl & NaCl 9/26.     Plan:   Dosing weight 9.4 kg with exception of sedation medications, which are 8.5 kg  KCL 8 meq/kg/day divided every 3 hours   NaCL 4 meq/kg/day divided by every 6 hours   RFP weekly on Mondays   P-HYPERVERBAL
 
I-REDIRECTION WITH 1:1 THERAPEUTIC INTERVENTIONS AND PRESENT REALITY. EDUCATE
AND ENCOURAGE MEDICATION COMPLIANCE.
 
R-PATIENT MEDICATION COMPLIANT AT HS. PATIENT PROVIDED NOURISHMENT AND FLUIDS
AT HS. PATIENT WITH NO SUICIDAL OR HOMICIDAL IDEATIONS. PATIENT WITH NO
DELUSIONS OR HALLUCINATIONS AT THIS TIME. PATIENT HYPERVERBAL AND IRRITABLE AT
TIMES WITH PEERS IN DINING AREA.
 
P-CONTINUE TO ENCOURAGE MEDICATION COMPLIANCE, CONTINUE TO PRESENT REALITY,
ENCOURAGE GROUP THERAPY WHILE AWAKE

## 2024-10-31 ENCOUNTER — TELEPHONE (OUTPATIENT)
Dept: CARDIOLOGY | Age: 69
End: 2024-10-31

## 2024-10-31 NOTE — TELEPHONE ENCOUNTER
Echo authorization needs more clinical information and is requesting a Peer-2-Peer discussion.    [phone]    Trackin     Electronically signed by Emelina Mishra on 10/31/2024 at 2:42 PM

## 2024-11-01 ENCOUNTER — TELEPHONE (OUTPATIENT)
Dept: CARDIOLOGY | Age: 69
End: 2024-11-01

## 2024-11-01 ENCOUNTER — OFFICE VISIT (OUTPATIENT)
Dept: FAMILY MEDICINE CLINIC | Age: 69
End: 2024-11-01
Payer: COMMERCIAL

## 2024-11-01 VITALS
WEIGHT: 188 LBS | TEMPERATURE: 96.9 F | RESPIRATION RATE: 17 BRPM | BODY MASS INDEX: 33.3 KG/M2 | HEART RATE: 93 BPM | DIASTOLIC BLOOD PRESSURE: 64 MMHG | SYSTOLIC BLOOD PRESSURE: 124 MMHG | OXYGEN SATURATION: 98 %

## 2024-11-01 DIAGNOSIS — M79.10 MYALGIA: ICD-10-CM

## 2024-11-01 DIAGNOSIS — R60.0 BILATERAL LOWER EXTREMITY EDEMA: ICD-10-CM

## 2024-11-01 DIAGNOSIS — R29.6 FREQUENT FALLS: ICD-10-CM

## 2024-11-01 DIAGNOSIS — I10 SYSTEMIC PRIMARY ARTERIAL HYPERTENSION: ICD-10-CM

## 2024-11-01 DIAGNOSIS — E11.3293 TYPE 2 DIABETES MELLITUS WITH BOTH EYES AFFECTED BY MILD NONPROLIFERATIVE RETINOPATHY WITHOUT MACULAR EDEMA, WITHOUT LONG-TERM CURRENT USE OF INSULIN (HCC): Primary | ICD-10-CM

## 2024-11-01 DIAGNOSIS — Z87.39 HISTORY OF BURNING PAIN IN LEG: ICD-10-CM

## 2024-11-01 DIAGNOSIS — Z23 FLU VACCINE NEED: ICD-10-CM

## 2024-11-01 DIAGNOSIS — E78.00 PURE HYPERCHOLESTEROLEMIA: ICD-10-CM

## 2024-11-01 DIAGNOSIS — R06.02 SHORTNESS OF BREATH: ICD-10-CM

## 2024-11-01 DIAGNOSIS — E55.9 VITAMIN D DEFICIENCY: ICD-10-CM

## 2024-11-01 DIAGNOSIS — E53.8 VITAMIN B12 DEFICIENCY: ICD-10-CM

## 2024-11-01 LAB
ALBUMIN: 4.2 G/DL (ref 3.5–5.2)
ALP BLD-CCNC: 64 U/L (ref 35–104)
ALT SERPL-CCNC: 14 U/L (ref 0–32)
ANION GAP SERPL CALCULATED.3IONS-SCNC: 14 MMOL/L (ref 7–16)
AST SERPL-CCNC: 18 U/L (ref 0–31)
BASOPHILS ABSOLUTE: 0.02 K/UL (ref 0–0.2)
BASOPHILS RELATIVE PERCENT: 0 % (ref 0–2)
BILIRUB SERPL-MCNC: 0.6 MG/DL (ref 0–1.2)
BUN BLDV-MCNC: 17 MG/DL (ref 6–23)
CALCIUM SERPL-MCNC: 9.5 MG/DL (ref 8.6–10.2)
CHLORIDE BLD-SCNC: 104 MMOL/L (ref 98–107)
CHOLESTEROL, TOTAL: 136 MG/DL
CO2: 25 MMOL/L (ref 22–29)
CREAT SERPL-MCNC: 1.3 MG/DL (ref 0.5–1)
EOSINOPHILS ABSOLUTE: 0.11 K/UL (ref 0.05–0.5)
EOSINOPHILS RELATIVE PERCENT: 2 % (ref 0–6)
GFR, ESTIMATED: 46 ML/MIN/1.73M2
GLUCOSE BLD-MCNC: 181 MG/DL (ref 74–99)
HBA1C MFR BLD: 7 %
HCT VFR BLD CALC: 43.1 % (ref 34–48)
HDLC SERPL-MCNC: 62 MG/DL
HEMOGLOBIN: 13.5 G/DL (ref 11.5–15.5)
IMMATURE GRANULOCYTES %: 0 % (ref 0–5)
IMMATURE GRANULOCYTES ABSOLUTE: 0.03 K/UL (ref 0–0.58)
LDL CHOLESTEROL: 61 MG/DL
LYMPHOCYTES ABSOLUTE: 1.1 K/UL (ref 1.5–4)
LYMPHOCYTES RELATIVE PERCENT: 16 % (ref 20–42)
MAGNESIUM: 2 MG/DL (ref 1.6–2.6)
MCH RBC QN AUTO: 29.2 PG (ref 26–35)
MCHC RBC AUTO-ENTMCNC: 31.3 G/DL (ref 32–34.5)
MCV RBC AUTO: 93.1 FL (ref 80–99.9)
MONOCYTES ABSOLUTE: 0.48 K/UL (ref 0.1–0.95)
MONOCYTES RELATIVE PERCENT: 7 % (ref 2–12)
NEUTROPHILS ABSOLUTE: 5.24 K/UL (ref 1.8–7.3)
NEUTROPHILS RELATIVE PERCENT: 75 % (ref 43–80)
PDW BLD-RTO: 15.2 % (ref 11.5–15)
PLATELET # BLD: 247 K/UL (ref 130–450)
PMV BLD AUTO: 10.1 FL (ref 7–12)
POTASSIUM SERPL-SCNC: 4.8 MMOL/L (ref 3.5–5)
RBC # BLD: 4.63 M/UL (ref 3.5–5.5)
SODIUM BLD-SCNC: 143 MMOL/L (ref 132–146)
TOTAL PROTEIN: 7.1 G/DL (ref 6.4–8.3)
TRIGL SERPL-MCNC: 67 MG/DL
TSH SERPL DL<=0.05 MIU/L-ACNC: 1.47 UIU/ML (ref 0.27–4.2)
VITAMIN B-12: 320 PG/ML (ref 211–946)
VITAMIN D 25-HYDROXY: 25.8 NG/ML (ref 30–100)
VLDLC SERPL CALC-MCNC: 13 MG/DL
WBC # BLD: 7 K/UL (ref 4.5–11.5)

## 2024-11-01 PROCEDURE — 3074F SYST BP LT 130 MM HG: CPT | Performed by: STUDENT IN AN ORGANIZED HEALTH CARE EDUCATION/TRAINING PROGRAM

## 2024-11-01 PROCEDURE — G8427 DOCREV CUR MEDS BY ELIG CLIN: HCPCS | Performed by: STUDENT IN AN ORGANIZED HEALTH CARE EDUCATION/TRAINING PROGRAM

## 2024-11-01 PROCEDURE — 1123F ACP DISCUSS/DSCN MKR DOCD: CPT | Performed by: STUDENT IN AN ORGANIZED HEALTH CARE EDUCATION/TRAINING PROGRAM

## 2024-11-01 PROCEDURE — 99214 OFFICE O/P EST MOD 30 MIN: CPT | Performed by: STUDENT IN AN ORGANIZED HEALTH CARE EDUCATION/TRAINING PROGRAM

## 2024-11-01 PROCEDURE — 36415 COLL VENOUS BLD VENIPUNCTURE: CPT | Performed by: STUDENT IN AN ORGANIZED HEALTH CARE EDUCATION/TRAINING PROGRAM

## 2024-11-01 PROCEDURE — G8399 PT W/DXA RESULTS DOCUMENT: HCPCS | Performed by: STUDENT IN AN ORGANIZED HEALTH CARE EDUCATION/TRAINING PROGRAM

## 2024-11-01 PROCEDURE — 83036 HEMOGLOBIN GLYCOSYLATED A1C: CPT | Performed by: STUDENT IN AN ORGANIZED HEALTH CARE EDUCATION/TRAINING PROGRAM

## 2024-11-01 PROCEDURE — G8417 CALC BMI ABV UP PARAM F/U: HCPCS | Performed by: STUDENT IN AN ORGANIZED HEALTH CARE EDUCATION/TRAINING PROGRAM

## 2024-11-01 PROCEDURE — 2022F DILAT RTA XM EVC RTNOPTHY: CPT | Performed by: STUDENT IN AN ORGANIZED HEALTH CARE EDUCATION/TRAINING PROGRAM

## 2024-11-01 PROCEDURE — 3078F DIAST BP <80 MM HG: CPT | Performed by: STUDENT IN AN ORGANIZED HEALTH CARE EDUCATION/TRAINING PROGRAM

## 2024-11-01 PROCEDURE — G8484 FLU IMMUNIZE NO ADMIN: HCPCS | Performed by: STUDENT IN AN ORGANIZED HEALTH CARE EDUCATION/TRAINING PROGRAM

## 2024-11-01 PROCEDURE — 1036F TOBACCO NON-USER: CPT | Performed by: STUDENT IN AN ORGANIZED HEALTH CARE EDUCATION/TRAINING PROGRAM

## 2024-11-01 PROCEDURE — 3051F HG A1C>EQUAL 7.0%<8.0%: CPT | Performed by: STUDENT IN AN ORGANIZED HEALTH CARE EDUCATION/TRAINING PROGRAM

## 2024-11-01 PROCEDURE — 3017F COLORECTAL CA SCREEN DOC REV: CPT | Performed by: STUDENT IN AN ORGANIZED HEALTH CARE EDUCATION/TRAINING PROGRAM

## 2024-11-01 PROCEDURE — 1090F PRES/ABSN URINE INCON ASSESS: CPT | Performed by: STUDENT IN AN ORGANIZED HEALTH CARE EDUCATION/TRAINING PROGRAM

## 2024-11-01 ASSESSMENT — ENCOUNTER SYMPTOMS
VOMITING: 0
COUGH: 0
EYE PAIN: 0
SINUS PAIN: 0
SORE THROAT: 0
EYE REDNESS: 0
DIARRHEA: 0
ABDOMINAL PAIN: 0
RHINORRHEA: 0
CONSTIPATION: 0
NAUSEA: 0
SINUS PRESSURE: 0
BACK PAIN: 0
SHORTNESS OF BREATH: 0

## 2024-11-01 NOTE — PROGRESS NOTES
11/1/2024    HPI  Chief Complaint   Patient presents with    Diabetes    legs     burning       Ana Paula Kunz is a 69 y.o. female who  has a past medical history of Acute gouty arthritis, Bell's palsy, Bipolar disorder ([facility]), Carpal tunnel syndrome, Hyperlipidemia, Non-insulin dependent type 2 diabetes mellitus (HCC), Sepsis (HCC), Systemic primary arterial hypertension, and Type 2 diabetes with peripheral circulatory disorder, controlled ([facility]).     DM2:   Patient is here to fu regarding DM2. Patient is  controlled. Taking all medications and tolerating well.  Patient is not taking ASA and Ace Inhibitor/ARB. Patient is  on appropriately-dosed statin.  LDL is  at goal.  BP is  controlled.  does not hypoglycemic episodes. Patient does see Podiatry regularly.  Patient is aware that it is necessary to see an Eye Dr yearly.  Patient does not smoke.  Most recent labs reviewed with patient.  Patient does have complaints or concerns today.      Lab Results   Component Value Date    LABA1C 7.0 11/01/2024       Lab Results   Component Value Date    LDL 60 09/22/2023              Review of Systems   Constitutional:  Positive for fatigue. Negative for chills and fever.   HENT:  Negative for congestion, ear pain, postnasal drip, rhinorrhea, sinus pressure, sinus pain and sore throat.    Eyes:  Negative for pain and redness.   Respiratory:  Negative for cough and shortness of breath.    Cardiovascular:  Positive for leg swelling. Negative for chest pain.   Gastrointestinal:  Negative for abdominal pain, constipation, diarrhea, nausea and vomiting.   Genitourinary:  Negative for difficulty urinating and dysuria.   Musculoskeletal:  Negative for back pain, myalgias and neck pain.   Skin:  Negative for rash.   Neurological:  Negative for dizziness, light-headedness and numbness.   Psychiatric/Behavioral:  Positive for sleep disturbance.        Prior to Visit Medications    Medication Sig Taking? Authorizing Provider

## 2024-11-01 NOTE — TELEPHONE ENCOUNTER
Echo auth denied through [facility].  Peer-2-Peer discussion requested for approval.    [phone]    Trackin     Electronically signed by Emelina Mishra on 2024 at 7:24 AM

## 2024-11-03 DIAGNOSIS — E55.9 VITAMIN D DEFICIENCY: Primary | ICD-10-CM

## 2024-11-03 RX ORDER — ERGOCALCIFEROL 1.25 MG/1
CAPSULE, LIQUID FILLED ORAL
Qty: 12 CAPSULE | Refills: 0 | Status: SHIPPED | OUTPATIENT
Start: 2024-11-03

## 2024-11-11 ENCOUNTER — HOSPITAL ENCOUNTER (OUTPATIENT)
Age: 69
Discharge: HOME | End: 2024-11-11
Payer: COMMERCIAL

## 2024-11-11 LAB
ANION GAP SERPL CALCULATED.3IONS-SCNC: 11 MMOL/L (ref 7–16)
BASOPHILS # BLD: 0.03 K/UL (ref 0–0.2)
BASOPHILS NFR BLD: 1 % (ref 0–2)
BUN SERPL-MCNC: 20 MG/DL (ref 6–23)
CALCIUM SERPL-MCNC: 9.5 MG/DL (ref 8.6–10.2)
CHLORIDE SERPL-SCNC: 102 MMOL/L (ref 98–107)
CO2 SERPL-SCNC: 27 MMOL/L (ref 22–29)
CREAT SERPL-MCNC: 1.1 MG/DL (ref 0.5–1)
EOSINOPHIL # BLD: 0.11 K/UL (ref 0.05–0.5)
EOSINOPHILS RELATIVE PERCENT: 2 % (ref 0–6)
ERYTHROCYTE [DISTWIDTH] IN BLOOD BY AUTOMATED COUNT: 14.7 % (ref 11.5–15)
GFR, ESTIMATED: 53 ML/MIN/1.73M2
GLUCOSE SERPL-MCNC: 196 MG/DL (ref 74–99)
HCT VFR BLD AUTO: 43.6 % (ref 34–48)
HGB BLD-MCNC: 13.6 G/DL (ref 11.5–15.5)
IMM GRANULOCYTES # BLD AUTO: 0.04 K/UL (ref 0–0.58)
IMM GRANULOCYTES NFR BLD: 1 % (ref 0–5)
LYMPHOCYTES NFR BLD: 1.01 K/UL (ref 1.5–4)
LYMPHOCYTES RELATIVE PERCENT: 17 % (ref 20–42)
MAGNESIUM SERPL-MCNC: 2 MG/DL (ref 1.6–2.6)
MCH RBC QN AUTO: 29 PG (ref 26–35)
MCHC RBC AUTO-ENTMCNC: 31.2 G/DL (ref 32–34.5)
MCV RBC AUTO: 93 FL (ref 80–99.9)
MONOCYTES NFR BLD: 0.42 K/UL (ref 0.1–0.95)
MONOCYTES NFR BLD: 7 % (ref 2–12)
NEUTROPHILS NFR BLD: 73 % (ref 43–80)
NEUTS SEG NFR BLD: 4.25 K/UL (ref 1.8–7.3)
PHOSPHATE SERPL-MCNC: 3.9 MG/DL (ref 2.5–4.5)
PLATELET # BLD AUTO: 257 K/UL (ref 130–450)
PMV BLD AUTO: 9.8 FL (ref 7–12)
POTASSIUM SERPL-SCNC: 4.5 MMOL/L (ref 3.5–5)
RBC # BLD AUTO: 4.69 M/UL (ref 3.5–5.5)
SODIUM SERPL-SCNC: 140 MMOL/L (ref 132–146)
URATE SERPL-MCNC: 6 MG/DL (ref 2.4–5.7)
WBC OTHER # BLD: 5.9 K/UL (ref 4.5–11.5)

## 2024-11-11 PROCEDURE — 84550 ASSAY OF BLOOD/URIC ACID: CPT

## 2024-11-11 PROCEDURE — 85025 COMPLETE CBC W/AUTO DIFF WBC: CPT

## 2024-11-11 PROCEDURE — 80048 BASIC METABOLIC PNL TOTAL CA: CPT

## 2024-11-11 PROCEDURE — 83735 ASSAY OF MAGNESIUM: CPT

## 2024-11-11 PROCEDURE — 84100 ASSAY OF PHOSPHORUS: CPT

## 2024-11-14 ENCOUNTER — TELEPHONE (OUTPATIENT)
Dept: CARDIOLOGY | Age: 69
End: 2024-11-14

## 2024-11-18 NOTE — TELEPHONE ENCOUNTER
Name of Medication(s) Requested:  Requested Prescriptions     Pending Prescriptions Disp Refills    SITagliptin (JANUVIA) 100 MG tablet 90 tablet 1     Sig: TAKE 1 TABLET BY MOUTH EVERY DAY       Medication is on current medication list Yes    Dosage and directions were verified? Yes    Quantity verified: 90 day supply     Pharmacy Verified?  Yes    Last Appointment:  11/1/2024    Future appts:  Future Appointments   Date Time Provider Department Center   2/7/2025 11:00 AM Sheryl Wade DO [place] ECC DEP        (If no appt send self scheduling link. .REFILLAPPT)  Scheduling request sent?     [] Yes  [x] No    Does patient need updated?  [] Yes  [x] No

## 2024-11-19 DIAGNOSIS — E11.9 TYPE 2 DIABETES MELLITUS WITHOUT COMPLICATION, UNSPECIFIED WHETHER LONG TERM INSULIN USE (HCC): ICD-10-CM

## 2024-11-19 DIAGNOSIS — E55.9 VITAMIN D DEFICIENCY: ICD-10-CM

## 2024-11-19 RX ORDER — EMPAGLIFLOZIN 25 MG/1
TABLET, FILM COATED ORAL
Qty: 90 TABLET | Refills: 1 | OUTPATIENT
Start: 2024-11-19

## 2024-11-19 RX ORDER — ERGOCALCIFEROL 1.25 MG/1
CAPSULE, LIQUID FILLED ORAL
Qty: 12 CAPSULE | Refills: 0 | OUTPATIENT
Start: 2024-11-19

## 2024-11-20 RX ORDER — ATORVASTATIN CALCIUM 40 MG/1
40 TABLET, FILM COATED ORAL DAILY
Qty: 90 TABLET | Refills: 1 | Status: SHIPPED | OUTPATIENT
Start: 2024-11-20

## 2024-11-20 RX ORDER — ERGOCALCIFEROL 1.25 MG/1
CAPSULE, LIQUID FILLED ORAL
Qty: 12 CAPSULE | Refills: 0 | Status: SHIPPED | OUTPATIENT
Start: 2024-11-20

## 2024-11-21 ENCOUNTER — TELEPHONE (OUTPATIENT)
Dept: FAMILY MEDICINE CLINIC | Age: 69
End: 2024-11-21

## 2024-11-21 NOTE — TELEPHONE ENCOUNTER
Pt called in and both legs are still having a burning sensation; she wondered if dr mccray can call in a cream  I told her nursing would call her back

## 2024-11-26 NOTE — TELEPHONE ENCOUNTER
I am not sure how much a cream is going to help with burning. I had ordered the JAMES to make sure it was not any insufficiency but this also could be neuropathy

## 2024-12-10 ENCOUNTER — HOSPITAL ENCOUNTER (OUTPATIENT)
Dept: INTERVENTIONAL RADIOLOGY/VASCULAR | Age: 69
Discharge: HOME | End: 2024-12-12
Payer: COMMERCIAL

## 2024-12-10 DIAGNOSIS — Z87.39 HISTORY OF BURNING PAIN IN LEG: ICD-10-CM

## 2024-12-10 PROCEDURE — 93922 UPR/L XTREMITY ART 2 LEVELS: CPT | Performed by: SURGERY

## 2024-12-10 PROCEDURE — 93922 UPR/L XTREMITY ART 2 LEVELS: CPT

## 2024-12-11 DIAGNOSIS — Z87.39 HISTORY OF BURNING PAIN IN LEG: Primary | ICD-10-CM

## 2024-12-15 DIAGNOSIS — E11.9 TYPE 2 DIABETES MELLITUS WITHOUT COMPLICATION, UNSPECIFIED WHETHER LONG TERM INSULIN USE (HCC): ICD-10-CM

## 2024-12-16 RX ORDER — GLIMEPIRIDE 4 MG/1
TABLET ORAL
Qty: 90 TABLET | Refills: 1 | Status: SHIPPED | OUTPATIENT
Start: 2024-12-16

## 2024-12-16 RX ORDER — PIOGLITAZONE 45 MG/1
45 TABLET ORAL DAILY
Qty: 90 TABLET | Refills: 1 | Status: SHIPPED | OUTPATIENT
Start: 2024-12-16

## 2024-12-16 RX ORDER — LABETALOL 200 MG/1
200 TABLET, FILM COATED ORAL EVERY 8 HOURS SCHEDULED
Qty: 270 TABLET | Refills: 1 | Status: SHIPPED | OUTPATIENT
Start: 2024-12-16 | End: 2025-04-15

## 2024-12-16 NOTE — TELEPHONE ENCOUNTER
Name of Medication(s) Requested:  Requested Prescriptions     Pending Prescriptions Disp Refills    pioglitazone (ACTOS) 45 MG tablet [Pharmacy Med Name: PIOGLITAZONE HCL 45 MG TABLET] 90 tablet 1     Sig: TAKE 1 TABLET BY MOUTH EVERY DAY    glimepiride (AMARYL) 4 MG tablet [Pharmacy Med Name: GLIMEPIRIDE 4 MG TABLET] 90 tablet 1     Sig: TAKE 1 TABLET BY MOUTH EVERY DAY BEFORE BREAKFAST    metFORMIN (GLUCOPHAGE) 1000 MG tablet [Pharmacy Med Name: METFORMIN HCL 1,000 MG TABLET] 180 tablet 1     Sig: TAKE 1 TABLET BY MOUTH TWICE A DAY WITH BREAKFAST AND EVENING MEAL    labetalol (NORMODYNE) 200 MG tablet [Pharmacy Med Name: LABETALOL  MG TABLET] 270 tablet 1     Sig: TAKE 1 TABLET BY MOUTH EVERY 8 HOURS       Medication is on current medication list Yes    Dosage and directions were verified? Yes    Quantity verified: 90 day supply     Pharmacy Verified?  Yes    Last Appointment:  11/1/2024    Future appts:  Future Appointments   Date Time Provider Department Center   2/7/2025 11:00 AM Sheryl Wade DO [facility] E[facility] DEP        (If no appt send self scheduling link. .REFILLAPPT)  Scheduling request sent?     [] Yes  [x] No    Does patient need updated?  [] Yes  [x] No

## 2025-01-16 ENCOUNTER — TELEPHONE (OUTPATIENT)
Dept: FAMILY MEDICINE CLINIC | Age: 70
End: 2025-01-16

## 2025-01-16 NOTE — TELEPHONE ENCOUNTER
Since her A1c has been pretty well controlled the last 6 months, I would have her stop the Pioglitazone and we will recheck when she is due and see how the levels are at that time

## 2025-01-16 NOTE — TELEPHONE ENCOUNTER
Pt called in stating Pioglitazone 45 mg is causing the pt weight gain and she does not like that and is wondering if she can be prescribed a different medication. Please advise

## 2025-01-17 ENCOUNTER — APPOINTMENT (OUTPATIENT)
Dept: ULTRASOUND IMAGING | Age: 70
End: 2025-01-17
Payer: COMMERCIAL

## 2025-01-17 ENCOUNTER — HOSPITAL ENCOUNTER (INPATIENT)
Age: 70
LOS: 3 days | Discharge: PSYCHIATRIC OR SUBSTANCE USE FACILITY | End: 2025-01-21
Attending: EMERGENCY MEDICINE | Admitting: FAMILY MEDICINE
Payer: COMMERCIAL

## 2025-01-17 ENCOUNTER — APPOINTMENT (OUTPATIENT)
Dept: GENERAL RADIOLOGY | Age: 70
End: 2025-01-17
Payer: COMMERCIAL

## 2025-01-17 DIAGNOSIS — E16.2 HYPOGLYCEMIA: ICD-10-CM

## 2025-01-17 DIAGNOSIS — R60.0 BILATERAL LEG EDEMA: Primary | ICD-10-CM

## 2025-01-17 DIAGNOSIS — R79.89 ELEVATED TROPONIN: ICD-10-CM

## 2025-01-17 DIAGNOSIS — R41.82 ALTERED MENTAL STATUS, UNSPECIFIED ALTERED MENTAL STATUS TYPE: ICD-10-CM

## 2025-01-17 LAB
BASOPHILS # BLD: 0.03 K/UL (ref 0–0.2)
BASOPHILS NFR BLD: 1 % (ref 0–2)
EOSINOPHIL # BLD: 0.11 K/UL (ref 0.05–0.5)
EOSINOPHILS RELATIVE PERCENT: 2 % (ref 0–6)
ERYTHROCYTE [DISTWIDTH] IN BLOOD BY AUTOMATED COUNT: 14.8 % (ref 11.5–15)
GLUCOSE BLD-MCNC: 118 MG/DL (ref 74–99)
GLUCOSE BLD-MCNC: 79 MG/DL (ref 74–99)
HCT VFR BLD AUTO: 39.6 % (ref 34–48)
HGB BLD-MCNC: 12.8 G/DL (ref 11.5–15.5)
IMM GRANULOCYTES # BLD AUTO: <0.03 K/UL (ref 0–0.58)
IMM GRANULOCYTES NFR BLD: 0 % (ref 0–5)
LYMPHOCYTES NFR BLD: 1.4 K/UL (ref 1.5–4)
LYMPHOCYTES RELATIVE PERCENT: 25 % (ref 20–42)
MCH RBC QN AUTO: 29.2 PG (ref 26–35)
MCHC RBC AUTO-ENTMCNC: 32.3 G/DL (ref 32–34.5)
MCV RBC AUTO: 90.4 FL (ref 80–99.9)
MONOCYTES NFR BLD: 0.55 K/UL (ref 0.1–0.95)
MONOCYTES NFR BLD: 10 % (ref 2–12)
NEUTROPHILS NFR BLD: 62 % (ref 43–80)
NEUTS SEG NFR BLD: 3.4 K/UL (ref 1.8–7.3)
PLATELET # BLD AUTO: 266 K/UL (ref 130–450)
PMV BLD AUTO: 9.3 FL (ref 7–12)
RBC # BLD AUTO: 4.38 M/UL (ref 3.5–5.5)
WBC OTHER # BLD: 5.5 K/UL (ref 4.5–11.5)

## 2025-01-17 PROCEDURE — 99285 EMERGENCY DEPT VISIT HI MDM: CPT

## 2025-01-17 PROCEDURE — 82962 GLUCOSE BLOOD TEST: CPT

## 2025-01-17 PROCEDURE — 93970 EXTREMITY STUDY: CPT

## 2025-01-17 PROCEDURE — 83880 ASSAY OF NATRIURETIC PEPTIDE: CPT

## 2025-01-17 PROCEDURE — 84484 ASSAY OF TROPONIN QUANT: CPT

## 2025-01-17 PROCEDURE — 85025 COMPLETE CBC W/AUTO DIFF WBC: CPT

## 2025-01-17 PROCEDURE — 80053 COMPREHEN METABOLIC PANEL: CPT

## 2025-01-17 PROCEDURE — 71045 X-RAY EXAM CHEST 1 VIEW: CPT

## 2025-01-17 PROCEDURE — 93005 ELECTROCARDIOGRAM TRACING: CPT | Performed by: EMERGENCY MEDICINE

## 2025-01-18 ENCOUNTER — APPOINTMENT (OUTPATIENT)
Dept: CT IMAGING | Age: 70
End: 2025-01-18
Attending: EMERGENCY MEDICINE
Payer: COMMERCIAL

## 2025-01-18 PROBLEM — R60.0 PERIPHERAL EDEMA: Status: ACTIVE | Noted: 2025-01-18

## 2025-01-18 PROBLEM — R01.1 HEART MURMUR: Status: ACTIVE | Noted: 2025-01-18

## 2025-01-18 PROBLEM — R29.6 FREQUENT FALLS: Status: ACTIVE | Noted: 2025-01-18

## 2025-01-18 PROBLEM — R41.82 ALTERED MENTAL STATE: Status: ACTIVE | Noted: 2025-01-18

## 2025-01-18 LAB
25(OH)D3 SERPL-MCNC: 51.8 NG/ML (ref 30–100)
ALBUMIN SERPL-MCNC: 3.9 G/DL (ref 3.5–5.2)
ALBUMIN SERPL-MCNC: 4 G/DL (ref 3.5–5.2)
ALP SERPL-CCNC: 62 U/L (ref 35–104)
ALP SERPL-CCNC: 65 U/L (ref 35–104)
ALT SERPL-CCNC: 18 U/L (ref 0–32)
ALT SERPL-CCNC: 18 U/L (ref 0–32)
AMPHET UR QL SCN: NEGATIVE
ANION GAP SERPL CALCULATED.3IONS-SCNC: 11 MMOL/L (ref 7–16)
ANION GAP SERPL CALCULATED.3IONS-SCNC: 12 MMOL/L (ref 7–16)
APAP SERPL-MCNC: <5 UG/ML (ref 10–30)
AST SERPL-CCNC: 21 U/L (ref 0–31)
AST SERPL-CCNC: 21 U/L (ref 0–31)
B PARAP IS1001 DNA NPH QL NAA+NON-PROBE: NOT DETECTED
B PERT DNA SPEC QL NAA+PROBE: NOT DETECTED
BACTERIA URNS QL MICRO: ABNORMAL
BARBITURATES UR QL SCN: NEGATIVE
BASOPHILS # BLD: 0.01 K/UL (ref 0–0.2)
BASOPHILS NFR BLD: 0 % (ref 0–2)
BENZODIAZ UR QL: NEGATIVE
BILIRUB SERPL-MCNC: 0.4 MG/DL (ref 0–1.2)
BILIRUB SERPL-MCNC: 0.6 MG/DL (ref 0–1.2)
BILIRUB UR QL STRIP: NEGATIVE
BNP SERPL-MCNC: 141 PG/ML (ref 0–125)
BUN SERPL-MCNC: 18 MG/DL (ref 6–23)
BUN SERPL-MCNC: 19 MG/DL (ref 6–23)
BUPRENORPHINE UR QL: NEGATIVE
C PNEUM DNA NPH QL NAA+NON-PROBE: NOT DETECTED
CALCIUM SERPL-MCNC: 9.3 MG/DL (ref 8.6–10.2)
CALCIUM SERPL-MCNC: 9.8 MG/DL (ref 8.6–10.2)
CANNABINOIDS UR QL SCN: NEGATIVE
CHLORIDE SERPL-SCNC: 100 MMOL/L (ref 98–107)
CHLORIDE SERPL-SCNC: 100 MMOL/L (ref 98–107)
CHOLEST SERPL-MCNC: 140 MG/DL
CLARITY UR: CLEAR
CO2 SERPL-SCNC: 25 MMOL/L (ref 22–29)
CO2 SERPL-SCNC: 27 MMOL/L (ref 22–29)
COCAINE UR QL SCN: NEGATIVE
COLOR UR: YELLOW
CREAT SERPL-MCNC: 1.1 MG/DL (ref 0.5–1)
CREAT SERPL-MCNC: 1.1 MG/DL (ref 0.5–1)
EOSINOPHIL # BLD: 0.09 K/UL (ref 0.05–0.5)
EOSINOPHILS RELATIVE PERCENT: 2 % (ref 0–6)
ERYTHROCYTE [DISTWIDTH] IN BLOOD BY AUTOMATED COUNT: 14.9 % (ref 11.5–15)
ETHANOLAMINE SERPL-MCNC: <10 MG/DL (ref 0–0.08)
FENTANYL UR QL: NEGATIVE
FLUAV RNA NPH QL NAA+NON-PROBE: NOT DETECTED
FLUBV RNA NPH QL NAA+NON-PROBE: NOT DETECTED
FOLATE SERPL-MCNC: 16.6 NG/ML (ref 4.8–24.2)
GFR, ESTIMATED: 53 ML/MIN/1.73M2
GFR, ESTIMATED: 54 ML/MIN/1.73M2
GLUCOSE BLD-MCNC: 112 MG/DL (ref 74–99)
GLUCOSE BLD-MCNC: 122 MG/DL (ref 74–99)
GLUCOSE BLD-MCNC: 172 MG/DL (ref 74–99)
GLUCOSE BLD-MCNC: 213 MG/DL (ref 74–99)
GLUCOSE SERPL-MCNC: 124 MG/DL (ref 74–99)
GLUCOSE SERPL-MCNC: 86 MG/DL (ref 74–99)
GLUCOSE UR STRIP-MCNC: >=1000 MG/DL
HADV DNA NPH QL NAA+NON-PROBE: NOT DETECTED
HBA1C MFR BLD: 7.2 % (ref 4–5.6)
HCOV 229E RNA NPH QL NAA+NON-PROBE: NOT DETECTED
HCOV HKU1 RNA NPH QL NAA+NON-PROBE: NOT DETECTED
HCOV NL63 RNA NPH QL NAA+NON-PROBE: NOT DETECTED
HCOV OC43 RNA NPH QL NAA+NON-PROBE: NOT DETECTED
HCT VFR BLD AUTO: 40.2 % (ref 34–48)
HDLC SERPL-MCNC: 69 MG/DL
HGB BLD-MCNC: 12.8 G/DL (ref 11.5–15.5)
HGB UR QL STRIP.AUTO: ABNORMAL
HMPV RNA NPH QL NAA+NON-PROBE: NOT DETECTED
HPIV1 RNA NPH QL NAA+NON-PROBE: NOT DETECTED
HPIV2 RNA NPH QL NAA+NON-PROBE: NOT DETECTED
HPIV3 RNA NPH QL NAA+NON-PROBE: NOT DETECTED
HPIV4 RNA NPH QL NAA+NON-PROBE: NOT DETECTED
IMM GRANULOCYTES # BLD AUTO: <0.03 K/UL (ref 0–0.58)
IMM GRANULOCYTES NFR BLD: 0 % (ref 0–5)
IRON SATN MFR SERPL: 17 % (ref 15–50)
IRON SERPL-MCNC: 58 UG/DL (ref 37–145)
KETONES UR STRIP-MCNC: NEGATIVE MG/DL
LDLC SERPL CALC-MCNC: 59 MG/DL
LEUKOCYTE ESTERASE UR QL STRIP: ABNORMAL
LYMPHOCYTES NFR BLD: 1.18 K/UL (ref 1.5–4)
LYMPHOCYTES RELATIVE PERCENT: 22 % (ref 20–42)
M PNEUMO DNA NPH QL NAA+NON-PROBE: NOT DETECTED
MCH RBC QN AUTO: 28.9 PG (ref 26–35)
MCHC RBC AUTO-ENTMCNC: 31.8 G/DL (ref 32–34.5)
MCV RBC AUTO: 90.7 FL (ref 80–99.9)
METHADONE UR QL: NEGATIVE
MONOCYTES NFR BLD: 0.66 K/UL (ref 0.1–0.95)
MONOCYTES NFR BLD: 12 % (ref 2–12)
NEUTROPHILS NFR BLD: 64 % (ref 43–80)
NEUTS SEG NFR BLD: 3.54 K/UL (ref 1.8–7.3)
NITRITE UR QL STRIP: NEGATIVE
OPIATES UR QL SCN: NEGATIVE
OXYCODONE UR QL SCN: NEGATIVE
PCP UR QL SCN: NEGATIVE
PH UR STRIP: 6 [PH] (ref 5–9)
PLATELET # BLD AUTO: 262 K/UL (ref 130–450)
PMV BLD AUTO: 9.2 FL (ref 7–12)
POTASSIUM SERPL-SCNC: 4.1 MMOL/L (ref 3.5–5)
POTASSIUM SERPL-SCNC: 4.3 MMOL/L (ref 3.5–5)
PROT SERPL-MCNC: 6.7 G/DL (ref 6.4–8.3)
PROT SERPL-MCNC: 7 G/DL (ref 6.4–8.3)
PROT UR STRIP-MCNC: NEGATIVE MG/DL
RBC # BLD AUTO: 4.43 M/UL (ref 3.5–5.5)
RBC #/AREA URNS HPF: ABNORMAL /HPF
RSV RNA NPH QL NAA+NON-PROBE: NOT DETECTED
RV+EV RNA NPH QL NAA+NON-PROBE: NOT DETECTED
SALICYLATES SERPL-MCNC: <0.3 MG/DL (ref 0–30)
SARS-COV-2 RNA NPH QL NAA+NON-PROBE: NOT DETECTED
SODIUM SERPL-SCNC: 137 MMOL/L (ref 132–146)
SODIUM SERPL-SCNC: 138 MMOL/L (ref 132–146)
SP GR UR STRIP: 1.01 (ref 1–1.03)
SPECIMEN DESCRIPTION: NORMAL
TEST INFORMATION: NORMAL
TIBC SERPL-MCNC: 334 UG/DL (ref 250–450)
TOXIC TRICYCLIC SC,BLOOD: NEGATIVE
TRIGL SERPL-MCNC: 58 MG/DL
TROPONIN I SERPL HS-MCNC: 59 NG/L (ref 0–9)
TROPONIN I SERPL HS-MCNC: 68 NG/L (ref 0–9)
TSH SERPL DL<=0.05 MIU/L-ACNC: 1.78 UIU/ML (ref 0.27–4.2)
UROBILINOGEN UR STRIP-ACNC: 0.2 EU/DL (ref 0–1)
VIT B12 SERPL-MCNC: 379 PG/ML (ref 211–946)
VLDLC SERPL CALC-MCNC: 12 MG/DL
WBC #/AREA URNS HPF: ABNORMAL /HPF
WBC OTHER # BLD: 5.5 K/UL (ref 4.5–11.5)

## 2025-01-18 PROCEDURE — 82746 ASSAY OF FOLIC ACID SERUM: CPT

## 2025-01-18 PROCEDURE — 6370000000 HC RX 637 (ALT 250 FOR IP): Performed by: NURSE PRACTITIONER

## 2025-01-18 PROCEDURE — 99223 1ST HOSP IP/OBS HIGH 75: CPT | Performed by: NURSE PRACTITIONER

## 2025-01-18 PROCEDURE — 82607 VITAMIN B-12: CPT

## 2025-01-18 PROCEDURE — 84443 ASSAY THYROID STIM HORMONE: CPT

## 2025-01-18 PROCEDURE — 83036 HEMOGLOBIN GLYCOSYLATED A1C: CPT

## 2025-01-18 PROCEDURE — G0480 DRUG TEST DEF 1-7 CLASSES: HCPCS

## 2025-01-18 PROCEDURE — 81001 URINALYSIS AUTO W/SCOPE: CPT

## 2025-01-18 PROCEDURE — 6360000002 HC RX W HCPCS: Performed by: STUDENT IN AN ORGANIZED HEALTH CARE EDUCATION/TRAINING PROGRAM

## 2025-01-18 PROCEDURE — 80143 DRUG ASSAY ACETAMINOPHEN: CPT

## 2025-01-18 PROCEDURE — 83540 ASSAY OF IRON: CPT

## 2025-01-18 PROCEDURE — 2500000003 HC RX 250 WO HCPCS: Performed by: NURSE PRACTITIONER

## 2025-01-18 PROCEDURE — 80061 LIPID PANEL: CPT

## 2025-01-18 PROCEDURE — 85025 COMPLETE CBC W/AUTO DIFF WBC: CPT

## 2025-01-18 PROCEDURE — 70450 CT HEAD/BRAIN W/O DYE: CPT

## 2025-01-18 PROCEDURE — 80053 COMPREHEN METABOLIC PANEL: CPT

## 2025-01-18 PROCEDURE — 84484 ASSAY OF TROPONIN QUANT: CPT

## 2025-01-18 PROCEDURE — 2140000000 HC CCU INTERMEDIATE R&B

## 2025-01-18 PROCEDURE — 80179 DRUG ASSAY SALICYLATE: CPT

## 2025-01-18 PROCEDURE — 83550 IRON BINDING TEST: CPT

## 2025-01-18 PROCEDURE — 82306 VITAMIN D 25 HYDROXY: CPT

## 2025-01-18 PROCEDURE — 80307 DRUG TEST PRSMV CHEM ANLYZR: CPT

## 2025-01-18 PROCEDURE — 82962 GLUCOSE BLOOD TEST: CPT

## 2025-01-18 PROCEDURE — 72125 CT NECK SPINE W/O DYE: CPT

## 2025-01-18 PROCEDURE — 0202U NFCT DS 22 TRGT SARS-COV-2: CPT

## 2025-01-18 PROCEDURE — 6360000002 HC RX W HCPCS: Performed by: NURSE PRACTITIONER

## 2025-01-18 RX ORDER — FUROSEMIDE 10 MG/ML
20 INJECTION INTRAMUSCULAR; INTRAVENOUS ONCE
Status: COMPLETED | OUTPATIENT
Start: 2025-01-18 | End: 2025-01-18

## 2025-01-18 RX ORDER — FUROSEMIDE 10 MG/ML
40 INJECTION INTRAMUSCULAR; INTRAVENOUS DAILY
Status: DISCONTINUED | OUTPATIENT
Start: 2025-01-19 | End: 2025-01-19

## 2025-01-18 RX ORDER — POTASSIUM CHLORIDE 7.45 MG/ML
10 INJECTION INTRAVENOUS PRN
Status: DISCONTINUED | OUTPATIENT
Start: 2025-01-18 | End: 2025-01-21 | Stop reason: HOSPADM

## 2025-01-18 RX ORDER — ONDANSETRON 2 MG/ML
4 INJECTION INTRAMUSCULAR; INTRAVENOUS EVERY 6 HOURS PRN
Status: DISCONTINUED | OUTPATIENT
Start: 2025-01-18 | End: 2025-01-21 | Stop reason: HOSPADM

## 2025-01-18 RX ORDER — BRIMONIDINE TARTRATE 2 MG/ML
1 SOLUTION/ DROPS OPHTHALMIC 2 TIMES DAILY
Status: DISCONTINUED | OUTPATIENT
Start: 2025-01-18 | End: 2025-01-21 | Stop reason: HOSPADM

## 2025-01-18 RX ORDER — BENZONATATE 100 MG/1
100 CAPSULE ORAL 3 TIMES DAILY PRN
Status: DISCONTINUED | OUTPATIENT
Start: 2025-01-18 | End: 2025-01-21 | Stop reason: HOSPADM

## 2025-01-18 RX ORDER — PIOGLITAZONE 15 MG/1
45 TABLET ORAL DAILY
Status: DISCONTINUED | OUTPATIENT
Start: 2025-01-18 | End: 2025-01-18

## 2025-01-18 RX ORDER — DEXTROSE MONOHYDRATE 100 MG/ML
INJECTION, SOLUTION INTRAVENOUS CONTINUOUS PRN
Status: DISCONTINUED | OUTPATIENT
Start: 2025-01-18 | End: 2025-01-19 | Stop reason: SDUPTHER

## 2025-01-18 RX ORDER — LATANOPROST 50 UG/ML
1 SOLUTION/ DROPS OPHTHALMIC NIGHTLY
Status: DISCONTINUED | OUTPATIENT
Start: 2025-01-18 | End: 2025-01-21 | Stop reason: HOSPADM

## 2025-01-18 RX ORDER — ATORVASTATIN CALCIUM 40 MG/1
40 TABLET, FILM COATED ORAL DAILY
Status: DISCONTINUED | OUTPATIENT
Start: 2025-01-18 | End: 2025-01-21 | Stop reason: HOSPADM

## 2025-01-18 RX ORDER — ACETAMINOPHEN 650 MG/1
650 SUPPOSITORY RECTAL EVERY 6 HOURS PRN
Status: DISCONTINUED | OUTPATIENT
Start: 2025-01-18 | End: 2025-01-21 | Stop reason: HOSPADM

## 2025-01-18 RX ORDER — MAGNESIUM SULFATE IN WATER 40 MG/ML
2000 INJECTION, SOLUTION INTRAVENOUS PRN
Status: DISCONTINUED | OUTPATIENT
Start: 2025-01-18 | End: 2025-01-21 | Stop reason: HOSPADM

## 2025-01-18 RX ORDER — DEXTROSE MONOHYDRATE 25 G/50ML
12.5 INJECTION, SOLUTION INTRAVENOUS PRN
Status: DISCONTINUED | OUTPATIENT
Start: 2025-01-18 | End: 2025-01-19 | Stop reason: SDUPTHER

## 2025-01-18 RX ORDER — CALCIUM CARBONATE 500 MG/1
500 TABLET, CHEWABLE ORAL 3 TIMES DAILY PRN
Status: DISCONTINUED | OUTPATIENT
Start: 2025-01-18 | End: 2025-01-21 | Stop reason: HOSPADM

## 2025-01-18 RX ORDER — BENZTROPINE MESYLATE 1 MG/1
1 TABLET ORAL NIGHTLY
Status: DISCONTINUED | OUTPATIENT
Start: 2025-01-18 | End: 2025-01-21 | Stop reason: HOSPADM

## 2025-01-18 RX ORDER — GLUCAGON 1 MG/ML
1 KIT INJECTION PRN
Status: DISCONTINUED | OUTPATIENT
Start: 2025-01-18 | End: 2025-01-19 | Stop reason: SDUPTHER

## 2025-01-18 RX ORDER — GLIPIZIDE 5 MG/1
10 TABLET ORAL
Status: DISCONTINUED | OUTPATIENT
Start: 2025-01-19 | End: 2025-01-18

## 2025-01-18 RX ORDER — MECOBALAMIN 5000 MCG
5 TABLET,DISINTEGRATING ORAL EVERY EVENING
Status: DISCONTINUED | OUTPATIENT
Start: 2025-01-18 | End: 2025-01-21 | Stop reason: HOSPADM

## 2025-01-18 RX ORDER — TIMOLOL MALEATE 5 MG/ML
1 SOLUTION/ DROPS OPHTHALMIC DAILY
Status: DISCONTINUED | OUTPATIENT
Start: 2025-01-18 | End: 2025-01-21 | Stop reason: HOSPADM

## 2025-01-18 RX ORDER — MECOBALAMIN 5000 MCG
5 TABLET,DISINTEGRATING ORAL NIGHTLY PRN
Status: DISCONTINUED | OUTPATIENT
Start: 2025-01-18 | End: 2025-01-21 | Stop reason: HOSPADM

## 2025-01-18 RX ORDER — DEXTROSE MONOHYDRATE 25 G/50ML
25 INJECTION, SOLUTION INTRAVENOUS PRN
Status: DISCONTINUED | OUTPATIENT
Start: 2025-01-18 | End: 2025-01-19 | Stop reason: SDUPTHER

## 2025-01-18 RX ORDER — ONDANSETRON 4 MG/1
4 TABLET, ORALLY DISINTEGRATING ORAL EVERY 8 HOURS PRN
Status: DISCONTINUED | OUTPATIENT
Start: 2025-01-18 | End: 2025-01-21 | Stop reason: HOSPADM

## 2025-01-18 RX ORDER — HYDRALAZINE HYDROCHLORIDE 20 MG/ML
10 INJECTION INTRAMUSCULAR; INTRAVENOUS EVERY 6 HOURS PRN
Status: DISCONTINUED | OUTPATIENT
Start: 2025-01-18 | End: 2025-01-21 | Stop reason: HOSPADM

## 2025-01-18 RX ORDER — ALOGLIPTIN 6.25 MG/1
6.25 TABLET, FILM COATED ORAL DAILY
Status: DISCONTINUED | OUTPATIENT
Start: 2025-01-18 | End: 2025-01-18

## 2025-01-18 RX ORDER — POLYETHYLENE GLYCOL 3350 17 G/17G
17 POWDER, FOR SOLUTION ORAL DAILY PRN
Status: DISCONTINUED | OUTPATIENT
Start: 2025-01-18 | End: 2025-01-21 | Stop reason: HOSPADM

## 2025-01-18 RX ORDER — INSULIN LISPRO 100 [IU]/ML
0-4 INJECTION, SOLUTION INTRAVENOUS; SUBCUTANEOUS
Status: DISCONTINUED | OUTPATIENT
Start: 2025-01-18 | End: 2025-01-21 | Stop reason: HOSPADM

## 2025-01-18 RX ORDER — SODIUM CHLORIDE 0.9 % (FLUSH) 0.9 %
5-40 SYRINGE (ML) INJECTION EVERY 12 HOURS SCHEDULED
Status: DISCONTINUED | OUTPATIENT
Start: 2025-01-18 | End: 2025-01-21 | Stop reason: HOSPADM

## 2025-01-18 RX ORDER — METOPROLOL TARTRATE 25 MG/1
25 TABLET, FILM COATED ORAL 2 TIMES DAILY
Status: DISCONTINUED | OUTPATIENT
Start: 2025-01-18 | End: 2025-01-21 | Stop reason: HOSPADM

## 2025-01-18 RX ORDER — SODIUM CHLORIDE 0.9 % (FLUSH) 0.9 %
5-40 SYRINGE (ML) INJECTION PRN
Status: DISCONTINUED | OUTPATIENT
Start: 2025-01-18 | End: 2025-01-21 | Stop reason: HOSPADM

## 2025-01-18 RX ORDER — ENOXAPARIN SODIUM 100 MG/ML
40 INJECTION SUBCUTANEOUS DAILY
Status: DISCONTINUED | OUTPATIENT
Start: 2025-01-18 | End: 2025-01-21 | Stop reason: HOSPADM

## 2025-01-18 RX ORDER — SODIUM CHLORIDE 9 MG/ML
INJECTION, SOLUTION INTRAVENOUS PRN
Status: DISCONTINUED | OUTPATIENT
Start: 2025-01-18 | End: 2025-01-21 | Stop reason: HOSPADM

## 2025-01-18 RX ORDER — ACETAMINOPHEN 325 MG/1
650 TABLET ORAL EVERY 6 HOURS PRN
Status: DISCONTINUED | OUTPATIENT
Start: 2025-01-18 | End: 2025-01-21 | Stop reason: HOSPADM

## 2025-01-18 RX ORDER — LABETALOL 100 MG/1
200 TABLET, FILM COATED ORAL EVERY 8 HOURS SCHEDULED
Status: DISCONTINUED | OUTPATIENT
Start: 2025-01-18 | End: 2025-01-18

## 2025-01-18 RX ORDER — OLANZAPINE 5 MG/1
20 TABLET ORAL NIGHTLY
Status: DISCONTINUED | OUTPATIENT
Start: 2025-01-18 | End: 2025-01-21 | Stop reason: HOSPADM

## 2025-01-18 RX ORDER — POTASSIUM CHLORIDE 1500 MG/1
40 TABLET, EXTENDED RELEASE ORAL PRN
Status: DISCONTINUED | OUTPATIENT
Start: 2025-01-18 | End: 2025-01-21 | Stop reason: HOSPADM

## 2025-01-18 RX ADMIN — FUROSEMIDE 20 MG: 10 INJECTION, SOLUTION INTRAMUSCULAR; INTRAVENOUS at 10:18

## 2025-01-18 RX ADMIN — METOPROLOL TARTRATE 25 MG: 25 TABLET, FILM COATED ORAL at 10:18

## 2025-01-18 RX ADMIN — Medication 5 MG: at 17:29

## 2025-01-18 RX ADMIN — INSULIN LISPRO 1 UNITS: 100 INJECTION, SOLUTION INTRAVENOUS; SUBCUTANEOUS at 17:29

## 2025-01-18 RX ADMIN — WATER 1000 MG: 1 INJECTION INTRAMUSCULAR; INTRAVENOUS; SUBCUTANEOUS at 11:28

## 2025-01-18 RX ADMIN — ENOXAPARIN SODIUM 40 MG: 100 INJECTION SUBCUTANEOUS at 10:18

## 2025-01-18 RX ADMIN — EMPAGLIFLOZIN 10 MG: 10 TABLET, FILM COATED ORAL at 11:28

## 2025-01-18 ASSESSMENT — LIFESTYLE VARIABLES
HOW MANY STANDARD DRINKS CONTAINING ALCOHOL DO YOU HAVE ON A TYPICAL DAY: PATIENT DOES NOT DRINK
HOW OFTEN DO YOU HAVE A DRINK CONTAINING ALCOHOL: NEVER

## 2025-01-18 NOTE — ED PROVIDER NOTES
HPI:  1/17/25, Time: 8:09 PM KAREN Kunz is a 69 y.o. female history of gout history of Bell's palsy bipolar disease history of carpal tunnel hyperlipidemia diabetes sepsis systemic arterial hypertension presenting to the ED for edema bilaterally, beginning 1 day ago.  The complaint has been persistent, moderate in severity, and worsened by nothing.  Patient reporting edema that started 1 day.  Patient reporting no chest pain or shortness of breath she reports abdominal pain or vomiting she reports no fall or injury.  Patient reporting no fever.  Patient reporting no productive cough she reports no headache or syncopal event.  Patient was reportedly also having low blood sugar.  Patient was given oral glucose prior to arrival.    ROS:   Pertinent positives and negatives are stated within HPI, all other systems reviewed and are negative.  --------------------------------------------- PAST HISTORY ---------------------------------------------  Past Medical History:  has a past medical history of Acute gouty arthritis, Bell's palsy, Bipolar disorder (HCC), Carpal tunnel syndrome, Hyperlipidemia, Non-insulin dependent type 2 diabetes mellitus (HCC), Sepsis (HCC), Systemic primary arterial hypertension, and Type 2 diabetes with peripheral circulatory disorder, controlled (HCC).    Past Surgical History:  has a past surgical history that includes Echo Complete (06/22/2013); Vagina surgery; and Colonoscopy.    Social History:  reports that she has never smoked. She has never used smokeless tobacco. She reports that she does not drink alcohol and does not use drugs.    Family History: family history includes Heart Disease (age of onset: 68) in her father; High Blood Pressure in her father; No Known Problems in her brother; Other in her mother.     The patient’s home medications have been reviewed.    Allergies: Ace inhibitors, Ibuprofen, Latuda [lurasidone hcl], Lurasidone, Divalproex sodium, and

## 2025-01-18 NOTE — PROGRESS NOTES
Attempt verify patient's home medications with her, Patient handed me a bottle of Pioglitazone 45mg prescribed to her, but stated she doesn't think she takes that one anymore because it makes her gain weight.   Questioned why patient is carrying that bottle with her but no other medications, and patient was unsure.     Call placed to [facility] pharmacy, they are currently closed, will attempt to call to verify medications at later time.     Chantal Skinner RN, BSN

## 2025-01-18 NOTE — H&P
Hospitalist History & Physical      PCP: Sheryl Wade DO    Date of Service: Pt seen/examined on 1/18/2025     Chief Complaint:  had concerns including Hypoglycemia (BGL 63 for EMS. Pt received oral glucose gel.  during triage).    History of Present Illness:    Ms. Ana Paula Kunz, a 69 y.o. year old female  who  has a past medical history of Acute gouty arthritis, Bell's palsy, Bipolar disorder ([facility]), Carpal tunnel syndrome, Hyperlipidemia, Non-insulin dependent type 2 diabetes mellitus ([facility]), Sepsis ([facility]), Systemic primary arterial hypertension, and Type 2 diabetes with peripheral circulatory disorder, controlled ([facility]).     Patient presented to the ED with complaints of peripheral edema that began 1 day prior to arrival.  Per family patient has not been herself--making nonsensical statements, not taking her medications correctly, frequent falls.  The patient denies this stating her family always says things like that and they need to mind their own business.  She reports compliance with her medications and reports no recent falls.  She reports she follows regularly with Dr. Nunez, podiatry, ophthalmology, and psychiatry.  She did miss her psychiatry appointment this month because the weather was bad.  She reports she did not run out of any of her medications is taking all of them as prescribed.  She denies any chest pain, palpitations, WELCH.  She does note some orthopnea and reports she sleeps with 3 pillows.  She denies any abdominal pain, fevers, chills.  She reports history of Bell's palsy with a residual facial droop but otherwise no unilateral deficits or paresthesia    ER COURSE:  In ED vitals were stable.  Laboratory workup significant for renal insufficiency at apparent baseline, elevated troponin.  CXR reviewed and without any obvious infiltrate, effusion, vascular congestion.  CTh without acute abnormality.  CT C-spine with degenerative changes but no acute fracture.  US of BLE without

## 2025-01-18 NOTE — PROGRESS NOTES
Nurse to nurse report called to [id number].     Transport requested via bedwatch     Chantal Skinner RN, BSN

## 2025-01-18 NOTE — ED NOTES
Pt never placed in [facility] per transport, pt taken directly to US off ems cot. This RN called US and verified pt in US at this time

## 2025-01-19 PROBLEM — Z86.59 HISTORY OF BIPOLAR DISORDER: Status: ACTIVE | Noted: 2025-01-19

## 2025-01-19 PROBLEM — R41.0 DELIRIUM: Status: ACTIVE | Noted: 2025-01-19

## 2025-01-19 LAB
ANION GAP SERPL CALCULATED.3IONS-SCNC: 12 MMOL/L (ref 7–16)
BUN SERPL-MCNC: 22 MG/DL (ref 6–23)
CALCIUM SERPL-MCNC: 9.2 MG/DL (ref 8.6–10.2)
CHLORIDE SERPL-SCNC: 103 MMOL/L (ref 98–107)
CO2 SERPL-SCNC: 25 MMOL/L (ref 22–29)
CREAT SERPL-MCNC: 1.1 MG/DL (ref 0.5–1)
ERYTHROCYTE [DISTWIDTH] IN BLOOD BY AUTOMATED COUNT: 15 % (ref 11.5–15)
GFR, ESTIMATED: 56 ML/MIN/1.73M2
GLUCOSE BLD-MCNC: 105 MG/DL (ref 74–99)
GLUCOSE BLD-MCNC: 150 MG/DL (ref 74–99)
GLUCOSE BLD-MCNC: 155 MG/DL (ref 74–99)
GLUCOSE SERPL-MCNC: 73 MG/DL (ref 74–99)
HCT VFR BLD AUTO: 38.6 % (ref 34–48)
HGB BLD-MCNC: 12.4 G/DL (ref 11.5–15.5)
MAGNESIUM SERPL-MCNC: 2.2 MG/DL (ref 1.6–2.6)
MCH RBC QN AUTO: 28.9 PG (ref 26–35)
MCHC RBC AUTO-ENTMCNC: 32.1 G/DL (ref 32–34.5)
MCV RBC AUTO: 90 FL (ref 80–99.9)
PHOSPHATE SERPL-MCNC: 4.4 MG/DL (ref 2.5–4.5)
PLATELET # BLD AUTO: 263 K/UL (ref 130–450)
PMV BLD AUTO: 9.4 FL (ref 7–12)
POTASSIUM SERPL-SCNC: 4.1 MMOL/L (ref 3.5–5)
RBC # BLD AUTO: 4.29 M/UL (ref 3.5–5.5)
SODIUM SERPL-SCNC: 140 MMOL/L (ref 132–146)
WBC OTHER # BLD: 5.3 K/UL (ref 4.5–11.5)

## 2025-01-19 PROCEDURE — 80048 BASIC METABOLIC PNL TOTAL CA: CPT

## 2025-01-19 PROCEDURE — 6370000000 HC RX 637 (ALT 250 FOR IP): Performed by: NURSE PRACTITIONER

## 2025-01-19 PROCEDURE — 82962 GLUCOSE BLOOD TEST: CPT

## 2025-01-19 PROCEDURE — 85027 COMPLETE CBC AUTOMATED: CPT

## 2025-01-19 PROCEDURE — 83735 ASSAY OF MAGNESIUM: CPT

## 2025-01-19 PROCEDURE — 99221 1ST HOSP IP/OBS SF/LOW 40: CPT

## 2025-01-19 PROCEDURE — 2500000003 HC RX 250 WO HCPCS

## 2025-01-19 PROCEDURE — 6360000002 HC RX W HCPCS

## 2025-01-19 PROCEDURE — 2140000000 HC CCU INTERMEDIATE R&B

## 2025-01-19 PROCEDURE — 99233 SBSQ HOSP IP/OBS HIGH 50: CPT | Performed by: INTERNAL MEDICINE

## 2025-01-19 PROCEDURE — 2500000003 HC RX 250 WO HCPCS: Performed by: NURSE PRACTITIONER

## 2025-01-19 PROCEDURE — 6360000002 HC RX W HCPCS: Performed by: NURSE PRACTITIONER

## 2025-01-19 PROCEDURE — 84100 ASSAY OF PHOSPHORUS: CPT

## 2025-01-19 PROCEDURE — 36415 COLL VENOUS BLD VENIPUNCTURE: CPT

## 2025-01-19 PROCEDURE — 6360000002 HC RX W HCPCS: Performed by: STUDENT IN AN ORGANIZED HEALTH CARE EDUCATION/TRAINING PROGRAM

## 2025-01-19 RX ORDER — GLUCAGON 1 MG/ML
1 KIT INJECTION PRN
Status: DISCONTINUED | OUTPATIENT
Start: 2025-01-19 | End: 2025-01-21 | Stop reason: HOSPADM

## 2025-01-19 RX ORDER — DEXTROSE MONOHYDRATE 100 MG/ML
INJECTION, SOLUTION INTRAVENOUS CONTINUOUS PRN
Status: DISCONTINUED | OUTPATIENT
Start: 2025-01-19 | End: 2025-01-21 | Stop reason: HOSPADM

## 2025-01-19 RX ADMIN — EMPAGLIFLOZIN 10 MG: 10 TABLET, FILM COATED ORAL at 09:45

## 2025-01-19 RX ADMIN — METOPROLOL TARTRATE 25 MG: 25 TABLET, FILM COATED ORAL at 01:11

## 2025-01-19 RX ADMIN — WATER 1000 MG: 1 INJECTION INTRAMUSCULAR; INTRAVENOUS; SUBCUTANEOUS at 09:45

## 2025-01-19 RX ADMIN — Medication 5 MG: at 17:58

## 2025-01-19 RX ADMIN — FUROSEMIDE 40 MG: 10 INJECTION, SOLUTION INTRAMUSCULAR; INTRAVENOUS at 09:45

## 2025-01-19 RX ADMIN — Medication 5 MG: at 01:11

## 2025-01-19 RX ADMIN — ATORVASTATIN CALCIUM 40 MG: 40 TABLET, FILM COATED ORAL at 01:11

## 2025-01-19 RX ADMIN — ENOXAPARIN SODIUM 40 MG: 100 INJECTION SUBCUTANEOUS at 09:45

## 2025-01-19 RX ADMIN — BENZTROPINE MESYLATE 1 MG: 1 TABLET ORAL at 01:12

## 2025-01-19 RX ADMIN — SODIUM CHLORIDE, PRESERVATIVE FREE 10 ML: 5 INJECTION INTRAVENOUS at 10:00

## 2025-01-19 RX ADMIN — OLANZAPINE 20 MG: 5 TABLET, FILM COATED ORAL at 01:13

## 2025-01-19 RX ADMIN — METOPROLOL TARTRATE 25 MG: 25 TABLET, FILM COATED ORAL at 09:45

## 2025-01-19 RX ADMIN — SODIUM CHLORIDE, PRESERVATIVE FREE 10 ML: 5 INJECTION INTRAVENOUS at 09:46

## 2025-01-19 RX ADMIN — ZIPRASIDONE MESYLATE 20 MG: 20 INJECTION, POWDER, LYOPHILIZED, FOR SOLUTION INTRAMUSCULAR at 04:00

## 2025-01-19 NOTE — CONSULTS
psychiatric history:   Patient has a history of multiple inpatient psychiatric hospitalizations last hospitalized here in June 2012.  She follows outpatient at [facility] has seen Dr. Angel in the past.  She denies ever attempting suicide she has been on Risperdal Consta in the past, Invega, Invega, Depakote .     Substance abuse history:   Patient denies any drug or alcohol abuse her urine drug screen blood alcohol level were negative on admission     Legal history:   Patient denies any legal history     Personal family and social history:   Patient was raised in [place] she graduated high school, she has never been  she has 1 grown child.  She is not currently working and is on Social Security disability she currently lives with her mother.  She denies access to any guns.    Past Medical History:        Diagnosis Date    Acute gouty arthritis 06/09/2020    Bell's palsy     Bipolar disorder ([facility])     bipolar    Carpal tunnel syndrome 08/04/2016    Hyperlipidemia     Non-insulin dependent type 2 diabetes mellitus ([facility])     Sepsis ([facility]) 12/06/2019    Systemic primary arterial hypertension 08/04/2016    Type 2 diabetes with peripheral circulatory disorder, controlled ([facility]) 05/25/2023       Past Surgical History:        Procedure Laterality Date    COLONOSCOPY      ECHO COMPLETE  06/22/2013         VAGINA SURGERY         Medications Prior to Admission:   Medications Prior to Admission: glimepiride (AMARYL) 4 MG tablet, TAKE 1 TABLET BY MOUTH EVERY DAY BEFORE BREAKFAST  metFORMIN (GLUCOPHAGE) 1000 MG tablet, TAKE 1 TABLET BY MOUTH TWICE A DAY WITH BREAKFAST AND EVENING MEAL  labetalol (NORMODYNE) 200 MG tablet, TAKE 1 TABLET BY MOUTH EVERY 8 HOURS  diclofenac sodium (VOLTAREN) 1 % GEL, Apply 4 g topically 4 times daily  atorvastatin (LIPITOR) 40 MG tablet, TAKE 1 TABLET BY MOUTH EVERY DAY  empagliflozin (JARDIANCE) 25 MG tablet, Take 1 tablet by mouth every morning  vitamin D (ERGOCALCIFEROL) 1.25 MG  osseous spinal canal stenosis.  Multilevel degenerative changes seen throughout the cervical spine with anterior spurring and bridging osteophyte formation identified.  Minimal degenerative changes seen within the posterior facets.  Posterior osteophyte formation seen of the cervicothoracic spine.  Degenerative changes seen in the C1/C2 articulation. SOFT TISSUES: There is no prevertebral soft tissue swelling.  Vascular calcifications seen in the carotid bifurcations bilaterally.  Lung apices are clear.     No acute fracture or traumatic malalignment of the cervical spine. Multilevel degenerative changes seen throughout the cervical spine.     CT HEAD WO CONTRAST    Result Date: 1/18/2025  EXAMINATION: CT OF THE HEAD WITHOUT CONTRAST  1/18/2025 8:09 am TECHNIQUE: CT of the head was performed without the administration of intravenous contrast. Automated exposure control, iterative reconstruction, and/or weight based adjustment of the mA/kV was utilized to reduce the radiation dose to as low as reasonably achievable. COMPARISON: None. HISTORY: ORDERING SYSTEM PROVIDED HISTORY: Evaluate intracranial abnormality TECHNOLOGIST PROVIDED HISTORY: Has a \"code stroke\" or \"stroke alert\" been called?->No Reason for exam:->Evaluate intracranial abnormality Decision Support Exception - unselect if not a suspected or confirmed emergency medical condition->Emergency Medical Condition (MA) What reading provider will be dictating this exam?->CRC FINDINGS: BRAIN/VENTRICLES: Generalized atrophy identified the brain.  Low-attenuation areas seen in the periventricular and subcortical white matter to suggest chronic small vessel ischemic change.  There is no acute intracranial hemorrhage, mass effect or midline shift.  No abnormal extra-axial fluid collection.  The gray-white differentiation is maintained without evidence of an acute infarct.  There is no evidence of hydrocephalus. ORBITS: The visualized portion of the orbits demonstrate  no acute abnormality. SINUSES: The visualized paranasal sinuses and mastoid air cells demonstrate no acute abnormality. SOFT TISSUES/SKULL:  No acute abnormality of the visualized skull or soft tissues.     Atrophy and chronic changes seen within the brain with no acute intracranial abnormality.     XR CHEST PORTABLE    Result Date: 1/17/2025  EXAMINATION: ONE XRAY VIEW OF THE CHEST 1/17/2025 10:41 pm COMPARISON: 12/06/2019 HISTORY: ORDERING SYSTEM PROVIDED HISTORY: edema TECHNOLOGIST PROVIDED HISTORY: Reason for exam:->edema FINDINGS: The lungs are without acute focal process.  Bibasilar atelectasis.  There is no effusion or pneumothorax. The cardiomediastinal silhouette is without acute process. The osseous structures are without acute process.     No acute process.     Vascular duplex lower extremity venous bilateral    Result Date: 1/17/2025  EXAMINATION: DUPLEX VENOUS ULTRASOUND OF THE BILATERAL LOWER EXTREMITIES1/17/2025 9:35 pm TECHNIQUE: Duplex ultrasound using B-mode/gray scaled imaging and Doppler spectral analysis and color flow was obtained of the deep venous structures of the bilateral extremities. COMPARISON: None. HISTORY: ORDERING SYSTEM PROVIDED HISTORY: leg edema TECHNOLOGIST PROVIDED HISTORY: What reading provider will be dictating this exam?->CRC FINDINGS: The visualized veins of the bilateral lower extremities are patent and free of echogenic thrombus. The veins demonstrate good compressibility with normal color flow study and spectral analysis.     No evidence of DVT in either lower extremity.           RECOMMENDATIONS    Patient was seen and reassessed at bedside along with medical director, labs and medical record reviewed.  Patient recognized medical director from previous admissions as patient is well-known to our services.  She adamantly denies any suicidal ideation intent or plan, denies homicidal ideation intent or plan, denies auditory visual hallucinations, denies any paranoia.  No

## 2025-01-19 NOTE — PROGRESS NOTES
Pt downgraded to med/surg. Patient updated and telemetry removed.    Also offered PRN medications for constipation. Pt states she just had a BM and does not want either PRN medication.

## 2025-01-19 NOTE — PROGRESS NOTES
[facility] Hospitalist Progress Note    Admitting Date and Time: 1/17/2025  8:37 PM  Admit Dx: Altered mental state [R41.82]  Hypoglycemia [E16.2]  Elevated troponin [R79.89]  Bilateral leg edema [R60.0]  Altered mental status, unspecified altered mental status type [R41.82]  Ms. Ana Paula Kunz, a 69 y.o. year old female  who  has a past medical history of Acute gouty arthritis, Bell's palsy, Bipolar disorder ([facility]), Carpal tunnel syndrome, Hyperlipidemia, Non-insulin dependent type 2 diabetes mellitus ([facility]), Sepsis ([facility]), Systemic primary arterial hypertension, and Type 2 diabetes with peripheral circulatory disorder, controlled ([facility]).      Patient presented to the ED with complaints of peripheral edema that began 1 day prior to arrival.  Per family patient has not been herself--making nonsensical statements, not taking her medications correctly, frequent falls.  The patient denies this stating her family always says things like that and they need to mind their own business.  She reports compliance with her medications and reports no recent falls.  She reports she follows regularly with Dr. Nunez, podiatry, ophthalmology, and psychiatry.  She did miss her psychiatry appointment this month because the weather was bad.  She reports she did not run out of any of her medications is taking all of them as prescribed.  She denies any chest pain, palpitations, WELCH.  She does note some orthopnea and reports she sleeps with 3 pillows.  She denies any abdominal pain, fevers, chills.  She reports history of Bell's palsy with a residual facial droop but otherwise no unilateral deficits or paresthesia     ER COURSE:  In ED vitals were stable.  Laboratory workup significant for renal insufficiency at apparent baseline, elevated troponin.  CXR reviewed and without any obvious infiltrate, effusion, vascular congestion.  CTh without acute abnormality.  CT C-spine with degenerative changes but no acute fracture.  US of BLE  85.2 kg (187 lb 13.3 oz)   SpO2 97%   BMI 32.24 kg/m²     General Appearance: alert and oriented to person, place and time and in no acute distress  Skin: warm and dry  Head: normocephalic and atraumatic  Eyes: pupils equal, round, and reactive to light, extraocular eye movements intact, conjunctivae normal  Neck: neck supple and non tender without mass   Pulmonary/Chest: clear to auscultation bilaterally- no wheezes, rales or rhonchi, normal air movement, no respiratory distress  Cardiovascular: normal rate, normal S1 and S2 and no carotid bruits  Abdomen: soft, non-tender, non-distended, normal bowel sounds, no masses or organomegaly  Extremities: no cyanosis, no clubbing and no edema  Neurologic: no cranial nerve deficit and speech normal        Recent Labs     01/17/25  2306 01/18/25  1017 01/19/25  0724    138 140   K 4.1 4.3 4.1    100 103   CO2 25 27 25   BUN 19 18 22   CREATININE 1.1* 1.1* 1.1*   GLUCOSE 86 124* 73*   CALCIUM 9.3 9.8 9.2       Recent Labs     01/17/25  2306 01/18/25  1017 01/19/25  0724   WBC 5.5 5.5 5.3   RBC 4.38 4.43 4.29   HGB 12.8 12.8 12.4   HCT 39.6 40.2 38.6   MCV 90.4 90.7 90.0   MCH 29.2 28.9 28.9   MCHC 32.3 31.8* 32.1   RDW 14.8 14.9 15.0    262 263   MPV 9.3 9.2 9.4       Labs and imaging reviewed    Assessment:    Principal Problem:    Altered mental state  Active Problems:    Schizoaffective disorder (HCC)    Frequent falls    Peripheral edema    Heart murmur  Resolved Problems:    * No resolved hospital problems. *      Plan:  Continue Rocephin for UTI, follow urine culture  Continue home Cogentin and Zyprexa, psychiatry consulted and pending.  Received Geodon overnight, calm this morning.  Elevated troponin, heart murmur, lower extremity edema, ultrasound negative for DVT, echo pending.  Continue other home medications  Vital signs stable  DC IV Lasix    CODE STATUS: Full code  DVT prophylaxis: Lovenox    Discharge planning, pending clinical

## 2025-01-20 ENCOUNTER — APPOINTMENT (OUTPATIENT)
Age: 70
End: 2025-01-20
Payer: COMMERCIAL

## 2025-01-20 LAB
ANION GAP SERPL CALCULATED.3IONS-SCNC: 14 MMOL/L (ref 7–16)
BUN SERPL-MCNC: 23 MG/DL (ref 6–23)
CALCIUM SERPL-MCNC: 9.3 MG/DL (ref 8.6–10.2)
CHLORIDE SERPL-SCNC: 101 MMOL/L (ref 98–107)
CO2 SERPL-SCNC: 23 MMOL/L (ref 22–29)
CREAT SERPL-MCNC: 1 MG/DL (ref 0.5–1)
EKG ATRIAL RATE: 90 BPM
EKG P AXIS: 67 DEGREES
EKG P-R INTERVAL: 170 MS
EKG Q-T INTERVAL: 372 MS
EKG QRS DURATION: 82 MS
EKG QTC CALCULATION (BAZETT): 455 MS
EKG R AXIS: 26 DEGREES
EKG T AXIS: 59 DEGREES
EKG VENTRICULAR RATE: 90 BPM
GFR, ESTIMATED: 60 ML/MIN/1.73M2
GLUCOSE BLD-MCNC: 118 MG/DL (ref 74–99)
GLUCOSE BLD-MCNC: 129 MG/DL (ref 74–99)
GLUCOSE BLD-MCNC: 192 MG/DL (ref 74–99)
GLUCOSE BLD-MCNC: 363 MG/DL (ref 74–99)
GLUCOSE SERPL-MCNC: 200 MG/DL (ref 74–99)
POTASSIUM SERPL-SCNC: 3.7 MMOL/L (ref 3.5–5)
SODIUM SERPL-SCNC: 138 MMOL/L (ref 132–146)

## 2025-01-20 PROCEDURE — 6360000002 HC RX W HCPCS: Performed by: NURSE PRACTITIONER

## 2025-01-20 PROCEDURE — 99232 SBSQ HOSP IP/OBS MODERATE 35: CPT | Performed by: STUDENT IN AN ORGANIZED HEALTH CARE EDUCATION/TRAINING PROGRAM

## 2025-01-20 PROCEDURE — 97161 PT EVAL LOW COMPLEX 20 MIN: CPT

## 2025-01-20 PROCEDURE — 97535 SELF CARE MNGMENT TRAINING: CPT

## 2025-01-20 PROCEDURE — 6370000000 HC RX 637 (ALT 250 FOR IP): Performed by: NURSE PRACTITIONER

## 2025-01-20 PROCEDURE — 2140000000 HC CCU INTERMEDIATE R&B

## 2025-01-20 PROCEDURE — 93010 ELECTROCARDIOGRAM REPORT: CPT | Performed by: INTERNAL MEDICINE

## 2025-01-20 PROCEDURE — 82962 GLUCOSE BLOOD TEST: CPT

## 2025-01-20 PROCEDURE — 2500000003 HC RX 250 WO HCPCS: Performed by: NURSE PRACTITIONER

## 2025-01-20 PROCEDURE — 97530 THERAPEUTIC ACTIVITIES: CPT

## 2025-01-20 PROCEDURE — 6370000000 HC RX 637 (ALT 250 FOR IP): Performed by: STUDENT IN AN ORGANIZED HEALTH CARE EDUCATION/TRAINING PROGRAM

## 2025-01-20 PROCEDURE — 36415 COLL VENOUS BLD VENIPUNCTURE: CPT

## 2025-01-20 PROCEDURE — 80048 BASIC METABOLIC PNL TOTAL CA: CPT

## 2025-01-20 PROCEDURE — 97165 OT EVAL LOW COMPLEX 30 MIN: CPT

## 2025-01-20 PROCEDURE — 6360000002 HC RX W HCPCS: Performed by: STUDENT IN AN ORGANIZED HEALTH CARE EDUCATION/TRAINING PROGRAM

## 2025-01-20 RX ORDER — HALOPERIDOL 5 MG/ML
1 INJECTION INTRAMUSCULAR ONCE
Status: COMPLETED | OUTPATIENT
Start: 2025-01-20 | End: 2025-01-20

## 2025-01-20 RX ORDER — INSULIN GLARGINE 100 [IU]/ML
6 INJECTION, SOLUTION SUBCUTANEOUS NIGHTLY
Status: DISCONTINUED | OUTPATIENT
Start: 2025-01-20 | End: 2025-01-21 | Stop reason: HOSPADM

## 2025-01-20 RX ORDER — HALOPERIDOL 5 MG/ML
2 INJECTION INTRAMUSCULAR EVERY 4 HOURS PRN
Status: DISCONTINUED | OUTPATIENT
Start: 2025-01-20 | End: 2025-01-21 | Stop reason: HOSPADM

## 2025-01-20 RX ORDER — HALOPERIDOL 5 MG/ML
2 INJECTION INTRAMUSCULAR EVERY 4 HOURS PRN
Status: DISCONTINUED | OUTPATIENT
Start: 2025-01-20 | End: 2025-01-20

## 2025-01-20 RX ADMIN — WATER 1000 MG: 1 INJECTION INTRAMUSCULAR; INTRAVENOUS; SUBCUTANEOUS at 11:59

## 2025-01-20 RX ADMIN — EMPAGLIFLOZIN 10 MG: 10 TABLET, FILM COATED ORAL at 08:39

## 2025-01-20 RX ADMIN — METOPROLOL TARTRATE 25 MG: 25 TABLET, FILM COATED ORAL at 20:20

## 2025-01-20 RX ADMIN — SODIUM CHLORIDE, PRESERVATIVE FREE 10 ML: 5 INJECTION INTRAVENOUS at 01:58

## 2025-01-20 RX ADMIN — OLANZAPINE 20 MG: 5 TABLET, FILM COATED ORAL at 20:19

## 2025-01-20 RX ADMIN — HALOPERIDOL LACTATE 2 MG: 5 INJECTION, SOLUTION INTRAMUSCULAR at 23:04

## 2025-01-20 RX ADMIN — BRIMONIDINE TARTRATE 1 DROP: 2 SOLUTION/ DROPS OPHTHALMIC at 08:39

## 2025-01-20 RX ADMIN — Medication 5 MG: at 20:22

## 2025-01-20 RX ADMIN — HALOPERIDOL LACTATE 1 MG: 5 INJECTION, SOLUTION INTRAMUSCULAR at 16:07

## 2025-01-20 RX ADMIN — BENZTROPINE MESYLATE 1 MG: 1 TABLET ORAL at 20:20

## 2025-01-20 RX ADMIN — BENZTROPINE MESYLATE 1 MG: 1 TABLET ORAL at 01:58

## 2025-01-20 RX ADMIN — ENOXAPARIN SODIUM 40 MG: 100 INJECTION SUBCUTANEOUS at 08:39

## 2025-01-20 RX ADMIN — Medication 5 MG: at 01:58

## 2025-01-20 RX ADMIN — INSULIN LISPRO 4 UNITS: 100 INJECTION, SOLUTION INTRAVENOUS; SUBCUTANEOUS at 16:55

## 2025-01-20 RX ADMIN — OLANZAPINE 20 MG: 5 TABLET, FILM COATED ORAL at 01:57

## 2025-01-20 RX ADMIN — SODIUM CHLORIDE, PRESERVATIVE FREE 10 ML: 5 INJECTION INTRAVENOUS at 08:39

## 2025-01-20 RX ADMIN — ATORVASTATIN CALCIUM 40 MG: 40 TABLET, FILM COATED ORAL at 01:58

## 2025-01-20 RX ADMIN — INSULIN LISPRO 1 UNITS: 100 INJECTION, SOLUTION INTRAVENOUS; SUBCUTANEOUS at 12:29

## 2025-01-20 RX ADMIN — METOPROLOL TARTRATE 25 MG: 25 TABLET, FILM COATED ORAL at 01:57

## 2025-01-20 RX ADMIN — Medication 5 MG: at 16:56

## 2025-01-20 RX ADMIN — LATANOPROST 1 DROP: 50 SOLUTION OPHTHALMIC at 22:41

## 2025-01-20 RX ADMIN — HALOPERIDOL LACTATE 2 MG: 5 INJECTION, SOLUTION INTRAMUSCULAR at 18:48

## 2025-01-20 RX ADMIN — BRIMONIDINE TARTRATE 1 DROP: 2 SOLUTION/ DROPS OPHTHALMIC at 22:41

## 2025-01-20 RX ADMIN — METOPROLOL TARTRATE 25 MG: 25 TABLET, FILM COATED ORAL at 08:39

## 2025-01-20 RX ADMIN — TIMOLOL MALEATE 1 DROP: 5 SOLUTION OPHTHALMIC at 10:52

## 2025-01-20 RX ADMIN — ATORVASTATIN CALCIUM 40 MG: 40 TABLET, FILM COATED ORAL at 20:20

## 2025-01-20 NOTE — CARE COORDINATION
1/20:  Transition of care:  Pt presented to the ER for hypoglycemia from home.  Pt is on room air at 96%, Iv Rocephin til 1/21, Iv Apresoline PRN & SQ Lovenox.  Psychiatry re consulted to shad.  TTE needed.  Cm attempted to speak pt bedside - pt had a flight of ideas & was unable to be redirected.  Cm attempted to call pt's mother who currently admitted at [facility].  CM spoke with pt's brother Dez to discuss CM role & dc planning.  He is unclear of some of the information & believes her PCP enrrique Wade & uses [facility] on [address].  Pt lives with her mother in a 4 story home with 6/7 steps to enter.  The bed/bathroom are on the upstairs level with apprx on 20 steps to enter.  PTA pt was independent with intermittent use of a cane.  Pt had some falls recently & he feels she may need rehab.  Pt is a diabetic but he believes she only has po medications & no glucometer.  Pt has a hx of HHC but he can't recall the name.  Pt has no hx of SNF.  CM will see PT/OT & Psychiatry's recommendations.  Per Chinedu he will speak to the pt's daughter Jagruti in [place] & his brother to see if the pt has POA paperwork completed.  Sw/CM will continue to follow.  Electronically signed by Fina Krause RN on 1/20/2025 at 11:20 AM       Case Management Assessment  Initial Evaluation    Date/Time of Evaluation: 1/20/2025 11:21 AM  Assessment Completed by: Fina Krause RN    If patient is discharged prior to next notation, then this note serves as note for discharge by case management.    Patient Name: Ana Paula Kunz                   YOB: 1955  Diagnosis: Altered mental state [R41.82]  Hypoglycemia [E16.2]  Elevated troponin [R79.89]  Bilateral leg edema [R60.0]  Altered mental status, unspecified altered mental status type [R41.82]                   Date / Time: 1/17/2025  8:37 PM    Patient Admission Status: Inpatient   Readmission Risk (Low < 19, Mod (19-27), High > 27): Readmission Risk Score: 9.2    Current PCP:

## 2025-01-20 NOTE — PLAN OF CARE
Problem: Safety - Adult  Goal: Free from fall injury  Outcome: Progressing     Problem: Chronic Conditions and Co-morbidities  Goal: Patient's chronic conditions and co-morbidity symptoms are monitored and maintained or improved  Outcome: Progressing  Flowsheets (Taken 1/20/2025 0826)  Care Plan - Patient's Chronic Conditions and Co-Morbidity Symptoms are Monitored and Maintained or Improved: Monitor and assess patient's chronic conditions and comorbid symptoms for stability, deterioration, or improvement     Problem: Discharge Planning  Goal: Discharge to home or other facility with appropriate resources  Outcome: Progressing  Flowsheets (Taken 1/20/2025 0826)  Discharge to home or other facility with appropriate resources: Identify barriers to discharge with patient and caregiver     Problem: Confusion  Goal: Confusion, delirium, dementia, or psychosis is improved or at baseline  Description: INTERVENTIONS:  1. Assess for possible contributors to thought disturbance, including medications, impaired vision or hearing, underlying metabolic abnormalities, dehydration, psychiatric diagnoses, and notify attending LIP  2. [place] high risk fall precautions, as indicated  3. Provide frequent short contacts to provide reality reorientation, refocusing and direction  4. Decrease environmental stimuli, including noise as appropriate  5. Monitor and intervene to maintain adequate nutrition, hydration, elimination, sleep and activity  6. If unable to ensure safety without constant attention obtain sitter and review sitter guidelines with assigned personnel  7. Initiate Psychosocial CNS and Spiritual Care consult, as indicated  Outcome: Progressing  Flowsheets (Taken 1/20/2025 0826)  Effect of thought disturbance (confusion, delirium, dementia, or psychosis) are managed with adequate functional status:   Assess for contributors to thought disturbance, including medications, impaired vision or hearing, underlying metabolic

## 2025-01-20 NOTE — PROGRESS NOTES
Occupational Therapy    OCCUPATIONAL THERAPY INITIAL EVALUATION    [facility]  [address], [place]        Date:2025                                                  Patient Name: Ana Paula Kunz    MRN: [id number]    : 1955    Room: [address] [place] [address]          Evaluating OT: Sheila Allan, OTD, OTR/L; [id number]      Occupational therapy physician order:   Start   Ordering Provider    25 1000  OT eval and treat  Start:  25 1000,   End:  25 1000,   ONE TIME,   Standing Count:  1 Occurrences,   R         Janes, Megan Ferrari, APRN - NP          Pt presents to ED with hypoglycemia       Diagnosis:    1. Bilateral leg edema    2. Hypoglycemia    3. Altered mental status, unspecified altered mental status type    4. Elevated troponin       Patient Active Problem List   Diagnosis    Severe bipolar affective disorder with psychosis ([facility])    Carpal tunnel syndrome    Hyperlipidemia    Vitamin D deficiency    Systemic primary arterial hypertension    Schizoaffective disorder ([facility])    Severe manic bipolar 1 disorder with psychotic behavior ([facility])    Type 2 diabetes mellitus with both eyes affected by mild nonproliferative retinopathy without macular edema, without long-term current use of insulin ([facility])    Callus of foot    Onychomycosis    At risk for colon cancer    Altered mental state    Frequent falls    Bilateral leg edema    Heart murmur    History of bipolar disorder    Delirium due to uti          Pertinent Medical History:   Past Medical History:   Diagnosis Date    Acute gouty arthritis 2020    Bell's palsy     Bipolar disorder ([facility])     bipolar    Carpal tunnel syndrome 2016    Hyperlipidemia     Non-insulin dependent type 2 diabetes mellitus (HCC)     Sepsis ([facility]) 2019    Systemic primary arterial hypertension 2016    Type 2 diabetes with peripheral circulatory disorder, controlled ([facility]) 2023

## 2025-01-20 NOTE — PROGRESS NOTES
Notified attending that patient is impulsive, hyperactive and hyper verbal. See new order.  Zoey WALTER RN

## 2025-01-20 NOTE — PROGRESS NOTES
Physical Therapy  Physical Therapy Initial Assessment     Name: Ana Paula Kunz  : 1955  MRN: [id number]      Date of Service: 2025    Evaluating PT:  Karlene Jimenes PT, DPT [id number]    Room #:  6420/6420-B  Diagnosis:  Altered mental state [R41.82]  Hypoglycemia [E16.2]  Elevated troponin [R79.89]  Bilateral leg edema [R60.0]  Altered mental status, unspecified altered mental status type [R41.82]  PMHx/PSHx:    Past Medical History:   Diagnosis Date    Acute gouty arthritis 2020    Bell's palsy     Bipolar disorder (HCC)     bipolar    Carpal tunnel syndrome 2016    Hyperlipidemia     Non-insulin dependent type 2 diabetes mellitus (HCC)     Sepsis ([facility]) 2019    Systemic primary arterial hypertension 2016    Type 2 diabetes with peripheral circulatory disorder, controlled ([facility]) 2023      Procedure/Surgery:  none this admission  Precautions:  Falls, TSM, cognition   Equipment Needs:  TBD    SUBJECTIVE:    Pt unable to provide accurate history at this time. Per chart review, pt lives with her mother in 2 story home with 2nd floor set up. 7 DIANA with 1 HR.     OBJECTIVE:   Initial Evaluation  Date: 25 Treatment Short Term/ Long Term   Goals   AM-PAC 6 Clicks      Was pt agreeable to Eval/treatment? Yes     Does pt have pain? no c/o pain     Bed Mobility  Rolling: SBA   Supine to sit: Juany  Sit to supine: Juany  Scooting: Juany  Rolling: Supervision   Supine to sit: Supervision   Sit to supine: Supervision   Scooting: Supervision    Transfers Sit to stand: ModA  Stand to sit: ModA  Stand pivot: NT, pt declined  Sit to stand: Supervision   Stand to sit: Supervision   Stand pivot: Supervision with AAD   Ambulation    NT  >150 feet with AAD Supervision    Stair negotiation: ascended and descended  NT  14 steps with 1 rail Supervision    ROM BUE:  Defer to OT note  BLE:  WFL     Strength BUE:  Defer to OT note  BLE:  grossly 3/5  WFL   Balance Sitting EOB:  SBA  Dynamic

## 2025-01-20 NOTE — PROGRESS NOTES
HOSPITALIST PROGRESS NOTE    Patient's name: Ana Paula Kunz  : 1955  Admission date: 2025  Date of service: 2025   Primary care physician: Sheryl Wade DO    Assessment   Patient admitted on 2025     Ana Paula Kunz is a 69 y.o. female patient of Sheryl Wade DO with history of bipolar disorder, hyperlipidemia, non-insulin diabetes mellitus, hypertension presented to [facility] on 2025 with concern of confusion, not taking her medications frequent falls.  While in the ER patient workup was largely unremarkable.  Patient was admitted for psych evaluation.    Altered mental status  Concern for hira  Patient continues to have nonsensical episodes  Difficult to engage in a conversation.  Psych to reevaluate patient.  Continue home antipsychotics    Fall:  PT/OT  Echocardiogram  Orthostatic vitals    Possible UTI  Continue ceftriaxone      Follow labs   DVT prophylaxis Lovenox  Please see orders for further management and care.  Discharge plan: Pending psych evaluation, PT/OT and echocardiogram    Subjective  Ana Paula was seen and examined at bedside.  Patient has pressured speech, nonsensical conversation, difficult to engage in a conversation.  She does know she is in the hospital and it is January.      Review of Systems  All systems reviewed and negative except mentioned above.       Objective  Most Recent Recorded Vitals  /66   Pulse 91   Temp 97.7 °F (36.5 °C) (Temporal)   Resp 18   Ht 1.626 m (5' 4\")   Wt 85.2 kg (187 lb 13.3 oz)   SpO2 96%   BMI 32.24 kg/m²     General appearance: awake, alert No apparent distress, appears stated age and cooperative.  HEENT: Normal cephalic, atraumatic without obvious deformity. Pupils equal, round, and reactive to light.  Extra ocular muscles intact. Conjunctivae/corneas clear.  Neck: Supple, with full range of motion. No jugular venous distention. Trachea midline.  Respiratory:  lungs clear to auscultation;  Megan Marrero APRN - NP   5 mg at 01/20/25 0158    hydrALAZINE (APRESOLINE) injection 10 mg  10 mg IntraVENous Q6H PRN Megan Marrero APRN - NP        calcium carbonate (TUMS) chewable tablet 500 mg  500 mg Oral TID PRN Megan Marrero APRN - NP        Polyvinyl Alcohol-Povidone PF (REFRESH) 1.4-0.6 % ophthalmic solution 1 drop  1 drop Both Eyes PRN Megan Marrero APRN - NP        sodium chloride (OCEAN, BABY AYR) 0.65 % nasal spray 1 spray  1 spray Each Nostril PRN Megan Marrero APRN - NP        magnesium hydroxide (MILK OF MAGNESIA) 400 MG/5ML suspension 30 mL  30 mL Oral Daily PRN Megan Marrero APRN - NP        benzocaine-menthol (CEPACOL SORE THROAT) lozenge 1 lozenge  1 lozenge Oral Q2H PRN Megan Marrero APRN - NP        benzonatate (TESSALON) capsule 100 mg  100 mg Oral TID PRN Megan Marrero APRN - NP        insulin lispro (HUMALOG,ADMELOG) injection vial 0-4 Units  0-4 Units SubCUTAneous 4x Daily AC & HS Megan Marrero APRN - NP   1 Units at 01/18/25 1729    cefTRIAXone (ROCEPHIN) 1,000 mg in sterile water 10 mL IV syringe  1,000 mg IntraVENous Q24H Megan Marrero APRN - NP   1,000 mg at 01/19/25 0945       PRN Medications  sulfur hexafluoride microspheres, glucose, dextrose bolus **OR** dextrose bolus, glucagon (rDNA), dextrose, sodium chloride flush, sodium chloride, potassium chloride **OR** potassium alternative oral replacement **OR** potassium chloride, magnesium sulfate, ondansetron **OR** ondansetron, polyethylene glycol, acetaminophen **OR** acetaminophen, melatonin, hydrALAZINE, calcium carbonate, Polyvinyl Alcohol-Povidone PF, sodium chloride, magnesium hydroxide, benzocaine-menthol, benzonatate    +++++++++++++++++++++++++++++++++++++++++++++++++  Randy Langford MD    [place], [place]  +++++++++++++++++++++++++++++++++++++++++++++++++  NOTE: This report was transcribed using voice recognition software.

## 2025-01-21 ENCOUNTER — HOSPITAL ENCOUNTER (INPATIENT)
Age: 70
LOS: 16 days | Discharge: HOME | DRG: 750 | End: 2025-02-06
Attending: PSYCHIATRY & NEUROLOGY | Admitting: PSYCHIATRY & NEUROLOGY
Payer: COMMERCIAL

## 2025-01-21 ENCOUNTER — APPOINTMENT (OUTPATIENT)
Age: 70
End: 2025-01-21
Payer: COMMERCIAL

## 2025-01-21 VITALS
TEMPERATURE: 97.2 F | OXYGEN SATURATION: 96 % | SYSTOLIC BLOOD PRESSURE: 115 MMHG | DIASTOLIC BLOOD PRESSURE: 68 MMHG | HEIGHT: 64 IN | BODY MASS INDEX: 32.07 KG/M2 | RESPIRATION RATE: 18 BRPM | HEART RATE: 97 BPM | WEIGHT: 187.83 LBS

## 2025-01-21 PROBLEM — F31.9 BIPOLAR 1 DISORDER (HCC): Status: ACTIVE | Noted: 2025-01-21

## 2025-01-21 LAB
ECHO AO ASC DIAM: 2.3 CM
ECHO AO ASCENDING AORTA INDEX: 1.21 CM/M2
ECHO AV AREA PEAK VELOCITY: 2.4 CM2
ECHO AV AREA VTI: 2.1 CM2
ECHO AV AREA/BSA PEAK VELOCITY: 1.3 CM2/M2
ECHO AV AREA/BSA VTI: 1.1 CM2/M2
ECHO AV CUSP MM: 1.7 CM
ECHO AV MEAN GRADIENT: 6 MMHG
ECHO AV MEAN VELOCITY: 1.2 M/S
ECHO AV PEAK GRADIENT: 10 MMHG
ECHO AV PEAK VELOCITY: 1.6 M/S
ECHO AV VELOCITY RATIO: 0.75
ECHO AV VTI: 27.7 CM
ECHO BSA: 1.96 M2
ECHO LA DIAMETER INDEX: 1.63 CM/M2
ECHO LA DIAMETER: 3.1 CM
ECHO LA VOL A-L A2C: 46 ML (ref 22–52)
ECHO LA VOL A-L A4C: 34 ML (ref 22–52)
ECHO LA VOL MOD A2C: 44 ML (ref 22–52)
ECHO LA VOL MOD A4C: 32 ML (ref 22–52)
ECHO LA VOLUME AREA LENGTH: 40 ML
ECHO LA VOLUME INDEX A-L A2C: 24 ML/M2 (ref 16–34)
ECHO LA VOLUME INDEX A-L A4C: 18 ML/M2 (ref 16–34)
ECHO LA VOLUME INDEX AREA LENGTH: 21 ML/M2 (ref 16–34)
ECHO LA VOLUME INDEX MOD A2C: 23 ML/M2 (ref 16–34)
ECHO LA VOLUME INDEX MOD A4C: 17 ML/M2 (ref 16–34)
ECHO LV EF PHYSICIAN: 75 %
ECHO LV FRACTIONAL SHORTENING: 30 % (ref 28–44)
ECHO LV INTERNAL DIMENSION DIASTOLE INDEX: 1.74 CM/M2
ECHO LV INTERNAL DIMENSION DIASTOLIC: 3.3 CM (ref 3.9–5.3)
ECHO LV INTERNAL DIMENSION SYSTOLIC INDEX: 1.21 CM/M2
ECHO LV INTERNAL DIMENSION SYSTOLIC: 2.3 CM
ECHO LV IVSD: 0.9 CM (ref 0.6–0.9)
ECHO LV IVSS: 1.1 CM
ECHO LV MASS 2D: 87.7 G (ref 67–162)
ECHO LV MASS INDEX 2D: 46.2 G/M2 (ref 43–95)
ECHO LV POSTERIOR WALL DIASTOLIC: 1 CM (ref 0.6–0.9)
ECHO LV POSTERIOR WALL SYSTOLIC: 1.1 CM
ECHO LV RELATIVE WALL THICKNESS RATIO: 0.61
ECHO LVOT AREA: 3.1 CM2
ECHO LVOT AV VTI INDEX: 0.69
ECHO LVOT DIAM: 2 CM
ECHO LVOT MEAN GRADIENT: 4 MMHG
ECHO LVOT PEAK GRADIENT: 6 MMHG
ECHO LVOT PEAK VELOCITY: 1.2 M/S
ECHO LVOT STROKE VOLUME INDEX: 31.6 ML/M2
ECHO LVOT SV: 60 ML
ECHO LVOT VTI: 19.1 CM
ECHO MV "A" WAVE DURATION: 120 MSEC
ECHO MV A VELOCITY: 1.2 M/S
ECHO MV AREA PHT: 4.1 CM2
ECHO MV AREA VTI: 2.2 CM2
ECHO MV E DECELERATION TIME (DT): 139.7 MS
ECHO MV E VELOCITY: 0.79 M/S
ECHO MV E/A RATIO: 0.66
ECHO MV LVOT VTI INDEX: 1.4
ECHO MV MAX VELOCITY: 1.5 M/S
ECHO MV MEAN GRADIENT: 5 MMHG
ECHO MV MEAN VELOCITY: 1 M/S
ECHO MV PEAK GRADIENT: 9 MMHG
ECHO MV PRESSURE HALF TIME (PHT): 53.1 MS
ECHO MV VTI: 26.8 CM
ECHO PV MAX VELOCITY: 1.6 M/S
ECHO PV MEAN GRADIENT: 6 MMHG
ECHO PV MEAN VELOCITY: 1.2 M/S
ECHO PV PEAK GRADIENT: 10 MMHG
ECHO PV VTI: 26.8 CM
ECHO RV INTERNAL DIMENSION: 2.7 CM
ECHO TV REGURGITANT MAX VELOCITY: 3.11 M/S
ECHO TV REGURGITANT PEAK GRADIENT: 39 MMHG
GLUCOSE BLD-MCNC: 163 MG/DL (ref 74–99)
GLUCOSE BLD-MCNC: 196 MG/DL (ref 74–99)
GLUCOSE BLD-MCNC: 306 MG/DL (ref 74–99)

## 2025-01-21 PROCEDURE — 1240000000 HC EMOTIONAL WELLNESS R&B

## 2025-01-21 PROCEDURE — 6370000000 HC RX 637 (ALT 250 FOR IP): Performed by: STUDENT IN AN ORGANIZED HEALTH CARE EDUCATION/TRAINING PROGRAM

## 2025-01-21 PROCEDURE — 6370000000 HC RX 637 (ALT 250 FOR IP): Performed by: PSYCHIATRY & NEUROLOGY

## 2025-01-21 PROCEDURE — 93306 TTE W/DOPPLER COMPLETE: CPT | Performed by: INTERNAL MEDICINE

## 2025-01-21 PROCEDURE — 93306 TTE W/DOPPLER COMPLETE: CPT

## 2025-01-21 PROCEDURE — 82962 GLUCOSE BLOOD TEST: CPT

## 2025-01-21 PROCEDURE — 6370000000 HC RX 637 (ALT 250 FOR IP): Performed by: NURSE PRACTITIONER

## 2025-01-21 PROCEDURE — 99232 SBSQ HOSP IP/OBS MODERATE 35: CPT | Performed by: STUDENT IN AN ORGANIZED HEALTH CARE EDUCATION/TRAINING PROGRAM

## 2025-01-21 RX ORDER — OLANZAPINE 10 MG/1
20 TABLET ORAL NIGHTLY
Status: DISCONTINUED | OUTPATIENT
Start: 2025-01-21 | End: 2025-01-22

## 2025-01-21 RX ORDER — ATORVASTATIN CALCIUM 40 MG/1
40 TABLET, FILM COATED ORAL DAILY
Status: DISCONTINUED | OUTPATIENT
Start: 2025-01-21 | End: 2025-02-06 | Stop reason: HOSPADM

## 2025-01-21 RX ORDER — HYDRALAZINE HYDROCHLORIDE 20 MG/ML
10 INJECTION INTRAMUSCULAR; INTRAVENOUS EVERY 6 HOURS PRN
Status: DISCONTINUED | OUTPATIENT
Start: 2025-01-21 | End: 2025-02-06 | Stop reason: HOSPADM

## 2025-01-21 RX ORDER — BENZONATATE 100 MG/1
100 CAPSULE ORAL 3 TIMES DAILY PRN
Status: CANCELLED | OUTPATIENT
Start: 2025-01-21

## 2025-01-21 RX ORDER — BENZTROPINE MESYLATE 1 MG/1
1 TABLET ORAL NIGHTLY
Status: DISCONTINUED | OUTPATIENT
Start: 2025-01-21 | End: 2025-01-22

## 2025-01-21 RX ORDER — CALCIUM CARBONATE 500 MG/1
500 TABLET, CHEWABLE ORAL 3 TIMES DAILY PRN
Status: CANCELLED | OUTPATIENT
Start: 2025-01-21

## 2025-01-21 RX ORDER — GLUCAGON 1 MG/ML
1 KIT INJECTION PRN
Status: CANCELLED | OUTPATIENT
Start: 2025-01-21

## 2025-01-21 RX ORDER — POLYETHYLENE GLYCOL 3350 17 G/17G
17 POWDER, FOR SOLUTION ORAL DAILY PRN
Status: DISCONTINUED | OUTPATIENT
Start: 2025-01-21 | End: 2025-01-21

## 2025-01-21 RX ORDER — POTASSIUM CHLORIDE 1500 MG/1
40 TABLET, EXTENDED RELEASE ORAL PRN
Status: DISCONTINUED | OUTPATIENT
Start: 2025-01-21 | End: 2025-02-06 | Stop reason: HOSPADM

## 2025-01-21 RX ORDER — MECOBALAMIN 5000 MCG
5 TABLET,DISINTEGRATING ORAL EVERY EVENING
Status: CANCELLED | OUTPATIENT
Start: 2025-01-21

## 2025-01-21 RX ORDER — DEXTROSE MONOHYDRATE 100 MG/ML
INJECTION, SOLUTION INTRAVENOUS CONTINUOUS PRN
Status: DISCONTINUED | OUTPATIENT
Start: 2025-01-21 | End: 2025-02-06 | Stop reason: HOSPADM

## 2025-01-21 RX ORDER — INSULIN LISPRO 100 [IU]/ML
0-4 INJECTION, SOLUTION INTRAVENOUS; SUBCUTANEOUS
Status: CANCELLED | OUTPATIENT
Start: 2025-01-21

## 2025-01-21 RX ORDER — NICOTINE 21 MG/24HR
1 PATCH, TRANSDERMAL 24 HOURS TRANSDERMAL DAILY
Status: DISCONTINUED | OUTPATIENT
Start: 2025-01-21 | End: 2025-01-28

## 2025-01-21 RX ORDER — GLUCAGON 1 MG/ML
1 KIT INJECTION PRN
Status: DISCONTINUED | OUTPATIENT
Start: 2025-01-21 | End: 2025-02-06 | Stop reason: HOSPADM

## 2025-01-21 RX ORDER — METOPROLOL TARTRATE 25 MG/1
25 TABLET, FILM COATED ORAL 2 TIMES DAILY
Status: CANCELLED | OUTPATIENT
Start: 2025-01-21

## 2025-01-21 RX ORDER — ENOXAPARIN SODIUM 100 MG/ML
40 INJECTION SUBCUTANEOUS DAILY
Status: DISCONTINUED | OUTPATIENT
Start: 2025-01-22 | End: 2025-02-06 | Stop reason: HOSPADM

## 2025-01-21 RX ORDER — TIMOLOL MALEATE 5 MG/ML
1 SOLUTION/ DROPS OPHTHALMIC DAILY
Status: DISCONTINUED | OUTPATIENT
Start: 2025-01-22 | End: 2025-02-06 | Stop reason: HOSPADM

## 2025-01-21 RX ORDER — INSULIN LISPRO 100 [IU]/ML
0-4 INJECTION, SOLUTION INTRAVENOUS; SUBCUTANEOUS
Status: DISCONTINUED | OUTPATIENT
Start: 2025-01-21 | End: 2025-01-25

## 2025-01-21 RX ORDER — ACETAMINOPHEN 325 MG/1
650 TABLET ORAL EVERY 6 HOURS PRN
Status: CANCELLED | OUTPATIENT
Start: 2025-01-21

## 2025-01-21 RX ORDER — LATANOPROST 50 UG/ML
1 SOLUTION/ DROPS OPHTHALMIC NIGHTLY
Status: CANCELLED | OUTPATIENT
Start: 2025-01-21

## 2025-01-21 RX ORDER — ONDANSETRON 4 MG/1
4 TABLET, ORALLY DISINTEGRATING ORAL EVERY 8 HOURS PRN
Status: DISCONTINUED | OUTPATIENT
Start: 2025-01-21 | End: 2025-02-05

## 2025-01-21 RX ORDER — SODIUM CHLORIDE 9 MG/ML
INJECTION, SOLUTION INTRAVENOUS PRN
Status: DISCONTINUED | OUTPATIENT
Start: 2025-01-21 | End: 2025-02-06 | Stop reason: HOSPADM

## 2025-01-21 RX ORDER — POTASSIUM CHLORIDE 1500 MG/1
40 TABLET, EXTENDED RELEASE ORAL PRN
Status: CANCELLED | OUTPATIENT
Start: 2025-01-21

## 2025-01-21 RX ORDER — BRIMONIDINE TARTRATE 2 MG/ML
1 SOLUTION/ DROPS OPHTHALMIC 2 TIMES DAILY
Status: DISCONTINUED | OUTPATIENT
Start: 2025-01-21 | End: 2025-02-06 | Stop reason: HOSPADM

## 2025-01-21 RX ORDER — SODIUM CHLORIDE 9 MG/ML
INJECTION, SOLUTION INTRAVENOUS PRN
Status: CANCELLED | OUTPATIENT
Start: 2025-01-21

## 2025-01-21 RX ORDER — POLYETHYLENE GLYCOL 3350 17 G/17G
17 POWDER, FOR SOLUTION ORAL DAILY PRN
Status: DISCONTINUED | OUTPATIENT
Start: 2025-01-21 | End: 2025-02-06 | Stop reason: HOSPADM

## 2025-01-21 RX ORDER — METOPROLOL TARTRATE 25 MG/1
25 TABLET, FILM COATED ORAL 2 TIMES DAILY
Status: DISCONTINUED | OUTPATIENT
Start: 2025-01-21 | End: 2025-01-25

## 2025-01-21 RX ORDER — POTASSIUM CHLORIDE 7.45 MG/ML
10 INJECTION INTRAVENOUS PRN
Status: CANCELLED | OUTPATIENT
Start: 2025-01-21

## 2025-01-21 RX ORDER — SODIUM CHLORIDE 0.9 % (FLUSH) 0.9 %
5-40 SYRINGE (ML) INJECTION EVERY 12 HOURS SCHEDULED
Status: DISCONTINUED | OUTPATIENT
Start: 2025-01-21 | End: 2025-01-28

## 2025-01-21 RX ORDER — SODIUM CHLORIDE 0.9 % (FLUSH) 0.9 %
5-40 SYRINGE (ML) INJECTION PRN
Status: CANCELLED | OUTPATIENT
Start: 2025-01-21

## 2025-01-21 RX ORDER — BRIMONIDINE TARTRATE 2 MG/ML
1 SOLUTION/ DROPS OPHTHALMIC 2 TIMES DAILY
Status: CANCELLED | OUTPATIENT
Start: 2025-01-21

## 2025-01-21 RX ORDER — ONDANSETRON 2 MG/ML
4 INJECTION INTRAMUSCULAR; INTRAVENOUS EVERY 6 HOURS PRN
Status: CANCELLED | OUTPATIENT
Start: 2025-01-21

## 2025-01-21 RX ORDER — INSULIN GLARGINE 100 [IU]/ML
6 INJECTION, SOLUTION SUBCUTANEOUS NIGHTLY
Status: CANCELLED | OUTPATIENT
Start: 2025-01-21

## 2025-01-21 RX ORDER — HALOPERIDOL 5 MG/ML
2 INJECTION INTRAMUSCULAR EVERY 4 HOURS PRN
Status: CANCELLED | OUTPATIENT
Start: 2025-01-21

## 2025-01-21 RX ORDER — LATANOPROST 50 UG/ML
1 SOLUTION/ DROPS OPHTHALMIC NIGHTLY
Status: DISCONTINUED | OUTPATIENT
Start: 2025-01-21 | End: 2025-02-06 | Stop reason: HOSPADM

## 2025-01-21 RX ORDER — OLANZAPINE 5 MG/1
20 TABLET ORAL NIGHTLY
Status: CANCELLED | OUTPATIENT
Start: 2025-01-21

## 2025-01-21 RX ORDER — MAGNESIUM SULFATE IN WATER 40 MG/ML
2000 INJECTION, SOLUTION INTRAVENOUS PRN
Status: DISCONTINUED | OUTPATIENT
Start: 2025-01-21 | End: 2025-02-06 | Stop reason: HOSPADM

## 2025-01-21 RX ORDER — CALCIUM CARBONATE 500 MG/1
500 TABLET, CHEWABLE ORAL 3 TIMES DAILY PRN
Status: DISCONTINUED | OUTPATIENT
Start: 2025-01-21 | End: 2025-02-06 | Stop reason: HOSPADM

## 2025-01-21 RX ORDER — MAGNESIUM SULFATE IN WATER 40 MG/ML
2000 INJECTION, SOLUTION INTRAVENOUS PRN
Status: CANCELLED | OUTPATIENT
Start: 2025-01-21

## 2025-01-21 RX ORDER — ENOXAPARIN SODIUM 100 MG/ML
40 INJECTION SUBCUTANEOUS DAILY
Status: CANCELLED | OUTPATIENT
Start: 2025-01-22

## 2025-01-21 RX ORDER — POTASSIUM CHLORIDE 7.45 MG/ML
10 INJECTION INTRAVENOUS PRN
Status: DISCONTINUED | OUTPATIENT
Start: 2025-01-21 | End: 2025-02-06 | Stop reason: HOSPADM

## 2025-01-21 RX ORDER — DEXTROSE MONOHYDRATE 100 MG/ML
INJECTION, SOLUTION INTRAVENOUS CONTINUOUS PRN
Status: CANCELLED | OUTPATIENT
Start: 2025-01-21

## 2025-01-21 RX ORDER — TIMOLOL MALEATE 5 MG/ML
1 SOLUTION/ DROPS OPHTHALMIC DAILY
Status: CANCELLED | OUTPATIENT
Start: 2025-01-22

## 2025-01-21 RX ORDER — MAGNESIUM HYDROXIDE/ALUMINUM HYDROXICE/SIMETHICONE 120; 1200; 1200 MG/30ML; MG/30ML; MG/30ML
30 SUSPENSION ORAL PRN
Status: DISCONTINUED | OUTPATIENT
Start: 2025-01-21 | End: 2025-02-06 | Stop reason: HOSPADM

## 2025-01-21 RX ORDER — MECOBALAMIN 5000 MCG
5 TABLET,DISINTEGRATING ORAL EVERY EVENING
Status: DISCONTINUED | OUTPATIENT
Start: 2025-01-21 | End: 2025-01-22

## 2025-01-21 RX ORDER — SODIUM CHLORIDE 0.9 % (FLUSH) 0.9 %
5-40 SYRINGE (ML) INJECTION EVERY 12 HOURS SCHEDULED
Status: CANCELLED | OUTPATIENT
Start: 2025-01-21

## 2025-01-21 RX ORDER — HALOPERIDOL 5 MG/ML
2 INJECTION INTRAMUSCULAR EVERY 4 HOURS PRN
Status: DISCONTINUED | OUTPATIENT
Start: 2025-01-21 | End: 2025-01-22

## 2025-01-21 RX ORDER — ACETAMINOPHEN 650 MG/1
650 SUPPOSITORY RECTAL EVERY 6 HOURS PRN
Status: CANCELLED | OUTPATIENT
Start: 2025-01-21

## 2025-01-21 RX ORDER — ACETAMINOPHEN 325 MG/1
650 TABLET ORAL EVERY 6 HOURS PRN
Status: DISCONTINUED | OUTPATIENT
Start: 2025-01-21 | End: 2025-02-06 | Stop reason: HOSPADM

## 2025-01-21 RX ORDER — MECOBALAMIN 5000 MCG
5 TABLET,DISINTEGRATING ORAL NIGHTLY PRN
Status: DISCONTINUED | OUTPATIENT
Start: 2025-01-21 | End: 2025-02-06 | Stop reason: HOSPADM

## 2025-01-21 RX ORDER — HYDRALAZINE HYDROCHLORIDE 20 MG/ML
10 INJECTION INTRAMUSCULAR; INTRAVENOUS EVERY 6 HOURS PRN
Status: CANCELLED | OUTPATIENT
Start: 2025-01-21

## 2025-01-21 RX ORDER — ATORVASTATIN CALCIUM 40 MG/1
40 TABLET, FILM COATED ORAL DAILY
Status: CANCELLED | OUTPATIENT
Start: 2025-01-21

## 2025-01-21 RX ORDER — ACETAMINOPHEN 650 MG/1
650 SUPPOSITORY RECTAL EVERY 6 HOURS PRN
Status: DISCONTINUED | OUTPATIENT
Start: 2025-01-21 | End: 2025-02-06 | Stop reason: HOSPADM

## 2025-01-21 RX ORDER — INSULIN GLARGINE 100 [IU]/ML
6 INJECTION, SOLUTION SUBCUTANEOUS NIGHTLY
Status: DISCONTINUED | OUTPATIENT
Start: 2025-01-21 | End: 2025-01-25

## 2025-01-21 RX ORDER — ONDANSETRON 2 MG/ML
4 INJECTION INTRAMUSCULAR; INTRAVENOUS EVERY 6 HOURS PRN
Status: DISCONTINUED | OUTPATIENT
Start: 2025-01-21 | End: 2025-02-05

## 2025-01-21 RX ORDER — BENZTROPINE MESYLATE 1 MG/1
1 TABLET ORAL NIGHTLY
Status: CANCELLED | OUTPATIENT
Start: 2025-01-21

## 2025-01-21 RX ORDER — HYDROXYZINE HYDROCHLORIDE 25 MG/1
25 TABLET, FILM COATED ORAL 3 TIMES DAILY PRN
Status: DISCONTINUED | OUTPATIENT
Start: 2025-01-21 | End: 2025-02-06 | Stop reason: HOSPADM

## 2025-01-21 RX ORDER — ONDANSETRON 4 MG/1
4 TABLET, ORALLY DISINTEGRATING ORAL EVERY 8 HOURS PRN
Status: CANCELLED | OUTPATIENT
Start: 2025-01-21

## 2025-01-21 RX ORDER — POLYETHYLENE GLYCOL 3350 17 G/17G
17 POWDER, FOR SOLUTION ORAL DAILY PRN
Status: CANCELLED | OUTPATIENT
Start: 2025-01-21

## 2025-01-21 RX ORDER — BENZONATATE 100 MG/1
100 CAPSULE ORAL 3 TIMES DAILY PRN
Status: DISCONTINUED | OUTPATIENT
Start: 2025-01-21 | End: 2025-02-06 | Stop reason: HOSPADM

## 2025-01-21 RX ORDER — SODIUM CHLORIDE 0.9 % (FLUSH) 0.9 %
5-40 SYRINGE (ML) INJECTION PRN
Status: DISCONTINUED | OUTPATIENT
Start: 2025-01-21 | End: 2025-01-21

## 2025-01-21 RX ORDER — MECOBALAMIN 5000 MCG
5 TABLET,DISINTEGRATING ORAL NIGHTLY PRN
Status: CANCELLED | OUTPATIENT
Start: 2025-01-21

## 2025-01-21 RX ADMIN — Medication 5 MG: at 21:04

## 2025-01-21 RX ADMIN — EMPAGLIFLOZIN 10 MG: 10 TABLET, FILM COATED ORAL at 10:50

## 2025-01-21 RX ADMIN — HYDROXYZINE HYDROCHLORIDE 25 MG: 25 TABLET ORAL at 21:04

## 2025-01-21 RX ADMIN — LATANOPROST 1 DROP: 50 SOLUTION OPHTHALMIC at 21:09

## 2025-01-21 RX ADMIN — BRIMONIDINE TARTRATE 1 DROP: 2 SOLUTION/ DROPS OPHTHALMIC at 21:10

## 2025-01-21 RX ADMIN — METOPROLOL TARTRATE 25 MG: 25 TABLET, FILM COATED ORAL at 10:50

## 2025-01-21 RX ADMIN — ATORVASTATIN CALCIUM 40 MG: 40 TABLET, FILM COATED ORAL at 21:04

## 2025-01-21 RX ADMIN — METOPROLOL TARTRATE 25 MG: 25 TABLET, FILM COATED ORAL at 21:02

## 2025-01-21 RX ADMIN — BENZTROPINE MESYLATE 1 MG: 1 TABLET ORAL at 21:03

## 2025-01-21 RX ADMIN — BRIMONIDINE TARTRATE 1 DROP: 2 SOLUTION/ DROPS OPHTHALMIC at 10:49

## 2025-01-21 RX ADMIN — TIMOLOL MALEATE 1 DROP: 5 SOLUTION OPHTHALMIC at 10:49

## 2025-01-21 RX ADMIN — ACETAMINOPHEN 650 MG: 325 TABLET ORAL at 18:11

## 2025-01-21 RX ADMIN — INSULIN LISPRO 3 UNITS: 100 INJECTION, SOLUTION INTRAVENOUS; SUBCUTANEOUS at 20:58

## 2025-01-21 RX ADMIN — OLANZAPINE 20 MG: 10 TABLET, FILM COATED ORAL at 21:01

## 2025-01-21 RX ADMIN — Medication 5 MG: at 18:11

## 2025-01-21 RX ADMIN — INSULIN GLARGINE 6 UNITS: 100 INJECTION, SOLUTION SUBCUTANEOUS at 20:54

## 2025-01-21 ASSESSMENT — SLEEP AND FATIGUE QUESTIONNAIRES
DO YOU USE A SLEEP AID: YES
DO YOU HAVE DIFFICULTY SLEEPING: YES
SLEEP PATTERN: DIFFICULTY FALLING ASLEEP
AVERAGE NUMBER OF SLEEP HOURS: 7

## 2025-01-21 ASSESSMENT — PAIN SCALES - GENERAL
PAINLEVEL_OUTOF10: 0
PAINLEVEL_OUTOF10: 10

## 2025-01-21 ASSESSMENT — PAIN - FUNCTIONAL ASSESSMENT: PAIN_FUNCTIONAL_ASSESSMENT: PREVENTS OR INTERFERES SOME ACTIVE ACTIVITIES AND ADLS

## 2025-01-21 ASSESSMENT — PAIN DESCRIPTION - ORIENTATION: ORIENTATION: INNER

## 2025-01-21 ASSESSMENT — PAIN DESCRIPTION - LOCATION: LOCATION: RECTUM

## 2025-01-21 ASSESSMENT — PAIN DESCRIPTION - DESCRIPTORS: DESCRIPTORS: ACHING

## 2025-01-21 NOTE — CARE COORDINATION
Social Work/ Case Management Transition of Care Planning (Romelia Reyna, RICCI [phone]):     Discharge order noted, Pt placed on inv hold and accepted to IP psych. Pt accepted to 7W, for medical bed.  SW let RN know to call N2N @ [phone], then call section G @ [phone] and they will assign bed.   RICCI Hernandez  1/21/2025

## 2025-01-21 NOTE — CARE COORDINATION
Social Work/ Case Management Transition of Care Planning (Romelia Reyna, SOUMYAW [phone]):     Per report and chart review Pt is on room air. Pending Echo. Psych has been re consulted.  Pt remains agitated with pressured speech and flight of ideas. Pt is very difficult to engage in conversation. Discharge plan unclear at this time. Pt receiving Haldol PRN. SW/CM to follow.  RICCI Hernandez  1/21/2025     pulse oximetry

## 2025-01-21 NOTE — TRANSITION OF CARE
4 Eyes Skin Assessment     NAME:  Ana Paula Kunz  YOB: 1955  MEDICAL RECORD NUMBER:  [id number]    The patient is being assessed for  Admission    I agree that at least one RN has performed a thorough Head to Toe Skin Assessment on the patient. ALL assessment sites listed below have been assessed.      Areas assessed by both nurses:    Head, Face, Ears, Shoulders, Back, Chest, Arms, Elbows, Hands, Sacrum. Buttock, Coccyx, Ischium, Legs. Feet and Heels, and Under Medical Devices         Does the Patient have a Wound? No noted wound(s)       Filiberto Prevention initiated by RN: No  Wound Care Orders initiated by RN: No    Pressure Injury (Stage 3,4, Unstageable, DTI, NWPT, and Complex wounds) if present, place Wound referral order by RN under : No    New Ostomies, if present place, Ostomy referral order under : No     Nurse 1 eSignature: Electronically signed by Yohannes Kauffman RN on 1/21/25 at 6:03 PM EST    **SHARE this note so that the co-signing nurse can place an eSignature**    Nurse 2 eSignature: Electronically signed by Kymberly Do RN on 1/21/25 at 6:12 PM EST

## 2025-01-21 NOTE — ED NOTES
Call from unit for transfer to psych. Once  puts in medical clearance note, N2N can be called to [phone]. Once confirmed by 7W, will have bed assigned.

## 2025-01-21 NOTE — PROGRESS NOTES
Patient is very agitated and removed her IIV and refused to let us to put another one, she said her arm is hurting very bad .

## 2025-01-21 NOTE — ACP (ADVANCE CARE PLANNING)
Advance Care Planning   The patient has the following advanced directives on file:  Advance Directives       Power of  Living Will ACP-Advance Directive ACP-Power of     Not on File Filed on 06/23/13 Filed Not on File            The patient has appointed the following active healthcare agents:    Primary Decision Maker: Ledy Kunz - Parent - [phone]    Primary Decision Maker: Jagruti Kunz - Child - [phone]    Secondary Decision Maker: Marycarmen Lagos - Aunt/Uncle - [phone]    Supplemental (Other) Decision Maker: Julissa Kunz - Brother/Sister - [phone]    Supplemental (Other) Decision Maker: Ricardo Kunz - Brother/Sister - [phone]    Supplemental (Other) Decision Maker: THI KUNZ - Brother/Sister - [phone]    The Patient has the following current code status:    Code Status: Full Code      Romelia Reyna, LSW  1/21/2025

## 2025-01-21 NOTE — DISCHARGE INSTR - COC
Continuity of Care Form    Patient Name: Ana Paula Kunz   :  1955  MRN:  [id number]    Admit date:  2025  Discharge date:  ***    Code Status Order: Full Code   Advance Directives:   Advance Care Flowsheet Documentation             Admitting Physician:  Gregorio Cerna MD  PCP: Sheryl Wade DO    Discharging Nurse: ***  Discharging Hospital Unit/Room#: 6413/6413-A  Discharging Unit Phone Number: ***    Emergency Contact:   Extended Emergency Contact Information  Primary Emergency Contact: ZeLedy  [address] [facility] [place] [address] [facility]  Home Phone: [phone]  Relation: Parent  Secondary Emergency Contact: Marycarmen Lagos  Address: [place] [address] [facility]  Home Phone: [phone]  Relation: Aunt/Uncle  Preferred language: English   needed? No    Past Surgical History:  Past Surgical History:   Procedure Laterality Date    COLONOSCOPY      ECHO COMPLETE  2013         VAGINA SURGERY         Immunization History:   Immunization History   Administered Date(s) Administered    COVID-19, PFIZER PURPLE top, DILUTE for use, (age 12 y+), 30mcg/0.3mL 2021, 2021    Influenza Virus Vaccine 2016    Influenza, AFLURIA (age 3 y+), FLUZONE, (age 6 mo+), Quadv MDV, 0.5mL 2016, 2019, 2021    Influenza, FLUAD, (age 65 y+), IM, Quadv, 0.5mL 2022, 2023    Influenza, FLUARIX, FLULAVAL, FLUZONE (age 6 mo+) and AFLURIA, (age 3 y+), Quadv PF, 0.5mL 2016, 10/23/2017    Influenza, FLUBLOK, (age 18 y+), Quadv PF, 0.5mL 10/20/2020    Pneumococcal, PCV20, PREVNAR 20, (age 6w+), IM, 0.5mL 2022    Pneumococcal, PPSV23, PNEUMOVAX 23, (age 2y+), SC/IM, 0.5mL 10/28/2002, 2009, 2019       Active Problems:  Patient Active Problem List   Diagnosis Code    Severe bipolar affective disorder with psychosis ([facility]) F31.89    Carpal tunnel syndrome G56.00    Hyperlipidemia  E78.5    Vitamin D deficiency E55.9    Systemic primary arterial hypertension I10    Schizoaffective disorder ([facility]) F25.9    Severe manic bipolar 1 disorder with psychotic behavior ([facility]) F31.2    Type 2 diabetes mellitus with both eyes affected by mild nonproliferative retinopathy without macular edema, without long-term current use of insulin ([facility]) E11.3293    Callus of foot L84    Onychomycosis B35.1    At risk for colon cancer Z91.89    Altered mental state R41.82    Frequent falls R29.6    Bilateral leg edema R60.0    Heart murmur R01.1    History of bipolar disorder Z86.59    Delirium due to uti R41.0       Isolation/Infection:   Isolation            No Isolation          Patient Infection Status       None to display                     Nurse Assessment:  Last Vital Signs: /68   Pulse 97   Temp 97.2 °F (36.2 °C) (Oral)   Resp 18   Ht 1.626 m (5' 4\")   Wt 85.2 kg (187 lb 13.3 oz)   SpO2 96%   BMI 32.24 kg/m²     Last documented pain score (0-10 scale):    Last Weight:   Wt Readings from Last 1 Encounters:   01/19/25 85.2 kg (187 lb 13.3 oz)     Mental Status:  {IP PT MENTAL STATUS:20030}    IV Access:  { MEGA IV ACCESS:135618714}    Nursing Mobility/ADLs:  Walking   {CHP DME ADLs:999499594}  Transfer  {CHP DME ADLs:838803968}  Bathing  {CHP DME ADLs:050932018}  Dressing  {CHP DME ADLs:794476838}  Toileting  {CHP DME ADLs:959459538}  Feeding  {CHP DME ADLs:991393567}  Med Admin  {CHP DME ADLs:740835125}  Med Delivery   { MEGA MED Delivery:066039642}    Wound Care Documentation and Therapy:        Elimination:  Continence:   Bowel: {YES / NO:19727}  Bladder: {YES / NO:19727}  Urinary Catheter: {Urinary Catheter:958588357}   Colostomy/Ileostomy/Ileal Conduit: {YES / NO:19727}       Date of Last BM: ***    Intake/Output Summary (Last 24 hours) at 1/21/2025 1558  Last data filed at 1/21/2025 0714  Gross per 24 hour   Intake 1265 ml   Output 1925 ml   Net -660 ml     I/O last 3 completed shifts:  In:

## 2025-01-21 NOTE — PLAN OF CARE
Problem: Safety - Adult  Goal: Free from fall injury  1/20/2025 2245 by Janak Jasso RN  Outcome: Progressing  Flowsheets (Taken 1/20/2025 2243)  Free From Fall Injury: Instruct family/caregiver on patient safety  1/20/2025 1307 by Zoey OG RN  Outcome: Progressing     Problem: Chronic Conditions and Co-morbidities  Goal: Patient's chronic conditions and co-morbidity symptoms are monitored and maintained or improved  1/20/2025 2245 by Janak Jasso RN  Outcome: Progressing  Flowsheets (Taken 1/20/2025 1957)  Care Plan - Patient's Chronic Conditions and Co-Morbidity Symptoms are Monitored and Maintained or Improved:   Monitor and assess patient's chronic conditions and comorbid symptoms for stability, deterioration, or improvement   Collaborate with multidisciplinary team to address chronic and comorbid conditions and prevent exacerbation or deterioration  1/20/2025 1307 by Zoey OG RN  Outcome: Progressing  Flowsheets (Taken 1/20/2025 0826)  Care Plan - Patient's Chronic Conditions and Co-Morbidity Symptoms are Monitored and Maintained or Improved: Monitor and assess patient's chronic conditions and comorbid symptoms for stability, deterioration, or improvement     Problem: Discharge Planning  Goal: Discharge to home or other facility with appropriate resources  1/20/2025 2245 by Janak Jasso RN  Outcome: Progressing  Flowsheets (Taken 1/20/2025 1957)  Discharge to home or other facility with appropriate resources:   Identify barriers to discharge with patient and caregiver   Arrange for needed discharge resources and transportation as appropriate  1/20/2025 1307 by Zoey OG RN  Outcome: Progressing  Flowsheets (Taken 1/20/2025 0826)  Discharge to home or other facility with appropriate resources: Identify barriers to discharge with patient and caregiver     Problem: Confusion  Goal: Confusion, delirium, dementia, or psychosis is improved or at baseline  Description: INTERVENTIONS:  1.  Assess for possible contributors to thought disturbance, including medications, impaired vision or hearing, underlying metabolic abnormalities, dehydration, psychiatric diagnoses, and notify attending LIP  2. Mico high risk fall precautions, as indicated  3. Provide frequent short contacts to provide reality reorientation, refocusing and direction  4. Decrease environmental stimuli, including noise as appropriate  5. Monitor and intervene to maintain adequate nutrition, hydration, elimination, sleep and activity  6. If unable to ensure safety without constant attention obtain sitter and review sitter guidelines with assigned personnel  7. Initiate Psychosocial CNS and Spiritual Care consult, as indicated  1/20/2025 2245 by Janak Jasso, RN  Outcome: Progressing  Flowsheets (Taken 1/20/2025 1957)  Effect of thought disturbance (confusion, delirium, dementia, or psychosis) are managed with adequate functional status:   Assess for contributors to thought disturbance, including medications, impaired vision or hearing, underlying metabolic abnormalities, dehydration, psychiatric diagnoses, notify LIP   Mico high risk fall precautions, as indicated  1/20/2025 1307 by Zoey OG RN  Outcome: Progressing  Flowsheets (Taken 1/20/2025 0826)  Effect of thought disturbance (confusion, delirium, dementia, or psychosis) are managed with adequate functional status:   Assess for contributors to thought disturbance, including medications, impaired vision or hearing, underlying metabolic abnormalities, dehydration, psychiatric diagnoses, notify LIP   Mico high risk fall precautions, as indicated   Provide frequent short contacts to provide reality reorientation, refocusing and direction   Monitor and intervene to maintain adequate nutrition, hydration, elimination, sleep and activity     Problem: Skin/Tissue Integrity  Goal: Absence of new skin breakdown  Description: 1.  Monitor for areas of redness and/or skin

## 2025-01-21 NOTE — CONSULTS
Patient was seen today at bedside along with medical director, patient is nonsensical, delusional making delusional statements. She is angry, agitated.  She states that her mother is in [place] she states that her mother told her that she needs to find a manner she is getting kicked out.  She is very bizarre, loud.  At this time it does appear patient cannot maintain stability safely at home, patient has been made involuntary status.  Patient has poor insight into mental illness and need for treatment, very poor judgment.  Once patient is medically cleared please call section G at extension [phone] for assistance in transferring this patient to [facility]. once bed is available, patient would benefit from medical bed on psychiatric unit.

## 2025-01-21 NOTE — DISCHARGE SUMMARY
[facility] Hospitalist Discharge Summary         Ana Paula Knuz  MRN: [id number]  Account Number: [id number]  Admitted: 1/17/2025  Discharge Date: 01/21/25    PCP Handoff    Recommended Outpatient Testing  None    Results Pending At Discharge  ECHO        Clinical Summary  Patient admitted on 1/17/2025      Ana Paula Kunz is a 69 y[facility]. [facility]  with history of bipolar disorder, hyperlipidemia, non-insulin diabetes mellitus, hypertension presented to [facility] on 1/17/2025 with concern of confusion, not taking her medications frequent falls.  While in the ER patient workup was largely unremarkable.  Patient was admitted for psych evaluation.     Altered mental status  Concern for hira  Patient continues to have nonsensical episodes  Difficult to engage in a conversation.  Psych to reevaluate patient.  Continue home antipsychotics  As needed Haldol  Patient has been accepted to inpatient psych, patient is medically clear for discharge.     Fall:  PT/OT  Echocardiogram-completed, results pending  Orthostatic vitals     Possible UTI  Completed ceftriaxone      Discharge Medications     Medication List        CONTINUE taking these medications      ammonium lactate 12 % cream  Commonly known as: AMLACTIN  APPLY TO AFFECTED AREA EVERY DAY     atorvastatin 40 MG tablet  Commonly known as: LIPITOR  TAKE 1 TABLET BY MOUTH EVERY DAY     benztropine 1 MG tablet  Commonly known as: COGENTIN  Take 1 tablet by mouth nightly     brimonidine 0.2 % ophthalmic solution  Commonly known as: ALPHAGAN     diclofenac sodium 1 % Gel  Commonly known as: VOLTAREN  Apply 4 g topically 4 times daily     empagliflozin 25 MG tablet  Commonly known as: Jardiance  Take 1 tablet by mouth every morning     furosemide 20 MG tablet  Commonly known as: LASIX  Take 1 tablet by mouth every other day     glimepiride 4 MG tablet  Commonly known as: AMARYL  TAKE 1 TABLET BY MOUTH EVERY DAY BEFORE

## 2025-01-21 NOTE — PROGRESS NOTES
HOSPITALIST PROGRESS NOTE    Patient's name: Ana Paula Kunz  : 1955  Admission date: 2025  Date of service: 2025   Primary care physician: Sheryl Wade DO    Assessment   Patient admitted on 2025     Ana Paula Kunz is a 69 y.o. female patient of Sheryl Wade DO with history of bipolar disorder, hyperlipidemia, non-insulin diabetes mellitus, hypertension presented to [facility] on 2025 with concern of confusion, not taking her medications frequent falls.  While in the ER patient workup was largely unremarkable.  Patient was admitted for psych evaluation.    Altered mental status  Concern for hira  Patient continues to have nonsensical episodes  Difficult to engage in a conversation.  Psych to reevaluate patient.  Continue home antipsychotics  As needed Haldol    Fall:  PT/OT  Echocardiogram  Orthostatic vitals    Possible UTI  Related ceftriaxone      Follow labs   DVT prophylaxis Lovenox  Please see orders for further management and care.  Discharge plan: Pending psych evaluation, PT/OT and echocardiogram    Subjective  Ana Paula was seen and examined at bedside.  Patient has pressured speech, nonsensical conversation, difficult to engage in a conversation.  Continues to have episodes of agitation needing Haldol  She does know she is in the hospital and it is January.      Review of Systems  All systems reviewed and negative except mentioned above.       Objective  Most Recent Recorded Vitals  /68   Pulse 97   Temp 97.2 °F (36.2 °C) (Oral)   Resp 18   Ht 1.626 m (5' 4\")   Wt 85.2 kg (187 lb 13.3 oz)   SpO2 96%   BMI 32.24 kg/m²     General appearance: awake, alert No apparent distress, appears stated age and cooperative.  HEENT: Normal cephalic, atraumatic without obvious deformity. Pupils equal, round, and reactive to light.  Extra ocular muscles intact. Conjunctivae/corneas clear.  Neck: Supple, with full range of motion. No jugular venous  Daily PRN Emilykus, Megan Vonda, APRN - NP        acetaminophen (TYLENOL) tablet 650 mg  650 mg Oral Q6H PRN Emilykus, Megan Vonda, APRN - NP        Or    acetaminophen (TYLENOL) suppository 650 mg  650 mg Rectal Q6H PRN Emilykus, Megan Vonda, APRN - NP        melatonin disintegrating tablet 5 mg  5 mg Oral QPM Hunkus, Megan Vonda, APRN - NP   5 mg at 01/20/25 1656    melatonin disintegrating tablet 5 mg  5 mg Oral Nightly PRN Emilykus, Megan Vonda, APRN - NP   5 mg at 01/20/25 2022    hydrALAZINE (APRESOLINE) injection 10 mg  10 mg IntraVENous Q6H PRN Emilykus, Megan Ferrari, APRN - NP        calcium carbonate (TUMS) chewable tablet 500 mg  500 mg Oral TID PRN Janes, Megan Ferrari, APRN - NP        Polyvinyl Alcohol-Povidone PF (REFRESH) 1.4-0.6 % ophthalmic solution 1 drop  1 drop Both Eyes PRN Janes, Megan Ferrari, APRN - NP        sodium chloride (OCEAN, BABY AYR) 0.65 % nasal spray 1 spray  1 spray Each Nostril PRN Janes, Megan Ferrari, APRN - NP        magnesium hydroxide (MILK OF MAGNESIA) 400 MG/5ML suspension 30 mL  30 mL Oral Daily PRN Janes, Megan Nicholasn, APRN - NP        benzocaine-menthol (CEPACOL SORE THROAT) lozenge 1 lozenge  1 lozenge Oral Q2H PRN Janes, Megan Ferrari, APRN - NP        benzonatate (TESSALON) capsule 100 mg  100 mg Oral TID PRN Susieus, Megan Vonda, APRN - NP        insulin lispro (HUMALOG,ADMELOG) injection vial 0-4 Units  0-4 Units SubCUTAneous 4x Daily AC & HS Emilykus, Megan Vonda, APRN - NP   4 Units at 01/20/25 1655       PRN Medications  haloperidol lactate, sulfur hexafluoride microspheres, glucose, dextrose bolus **OR** dextrose bolus, glucagon (rDNA), dextrose, sodium chloride flush, sodium chloride, potassium chloride **OR** potassium alternative oral replacement **OR** potassium chloride, magnesium sulfate, ondansetron **OR** ondansetron, polyethylene glycol, acetaminophen **OR** acetaminophen, melatonin, hydrALAZINE, calcium carbonate, Polyvinyl Alcohol-Povidone PF, sodium chloride, magnesium  hydroxide, benzocaine-menthol, benzonatate    +++++++++++++++++++++++++++++++++++++++++++++++++  Randy Langford MD    [facility], [place]  +++++++++++++++++++++++++++++++++++++++++++++++++  NOTE: This report was transcribed using voice recognition software. Every effort was made to ensure accuracy; however, inadvertent computerized transcription errors may be present.    I can be reached through [email or web address].

## 2025-01-22 PROBLEM — F09 COGNITIVE DISORDER: Status: ACTIVE | Noted: 2025-01-22

## 2025-01-22 LAB
GLUCOSE BLD-MCNC: 148 MG/DL (ref 74–99)
GLUCOSE BLD-MCNC: 186 MG/DL (ref 74–99)
GLUCOSE BLD-MCNC: 191 MG/DL (ref 74–99)
GLUCOSE BLD-MCNC: 324 MG/DL (ref 74–99)

## 2025-01-22 PROCEDURE — 6370000000 HC RX 637 (ALT 250 FOR IP): Performed by: STUDENT IN AN ORGANIZED HEALTH CARE EDUCATION/TRAINING PROGRAM

## 2025-01-22 PROCEDURE — 1240000000 HC EMOTIONAL WELLNESS R&B

## 2025-01-22 PROCEDURE — 90792 PSYCH DIAG EVAL W/MED SRVCS: CPT

## 2025-01-22 PROCEDURE — 6370000000 HC RX 637 (ALT 250 FOR IP)

## 2025-01-22 PROCEDURE — GZ51ZZZ INDIVIDUAL PSYCHOTHERAPY, BEHAVIORAL: ICD-10-PCS | Performed by: PSYCHIATRY & NEUROLOGY

## 2025-01-22 PROCEDURE — 82962 GLUCOSE BLOOD TEST: CPT

## 2025-01-22 RX ORDER — PIOGLITAZONE 45 MG/1
45 TABLET ORAL DAILY
COMMUNITY

## 2025-01-22 RX ORDER — CARBAMAZEPINE 200 MG/1
100 TABLET ORAL 2 TIMES DAILY
Status: DISCONTINUED | OUTPATIENT
Start: 2025-01-22 | End: 2025-01-23

## 2025-01-22 RX ORDER — BENZTROPINE MESYLATE 1 MG/1
1 TABLET ORAL 2 TIMES DAILY
Status: DISCONTINUED | OUTPATIENT
Start: 2025-01-22 | End: 2025-02-06 | Stop reason: HOSPADM

## 2025-01-22 RX ORDER — PALIPERIDONE 3 MG/1
3 TABLET, EXTENDED RELEASE ORAL 2 TIMES DAILY
Status: DISCONTINUED | OUTPATIENT
Start: 2025-01-22 | End: 2025-01-23

## 2025-01-22 RX ORDER — CLONAZEPAM 0.5 MG/1
0.5 TABLET ORAL 2 TIMES DAILY
Status: DISCONTINUED | OUTPATIENT
Start: 2025-01-22 | End: 2025-01-23

## 2025-01-22 RX ADMIN — INSULIN LISPRO 1 UNITS: 100 INJECTION, SOLUTION INTRAVENOUS; SUBCUTANEOUS at 11:35

## 2025-01-22 RX ADMIN — INSULIN LISPRO 3 UNITS: 100 INJECTION, SOLUTION INTRAVENOUS; SUBCUTANEOUS at 21:17

## 2025-01-22 RX ADMIN — Medication 5 MG: at 21:06

## 2025-01-22 RX ADMIN — CLONAZEPAM 0.5 MG: 0.5 TABLET ORAL at 14:24

## 2025-01-22 RX ADMIN — EMPAGLIFLOZIN 10 MG: 10 TABLET, FILM COATED ORAL at 08:07

## 2025-01-22 RX ADMIN — CARBAMAZEPINE 100 MG: 200 TABLET ORAL at 11:35

## 2025-01-22 RX ADMIN — BRIMONIDINE TARTRATE 1 DROP: 2 SOLUTION/ DROPS OPHTHALMIC at 21:03

## 2025-01-22 RX ADMIN — METOPROLOL TARTRATE 25 MG: 25 TABLET, FILM COATED ORAL at 08:07

## 2025-01-22 RX ADMIN — BENZTROPINE MESYLATE 1 MG: 1 TABLET ORAL at 21:09

## 2025-01-22 RX ADMIN — PALIPERIDONE 3 MG: 3 TABLET, EXTENDED RELEASE ORAL at 21:08

## 2025-01-22 RX ADMIN — PALIPERIDONE 3 MG: 3 TABLET, EXTENDED RELEASE ORAL at 14:24

## 2025-01-22 RX ADMIN — LATANOPROST 1 DROP: 50 SOLUTION OPHTHALMIC at 21:03

## 2025-01-22 RX ADMIN — TIMOLOL MALEATE 1 DROP: 5 SOLUTION OPHTHALMIC at 08:05

## 2025-01-22 RX ADMIN — BENZTROPINE MESYLATE 1 MG: 1 TABLET ORAL at 14:24

## 2025-01-22 RX ADMIN — METOPROLOL TARTRATE 25 MG: 25 TABLET, FILM COATED ORAL at 21:06

## 2025-01-22 RX ADMIN — CLONAZEPAM 0.5 MG: 0.5 TABLET ORAL at 21:09

## 2025-01-22 RX ADMIN — BRIMONIDINE TARTRATE 1 DROP: 2 SOLUTION/ DROPS OPHTHALMIC at 08:06

## 2025-01-22 RX ADMIN — INSULIN LISPRO 1 UNITS: 100 INJECTION, SOLUTION INTRAVENOUS; SUBCUTANEOUS at 06:39

## 2025-01-22 RX ADMIN — CARBAMAZEPINE 100 MG: 200 TABLET ORAL at 21:07

## 2025-01-22 RX ADMIN — ATORVASTATIN CALCIUM 40 MG: 40 TABLET, FILM COATED ORAL at 21:07

## 2025-01-22 RX ADMIN — INSULIN GLARGINE 6 UNITS: 100 INJECTION, SOLUTION SUBCUTANEOUS at 21:18

## 2025-01-22 ASSESSMENT — PATIENT HEALTH QUESTIONNAIRE - PHQ9
SUM OF ALL RESPONSES TO PHQ QUESTIONS 1-9: 0
SUM OF ALL RESPONSES TO PHQ QUESTIONS 1-9: 0
SUM OF ALL RESPONSES TO PHQ9 QUESTIONS 1 & 2: 0
SUM OF ALL RESPONSES TO PHQ QUESTIONS 1-9: 0
2. FEELING DOWN, DEPRESSED OR HOPELESS: NOT AT ALL
1. LITTLE INTEREST OR PLEASURE IN DOING THINGS: NOT AT ALL
SUM OF ALL RESPONSES TO PHQ QUESTIONS 1-9: 0

## 2025-01-22 ASSESSMENT — SLEEP AND FATIGUE QUESTIONNAIRES
DO YOU HAVE DIFFICULTY SLEEPING: NO
DO YOU USE A SLEEP AID: NO
AVERAGE NUMBER OF SLEEP HOURS: 6

## 2025-01-22 ASSESSMENT — PAIN SCALES - GENERAL
PAINLEVEL_OUTOF10: 0

## 2025-01-22 NOTE — GROUP NOTE
Date: 1/22/2025  Start Time: 1130    Number of Participants: 7    Type of Group: Recreational    Wellness Binder Information  Module Name:  Today in History     Patient's Goal:  to maintain cognitive function.  To enhance socialization amongst peers.    Notes:  CTRS provided each patient with a handout on today in history and today's holidays.      Status After Intervention:  Unchanged    Participation Level: Minimal    Participation Quality: Intrusive and Resistant      Speech:  loud      Thought Process/Content: Flight of ideas      Affective Functioning: Exaggerated      Mood: irritable      Level of consciousness:  Alert and Preoccupied      Response to Learning: Resistant      Endings: None Reported    Modes of Intervention: Socialization and Activity      Discipline Responsible: Recreational Therapist      Signature:  JOANN Garibay

## 2025-01-22 NOTE — CARE COORDINATION
Biopsychosocial Assessment Note    Social work met with patient to complete the biopsychosocial assessment and C-SSRS.     Chief Complaint: \"My legs\"    Mental Status Exam: Pt is alert and oriented x4. Pt's mood is labile, affect is unstable. Pt's eye contact is fair. Pt's insight and judgement is poor. Pt denied SI, HI, AVH.      Clinical Summary: Pt is a 69-year-old female, who presented to the ER due to her legs. Per ED notes, \"presenting to the ED for edema bilaterally.\"    Pt has multiple past admissions to [facility]. Per pts chart, her last admission 6/15/2020. She treats with [facility] for outpatient mental health services. Pt denied any past suicide attempts. Pt denied trauma/abuse. She denied substance use. She stated that sleep and appetite are normal.     Pt is unemployed. Pt denied a legal history. Pt is single and has 1 child. She denied any family history of mental health. She stated that her mom and siblings are a support for her.     Pt stated that she plans on returning home. She plans to continue with [facility].     Risk Factors:   Several inpatient psychiatric admissions   Mental Health Diagnosis-  Impulsive behaviors   Multiple stressors   Health issues     Protective Factors:   Strong family support   Positive rapport with MH providers   Outpatient provider   Spiritual/ [employer]ism Beliefs   Safe and stable housing   Access to essential/ basic needs   No access to weapons     Gender  [] Male [x] Female [] Transgender  [] Other    Sexual Orientation    [x] Heterosexual [] Homosexual [] Bisexual [] Other    Suicidal Ideation  [] Past [] Present [x] Denies     C-SSRS Screening Completed: Current Suicide Risk:  [x] No Risk  [] Low [] Moderate [] High    Homicidal Ideation  [] Past [] Present [x] Denies     Hallucinations/Delusions (Specify type)  [] Reports [x] Denies     Current or Past Mental Health Treatment:  [x] Yes, When and Where: [facility]  [] No    Substance Use/Alcohol Use/Addiction  []

## 2025-01-22 NOTE — CARE COORDINATION
CTRS met with patient to complete leisure assessment.  Patient nonsensical, elevated mood.  Patient was able to answer most questions but required re-direction at times.       01/22/25 1417   Activities of Daily Living   Patient Requires assistance with daily self-care activities? Yes   Patient identified needing assistance with: Money Management;Transportation;Meal Planning;Food Preparation   Person Assisting details patient reports her brothers or aunt will assist her to where she needs to go   Leisure Activity 1   3 Favorite Leisure Activities \"listening to music, singing, I like to f&^%\"\"   Frequency weekly   Last time this week   Barriers to participating  motivation;social (ie. no one to do it with);transportation   Social   Patient reports spending the majority of their free time with one other person   Patient verbalizes a preference for spending free time with one other person   Patient’s perception of support system more healthy  (reports her mother and brothers are supportive of her)   Patient’s perception of barriers to socializing with others include(s) no perceived barriers   Social Details patient currently lives with her mother per patient.  Per chart, patient's mother is at [facility].  Patient reports she has no job but does receive income.  She reports she has one child and is not . Patient follows outpatient with [facility].   Beliefs & Coping   Has difficulty dealing with feelings   No   Internalizes feelings/Keeps feelings in No   Externalizes feelings through aggressiveness or poor temper control  No   Feels uncomfortable around others  No   Has difficulty talking to others  No   Depends on others for direction or decisions No   Difficulty dealing with anger of others  No   Difficulty dealing with own anger  No   Difficulty managing stress No   Frequently has difficulty with relationships  No   Has recently perceived/experienced loss, disappointment, humiliation or failure  NO   General

## 2025-01-22 NOTE — H&P
Multilevel degenerative changes seen throughout the cervical spine.     CT HEAD WO CONTRAST    Result Date: 1/18/2025  EXAMINATION: CT OF THE HEAD WITHOUT CONTRAST  1/18/2025 8:09 am TECHNIQUE: CT of the head was performed without the administration of intravenous contrast. Automated exposure control, iterative reconstruction, and/or weight based adjustment of the mA/kV was utilized to reduce the radiation dose to as low as reasonably achievable. COMPARISON: None. HISTORY: ORDERING SYSTEM PROVIDED HISTORY: Evaluate intracranial abnormality TECHNOLOGIST PROVIDED HISTORY: Has a \"code stroke\" or \"stroke alert\" been called?->No Reason for exam:->Evaluate intracranial abnormality Decision Support Exception - unselect if not a suspected or confirmed emergency medical condition->Emergency Medical Condition (MA) What reading provider will be dictating this exam?->CRC FINDINGS: BRAIN/VENTRICLES: Generalized atrophy identified the brain.  Low-attenuation areas seen in the periventricular and subcortical white matter to suggest chronic small vessel ischemic change.  There is no acute intracranial hemorrhage, mass effect or midline shift.  No abnormal extra-axial fluid collection.  The gray-white differentiation is maintained without evidence of an acute infarct.  There is no evidence of hydrocephalus. ORBITS: The visualized portion of the orbits demonstrate no acute abnormality. SINUSES: The visualized paranasal sinuses and mastoid air cells demonstrate no acute abnormality. SOFT TISSUES/SKULL:  No acute abnormality of the visualized skull or soft tissues.     Atrophy and chronic changes seen within the brain with no acute intracranial abnormality.     XR CHEST PORTABLE    Result Date: 1/17/2025  EXAMINATION: ONE XRAY VIEW OF THE CHEST 1/17/2025 10:41 pm COMPARISON: 12/06/2019 HISTORY: ORDERING SYSTEM PROVIDED HISTORY: edema TECHNOLOGIST PROVIDED HISTORY: Reason for exam:->edema FINDINGS: The lungs are without acute focal

## 2025-01-22 NOTE — CARE COORDINATION
KAREEN called pt brother Dez [phone] (JUVE signed) to gain collateral. He states that he is unsure if pt was taking her medications or if she needs them adjusted.     He states that pt was living with their mother until 2 weeks ago where mother had to go to [facility] for physical rehab. He states that they would help each other take their medications. He states that she started to show decompensation after mother started to stay at [facility].     He states that she has been stable for 3-4 years now, so she may need a medication adjustment/change to become stable again.     He states that pt returning home is a good plan for pt. He just wants to ensure pt is stable prior to returning home. He is unsure how much longer pt mother will be in [facility], could possibly be discharged Saturday 1/25, but discharge will definitely be no longer than a week from today.     He states that it would be an even better plan for pt to go to a skilled nursing facility prior to returning home to ensure she is physically stronger too. He states that once mentally stable, he would prefer pt go to [facility], but is agreeable to anywhere in [facility].     He denied pt access to guns/weapons.

## 2025-01-22 NOTE — GROUP NOTE
Group Therapy Note    Date: 1/22/2025  Start Time: 0750  End Time:  0810  Number of Participants: 9    Type of Group: Community Meeting    Wellness Binder Information  Module Name:  [facility] [place]      Patient's Goal:  Patient will be oriented to the unit including but not limited expectations, staff, programming schedule.  Patient will be able to ID expectations of treatment.     Notes: Patient was a participant in community meeting, and was updated on expectations of the unit, staffing, programming schedule, and acclimated to their environment.    Patient shared goal for today as \"get trump out of office!  I am going to be the first black president!\"    Status After Intervention:  Unchanged    Participation Level: Interactive and Monopolizing    Participation Quality: Sharing      Speech:  loud      Thought Process/Content: Flight of ideas      Affective Functioning: Exaggerated      Mood: elevated      Level of consciousness:  Alert and Preoccupied      Response to Learning: Able to verbalize current knowledge/experience and Able to verbalize/acknowledge new learning      Endings: None Reported    Modes of Intervention: Exploration, Clarifying, and Problem-solving      Discipline Responsible: Recreational Therapist      Signature:  JOANN Garibay

## 2025-01-23 LAB
GLUCOSE BLD-MCNC: 144 MG/DL (ref 74–99)
GLUCOSE BLD-MCNC: 163 MG/DL (ref 74–99)
GLUCOSE BLD-MCNC: 208 MG/DL (ref 74–99)
GLUCOSE BLD-MCNC: 282 MG/DL (ref 74–99)

## 2025-01-23 PROCEDURE — 97530 THERAPEUTIC ACTIVITIES: CPT

## 2025-01-23 PROCEDURE — 6370000000 HC RX 637 (ALT 250 FOR IP)

## 2025-01-23 PROCEDURE — 6370000000 HC RX 637 (ALT 250 FOR IP): Performed by: STUDENT IN AN ORGANIZED HEALTH CARE EDUCATION/TRAINING PROGRAM

## 2025-01-23 PROCEDURE — 82962 GLUCOSE BLOOD TEST: CPT

## 2025-01-23 PROCEDURE — 1240000000 HC EMOTIONAL WELLNESS R&B

## 2025-01-23 PROCEDURE — 99232 SBSQ HOSP IP/OBS MODERATE 35: CPT

## 2025-01-23 PROCEDURE — 6360000002 HC RX W HCPCS: Performed by: PSYCHIATRY & NEUROLOGY

## 2025-01-23 PROCEDURE — 97165 OT EVAL LOW COMPLEX 30 MIN: CPT

## 2025-01-23 RX ORDER — CLONAZEPAM 0.5 MG/1
0.5 TABLET ORAL DAILY
Status: COMPLETED | OUTPATIENT
Start: 2025-01-24 | End: 2025-01-24

## 2025-01-23 RX ORDER — PALIPERIDONE 3 MG/1
3 TABLET, EXTENDED RELEASE ORAL DAILY
Status: DISCONTINUED | OUTPATIENT
Start: 2025-01-24 | End: 2025-01-26

## 2025-01-23 RX ORDER — DIPHENHYDRAMINE HYDROCHLORIDE 50 MG/ML
25 INJECTION INTRAMUSCULAR; INTRAVENOUS ONCE
Status: COMPLETED | OUTPATIENT
Start: 2025-01-23 | End: 2025-01-23

## 2025-01-23 RX ORDER — CARBAMAZEPINE 200 MG/1
200 TABLET ORAL 2 TIMES DAILY
Status: DISCONTINUED | OUTPATIENT
Start: 2025-01-23 | End: 2025-02-06 | Stop reason: HOSPADM

## 2025-01-23 RX ORDER — PALIPERIDONE 6 MG/1
6 TABLET, EXTENDED RELEASE ORAL NIGHTLY
Status: DISCONTINUED | OUTPATIENT
Start: 2025-01-23 | End: 2025-01-26

## 2025-01-23 RX ORDER — OLANZAPINE 10 MG/2ML
5 INJECTION, POWDER, FOR SOLUTION INTRAMUSCULAR ONCE
Status: COMPLETED | OUTPATIENT
Start: 2025-01-23 | End: 2025-01-23

## 2025-01-23 RX ORDER — CLONAZEPAM 0.5 MG/1
1 TABLET ORAL NIGHTLY
Status: DISCONTINUED | OUTPATIENT
Start: 2025-01-23 | End: 2025-01-24

## 2025-01-23 RX ADMIN — INSULIN GLARGINE 6 UNITS: 100 INJECTION, SOLUTION SUBCUTANEOUS at 22:14

## 2025-01-23 RX ADMIN — INSULIN LISPRO 2 UNITS: 100 INJECTION, SOLUTION INTRAVENOUS; SUBCUTANEOUS at 22:14

## 2025-01-23 RX ADMIN — CARBAMAZEPINE 100 MG: 200 TABLET ORAL at 08:44

## 2025-01-23 RX ADMIN — CLONAZEPAM 1 MG: 0.5 TABLET ORAL at 22:15

## 2025-01-23 RX ADMIN — TIMOLOL MALEATE 1 DROP: 5 SOLUTION OPHTHALMIC at 08:45

## 2025-01-23 RX ADMIN — BENZONATATE 100 MG: 100 CAPSULE ORAL at 08:44

## 2025-01-23 RX ADMIN — CLONAZEPAM 0.5 MG: 0.5 TABLET ORAL at 08:43

## 2025-01-23 RX ADMIN — EMPAGLIFLOZIN 10 MG: 10 TABLET, FILM COATED ORAL at 08:44

## 2025-01-23 RX ADMIN — METOPROLOL TARTRATE 25 MG: 25 TABLET, FILM COATED ORAL at 08:44

## 2025-01-23 RX ADMIN — DIPHENHYDRAMINE HYDROCHLORIDE 25 MG: 50 INJECTION INTRAMUSCULAR; INTRAVENOUS at 22:09

## 2025-01-23 RX ADMIN — BRIMONIDINE TARTRATE 1 DROP: 2 SOLUTION/ DROPS OPHTHALMIC at 08:45

## 2025-01-23 RX ADMIN — INSULIN LISPRO 1 UNITS: 100 INJECTION, SOLUTION INTRAVENOUS; SUBCUTANEOUS at 11:15

## 2025-01-23 RX ADMIN — BENZTROPINE MESYLATE 1 MG: 1 TABLET ORAL at 08:44

## 2025-01-23 RX ADMIN — PALIPERIDONE 3 MG: 3 TABLET, EXTENDED RELEASE ORAL at 08:55

## 2025-01-23 RX ADMIN — OLANZAPINE 5 MG: 10 INJECTION, POWDER, FOR SOLUTION INTRAMUSCULAR at 22:09

## 2025-01-23 ASSESSMENT — PAIN SCALES - GENERAL
PAINLEVEL_OUTOF10: 0

## 2025-01-23 NOTE — CARE COORDINATION
Sw met with pt to discuss discharge plan. SW informed pt that she can return back home or going to a SNF similar to what her mom is completing. Pt stated that she wants to return back home and she denied wanting to go to a SNF. Pt stated that she is excited to go home and she has things she wants to do at home. Pt was bright and laughing during this encounter and began to dance.

## 2025-01-23 NOTE — GROUP NOTE
Shared goal for the day as to sing.                                                                         Group Therapy Note    Date: 1/23/2025    Group Start Time: 0930  Group End Time: 0955  Group Topic: Community Meeting    [facility]    Bessy Arrieta CTRS    Type of Group: Community Meeting      Patient's Goal:  Patient will be able to id staffing assignments, expectations of patients, and general information re: floor rules. Will be prompted to share goal for the day.     Notes:  Patient appeared to be an active listener, taking in information presented and was prompted to share goal for the day.    Status After Intervention:  Improved    Participation Level: Active Listener and Interactive    Participation Quality: Appropriate, Attentive, and Sharing      Speech:  normal      Thought Process/Content: Logical      Affective Functioning: Congruent      Mood: euthymic      Level of consciousness:  Alert, Oriented x4, and Attentive      Response to Learning: Able to verbalize/acknowledge new learning, Able to retain information, and Progressing to goal      Endings: None Reported    Modes of Intervention: Support, Socialization, and Clarifying      Discipline Responsible: Psychoeducational Specialist      Signature:  JOANN Bradford

## 2025-01-23 NOTE — GROUP NOTE
Group Therapy Note    Date: 1/23/2025    Group Start Time: 0955  Group End Time: 1030  Group Topic: Psychoeducation    [facility]    Bessy Arrieta, JOANN        Group Therapy Note      Type of Group: Psychoeducation    Wellness Binder Information  Module Name:  managing change     Patient's Goal:  pt will be able to id what key steps one can focus on to maintain a sense of control, in an uncertain time.     Notes:  pleasant and engaged in group, able to share when prompted. Accepting of handout.     Status After Intervention:  Improved    Participation Level: Active Listener and Interactive    Participation Quality: Appropriate, Attentive, and Sharing      Speech:  normal      Thought Process/Content: Logical      Affective Functioning: Congruent      Mood: euthymic      Level of consciousness:  Alert, Oriented x4, and Attentive      Response to Learning: Able to verbalize/acknowledge new learning, Able to retain information, and Progressing to goal      Endings: None Reported    Modes of Intervention: Education, Support, Socialization, and Problem-solving      Discipline Responsible: Psychoeducational Specialist      Signature:  JOANN Bradford

## 2025-01-24 LAB
GLUCOSE BLD-MCNC: 158 MG/DL (ref 74–99)
GLUCOSE BLD-MCNC: 185 MG/DL (ref 74–99)
GLUCOSE BLD-MCNC: 228 MG/DL (ref 74–99)
GLUCOSE BLD-MCNC: 278 MG/DL (ref 74–99)

## 2025-01-24 PROCEDURE — 1240000000 HC EMOTIONAL WELLNESS R&B

## 2025-01-24 PROCEDURE — 6370000000 HC RX 637 (ALT 250 FOR IP): Performed by: STUDENT IN AN ORGANIZED HEALTH CARE EDUCATION/TRAINING PROGRAM

## 2025-01-24 PROCEDURE — 82962 GLUCOSE BLOOD TEST: CPT

## 2025-01-24 PROCEDURE — 6370000000 HC RX 637 (ALT 250 FOR IP)

## 2025-01-24 PROCEDURE — 99232 SBSQ HOSP IP/OBS MODERATE 35: CPT | Performed by: NURSE PRACTITIONER

## 2025-01-24 RX ORDER — CLONAZEPAM 0.5 MG/1
0.5 TABLET ORAL DAILY
Status: COMPLETED | OUTPATIENT
Start: 2025-01-25 | End: 2025-01-26

## 2025-01-24 RX ORDER — CLONAZEPAM 0.5 MG/1
1 TABLET ORAL NIGHTLY
Status: COMPLETED | OUTPATIENT
Start: 2025-01-24 | End: 2025-01-26

## 2025-01-24 RX ADMIN — INSULIN LISPRO 2 UNITS: 100 INJECTION, SOLUTION INTRAVENOUS; SUBCUTANEOUS at 21:21

## 2025-01-24 RX ADMIN — BENZTROPINE MESYLATE 1 MG: 1 TABLET ORAL at 07:57

## 2025-01-24 RX ADMIN — PALIPERIDONE 6 MG: 6 TABLET, EXTENDED RELEASE ORAL at 21:28

## 2025-01-24 RX ADMIN — INSULIN LISPRO 1 UNITS: 100 INJECTION, SOLUTION INTRAVENOUS; SUBCUTANEOUS at 12:27

## 2025-01-24 RX ADMIN — CLONAZEPAM 0.5 MG: 0.5 TABLET ORAL at 07:57

## 2025-01-24 RX ADMIN — TIMOLOL MALEATE 1 DROP: 5 SOLUTION OPHTHALMIC at 08:13

## 2025-01-24 RX ADMIN — LATANOPROST 1 DROP: 50 SOLUTION OPHTHALMIC at 21:35

## 2025-01-24 RX ADMIN — BENZTROPINE MESYLATE 1 MG: 1 TABLET ORAL at 21:27

## 2025-01-24 RX ADMIN — BRIMONIDINE TARTRATE 1 DROP: 2 SOLUTION/ DROPS OPHTHALMIC at 21:36

## 2025-01-24 RX ADMIN — METOPROLOL TARTRATE 25 MG: 25 TABLET, FILM COATED ORAL at 21:29

## 2025-01-24 RX ADMIN — CARBAMAZEPINE 200 MG: 200 TABLET ORAL at 07:57

## 2025-01-24 RX ADMIN — ATORVASTATIN CALCIUM 40 MG: 40 TABLET, FILM COATED ORAL at 21:28

## 2025-01-24 RX ADMIN — METOPROLOL TARTRATE 25 MG: 25 TABLET, FILM COATED ORAL at 07:57

## 2025-01-24 RX ADMIN — CLONAZEPAM 1 MG: 0.5 TABLET ORAL at 21:29

## 2025-01-24 RX ADMIN — PALIPERIDONE 3 MG: 3 TABLET, EXTENDED RELEASE ORAL at 07:57

## 2025-01-24 RX ADMIN — ACETAMINOPHEN 650 MG: 325 TABLET ORAL at 07:58

## 2025-01-24 RX ADMIN — CARBAMAZEPINE 200 MG: 200 TABLET ORAL at 21:28

## 2025-01-24 RX ADMIN — INSULIN GLARGINE 6 UNITS: 100 INJECTION, SOLUTION SUBCUTANEOUS at 21:26

## 2025-01-24 RX ADMIN — INSULIN LISPRO 1 UNITS: 100 INJECTION, SOLUTION INTRAVENOUS; SUBCUTANEOUS at 17:00

## 2025-01-24 RX ADMIN — BRIMONIDINE TARTRATE 1 DROP: 2 SOLUTION/ DROPS OPHTHALMIC at 08:02

## 2025-01-24 RX ADMIN — EMPAGLIFLOZIN 10 MG: 10 TABLET, FILM COATED ORAL at 07:57

## 2025-01-24 ASSESSMENT — PAIN DESCRIPTION - ORIENTATION: ORIENTATION: RIGHT

## 2025-01-24 ASSESSMENT — PAIN SCALES - GENERAL: PAINLEVEL_OUTOF10: 8

## 2025-01-24 ASSESSMENT — PAIN DESCRIPTION - LOCATION: LOCATION: ARM

## 2025-01-24 ASSESSMENT — PAIN DESCRIPTION - DESCRIPTORS: DESCRIPTORS: ACHING;SORE

## 2025-01-24 NOTE — GROUP NOTE
Group Therapy Note    Date: 1/24/2025    Group Start Time: 1410  Group End Time: 1445  Group Topic: Cognitive Skills    [facility]    Bhavya Vasquez MSW, LSW        Group Therapy Note    Attendees: 7         Patient's Goal:  Pt will be able to identify coping skills for depression and things to help them when they are feeling increased depression.    Notes:  pt participated in group and made connections.     Status After Intervention:  Improved    Participation Level: Active Listener and Interactive    Participation Quality: Appropriate, Attentive, Sharing, and Supportive      Speech:  normal      Thought Process/Content: Logical  Linear      Affective Functioning: Congruent      Mood: anxious      Level of consciousness:  Alert, Oriented x4, and Attentive      Response to Learning: Able to verbalize current knowledge/experience, Able to verbalize/acknowledge new learning, Able to retain information, and Capable of insight      Endings: None Reported    Modes of Intervention: Education, Support, Socialization, Exploration, Clarifying, and Problem-solving      Discipline Responsible: /Counselor      Signature:  MAMI Adrian LSW

## 2025-01-24 NOTE — CARE COORDINATION
SW met with pt who stated that she wants to work on getting her teeth fixed because they are causing her problems. Pt stated that she is feeling happy today and she has been getting a lot happier. Pt stated that she will be returning back home when she is discharged from the hospital and she will follow up with [facility] for mental health services. Pt discussed her legs being swollen and stated that she has not told her RN this information. Pt denied any depression, anxiety, SI, HI, AVH.

## 2025-01-25 LAB
GLUCOSE BLD-MCNC: 147 MG/DL (ref 74–99)
GLUCOSE BLD-MCNC: 185 MG/DL (ref 74–99)
GLUCOSE BLD-MCNC: 208 MG/DL (ref 74–99)
GLUCOSE BLD-MCNC: 360 MG/DL (ref 74–99)

## 2025-01-25 PROCEDURE — 6370000000 HC RX 637 (ALT 250 FOR IP): Performed by: INTERNAL MEDICINE

## 2025-01-25 PROCEDURE — 6370000000 HC RX 637 (ALT 250 FOR IP): Performed by: STUDENT IN AN ORGANIZED HEALTH CARE EDUCATION/TRAINING PROGRAM

## 2025-01-25 PROCEDURE — 82962 GLUCOSE BLOOD TEST: CPT

## 2025-01-25 PROCEDURE — 99232 SBSQ HOSP IP/OBS MODERATE 35: CPT

## 2025-01-25 PROCEDURE — 1240000000 HC EMOTIONAL WELLNESS R&B

## 2025-01-25 PROCEDURE — 6370000000 HC RX 637 (ALT 250 FOR IP)

## 2025-01-25 RX ORDER — INSULIN LISPRO 100 [IU]/ML
0-4 INJECTION, SOLUTION INTRAVENOUS; SUBCUTANEOUS
Status: DISCONTINUED | OUTPATIENT
Start: 2025-01-25 | End: 2025-02-06 | Stop reason: HOSPADM

## 2025-01-25 RX ORDER — GLIPIZIDE 5 MG/1
10 TABLET ORAL
Status: DISCONTINUED | OUTPATIENT
Start: 2025-01-26 | End: 2025-02-01

## 2025-01-25 RX ORDER — FUROSEMIDE 20 MG/1
20 TABLET ORAL DAILY
Status: DISCONTINUED | OUTPATIENT
Start: 2025-01-25 | End: 2025-02-01

## 2025-01-25 RX ORDER — ALOGLIPTIN 12.5 MG/1
12.5 TABLET, FILM COATED ORAL DAILY
Status: DISCONTINUED | OUTPATIENT
Start: 2025-01-26 | End: 2025-02-06 | Stop reason: HOSPADM

## 2025-01-25 RX ORDER — INSULIN LISPRO 100 [IU]/ML
5 INJECTION, SOLUTION INTRAVENOUS; SUBCUTANEOUS
Status: DISCONTINUED | OUTPATIENT
Start: 2025-01-25 | End: 2025-01-25

## 2025-01-25 RX ORDER — PIOGLITAZONE 15 MG/1
45 TABLET ORAL DAILY
Status: DISCONTINUED | OUTPATIENT
Start: 2025-01-25 | End: 2025-02-06 | Stop reason: HOSPADM

## 2025-01-25 RX ORDER — LABETALOL 200 MG/1
200 TABLET, FILM COATED ORAL EVERY 8 HOURS SCHEDULED
Status: DISCONTINUED | OUTPATIENT
Start: 2025-01-25 | End: 2025-02-06 | Stop reason: HOSPADM

## 2025-01-25 RX ADMIN — METFORMIN HYDROCHLORIDE 1000 MG: 1000 TABLET, FILM COATED ORAL at 12:13

## 2025-01-25 RX ADMIN — LATANOPROST 1 DROP: 50 SOLUTION OPHTHALMIC at 21:40

## 2025-01-25 RX ADMIN — PIOGLITAZONE 45 MG: 15 TABLET ORAL at 12:48

## 2025-01-25 RX ADMIN — CLONAZEPAM 0.5 MG: 0.5 TABLET ORAL at 08:50

## 2025-01-25 RX ADMIN — ATORVASTATIN CALCIUM 40 MG: 40 TABLET, FILM COATED ORAL at 21:38

## 2025-01-25 RX ADMIN — METFORMIN HYDROCHLORIDE 1000 MG: 1000 TABLET, FILM COATED ORAL at 17:07

## 2025-01-25 RX ADMIN — INSULIN LISPRO 1 UNITS: 100 INJECTION, SOLUTION INTRAVENOUS; SUBCUTANEOUS at 07:19

## 2025-01-25 RX ADMIN — CLONAZEPAM 1 MG: 0.5 TABLET ORAL at 21:39

## 2025-01-25 RX ADMIN — BENZTROPINE MESYLATE 1 MG: 1 TABLET ORAL at 21:38

## 2025-01-25 RX ADMIN — INSULIN LISPRO 4 UNITS: 100 INJECTION, SOLUTION INTRAVENOUS; SUBCUTANEOUS at 12:13

## 2025-01-25 RX ADMIN — INSULIN LISPRO 1 UNITS: 100 INJECTION, SOLUTION INTRAVENOUS; SUBCUTANEOUS at 21:40

## 2025-01-25 RX ADMIN — PALIPERIDONE 3 MG: 3 TABLET, EXTENDED RELEASE ORAL at 08:50

## 2025-01-25 RX ADMIN — TIMOLOL MALEATE 1 DROP: 5 SOLUTION OPHTHALMIC at 08:48

## 2025-01-25 RX ADMIN — FUROSEMIDE 20 MG: 20 TABLET ORAL at 12:48

## 2025-01-25 RX ADMIN — BRIMONIDINE TARTRATE 1 DROP: 2 SOLUTION/ DROPS OPHTHALMIC at 21:40

## 2025-01-25 RX ADMIN — PALIPERIDONE 6 MG: 6 TABLET, EXTENDED RELEASE ORAL at 21:39

## 2025-01-25 RX ADMIN — EMPAGLIFLOZIN 10 MG: 10 TABLET, FILM COATED ORAL at 08:50

## 2025-01-25 RX ADMIN — CARBAMAZEPINE 200 MG: 200 TABLET ORAL at 21:38

## 2025-01-25 RX ADMIN — LABETALOL HYDROCHLORIDE 200 MG: 200 TABLET, FILM COATED ORAL at 21:39

## 2025-01-25 RX ADMIN — CARBAMAZEPINE 200 MG: 200 TABLET ORAL at 08:50

## 2025-01-25 RX ADMIN — BENZTROPINE MESYLATE 1 MG: 1 TABLET ORAL at 08:50

## 2025-01-25 RX ADMIN — METOPROLOL TARTRATE 25 MG: 25 TABLET, FILM COATED ORAL at 08:52

## 2025-01-25 RX ADMIN — BRIMONIDINE TARTRATE 1 DROP: 2 SOLUTION/ DROPS OPHTHALMIC at 08:49

## 2025-01-25 NOTE — GROUP NOTE
Group Therapy Note    Date: 1/25/2025    Group Start Time: 1400  Group End Time: 1430  Group Topic: Cognitive Skills    [facility]  2    Romelia Reyna        Group Therapy Note    Attendees: 7     Patient's Goal:  Pt attended group therapy where we discussed cognitive distortions.     Notes:  Pt was an active participant in group therapy.     Status After Intervention:  Unchanged    Participation Level: Minimal    Participation Quality: Lethargic      Speech:  normal      Thought Process/Content: Logical      Affective Functioning: Flat      Mood: depressed      Level of consciousness:  Drowsy      Response to Learning: Able to verbalize current knowledge/experience      Endings: None Reported    Modes of Intervention: Education, Support, Socialization, Exploration, Clarifying, and Problem-solving      Discipline Responsible: /Counselor      Signature:  Romelia Reyna

## 2025-01-25 NOTE — CONSULTS
Internal medicine consulted for diabetic management.  Patient is very well controlled hemoglobin A1c 7.2.  Since patient is well controlled on normal home regimen, recommend resuming her home medications that include:    Jardiance 25mg daily  Glimepiride 4mg daily  Metformin 1000 BID  Pioglitazone 45 mg daily  Januvia 100mg daily    Patient's med rec is incorrect, she has not been on metoprolol for years, she takes labetalol 200mg TID    Reordering home medications in a stable patient does not warrant a full consult.    Electronically signed by Xiomara Robertson DO on 1/25/2025 at 12:06 PM

## 2025-01-25 NOTE — GROUP NOTE
Group Therapy Note    Date: 1/25/2025    Group Start Time: 0900  Group End Time: 0920  Group Topic: Community Meeting    [facility]    Mikayla Medel CTRS    Group Therapy Note    Attendees: 8    Date: 1/25/2025  Start Time: 0900  End Time:  0920  Number of Participants: 8    Type of Group: Community Meeting    Patient's Goal:  Increased awareness of functions of the milieu, daily staffing and programming. Identified goal for the day.    Notes:  Patient often disruptive during group with nonsensical, inappropriate speech. Patient difficult to redirect. Patient shared goal for the day as, \"Get the people laughing.\"    Status After Intervention:  Unchanged    Participation Level: Monopolizing    Participation Quality: Inappropriate and Intrusive      Speech:  pressured and loud      Thought Process/Content: Flight of ideas      Affective Functioning: Incongruent      Mood: Labile      Level of consciousness:  Inattentive      Response to Learning: Resistant      Endings: None Reported    Modes of Intervention: Education, Support, Socialization, Exploration, Clarifying, and Problem-solving      Discipline Responsible: Psychoeducational Specialist      Signature:  JOANN Duarte

## 2025-01-26 LAB
CARBAMAZEPINE DOSE: NORMAL MG
CARBAMAZEPINE SERPL-MCNC: 7.4 UG/ML (ref 4–10)
DATE LAST DOSE: NORMAL
GLUCOSE BLD-MCNC: 152 MG/DL (ref 74–99)
GLUCOSE BLD-MCNC: 156 MG/DL (ref 74–99)
GLUCOSE BLD-MCNC: 174 MG/DL (ref 74–99)
GLUCOSE BLD-MCNC: 93 MG/DL (ref 74–99)
TME LAST DOSE: NORMAL H

## 2025-01-26 PROCEDURE — 6370000000 HC RX 637 (ALT 250 FOR IP): Performed by: INTERNAL MEDICINE

## 2025-01-26 PROCEDURE — 6370000000 HC RX 637 (ALT 250 FOR IP): Performed by: STUDENT IN AN ORGANIZED HEALTH CARE EDUCATION/TRAINING PROGRAM

## 2025-01-26 PROCEDURE — 1240000000 HC EMOTIONAL WELLNESS R&B

## 2025-01-26 PROCEDURE — 99232 SBSQ HOSP IP/OBS MODERATE 35: CPT

## 2025-01-26 PROCEDURE — 6370000000 HC RX 637 (ALT 250 FOR IP)

## 2025-01-26 RX ORDER — PALIPERIDONE 3 MG/1
3 TABLET, EXTENDED RELEASE ORAL 2 TIMES DAILY
Status: DISCONTINUED | OUTPATIENT
Start: 2025-01-26 | End: 2025-02-06

## 2025-01-26 RX ADMIN — ALOGLIPTIN 12.5 MG: 12.5 TABLET, FILM COATED ORAL at 09:02

## 2025-01-26 RX ADMIN — GLIPIZIDE 10 MG: 5 TABLET ORAL at 06:53

## 2025-01-26 RX ADMIN — METFORMIN HYDROCHLORIDE 1000 MG: 1000 TABLET, FILM COATED ORAL at 16:36

## 2025-01-26 RX ADMIN — CLONAZEPAM 0.5 MG: 0.5 TABLET ORAL at 09:03

## 2025-01-26 RX ADMIN — FUROSEMIDE 20 MG: 20 TABLET ORAL at 09:02

## 2025-01-26 RX ADMIN — CLONAZEPAM 1 MG: 0.5 TABLET ORAL at 21:43

## 2025-01-26 RX ADMIN — ATORVASTATIN CALCIUM 40 MG: 40 TABLET, FILM COATED ORAL at 21:43

## 2025-01-26 RX ADMIN — BENZTROPINE MESYLATE 1 MG: 1 TABLET ORAL at 21:43

## 2025-01-26 RX ADMIN — LABETALOL HYDROCHLORIDE 200 MG: 200 TABLET, FILM COATED ORAL at 13:33

## 2025-01-26 RX ADMIN — PIOGLITAZONE 45 MG: 15 TABLET ORAL at 09:02

## 2025-01-26 RX ADMIN — BRIMONIDINE TARTRATE 1 DROP: 2 SOLUTION/ DROPS OPHTHALMIC at 21:43

## 2025-01-26 RX ADMIN — CARBAMAZEPINE 200 MG: 200 TABLET ORAL at 21:43

## 2025-01-26 RX ADMIN — PALIPERIDONE 3 MG: 3 TABLET, EXTENDED RELEASE ORAL at 21:43

## 2025-01-26 RX ADMIN — CARBAMAZEPINE 200 MG: 200 TABLET ORAL at 09:02

## 2025-01-26 RX ADMIN — LABETALOL HYDROCHLORIDE 200 MG: 200 TABLET, FILM COATED ORAL at 21:43

## 2025-01-26 RX ADMIN — LATANOPROST 1 DROP: 50 SOLUTION OPHTHALMIC at 21:44

## 2025-01-26 RX ADMIN — METFORMIN HYDROCHLORIDE 1000 MG: 1000 TABLET, FILM COATED ORAL at 09:03

## 2025-01-26 RX ADMIN — PALIPERIDONE 3 MG: 3 TABLET, EXTENDED RELEASE ORAL at 09:02

## 2025-01-26 RX ADMIN — LABETALOL HYDROCHLORIDE 200 MG: 200 TABLET, FILM COATED ORAL at 06:53

## 2025-01-26 RX ADMIN — TIMOLOL MALEATE 1 DROP: 5 SOLUTION OPHTHALMIC at 09:04

## 2025-01-26 RX ADMIN — EMPAGLIFLOZIN 10 MG: 10 TABLET, FILM COATED ORAL at 09:03

## 2025-01-26 RX ADMIN — BRIMONIDINE TARTRATE 1 DROP: 2 SOLUTION/ DROPS OPHTHALMIC at 09:04

## 2025-01-26 RX ADMIN — BENZTROPINE MESYLATE 1 MG: 1 TABLET ORAL at 09:02

## 2025-01-26 NOTE — GROUP NOTE
Shared goal for the day as to not cuss.                                                                       Group Therapy Note    Date: 1/26/2025    Group Start Time: 0900  Group End Time: 0930  Group Topic: Community Meeting    [facility]  1    Bessy Arrieta, HILDAS    Type of Group: Community Meeting      Patient's Goal:  Patient will be able to id staffing assignments, expectations of patients, and general information re: floor rules. Will be prompted to share goal for the day.     Notes:  Patient appeared to be an active listener, taking in information presented and was prompted to share goal for the day.    Status After Intervention:  Improved    Participation Level: Active Listener and Interactive    Participation Quality: Appropriate and Attentive      Speech:  normal      Thought Process/Content: Logical      Affective Functioning: Congruent      Mood: euthymic      Level of consciousness:  Alert, Oriented x4, and Attentive      Response to Learning: Able to verbalize/acknowledge new learning and Able to retain information      Endings: None Reported    Modes of Intervention: Support and Socialization      Discipline Responsible: Psychoeducational Specialist      Signature:  JOANN Bradford

## 2025-01-27 ENCOUNTER — APPOINTMENT (OUTPATIENT)
Dept: CT IMAGING | Age: 70
DRG: 750 | End: 2025-01-27
Attending: PSYCHIATRY & NEUROLOGY
Payer: COMMERCIAL

## 2025-01-27 LAB
GLUCOSE BLD-MCNC: 154 MG/DL (ref 74–99)
GLUCOSE BLD-MCNC: 161 MG/DL (ref 74–99)
GLUCOSE BLD-MCNC: 194 MG/DL (ref 74–99)
GLUCOSE BLD-MCNC: 91 MG/DL (ref 74–99)

## 2025-01-27 PROCEDURE — 99232 SBSQ HOSP IP/OBS MODERATE 35: CPT

## 2025-01-27 PROCEDURE — 6370000000 HC RX 637 (ALT 250 FOR IP)

## 2025-01-27 PROCEDURE — 70450 CT HEAD/BRAIN W/O DYE: CPT

## 2025-01-27 PROCEDURE — 1240000000 HC EMOTIONAL WELLNESS R&B

## 2025-01-27 PROCEDURE — 6370000000 HC RX 637 (ALT 250 FOR IP): Performed by: INTERNAL MEDICINE

## 2025-01-27 PROCEDURE — 6370000000 HC RX 637 (ALT 250 FOR IP): Performed by: STUDENT IN AN ORGANIZED HEALTH CARE EDUCATION/TRAINING PROGRAM

## 2025-01-27 PROCEDURE — 6370000000 HC RX 637 (ALT 250 FOR IP): Performed by: PSYCHIATRY & NEUROLOGY

## 2025-01-27 PROCEDURE — 82962 GLUCOSE BLOOD TEST: CPT

## 2025-01-27 RX ADMIN — LATANOPROST 1 DROP: 50 SOLUTION OPHTHALMIC at 20:52

## 2025-01-27 RX ADMIN — Medication 5 MG: at 20:50

## 2025-01-27 RX ADMIN — PALIPERIDONE 3 MG: 3 TABLET, EXTENDED RELEASE ORAL at 20:50

## 2025-01-27 RX ADMIN — ATORVASTATIN CALCIUM 40 MG: 40 TABLET, FILM COATED ORAL at 20:50

## 2025-01-27 RX ADMIN — INSULIN LISPRO 1 UNITS: 100 INJECTION, SOLUTION INTRAVENOUS; SUBCUTANEOUS at 09:41

## 2025-01-27 RX ADMIN — BENZTROPINE MESYLATE 1 MG: 1 TABLET ORAL at 20:50

## 2025-01-27 RX ADMIN — ALOGLIPTIN 12.5 MG: 12.5 TABLET, FILM COATED ORAL at 09:33

## 2025-01-27 RX ADMIN — BRIMONIDINE TARTRATE 1 DROP: 2 SOLUTION/ DROPS OPHTHALMIC at 09:32

## 2025-01-27 RX ADMIN — FUROSEMIDE 20 MG: 20 TABLET ORAL at 09:34

## 2025-01-27 RX ADMIN — EMPAGLIFLOZIN 10 MG: 10 TABLET, FILM COATED ORAL at 09:34

## 2025-01-27 RX ADMIN — BRIMONIDINE TARTRATE 1 DROP: 2 SOLUTION/ DROPS OPHTHALMIC at 20:52

## 2025-01-27 RX ADMIN — TIMOLOL MALEATE 1 DROP: 5 SOLUTION OPHTHALMIC at 09:32

## 2025-01-27 RX ADMIN — PIOGLITAZONE 45 MG: 15 TABLET ORAL at 09:34

## 2025-01-27 RX ADMIN — METFORMIN HYDROCHLORIDE 1000 MG: 1000 TABLET, FILM COATED ORAL at 09:33

## 2025-01-27 RX ADMIN — HYDROXYZINE HYDROCHLORIDE 25 MG: 25 TABLET ORAL at 02:21

## 2025-01-27 RX ADMIN — GLIPIZIDE 10 MG: 5 TABLET ORAL at 06:42

## 2025-01-27 RX ADMIN — LABETALOL HYDROCHLORIDE 200 MG: 200 TABLET, FILM COATED ORAL at 20:50

## 2025-01-27 RX ADMIN — BENZTROPINE MESYLATE 1 MG: 1 TABLET ORAL at 09:34

## 2025-01-27 RX ADMIN — PALIPERIDONE 3 MG: 3 TABLET, EXTENDED RELEASE ORAL at 09:34

## 2025-01-27 RX ADMIN — Medication 5 MG: at 02:21

## 2025-01-27 RX ADMIN — CARBAMAZEPINE 200 MG: 200 TABLET ORAL at 20:50

## 2025-01-27 RX ADMIN — CARBAMAZEPINE 200 MG: 200 TABLET ORAL at 09:34

## 2025-01-27 RX ADMIN — LABETALOL HYDROCHLORIDE 200 MG: 200 TABLET, FILM COATED ORAL at 06:42

## 2025-01-27 ASSESSMENT — PAIN SCALES - GENERAL
PAINLEVEL_OUTOF10: 0
PAINLEVEL_OUTOF10: 0

## 2025-01-27 NOTE — CARE COORDINATION
Ange called [facility] [phone] and spoke with Rosa, to get pt scheduled for appointments. Rosa stated that she will call this worker back with appointments.

## 2025-01-27 NOTE — GROUP NOTE
Date: 1/27/2025  Start Time: 1000  End Time:  1045  Number of Participants: 12    Type of Group: Psychoeducation    Wellness Binder Information  Module Name:  Supporting someone with depression    Patient's Goal:  To ID at least one way to enhance support.  Explore ways to improve support amongst peers and family members    Notes:  CTRS led educational discussion on ways to improve support for those diagnosed with mental health and depression illnesses.  CTRS provided examples of ways to improve help.  Patient was an active participant in discussion and was accepting of handout.  Patient able to ID at least one way to improve support.      Status After Intervention:  Improved    Participation Level: Active Listener    Participation Quality: Appropriate and Attentive      Speech:  normal      Thought Process/Content: Flight of ideas      Affective Functioning: Congruent      Mood: euthymic      Level of consciousness:  Alert and Attentive      Response to Learning: Able to verbalize current knowledge/experience, Able to verbalize/acknowledge new learning, and Progressing to goal      Endings: None Reported    Modes of Intervention: Education, Exploration, Clarifying, and Problem-solving      Discipline Responsible: Recreational Therapist      Signature:  JOANN Garibay

## 2025-01-27 NOTE — CARE COORDINATION
Sw met with pt to check in. She stated that she wants to go home and not to a skilled nursing facility. She stated that she would like to continue with [facility] post discharge. She stated that she has been sleeping good. Pt denied SI/HI/AVH.

## 2025-01-28 LAB
GLUCOSE BLD-MCNC: 122 MG/DL (ref 74–99)
GLUCOSE BLD-MCNC: 143 MG/DL (ref 74–99)
GLUCOSE BLD-MCNC: 196 MG/DL (ref 74–99)
GLUCOSE BLD-MCNC: 65 MG/DL (ref 74–99)
GLUCOSE BLD-MCNC: 71 MG/DL (ref 74–99)
GLUCOSE BLD-MCNC: 90 MG/DL (ref 74–99)

## 2025-01-28 PROCEDURE — 97535 SELF CARE MNGMENT TRAINING: CPT

## 2025-01-28 PROCEDURE — 6370000000 HC RX 637 (ALT 250 FOR IP): Performed by: PSYCHIATRY & NEUROLOGY

## 2025-01-28 PROCEDURE — 97530 THERAPEUTIC ACTIVITIES: CPT

## 2025-01-28 PROCEDURE — 6370000000 HC RX 637 (ALT 250 FOR IP): Performed by: INTERNAL MEDICINE

## 2025-01-28 PROCEDURE — 6370000000 HC RX 637 (ALT 250 FOR IP): Performed by: STUDENT IN AN ORGANIZED HEALTH CARE EDUCATION/TRAINING PROGRAM

## 2025-01-28 PROCEDURE — 82962 GLUCOSE BLOOD TEST: CPT

## 2025-01-28 PROCEDURE — 1240000000 HC EMOTIONAL WELLNESS R&B

## 2025-01-28 PROCEDURE — 6370000000 HC RX 637 (ALT 250 FOR IP)

## 2025-01-28 PROCEDURE — 99232 SBSQ HOSP IP/OBS MODERATE 35: CPT

## 2025-01-28 RX ADMIN — TIMOLOL MALEATE 1 DROP: 5 SOLUTION OPHTHALMIC at 08:43

## 2025-01-28 RX ADMIN — BENZTROPINE MESYLATE 1 MG: 1 TABLET ORAL at 08:41

## 2025-01-28 RX ADMIN — BRIMONIDINE TARTRATE 1 DROP: 2 SOLUTION/ DROPS OPHTHALMIC at 08:44

## 2025-01-28 RX ADMIN — CARBAMAZEPINE 200 MG: 200 TABLET ORAL at 08:41

## 2025-01-28 RX ADMIN — METFORMIN HYDROCHLORIDE 1000 MG: 1000 TABLET, FILM COATED ORAL at 08:41

## 2025-01-28 RX ADMIN — GLIPIZIDE 10 MG: 5 TABLET ORAL at 06:56

## 2025-01-28 RX ADMIN — PALIPERIDONE 3 MG: 3 TABLET, EXTENDED RELEASE ORAL at 21:36

## 2025-01-28 RX ADMIN — FUROSEMIDE 20 MG: 20 TABLET ORAL at 08:41

## 2025-01-28 RX ADMIN — CARBAMAZEPINE 200 MG: 200 TABLET ORAL at 21:37

## 2025-01-28 RX ADMIN — Medication 5 MG: at 21:36

## 2025-01-28 RX ADMIN — ATORVASTATIN CALCIUM 40 MG: 40 TABLET, FILM COATED ORAL at 21:36

## 2025-01-28 RX ADMIN — BENZTROPINE MESYLATE 1 MG: 1 TABLET ORAL at 21:37

## 2025-01-28 RX ADMIN — LABETALOL HYDROCHLORIDE 200 MG: 200 TABLET, FILM COATED ORAL at 06:56

## 2025-01-28 RX ADMIN — ALOGLIPTIN 12.5 MG: 12.5 TABLET, FILM COATED ORAL at 08:41

## 2025-01-28 RX ADMIN — LABETALOL HYDROCHLORIDE 200 MG: 200 TABLET, FILM COATED ORAL at 13:20

## 2025-01-28 RX ADMIN — LATANOPROST 1 DROP: 50 SOLUTION OPHTHALMIC at 21:43

## 2025-01-28 RX ADMIN — METFORMIN HYDROCHLORIDE 1000 MG: 1000 TABLET, FILM COATED ORAL at 17:50

## 2025-01-28 RX ADMIN — BRIMONIDINE TARTRATE 1 DROP: 2 SOLUTION/ DROPS OPHTHALMIC at 21:42

## 2025-01-28 RX ADMIN — INSULIN LISPRO 1 UNITS: 100 INJECTION, SOLUTION INTRAVENOUS; SUBCUTANEOUS at 21:35

## 2025-01-28 RX ADMIN — PIOGLITAZONE 45 MG: 15 TABLET ORAL at 08:41

## 2025-01-28 RX ADMIN — LABETALOL HYDROCHLORIDE 200 MG: 200 TABLET, FILM COATED ORAL at 21:36

## 2025-01-28 RX ADMIN — EMPAGLIFLOZIN 10 MG: 10 TABLET, FILM COATED ORAL at 08:41

## 2025-01-28 RX ADMIN — HYDROXYZINE HYDROCHLORIDE 25 MG: 25 TABLET ORAL at 21:37

## 2025-01-28 RX ADMIN — Medication 16 G: at 16:36

## 2025-01-28 RX ADMIN — PALIPERIDONE 3 MG: 3 TABLET, EXTENDED RELEASE ORAL at 08:41

## 2025-01-28 ASSESSMENT — PAIN SCALES - GENERAL: PAINLEVEL_OUTOF10: 0

## 2025-01-28 NOTE — GROUP NOTE
Date: 1/28/2025  Start Time: 1030  End Time:  1120  Number of Participants: 7    Type of Group: Psychoeducation    Wellness Binder Information  Module Name:  Self care assessment and tips    Patient's Goal:  to explore possible areas where one can improve self-care.  ID at least one way to improve self-care/overall well being.     Notes:  CTRS facilitated self-care assessment for each patient to explore possible areas where one can improve self-care.  CTRS then led educational discussion on ways to improve self-care/well being.  Patient was an active participant in discussion and activity.  Patient was able to ID at least one way to improve self-care habits.      Status After Intervention:  Improved    Participation Level: Active Listener and Interactive    Participation Quality: Appropriate and Attentive      Speech:  normal      Thought Process/Content: flight of ideas      Affective Functioning: pleasantly confused      Mood: elevated      Level of consciousness:  Alert and Attentive      Response to Learning: Able to verbalize current knowledge/experience, Able to verbalize/acknowledge new learning, and Progressing to goal      Endings: None Reported    Modes of Intervention: Education, Exploration, Clarifying, Problem-solving, and Activity      Discipline Responsible: Recreational Therapist      Signature:  JOANN Garibay

## 2025-01-28 NOTE — GROUP NOTE
Date: 1/28/2025  Start Time: 1500  End Time:  1540  Number of Participants: 7    Type of Group: Recreational    Wellness Binder Information  Module Name:  Old [place]    Patient's Goal:  To improve mood and increase overall relaxation    Notes:  CTRS facilitated old wives tales trivia.  Patient was an active participant in activity and was observed socializing with peers and exhibited an overall relaxed behavior.     Status After Intervention:  Improved    Participation Level: Active Listener and Interactive    Participation Quality: Appropriate, Attentive, and Sharing      Speech:  normal      Thought Process/Content: Logical      Affective Functioning: Congruent      Mood: euthymic      Level of consciousness:  Alert and Attentive      Response to Learning: Able to verbalize current knowledge/experience, Able to verbalize/acknowledge new learning, and Progressing to goal      Endings: None Reported    Modes of Intervention: Socialization, Exploration, and Activity      Discipline Responsible: Recreational Therapist      Signature:  JOANN Garibay    Group Therapy Note    Attendees: 7

## 2025-01-28 NOTE — GROUP NOTE
Group Therapy Note    Date: 1/28/2025    Group Start Time: 0800  Group End Time: 0830  Group Topic: Community Meeting    [facility]    Alissa Vega CTRS    Group Therapy Note    Date: 1/28/2025  Start Time: 0800  End Time:  0830  Number of Participants: 12    Type of Group: Community Meeting    Wellness Binder Information  Module Name:  Community Meeting      Patient's Goal:  Patient will be oriented to the unit including but not limited expectations, staff, programming schedule.  Patient will be able to ID expectations of treatment.     Notes: Patient was a participant in community meeting, and was updated on expectations of the unit, staffing, programming schedule, and acclimated to their environment.    Patient shared goal for today as \"to not talk real loud\"    Status After Intervention:  Improved    Participation Level: Active Listener and Interactive    Participation Quality: Appropriate and Attentive      Speech:  normal      Thought Process/Content: Flight of ideas      Affective Functioning: Congruent      Mood: elevated      Level of consciousness:  Alert and Attentive      Response to Learning: Able to verbalize current knowledge/experience, Able to verbalize/acknowledge new learning, and Progressing to goal      Endings: None Reported    Modes of Intervention: Exploration, Clarifying, and Problem-solving      Discipline Responsible: Recreational Therapist      Signature:  JOANN Garibay

## 2025-01-28 NOTE — CARE COORDINATION
Anesthesia Pre Eval Note    Anesthesia ROS/Med Hx        Anesthetic Complication History:  Patient does not have a history of anesthetic complications      Pulmonary Review:    The patient is a former smoker.     Neuro/Psych Review:  Patient does not have a neuro/psych history       Cardiovascular Review:  Exercise tolerance: good (>4 METS)  Positive for hypertension  Positive for hyperlipidemia    GI/HEPATIC/RENAL Review:    Positive for renal disease (dialysis MWF) - end stage renal disease and bwnkkiad55/4/2021    End/Other Review:  Positive for diabetes - type 2  Additional Results:     ALLERGIES:  No Known Allergies     Prior to Admission medications :  Medication glipiZIDE (GLUCOTROL) 2.5 MG 24 hr tablet, Sig Take 1 tablet by mouth daily., Start Date 9/30/21, End Date , Taking? Yes, Authorizing Provider Michael Porter MD    Medication calcium acetate gelcap (PHOSLO) 667 MG capsule, Sig Take 667 mg by mouth 3 times daily., Start Date 8/6/21, End Date , Taking? Yes, Authorizing Provider Outside Provider    Medication aspirin 325 MG tablet, Sig Take 1 tablet by mouth daily., Start Date 8/31/21, End Date , Taking? Yes, Authorizing Provider Michael Porter MD    Medication pantoprazole (PROTONIX) 40 MG tablet, Sig Take 1 tablet by mouth daily., Start Date 8/31/21, End Date , Taking? Yes, Authorizing Provider Michael Porter MD    Medication amLODIPine (NORVASC) 10 MG tablet, Sig TAKE 1 TABLET BY MOUTH DAILY, Start Date 8/2/21, End Date , Taking? Yes, Authorizing Provider Michael Porter MD    Medication Lancets (freestyle) Misc, Sig USE TO TEST BLOOD GLUCOSE FOUR TIMES DAILY, Start Date 7/12/21, End Date , Taking? Yes, Authorizing Provider Michael Porter MD    Medication atorvastatin (LIPITOR) 40 MG tablet, Sig Take 1 tablet by mouth nightly., Start Date 4/27/21, End Date , Taking? Yes, Authorizing Provider Michael Porter MD    Medication labetalol (NORMODYNE) 200 MG tablet, Sig Take 1 tablet by mouth 3 times  Ange called Jeanine [id number] to schedule updated PT/OT. Ange left eJanine a voicemail.    daily., Start Date 4/27/21, End Date , Taking? Yes, Authorizing Provider Michael Porter MD    Medication losartan (COZAAR) 100 MG tablet, Sig Take 1 tablet by mouth nightly., Start Date 4/27/21, End Date , Taking? Yes, Authorizing Provider Michael Porter MD    Medication blood glucose meter, Sig Test blood sugar 4 times daily. Diagnosis: E11.9 . Meter: as covered by insurance, Start Date 4/27/21, End Date , Taking? Yes, Authorizing Provider Michael Porter MD    Medication blood glucose test strip, Sig Test blood sugar 4 times daily. Diagnosis: E11.9. Meter: as covered by insurance, Start Date 4/27/21, End Date , Taking? Yes, Authorizing Provider Michael Porter MD            Relevant Problems   No relevant active problems       Physical Exam     Airway   Mallampati: II  TM Distance: >3 FB  Neck ROM: Full    Cardiovascular  Cardiovascular exam normal    Head Assessment  Head assessment: Normocephalic and Atraumatic    General Assessment  General Assessment: Alert and oriented and No acute distress    Dental Exam    Patient has:  Denied broken/chipped/loose teeth  Dental Note: 3 missing teeth on bottom    Pulmonary Exam  Pulmonary exam normal    Abdominal Exam  Abdominal exam normal      Anesthesia Plan:  Anesthesia Plan  No phone call attempted due to:  ASA Status: 4    Anesthesia Type: MAC    Premedication: IV      Post-op Pain Management: Per Surgeon      Checklist  Reviewed: Past Med History, Allergies, Medications, Problem list, NPO Status, Patient Summary, Lab Results, DNR Status, Beta Blocker Status, Care Everywhere, Nursing Notes and Consultations  Consent/Risks Discussed Statement:  The proposed anesthetic plan, including its risks and benefits, have been discussed with the Patient along with the risks and benefits of alternatives. Questions were encouraged and answered and the patient and/or representative understands and agrees to proceed.    I have discussed elements of the patient's history or  examination, as noted above and/or as follows, that place the patient at higher risk of complications; kidney disease.    I discussed with the patient (and/or patient's legal representative) the risks and benefits of the proposed anesthesia plan, MAC, which may include services performed by other anesthesia providers.    Alternative anesthesia plans, if available, were reviewed with the patient (and/or patient's legal representative). Discussion has been held with the patient (and/or patient's legal representative) regarding risks of anesthesia, which include intra-operative awareness, allergic reaction, depressed breathing and nausea and emergent situations that may require change in anesthesia plan.    The patient (and/or patient's legal representative) has indicated understanding, his/her questions have been answered, and he/she wishes to proceed with the planned anesthetic.    Blood Products: Not Anticipated    Comments  Plan Comments: Took no meds this am--brought with--will give meds immediately after procedure

## 2025-01-29 LAB
GLUCOSE BLD-MCNC: 115 MG/DL (ref 74–99)
GLUCOSE BLD-MCNC: 120 MG/DL (ref 74–99)
GLUCOSE BLD-MCNC: 138 MG/DL (ref 74–99)
GLUCOSE BLD-MCNC: 80 MG/DL (ref 74–99)

## 2025-01-29 PROCEDURE — 99232 SBSQ HOSP IP/OBS MODERATE 35: CPT

## 2025-01-29 PROCEDURE — 6370000000 HC RX 637 (ALT 250 FOR IP): Performed by: STUDENT IN AN ORGANIZED HEALTH CARE EDUCATION/TRAINING PROGRAM

## 2025-01-29 PROCEDURE — 6370000000 HC RX 637 (ALT 250 FOR IP): Performed by: INTERNAL MEDICINE

## 2025-01-29 PROCEDURE — 6370000000 HC RX 637 (ALT 250 FOR IP)

## 2025-01-29 PROCEDURE — 82962 GLUCOSE BLOOD TEST: CPT

## 2025-01-29 PROCEDURE — 1240000000 HC EMOTIONAL WELLNESS R&B

## 2025-01-29 PROCEDURE — 6370000000 HC RX 637 (ALT 250 FOR IP): Performed by: PSYCHIATRY & NEUROLOGY

## 2025-01-29 RX ADMIN — CARBAMAZEPINE 200 MG: 200 TABLET ORAL at 08:40

## 2025-01-29 RX ADMIN — FUROSEMIDE 20 MG: 20 TABLET ORAL at 08:40

## 2025-01-29 RX ADMIN — PALIPERIDONE 3 MG: 3 TABLET, EXTENDED RELEASE ORAL at 21:30

## 2025-01-29 RX ADMIN — BENZTROPINE MESYLATE 1 MG: 1 TABLET ORAL at 21:30

## 2025-01-29 RX ADMIN — BRIMONIDINE TARTRATE 1 DROP: 2 SOLUTION/ DROPS OPHTHALMIC at 21:35

## 2025-01-29 RX ADMIN — METFORMIN HYDROCHLORIDE 1000 MG: 1000 TABLET, FILM COATED ORAL at 16:44

## 2025-01-29 RX ADMIN — ATORVASTATIN CALCIUM 40 MG: 40 TABLET, FILM COATED ORAL at 21:30

## 2025-01-29 RX ADMIN — CARBAMAZEPINE 200 MG: 200 TABLET ORAL at 21:30

## 2025-01-29 RX ADMIN — GLIPIZIDE 10 MG: 5 TABLET ORAL at 06:43

## 2025-01-29 RX ADMIN — ALOGLIPTIN 12.5 MG: 12.5 TABLET, FILM COATED ORAL at 08:40

## 2025-01-29 RX ADMIN — TIMOLOL MALEATE 1 DROP: 5 SOLUTION OPHTHALMIC at 08:40

## 2025-01-29 RX ADMIN — LABETALOL HYDROCHLORIDE 200 MG: 200 TABLET, FILM COATED ORAL at 06:43

## 2025-01-29 RX ADMIN — LATANOPROST 1 DROP: 50 SOLUTION OPHTHALMIC at 21:35

## 2025-01-29 RX ADMIN — HYDROXYZINE HYDROCHLORIDE 25 MG: 25 TABLET ORAL at 21:30

## 2025-01-29 RX ADMIN — EMPAGLIFLOZIN 10 MG: 10 TABLET, FILM COATED ORAL at 09:07

## 2025-01-29 RX ADMIN — LABETALOL HYDROCHLORIDE 200 MG: 200 TABLET, FILM COATED ORAL at 13:32

## 2025-01-29 RX ADMIN — LABETALOL HYDROCHLORIDE 200 MG: 200 TABLET, FILM COATED ORAL at 21:30

## 2025-01-29 RX ADMIN — PIOGLITAZONE 45 MG: 15 TABLET ORAL at 08:39

## 2025-01-29 RX ADMIN — PALIPERIDONE 3 MG: 3 TABLET, EXTENDED RELEASE ORAL at 08:40

## 2025-01-29 RX ADMIN — Medication 5 MG: at 21:30

## 2025-01-29 RX ADMIN — BRIMONIDINE TARTRATE 1 DROP: 2 SOLUTION/ DROPS OPHTHALMIC at 08:41

## 2025-01-29 RX ADMIN — METFORMIN HYDROCHLORIDE 1000 MG: 1000 TABLET, FILM COATED ORAL at 08:40

## 2025-01-29 RX ADMIN — BENZTROPINE MESYLATE 1 MG: 1 TABLET ORAL at 08:40

## 2025-01-29 ASSESSMENT — PAIN SCALES - GENERAL
PAINLEVEL_OUTOF10: 0
PAINLEVEL_OUTOF10: 0

## 2025-01-29 NOTE — GROUP NOTE
Shared goal for the day as to sing a song.                                                                       Group Therapy Note    Date: 1/29/2025    Group Start Time: 0845  Group End Time: 0910  Group Topic: Community Meeting    [facility]  1    Bessy Arrieta, CTRS    Type of Group: Community Meeting      Patient's Goal:  Patient will be able to id staffing assignments, expectations of patients, and general information re: floor rules. Will be prompted to share goal for the day.     Notes:  Patient appeared to be an active listener, taking in information presented and was prompted to share goal for the day.    Status After Intervention:  Improved    Participation Level: Active Listener and Interactive    Participation Quality: Appropriate and Attentive      Speech:  normal      Thought Process/Content: Logical      Affective Functioning: Congruent      Mood: euthymic      Level of consciousness:  Alert, Oriented x4, and Attentive      Response to Learning: Able to verbalize/acknowledge new learning, Able to retain information, and Progressing to goal      Endings: None Reported    Modes of Intervention: Education, Support, and Clarifying      Discipline Responsible: Psychoeducational Specialist      Signature:  Bessy Arrieta CTRS

## 2025-01-29 NOTE — CARE COORDINATION
KAREEN called pt brother Dez [phone] (JUVE signed) to obtain updated collateral. Dez stated that he talked to her last night and stated that she is still up and down. He stated that she is not ready for discharge and he will know when she is. He stated that he is not sure she is ready to be discharged to a skilled nursing facility yet. He stated that she is still singing when she talks. He stated that mom is at the ER and is supposed to be released today and will be staying with his brother until pt is ready to go home.

## 2025-01-29 NOTE — GROUP NOTE
Date: 1/29/2025  Start Time: 1015  End Time:  1055  Number of Participants: 8    Type of Group: Psychoeducation    Wellness Binder Information  Module Name:  Stop Overthinking!    Patient's Goal:  To help improve decision making skills in daily decisions and life changing decisions.  Patient will be able to ID at least one way to help improve racing thoughts and/or negative thoughts.      Notes:  CTRS led educational discussion on ways to improve decision making skills.  Patient accepted handout.  Patient sat with behavioral health assistant and colored pictures during group.  Patient exhibited a more reserved behavior and did not engage in disruptive behavior during group programming.    Status After Intervention:  Improved    Participation Level: Active Listener    Participation Quality: Attentive      Speech:  normal      Thought Process/Content: Flight of ideas      Affective Functioning: Congruent      Mood: content      Level of consciousness:  Alert and Attentive      Response to Learning: Able to verbalize current knowledge/experience and Able to verbalize/acknowledge new learning      Endings: None Reported    Modes of Intervention: Education, Exploration, Clarifying, and Problem-solving      Discipline Responsible: Recreational Therapist      Signature:  JOANN Garibay

## 2025-01-29 NOTE — CARE COORDINATION
KAREEN met with pt to discuss discharge plan. Pt was seen in her room. Pt stated that she does not want to go to a SNF and she wants to return home. Tech discussed AL with pt and pt stated that she changed her mind and does not want to go. Pt denied any depression, anxiety, SI, HI, AVH.

## 2025-01-30 LAB
GLUCOSE BLD-MCNC: 102 MG/DL (ref 74–99)
GLUCOSE BLD-MCNC: 104 MG/DL (ref 74–99)
GLUCOSE BLD-MCNC: 107 MG/DL (ref 74–99)
GLUCOSE BLD-MCNC: 54 MG/DL (ref 74–99)
GLUCOSE BLD-MCNC: 69 MG/DL (ref 74–99)
GLUCOSE BLD-MCNC: 85 MG/DL (ref 74–99)

## 2025-01-30 PROCEDURE — 99232 SBSQ HOSP IP/OBS MODERATE 35: CPT

## 2025-01-30 PROCEDURE — 82962 GLUCOSE BLOOD TEST: CPT

## 2025-01-30 PROCEDURE — 6370000000 HC RX 637 (ALT 250 FOR IP): Performed by: INTERNAL MEDICINE

## 2025-01-30 PROCEDURE — 1240000000 HC EMOTIONAL WELLNESS R&B

## 2025-01-30 PROCEDURE — 6370000000 HC RX 637 (ALT 250 FOR IP): Performed by: PSYCHIATRY & NEUROLOGY

## 2025-01-30 PROCEDURE — 6370000000 HC RX 637 (ALT 250 FOR IP): Performed by: STUDENT IN AN ORGANIZED HEALTH CARE EDUCATION/TRAINING PROGRAM

## 2025-01-30 PROCEDURE — 6370000000 HC RX 637 (ALT 250 FOR IP)

## 2025-01-30 RX ADMIN — LABETALOL HYDROCHLORIDE 200 MG: 200 TABLET, FILM COATED ORAL at 21:46

## 2025-01-30 RX ADMIN — EMPAGLIFLOZIN 10 MG: 10 TABLET, FILM COATED ORAL at 09:11

## 2025-01-30 RX ADMIN — LATANOPROST 1 DROP: 50 SOLUTION OPHTHALMIC at 21:50

## 2025-01-30 RX ADMIN — CARBAMAZEPINE 200 MG: 200 TABLET ORAL at 09:11

## 2025-01-30 RX ADMIN — PALIPERIDONE 3 MG: 3 TABLET, EXTENDED RELEASE ORAL at 21:46

## 2025-01-30 RX ADMIN — BENZTROPINE MESYLATE 1 MG: 1 TABLET ORAL at 21:47

## 2025-01-30 RX ADMIN — ALOGLIPTIN 12.5 MG: 12.5 TABLET, FILM COATED ORAL at 09:11

## 2025-01-30 RX ADMIN — HYDROXYZINE HYDROCHLORIDE 25 MG: 25 TABLET ORAL at 21:47

## 2025-01-30 RX ADMIN — BENZTROPINE MESYLATE 1 MG: 1 TABLET ORAL at 09:11

## 2025-01-30 RX ADMIN — BRIMONIDINE TARTRATE 1 DROP: 2 SOLUTION/ DROPS OPHTHALMIC at 09:09

## 2025-01-30 RX ADMIN — Medication 5 MG: at 21:47

## 2025-01-30 RX ADMIN — ATORVASTATIN CALCIUM 40 MG: 40 TABLET, FILM COATED ORAL at 21:46

## 2025-01-30 RX ADMIN — LABETALOL HYDROCHLORIDE 200 MG: 200 TABLET, FILM COATED ORAL at 06:50

## 2025-01-30 RX ADMIN — PALIPERIDONE 3 MG: 3 TABLET, EXTENDED RELEASE ORAL at 09:11

## 2025-01-30 RX ADMIN — GLIPIZIDE 10 MG: 5 TABLET ORAL at 06:50

## 2025-01-30 RX ADMIN — METFORMIN HYDROCHLORIDE 1000 MG: 1000 TABLET, FILM COATED ORAL at 09:11

## 2025-01-30 RX ADMIN — BRIMONIDINE TARTRATE 1 DROP: 2 SOLUTION/ DROPS OPHTHALMIC at 21:50

## 2025-01-30 RX ADMIN — METFORMIN HYDROCHLORIDE 1000 MG: 1000 TABLET, FILM COATED ORAL at 16:51

## 2025-01-30 RX ADMIN — FUROSEMIDE 20 MG: 20 TABLET ORAL at 09:11

## 2025-01-30 RX ADMIN — TIMOLOL MALEATE 1 DROP: 5 SOLUTION OPHTHALMIC at 09:09

## 2025-01-30 RX ADMIN — Medication 16 G: at 11:08

## 2025-01-30 RX ADMIN — LABETALOL HYDROCHLORIDE 200 MG: 200 TABLET, FILM COATED ORAL at 14:07

## 2025-01-30 RX ADMIN — PIOGLITAZONE 45 MG: 15 TABLET ORAL at 09:11

## 2025-01-30 RX ADMIN — CARBAMAZEPINE 200 MG: 200 TABLET ORAL at 21:46

## 2025-01-30 NOTE — CARE COORDINATION
KAREEN received voicemail from [facility] Liaison Opal [phone] who states that pt is accepted to a semi private room at [facility] at [facility] if pt is interested.     SW met with pt to discuss. Pt states that she would be agreeable to going to [facility] at the [facility] if this is the only facility willing to accept her. She is hopeful to get closer to [place] if possible. SW informed pt that SW will discuss with treatment team and keep her updated. Pt verbalized understanding. She denied questions or concerns or SW at this time.     KAREEN called Opal [phone] and informed her that pt is interested in going to [facility]. She states that if this is where pt decides to go, then she is able to start pre-cert, but is aware that pt wants to see if she is accepted elsewhere first. SW informed her that SW will continue to provide updates.     KAREEN called [facility] Port[facility] [phone] to follow up on pt referral. No answer, KAREEN left voicemail.     KAREEN called [place] leticia Rios [phone] to follow up on pt referral. No answer, KAREEN left voicemail.

## 2025-01-30 NOTE — GROUP NOTE
Group Therapy Note    Date: 1/30/2025    Group Start Time: 1425  Group End Time: 1500  Group Topic: Psychoeducation    [facility] 1    Bessy Arrieta, CTRS    Date: 1/30/2025  Module Name:  processing big changes    Patient's Goal:   will be able to id what key points one can focus on when life   Feels like all thing are out of your control.     Notes:  pleasant and engaged in group, sharing and accepting of handout.     Status After Intervention:  Improved    Participation Level: Active Listener and Interactive    Participation Quality: Appropriate, Attentive, and Sharing      Speech:  normal      Thought Process/Content: Logical      Affective Functioning: Congruent      Mood: euthymic      Level of consciousness:  Alert, Oriented x4, and Attentive      Response to Learning: Able to verbalize/acknowledge new learning, Able to retain information, and Progressing to goal      Endings: None Reported    Modes of Intervention: Education, Support, Socialization, and Clarifying      Discipline Responsible: Psychoeducational Specialist      Signature:  JOANN Bradford

## 2025-01-30 NOTE — CARE COORDINATION
SW met with pt to discuss discharge plan. SW informed pt that we have been speaking with family and they think it is a good idea for her to go to skilled nursing facility to get a little bit stronger prior to returning home. SW informed her that this is the same thing her mother did. Pt states that she would be agreeable to go to a skilled nursing facility temporarily prior to returning home and would prefer to go to [facility] as this is where her mother went, but is otherwise agreeable to any facility willing to accept her.     [facility] approved    SW called [facility] [phone] who states that [facility] is full. She states that there are other facilities that she is able to refer to in [facility]. SW placed referral with Lindsey.     SW called [facility] Liaison Opal [phone] and placed referral for facilities in [facility].     SW called [place] liaison Blanca [phone] and left voicemail to place referral for pt.

## 2025-01-31 LAB
ALBUMIN SERPL-MCNC: 3.7 G/DL (ref 3.5–5.2)
ALP SERPL-CCNC: 87 U/L (ref 35–104)
ALT SERPL-CCNC: 42 U/L (ref 0–32)
ANION GAP SERPL CALCULATED.3IONS-SCNC: 12 MMOL/L (ref 7–16)
AST SERPL-CCNC: 42 U/L (ref 0–31)
BILIRUB SERPL-MCNC: 0.2 MG/DL (ref 0–1.2)
BUN SERPL-MCNC: 29 MG/DL (ref 6–23)
CALCIUM SERPL-MCNC: 9.2 MG/DL (ref 8.6–10.2)
CHLORIDE SERPL-SCNC: 100 MMOL/L (ref 98–107)
CO2 SERPL-SCNC: 23 MMOL/L (ref 22–29)
CREAT SERPL-MCNC: 0.9 MG/DL (ref 0.5–1)
GFR, ESTIMATED: 72 ML/MIN/1.73M2
GLUCOSE BLD-MCNC: 104 MG/DL (ref 74–99)
GLUCOSE BLD-MCNC: 111 MG/DL (ref 74–99)
GLUCOSE BLD-MCNC: 126 MG/DL (ref 74–99)
GLUCOSE BLD-MCNC: 53 MG/DL (ref 74–99)
GLUCOSE BLD-MCNC: 58 MG/DL (ref 74–99)
GLUCOSE SERPL-MCNC: 102 MG/DL (ref 74–99)
POTASSIUM SERPL-SCNC: 4.3 MMOL/L (ref 3.5–5)
PROT SERPL-MCNC: 7.1 G/DL (ref 6.4–8.3)
SODIUM SERPL-SCNC: 135 MMOL/L (ref 132–146)

## 2025-01-31 PROCEDURE — 6370000000 HC RX 637 (ALT 250 FOR IP): Performed by: INTERNAL MEDICINE

## 2025-01-31 PROCEDURE — 80053 COMPREHEN METABOLIC PANEL: CPT

## 2025-01-31 PROCEDURE — 6370000000 HC RX 637 (ALT 250 FOR IP)

## 2025-01-31 PROCEDURE — 97530 THERAPEUTIC ACTIVITIES: CPT

## 2025-01-31 PROCEDURE — 82962 GLUCOSE BLOOD TEST: CPT

## 2025-01-31 PROCEDURE — 36415 COLL VENOUS BLD VENIPUNCTURE: CPT

## 2025-01-31 PROCEDURE — 99232 SBSQ HOSP IP/OBS MODERATE 35: CPT

## 2025-01-31 PROCEDURE — 1240000000 HC EMOTIONAL WELLNESS R&B

## 2025-01-31 PROCEDURE — 97535 SELF CARE MNGMENT TRAINING: CPT

## 2025-01-31 PROCEDURE — 6370000000 HC RX 637 (ALT 250 FOR IP): Performed by: STUDENT IN AN ORGANIZED HEALTH CARE EDUCATION/TRAINING PROGRAM

## 2025-01-31 PROCEDURE — 6370000000 HC RX 637 (ALT 250 FOR IP): Performed by: PSYCHIATRY & NEUROLOGY

## 2025-01-31 RX ADMIN — PALIPERIDONE 3 MG: 3 TABLET, EXTENDED RELEASE ORAL at 21:15

## 2025-01-31 RX ADMIN — LABETALOL HYDROCHLORIDE 200 MG: 200 TABLET, FILM COATED ORAL at 06:36

## 2025-01-31 RX ADMIN — BRIMONIDINE TARTRATE 1 DROP: 2 SOLUTION/ DROPS OPHTHALMIC at 08:35

## 2025-01-31 RX ADMIN — EMPAGLIFLOZIN 10 MG: 10 TABLET, FILM COATED ORAL at 08:33

## 2025-01-31 RX ADMIN — PIOGLITAZONE 45 MG: 15 TABLET ORAL at 08:33

## 2025-01-31 RX ADMIN — LABETALOL HYDROCHLORIDE 200 MG: 200 TABLET, FILM COATED ORAL at 21:15

## 2025-01-31 RX ADMIN — ALOGLIPTIN 12.5 MG: 12.5 TABLET, FILM COATED ORAL at 08:33

## 2025-01-31 RX ADMIN — METFORMIN HYDROCHLORIDE 1000 MG: 1000 TABLET, FILM COATED ORAL at 16:22

## 2025-01-31 RX ADMIN — GLIPIZIDE 10 MG: 5 TABLET ORAL at 06:38

## 2025-01-31 RX ADMIN — BRIMONIDINE TARTRATE 1 DROP: 2 SOLUTION/ DROPS OPHTHALMIC at 21:19

## 2025-01-31 RX ADMIN — BENZTROPINE MESYLATE 1 MG: 1 TABLET ORAL at 08:33

## 2025-01-31 RX ADMIN — FUROSEMIDE 20 MG: 20 TABLET ORAL at 08:33

## 2025-01-31 RX ADMIN — PALIPERIDONE 3 MG: 3 TABLET, EXTENDED RELEASE ORAL at 08:33

## 2025-01-31 RX ADMIN — CARBAMAZEPINE 200 MG: 200 TABLET ORAL at 21:15

## 2025-01-31 RX ADMIN — TIMOLOL MALEATE 1 DROP: 5 SOLUTION OPHTHALMIC at 08:35

## 2025-01-31 RX ADMIN — CARBAMAZEPINE 200 MG: 200 TABLET ORAL at 08:33

## 2025-01-31 RX ADMIN — LABETALOL HYDROCHLORIDE 200 MG: 200 TABLET, FILM COATED ORAL at 13:42

## 2025-01-31 RX ADMIN — ATORVASTATIN CALCIUM 40 MG: 40 TABLET, FILM COATED ORAL at 21:15

## 2025-01-31 RX ADMIN — Medication 5 MG: at 21:15

## 2025-01-31 RX ADMIN — METFORMIN HYDROCHLORIDE 1000 MG: 1000 TABLET, FILM COATED ORAL at 08:33

## 2025-01-31 RX ADMIN — HYDROXYZINE HYDROCHLORIDE 25 MG: 25 TABLET ORAL at 19:06

## 2025-01-31 RX ADMIN — LATANOPROST 1 DROP: 50 SOLUTION OPHTHALMIC at 21:19

## 2025-01-31 RX ADMIN — BENZTROPINE MESYLATE 1 MG: 1 TABLET ORAL at 21:15

## 2025-01-31 NOTE — CARE COORDINATION
KAREEN called [facility] [phone] who states that they have a possible female bed opening today and she will keep KAREEN updated, otherwise, they do not have a female bed available.

## 2025-01-31 NOTE — CARE COORDINATION
KAREEN received voicemail from [facility] liaison Blanca [phone] who states that they do not have a female bed available.     KAREEN called pt brother Dez [phone] (JUVE signed) to discuss pt discharge plan. He feels that [facility] is too far away for pt to go to. KAREEN informed him that pt is agreeable, however he states that she is not mentally stable enough to make these decisions. He states that he spoke with pt this morning and odes not feel she is doing much better, feels that she needs more treatment prior to discharge. He states that if she is mentally stable enough, then he would like for her to return. Home. KAREEN informed him that this is ultimately up to pt. He understands, but does not feel pt is in the right state of mind to make decisions at this time.     KAREEN called [facility] Liaison Lindsey [phone] to follow up on pt referral. No answer, KAREEN left voicemail.     KAREEN spoke with NP and agreed to start precert as pt is agreeable to this facility.     KAREEN called and ordered updated PT/OT evals.     KAREEN called [facility] Liaison Mima [phone] and advised her to start precert. She states that she will start precert once updated PT/OT evals are in.

## 2025-01-31 NOTE — GROUP NOTE
Group Therapy Note    Date: 1/31/2025    Group Start Time: 1030  Group End Time: 1115  Group Topic: Psychoeducation    [facility] 1    Bessy Arrieta, CTRS    Date: 1/31/2025  Module Name:  id of stressors dailies vs life events     Patient's Goal:  pt will be able to id daily events and life circumstances that contribute to current stress levels.     Notes:  pleasant and sharing in group, able to contribute appropriately.     Status After Intervention:  Improved    Participation Level: Active Listener and Interactive    Participation Quality: Appropriate, Attentive, and Sharing      Speech:  normal      Thought Process/Content: Logical      Affective Functioning: Congruent      Mood: euthymic      Level of consciousness:  Alert, Oriented x4, and Attentive      Response to Learning: Able to verbalize/acknowledge new learning, Able to retain information, and Progressing to goal      Endings: None Reported    Modes of Intervention: Education, Support, Socialization, and Problem-solving      Discipline Responsible: Psychoeducational Specialist      Signature:  JOANN Bradford

## 2025-01-31 NOTE — CARE COORDINATION
KAREEN called [facility] Portillo [phone] to follow up on pt referral. No answer, KAREEN left voicemail.     KAREEN called [place] [facility] [place] [phone] to follow up on bed availability. No answer, KAREEN left voicemail.

## 2025-01-31 NOTE — GROUP NOTE
Shared goal for the day as to sing.                                                                       Group Therapy Note    Date: 1/31/2025    Group Start Time: 1015  Group End Time: 1030  Group Topic: Community Meeting    [facility] 1    Bessy Arrieta, JOANN    Type of Group: Community Meeting      Patient's Goal:  Patient will be able to id staffing assignments, expectations of patients, and general information re: floor rules. Will be prompted to share goal for the day.     Notes:  Patient appeared to be an active listener, taking in information presented and was prompted to share goal for the day.    Status After Intervention:  Improved    Participation Level: Active Listener and Interactive    Participation Quality: Appropriate, Attentive, and Sharing      Speech:  normal      Thought Process/Content: Logical      Affective Functioning: Congruent      Mood: euthymic      Level of consciousness:  Alert, Oriented x4, and Attentive      Response to Learning: Able to verbalize/acknowledge new learning, Able to retain information, and Progressing to goal      Endings: None Reported    Modes of Intervention: Education, Support, Socialization, and Problem-solving      Discipline Responsible: Psychoeducational Specialist      Signature:  JOANN Bradford

## 2025-02-01 PROBLEM — E11.649 HYPOGLYCEMIA ASSOCIATED WITH TYPE 2 DIABETES MELLITUS (HCC): Status: ACTIVE | Noted: 2025-02-01

## 2025-02-01 LAB
ANION GAP SERPL CALCULATED.3IONS-SCNC: 13 MMOL/L (ref 7–16)
BNP SERPL-MCNC: 772 PG/ML (ref 0–125)
BUN SERPL-MCNC: 30 MG/DL (ref 6–23)
CALCIUM SERPL-MCNC: 8.9 MG/DL (ref 8.6–10.2)
CHLORIDE SERPL-SCNC: 99 MMOL/L (ref 98–107)
CO2 SERPL-SCNC: 21 MMOL/L (ref 22–29)
CREAT SERPL-MCNC: 0.9 MG/DL (ref 0.5–1)
ERYTHROCYTE [DISTWIDTH] IN BLOOD BY AUTOMATED COUNT: 14.6 % (ref 11.5–15)
GFR, ESTIMATED: 74 ML/MIN/1.73M2
GLUCOSE BLD-MCNC: 107 MG/DL (ref 74–99)
GLUCOSE BLD-MCNC: 78 MG/DL (ref 74–99)
GLUCOSE BLD-MCNC: 92 MG/DL (ref 74–99)
GLUCOSE BLD-MCNC: 93 MG/DL (ref 74–99)
GLUCOSE SERPL-MCNC: 69 MG/DL (ref 74–99)
HCT VFR BLD AUTO: 36 % (ref 34–48)
HGB BLD-MCNC: 11.5 G/DL (ref 11.5–15.5)
MCH RBC QN AUTO: 28.8 PG (ref 26–35)
MCHC RBC AUTO-ENTMCNC: 31.9 G/DL (ref 32–34.5)
MCV RBC AUTO: 90 FL (ref 80–99.9)
PLATELET # BLD AUTO: 234 K/UL (ref 130–450)
PMV BLD AUTO: 10.2 FL (ref 7–12)
POTASSIUM SERPL-SCNC: 4.8 MMOL/L (ref 3.5–5)
RBC # BLD AUTO: 4 M/UL (ref 3.5–5.5)
SODIUM SERPL-SCNC: 133 MMOL/L (ref 132–146)
WBC OTHER # BLD: 6.6 K/UL (ref 4.5–11.5)

## 2025-02-01 PROCEDURE — 99255 IP/OBS CONSLTJ NEW/EST HI 80: CPT | Performed by: NURSE PRACTITIONER

## 2025-02-01 PROCEDURE — 6370000000 HC RX 637 (ALT 250 FOR IP): Performed by: INTERNAL MEDICINE

## 2025-02-01 PROCEDURE — 6370000000 HC RX 637 (ALT 250 FOR IP): Performed by: STUDENT IN AN ORGANIZED HEALTH CARE EDUCATION/TRAINING PROGRAM

## 2025-02-01 PROCEDURE — 6370000000 HC RX 637 (ALT 250 FOR IP)

## 2025-02-01 PROCEDURE — 1240000000 HC EMOTIONAL WELLNESS R&B

## 2025-02-01 PROCEDURE — 83880 ASSAY OF NATRIURETIC PEPTIDE: CPT

## 2025-02-01 PROCEDURE — 36415 COLL VENOUS BLD VENIPUNCTURE: CPT

## 2025-02-01 PROCEDURE — 6370000000 HC RX 637 (ALT 250 FOR IP): Performed by: PSYCHIATRY & NEUROLOGY

## 2025-02-01 PROCEDURE — 82962 GLUCOSE BLOOD TEST: CPT

## 2025-02-01 PROCEDURE — 6370000000 HC RX 637 (ALT 250 FOR IP): Performed by: NURSE PRACTITIONER

## 2025-02-01 PROCEDURE — 85027 COMPLETE CBC AUTOMATED: CPT

## 2025-02-01 PROCEDURE — 80048 BASIC METABOLIC PNL TOTAL CA: CPT

## 2025-02-01 PROCEDURE — 99232 SBSQ HOSP IP/OBS MODERATE 35: CPT

## 2025-02-01 RX ORDER — POTASSIUM CHLORIDE 1500 MG/1
40 TABLET, EXTENDED RELEASE ORAL
Status: DISCONTINUED | OUTPATIENT
Start: 2025-02-02 | End: 2025-02-04

## 2025-02-01 RX ORDER — FUROSEMIDE 40 MG/1
40 TABLET ORAL DAILY
Status: DISCONTINUED | OUTPATIENT
Start: 2025-02-02 | End: 2025-02-01

## 2025-02-01 RX ORDER — FUROSEMIDE 40 MG/1
40 TABLET ORAL 2 TIMES DAILY
Status: DISCONTINUED | OUTPATIENT
Start: 2025-02-01 | End: 2025-02-03

## 2025-02-01 RX ADMIN — METFORMIN HYDROCHLORIDE 1000 MG: 1000 TABLET, FILM COATED ORAL at 08:38

## 2025-02-01 RX ADMIN — Medication 5 MG: at 21:24

## 2025-02-01 RX ADMIN — BENZTROPINE MESYLATE 1 MG: 1 TABLET ORAL at 08:36

## 2025-02-01 RX ADMIN — FUROSEMIDE 20 MG: 20 TABLET ORAL at 08:36

## 2025-02-01 RX ADMIN — CARBAMAZEPINE 200 MG: 200 TABLET ORAL at 08:36

## 2025-02-01 RX ADMIN — LABETALOL HYDROCHLORIDE 200 MG: 200 TABLET, FILM COATED ORAL at 07:10

## 2025-02-01 RX ADMIN — TIMOLOL MALEATE 1 DROP: 5 SOLUTION OPHTHALMIC at 08:35

## 2025-02-01 RX ADMIN — LABETALOL HYDROCHLORIDE 200 MG: 200 TABLET, FILM COATED ORAL at 13:00

## 2025-02-01 RX ADMIN — METFORMIN HYDROCHLORIDE 1000 MG: 1000 TABLET, FILM COATED ORAL at 16:26

## 2025-02-01 RX ADMIN — FUROSEMIDE 40 MG: 40 TABLET ORAL at 16:33

## 2025-02-01 RX ADMIN — LATANOPROST 1 DROP: 50 SOLUTION OPHTHALMIC at 21:25

## 2025-02-01 RX ADMIN — BENZTROPINE MESYLATE 1 MG: 1 TABLET ORAL at 21:24

## 2025-02-01 RX ADMIN — PALIPERIDONE 3 MG: 3 TABLET, EXTENDED RELEASE ORAL at 21:24

## 2025-02-01 RX ADMIN — HYDROXYZINE HYDROCHLORIDE 25 MG: 25 TABLET ORAL at 21:24

## 2025-02-01 RX ADMIN — ALOGLIPTIN 12.5 MG: 12.5 TABLET, FILM COATED ORAL at 08:36

## 2025-02-01 RX ADMIN — PALIPERIDONE 3 MG: 3 TABLET, EXTENDED RELEASE ORAL at 08:36

## 2025-02-01 RX ADMIN — GLIPIZIDE 10 MG: 5 TABLET ORAL at 07:10

## 2025-02-01 RX ADMIN — HYDROXYZINE HYDROCHLORIDE 25 MG: 25 TABLET ORAL at 13:00

## 2025-02-01 RX ADMIN — BRIMONIDINE TARTRATE 1 DROP: 2 SOLUTION/ DROPS OPHTHALMIC at 08:35

## 2025-02-01 RX ADMIN — BRIMONIDINE TARTRATE 1 DROP: 2 SOLUTION/ DROPS OPHTHALMIC at 21:24

## 2025-02-01 RX ADMIN — CARBAMAZEPINE 200 MG: 200 TABLET ORAL at 21:24

## 2025-02-01 RX ADMIN — ATORVASTATIN CALCIUM 40 MG: 40 TABLET, FILM COATED ORAL at 21:24

## 2025-02-01 RX ADMIN — LABETALOL HYDROCHLORIDE 200 MG: 200 TABLET, FILM COATED ORAL at 21:24

## 2025-02-01 RX ADMIN — PIOGLITAZONE 45 MG: 15 TABLET ORAL at 08:36

## 2025-02-01 RX ADMIN — EMPAGLIFLOZIN 10 MG: 10 TABLET, FILM COATED ORAL at 08:36

## 2025-02-01 ASSESSMENT — PAIN SCALES - GENERAL: PAINLEVEL_OUTOF10: 0

## 2025-02-01 NOTE — CONSULTS
Hospitalist Consult History & Physical        Reason for Consult:      Bilateral swelling of lower extremities       Date of Service: Pt seen/examined in consultation on 2/1/2025    History of Present Illness:    Ms. Ana Paula Kunz, a 69 y.o. year old female  who  has a past medical history of Acute gouty arthritis, Bell's palsy, Bipolar disorder (HCC), Carpal tunnel syndrome, Hyperlipidemia, Non-insulin dependent type 2 diabetes mellitus (HCC), Sepsis (HCC), Systemic primary arterial hypertension, and Type 2 diabetes with peripheral circulatory disorder, controlled (HCC).     Patient originally presented to the ED on 1/17  with complaints of peripheral edema that began 1 day prior to arrival.  Per family patient has not been herself--making nonsensical statements, not taking her medications correctly, frequent falls.  She was treated for UTI and ultimately discharged to [facility] on 1/21 severe manic bipolar disorder with psychotic behavior.     Our service has been consulted for peripheral edema. She is a poor historian but reports her legs look better to her and they looked like tree trunks before.  She denies any orthopnea, WELCH, SOB, palpitations, cough, leg pain.     Past Medical History:        Diagnosis Date    Acute gouty arthritis 06/09/2020    Bell's palsy     Bipolar disorder (HCC)     bipolar    Carpal tunnel syndrome 08/04/2016    Hyperlipidemia     Non-insulin dependent type 2 diabetes mellitus (HCC)     Sepsis (HCC) 12/06/2019    Systemic primary arterial hypertension 08/04/2016    Type 2 diabetes with peripheral circulatory disorder, controlled ([facility]) 05/25/2023       Past Surgical History:        Procedure Laterality Date    COLONOSCOPY      ECHO COMPLETE  06/22/2013         VAGINA SURGERY         Medications Prior to Admission:    Prior to Admission medications    Medication Sig Start Date End Date Taking? Authorizing Provider   glimepiride (AMARYL) 4 MG tablet TAKE 1 TABLET BY MOUTH EVERY DAY BEFORE

## 2025-02-01 NOTE — GROUP NOTE
Group Therapy Note    Date: 2/1/2025    Group Start Time: 1400  Group End Time: 1450  Group Topic: Cognitive Skills    [facility] AC[facility]  2    Romelia Reyna        Group Therapy Note    Attendees: 10       Patient's Goal:  Pt will be able to identify strengths and qualities within themselves and practice using those strengths.     Notes:  Pt participated in group and engaged in positive discussion.       Status After Intervention:  Unchanged    Participation Level: Minimal    Participation Quality: Lethargic      Speech:  normal      Thought Process/Content: Logical      Affective Functioning: Flat      Mood: depressed      Level of consciousness:  Drowsy      Response to Learning: Able to verbalize current knowledge/experience      Endings: None Reported    Modes of Intervention: Education, Support, Socialization, Exploration, Clarifying, and Problem-solving      Discipline Responsible: /Counselor      Signature:  Romelia Reyna

## 2025-02-02 LAB
ANION GAP SERPL CALCULATED.3IONS-SCNC: 14 MMOL/L (ref 7–16)
BUN SERPL-MCNC: 29 MG/DL (ref 6–23)
CALCIUM SERPL-MCNC: 9.4 MG/DL (ref 8.6–10.2)
CHLORIDE SERPL-SCNC: 100 MMOL/L (ref 98–107)
CO2 SERPL-SCNC: 26 MMOL/L (ref 22–29)
CREAT SERPL-MCNC: 1 MG/DL (ref 0.5–1)
GFR, ESTIMATED: 65 ML/MIN/1.73M2
GLUCOSE BLD-MCNC: 120 MG/DL (ref 74–99)
GLUCOSE BLD-MCNC: 132 MG/DL (ref 74–99)
GLUCOSE BLD-MCNC: 133 MG/DL (ref 74–99)
GLUCOSE BLD-MCNC: 93 MG/DL (ref 74–99)
GLUCOSE SERPL-MCNC: 101 MG/DL (ref 74–99)
POTASSIUM SERPL-SCNC: 4.2 MMOL/L (ref 3.5–5)
SODIUM SERPL-SCNC: 140 MMOL/L (ref 132–146)

## 2025-02-02 PROCEDURE — 1240000000 HC EMOTIONAL WELLNESS R&B

## 2025-02-02 PROCEDURE — 80048 BASIC METABOLIC PNL TOTAL CA: CPT

## 2025-02-02 PROCEDURE — 6370000000 HC RX 637 (ALT 250 FOR IP)

## 2025-02-02 PROCEDURE — 36415 COLL VENOUS BLD VENIPUNCTURE: CPT

## 2025-02-02 PROCEDURE — 6370000000 HC RX 637 (ALT 250 FOR IP): Performed by: INTERNAL MEDICINE

## 2025-02-02 PROCEDURE — 6370000000 HC RX 637 (ALT 250 FOR IP): Performed by: NURSE PRACTITIONER

## 2025-02-02 PROCEDURE — 99232 SBSQ HOSP IP/OBS MODERATE 35: CPT | Performed by: INTERNAL MEDICINE

## 2025-02-02 PROCEDURE — 6370000000 HC RX 637 (ALT 250 FOR IP): Performed by: PSYCHIATRY & NEUROLOGY

## 2025-02-02 PROCEDURE — 99232 SBSQ HOSP IP/OBS MODERATE 35: CPT | Performed by: NURSE PRACTITIONER

## 2025-02-02 PROCEDURE — 6370000000 HC RX 637 (ALT 250 FOR IP): Performed by: STUDENT IN AN ORGANIZED HEALTH CARE EDUCATION/TRAINING PROGRAM

## 2025-02-02 PROCEDURE — 82962 GLUCOSE BLOOD TEST: CPT

## 2025-02-02 RX ADMIN — ALOGLIPTIN 12.5 MG: 12.5 TABLET, FILM COATED ORAL at 08:30

## 2025-02-02 RX ADMIN — HYDROXYZINE HYDROCHLORIDE 25 MG: 25 TABLET ORAL at 21:32

## 2025-02-02 RX ADMIN — PIOGLITAZONE 45 MG: 15 TABLET ORAL at 08:30

## 2025-02-02 RX ADMIN — ATORVASTATIN CALCIUM 40 MG: 40 TABLET, FILM COATED ORAL at 21:31

## 2025-02-02 RX ADMIN — LABETALOL HYDROCHLORIDE 200 MG: 200 TABLET, FILM COATED ORAL at 13:45

## 2025-02-02 RX ADMIN — METFORMIN HYDROCHLORIDE 1000 MG: 1000 TABLET, FILM COATED ORAL at 08:30

## 2025-02-02 RX ADMIN — LATANOPROST 1 DROP: 50 SOLUTION OPHTHALMIC at 21:32

## 2025-02-02 RX ADMIN — FUROSEMIDE 40 MG: 40 TABLET ORAL at 16:25

## 2025-02-02 RX ADMIN — POTASSIUM CHLORIDE 40 MEQ: 1500 TABLET, EXTENDED RELEASE ORAL at 08:30

## 2025-02-02 RX ADMIN — BRIMONIDINE TARTRATE 1 DROP: 2 SOLUTION/ DROPS OPHTHALMIC at 21:32

## 2025-02-02 RX ADMIN — CARBAMAZEPINE 200 MG: 200 TABLET ORAL at 21:31

## 2025-02-02 RX ADMIN — EMPAGLIFLOZIN 10 MG: 10 TABLET, FILM COATED ORAL at 08:30

## 2025-02-02 RX ADMIN — BRIMONIDINE TARTRATE 1 DROP: 2 SOLUTION/ DROPS OPHTHALMIC at 08:29

## 2025-02-02 RX ADMIN — TIMOLOL MALEATE 1 DROP: 5 SOLUTION OPHTHALMIC at 08:29

## 2025-02-02 RX ADMIN — Medication 5 MG: at 21:31

## 2025-02-02 RX ADMIN — BENZTROPINE MESYLATE 1 MG: 1 TABLET ORAL at 21:31

## 2025-02-02 RX ADMIN — BENZTROPINE MESYLATE 1 MG: 1 TABLET ORAL at 08:30

## 2025-02-02 RX ADMIN — LABETALOL HYDROCHLORIDE 200 MG: 200 TABLET, FILM COATED ORAL at 21:31

## 2025-02-02 RX ADMIN — METFORMIN HYDROCHLORIDE 1000 MG: 1000 TABLET, FILM COATED ORAL at 16:25

## 2025-02-02 RX ADMIN — CARBAMAZEPINE 200 MG: 200 TABLET ORAL at 08:30

## 2025-02-02 RX ADMIN — PALIPERIDONE 3 MG: 3 TABLET, EXTENDED RELEASE ORAL at 08:30

## 2025-02-02 RX ADMIN — PALIPERIDONE 3 MG: 3 TABLET, EXTENDED RELEASE ORAL at 21:31

## 2025-02-02 RX ADMIN — FUROSEMIDE 40 MG: 40 TABLET ORAL at 08:33

## 2025-02-02 RX ADMIN — LABETALOL HYDROCHLORIDE 200 MG: 200 TABLET, FILM COATED ORAL at 06:45

## 2025-02-02 NOTE — GROUP NOTE
Group Therapy Note    Date: 2/2/2025    Group Start Time: 1045  Group End Time: 1130  Group Topic: Cognitive Skills    [facility] 2    Bhavya Vasquez MSW, LSW        Group Therapy Note    Attendees: 15       Patient's Goal:  Pt will be able to identify relationship conflict resolution skills and understand how to navigate problems within relationships.    Notes:  pt participated in group and made connections.    Status After Intervention:  Improved    Participation Level: Active Listener and Interactive    Participation Quality: Appropriate, Attentive, Sharing, and Supportive      Speech:  normal      Thought Process/Content: Logical  Linear      Affective Functioning: Congruent      Mood: anxious      Level of consciousness:  Alert, Oriented x4, and Attentive      Response to Learning: Able to verbalize current knowledge/experience, Able to verbalize/acknowledge new learning, Able to retain information, and Capable of insight      Endings: None Reported    Modes of Intervention: Education, Support, Socialization, Exploration, Clarifying, and Problem-solving      Discipline Responsible: /Counselor      Signature:  MAMI Adrian LSW

## 2025-02-02 NOTE — GROUP NOTE
Group Therapy Note    Date: 2/2/2025    Group Start Time: 0900  Group End Time: 0930  Group Topic: Psychoeducation    [facility] 2    Mikayla Medel CTRS    Group Therapy Note    Attendees: 14    Date: 2/2/2025  Start Time: 0900  End Time:  0930  Number of Participants: 14    Type of Group: Psychoeducation    Name:  Sleep Hygiene    Patient's Goal:  Increased awareness of how sleep affects mental health, stages of sleep and way to improve sleep habits.     Notes:  CTRS led educational group discussion on sleep hygiene. Encouraged patients to share their experiences. Patient was actively engaged in group discussion and made positive responses.    Status After Intervention:  Improved    Participation Level: Active Listener and Interactive    Participation Quality: Appropriate, Attentive, and Sharing      Speech:  normal      Thought Process/Content: Logical  Linear      Affective Functioning: Congruent      Mood:  Appropriate      Level of consciousness:  Alert and Attentive      Response to Learning: Able to verbalize current knowledge/experience, Able to verbalize/acknowledge new learning, Able to retain information, Capable of insight, Able to change behavior, and Progressing to goal      Endings: None Reported    Modes of Intervention: Education, Support, Socialization, Exploration, Clarifying, and Problem-solving      Discipline Responsible: Psychoeducational Specialist      Signature:  JOANN Duarte

## 2025-02-02 NOTE — GROUP NOTE
Group Therapy Note    Date: 2/2/2025    Group Start Time: 0845  Group End Time: 0900  Group Topic: Community Meeting    [facility] 2    Mikayla Medel CTRS    Group Therapy Note    Attendees: 14    Date: 2/2/2025  Start Time: 0845  End Time:  0900  Number of Participants: 14    Type of Group: Community Meeting    Patient's Goal:  Increased awareness of expectations of the milieu, daily staffing and programming. Identified goal for the day.    Notes:  Patient was an active listener in group. Patient shared goal for the day as, \"Get my point across.\" Patient's speech was rapid, slurred and often difficult to understand.     Status After Intervention:  Improved    Participation Level: Active Listener and Interactive    Participation Quality: Attentive and Sharing      Speech:  Pressured, slurred      Thought Process/Content: Logical  Linear      Affective Functioning: Congruent      Mood:  Appropriate      Level of consciousness:  Alert and Attentive      Response to Learning: Able to verbalize current knowledge/experience, Able to verbalize/acknowledge new learning, Able to retain information, Capable of insight, Able to change behavior, and Progressing to goal      Endings: None Reported    Modes of Intervention: Education, Support, Socialization, Exploration, Clarifying, and Problem-solving      Discipline Responsible: Psychoeducational Specialist      Signature:  JOANN Duarte

## 2025-02-03 LAB
ANION GAP SERPL CALCULATED.3IONS-SCNC: 19 MMOL/L (ref 7–16)
BUN SERPL-MCNC: 34 MG/DL (ref 6–23)
CALCIUM SERPL-MCNC: 9.6 MG/DL (ref 8.6–10.2)
CHLORIDE SERPL-SCNC: 100 MMOL/L (ref 98–107)
CO2 SERPL-SCNC: 20 MMOL/L (ref 22–29)
CREAT SERPL-MCNC: 1.1 MG/DL (ref 0.5–1)
GFR, ESTIMATED: 54 ML/MIN/1.73M2
GLUCOSE BLD-MCNC: 122 MG/DL (ref 74–99)
GLUCOSE BLD-MCNC: 140 MG/DL (ref 74–99)
GLUCOSE BLD-MCNC: 147 MG/DL (ref 74–99)
GLUCOSE BLD-MCNC: 158 MG/DL (ref 74–99)
GLUCOSE SERPL-MCNC: 142 MG/DL (ref 74–99)
POTASSIUM SERPL-SCNC: 5 MMOL/L (ref 3.5–5)
SODIUM SERPL-SCNC: 139 MMOL/L (ref 132–146)

## 2025-02-03 PROCEDURE — 6370000000 HC RX 637 (ALT 250 FOR IP)

## 2025-02-03 PROCEDURE — 99232 SBSQ HOSP IP/OBS MODERATE 35: CPT

## 2025-02-03 PROCEDURE — 82962 GLUCOSE BLOOD TEST: CPT

## 2025-02-03 PROCEDURE — 36415 COLL VENOUS BLD VENIPUNCTURE: CPT

## 2025-02-03 PROCEDURE — 80048 BASIC METABOLIC PNL TOTAL CA: CPT

## 2025-02-03 PROCEDURE — 6370000000 HC RX 637 (ALT 250 FOR IP): Performed by: PSYCHIATRY & NEUROLOGY

## 2025-02-03 PROCEDURE — 1240000000 HC EMOTIONAL WELLNESS R&B

## 2025-02-03 PROCEDURE — 6370000000 HC RX 637 (ALT 250 FOR IP): Performed by: STUDENT IN AN ORGANIZED HEALTH CARE EDUCATION/TRAINING PROGRAM

## 2025-02-03 PROCEDURE — 97530 THERAPEUTIC ACTIVITIES: CPT

## 2025-02-03 PROCEDURE — 99232 SBSQ HOSP IP/OBS MODERATE 35: CPT | Performed by: INTERNAL MEDICINE

## 2025-02-03 PROCEDURE — 6370000000 HC RX 637 (ALT 250 FOR IP): Performed by: INTERNAL MEDICINE

## 2025-02-03 PROCEDURE — 6370000000 HC RX 637 (ALT 250 FOR IP): Performed by: NURSE PRACTITIONER

## 2025-02-03 RX ORDER — INSULIN GLARGINE 100 [IU]/ML
20 INJECTION, SOLUTION SUBCUTANEOUS NIGHTLY
Qty: 5 ADJUSTABLE DOSE PRE-FILLED PEN SYRINGE | Refills: 3 | Status: SHIPPED | OUTPATIENT
Start: 2025-02-03

## 2025-02-03 RX ORDER — FUROSEMIDE 40 MG/1
40 TABLET ORAL DAILY
Status: DISCONTINUED | OUTPATIENT
Start: 2025-02-04 | End: 2025-02-04

## 2025-02-03 RX ADMIN — Medication 5 MG: at 21:48

## 2025-02-03 RX ADMIN — BENZTROPINE MESYLATE 1 MG: 1 TABLET ORAL at 21:47

## 2025-02-03 RX ADMIN — LABETALOL HYDROCHLORIDE 200 MG: 200 TABLET, FILM COATED ORAL at 06:37

## 2025-02-03 RX ADMIN — METFORMIN HYDROCHLORIDE 1000 MG: 1000 TABLET, FILM COATED ORAL at 09:48

## 2025-02-03 RX ADMIN — ALOGLIPTIN 12.5 MG: 12.5 TABLET, FILM COATED ORAL at 09:47

## 2025-02-03 RX ADMIN — LABETALOL HYDROCHLORIDE 200 MG: 200 TABLET, FILM COATED ORAL at 13:40

## 2025-02-03 RX ADMIN — METFORMIN HYDROCHLORIDE 1000 MG: 1000 TABLET, FILM COATED ORAL at 16:39

## 2025-02-03 RX ADMIN — BRIMONIDINE TARTRATE 1 DROP: 2 SOLUTION/ DROPS OPHTHALMIC at 21:51

## 2025-02-03 RX ADMIN — POTASSIUM CHLORIDE 40 MEQ: 1500 TABLET, EXTENDED RELEASE ORAL at 09:47

## 2025-02-03 RX ADMIN — PALIPERIDONE 3 MG: 3 TABLET, EXTENDED RELEASE ORAL at 21:48

## 2025-02-03 RX ADMIN — PALIPERIDONE 3 MG: 3 TABLET, EXTENDED RELEASE ORAL at 09:47

## 2025-02-03 RX ADMIN — BENZTROPINE MESYLATE 1 MG: 1 TABLET ORAL at 09:48

## 2025-02-03 RX ADMIN — HYDROXYZINE HYDROCHLORIDE 25 MG: 25 TABLET ORAL at 21:48

## 2025-02-03 RX ADMIN — EMPAGLIFLOZIN 10 MG: 10 TABLET, FILM COATED ORAL at 09:48

## 2025-02-03 RX ADMIN — LATANOPROST 1 DROP: 50 SOLUTION OPHTHALMIC at 21:50

## 2025-02-03 RX ADMIN — CARBAMAZEPINE 200 MG: 200 TABLET ORAL at 21:47

## 2025-02-03 RX ADMIN — PIOGLITAZONE 45 MG: 15 TABLET ORAL at 09:47

## 2025-02-03 RX ADMIN — BRIMONIDINE TARTRATE 1 DROP: 2 SOLUTION/ DROPS OPHTHALMIC at 09:47

## 2025-02-03 RX ADMIN — TIMOLOL MALEATE 1 DROP: 5 SOLUTION OPHTHALMIC at 09:47

## 2025-02-03 RX ADMIN — CARBAMAZEPINE 200 MG: 200 TABLET ORAL at 09:48

## 2025-02-03 RX ADMIN — ATORVASTATIN CALCIUM 40 MG: 40 TABLET, FILM COATED ORAL at 21:47

## 2025-02-03 RX ADMIN — LABETALOL HYDROCHLORIDE 200 MG: 200 TABLET, FILM COATED ORAL at 21:48

## 2025-02-03 RX ADMIN — FUROSEMIDE 40 MG: 40 TABLET ORAL at 09:48

## 2025-02-03 ASSESSMENT — PAIN SCALES - GENERAL
PAINLEVEL_OUTOF10: 0
PAINLEVEL_OUTOF10: 0

## 2025-02-03 NOTE — TELEPHONE ENCOUNTER
Name of Medication(s) Requested:  Requested Prescriptions     Pending Prescriptions Disp Refills    LANTUS SOLOSTAR 100 UNIT/ML injection pen [Pharmacy Med Name: LANTUS SOLOSTAR 100 UNIT/ML]  3     Sig: INJECT 20 UNITS INTO THE SKIN NIGHTLY       Medication is on current medication list: No    Dosage and directions were verified? Yes    Quantity verified: 30 day supply     Pharmacy Verified?  Yes    Last Appointment:  11/1/2024    Future appts:  Future Appointments   Date Time Provider Department Center   2/7/2025 11:00 AM Sheryl Wade DO [facility] DE[facility]        (If no appt send self scheduling link. .REFILLAPPT)  Scheduling request sent?     [] Yes  [x] No    Does patient need updated?  [] Yes  [x] No

## 2025-02-03 NOTE — GROUP NOTE
Group Therapy Note    Date: 2/3/2025  Start Time: 0750  End Time:  0830  Number of Participants: 16    Type of Group: Community Meeting    Wellness Binder Information  Module Name:  Community Meeting      Patient's Goal:  Patient will be oriented to the unit including but not limited expectations, staff, programming schedule.  Patient will be able to ID expectations of treatment.     Notes: Patient was a participant in community meeting, and was updated on expectations of the unit, staffing, programming schedule, and acclimated to their environment.    Patient shared goal for today as \"get the hell out of here and doing the wild thing!\"    Status After Intervention:  Improved    Participation Level: Active Listener and Interactive    Participation Quality: Attentive and Sharing      Speech:  loud      Thought Process/Content: Flight of ideas      Affective Functioning: Congruent      Mood: elevated      Level of consciousness:  Alert and Attentive      Response to Learning: Able to verbalize current knowledge/experience, Able to verbalize/acknowledge new learning, and Progressing to goal      Endings: None Reported    Modes of Intervention: Exploration, Clarifying, and Problem-solving      Discipline Responsible: Recreational Therapist      Signature:  JOANN Garibay

## 2025-02-03 NOTE — GROUP NOTE
Group Therapy Note    Date: 2/3/2025    Group Start Time: 0205  Group End Time: 0250  Group Topic: Cognitive Skills    [facility] 1    Arsen Simms, RICCI BOLAÑOS        Group Therapy Note    Attendees: 7       Patient's Goal:  Pt attended group therapy where we read the poem Desiderata and learned about trauma.    Notes:  Pt was an active participant in group therapy.    Status After Intervention:  Improved    Participation Level: Active Listener and Interactive    Participation Quality: Attentive and Sharing      Speech:  slurred      Thought Process/Content: Logical      Affective Functioning: Congruent      Mood: euthymic      Level of consciousness:  Alert and Attentive      Response to Learning: Able to verbalize current knowledge/experience and Able to retain information      Endings: None Reported    Modes of Intervention: Education, Support, Socialization, Exploration, Clarifying, and Problem-solving      Discipline Responsible: /Counselor      Signature:  MAMI Henry, RICCI

## 2025-02-03 NOTE — CARE COORDINATION
Ange called [facility]ia[facility] Mima [phone] to inform her of the updated PT/OT. She stated that she needs the PASRR. Ange stated that ot came back.     Ange faxed PASRR to Mima.

## 2025-02-03 NOTE — GROUP NOTE
Group Therapy Note    Date: 2/3/2025    Group Start Time: 1000  Group End Time: 1055  Group Topic: Psychoeducation    [facility] 2    Alissa Vega CTRS    Date: 2/3/2025  Start Time: 1000  End Time:  1055  Number of Participants: 13    Type of Group: Psychoeducation    Wellness Binder Information  Module Name:  Grounding Techniques    Patient's Goal:  To explore different opportunities to incorporate grounding techniques.  Patient will be able to ID at least one way to engage in grounding.      Notes:  CTRS led educational discussion on grounding techniques.  CTRS discussed different grounding techniques one can attempt and utilize when needed.  Patient was an active participant in discussion and was accepting of handout.  Patient receptive to the information received.      Status After Intervention:  Improved    Participation Level: Active Listener    Participation Quality: Appropriate and Attentive      Speech:  normal      Thought Process/Content: Logical      Affective Functioning: Congruent      Mood: euthymic      Level of consciousness:  Alert and Attentive      Response to Learning: Able to verbalize current knowledge/experience, Able to verbalize/acknowledge new learning, and Progressing to goal      Endings: None Reported    Modes of Intervention: Education, Exploration, Clarifying, and Problem-solving      Discipline Responsible: Recreational Therapist      Signature:  JOANN Garibay

## 2025-02-03 NOTE — GROUP NOTE
Date: 2/3/2025  Start Time: 1110  End Time:  1130  Number of Participants: 9    Type of Group: Recreational    Wellness Binder Information  Module Name:  Colorstrology    Patient's Goal:  to increase socialization amongst peers.  To enhance relaxation response.      Notes:  CTRS facilitated colorstrology.  Patient was an active participant in activity.    Status After Intervention:  Improved    Participation Level: Active Listener and Interactive    Participation Quality: Appropriate and Attentive      Speech:  normal      Thought Process/Content: Logical      Affective Functioning: Congruent      Mood: euthymic      Level of consciousness:  Alert and Attentive      Response to Learning: Able to verbalize current knowledge/experience, Able to verbalize/acknowledge new learning, and Progressing to goal      Endings: None Reported    Modes of Intervention: Socialization, Exploration, and Activity      Discipline Responsible: Recreational Therapist      Signature:  JOANN Garibay

## 2025-02-03 NOTE — BH NOTE
Erikdevon DIANA called up to the floor in order to administer IM medications. Patient verbally aggressive during administration but tolerated injections well. Patient then wheeled self out to the dayroom in her wheel chair. This RN attempted to ask the patient about receiving her scheduled insulin. Patient stated \"I'm not taking anything from you bitch\". With additional staff assistance from another RN, patient was then agreeable to her scheduled Lantus, Humalog, and Klonopin 1 mg PO. Will continue to monitor.   
Patient awake in day room sitting in wheelchair upon approach.  Patient has good eye contact.    Patient presents brightened and exaggerated at times.Speech is somewhat garbled.  Patient appears somewhat well groomed.  Patient reports mood is good.   Patient denies suicidal/homicidal ideations and hallucinations.     Patient denies anxiety and depression.   Patient denies pain  Patient is taking ordered medications without issue. Patient is attending groups per note review.  Purposeful Q15min rounding continues.  
Patient awake in day room upon approach.  Patient has good eye contact.    Patient presents irritable and exaggerated.   Appears somewhat well groomed.  Patient reports mood is good.  Patient denies suicidal/homicidal ideations and hallucinations.     Patient denies anxiety and depression.  Patient denies pain.  Patient is taking ordered medications without issue. Patient is attending groups per note review.  Purposeful Q15min rounding continues.  
Patient awake/asleep in room upon approach.  Patient has good/poor eye contact.    Patient presents brightened and exaggerated.   Appears somewhat well groomed.  Patient reports mood is good, possibly confabulating during assessment. Speech noted to be garbled.   Patient denies suicidal/homicidal ideations and hallucinations.     Patient denies anxiety and depression.  Patient denies pain.  Patient is taking ordered medications without issue. Patient is attending select groups per note review.  Purposeful Q15min rounding continues.  
Patient is alert and oriented x 2. Patient has a brightened, elevated affect and appears anxious. Patient is cooperative, friendly and impulsive. Patient is exhibiting tangential thoughts, flights of ideas and rapid, pressured speech. Patient denies any suicidal ideations, homicidal ideations, auditory and visual hallucinations. Patient denies any depression and anxiety at this time. Patient denies any pain at this time. Patient is in day room socializing and watching television with other patients. Patient does not appear in any distress at this time. Fall and standard precautions reviewed and implemented. Will continue to monitor patient every 15 minute rounds to ensure patient safety.  
Patient is alert and oriented x 2. Patient has a flat, blunt affect and appears labile, anxious and is impulsive. Patient is hypermobile, preoccupied, with pressured speech. Patient appears paranoid, delusional, exhibiting loose associations, and flight of ideas. Patient denies any suicidal ideations, homicidal ideations, auditory and visual hallucinations. Patient denies any depression and anxiety at this time. Patient is in day room socializing select peers. Patient does not appear in any distress at this time. Fall and standard precautions reviewed and implemented. Will continue to monitor patient every 15 minute rounds to ensure patient safety.  
Patient is in bed, and appears to be sleeping. Patient does not appear to be in any respiratory distress at this time. Fall and standard precautions reviewed and implemented. Will continue to monitor patient every 15 minute rounds to ensure patient safety.  
Patient out in the dayroom talking and singing loudly. Patient asked politely by another staff to lower her voice. Patient then started yelling and cussing at this RN and other staff. This RN attempted to approach the patient to verbally deescalate the situation. Patient then stating \"I will beat you up bitch. Don't f*cking talk to me\" to this RN. This RN and another RN attempted to offer patient her scheduled medications along with oral PRNs to help calm the patient down. Patient refused both times to take any medications including scheduled insulin. Dr. Plummer notified of patient's behaviors, following orders given: one time order of Benadryl 25 mg IM and one time order of Zyprexa 5 mg IM one time. Will continue to monitor.   
Patient resting in bed with eyes closed. Respirations even and unlabored. No signs of distress. Purposeful rounding continued.   
Patient resting with eyes closed. Respirations even and unlabored. No signs of distress. Purposeful rounds continued.   
Patient was continuously calling on call light would go to assess. Patient stating wanting to the nurse something. Patient grinning and laughing not needing anything at all. Medication provided to aide with sleep and help calm down. See MAR. Purposeful rounds continue.  
the recliner. however patient is alert, oriented and able to provide informed consent    Pt admitted with followings belongings:  Dental Appliances: None  Vision - Corrective Lenses: None  Hearing Aid: None  Body Piercings Removed: No  Clothing: Undergarments, Pants, Robe  Other Valuables: At home  The following personal items were collected during admission. Items secured in locker/safe. Items will be returned to patient at discharge.     Yohannes Kauffman RN

## 2025-02-03 NOTE — GROUP NOTE
Group Therapy Note    Date: 2/3/2025  Start Time: 1500  End Time:  1530  Number of Participants: 8    Type of Group: Recovery    Wellness Binder Information  Module Name: Peer Recovery    Patient's Goal:  Patient will be able to demonstrate knowledge of community resources available for mental health and/or drug and alcohol.     Notes:  Peer , Jovanna, spoke to patient regarding community resources available and provided patient with a story of hope and recovery.  Patient listened attentively in group, and was receptive to the information provided.     Status After Intervention:  Improved    Participation Level: Active Listener    Participation Quality: Appropriate and Attentive      Speech:  normal      Thought Process/Content: Logical      Affective Functioning: Congruent      Mood: content      Level of consciousness:  Alert and Attentive      Response to Learning: Able to verbalize current knowledge/experience, Able to verbalize/acknowledge new learning, and Progressing to goal      Endings: None Reported    Modes of Intervention: Education, Support, and Exploration      Discipline Responsible: Recreational Therapist      Signature:  JOANN Garibay    Group Therapy Note    Attendees: 8

## 2025-02-04 LAB
ANION GAP SERPL CALCULATED.3IONS-SCNC: 13 MMOL/L (ref 7–16)
BUN SERPL-MCNC: 38 MG/DL (ref 6–23)
CALCIUM SERPL-MCNC: 9.5 MG/DL (ref 8.6–10.2)
CHLORIDE SERPL-SCNC: 99 MMOL/L (ref 98–107)
CO2 SERPL-SCNC: 25 MMOL/L (ref 22–29)
CREAT SERPL-MCNC: 1.3 MG/DL (ref 0.5–1)
GFR, ESTIMATED: 47 ML/MIN/1.73M2
GLUCOSE BLD-MCNC: 135 MG/DL (ref 74–99)
GLUCOSE BLD-MCNC: 141 MG/DL (ref 74–99)
GLUCOSE BLD-MCNC: 173 MG/DL (ref 74–99)
GLUCOSE BLD-MCNC: 205 MG/DL (ref 74–99)
GLUCOSE SERPL-MCNC: 136 MG/DL (ref 74–99)
POTASSIUM SERPL-SCNC: 4.4 MMOL/L (ref 3.5–5)
SODIUM SERPL-SCNC: 137 MMOL/L (ref 132–146)

## 2025-02-04 PROCEDURE — 99232 SBSQ HOSP IP/OBS MODERATE 35: CPT

## 2025-02-04 PROCEDURE — 1240000000 HC EMOTIONAL WELLNESS R&B

## 2025-02-04 PROCEDURE — 6370000000 HC RX 637 (ALT 250 FOR IP): Performed by: NURSE PRACTITIONER

## 2025-02-04 PROCEDURE — 82962 GLUCOSE BLOOD TEST: CPT

## 2025-02-04 PROCEDURE — 6370000000 HC RX 637 (ALT 250 FOR IP): Performed by: INTERNAL MEDICINE

## 2025-02-04 PROCEDURE — 6370000000 HC RX 637 (ALT 250 FOR IP): Performed by: STUDENT IN AN ORGANIZED HEALTH CARE EDUCATION/TRAINING PROGRAM

## 2025-02-04 PROCEDURE — 6370000000 HC RX 637 (ALT 250 FOR IP)

## 2025-02-04 PROCEDURE — 36415 COLL VENOUS BLD VENIPUNCTURE: CPT

## 2025-02-04 PROCEDURE — 6370000000 HC RX 637 (ALT 250 FOR IP): Performed by: PSYCHIATRY & NEUROLOGY

## 2025-02-04 PROCEDURE — 99232 SBSQ HOSP IP/OBS MODERATE 35: CPT | Performed by: INTERNAL MEDICINE

## 2025-02-04 PROCEDURE — 80048 BASIC METABOLIC PNL TOTAL CA: CPT

## 2025-02-04 RX ORDER — FUROSEMIDE 20 MG/1
20 TABLET ORAL DAILY
Status: DISCONTINUED | OUTPATIENT
Start: 2025-02-05 | End: 2025-02-06 | Stop reason: HOSPADM

## 2025-02-04 RX ORDER — POTASSIUM CHLORIDE 1500 MG/1
20 TABLET, EXTENDED RELEASE ORAL
Status: DISCONTINUED | OUTPATIENT
Start: 2025-02-05 | End: 2025-02-06 | Stop reason: HOSPADM

## 2025-02-04 RX ADMIN — METFORMIN HYDROCHLORIDE 1000 MG: 1000 TABLET, FILM COATED ORAL at 16:42

## 2025-02-04 RX ADMIN — BENZTROPINE MESYLATE 1 MG: 1 TABLET ORAL at 08:51

## 2025-02-04 RX ADMIN — FUROSEMIDE 40 MG: 40 TABLET ORAL at 08:50

## 2025-02-04 RX ADMIN — CARBAMAZEPINE 200 MG: 200 TABLET ORAL at 21:20

## 2025-02-04 RX ADMIN — ATORVASTATIN CALCIUM 40 MG: 40 TABLET, FILM COATED ORAL at 21:20

## 2025-02-04 RX ADMIN — HYDROXYZINE HYDROCHLORIDE 25 MG: 25 TABLET ORAL at 21:20

## 2025-02-04 RX ADMIN — INSULIN LISPRO 1 UNITS: 100 INJECTION, SOLUTION INTRAVENOUS; SUBCUTANEOUS at 11:43

## 2025-02-04 RX ADMIN — PALIPERIDONE 3 MG: 3 TABLET, EXTENDED RELEASE ORAL at 08:49

## 2025-02-04 RX ADMIN — LABETALOL HYDROCHLORIDE 200 MG: 200 TABLET, FILM COATED ORAL at 21:21

## 2025-02-04 RX ADMIN — BENZTROPINE MESYLATE 1 MG: 1 TABLET ORAL at 21:20

## 2025-02-04 RX ADMIN — PIOGLITAZONE 45 MG: 15 TABLET ORAL at 08:48

## 2025-02-04 RX ADMIN — PALIPERIDONE 3 MG: 3 TABLET, EXTENDED RELEASE ORAL at 21:20

## 2025-02-04 RX ADMIN — Medication 5 MG: at 21:20

## 2025-02-04 RX ADMIN — LABETALOL HYDROCHLORIDE 200 MG: 200 TABLET, FILM COATED ORAL at 06:39

## 2025-02-04 RX ADMIN — BRIMONIDINE TARTRATE 1 DROP: 2 SOLUTION/ DROPS OPHTHALMIC at 21:20

## 2025-02-04 RX ADMIN — ALOGLIPTIN 12.5 MG: 12.5 TABLET, FILM COATED ORAL at 08:50

## 2025-02-04 RX ADMIN — LATANOPROST 1 DROP: 50 SOLUTION OPHTHALMIC at 21:20

## 2025-02-04 RX ADMIN — CARBAMAZEPINE 200 MG: 200 TABLET ORAL at 08:50

## 2025-02-04 RX ADMIN — EMPAGLIFLOZIN 10 MG: 10 TABLET, FILM COATED ORAL at 08:48

## 2025-02-04 RX ADMIN — LABETALOL HYDROCHLORIDE 200 MG: 200 TABLET, FILM COATED ORAL at 13:42

## 2025-02-04 RX ADMIN — TIMOLOL MALEATE 1 DROP: 5 SOLUTION OPHTHALMIC at 08:49

## 2025-02-04 RX ADMIN — POTASSIUM CHLORIDE 40 MEQ: 1500 TABLET, EXTENDED RELEASE ORAL at 08:50

## 2025-02-04 RX ADMIN — METFORMIN HYDROCHLORIDE 1000 MG: 1000 TABLET, FILM COATED ORAL at 08:50

## 2025-02-04 RX ADMIN — BRIMONIDINE TARTRATE 1 DROP: 2 SOLUTION/ DROPS OPHTHALMIC at 08:49

## 2025-02-04 ASSESSMENT — PAIN SCALES - GENERAL
PAINLEVEL_OUTOF10: 0
PAINLEVEL_OUTOF10: 0

## 2025-02-04 NOTE — CARE COORDINATION
Ange called [facility] [phone] to follow up on precert. She stated that it has been started and is pending.

## 2025-02-04 NOTE — GROUP NOTE
Date: 2/4/2025  Start Time: 1000  End Time:  1055  Number of Participants: 13    Type of Group: Psychoeducation    Wellness Binder Information  Module Name:  Active Listening and Effective Communication    Patient's Goal:  To improve communication styles and foster positive relationships.      Notes:  CTRS led educational discussion on active listening and effective communication.  CTRS explored the possibilities of fostering positive relationships through effective communication.  Patient was an active participant in discussion and was accepting of handout.  Patient receptive to the information received.      Status After Intervention:  Improved    Participation Level: Active Listener    Participation Quality: Appropriate and Attentive      Speech:  normal      Thought Process/Content: Logical      Affective Functioning: Congruent      Mood: euthymic      Level of consciousness:  Alert and Attentive      Response to Learning: Able to verbalize current knowledge/experience, Able to verbalize/acknowledge new learning, and Progressing to goal      Endings: None Reported    Modes of Intervention: Education, Support, Exploration, and Clarifying      Discipline Responsible: Recreational Therapist      Signature:  JOANN Garibay     show

## 2025-02-04 NOTE — CARE COORDINATION
Eddie called [facility] Liaison Opal [phone] to follow up on precert. She stated that they need the level 2, eddie stated that she faxed over the [facility] yesterday and this morning. She provided this worker with another fax number.     Eddie faxed [facility] again.

## 2025-02-04 NOTE — GROUP NOTE
Group Therapy Note    Date: 2/4/2025  Start Time: 0745  End Time:  0810  Number of Participants: 14    Type of Group: Community Meeting    Wellness Binder Information  Module Name:  Community Meeting      Patient's Goal:  Patient will be oriented to the unit including but not limited expectations, staff, programming schedule.  Patient will be able to ID expectations of treatment.     Notes: Patient was a participant in community meeting, and was updated on expectations of the unit, staffing, programming schedule, and acclimated to their environment.    Patient shared goal for today as \"try to whoop someone's ass\"    Status After Intervention:  Decompensated    Participation Level: Interactive    Participation Quality: Inappropriate and Resistant      Speech:  loud      Thought Process/Content: Flight of ideas      Affective Functioning: Exaggerated      Mood: angry      Level of consciousness:  Alert and Attentive      Response to Learning: Resistant      Endings: None Reported    Modes of Intervention: Exploration, Clarifying, and Problem-solving      Discipline Responsible: Recreational Therapist      Signature:  JOANN Garibay

## 2025-02-05 LAB
ANION GAP SERPL CALCULATED.3IONS-SCNC: 12 MMOL/L (ref 7–16)
BUN SERPL-MCNC: 42 MG/DL (ref 6–23)
CALCIUM SERPL-MCNC: 9.2 MG/DL (ref 8.6–10.2)
CHLORIDE SERPL-SCNC: 104 MMOL/L (ref 98–107)
CO2 SERPL-SCNC: 23 MMOL/L (ref 22–29)
CREAT SERPL-MCNC: 1 MG/DL (ref 0.5–1)
GFR, ESTIMATED: 64 ML/MIN/1.73M2
GLUCOSE BLD-MCNC: 131 MG/DL (ref 74–99)
GLUCOSE BLD-MCNC: 132 MG/DL (ref 74–99)
GLUCOSE BLD-MCNC: 166 MG/DL (ref 74–99)
GLUCOSE BLD-MCNC: 203 MG/DL (ref 74–99)
GLUCOSE SERPL-MCNC: 218 MG/DL (ref 74–99)
POTASSIUM SERPL-SCNC: 4.3 MMOL/L (ref 3.5–5)
SODIUM SERPL-SCNC: 139 MMOL/L (ref 132–146)

## 2025-02-05 PROCEDURE — 2580000003 HC RX 258: Performed by: INTERNAL MEDICINE

## 2025-02-05 PROCEDURE — 1240000000 HC EMOTIONAL WELLNESS R&B

## 2025-02-05 PROCEDURE — 6370000000 HC RX 637 (ALT 250 FOR IP): Performed by: INTERNAL MEDICINE

## 2025-02-05 PROCEDURE — 6370000000 HC RX 637 (ALT 250 FOR IP): Performed by: STUDENT IN AN ORGANIZED HEALTH CARE EDUCATION/TRAINING PROGRAM

## 2025-02-05 PROCEDURE — 6370000000 HC RX 637 (ALT 250 FOR IP)

## 2025-02-05 PROCEDURE — 2500000003 HC RX 250 WO HCPCS

## 2025-02-05 PROCEDURE — 80048 BASIC METABOLIC PNL TOTAL CA: CPT

## 2025-02-05 PROCEDURE — 82962 GLUCOSE BLOOD TEST: CPT

## 2025-02-05 PROCEDURE — 6370000000 HC RX 637 (ALT 250 FOR IP): Performed by: PSYCHIATRY & NEUROLOGY

## 2025-02-05 PROCEDURE — 99232 SBSQ HOSP IP/OBS MODERATE 35: CPT

## 2025-02-05 PROCEDURE — 99232 SBSQ HOSP IP/OBS MODERATE 35: CPT | Performed by: INTERNAL MEDICINE

## 2025-02-05 PROCEDURE — 87449 NOS EACH ORGANISM AG IA: CPT

## 2025-02-05 PROCEDURE — 36415 COLL VENOUS BLD VENIPUNCTURE: CPT

## 2025-02-05 PROCEDURE — 87324 CLOSTRIDIUM AG IA: CPT

## 2025-02-05 RX ORDER — SODIUM CHLORIDE 0.9 % (FLUSH) 0.9 %
SYRINGE (ML) INJECTION
Status: COMPLETED
Start: 2025-02-05 | End: 2025-02-05

## 2025-02-05 RX ORDER — ONDANSETRON 4 MG/1
4 TABLET, ORALLY DISINTEGRATING ORAL EVERY 8 HOURS PRN
Status: DISCONTINUED | OUTPATIENT
Start: 2025-02-05 | End: 2025-02-06 | Stop reason: HOSPADM

## 2025-02-05 RX ORDER — 0.9 % SODIUM CHLORIDE 0.9 %
500 INTRAVENOUS SOLUTION INTRAVENOUS ONCE
Status: COMPLETED | OUTPATIENT
Start: 2025-02-05 | End: 2025-02-05

## 2025-02-05 RX ORDER — LOPERAMIDE HYDROCHLORIDE 2 MG/1
2 CAPSULE ORAL 4 TIMES DAILY PRN
Status: DISCONTINUED | OUTPATIENT
Start: 2025-02-05 | End: 2025-02-06 | Stop reason: HOSPADM

## 2025-02-05 RX ADMIN — ATORVASTATIN CALCIUM 40 MG: 40 TABLET, FILM COATED ORAL at 21:19

## 2025-02-05 RX ADMIN — HYDROXYZINE HYDROCHLORIDE 25 MG: 25 TABLET ORAL at 21:20

## 2025-02-05 RX ADMIN — INSULIN LISPRO 1 UNITS: 100 INJECTION, SOLUTION INTRAVENOUS; SUBCUTANEOUS at 07:36

## 2025-02-05 RX ADMIN — METFORMIN HYDROCHLORIDE 1000 MG: 1000 TABLET, FILM COATED ORAL at 08:55

## 2025-02-05 RX ADMIN — CARBAMAZEPINE 200 MG: 200 TABLET ORAL at 08:55

## 2025-02-05 RX ADMIN — LOPERAMIDE HYDROCHLORIDE 2 MG: 2 CAPSULE ORAL at 11:43

## 2025-02-05 RX ADMIN — LATANOPROST 1 DROP: 50 SOLUTION OPHTHALMIC at 21:18

## 2025-02-05 RX ADMIN — PIOGLITAZONE 45 MG: 15 TABLET ORAL at 08:55

## 2025-02-05 RX ADMIN — LABETALOL HYDROCHLORIDE 200 MG: 200 TABLET, FILM COATED ORAL at 06:50

## 2025-02-05 RX ADMIN — BRIMONIDINE TARTRATE 1 DROP: 2 SOLUTION/ DROPS OPHTHALMIC at 08:53

## 2025-02-05 RX ADMIN — Medication 5 MG: at 21:20

## 2025-02-05 RX ADMIN — POTASSIUM CHLORIDE 20 MEQ: 1500 TABLET, EXTENDED RELEASE ORAL at 08:55

## 2025-02-05 RX ADMIN — BRIMONIDINE TARTRATE 1 DROP: 2 SOLUTION/ DROPS OPHTHALMIC at 21:18

## 2025-02-05 RX ADMIN — BENZONATATE 100 MG: 100 CAPSULE ORAL at 08:55

## 2025-02-05 RX ADMIN — BENZTROPINE MESYLATE 1 MG: 1 TABLET ORAL at 21:20

## 2025-02-05 RX ADMIN — PALIPERIDONE 3 MG: 3 TABLET, EXTENDED RELEASE ORAL at 21:19

## 2025-02-05 RX ADMIN — BENZTROPINE MESYLATE 1 MG: 1 TABLET ORAL at 08:54

## 2025-02-05 RX ADMIN — METFORMIN HYDROCHLORIDE 1000 MG: 1000 TABLET, FILM COATED ORAL at 16:19

## 2025-02-05 RX ADMIN — CARBAMAZEPINE 200 MG: 200 TABLET ORAL at 21:18

## 2025-02-05 RX ADMIN — PALIPERIDONE 3 MG: 3 TABLET, EXTENDED RELEASE ORAL at 08:55

## 2025-02-05 RX ADMIN — LABETALOL HYDROCHLORIDE 200 MG: 200 TABLET, FILM COATED ORAL at 21:18

## 2025-02-05 RX ADMIN — SODIUM CHLORIDE 500 ML: 0.9 INJECTION, SOLUTION INTRAVENOUS at 09:53

## 2025-02-05 RX ADMIN — ALOGLIPTIN 12.5 MG: 12.5 TABLET, FILM COATED ORAL at 08:55

## 2025-02-05 RX ADMIN — ACETAMINOPHEN 650 MG: 325 TABLET ORAL at 10:52

## 2025-02-05 RX ADMIN — ACETAMINOPHEN 650 MG: 325 TABLET ORAL at 21:19

## 2025-02-05 RX ADMIN — EMPAGLIFLOZIN 10 MG: 10 TABLET, FILM COATED ORAL at 08:55

## 2025-02-05 RX ADMIN — TIMOLOL MALEATE 1 DROP: 5 SOLUTION OPHTHALMIC at 08:53

## 2025-02-05 RX ADMIN — SODIUM CHLORIDE, PRESERVATIVE FREE 10 ML: 5 INJECTION INTRAVENOUS at 09:55

## 2025-02-05 ASSESSMENT — PAIN SCALES - GENERAL
PAINLEVEL_OUTOF10: 0

## 2025-02-05 NOTE — CARE COORDINATION
Ange called [facility] Liaison Mima [phone] to follow up on precert. Ange left Mima a voicemail.

## 2025-02-06 VITALS
RESPIRATION RATE: 16 BRPM | OXYGEN SATURATION: 95 % | BODY MASS INDEX: 32.3 KG/M2 | SYSTOLIC BLOOD PRESSURE: 122 MMHG | HEART RATE: 84 BPM | DIASTOLIC BLOOD PRESSURE: 84 MMHG | TEMPERATURE: 100.1 F | WEIGHT: 189.2 LBS | HEIGHT: 64 IN

## 2025-02-06 LAB
GLUCOSE BLD-MCNC: 103 MG/DL (ref 74–99)
GLUCOSE BLD-MCNC: 104 MG/DL (ref 74–99)

## 2025-02-06 PROCEDURE — 6370000000 HC RX 637 (ALT 250 FOR IP)

## 2025-02-06 PROCEDURE — 6370000000 HC RX 637 (ALT 250 FOR IP): Performed by: INTERNAL MEDICINE

## 2025-02-06 PROCEDURE — 99239 HOSP IP/OBS DSCHRG MGMT >30: CPT

## 2025-02-06 PROCEDURE — 6360000002 HC RX W HCPCS

## 2025-02-06 PROCEDURE — 82962 GLUCOSE BLOOD TEST: CPT

## 2025-02-06 PROCEDURE — 90656 IIV3 VACC NO PRSV 0.5 ML IM: CPT

## 2025-02-06 PROCEDURE — G0008 ADMIN INFLUENZA VIRUS VAC: HCPCS

## 2025-02-06 RX ORDER — PALIPERIDONE 3 MG/1
3 TABLET, EXTENDED RELEASE ORAL 2 TIMES DAILY
Qty: 30 TABLET | Refills: 3
Start: 2025-02-06 | End: 2025-02-06

## 2025-02-06 RX ORDER — CARBAMAZEPINE 200 MG/1
200 TABLET ORAL 2 TIMES DAILY
Qty: 60 TABLET | Refills: 0 | Status: SHIPPED | OUTPATIENT
Start: 2025-02-06 | End: 2025-03-08

## 2025-02-06 RX ORDER — BENZTROPINE MESYLATE 1 MG/1
1 TABLET ORAL 2 TIMES DAILY
Qty: 60 TABLET | Refills: 3
Start: 2025-02-06 | End: 2025-02-06

## 2025-02-06 RX ORDER — FUROSEMIDE 20 MG/1
20 TABLET ORAL DAILY
Qty: 60 TABLET | Refills: 3 | Status: SHIPPED | OUTPATIENT
Start: 2025-02-06

## 2025-02-06 RX ORDER — POTASSIUM CHLORIDE 1500 MG/1
20 TABLET, EXTENDED RELEASE ORAL
Qty: 60 TABLET | Refills: 0 | Status: SHIPPED | OUTPATIENT
Start: 2025-02-07

## 2025-02-06 RX ORDER — POTASSIUM CHLORIDE 1500 MG/1
20 TABLET, EXTENDED RELEASE ORAL
Qty: 60 TABLET | Refills: 3 | Status: SHIPPED | OUTPATIENT
Start: 2025-02-07 | End: 2025-02-06

## 2025-02-06 RX ORDER — PALIPERIDONE 6 MG/1
6 TABLET, EXTENDED RELEASE ORAL DAILY
Qty: 30 TABLET | Refills: 0 | Status: SHIPPED | OUTPATIENT
Start: 2025-02-07 | End: 2025-03-09

## 2025-02-06 RX ORDER — FUROSEMIDE 20 MG/1
20 TABLET ORAL DAILY
Qty: 60 TABLET | Refills: 3 | Status: SHIPPED | OUTPATIENT
Start: 2025-02-06 | End: 2025-02-06

## 2025-02-06 RX ORDER — BENZTROPINE MESYLATE 1 MG/1
1 TABLET ORAL 2 TIMES DAILY
Qty: 60 TABLET | Refills: 0 | Status: SHIPPED | OUTPATIENT
Start: 2025-02-06 | End: 2025-03-08

## 2025-02-06 RX ORDER — PALIPERIDONE 6 MG/1
6 TABLET, EXTENDED RELEASE ORAL DAILY
Status: DISCONTINUED | OUTPATIENT
Start: 2025-02-07 | End: 2025-02-06 | Stop reason: HOSPADM

## 2025-02-06 RX ORDER — CARBAMAZEPINE 200 MG/1
200 TABLET ORAL 2 TIMES DAILY
Qty: 90 TABLET | Refills: 3
Start: 2025-02-06 | End: 2025-02-06

## 2025-02-06 RX ORDER — PALIPERIDONE 3 MG/1
6 TABLET, EXTENDED RELEASE ORAL EVERY MORNING
Qty: 60 TABLET | Refills: 0 | Status: SHIPPED | OUTPATIENT
Start: 2025-02-06 | End: 2025-02-06 | Stop reason: HOSPADM

## 2025-02-06 RX ORDER — PALIPERIDONE 3 MG/1
3 TABLET, EXTENDED RELEASE ORAL 2 TIMES DAILY
Qty: 60 TABLET | Refills: 0 | Status: SHIPPED | OUTPATIENT
Start: 2025-02-06 | End: 2025-02-06

## 2025-02-06 RX ADMIN — TIMOLOL MALEATE 1 DROP: 5 SOLUTION OPHTHALMIC at 09:57

## 2025-02-06 RX ADMIN — ALOGLIPTIN 12.5 MG: 12.5 TABLET, FILM COATED ORAL at 09:57

## 2025-02-06 RX ADMIN — BENZTROPINE MESYLATE 1 MG: 1 TABLET ORAL at 09:57

## 2025-02-06 RX ADMIN — FUROSEMIDE 20 MG: 20 TABLET ORAL at 09:57

## 2025-02-06 RX ADMIN — LABETALOL HYDROCHLORIDE 200 MG: 200 TABLET, FILM COATED ORAL at 06:32

## 2025-02-06 RX ADMIN — LABETALOL HYDROCHLORIDE 200 MG: 200 TABLET, FILM COATED ORAL at 14:48

## 2025-02-06 RX ADMIN — PIOGLITAZONE 45 MG: 15 TABLET ORAL at 09:57

## 2025-02-06 RX ADMIN — METFORMIN HYDROCHLORIDE 1000 MG: 1000 TABLET, FILM COATED ORAL at 09:57

## 2025-02-06 RX ADMIN — EMPAGLIFLOZIN 10 MG: 10 TABLET, FILM COATED ORAL at 09:57

## 2025-02-06 RX ADMIN — CARBAMAZEPINE 200 MG: 200 TABLET ORAL at 09:57

## 2025-02-06 RX ADMIN — BRIMONIDINE TARTRATE 1 DROP: 2 SOLUTION/ DROPS OPHTHALMIC at 09:57

## 2025-02-06 RX ADMIN — INFLUENZA A VIRUS A/VICTORIA/4897/2022 IVR-238 (H1N1) ANTIGEN (PROPIOLACTONE INACTIVATED), INFLUENZA A VIRUS A/THAILAND/8/2022 IVR-237 (H3N2) ANTIGEN (PROPIOLACTONE INACTIVATED), INFLUENZA B VIRUS B/AUSTRIA/1359417/2021 BVR-26 ANTIGEN (PROPIOLACTONE INACTIVATED) 0.5 ML: 15; 15; 15 INJECTION, SUSPENSION INTRAMUSCULAR at 12:39

## 2025-02-06 RX ADMIN — PALIPERIDONE 3 MG: 3 TABLET, EXTENDED RELEASE ORAL at 09:57

## 2025-02-06 RX ADMIN — POTASSIUM CHLORIDE 20 MEQ: 1500 TABLET, EXTENDED RELEASE ORAL at 09:57

## 2025-02-06 ASSESSMENT — PAIN SCALES - GENERAL: PAINLEVEL_OUTOF10: 0

## 2025-02-06 NOTE — CARE COORDINATION
Ange called [facility] Liaison Opal [phone] to inform her that pts cdiff came back rejected and pt was cleared by medical. Ange asked if we can do discharge today, she stated yes.

## 2025-02-06 NOTE — CARE COORDINATION
Called Opal at the [facility]  [phone] to confirm pt is now declining to go to facility and would like to return home.

## 2025-02-06 NOTE — DISCHARGE SUMMARY
DISCHARGE SUMMARY      Patient ID:  Ana Paula Kunz  [id number]  69 y.o.  1955    Admit date: 1/21/2025    Discharge date and time: 2/6/2025    Admitting Physician: Mohan Garces MD     Discharge Physician: Dr Dez AMBROSE    Discharge Diagnoses:   Patient Active Problem List   Diagnosis    Severe bipolar affective disorder with psychosis (HCC)    Carpal tunnel syndrome    Hyperlipidemia    Vitamin D deficiency    Systemic primary arterial hypertension    Schizoaffective disorder (HCC)    Severe manic bipolar 1 disorder with psychotic behavior ([facility])    Type 2 diabetes mellitus with both eyes affected by mild nonproliferative retinopathy without macular edema, without long-term current use of insulin ([facility])    Callus of foot    Onychomycosis    At risk for colon cancer    Altered mental state    Frequent falls    Bilateral lower extremity edema    Heart murmur    History of bipolar disorder    Delirium due to uti    Bipolar 1 disorder (HCC)    Cognitive disorder    Hypoglycemia associated with type 2 diabetes mellitus ([facility])       Admission Condition: poor    Discharged Condition: stable    Admission Circumstance: Patient name: Ana Paula Kunz  Patient's past mental health and addiction history: History of bipolar disorder with multiple previous inpatient psychiatric hospitalization  Patient's presentation to the ED and why the patient needs admission: Delusional, bizarre agitated behaviors, nonsensical  Legal status:  []  Voluntary  [x]  Involuntary  []  Probate  Triggering/precipitating events: Noncompliance with medication  Duration of triggering/precipitating events: Within the last week      PAST MEDICAL/PSYCHIATRIC HISTORY:   Past Medical History:   Diagnosis Date    Acute gouty arthritis 06/09/2020    Bell's palsy     Bipolar disorder (HCC)     bipolar    Carpal tunnel syndrome 08/04/2016    Hyperlipidemia     Non-insulin dependent type 2 diabetes mellitus (HCC)     Sepsis ([facility]) 12/06/2019    Systemic primary

## 2025-02-06 NOTE — TRANSITION OF CARE
Behavioral Health Transition Record    Patient Name: Ana Paula Kunz  YOB: 1955   Medical Record Number: [id number]  Date of Admission: 1/21/2025  4:35 PM   Date of Discharge: 2/6/2025    Attending Provider: Mohan Garces MD   Discharging Provider: Dr Garces  To contact this individual call [phone] and ask the  to page.  If unavailable, ask to be transferred to Behavioral Health Provider on call.  A Behavioral Health Provider will be available on call 24/7 and during holidays.    Primary Care Provider: Sheryl Wade, DO    Allergies   Allergen Reactions    Ace Inhibitors Other (See Comments)     ?    Ibuprofen Other (See Comments)     hallucinate    Latuda [Lurasidone Hcl] Other (See Comments)     ?    Lurasidone Other (See Comments)     ?    Divalproex Sodium     Haloperidol Palpitations and Other (See Comments)     \"It makes my heart race\"        Reason for Admission: Patient name: Ana Paula Kunz  Patient's past mental health and addiction history: History of bipolar disorder with multiple previous inpatient psychiatric hospitalization  Patient's presentation to the ED and why the patient needs admission: Delusional, bizarre agitated behaviors, nonsensical  Legal status:  []  Voluntary  [x]  Involuntary  []  Probate  Triggering/precipitating events: Noncompliance with medication  Duration of triggering/precipitating events: Within the last week       Admission Diagnosis: Bipolar 1 disorder (HCC) [F31.9]    * No surgery found *    Results for orders placed or performed during the hospital encounter of 01/21/25   Carbamazepine Level, Total   Result Value Ref Range    Carbamazepine Lvl 7.4 4.0 - 10.0 ug/mL    Carbamazepine Dose Amount Unknown     Carbamazepine Date Last Dose UNK^Unknown^L     Carbamazepine Dose Time UNK^Unknown^L    Comprehensive Metabolic Panel   Result Value Ref Range    Sodium 135 132 - 146 mmol/L    Potassium 4.3 3.5 - 5.0 mmol/L    Chloride 100 98 - 107 mmol/L

## 2025-02-06 NOTE — PLAN OF CARE
Problem: Gretta  Goal: Will exhibit normal sleep and speech and no impulsivity  Description: INTERVENTIONS:  1. Administer medication as ordered  2. Set limits on impulsive behavior  3. Make attempts to decrease external stimuli as possible  1/22/2025 2141 by Mikki Shelton, RN  Outcome: Progressing     Problem: Behavior  Goal: Pt/Family maintain appropriate behavior and adhere to behavioral management agreement, if implemented  Description: INTERVENTIONS:  1. Assess patient/family's coping skills and  non-compliant behavior (including use of illegal substances)  2. Notify security of behavior or suspected illegal substances which indicate the need for search of the family and/or belongings  3. Encourage verbalization of thoughts and concerns in a socially appropriate manner  4. Utilize positive, consistent limit setting strategies supporting safety of patient, staff and others  5. Encourage participation in the decision making process about the behavioral management agreement  6. If a visitor's behavior poses a threat to safety call refer to organization policy.  7. Initiate consult with , Psychosocial CNS, Spiritual Care as appropriate  1/22/2025 2141 by Mikki Shelton, RN  Outcome: Progressing     Patient has been out on the unit watching tv and talking on the phone. She was friendly and cooperative with assessment. She is easily agitated and argumentative at times. Speech was rapid/pressured and loud. Speech is somewhat garbled. Thoughts are loose, circumstantial, disorganized with flight of ideas. Denies anxiety and depression. Denies suicidal/homicidal ideations and hallucinations. Took HS medications without issue. Purposeful rounding continued.   
  Problem: Gretta  Goal: Will exhibit normal sleep and speech and no impulsivity  Description: INTERVENTIONS:  1. Administer medication as ordered  2. Set limits on impulsive behavior  3. Make attempts to decrease external stimuli as possible  2/4/2025 2228 by Mikki Shelton, RN  Outcome: Progressing     Problem: Behavior  Goal: Pt/Family maintain appropriate behavior and adhere to behavioral management agreement, if implemented  Description: INTERVENTIONS:  1. Assess patient/family's coping skills and  non-compliant behavior (including use of illegal substances)  2. Notify security of behavior or suspected illegal substances which indicate the need for search of the family and/or belongings  3. Encourage verbalization of thoughts and concerns in a socially appropriate manner  4. Utilize positive, consistent limit setting strategies supporting safety of patient, staff and others  5. Encourage participation in the decision making process about the behavioral management agreement  6. If a visitor's behavior poses a threat to safety call refer to organization policy.  7. Initiate consult with , Psychosocial CNS, Spiritual Care as appropriate  2/4/2025 2228 by Mikki Shelton, RN  Outcome: Progressing     Patient has been out on the unit. Social with peers. Affect is brightened and mood congruent. Pleasant and cooperative during assessment. Focused on discharge. Denies suicidal/homicidal ideations and hallucinations. Purposeful rounding continued.   
  Problem: Gretta  Goal: Will exhibit normal sleep and speech and no impulsivity  Description: INTERVENTIONS:  1. Administer medication as ordered  2. Set limits on impulsive behavior  3. Make attempts to decrease external stimuli as possible  2/5/2025 2148 by Mikki Shelton, RN  Outcome: Progressing     Problem: Behavior  Goal: Pt/Family maintain appropriate behavior and adhere to behavioral management agreement, if implemented  Description: INTERVENTIONS:  1. Assess patient/family's coping skills and  non-compliant behavior (including use of illegal substances)  2. Notify security of behavior or suspected illegal substances which indicate the need for search of the family and/or belongings  3. Encourage verbalization of thoughts and concerns in a socially appropriate manner  4. Utilize positive, consistent limit setting strategies supporting safety of patient, staff and others  5. Encourage participation in the decision making process about the behavioral management agreement  6. If a visitor's behavior poses a threat to safety call refer to organization policy.  7. Initiate consult with , Psychosocial CNS, Spiritual Care as appropriate  2/5/2025 2148 by Mikki Shelton, RN  Outcome: Progressing     Patient has been resting comfortably in bed. Pleasant and cooperative during assessment. Speech is garbled, rapid, and pressured. Thoughts are circumstantial and disorganized. She is able to make her needs known. Repositions self in bed. Reports that she is feeling better. Snack was provided. Denies anxiety and depression. Denies suicidal/homicidal ideations and hallucinations. Took HS medications without issue. Purposeful rounding continued.   
  Problem: Gretta  Goal: Will exhibit normal sleep and speech and no impulsivity  Description: INTERVENTIONS:  1. Administer medication as ordered  2. Set limits on impulsive behavior  3. Make attempts to decrease external stimuli as possible  Outcome: Progressing     Problem: Behavior  Goal: Pt/Family maintain appropriate behavior and adhere to behavioral management agreement, if implemented  Description: INTERVENTIONS:  1. Assess patient/family's coping skills and  non-compliant behavior (including use of illegal substances)  2. Notify security of behavior or suspected illegal substances which indicate the need for search of the family and/or belongings  3. Encourage verbalization of thoughts and concerns in a socially appropriate manner  4. Utilize positive, consistent limit setting strategies supporting safety of patient, staff and others  5. Encourage participation in the decision making process about the behavioral management agreement  6. If a visitor's behavior poses a threat to safety call refer to organization policy.  7. Initiate consult with , Psychosocial CNS, Spiritual Care as appropriate  1/21/2025 2056 by Mikki Shelton, RN  Outcome: Progressing      Patient has been out on the unit watching tv/listening to music. Often heard singing. She was pleasant and cooperative during encounter. Speech was loud, rapid, and pressured. Thoughts were disorganized and circumstantial, with flight of ideas. When assessing anxiety, she stated \"I'm happy, sad, want my man\". Denied depression, stating that she is \"happy go thuan\". Denies suicidal/homicidal ideations and hallucinations. C/o swollen lower extremities. Patient was assisted to elevate BLE. Reminded to seek staff assistance with any needs. Purposeful rounding continued.  
  Problem: Gretta  Goal: Will exhibit normal sleep and speech and no impulsivity  Description: INTERVENTIONS:  1. Administer medication as ordered  2. Set limits on impulsive behavior  3. Make attempts to decrease external stimuli as possible  Outcome: Progressing     Problem: Behavior  Goal: Pt/Family maintain appropriate behavior and adhere to behavioral management agreement, if implemented  Description: INTERVENTIONS:  1. Assess patient/family's coping skills and  non-compliant behavior (including use of illegal substances)  2. Notify security of behavior or suspected illegal substances which indicate the need for search of the family and/or belongings  3. Encourage verbalization of thoughts and concerns in a socially appropriate manner  4. Utilize positive, consistent limit setting strategies supporting safety of patient, staff and others  5. Encourage participation in the decision making process about the behavioral management agreement  6. If a visitor's behavior poses a threat to safety call refer to organization policy.  7. Initiate consult with , Psychosocial CNS, Spiritual Care as appropriate  Outcome: Progressing     Patient has been out on the unit watching tv and making phone calls. Pleasant and cooperative during encounter. Speech is loud, rapid and pressured. Behavior remaining in control. Took HS medications without issue. Denies suicidal/homicidal ideations and hallucinations. Purposeful rounding continued.   
  Problem: Gretta  Goal: Will exhibit normal sleep and speech and no impulsivity  Description: INTERVENTIONS:  1. Administer medication as ordered  2. Set limits on impulsive behavior  3. Make attempts to decrease external stimuli as possible  Outcome: Progressing     Problem: Behavior  Goal: Pt/Family maintain appropriate behavior and adhere to behavioral management agreement, if implemented  Description: INTERVENTIONS:  1. Assess patient/family's coping skills and  non-compliant behavior (including use of illegal substances)  2. Notify security of behavior or suspected illegal substances which indicate the need for search of the family and/or belongings  3. Encourage verbalization of thoughts and concerns in a socially appropriate manner  4. Utilize positive, consistent limit setting strategies supporting safety of patient, staff and others  5. Encourage participation in the decision making process about the behavioral management agreement  6. If a visitor's behavior poses a threat to safety call refer to organization policy.  7. Initiate consult with , Psychosocial CNS, Spiritual Care as appropriate  Outcome: Progressing     Patient has been out on the unit watching tv. She can be loud and disruptive at times, but redirected easily and apologetic. Patient was paranoid and suspicious of someone entering her room and rearranging items. Denied suicidal/homicidal ideations and hallucinations. Purposeful rounding continued.  
  Problem: Gretta  Goal: Will exhibit normal sleep and speech and no impulsivity  Description: INTERVENTIONS:  1. Administer medication as ordered  2. Set limits on impulsive behavior  3. Make attempts to decrease external stimuli as possible  Outcome: Progressing     Problem: Behavior  Goal: Pt/Family maintain appropriate behavior and adhere to behavioral management agreement, if implemented  Description: INTERVENTIONS:  1. Assess patient/family's coping skills and  non-compliant behavior (including use of illegal substances)  2. Notify security of behavior or suspected illegal substances which indicate the need for search of the family and/or belongings  3. Encourage verbalization of thoughts and concerns in a socially appropriate manner  4. Utilize positive, consistent limit setting strategies supporting safety of patient, staff and others  5. Encourage participation in the decision making process about the behavioral management agreement  6. If a visitor's behavior poses a threat to safety call refer to organization policy.  7. Initiate consult with , Psychosocial CNS, Spiritual Care as appropriate  Outcome: Progressing     Patient has been out on the unit. Social with peers. Pleasant and cooperative during assessment. Speech is loud, rapid, and pressured. Thoughts are circumstantial with flight of ideas. Denies anxiety and depression. Voiced readiness for discharge and hopeful for tomorrow. Denies suicidal/homicidal ideations and hallucinations. Took HS medications without issue. Purposeful rounding continued.   
  Problem: Risk for Elopement  Goal: Patient will not exit the unit/facility without proper excort  Outcome: Progressing     Problem: Depression  Goal: Will be euthymic at discharge  Description: INTERVENTIONS:  1. Administer medication as ordered  2. Provide emotional support via 1:1 interaction with staff  3. Encourage involvement in milieu/groups/activities  4. Monitor for social isolation  Outcome: Progressing     Problem: Gretta  Goal: Will exhibit normal sleep and speech and no impulsivity  Description: INTERVENTIONS:  1. Administer medication as ordered  2. Set limits on impulsive behavior  3. Make attempts to decrease external stimuli as possible  1/24/2025 0950 by Marifer Baez RN  Outcome: Progressing     Problem: Psychosis  Goal: Will report no hallucinations or delusions  Description: INTERVENTIONS:  1. Administer medication as  ordered  2. Assist with reality testing to support increasing orientation  3. Assess if patient's hallucinations or delusions are encouraging self harm or harm to others and intervene as appropriate  Outcome: Progressing     Problem: Behavior  Goal: Pt/Family maintain appropriate behavior and adhere to behavioral management agreement, if implemented  Description: INTERVENTIONS:  1. Assess patient/family's coping skills and  non-compliant behavior (including use of illegal substances)  2. Notify security of behavior or suspected illegal substances which indicate the need for search of the family and/or belongings  3. Encourage verbalization of thoughts and concerns in a socially appropriate manner  4. Utilize positive, consistent limit setting strategies supporting safety of patient, staff and others  5. Encourage participation in the decision making process about the behavioral management agreement  6. If a visitor's behavior poses a threat to safety call refer to organization policy.  7. Initiate consult with , Psychosocial CNS, Spiritual Care as 
  Problem: Risk for Elopement  Goal: Patient will not exit the unit/facility without proper excort  Outcome: Progressing     Problem: Self Harm/Suicidality  Goal: Will have no self-injury during hospital stay  Description: INTERVENTIONS:  1.  Ensure constant observer at bedside with Q15M safety checks  2.  Maintain a safe environment  3.  Secure patient belongings  4.  Ensure family/visitors adhere to safety recommendations  5.  Ensure safety tray has been added to patient's diet order  6.  Every shift and PRN: Re-assess suicidal risk via Frequent Screener    Outcome: Progressing     Problem: Depression  Goal: Will be euthymic at discharge  Description: INTERVENTIONS:  1. Administer medication as ordered  2. Provide emotional support via 1:1 interaction with staff  3. Encourage involvement in milieu/groups/activities  4. Monitor for social isolation  Outcome: Progressing     Problem: Gretta  Goal: Will exhibit normal sleep and speech and no impulsivity  Description: INTERVENTIONS:  1. Administer medication as ordered  2. Set limits on impulsive behavior  3. Make attempts to decrease external stimuli as possible  Outcome: Progressing     Problem: Psychosis  Goal: Will report no hallucinations or delusions  Description: INTERVENTIONS:  1. Administer medication as  ordered  2. Assist with reality testing to support increasing orientation  3. Assess if patient's hallucinations or delusions are encouraging self harm or harm to others and intervene as appropriate  Outcome: Progressing     Problem: Behavior  Goal: Pt/Family maintain appropriate behavior and adhere to behavioral management agreement, if implemented  Description: INTERVENTIONS:  1. Assess patient/family's coping skills and  non-compliant behavior (including use of illegal substances)  2. Notify security of behavior or suspected illegal substances which indicate the need for search of the family and/or belongings  3. Encourage verbalization of thoughts and 
  Problem: Self Harm/Suicidality  Goal: Will have no self-injury during hospital stay  Description: INTERVENTIONS:  1.  Ensure constant observer at bedside with Q15M safety checks  2.  Maintain a safe environment  3.  Secure patient belongings  4.  Ensure family/visitors adhere to safety recommendations  5.  Ensure safety tray has been added to patient's diet order  6.  Every shift and PRN: Re-assess suicidal risk via Frequent Screener  Outcome: Progressing     Problem: Gretta  Goal: Will exhibit normal sleep and speech and no impulsivity  Description: INTERVENTIONS:  1. Administer medication as ordered  2. Set limits on impulsive behavior  3. Make attempts to decrease external stimuli as possible  Outcome: Not Progressing     Problem: Behavior  Goal: Pt/Family maintain appropriate behavior and adhere to behavioral management agreement, if implemented  Description: INTERVENTIONS:  1. Assess patient/family's coping skills and  non-compliant behavior (including use of illegal substances)  2. Notify security of behavior or suspected illegal substances which indicate the need for search of the family and/or belongings  3. Encourage verbalization of thoughts and concerns in a socially appropriate manner  4. Utilize positive, consistent limit setting strategies supporting safety of patient, staff and others  5. Encourage participation in the decision making process about the behavioral management agreement  6. If a visitor's behavior poses a threat to safety call refer to organization policy.  7. Initiate consult with , Psychosocial CNS, Spiritual Care as appropriate  Outcome: Not Progressing     Problem: Sleep Disturbance  Goal: Will exhibit normal sleeping pattern  Description: INTERVENTIONS:  1. Administer medication as ordered  2. Decrease environmental stimuli, including noise, as appropriate  3. Discourage social isolation and naps during the day  Outcome: Progressing     Problem: Safety - Adult  Goal: 
  Problem: Skin/Tissue Integrity  Goal: Absence of new skin breakdown  Description: 1.  Monitor for areas of redness and/or skin breakdown  2.  Assess vascular access sites hourly  3.  Every 4-6 hours minimum:  Change oxygen saturation probe site  4.  Every 4-6 hours:  If on nasal continuous positive airway pressure, respiratory therapy assess nares and determine need for appliance change or resting period.  1/27/2025 2229 by Joanna Mistry RN  Outcome: Progressing  1/27/2025 1650 by Marifer Baez RN  Outcome: Progressing     Problem: Skin/Tissue Integrity  Goal: Absence of new skin breakdown  Description: 1.  Monitor for areas of redness and/or skin breakdown  2.  Assess vascular access sites hourly  3.  Every 4-6 hours minimum:  Change oxygen saturation probe site  4.  Every 4-6 hours:  If on nasal continuous positive airway pressure, respiratory therapy assess nares and determine need for appliance change or resting period.  1/27/2025 2229 by Joanna Mistry RN  Outcome: Progressing  1/27/2025 1650 by Marifer Baez RN  Outcome: Progressing     Problem: Risk for Elopement  Goal: Patient will not exit the unit/facility without proper excort  1/27/2025 2229 by Joanna Mistry RN  Outcome: Progressing  1/27/2025 1650 by Marifer Baez RN  Outcome: Progressing     Problem: Risk for Elopement  Goal: Patient will not exit the unit/facility without proper excort  1/27/2025 2229 by Joanna Mistry RN  Outcome: Progressing  1/27/2025 1650 by Marifer Baez RN  Outcome: Progressing     Problem: Self Harm/Suicidality  Goal: Will have no self-injury during hospital stay  Description: INTERVENTIONS:  1.  Ensure constant observer at bedside with Q15M safety checks  2.  Maintain a safe environment  3.  Secure patient belongings  4.  Ensure family/visitors adhere to safety recommendations  5.  Ensure safety tray has been added to patient's diet order  6.  Every shift and PRN: Re-assess suicidal risk via 
  Problem: Skin/Tissue Integrity  Goal: Absence of new skin breakdown  Description: 1.  Monitor for areas of redness and/or skin breakdown  2.  Assess vascular access sites hourly  3.  Every 4-6 hours minimum:  Change oxygen saturation probe site  4.  Every 4-6 hours:  If on nasal continuous positive airway pressure, respiratory therapy assess nares and determine need for appliance change or resting period.  1/28/2025 0838 by Jovanna White RN  Outcome: Progressing     Problem: Risk for Elopement  Goal: Patient will not exit the unit/facility without proper excort  1/28/2025 0838 by Jovanna White, RN  Outcome: Progressing     Problem: Depression  Goal: Will be euthymic at discharge  Description: INTERVENTIONS:  1. Administer medication as ordered  2. Provide emotional support via 1:1 interaction with staff  3. Encourage involvement in milieu/groups/activities  4. Monitor for social isolation  1/28/2025 0838 by Jovanna White, RN  Outcome: Progressing        Patient denies SI/HI/Hallucinations. Patient is in no active distress respirations even and unlabored. Patient has been medication compliant. Patient appears less labile and intrusive during morning assessment. Patient does have flight of ideas and tangential speech. Will continue to monitor and will intervene as needed with close 15 minute rounds.      
  Problem: Skin/Tissue Integrity  Goal: Absence of new skin breakdown  Description: 1.  Monitor for areas of redness and/or skin breakdown  2.  Assess vascular access sites hourly  3.  Every 4-6 hours minimum:  Change oxygen saturation probe site  4.  Every 4-6 hours:  If on nasal continuous positive airway pressure, respiratory therapy assess nares and determine need for appliance change or resting period.  2/1/2025 2200 by Breanne Rubi RN  Outcome: Progressing     Problem: Risk for Elopement  Goal: Patient will not exit the unit/facility without proper excort  2/1/2025 2200 by Breanne Rubi, RN  Outcome: Progressing     Problem: Self Harm/Suicidality  Goal: Will have no self-injury during hospital stay  Description: INTERVENTIONS:  1.  Ensure constant observer at bedside with Q15M safety checks  2.  Maintain a safe environment  3.  Secure patient belongings  4.  Ensure family/visitors adhere to safety recommendations  5.  Ensure safety tray has been added to patient's diet order  6.  Every shift and PRN: Re-assess suicidal risk via Frequent Screener    2/1/2025 2200 by Breanne Rubi RN  Outcome: Progressing     Problem: Depression  Goal: Will be euthymic at discharge  Description: INTERVENTIONS:  1. Administer medication as ordered  2. Provide emotional support via 1:1 interaction with staff  3. Encourage involvement in milieu/groups/activities  4. Monitor for social isolation  Outcome: Progressing     Problem: Gretta  Goal: Will exhibit normal sleep and speech and no impulsivity  Description: INTERVENTIONS:  1. Administer medication as ordered  2. Set limits on impulsive behavior  3. Make attempts to decrease external stimuli as possible  Outcome: Progressing     Problem: Psychosis  Goal: Will report no hallucinations or delusions  Description: INTERVENTIONS:  1. Administer medication as  ordered  2. Assist with reality testing to support increasing orientation  3. Assess if patient's hallucinations or 
  Problem: Skin/Tissue Integrity  Goal: Absence of new skin breakdown  Description: 1.  Monitor for areas of redness and/or skin breakdown  2.  Assess vascular access sites hourly  3.  Every 4-6 hours minimum:  Change oxygen saturation probe site  4.  Every 4-6 hours:  If on nasal continuous positive airway pressure, respiratory therapy assess nares and determine need for appliance change or resting period.  2/2/2025 2323 by Breanne Rubi, RN  Outcome: Progressing     Problem: Self Harm/Suicidality  Goal: Will have no self-injury during hospital stay  Description: INTERVENTIONS:  1.  Ensure constant observer at bedside with Q15M safety checks  2.  Maintain a safe environment  3.  Secure patient belongings  4.  Ensure family/visitors adhere to safety recommendations  5.  Ensure safety tray has been added to patient's diet order  6.  Every shift and PRN: Re-assess suicidal risk via Frequent Screener    2/2/2025 2323 by Breanne Rubi RN  Outcome: Progressing     Problem: Depression  Goal: Will be euthymic at discharge  Description: INTERVENTIONS:  1. Administer medication as ordered  2. Provide emotional support via 1:1 interaction with staff  3. Encourage involvement in milieu/groups/activities  4. Monitor for social isolation  Outcome: Progressing     Problem: Gretta  Goal: Will exhibit normal sleep and speech and no impulsivity  Description: INTERVENTIONS:  1. Administer medication as ordered  2. Set limits on impulsive behavior  3. Make attempts to decrease external stimuli as possible  Outcome: Progressing     Problem: Psychosis  Goal: Will report no hallucinations or delusions  Description: INTERVENTIONS:  1. Administer medication as  ordered  2. Assist with reality testing to support increasing orientation  3. Assess if patient's hallucinations or delusions are encouraging self harm or harm to others and intervene as appropriate  Outcome: Progressing     Problem: Behavior  Goal: Pt/Family maintain 
  Problem: Skin/Tissue Integrity  Goal: Absence of new skin breakdown  Description: 1.  Monitor for areas of redness and/or skin breakdown  2.  Assess vascular access sites hourly  3.  Every 4-6 hours minimum:  Change oxygen saturation probe site  4.  Every 4-6 hours:  If on nasal continuous positive airway pressure, respiratory therapy assess nares and determine need for appliance change or resting period.  2/3/2025 0910 by Jovanna White, RN  Outcome: Progressing     Problem: Risk for Elopement  Goal: Patient will not exit the unit/facility without proper excort  Outcome: Progressing     Problem: Self Harm/Suicidality  Goal: Will have no self-injury during hospital stay  Description: INTERVENTIONS:  1.  Ensure constant observer at bedside with Q15M safety checks  2.  Maintain a safe environment  3.  Secure patient belongings  4.  Ensure family/visitors adhere to safety recommendations  5.  Ensure safety tray has been added to patient's diet order  6.  Every shift and PRN: Re-assess suicidal risk via Frequent Screener    2/3/2025 0910 by Jovanna White, RN  Outcome: Progressing        Patient denies SI/HI/Hallucinations. Patient is seen in day room eating breakfast. Patient is medication compliant. Patient denies anxiety and depression. No active distress is noted, respirations are even and unlabored. Will continue to monitor and will intervene as needed with close 15 minute rounds.     
  Problem: Skin/Tissue Integrity  Goal: Absence of new skin breakdown  Description: 1.  Monitor for areas of redness and/or skin breakdown  2.  Assess vascular access sites hourly  3.  Every 4-6 hours minimum:  Change oxygen saturation probe site  4.  Every 4-6 hours:  If on nasal continuous positive airway pressure, respiratory therapy assess nares and determine need for appliance change or resting period.  Outcome: Progressing     Problem: Risk for Elopement  Goal: Patient will not exit the unit/facility without proper excort  Outcome: Progressing     Problem: Self Harm/Suicidality  Goal: Will have no self-injury during hospital stay  Description: INTERVENTIONS:  1.  Ensure constant observer at bedside with Q15M safety checks  2.  Maintain a safe environment  3.  Secure patient belongings  4.  Ensure family/visitors adhere to safety recommendations  5.  Ensure safety tray has been added to patient's diet order  6.  Every shift and PRN: Re-assess suicidal risk via Frequent Screener    Outcome: Progressing          Patient denies SI/HI/Hallucinations. Patient is medication compliant and denies anxiety and depression. Patient is in no active distress respirations even and unlabored. Will continue to monitor and will intervene as needed with close 15 minute rounds.      
  Problem: Skin/Tissue Integrity  Goal: Absence of new skin breakdown  Description: 1.  Monitor for areas of redness and/or skin breakdown  2.  Assess vascular access sites hourly  3.  Every 4-6 hours minimum:  Change oxygen saturation probe site  4.  Every 4-6 hours:  If on nasal continuous positive airway pressure, respiratory therapy assess nares and determine need for appliance change or resting period.  Outcome: Progressing     Problem: Risk for Elopement  Goal: Patient will not exit the unit/facility without proper excort  Outcome: Progressing     Problem: Self Harm/Suicidality  Goal: Will have no self-injury during hospital stay  Description: INTERVENTIONS:  1.  Ensure constant observer at bedside with Q15M safety checks  2.  Maintain a safe environment  3.  Secure patient belongings  4.  Ensure family/visitors adhere to safety recommendations  5.  Ensure safety tray has been added to patient's diet order  6.  Every shift and PRN: Re-assess suicidal risk via Frequent Screener    Outcome: Progressing          Patient denies SI/HI/Hallucinations. Patient seen in dayroom, less intrusive.  Patient has rapid pressured speech. Patient is denying anxiety and depression. No active distress respirations even and unlabored. Will continue to monitor and will intervene as needed with close 15 minute rounds     
  Problem: Skin/Tissue Integrity  Goal: Absence of new skin breakdown  Description: 1.  Monitor for areas of redness and/or skin breakdown  2.  Assess vascular access sites hourly  3.  Every 4-6 hours minimum:  Change oxygen saturation probe site  4.  Every 4-6 hours:  If on nasal continuous positive airway pressure, respiratory therapy assess nares and determine need for appliance change or resting period.  Outcome: Progressing     Problem: Risk for Elopement  Goal: Patient will not exit the unit/facility without proper excort  Outcome: Progressing     Problem: Self Harm/Suicidality  Goal: Will have no self-injury during hospital stay  Description: INTERVENTIONS:  1.  Ensure constant observer at bedside with Q15M safety checks  2.  Maintain a safe environment  3.  Secure patient belongings  4.  Ensure family/visitors adhere to safety recommendations  5.  Ensure safety tray has been added to patient's diet order  6.  Every shift and PRN: Re-assess suicidal risk via Frequent Screener    Outcome: Progressing     Problem: Gretta  Goal: Will exhibit normal sleep and speech and no impulsivity  Description: INTERVENTIONS:  1. Administer medication as ordered  2. Set limits on impulsive behavior  3. Make attempts to decrease external stimuli as possible  Outcome: Progressing     Problem: Psychosis  Goal: Will report no hallucinations or delusions  Description: INTERVENTIONS:  1. Administer medication as  ordered  2. Assist with reality testing to support increasing orientation  3. Assess if patient's hallucinations or delusions are encouraging self harm or harm to others and intervene as appropriate  Outcome: Progressing     Problem: Behavior  Goal: Pt/Family maintain appropriate behavior and adhere to behavioral management agreement, if implemented  Description: INTERVENTIONS:  1. Assess patient/family's coping skills and  non-compliant behavior (including use of illegal substances)  2. Notify security of behavior or 
  Problem: Skin/Tissue Integrity  Goal: Absence of new skin breakdown  Description: 1.  Monitor for areas of redness and/or skin breakdown  2.  Assess vascular access sites hourly  3.  Every 4-6 hours minimum:  Change oxygen saturation probe site  4.  Every 4-6 hours:  If on nasal continuous positive airway pressure, respiratory therapy assess nares and determine need for appliance change or resting period.  Outcome: Progressing     Problem: Risk for Elopement  Goal: Patient will not exit the unit/facility without proper excort  Outcome: Progressing     Problem: Self Harm/Suicidality  Goal: Will have no self-injury during hospital stay  Description: INTERVENTIONS:  1.  Ensure constant observer at bedside with Q15M safety checks  2.  Maintain a safe environment  3.  Secure patient belongings  4.  Ensure family/visitors adhere to safety recommendations  5.  Ensure safety tray has been added to patient's diet order  6.  Every shift and PRN: Re-assess suicidal risk via Frequent Screener    Outcome: Progressing     Problem: Gretta  Goal: Will exhibit normal sleep and speech and no impulsivity  Description: INTERVENTIONS:  1. Administer medication as ordered  2. Set limits on impulsive behavior  3. Make attempts to decrease external stimuli as possible  Outcome: Progressing     Problem: Psychosis  Goal: Will report no hallucinations or delusions  Description: INTERVENTIONS:  1. Administer medication as  ordered  2. Assist with reality testing to support increasing orientation  3. Assess if patient's hallucinations or delusions are encouraging self harm or harm to others and intervene as appropriate  Outcome: Progressing     Problem: Behavior  Goal: Pt/Family maintain appropriate behavior and adhere to behavioral management agreement, if implemented  Description: INTERVENTIONS:  1. Assess patient/family's coping skills and  non-compliant behavior (including use of illegal substances)  2. Notify security of behavior or 
  Problem: Skin/Tissue Integrity  Goal: Absence of new skin breakdown  Description: 1.  Monitor for areas of redness and/or skin breakdown  2.  Assess vascular access sites hourly  3.  Every 4-6 hours minimum:  Change oxygen saturation probe site  4.  Every 4-6 hours:  If on nasal continuous positive airway pressure, respiratory therapy assess nares and determine need for appliance change or resting period.  Outcome: Progressing     Problem: Risk for Elopement  Goal: Patient will not exit the unit/facility without proper excort  Outcome: Progressing     Problem: Self Harm/Suicidality  Goal: Will have no self-injury during hospital stay  Description: INTERVENTIONS:  1.  Ensure constant observer at bedside with Q15M safety checks  2.  Maintain a safe environment  3.  Secure patient belongings  4.  Ensure family/visitors adhere to safety recommendations  5.  Ensure safety tray has been added to patient's diet order  6.  Every shift and PRN: Re-assess suicidal risk via Frequent Screener    Outcome: Progressing    Patient denies SI/HI/Hallucinations. Patient in room complaining of slight nausea. Patient initially assessed and skin is intact. Patient assisted X 2 with assistance using the bedpan. Patient denies anxiety and depression.  Rapid pressured speech and mood lability noted. Patient is in no active distress respirations are even and unlabored. Will continue to monitor and will intervene as needed with close 15 minute rounds.   
Denies SI/HI/AVH. Denies anxiety and depression. Reports good sleep and poor appetite. Compliant with medications. No groups attended D/T multiple episodes of diarrhea. Appears less labile, disorganized, and impulsive. Easily distracted and redirectable. Remains in control of behaviors.     Problem: Risk for Elopement  Goal: Patient will not exit the unit/facility without proper excort  Outcome: Progressing     Problem: Self Harm/Suicidality  Goal: Will have no self-injury during hospital stay  Outcome: Progressing     Problem: Depression  Goal: Will be euthymic at discharge  Outcome: Progressing     Problem: Gretta  Goal: Will exhibit normal sleep and speech and no impulsivity  Outcome: Progressing     Problem: Psychosis  Goal: Will report no hallucinations or delusions  Outcome: Progressing     Problem: Behavior  Goal: Pt/Family maintain appropriate behavior and adhere to behavioral management agreement, if implemented  Outcome: Progressing     Problem: Anxiety  Goal: Will report anxiety at manageable levels  Outcome: Progressing     Problem: Sleep Disturbance  Goal: Will exhibit normal sleeping pattern  Outcome: Progressing     Problem: Involuntary Admit  Goal: Will cooperate with staff recommendations and doctor's orders and will demonstrate appropriate behavior  Outcome: Progressing     Problem: Self Care Deficit  Goal: Return ADL status to a safe level of function  Outcome: Progressing     Problem: Spiritual Care  Goal: Pt/Family able to move forward in process of forgiving self, others, and/or higher power  Outcome: Progressing     Problem: Safety - Adult  Goal: Free from fall injury  Outcome: Progressing     Problem: Pain  Goal: Verbalizes/displays adequate comfort level or baseline comfort level  Outcome: Progressing     
Patient denies suicidal/homicidal ideations and hallucinations. Patient denies anxiety and depression. Patient continues to be grandiose and verbose, behavior seems to be more in control compared to yesterday evening. Patient is taking ordered medications without issue. Continued support offered to patient. Safety rounds continue.     Problem: Self Harm/Suicidality  Goal: Will have no self-injury during hospital stay  Description: INTERVENTIONS:  1.  Ensure constant observer at bedside with Q15M safety checks  2.  Maintain a safe environment  3.  Secure patient belongings  4.  Ensure family/visitors adhere to safety recommendations  5.  Ensure safety tray has been added to patient's diet order  6.  Every shift and PRN: Re-assess suicidal risk via Frequent Screener    1/26/2025 2204 by Breanne Rubi RN  Outcome: Progressing     Problem: Depression  Goal: Will be euthymic at discharge  Description: INTERVENTIONS:  1. Administer medication as ordered  2. Provide emotional support via 1:1 interaction with staff  3. Encourage involvement in milieu/groups/activities  4. Monitor for social isolation  Outcome: Progressing     Problem: Gretta  Goal: Will exhibit normal sleep and speech and no impulsivity  Description: INTERVENTIONS:  1. Administer medication as ordered  2. Set limits on impulsive behavior  3. Make attempts to decrease external stimuli as possible  1/26/2025 2204 by Breanne Rubi, RN  Outcome: Progressing     Problem: Psychosis  Goal: Will report no hallucinations or delusions  Description: INTERVENTIONS:  1. Administer medication as  ordered  2. Assist with reality testing to support increasing orientation  3. Assess if patient's hallucinations or delusions are encouraging self harm or harm to others and intervene as appropriate  Outcome: Progressing     Problem: Behavior  Goal: Pt/Family maintain appropriate behavior and adhere to behavioral management agreement, if implemented  Description: 
Patient denies suicidal/homicidal ideations and hallucinations. Patient denies anxiety and depression. Patient is tangential, states she \"likes to cuss\" and does mention a sister who \" too young at age 22\". Patient appears grandiose at times and remains verbose. Patient is taking ordered medications without issue. Continued support offered to patient. Safety rounds continue.     Problem: Gretta  Goal: Will exhibit normal sleep and speech and no impulsivity  Description: INTERVENTIONS:  1. Administer medication as ordered  2. Set limits on impulsive behavior  3. Make attempts to decrease external stimuli as possible  2025 by Breanne Rubi RN  Outcome: Progressing     Problem: Psychosis  Goal: Will report no hallucinations or delusions  Description: INTERVENTIONS:  1. Administer medication as  ordered  2. Assist with reality testing to support increasing orientation  3. Assess if patient's hallucinations or delusions are encouraging self harm or harm to others and intervene as appropriate  2025 by Breanne Rubi RN  Outcome: Progressing     Problem: Behavior  Goal: Pt/Family maintain appropriate behavior and adhere to behavioral management agreement, if implemented  Description: INTERVENTIONS:  1. Assess patient/family's coping skills and  non-compliant behavior (including use of illegal substances)  2. Notify security of behavior or suspected illegal substances which indicate the need for search of the family and/or belongings  3. Encourage verbalization of thoughts and concerns in a socially appropriate manner  4. Utilize positive, consistent limit setting strategies supporting safety of patient, staff and others  5. Encourage participation in the decision making process about the behavioral management agreement  6. If a visitor's behavior poses a threat to safety call refer to organization policy.  7. Initiate consult with , Psychosocial CNS, Spiritual Care as 
Patient denies suicidal/homicidal ideations and hallucinations. Patient reports anxiety is 35/10 and when asked about depression patient states she is in love and starts singing. Patient is verbose and loud at times, remains easily redirectable at this time. Patient is taking ordered medications without issue. Continued support offered to patient. Safety rounds continue.     Problem: Self Harm/Suicidality  Goal: Will have no self-injury during hospital stay  Description: INTERVENTIONS:  1.  Ensure constant observer at bedside with Q15M safety checks  2.  Maintain a safe environment  3.  Secure patient belongings  4.  Ensure family/visitors adhere to safety recommendations  5.  Ensure safety tray has been added to patient's diet order  6.  Every shift and PRN: Re-assess suicidal risk via Frequent Screener    Outcome: Progressing     Problem: Depression  Goal: Will be euthymic at discharge  Description: INTERVENTIONS:  1. Administer medication as ordered  2. Provide emotional support via 1:1 interaction with staff  3. Encourage involvement in milieu/groups/activities  4. Monitor for social isolation  Outcome: Progressing     Problem: Gretta  Goal: Will exhibit normal sleep and speech and no impulsivity  Description: INTERVENTIONS:  1. Administer medication as ordered  2. Set limits on impulsive behavior  3. Make attempts to decrease external stimuli as possible  Outcome: Progressing     Problem: Psychosis  Goal: Will report no hallucinations or delusions  Description: INTERVENTIONS:  1. Administer medication as  ordered  2. Assist with reality testing to support increasing orientation  3. Assess if patient's hallucinations or delusions are encouraging self harm or harm to others and intervene as appropriate  1/25/2025 8218 by Breanne Rubi RN  Outcome: Progressing     Problem: Behavior  Goal: Pt/Family maintain appropriate behavior and adhere to behavioral management agreement, if implemented  Description: 
Pt denies homicidal/suicidal thoughts. Denies hallucinations. Pt denies anxiety and depression. Pt has not had any outburst on the unit. Pt is interactive with peers on the unit. Pt is compliant with medications. Pt is attending group therapy. Will continue to monitor and provide support.  Problem: Risk for Elopement  Goal: Patient will not exit the unit/facility without proper excort  Outcome: Progressing     Problem: Self Harm/Suicidality  Goal: Will have no self-injury during hospital stay  Description: INTERVENTIONS:  1.  Ensure constant observer at bedside with Q15M safety checks  2.  Maintain a safe environment  3.  Secure patient belongings  4.  Ensure family/visitors adhere to safety recommendations  5.  Ensure safety tray has been added to patient's diet order  6.  Every shift and PRN: Re-assess suicidal risk via Frequent Screener    Outcome: Progressing     Problem: Gretta  Goal: Will exhibit normal sleep and speech and no impulsivity  Description: INTERVENTIONS:  1. Administer medication as ordered  2. Set limits on impulsive behavior  3. Make attempts to decrease external stimuli as possible  Outcome: Progressing     
Pt denies homicidal/suicidal thoughts. Denies hallucinations. Pt denies anxiety and depression. Pt is impulsive and verbally aggressive. Pt speech is pressured and loud. Pt is complaint with medications and attending group therapy. Will continue to monitor and provide support.   Problem: Anxiety  Goal: Will report anxiety at manageable levels  Description: INTERVENTIONS:  1. Administer medication as ordered  2. Teach and rehearse alternative coping skills  3. Provide emotional support with 1:1 interaction with staff  Outcome: Progressing     Problem: Psychosis  Goal: Will report no hallucinations or delusions  Description: INTERVENTIONS:  1. Administer medication as  ordered  2. Assist with reality testing to support increasing orientation  3. Assess if patient's hallucinations or delusions are encouraging self harm or harm to others and intervene as appropriate  Outcome: Not Progressing     Problem: Behavior  Goal: Pt/Family maintain appropriate behavior and adhere to behavioral management agreement, if implemented  Description: INTERVENTIONS:  1. Assess patient/family's coping skills and  non-compliant behavior (including use of illegal substances)  2. Notify security of behavior or suspected illegal substances which indicate the need for search of the family and/or belongings  3. Encourage verbalization of thoughts and concerns in a socially appropriate manner  4. Utilize positive, consistent limit setting strategies supporting safety of patient, staff and others  5. Encourage participation in the decision making process about the behavioral management agreement  6. If a visitor's behavior poses a threat to safety call refer to organization policy.  7. Initiate consult with , Psychosocial CNS, Spiritual Care as appropriate  Outcome: Not Progressing     
Pt denies homicidal/suicidal thoughts. Denies hallucinations. Pt is complaint with medications. Pt did not attend group therapy. Pt is impulsive, loud, and verbally aggressive. Pt is able to be redirected and apologizes for her actions. Pt is interactive with select peers on the unit. Will continue to monitor and provide support.  Problem: Gretta  Goal: Will exhibit normal sleep and speech and no impulsivity  Description: INTERVENTIONS:  1. Administer medication as ordered  2. Set limits on impulsive behavior  3. Make attempts to decrease external stimuli as possible  Outcome: Progressing     Problem: Psychosis  Goal: Will report no hallucinations or delusions  Description: INTERVENTIONS:  1. Administer medication as  ordered  2. Assist with reality testing to support increasing orientation  3. Assess if patient's hallucinations or delusions are encouraging self harm or harm to others and intervene as appropriate  Outcome: Progressing     Problem: Behavior  Goal: Pt/Family maintain appropriate behavior and adhere to behavioral management agreement, if implemented  Description: INTERVENTIONS:  1. Assess patient/family's coping skills and  non-compliant behavior (including use of illegal substances)  2. Notify security of behavior or suspected illegal substances which indicate the need for search of the family and/or belongings  3. Encourage verbalization of thoughts and concerns in a socially appropriate manner  4. Utilize positive, consistent limit setting strategies supporting safety of patient, staff and others  5. Encourage participation in the decision making process about the behavioral management agreement  6. If a visitor's behavior poses a threat to safety call refer to organization policy.  7. Initiate consult with , Psychosocial CNS, Spiritual Care as appropriate  Outcome: Not Progressing     
Pt denies homicidal/suicidal thoughts. Denies hallucinations. Pt is compliant with medications. Pt is attending group therapy. Pt has not had any outburst on the unit. Pt is interactive with select peers. Pt brightens with conversation. Pt denies anxiety and depression. Will continue to monitor and provide support .  Problem: Skin/Tissue Integrity  Goal: Absence of new skin breakdown  Description: 1.  Monitor for areas of redness and/or skin breakdown  2.  Assess vascular access sites hourly  3.  Every 4-6 hours minimum:  Change oxygen saturation probe site  4.  Every 4-6 hours:  If on nasal continuous positive airway pressure, respiratory therapy assess nares and determine need for appliance change or resting period.  1/31/2025 0321 by Joanna Mistry RN  Outcome: Progressing     Problem: Risk for Elopement  Goal: Patient will not exit the unit/facility without proper excort  1/31/2025 0321 by Joanna Mistry RN  Outcome: Progressing     Problem: Self Harm/Suicidality  Goal: Will have no self-injury during hospital stay  Description: INTERVENTIONS:  1.  Ensure constant observer at bedside with Q15M safety checks  2.  Maintain a safe environment  3.  Secure patient belongings  4.  Ensure family/visitors adhere to safety recommendations  5.  Ensure safety tray has been added to patient's diet order  6.  Every shift and PRN: Re-assess suicidal risk via Frequent Screener    1/31/2025 0321 by Joanna Mistry RN  Outcome: Progressing     Problem: Depression  Goal: Will be euthymic at discharge  Description: INTERVENTIONS:  1. Administer medication as ordered  2. Provide emotional support via 1:1 interaction with staff  3. Encourage involvement in milieu/groups/activities  4. Monitor for social isolation  1/31/2025 0321 by Joanna Mistry RN  Outcome: Progressing     Problem: Gretta  Goal: Will exhibit normal sleep and speech and no impulsivity  Description: INTERVENTIONS:  1. Administer medication as ordered  2. Set 
Pt denies homicidal/suicidal thoughts. Denies hallucinations. Pt is compliant with medications. Pt is attending group therapy. Pt is in control of behaviors and haven't had any outburst on the unit. Pt is cooperative and interactive with select peers on the unit. Will continue to monitor and provide support.  Problem: Skin/Tissue Integrity  Goal: Absence of new skin breakdown  Description: 1.  Monitor for areas of redness and/or skin breakdown  2.  Assess vascular access sites hourly  3.  Every 4-6 hours minimum:  Change oxygen saturation probe site  4.  Every 4-6 hours:  If on nasal continuous positive airway pressure, respiratory therapy assess nares and determine need for appliance change or resting period.  Outcome: Progressing     Problem: Risk for Elopement  Goal: Patient will not exit the unit/facility without proper excort  Outcome: Progressing     Problem: Self Harm/Suicidality  Goal: Will have no self-injury during hospital stay  Description: INTERVENTIONS:  1.  Ensure constant observer at bedside with Q15M safety checks  2.  Maintain a safe environment  3.  Secure patient belongings  4.  Ensure family/visitors adhere to safety recommendations  5.  Ensure safety tray has been added to patient's diet order  6.  Every shift and PRN: Re-assess suicidal risk via Frequent Screener    Outcome: Progressing     
Pt denies suicidal/homicidal thoughts. Denies hallucinations. Pt is verbally aggressive and makes inappropriate comments to staff. Pt is impulsive and irritable. Pt denies anxiety and depression. Will continue to monitor and provide support.  Problem: Psychosis  Goal: Will report no hallucinations or delusions  Description: INTERVENTIONS:  1. Administer medication as  ordered  2. Assist with reality testing to support increasing orientation  3. Assess if patient's hallucinations or delusions are encouraging self harm or harm to others and intervene as appropriate  Outcome: Not Progressing     Problem: Behavior  Goal: Pt/Family maintain appropriate behavior and adhere to behavioral management agreement, if implemented  Description: INTERVENTIONS:  1. Assess patient/family's coping skills and  non-compliant behavior (including use of illegal substances)  2. Notify security of behavior or suspected illegal substances which indicate the need for search of the family and/or belongings  3. Encourage verbalization of thoughts and concerns in a socially appropriate manner  4. Utilize positive, consistent limit setting strategies supporting safety of patient, staff and others  5. Encourage participation in the decision making process about the behavioral management agreement  6. If a visitor's behavior poses a threat to safety call refer to organization policy.  7. Initiate consult with , Psychosocial CNS, Spiritual Care as appropriate  Outcome: Not Progressing     Problem: Anxiety  Goal: Will report anxiety at manageable levels  Description: INTERVENTIONS:  1. Administer medication as ordered  2. Teach and rehearse alternative coping skills  3. Provide emotional support with 1:1 interaction with staff  Outcome: Not Progressing     
Verbalizes/displays adequate comfort level or baseline comfort level  Outcome: Progressing     
Progressing     Problem: Behavior  Goal: Pt/Family maintain appropriate behavior and adhere to behavioral management agreement, if implemented  Description: INTERVENTIONS:  1. Assess patient/family's coping skills and  non-compliant behavior (including use of illegal substances)  2. Notify security of behavior or suspected illegal substances which indicate the need for search of the family and/or belongings  3. Encourage verbalization of thoughts and concerns in a socially appropriate manner  4. Utilize positive, consistent limit setting strategies supporting safety of patient, staff and others  5. Encourage participation in the decision making process about the behavioral management agreement  6. If a visitor's behavior poses a threat to safety call refer to organization policy.  7. Initiate consult with , Psychosocial CNS, Spiritual Care as appropriate  Outcome: Not Progressing     Problem: Behavior  Goal: Pt/Family maintain appropriate behavior and adhere to behavioral management agreement, if implemented  Description: INTERVENTIONS:  1. Assess patient/family's coping skills and  non-compliant behavior (including use of illegal substances)  2. Notify security of behavior or suspected illegal substances which indicate the need for search of the family and/or belongings  3. Encourage verbalization of thoughts and concerns in a socially appropriate manner  4. Utilize positive, consistent limit setting strategies supporting safety of patient, staff and others  5. Encourage participation in the decision making process about the behavioral management agreement  6. If a visitor's behavior poses a threat to safety call refer to organization policy.  7. Initiate consult with , Psychosocial CNS, Spiritual Care as appropriate  Outcome: Not Progressing     Problem: Anxiety  Goal: Will report anxiety at manageable levels  Description: INTERVENTIONS:  1. Administer medication as ordered  2. Teach and

## 2025-02-06 NOTE — GROUP NOTE
Group Therapy Note    Date: 2/6/2025    Group Start Time: 1400  Group End Time: 1430  Group Topic: Cognitive Skills    [facility]    Lorin Graham LSW        Group Therapy Note    Attendees: 9       Patient's Goal:  Pts discussed toxic influences and how to eliminate them.     Notes:  Pt actively participated in group.     Status After Intervention:  Improved    Participation Level: Active Listener and Interactive    Participation Quality: Appropriate, Attentive, Sharing, and Supportive      Speech:  normal      Thought Process/Content: Logical      Affective Functioning: Congruent      Mood: euthymic      Level of consciousness:  Alert and Oriented x4      Response to Learning: Able to verbalize current knowledge/experience, Able to verbalize/acknowledge new learning, Able to retain information, Capable of insight, and Progressing to goal      Endings: None Reported    Modes of Intervention: Education, Support, Socialization, Exploration, Clarifying, and Problem-solving      Discipline Responsible: /Counselor      Signature:  RICCI Cobos     Per Dr. Coronel  could we organize repeat CT with IV contrast of the abdomen for the end of April (nec panc)     Order placed. CT scheduled.     4/27/2020 9:00 am at the [facility]. NPO for 2 hours prior, arrive at 8:30 am.     Letter sent per patient preference.     Meena Samaniego, RN Care Coordinator

## 2025-02-06 NOTE — CARE COORDINATION
[phone]. She stated that pts precert came back and they can take pt. She stated that she saw we tested pt for cdiff. Ange stated that we have not gotten the results back from this, but once we do, sw will call her and then we can plan for discharge. She was fine with this plan.

## 2025-02-06 NOTE — CARE COORDINATION
Discharge:    Sw met with pt to discuss discharge. She is going to [facility]. She denied SI/HI/AVH.     Collateral:    Sw called pts brother Dez [phone] (JUVE signed) to inform him of discharge. He was upset to hear that we are sending her to the skilled nursing facility. He stated that he talked to a  about sending her home and setting up home healthcare. Sw stated that she was not aware of this. Sw stated that pt was agreeable to going to the nursing facility. He stated that he wants to speak with the pt.     Sw transferred him to the pt phones.     Sw called SW called [facility] Phone: [phone] to schedule transportation for the pt to go to [facility], address: [address] [place] [address].     Staff spoke with pts family and pt will be returning home.     Appointments:    Pt has a diagnostic on 2/7 and a med appointment on 2/27.    In order to ensure appropriate transition and discharge planning is in place, the following documents have been transmitted to yamile Christie the new outpatient provider:    The d/c diagnosis was transmitted to the next care provider  The reason for hospitalization was transmitted to the next care provider  The d/c medications (dosage and indication) were transmitted to the next care provider   The continuing care plan was transmitted to the next care provider

## 2025-02-06 NOTE — PROGRESS NOTES
[facility] Hospitalist Progress Note      Synopsis: Patient with a history of bipolar disorder, hyperlipidemia, type 2 diabetes, admitted on 1/21/2025 to inpatient psych, hospitalist service consulted for peripheral edema in her legs.  Apparently this is chronic, she does take Lasix at baseline, which has been uptitrated with improvement in edema    Subjective    Clinically improving. Feeling better.    Stable overnight. No other overnight issues reported.     No CP, SOB, palpitations, blurred vision, HA, lightheadedness, LOC or focal neurological deficits    Exam:  BP (!) 128/58   Pulse 91   Temp 98.1 °F (36.7 °C) (Temporal)   Resp 18   Ht 1.626 m (5' 4.02\")   Wt 85.3 kg (188 lb 0.8 oz)   SpO2 97%   BMI 32.26 kg/m²   General appearance: No apparent distress, appears stated age and cooperative.  HEENT: Pupils equal, round, and reactive to light. Conjunctivae/corneas clear.  Neck: Supple. No jugular venous distention. Trachea midline.  Respiratory:  Normal respiratory effort. Clear to auscultation, bilaterally without Rales/Wheezes/Rhonchi.  Cardiovascular: Regular rate and rhythm with normal S1/S2 without murmurs, rubs or gallops.  Abdomen: Soft, non-tender, non-distended with normal bowel sounds.  Musculoskeletal: Bilateral lower extremity edema  Skin:  No rashes    Neurologic: awake, alert and following commands     Medications:  Reviewed    Infusion Medications    sodium chloride      dextrose       Scheduled Medications    [START ON 2/4/2025] furosemide  40 mg Oral Daily    potassium chloride  40 mEq Oral Daily with breakfast    paliperidone  3 mg Oral BID    metFORMIN  1,000 mg Oral BID WC    pioglitazone  45 mg Oral Daily    empagliflozin  10 mg Oral QAM    alogliptin  12.5 mg Oral Daily    labetalol  200 mg Oral 3 times per day    insulin lispro  0-4 Units SubCUTAneous 4x Daily AC & HS    carBAMazepine  200 mg Oral BID    benztropine  1 mg Oral BID    atorvastatin  40 mg Oral Daily    brimonidine  1 
    [facility] Hospitalist Progress Note      Synopsis: Patient with a history of bipolar disorder, hyperlipidemia, type 2 diabetes, admitted on 1/21/2025 to inpatient psych, hospitalist service consulted for peripheral edema in her legs.  Apparently this is chronic, she does take Lasix at baseline, which has been uptitrated with improvement in edema    Subjective    Clinically improving. Feeling better.    Stable overnight. No other overnight issues reported.     No CP, SOB, palpitations, blurred vision, HA, lightheadedness, LOC or focal neurological deficits    Exam:  BP (!) 141/63   Pulse 87   Temp 97.9 °F (36.6 °C) (Temporal)   Resp 16   Ht 1.626 m (5' 4.02\")   Wt 92.7 kg (204 lb 6.4 oz)   SpO2 98%   BMI 35.07 kg/m²   General appearance: No apparent distress, appears stated age and cooperative.  HEENT: Pupils equal, round, and reactive to light. Conjunctivae/corneas clear.  Neck: Supple. No jugular venous distention. Trachea midline.  Respiratory:  Normal respiratory effort. Clear to auscultation, bilaterally without Rales/Wheezes/Rhonchi.  Cardiovascular: Regular rate and rhythm with normal S1/S2 without murmurs, rubs or gallops.  Abdomen: Soft, non-tender, non-distended with normal bowel sounds.  Musculoskeletal: Bilateral lower extremity edema  Skin:  No rashes    Neurologic: awake, alert and following commands     Medications:  Reviewed    Infusion Medications    sodium chloride      dextrose       Scheduled Medications    [START ON 2/5/2025] furosemide  20 mg Oral Daily    [START ON 2/5/2025] potassium chloride  20 mEq Oral Daily with breakfast    paliperidone  3 mg Oral BID    metFORMIN  1,000 mg Oral BID WC    pioglitazone  45 mg Oral Daily    empagliflozin  10 mg Oral QAM    alogliptin  12.5 mg Oral Daily    labetalol  200 mg Oral 3 times per day    insulin lispro  0-4 Units SubCUTAneous 4x Daily AC & HS    carBAMazepine  200 mg Oral BID    benztropine  1 mg Oral BID    atorvastatin  40 mg Oral Daily 
    [facility] Hospitalist Progress Note      Synopsis: Patient with a history of bipolar disorder, hyperlipidemia, type 2 diabetes, admitted on 1/21/2025 to inpatient psych, hospitalist service consulted for peripheral edema in her legs.  Apparently this is chronic, she does take Lasix at baseline, which has been uptitrated with improvement in edema    Subjective    Clinically improving. Feeling better.    Stable overnight. No other overnight issues reported.     No CP, SOB, palpitations, blurred vision, HA, lightheadedness, LOC or focal neurological deficits    Exam:  /64   Pulse 95   Temp 97.6 °F (36.4 °C) (Temporal)   Resp 17   Ht 1.626 m (5' 4.02\")   Wt 85.3 kg (188 lb 0.8 oz)   SpO2 98%   BMI 32.26 kg/m²   General appearance: No apparent distress, appears stated age and cooperative.  HEENT: Pupils equal, round, and reactive to light. Conjunctivae/corneas clear.  Neck: Supple. No jugular venous distention. Trachea midline.  Respiratory:  Normal respiratory effort. Clear to auscultation, bilaterally without Rales/Wheezes/Rhonchi.  Cardiovascular: Regular rate and rhythm with normal S1/S2 without murmurs, rubs or gallops.  Abdomen: Soft, non-tender, non-distended with normal bowel sounds.  Musculoskeletal: Bilateral lower extremity edema  Skin:  No rashes    Neurologic: awake, alert and following commands     Medications:  Reviewed    Infusion Medications    sodium chloride      dextrose       Scheduled Medications    furosemide  40 mg Oral BID    potassium chloride  40 mEq Oral Daily with breakfast    paliperidone  3 mg Oral BID    metFORMIN  1,000 mg Oral BID WC    pioglitazone  45 mg Oral Daily    empagliflozin  10 mg Oral QAM    alogliptin  12.5 mg Oral Daily    labetalol  200 mg Oral 3 times per day    insulin lispro  0-4 Units SubCUTAneous 4x Daily AC & HS    carBAMazepine  200 mg Oral BID    benztropine  1 mg Oral BID    atorvastatin  40 mg Oral Daily    brimonidine  1 drop Both Eyes BID    
    [facility] Hospitalist Progress Note      Synopsis: Patient with a history of bipolar disorder, hyperlipidemia, type 2 diabetes, admitted on 1/21/2025 to inpatient psych, hospitalist service consulted for peripheral edema in her legs.  Apparently this is chronic, she does take Lasix at baseline, which has been uptitrated with improvement in edema    Subjective  Increased diarrhea and nausea/vomiting over the last 24 hours.      Exam:  /61   Pulse (!) 114   Temp 98.2 °F (36.8 °C) (Temporal)   Resp 16   Ht 1.626 m (5' 4.02\")   Wt 88.5 kg (195 lb 1.6 oz)   SpO2 94%   BMI 33.47 kg/m²   General appearance: No apparent distress, appears stated age and cooperative.  HEENT: Pupils equal, round, and reactive to light. Conjunctivae/corneas clear.  Neck: Supple. No jugular venous distention. Trachea midline.  Respiratory:  Normal respiratory effort. Clear to auscultation, bilaterally without Rales/Wheezes/Rhonchi.  Cardiovascular: Regular rate and rhythm with normal S1/S2 without murmurs, rubs or gallops.  Abdomen: Soft, non-tender, non-distended with normal bowel sounds.  Musculoskeletal: Bilateral lower extremity edema  Skin:  No rashes    Neurologic: awake, alert and following commands     Medications:  Reviewed    Infusion Medications    sodium chloride      dextrose       Scheduled Medications    furosemide  20 mg Oral Daily    potassium chloride  20 mEq Oral Daily with breakfast    paliperidone  3 mg Oral BID    metFORMIN  1,000 mg Oral BID WC    pioglitazone  45 mg Oral Daily    empagliflozin  10 mg Oral QAM    alogliptin  12.5 mg Oral Daily    labetalol  200 mg Oral 3 times per day    insulin lispro  0-4 Units SubCUTAneous 4x Daily AC & HS    carBAMazepine  200 mg Oral BID    benztropine  1 mg Oral BID    atorvastatin  40 mg Oral Daily    brimonidine  1 drop Both Eyes BID    latanoprost  1 drop Both Eyes Nightly    timolol  1 drop Both Eyes Daily    enoxaparin  40 mg SubCUTAneous Daily    influenza virus 
Arrived for the last 15 min of this afterN's 75 min group time.   Missed \"the ismael hathaway story.\"   But attentive to \"boxes to check off\" speech from  arcadio faust.   Earned particip prize.   Appeared to digest some of the key learning points.   
Attempted report X 2. No answer. Awaiting call back.   
Attempted to call report. No answer will attempt again.  
Attended / particip well in this afterN's 75 min group on lessons fr the life of tyrese tee.   Attentive to a/v material.   Scored 100 on written quiz.  Excellent contrib to discussion.    Supportive listening to peers.   Earned particip prize.   Appeared to fully digest key learning points.    
Attended this am's 75 min group on humor as a coping skill: lessons fr the life of wallace baker.    Attentive to a/v materials.    Earned particip prize.   Courteous listening to peers.   Scored 100 on both quizzes.   Appeared to fully digest key learning points.   
BEHAVIORAL HEALTH FOLLOW-UP NOTE     1/23/2025     Patient was seen and examined in person, Chart reviewed   Patient's case discussed with staff/team    Chief Complaint: Delusional, bizarre, agitated behaviors, nonsensical     Interim History:   Patient was seen on the unit she remains loud, impulsive, labile, easily upset and agitated.  She continues to be disorganized with flight of ideas, delusions.  She is very easily distracted she does need redirection often at times she is difficult to redirect however today she does appear little more easily redirectable.  She became upset with staff members today began swearing and threatening.  She was able to be redirected and joined peers.  She is denies suicidal ideation, denies homicidal ideation she denies auditory visual hallucinations.  She did agree to work with PT/OT today.  She has been taking her medication, she was started on Klonopin yesterday which will be increased nightly tonight as reported patient had poor sleep.  She reports she is attending group, appetite reported to be fair    Appetite: [x] Normal/Unchanged  [] Increased  [] Decreased      Sleep:       [x] Normal/Unchanged  [] Fair       [] Poor              Energy:    [x] Normal/Unchanged  [] Increased  [] Decreased        SI [] Present  [x] Absent    HI  []Present  [x] Absent     Aggression:  [] yes  [x] no    Patient is [x] able  [] unable to CONTRACT FOR SAFETY     PAST MEDICAL/PSYCHIATRIC HISTORY:   Past Medical History:   Diagnosis Date    Acute gouty arthritis 06/09/2020    Bell's palsy     Bipolar disorder (HCC)     bipolar    Carpal tunnel syndrome 08/04/2016    Hyperlipidemia     Non-insulin dependent type 2 diabetes mellitus (HCC)     Sepsis ([facility]) 12/06/2019    Systemic primary arterial hypertension 08/04/2016    Type 2 diabetes with peripheral circulatory disorder, controlled ([facility]) 05/25/2023       FAMILY/SOCIAL HISTORY:  Family History   Problem Relation Age of Onset    Other Mother       
BEHAVIORAL HEALTH FOLLOW-UP NOTE     1/24/2025     Patient was seen and examined in person, Chart reviewed   Patient's case discussed with staff/team    Chief Complaint: Delusional, bizarre, agitated behaviors, nonsensical     Interim History:   Patient seen out in the milieu.  She is elated should her speech is loud she is hyperverbal.  She is somewhat dysarthric and difficult to understand what she is saying.  She states that she is happy to see me she states she talked to her mom and that her mom says \"I am glad you sound better.\"  She wants me to look at her legs that are swollen.  She states she is always had \"big legs with these are \"too big.\"  She has poor impulse control she tends to interrupt other patients when they are speaking.  She is difficult to understand at times.  Disorganized.  Denies suicidal ideations intent or plan denies auditory visualization attending group she is out social with the milieu.      Appetite: [x] Normal/Unchanged  [] Increased  [] Decreased      Sleep:       [x] Normal/Unchanged  [] Fair       [] Poor              Energy:    [x] Normal/Unchanged  [] Increased  [] Decreased        SI [] Present  [x] Absent    HI  []Present  [x] Absent     Aggression:  [] yes  [x] no    Patient is [x] able  [] unable to CONTRACT FOR SAFETY     PAST MEDICAL/PSYCHIATRIC HISTORY:   Past Medical History:   Diagnosis Date    Acute gouty arthritis 06/09/2020    Bell's palsy     Bipolar disorder ([facility])     bipolar    Carpal tunnel syndrome 08/04/2016    Hyperlipidemia     Non-insulin dependent type 2 diabetes mellitus ([facility])     Sepsis ([facility]) 12/06/2019    Systemic primary arterial hypertension 08/04/2016    Type 2 diabetes with peripheral circulatory disorder, controlled ([facility]) 05/25/2023       FAMILY/SOCIAL HISTORY:  Family History   Problem Relation Age of Onset    Other Mother         diverticulitis    Heart Disease Father 68    High Blood Pressure Father     No Known Problems Brother      Social 
BEHAVIORAL HEALTH FOLLOW-UP NOTE     1/26/2025     Patient was seen and examined in person, Chart reviewed   Patient's case discussed with staff/team    Chief Complaint: Delusional, bizarre, agitated behaviors, nonsensical     Interim History:   Patient seen out on the unit she is sitting in a table finishing her breakfast she does appear calmer today however she does remain hyperverbal, distractible but she is easily redirectable.  She states that she does not need to go to any skilled rehab facility stating that she just wants to go home stating that her mother was at [facility] but she will be going home soon.  She states they do well with each other.  She   continues to have poor impulse control, remains intrusive, impulsive.  She does denies suicidal ideation, denies homicidal ideation she denies auditory visual hallucinations.  She is taking her medication, she has had no further outburst on the unit, she is out social and she is attending groups.  Sleep and appetite reported to be fair    Appetite: [x] Normal/Unchanged  [] Increased  [] Decreased      Sleep:       [x] Normal/Unchanged  [] Fair       [] Poor              Energy:    [x] Normal/Unchanged  [] Increased  [] Decreased        SI [] Present  [x] Absent    HI  []Present  [x] Absent     Aggression:  [] yes  [x] no    Patient is [x] able  [] unable to CONTRACT FOR SAFETY     PAST MEDICAL/PSYCHIATRIC HISTORY:   Past Medical History:   Diagnosis Date    Acute gouty arthritis 06/09/2020    Bell's palsy     Bipolar disorder (HCC)     bipolar    Carpal tunnel syndrome 08/04/2016    Hyperlipidemia     Non-insulin dependent type 2 diabetes mellitus (HCC)     Sepsis ([facility]) 12/06/2019    Systemic primary arterial hypertension 08/04/2016    Type 2 diabetes with peripheral circulatory disorder, controlled ([facility]) 05/25/2023       FAMILY/SOCIAL HISTORY:  Family History   Problem Relation Age of Onset    Other Mother         diverticulitis    Heart Disease Father 68    High 
BEHAVIORAL HEALTH FOLLOW-UP NOTE     1/28/2025     Patient was seen and examined in person, Chart reviewed   Patient's case discussed with staff/team    Chief Complaint: Delusional, bizarre, agitated behaviors, nonsensical     Interim History:   Patient seen found on the unit in her wheelchair she does remain hyperverbal at times, impulsive and intrusive however she is easily redirectable.  She does appear calmer speaking me today.  She has remained under control while she has been out on the unit, easy to redirect per staff.  Yesterday patient did have a fall out on the unit PT/OT to be contacted for updated evaluation.  Patient initially had refused skilled rehab facility will see the patient again regarding that today after PT/OT work with her.  She does appear weak, she is using the wheelchair to get around she has to be wheeled to the phone today.  When asking patient how she gets around at home she states \"I do all right\".  She does not provide further details.  Social work to also reach out to patient's family for updated collateral as patient's mother was in [facility] may need nursing home need to see when patient's mother will be discharged home as she does live with patient.  She has been sleeping very poorly the last several nights it was reported last night was the first night patient had slept 5 hours.  She continues to have poor insight and judgment, poor impulse control. She does denies suicidal ideation, denies homicidal ideation she denies auditory visual hallucinations.  She is taking her medication, she has had no further outburst on the unit, she is out social and she is attending groups.  Sleep and appetite reported to be fair    Appetite: [x] Normal/Unchanged  [] Increased  [] Decreased      Sleep:       [x] Normal/Unchanged  [] Fair       [] Poor              Energy:    [x] Normal/Unchanged  [] Increased  [] Decreased        SI [] Present  [x] Absent    HI  []Present  [x] Absent     Aggression:  [] 
BEHAVIORAL HEALTH FOLLOW-UP NOTE     1/29/2025     Patient was seen and examined in person, Chart reviewed   Patient's case discussed with staff/team    Chief Complaint: Delusional, bizarre, agitated behaviors, nonsensical     Interim History:   Patient seen on the unit she is sitting in a table singing she is currently cooperative she does remain hyperverbal, easily distracted but remains easily redirectable.  She continues to appear calmer she has remained under control as she has been out on the unit.  She has been working with PT/OT.  Patient continues states she does not feel she needs to go to skilled rehab will continue to educate patient on recommended discharge plan.  She states that she did talk to her mother she states her mother is doing better she is getting stronger but she is worried about patient.  She denies suicidal ideation, denies homicidal ideation she denies auditory and visual hallucinations.  She does remain intrusive, impulsive, she has been taking her medication.  She is attending group, she is social with peers she has had no behavioral disturbances on the unit.  She continues to have poor sleep reported that she slept 3.25 broken hours last night, appetite reported to be fair.  She continues to have poor insight judgment, poor impulse control.    Appetite: [x] Normal/Unchanged  [] Increased  [] Decreased      Sleep:       [x] Normal/Unchanged  [] Fair       [] Poor              Energy:    [x] Normal/Unchanged  [] Increased  [] Decreased        SI [] Present  [x] Absent    HI  []Present  [x] Absent     Aggression:  [] yes  [x] no    Patient is [x] able  [] unable to CONTRACT FOR SAFETY     PAST MEDICAL/PSYCHIATRIC HISTORY:   Past Medical History:   Diagnosis Date    Acute gouty arthritis 06/09/2020    Bell's palsy     Bipolar disorder (HCC)     bipolar    Carpal tunnel syndrome 08/04/2016    Hyperlipidemia     Non-insulin dependent type 2 diabetes mellitus (HCC)     Sepsis (HCC) 12/06/2019 
BEHAVIORAL HEALTH FOLLOW-UP NOTE     1/30/2025     Patient was seen and examined in person, Chart reviewed   Patient's case discussed with staff/team    Chief Complaint: Delusional, bizarre, agitated behaviors, nonsensical     Interim History:   Patient seen in her room she is sitting in her wheelchair putting cups on her nightstand.  She is calm, cooperative she does remain hyperverbal however she does appear calmer today.  She is engaging more appropriately in conversation she does remain easily distracted but redirectable.  She states she is agreeable for skilled rehab states that she does have trouble getting around.  Referrals to be sent out, PASRR approved.  She denies suicidal ideation, denies homicidal ideation she denies auditory visual hallucinations.  She does remain intrusive and impulsive at times, she is attending groups she is social with select peers.  She reportedly slept a little over 3 hours last night, appetite reported to be fair.    Appetite: [x] Normal/Unchanged  [] Increased  [] Decreased      Sleep:       [x] Normal/Unchanged  [] Fair       [] Poor              Energy:    [x] Normal/Unchanged  [] Increased  [] Decreased        SI [] Present  [x] Absent    HI  []Present  [x] Absent     Aggression:  [] yes  [x] no    Patient is [x] able  [] unable to CONTRACT FOR SAFETY     PAST MEDICAL/PSYCHIATRIC HISTORY:   Past Medical History:   Diagnosis Date    Acute gouty arthritis 06/09/2020    Bell's palsy     Bipolar disorder ([facility])     bipolar    Carpal tunnel syndrome 08/04/2016    Hyperlipidemia     Non-insulin dependent type 2 diabetes mellitus ([facility])     Sepsis ([facility]) 12/06/2019    Systemic primary arterial hypertension 08/04/2016    Type 2 diabetes with peripheral circulatory disorder, controlled ([facility]) 05/25/2023       FAMILY/SOCIAL HISTORY:  Family History   Problem Relation Age of Onset    Other Mother         diverticulitis    Heart Disease Father 68    High Blood Pressure Father     No Known 
BEHAVIORAL HEALTH FOLLOW-UP NOTE     1/31/2025     Patient was seen and examined in person, Chart reviewed   Patient's case discussed with staff/team    Chief Complaint: Delusional, bizarre, agitated behaviors, nonsensical     Interim History:   Patient seen in her room she states that she is getting herself ready putting her lotion on she is calm, cooperative she does appear calmer she is not as hyperverbal.  She continues to have poor sleep when asking patient why she did not sleep she states she never really has slept throughout most of her life.  She states she does not feel tired but states that she can nap during the day when she wants to.  She remains agreeable for skilled rehab referrals sent out. She denies suicidal ideation, denies homicidal ideation she denies auditory visual hallucinations.  She does remain intrusive and impulsive at times, she is attending groups she is social with select peers. Appetite reported to be fair.    Appetite: [x] Normal/Unchanged  [] Increased  [] Decreased      Sleep:       [x] Normal/Unchanged  [] Fair       [] Poor              Energy:    [x] Normal/Unchanged  [] Increased  [] Decreased        SI [] Present  [x] Absent    HI  []Present  [x] Absent     Aggression:  [] yes  [x] no    Patient is [x] able  [] unable to CONTRACT FOR SAFETY     PAST MEDICAL/PSYCHIATRIC HISTORY:   Past Medical History:   Diagnosis Date    Acute gouty arthritis 06/09/2020    Bell's palsy     Bipolar disorder ([facility])     bipolar    Carpal tunnel syndrome 08/04/2016    Hyperlipidemia     Non-insulin dependent type 2 diabetes mellitus ([facility])     Sepsis ([facility]) 12/06/2019    Systemic primary arterial hypertension 08/04/2016    Type 2 diabetes with peripheral circulatory disorder, controlled ([facility]) 05/25/2023       FAMILY/SOCIAL HISTORY:  Family History   Problem Relation Age of Onset    Other Mother         diverticulitis    Heart Disease Father 68    High Blood Pressure Father     No Known Problems 
BEHAVIORAL HEALTH FOLLOW-UP NOTE     2/1/2025     Patient was seen and examined in person, Chart reviewed   Patient's case discussed with staff/team    Chief Complaint: Delusional, bizarre, agitated behaviors, nonsensical     Interim History:   Patient seen out on the unit she does remain hyperverbal, loud at times she is however she does appear calmer she is more easily redirectable.  She states that she slept better last night she states she visited with her brother.  She remains agreeable for skilled rehab on discharge referrals sent out.  She denies suicidal ideation, denies homicidal ideation denies auditory visual hallucinations.  She does remain intrusive and impulsive at times, she is attending groups.  Sleep and appetite are reported to be fair    Appetite: [x] Normal/Unchanged  [] Increased  [] Decreased      Sleep:       [x] Normal/Unchanged  [] Fair       [] Poor              Energy:    [x] Normal/Unchanged  [] Increased  [] Decreased        SI [] Present  [x] Absent    HI  []Present  [x] Absent     Aggression:  [] yes  [x] no    Patient is [x] able  [] unable to CONTRACT FOR SAFETY     PAST MEDICAL/PSYCHIATRIC HISTORY:   Past Medical History:   Diagnosis Date    Acute gouty arthritis 06/09/2020    Bell's palsy     Bipolar disorder ([facility])     bipolar    Carpal tunnel syndrome 08/04/2016    Hyperlipidemia     Non-insulin dependent type 2 diabetes mellitus ([facility])     Sepsis ([facility]) 12/06/2019    Systemic primary arterial hypertension 08/04/2016    Type 2 diabetes with peripheral circulatory disorder, controlled ([facility]) 05/25/2023       FAMILY/SOCIAL HISTORY:  Family History   Problem Relation Age of Onset    Other Mother         diverticulitis    Heart Disease Father 68    High Blood Pressure Father     No Known Problems Brother      Social History     Socioeconomic History    Marital status: Single     Spouse name: Not on file    Number of children: Not on file    Years of education: Not on file    Highest 
BEHAVIORAL HEALTH FOLLOW-UP NOTE     2/2/2025     Patient was seen and examined in person, Chart reviewed   Patient's case discussed with staff/team    Chief Complaint: Delusional, bizarre, agitated behaviors, nonsensical     Interim History:   Patient seen this morning out in the milieu.  His speech is garbled she is dysarthric is difficult to understand exactly what she is saying.  She is asking about discharge.  Denies suicidal ideations intent or plan denies auditory or visual hallucinations she is much calmer about the unit staff reports poor sleep last night.  Eating well sleeping well and no neurovegetative signs symptoms of depression        Appetite: [x] Normal/Unchanged  [] Increased  [] Decreased      Sleep:       [x] Normal/Unchanged  [] Fair       [] Poor              Energy:    [x] Normal/Unchanged  [] Increased  [] Decreased        SI [] Present  [x] Absent    HI  []Present  [x] Absent     Aggression:  [] yes  [x] no    Patient is [x] able  [] unable to CONTRACT FOR SAFETY     PAST MEDICAL/PSYCHIATRIC HISTORY:   Past Medical History:   Diagnosis Date    Acute gouty arthritis 06/09/2020    Bell's palsy     Bipolar disorder ([facility])     bipolar    Carpal tunnel syndrome 08/04/2016    Hyperlipidemia     Non-insulin dependent type 2 diabetes mellitus ([facility])     Sepsis ([facility]) 12/06/2019    Systemic primary arterial hypertension 08/04/2016    Type 2 diabetes with peripheral circulatory disorder, controlled ([facility]) 05/25/2023       FAMILY/SOCIAL HISTORY:  Family History   Problem Relation Age of Onset    Other Mother         diverticulitis    Heart Disease Father 68    High Blood Pressure Father     No Known Problems Brother      Social History     Socioeconomic History    Marital status: Single     Spouse name: Not on file    Number of children: Not on file    Years of education: Not on file    Highest education level: Not on file   Occupational History    Not on file   Tobacco Use    Smoking status: Never    
BEHAVIORAL HEALTH FOLLOW-UP NOTE     2/3/2025     Patient was seen and examined in person, Chart reviewed   Patient's case discussed with staff/team    Chief Complaint: Delusional, bizarre, agitated behaviors, nonsensical     Interim History:   Patient was seen in her room she is cooperative, calmer she has been more in control. She is more easily redirectable. Speech remains garbled, difficult to understand at times. Denies suicidal ideations intent or plan denies auditory or visual hallucinations she is much calmer about the  unit. She has been taking her medications, she has had no behavioral disturbances on the unit, she is going to group. Sleep improved last night was reported she slept 4 hour, appetite reported to be fair. Social work to follow up on SNF.     Appetite: [x] Normal/Unchanged  [] Increased  [] Decreased      Sleep:       [x] Normal/Unchanged  [] Fair       [] Poor              Energy:    [x] Normal/Unchanged  [] Increased  [] Decreased        SI [] Present  [x] Absent    HI  []Present  [x] Absent     Aggression:  [] yes  [x] no    Patient is [x] able  [] unable to CONTRACT FOR SAFETY     PAST MEDICAL/PSYCHIATRIC HISTORY:   Past Medical History:   Diagnosis Date    Acute gouty arthritis 06/09/2020    Bell's palsy     Bipolar disorder ([facility])     bipolar    Carpal tunnel syndrome 08/04/2016    Hyperlipidemia     Non-insulin dependent type 2 diabetes mellitus ([facility])     Sepsis ([facility]) 12/06/2019    Systemic primary arterial hypertension 08/04/2016    Type 2 diabetes with peripheral circulatory disorder, controlled ([facility]) 05/25/2023       FAMILY/SOCIAL HISTORY:  Family History   Problem Relation Age of Onset    Other Mother         diverticulitis    Heart Disease Father 68    High Blood Pressure Father     No Known Problems Brother      Social History     Socioeconomic History    Marital status: Single     Spouse name: Not on file    Number of children: Not on file    Years of education: Not on file    
[facility]  Week Interdisciplinary Treatment Plan Note     Review Date & Time: 01/28/2025 0900    Patient was in treatment team.    Estimated Length of Stay Update:  7-10   Estimated Discharge Date Update: 01/30/2025    EDUCATION:   Learner Progress Toward Treatment Goals: Reviewed results and recommendations of this team    Method: Small group    Outcome: Verbalized understanding    PATIENT GOALS: \"To not talk too loud.\"    PLAN/TREATMENT RECOMMENDATIONS UPDATE: Encourage patient to attend and participate in groups. Take medication as prescribed.    GOALS UPDATE:  Time frame for Short-Term Goals: Prior to discharge.      Marifer Baez RN  
[facility]  Week Interdisciplinary Treatment Plan Note     Review Date & Time: 02/04/2025 0900    Patient was in treatment team.    Estimated Length of Stay Update:  7-10   Estimated Discharge Date Update: 02/06/2025    EDUCATION:   Learner Progress Toward Treatment Goals: Reviewed results and recommendations of this team    Method: Small group    Outcome: Verbalized understanding    PATIENT GOALS: None at this time    PLAN/TREATMENT RECOMMENDATIONS UPDATE:  Encourage patient to attend and participate in groups. Take medication as prescribed.    GOALS UPDATE:  Time frame for Short-Term Goals: Prior to discharge      Marifer Baez RN  
[facility]  Day 3 Interdisciplinary Treatment Plan NOTE    Review Date & Time: 01/24/2025 0900    Admission Type:   Admission Type: Involuntary    Reason for admission:  Reason for Admission: pt refused to answer  Estimated Length of Stay: 5-7 days  Estimated Discharge Date Update: to be determined by physician    PATIENT STRENGTHS:  Patient Strengths    Patient Strengths and Limitations:Limitations: Perceives need for assistance with self-care, Difficult relationships / poor social skills, Unrealistic self-view, External locus of control  Addictive Behavior:Addictive Behavior  In the Past 3 Months, Have You Felt or Has Someone Told You That You Have a Problem With  : None  Medical Problems:  Past Medical History:   Diagnosis Date    Acute gouty arthritis 06/09/2020    Bell's palsy     Bipolar disorder ([facility])     bipolar    Carpal tunnel syndrome 08/04/2016    Hyperlipidemia     Non-insulin dependent type 2 diabetes mellitus ([facility])     Sepsis ([facility]) 12/06/2019    Systemic primary arterial hypertension 08/04/2016    Type 2 diabetes with peripheral circulatory disorder, controlled ([facility]) 05/25/2023       Risk:  Fall Risk   Filiberto Scale Filiberto Scale Score: 20  BVC    Change in scores no Changes to plan of Care no    Status EXAM:   Mental Status and Behavioral Exam  Normal: No  Level of Assistance: Independent/Self  Facial Expression: Elevated  Affect: Unstable  Level of Consciousness: Alert  Frequency of Checks: 4 times per hour, close  Mood:Normal: No  Mood: Labile  Motor Activity:Normal: Yes  Motor Activity: Increased  Eye Contact: Fair  Observed Behavior: Cooperative  Sexual Misconduct History: Current - no  Preception: [place] to person, [place] to time, [place] to place, [place] to situation  Attention:Normal: No  Attention: Distractible, Unable to concentrate  Thought Processes: Flight of ideas  Thought Content:Normal: No  Thought Content: Delusions  Depression Symptoms: No problems reported or 
[facility]  Initial Interdisciplinary Treatment Plan Note      Original treatment plan Date & Time: 01/22/2025 0900    Admission Type:  Admission Type: Involuntary    Reason for admission:   Reason for Admission: pt refused to answer    Estimated Length of Stay:  5-7days  Estimated Discharge Date: To be determined by physician.    PATIENT STRENGTHS:  Patient Strengths:   Patient Strengths and Limitations:   Addictive Behavior:    Medical Problems:  Past Medical History:   Diagnosis Date    Acute gouty arthritis 06/09/2020    Bell's palsy     Bipolar disorder ([facility])     bipolar    Carpal tunnel syndrome 08/04/2016    Hyperlipidemia     Non-insulin dependent type 2 diabetes mellitus ([facility])     Sepsis ([facility]) 12/06/2019    Systemic primary arterial hypertension 08/04/2016    Type 2 diabetes with peripheral circulatory disorder, controlled ([facility]) 05/25/2023     Status EXAM:Mental Status and Behavioral Exam  Normal:  (pt resting with eyes closed)  Level of Assistance: Independent/Self  Facial Expression: Elevated, Brightened, Exaggerated  Affect: Unstable  Level of Consciousness: Alert  Frequency of Checks: 4 times per hour, close  Mood:Normal: No  Mood: Labile, Anxious  Motor Activity:Normal: No  Motor Activity: Increased  Eye Contact: Good  Observed Behavior: Impulsive, Cooperative, Preoccupied  Sexual Misconduct History: Current - no  Preception: [place] to person, [place] to time, [place] to place  Attention:Normal: No  Attention: Distractible, Unable to concentrate  Thought Processes: Circumstantial, Flight of ideas, Loose association, Tangential  Thought Content:Normal: No  Thought Content: Preoccupations  Depression Symptoms: Impaired concentration  Anxiety Symptoms: Generalized  Gretta Symptoms: Flight of ideas, Increased energy, Labile, Poor judgment, Pressured speech  Hallucinations: None  Delusions: Yes  Delusions: Obsessions  Memory:Normal: No  Memory: Poor recent, Poor remote  Insight and Judgment: 
CLINICAL PHARMACY NOTE: MEDS TO BEDS    Total # of Prescriptions Filled: 4   The following medications were delivered to the patient:  Furosemide 20 mg  Carbamazepine 200 mg  Paliperidone er 6 mg  Jardiance 10 mg    Additional Documentation:   Jovanna OWUSU picked up  
Called and ordered AZAM HOSE for patient to aide with swelling. Currently awaiting arrival.   
MOCA 19/30  
OCCUPATIONAL THERAPY INITIAL EVALUATION    [facility] [address], [place]      Date:2025                                                Patient Name: Ana Paula Kunz  MRN: [id number]  : 1955  Room: 73/7302-     Evaluating OT:Leeanna Mathews, OTR/L   License #  [id number]       Referring Provider: Randi Hoang APRN - CNP     Specific Provider Orders/Date: OT evaluation & treatment        Diagnosis: Bipolar 1 disorder (HCC) [F31.9]      Pertinent Medical History:  has a past medical history of Acute gouty arthritis, Bell's palsy, Bipolar disorder (HCC), Carpal tunnel syndrome, Hyperlipidemia, Non-insulin dependent type 2 diabetes mellitus (HCC), Sepsis (HCC), Systemic primary arterial hypertension, and Type 2 diabetes with peripheral circulatory disorder, controlled (HCC).    Surgery: none this admit    Past Surgical History:  has a past surgical history that includes Echo Complete (2013); Vagina surgery; and Colonoscopy.       Precautions:  Fall Risk, TSM, cognition      Assessment of current deficits     Functional mobility            [x]ADLs           [x] Strength                  [x]Cognition    [x] Functional transfers          [x] IADLs         [x] Safety Awareness   [x]Endurance    [x] Fine Coordination                         [x] Balance      [] Vision/perception   [x]Sensation      []Gross Motor Coordination             [] ROM           [] Delirium                   [] Motor Control      OT PLAN OF CARE   OT POC based on physician orders, patient diagnosis and results of clinical assessment     Frequency/Duration: 2-4 days/wk for 2 weeks PRN   Specific OT Treatment Interventions to include:   Instruction/training on adapted ADL techniques and AE recommendations to increase functional independence within precautions  Training on energy conservation strategies, correct breathing pattern and techniques to improve 
Occupational Therapy  OT BEDSIDE TREATMENT NOTE   [facility]  [address], [place]        Date:2025  Patient Name: Ana Paula Kunz  MRN: [id number]  : 1955  Room: 73/7302-A     Per OT Eval:    Evaluating OT:Leeanna Mathews, OTR/L   License #  [id number]        Referring Provider: Randi Hoang APRN - CNP     Specific Provider Orders/Date: OT evaluation & treatment        Diagnosis: Bipolar 1 disorder (HCC) [F31.9]      Pertinent Medical History:  has a past medical history of Acute gouty arthritis, Bell's palsy, Bipolar disorder (HCC), Carpal tunnel syndrome, Hyperlipidemia, Non-insulin dependent type 2 diabetes mellitus (HCC), Sepsis (HCC), Systemic primary arterial hypertension, and Type 2 diabetes with peripheral circulatory disorder, controlled (HCC).    Surgery: none this admit    Past Surgical History:  has a past surgical history that includes Echo Complete (2013); Vagina surgery; and Colonoscopy.       Precautions:  Fall Risk, TSM, cognition      Assessment of current deficits     Functional mobility            [x]ADLs           [x] Strength                  [x]Cognition    [x] Functional transfers          [x] IADLs         [x] Safety Awareness   [x]Endurance    [x] Fine Coordination                         [x] Balance      [] Vision/perception   [x]Sensation      []Gross Motor Coordination             [] ROM           [] Delirium                   [] Motor Control      OT PLAN OF CARE   OT POC based on physician orders, patient diagnosis and results of clinical assessment     Frequency/Duration: 2-4 days/wk for 2 weeks PRN   Specific OT Treatment Interventions to include:   Instruction/training on adapted ADL techniques and AE recommendations to increase functional independence within precautions  Training on energy conservation strategies, correct breathing pattern and techniques to improve independence/tolerance 
Occupational Therapy  OT BEDSIDE TREATMENT NOTE   [facility]  [address] [place], [place]        Date:2025  Patient Name: Ana Paula Kunz  MRN: [id number]  : 1955  Room: 73/7302-A     Per OT Eval:    Evaluating OT:Leeanna Mathews, OTR/L   License #  [id number]        Referring Provider: Randi Hoang APRN - CNP     Specific Provider Orders/Date: OT evaluation & treatment        Diagnosis: Bipolar 1 disorder (HCC) [F31.9]      Pertinent Medical History:  has a past medical history of Acute gouty arthritis, Bell's palsy, Bipolar disorder (HCC), Carpal tunnel syndrome, Hyperlipidemia, Non-insulin dependent type 2 diabetes mellitus (HCC), Sepsis (HCC), Systemic primary arterial hypertension, and Type 2 diabetes with peripheral circulatory disorder, controlled (HCC).    Surgery: none this admit    Past Surgical History:  has a past surgical history that includes Echo Complete (2013); Vagina surgery; and Colonoscopy.       Precautions:  Fall Risk, TSM, cognition      Assessment of current deficits     Functional mobility            [x]ADLs           [x] Strength                  [x]Cognition    [x] Functional transfers          [x] IADLs         [x] Safety Awareness   [x]Endurance    [x] Fine Coordination                         [x] Balance      [] Vision/perception   [x]Sensation      []Gross Motor Coordination             [] ROM           [] Delirium                   [] Motor Control      OT PLAN OF CARE   OT POC based on physician orders, patient diagnosis and results of clinical assessment     Frequency/Duration: 2-4 days/wk for 2 weeks PRN   Specific OT Treatment Interventions to include:   Instruction/training on adapted ADL techniques and AE recommendations to increase functional independence within precautions  Training on energy conservation strategies, correct breathing pattern and techniques to improve independence/tolerance 
Occupational Therapy  OT SESSION ATTEMPT     Date:2025  Patient Name: Ana Paula Kunz  MRN: [id number]  : 1955  Room: [address] [place] [address]-A     Attempted OT session this date:    [] unavailable due to other medical staff currently with pt   [] on hold per nursing staff   [] on hold per nursing staff secondary to lab / radiology results    [x] declined Occupational Therapy, adamantly refusing this date.  Benefits of participation in therapy reviewed with pt.    [] off unit   [] Other:     Will reattempt OT evaluation at a later time.    Yuri Velasco OTR/L #[id number]    
Patient MARGARITO HOSE applied to bilateral lower extremities.Patient tolerated well. Device to help patient apply margarito hose are in her LOCKER.  
Patient appeared lethargic during morning assessment. Had 2 episodes of diarrhea early this morning for previous shift. During morning rounds, patient also had to episodes of emesis. Daily BMP showed BUN 42 and Creatinine 1.0. Mucous membranes are dry. VS T 98.2  RR 18 /61. Notified Dr. Cannon. Per Dr. Cannon a 22g IV was placed in L forearm, patent and dressing C/D/I.   
Patient awake at this time. Assisted to bathroom. Pleasant and cooperative. Reports that she \"slept good\".  
Patient awake throughout the night. Assisted to the bathroom. After, patient refused to return to bed even after encouragement/education to elevate BLE. Will continue to monitor.  
Patient declined to attend the following groups:    Community Meeting  Peer Recovery Presentation with MLS    Will continue to encourage patient to attend programming.     
Patient declined to attend the following groups:    Community Meeting  Psychoeducation       Will continue to encourage patient to attend programming.     
Patient declined verbal invitation to the following group    Community Meeting  Recreation- [facility]  Peer Recovery    Patient will continue to be provided with opportunities to enhance leisure skills/interests and/or coping mechanisms.   
Patient had unwitnessed fall in her room. Patient is alert and oriented x4. She states she slipped out of her wheelchair onto the floor while changing her socks. She states that she did not hit her head and landed on her buttocks. No visible signs of injury. It was noted that the brakes on patient's wheelchair were unlocked when this nurse arrived in room. VS stable. Management, family, and provider notified. Message left with family. CT of head ordered per unit protocol.   
Patient has been awake throughout the night stating she needs to \"write notes down\". Cooperative with care.   
Patient has been awake throughout the night. Behavior remaining in control and encouraged to rest. Purposeful rounding continued.  
Patient is in bed, with bed alarm active. Patient continues to \"yell out\" and refuses to use call light. Patient toileted. Fall and standard precautions reviewed and implemented. Will continue to monitor patient every 15 minute rounds to ensure patient safety.  
Patient resting quietly in bed with eyes closed. Respirations even and unlabored with no signs of distress observed. Environmental rounds continued.  
Patient resting quietly in bed with eyes closed. Respirations even and unlabored with no signs of distress observed. Environmental rounds continued.  
Patient sat up in bed and was assisted with minimal assistance to the wheelchair. Patient was taken to the bathroom where she urinated 200cc clear yellow urine. Patients bed and linen were changed. Will continue to assess and watch patient closely and address her needs. No active distress noted respirations even and unlabored. Will continue to monitor and will intervene as needed with close 15 minute rounds.     
Patient unable to attend the following group    Education- Stress vs Anxiety     Patient will continue to be provided with opportunities to enhance leisure skills/interests and/or coping mechanisms.   
Patient unable to come to community meeting at this time as she was with nursing staff.     Patient will continue to be provided with opportunities to enhance leisure skills/interests and/or coping mechanisms.   
Patient used call bell to alert staff that she had removed her IV d/t not wanting it anymore. IV intact. No redness or drainage to insertion site. Patient has no c/o pain or discomfort at this time.  
Patient's brother upset that patient was being discharged back to the nursing home and not home. Educated brother he was still insisting home discharge. Called our NP and unit manager. Both spoke with patient. Patient is now stating that she would rather return home rather than a skilled facility. Patient and family were educated on the amount of assistance this patient would require. They both verbalized understanding and felt safe taking the patient home.   
Physical Therapy      Name: Ana Paula Kunz  : 1955  MRN: [id number]  Date of Service: 2025  Room #:  7302/7302-A    PT attempted - patient adamantly refused; agreeable to PT tomorrow. PT will follow up as able/appropriate.    Mata See, PT, DPT  [id number]  
Physical Therapy  Rx    Name: Ana Paula Kunz  : 1955  MRN: [id number]      Date of Service: 2025    Evaluating PT:  Karlene Jimenes PT, DPT [id number]    Room #:  7302/7302-A  Diagnosis:  Bipolar 1 disorder ([facility]) [F31.9]  PMHx/PSHx:    Past Medical History:   Diagnosis Date    Acute gouty arthritis 2020    Bell's palsy     Bipolar disorder ([facility])     bipolar    Carpal tunnel syndrome 2016    Hyperlipidemia     Non-insulin dependent type 2 diabetes mellitus ([facility])     Sepsis ([facility]) 2019    Systemic primary arterial hypertension 2016    Type 2 diabetes with peripheral circulatory disorder, controlled ([facility]) 2023      Procedure/Surgery:  none this admission  Precautions:  Falls, TSM, cognition   Equipment Needs:  TBD    SUBJECTIVE:    Pt unable to provide accurate history at this time. Per chart review, pt lives with her mother in 2 story home with 2nd floor set up. 7 DIANA with 1 HR.     OBJECTIVE:   Initial Evaluation  Date: 25 Treatment  25 Short Term/ Long Term   Goals   AM-PAC 6 Clicks     Was pt agreeable to Eval/treatment? Yes yes    Does pt have pain? no c/o pain yes    Bed Mobility  Rolling: SBA   Supine to sit: Juany  Sit to supine: Juany  Scooting: Juany Rolling: NT  Supine to sit:   NT   Sit to supine: NT   Scooting: NT   Rolling: Supervision   Supine to sit: Supervision   Sit to supine: Supervision   Scooting: Supervision    Transfers Sit to stand: ModA  Stand to sit: ModA  Stand pivot: NT, pt declined Sit to stand: Mod    Stand to sit: Mod   Stand pivot: Mod    Sit to stand: Supervision   Stand to sit: Supervision   Stand pivot: Supervision with AAD   Ambulation    NT  15 feet  and Mod  with HHA.   >150 feet with AAD Supervision    Stair negotiation: ascended and descended  NT NT 14 steps with 1 rail Supervision    ROM BUE:  Defer to OT note  BLE:  WFL     Strength BUE:  Defer to OT note  BLE:  grossly 3/5  WFL   Balance Sitting EOB:  SBA  Dynamic 
Physical Therapy  Treatment Note    Name: Ana Paula Kunz  : 1955  MRN: [id number]      Date of Service: 2/3/2025    Evaluating PT:  Karlene Jimenes PT, DPT [id number]    Room #:  7302/7302-A  Diagnosis:  Bipolar 1 disorder ([facility]) [F31.9]  PMHx/PSHx:    Past Medical History:   Diagnosis Date    Acute gouty arthritis 2020    Bell's palsy     Bipolar disorder [facility] [facility]     bipolar    Carpal tunnel syndrome 2016    Hyperlipidemia     Non-insulin dependent type 2 diabetes mellitus ([facility])     Sepsis ([facility]) 2019    Systemic primary arterial hypertension 2016    Type 2 diabetes with peripheral circulatory disorder, controlled ([facility]) 2023      Procedure/Surgery:  none this admission  Precautions:  Falls, TSM, cognition   Equipment Needs:  TBD    SUBJECTIVE:    Pt unable to provide accurate history at this time. Per chart review, pt lives with her mother in 2 story home with 2nd floor set up. 7 DIANA with 1 HR.     OBJECTIVE:   Initial Evaluation  Date: 25 Treatment  Date: 2/3/25 Short Term/ Long Term   Goals   AM-PAC 6 Clicks 12/24 15/24    Was pt agreeable to Eval/treatment? Yes yes    Does pt have pain? no c/o pain yes    Bed Mobility  Rolling: SBA   Supine to sit: Juany  Sit to supine: Juany  Scooting: Juany Rolling: NT  Supine to sit:   NT   Sit to supine: NT   Scooting: NT   Rolling: Supervision   Supine to sit: Supervision   Sit to supine: Supervision   Scooting: Supervision    Transfers Sit to stand: ModA  Stand to sit: ModA  Stand pivot: NT, pt declined Sit to stand: min A  Stand to sit: min A  Stand pivot: min A   Sit to stand: Supervision   Stand to sit: Supervision   Stand pivot: Supervision with AAD   Ambulation    NT  25', 5' with ww min A >150 feet with AAD Supervision    Stair negotiation: ascended and descended  NT NT 14 steps with 1 rail Supervision    ROM BUE:  Defer to OT note  BLE:  WFL     Strength BUE:  Defer to OT note  BLE:  grossly 3/5  WFL   Balance Sitting EOB:  
Physical Therapy  Treatment Note    Name: Ana Paula Kunz  : 1955  MRN: [id number]      Date of Service: 2025    Evaluating PT:  Karlene Jimenes PT, DPT [id number]    Room #:  7302/7302-A  Diagnosis:  Bipolar 1 disorder (HCC) [F31.9]  PMHx/PSHx:    Past Medical History:   Diagnosis Date    Acute gouty arthritis 2020    Bell's palsy     Bipolar disorder ([facility])     bipolar    Carpal tunnel syndrome 2016    Hyperlipidemia     Non-insulin dependent type 2 diabetes mellitus ([facility])     Sepsis ([facility]) 2019    Systemic primary arterial hypertension 2016    Type 2 diabetes with peripheral circulatory disorder, controlled ([facility]) 2023      Procedure/Surgery:  none this admission  Precautions:  Falls, TSM, cognition   Equipment Needs:  TBD    SUBJECTIVE:    Pt unable to provide accurate history at this time. Per chart review, pt lives with her mother in 2 story home with 2nd floor set up. 7 DIANA with 1 HR.     OBJECTIVE:   Initial Evaluation  Date: 25 Treatment  Date: 25 Short Term/ Long Term   Goals   AM-PAC 6 Clicks     Was pt agreeable to Eval/treatment? Yes yes    Does pt have pain? no c/o pain yes    Bed Mobility  Rolling: SBA   Supine to sit: Juany  Sit to supine: Juany  Scooting: Juany Rolling: NT  Supine to sit:   NT   Sit to supine: NT   Scooting: NT   Rolling: Supervision   Supine to sit: Supervision   Sit to supine: Supervision   Scooting: Supervision    Transfers Sit to stand: ModA  Stand to sit: ModA  Stand pivot: NT, pt declined Sit to stand: mod A  Stand to sit: mod A  Stand pivot: mod A   Sit to stand: Supervision   Stand to sit: Supervision   Stand pivot: Supervision with AAD   Ambulation    NT  15' with B HHA Modax2 >150 feet with AAD Supervision    Stair negotiation: ascended and descended  NT NT 14 steps with 1 rail Supervision    ROM BUE:  Defer to OT note  BLE:  WFL     Strength BUE:  Defer to OT note  BLE:  grossly 3/5  WFL   Balance Sitting EOB:  
Physical Therapy  Treatment Note    Name: Ana Paula Kunz  : 1955  MRN: [id number]      Date of Service: 2025    Evaluating PT:  Karlene Jimenes PT, DPT [id number]    Room #:  7302/7302-A  Diagnosis:  Bipolar 1 disorder ([facility]) [F31.9]  PMHx/PSHx:    Past Medical History:   Diagnosis Date    Acute gouty arthritis 2020    Bell's palsy     Bipolar disorder ([facility])     bipolar    Carpal tunnel syndrome 2016    Hyperlipidemia     Non-insulin dependent type 2 diabetes mellitus ([facility])     Sepsis ([facility]) 2019    Systemic primary arterial hypertension 2016    Type 2 diabetes with peripheral circulatory disorder, controlled ([facility]) 2023      Procedure/Surgery:  none this admission  Precautions:  Falls, TSM, cognition   Equipment Needs:  TBD    SUBJECTIVE:    Pt unable to provide accurate history at this time. Per chart review, pt lives with her mother in 2 story home with 2nd floor set up. 7 DIANA with 1 HR.     OBJECTIVE:   Initial Evaluation  Date: 25 Treatment  Date: 25 Short Term/ Long Term   Goals   AM-PAC 6 Clicks     Was pt agreeable to Eval/treatment? Yes yes    Does pt have pain? no c/o pain yes    Bed Mobility  Rolling: SBA   Supine to sit: Juany  Sit to supine: Juany  Scooting: Juany Rolling: NT  Supine to sit:   NT   Sit to supine: NT   Scooting: NT   Rolling: Supervision   Supine to sit: Supervision   Sit to supine: Supervision   Scooting: Supervision    Transfers Sit to stand: ModA  Stand to sit: ModA  Stand pivot: NT, pt declined Sit to stand: mod A  Stand to sit: mod A  Stand pivot: mod A   Sit to stand: Supervision   Stand to sit: Supervision   Stand pivot: Supervision with AAD   Ambulation    NT  5' with ww min A >150 feet with AAD Supervision    Stair negotiation: ascended and descended  NT NT 14 steps with 1 rail Supervision    ROM BUE:  Defer to OT note  BLE:  WFL     Strength BUE:  Defer to OT note  BLE:  grossly 3/5  WFL   Balance Sitting EOB:  
Pt attended afternoon smoking cessation group. Pt appeared to be an active listener. Pt is able to share appropriately when prompted and asked facilitator relevant questions.  Pt was participant 1 of 7.    Electronically signed by Chantal Bundy on 1/31/2025 at 3:29 PM     
Pt refusing d/c plan that has been set up for pt as per her request earlier in the week. Pts family has clarified they can provide care for her. Spoke with Pts brother Shankar who states him, his brother and pts Mother will be able to assist in her home care at her home. Reminded family pt needs assistance walking and get around. Shankar states they will be able to assist pt as needed.        Notified brother we will be getting her meds filled and will call them to  pt once meds are ready.       
Returned mom's call regarding prescription. Mom wants  to write a new prescription with new dosage for testosterone. She is also requesting a medical letter due to the fact that they are going to a court hearing next week to get his name Legally change to [place]igno. Informed mom that I would inform .    ----- Message from Suzi Trimble sent at 6/3/2020  3:49 PM CDT -----  Contact: Mom [phone]  Would like to receive medical advice.    Would they like a call back or a response via [facility]:  Call back    Additional information: Calling to speak to the nurse regarding the pt needing a letter for a court hearing and driving license.  Mom is requesting a call back to discuss.           
Rx called stating that Cogentin Klor Con and lasix are too soon to fill. This RN called NP and made aware and also spoke with patient. Patient states, \"I have all the medications that I need at home.\" Patient also states, \"I have all the insulin I need at home as well.\"  Patient then called brother Shankar and he states ,\"she has all the medications here that she needs.\" Patient then made provider aware and called pharmacy.     Also our provider is going to order Invega 6 mg once a day compared to Invega 3mg BID due to insurance reasons. Our provider to change order. Will continue to monitor.   
Spiritual Health History and Assessment/Progress Note  [facility]    (P) Behavioral Health, Initial Encounter,  ,  , (P) Initial Encounter    Name: Ana Paula Kunz MRN: [id number]    Age: 69 y.o.     Sex: female   Language: English   Baptism: Denominational   Severe manic bipolar 1 disorder with psychotic behavior (HCC)     Date: 1/22/2025                           Spiritual Assessment began in [facility] 7W ACUTE  2        Referral/Consult From: (P) Nurse   Encounter Overview/Reason: (P) Behavioral Health, Initial Encounter  Service Provided For: (P) Patient    Pt sitting in the common area in a , I introduced myself to the patient and she began sharing her life history, she is Hindu attends a [employer] in the local area. She has family but is the only living in [place]. She is her to receive help. Pt reports her susu helps her somewhat. We discussed her susu and a few thing she can do. I shared with her that the chaplains re available if she needed to talk to one of us.     Susu, Belief, Meaning:   Patient identifies as spiritual, is connected with a susu tradition or spiritual practice, and has beliefs or practices that help with coping during difficult times  Family/Friends No family/friends present      Importance and Influence:  Patient has spiritual/personal beliefs that influence decisions regarding their health  Family/Friends No family/friends present    Community:  Patient feels well-supported. Support system includes: Extended family  Family/Friends No family/friends present    Assessment and Plan of Care:     Patient Interventions include: Facilitated expression of thoughts and feelings, Explored spiritual coping/struggle/distress, Engaged in theological reflection, and Facilitated life review and/ or legacy  Family/Friends Interventions include: No family/friends present    Patient Plan of Care: Spiritual Care available upon further referral  Family/Friends Plan of Care: No family/friends 
Spoke with the Lab and they stated that the stool for CDIFF was rejected because it was a formed stool. Will notify medical.  
This RN noticed 3 plus pitting edema in bilateral lower extremities with water blisters forming. This RN informed NP and medical consult given orders placed by MD, will follow. Patient in no active distress and denies shortness of breath.   
Daily, Margarita Cannon MD    [START ON 2/5/2025] potassium chloride (KLOR-CON M) extended release tablet 20 mEq, 20 mEq, Oral, Daily with breakfast, Margarita Cannon MD    paliperidone (INVEGA) extended release tablet 3 mg, 3 mg, Oral, BID, Randi Hoang APRN - CNP, 3 mg at 02/04/25 0849    metFORMIN (GLUCOPHAGE) tablet 1,000 mg, 1,000 mg, Oral, BID WC, Xiomara Robertson, DO, 1,000 mg at 02/04/25 0850    pioglitazone (ACTOS) tablet 45 mg, 45 mg, Oral, Daily, Xiomara Robertson, DO, 45 mg at 02/04/25 0848    empagliflozin (JARDIANCE) tablet 10 mg, 10 mg, Oral, QAM, Xiomara Robertson, DO, 10 mg at 02/04/25 0848    alogliptin (NESINA) tablet 12.5 mg, 12.5 mg, Oral, Daily, Xiomara Robertson, DO, 12.5 mg at 02/04/25 0850    labetalol (NORMODYNE) tablet 200 mg, 200 mg, Oral, 3 times per day, Xiomara Robertson, DO, 200 mg at 02/04/25 0639    insulin lispro (HUMALOG,ADMELOG) injection vial 0-4 Units, 0-4 Units, SubCUTAneous, 4x Daily AC & HS, Xiomara Robertson, DO, 1 Units at 01/28/25 2135    carBAMazepine (TEGRETOL) tablet 200 mg, 200 mg, Oral, BID, Randi Hoang APRN - CNP, 200 mg at 02/04/25 0850    benztropine (COGENTIN) tablet 1 mg, 1 mg, Oral, BID, Randi Hoang APRN - CNP, 1 mg at 02/04/25 0851    atorvastatin (LIPITOR) tablet 40 mg, 40 mg, Oral, Daily, Randy Langford MD, 40 mg at 02/03/25 2147    brimonidine (ALPHAGAN) 0.2 % ophthalmic solution 1 drop, 1 drop, Both Eyes, BID, Randy Langford MD, 1 drop at 02/04/25 0849    latanoprost (XALATAN) 0.005 % ophthalmic solution 1 drop, 1 drop, Both Eyes, Nightly, Randy Langford MD, 1 drop at 02/03/25 2150    timolol (TIMOPTIC) 0.5 % ophthalmic solution 1 drop, 1 drop, Both Eyes, Daily, Randy Langford MD, 1 drop at 02/04/25 0849    0.9 % sodium chloride infusion, , IntraVENous, PRN, Randy Langford MD    potassium chloride (KLOR-CON M) extended release tablet 40 mEq, 40 mEq, Oral, PRN **OR** potassium bicarb-citric acid (EFFER-K) effervescent tablet 40 mEq, 
IVPB premix, 2,000 mg, IntraVENous, PRN, Randy Langford MD    enoxaparin (LOVENOX) injection 40 mg, 40 mg, SubCUTAneous, Daily, Randy Langford MD    ondansetron (ZOFRAN-ODT) disintegrating tablet 4 mg, 4 mg, Oral, Q8H PRN **OR** ondansetron (ZOFRAN) injection 4 mg, 4 mg, IntraVENous, Q6H PRN, Randy Langford MD    acetaminophen (TYLENOL) tablet 650 mg, 650 mg, Oral, Q6H PRN, 650 mg at 01/24/25 0758 **OR** acetaminophen (TYLENOL) suppository 650 mg, 650 mg, Rectal, Q6H PRN, Randy Langford MD    melatonin disintegrating tablet 5 mg, 5 mg, Oral, Nightly PRN, Randy Langford MD, 5 mg at 01/22/25 2106    hydrALAZINE (APRESOLINE) injection 10 mg, 10 mg, IntraVENous, Q6H PRN, Randy Langford MD    calcium carbonate (TUMS) chewable tablet 500 mg, 500 mg, Oral, TID PRN, Randy Langford MD    Polyvinyl Alcohol-Povidone PF (REFRESH) 1.4-0.6 % ophthalmic solution 1 drop, 1 drop, Both Eyes, PRN, Randy Langford MD    sodium chloride (OCEAN, BABY AYR) 0.65 % nasal spray 1 spray, 1 spray, Each Nostril, PRN, Randy Langford MD    magnesium hydroxide (MILK OF MAGNESIA) 400 MG/5ML suspension 30 mL, 30 mL, Oral, Daily PRN, Randy Langford MD    benzocaine-menthol (CEPACOL SORE THROAT) lozenge 1 lozenge, 1 lozenge, Oral, Q2H PRN, Randy Langford MD    benzonatate (TESSALON) capsule 100 mg, 100 mg, Oral, TID PRN, Randy Langford MD, 100 mg at 01/23/25 0844    insulin lispro (HUMALOG,ADMELOG) injection vial 0-4 Units, 0-4 Units, SubCUTAneous, 4x Daily AC & HS, Randy Langford MD, 1 Units at 01/25/25 0719    sulfur hexafluoride microspheres (LUMASON) 60.7-25 MG injection 2 mL, 2 mL, IntraVENous, ONCE PRN, Randy Langford MD    glucose chewable tablet 16 g, 4 tablet, Oral, PRN, Randy Langford MD    dextrose bolus 10% 125 mL, 125 mL, IntraVENous, PRN **OR** dextrose bolus 10% 250 mL, 250 mL, IntraVENous, PRN, Randy Langford MD    glucagon injection 1 mg, 1 mg, SubCUTAneous, PRN, Randy Langford MD    dextrose 10 % infusion, , 
IntraVENous, PRN, Randy Langford MD    potassium chloride (KLOR-CON M) extended release tablet 40 mEq, 40 mEq, Oral, PRN **OR** potassium bicarb-citric acid (EFFER-K) effervescent tablet 40 mEq, 40 mEq, Oral, PRN **OR** potassium chloride 10 mEq/100 mL IVPB (Peripheral Line), 10 mEq, IntraVENous, PRN, aRndy Langford MD    magnesium sulfate 2000 mg in 50 mL IVPB premix, 2,000 mg, IntraVENous, PRN, Randy Langford MD    enoxaparin (LOVENOX) injection 40 mg, 40 mg, SubCUTAneous, Daily, Randy Langford MD    acetaminophen (TYLENOL) tablet 650 mg, 650 mg, Oral, Q6H PRN, 650 mg at 01/24/25 0758 **OR** acetaminophen (TYLENOL) suppository 650 mg, 650 mg, Rectal, Q6H PRN, Randy Langford MD    melatonin disintegrating tablet 5 mg, 5 mg, Oral, Nightly PRN, Randy Langford MD, 5 mg at 02/04/25 2120    hydrALAZINE (APRESOLINE) injection 10 mg, 10 mg, IntraVENous, Q6H PRN, Randy Langford MD    calcium carbonate (TUMS) chewable tablet 500 mg, 500 mg, Oral, TID PRN, Randy Langford MD    Polyvinyl Alcohol-Povidone PF (REFRESH) 1.4-0.6 % ophthalmic solution 1 drop, 1 drop, Both Eyes, PRN, Randy Langford MD    sodium chloride (OCEAN, BABY AYR) 0.65 % nasal spray 1 spray, 1 spray, Each Nostril, PRN, Randy Langford MD    magnesium hydroxide (MILK OF MAGNESIA) 400 MG/5ML suspension 30 mL, 30 mL, Oral, Daily PRN, Randy Langford MD    benzocaine-menthol (CEPACOL SORE THROAT) lozenge 1 lozenge, 1 lozenge, Oral, Q2H PRNAnastasiya Akhil, MD    benzonatate (TESSALON) capsule 100 mg, 100 mg, Oral, TID PRN, Randy Langford MD, 100 mg at 02/05/25 0855    sulfur hexafluoride microspheres (LUMASON) 60.7-25 MG injection 2 mL, 2 mL, IntraVENous, ONCE PRN, Randy Langford MD    glucose chewable tablet 16 g, 4 tablet, Oral, PRN, Randy Langford MD, 16 g at 01/30/25 1108    dextrose bolus 10% 125 mL, 125 mL, IntraVENous, PRN **OR** dextrose bolus 10% 250 mL, 250 mL, IntraVENous, PRN, Randy Langford MD    glucagon injection 1 mg, 1 mg, 
with transfers, safety/balance and walker management with functional mobility and compensatory strategies to assist with ADL tasks.     Comments: Upon arrival pt sitting upright in w/c, agreeable to therapy, speaking with nursing okaying pt to be seen this session. At end of session, pt seated upright in w/c at table, all lines and tubes intact, call light within reach.     Pt has made fair progress towards set goals.   Continue with current plan of care      Treatment Time In: 9:20am      Treatment Time Out: 9:35am                 Treatment Charges: Mins Units   Ther Ex  11877     Manual Therapy 48374     Thera Activities 67300 10 1   ADL/Home Mgt 48397 5    Neuro Re-ed 04148     Group Therapy      Orthotic manage/training  25029     Non-Billable Time     Total Timed Treatment 15 1        Joseline BARON/ELVIRA [id number]      
report was transcribed using voice recognition software. Every effort was made to ensure accuracy; however, inadvertent computerized transcription errors may be present.     Electronically signed by CHRISS Swenson CNP on 1/27/2025 at 12:44 PM

## 2025-02-06 NOTE — DISCHARGE SUMMARY
DISCHARGE SUMMARY      Patient ID:  Ana Paula Kunz  [id number]  69 y.o.  1955    Admit date: 1/21/2025    Discharge date and time: 2/6/2025    Admitting Physician: Mohan Garces MD     Discharge Physician: Dr Dez AMBROSE    Discharge Diagnoses:   Patient Active Problem List   Diagnosis    Severe bipolar affective disorder with psychosis (HCC)    Carpal tunnel syndrome    Hyperlipidemia    Vitamin D deficiency    Systemic primary arterial hypertension    Schizoaffective disorder (HCC)    Severe manic bipolar 1 disorder with psychotic behavior ([facility])    Type 2 diabetes mellitus with both eyes affected by mild nonproliferative retinopathy without macular edema, without long-term current use of insulin ([facility])    Callus of foot    Onychomycosis    At risk for colon cancer    Altered mental state    Frequent falls    Bilateral lower extremity edema    Heart murmur    History of bipolar disorder    Delirium due to uti    Bipolar 1 disorder (HCC)    Cognitive disorder    Hypoglycemia associated with type 2 diabetes mellitus ([facility])       Admission Condition: poor    Discharged Condition: stable    Admission Circumstance: Patient name: Ana Paula Kunz  Patient's past mental health and addiction history: History of bipolar disorder with multiple previous inpatient psychiatric hospitalization  Patient's presentation to the ED and why the patient needs admission: Delusional, bizarre agitated behaviors, nonsensical  Legal status:  []  Voluntary  [x]  Involuntary  []  Probate  Triggering/precipitating events: Noncompliance with medication  Duration of triggering/precipitating events: Within the last week      PAST MEDICAL/PSYCHIATRIC HISTORY:   Past Medical History:   Diagnosis Date    Acute gouty arthritis 06/09/2020    Bell's palsy     Bipolar disorder (HCC)     bipolar    Carpal tunnel syndrome 08/04/2016    Hyperlipidemia     Non-insulin dependent type 2 diabetes mellitus (HCC)     Sepsis ([facility]) 12/06/2019    Systemic primary

## 2025-02-07 ENCOUNTER — FOLLOWUP TELEPHONE ENCOUNTER (OUTPATIENT)
Dept: PSYCHIATRY | Age: 70
End: 2025-02-07

## 2025-02-07 NOTE — TELEPHONE ENCOUNTER
Pts Brother Dez called from [phone].  Called to express an apology to staff, he states no one told him his sister could not walk.     This staff informed Dez that I personally spoke with Shankar (other brother) yesterday and informed him of this fact and that was why we were encouraging pt to d/c to nursing facility.     Dez states moving forward he would like to be our point of contact.

## 2025-02-10 ENCOUNTER — HOSPITAL ENCOUNTER (EMERGENCY)
Age: 70
Discharge: PSYCHIATRIC OR SUBSTANCE USE FACILITY | End: 2025-02-12
Attending: STUDENT IN AN ORGANIZED HEALTH CARE EDUCATION/TRAINING PROGRAM
Payer: COMMERCIAL

## 2025-02-10 DIAGNOSIS — F30.10 MANIC BEHAVIOR (HCC): Primary | ICD-10-CM

## 2025-02-10 LAB
BASOPHILS # BLD: 0.02 K/UL (ref 0–0.2)
BASOPHILS NFR BLD: 0 % (ref 0–2)
EOSINOPHIL # BLD: 0.11 K/UL (ref 0.05–0.5)
EOSINOPHILS RELATIVE PERCENT: 2 % (ref 0–6)
ERYTHROCYTE [DISTWIDTH] IN BLOOD BY AUTOMATED COUNT: 14.7 % (ref 11.5–15)
HCT VFR BLD AUTO: 38.5 % (ref 34–48)
HGB BLD-MCNC: 12.6 G/DL (ref 11.5–15.5)
IMM GRANULOCYTES # BLD AUTO: 0.07 K/UL (ref 0–0.58)
IMM GRANULOCYTES NFR BLD: 1 % (ref 0–5)
LYMPHOCYTES NFR BLD: 1.22 K/UL (ref 1.5–4)
LYMPHOCYTES RELATIVE PERCENT: 22 % (ref 20–42)
MCH RBC QN AUTO: 29.2 PG (ref 26–35)
MCHC RBC AUTO-ENTMCNC: 32.7 G/DL (ref 32–34.5)
MCV RBC AUTO: 89.1 FL (ref 80–99.9)
MONOCYTES NFR BLD: 0.57 K/UL (ref 0.1–0.95)
MONOCYTES NFR BLD: 11 % (ref 2–12)
NEUTROPHILS NFR BLD: 63 % (ref 43–80)
NEUTS SEG NFR BLD: 3.45 K/UL (ref 1.8–7.3)
PLATELET, FLUORESCENCE: 280 K/UL (ref 130–450)
PMV BLD AUTO: 10.1 FL (ref 7–12)
RBC # BLD AUTO: 4.32 M/UL (ref 3.5–5.5)
WBC OTHER # BLD: 5.4 K/UL (ref 4.5–11.5)

## 2025-02-10 PROCEDURE — 80179 DRUG ASSAY SALICYLATE: CPT

## 2025-02-10 PROCEDURE — 93005 ELECTROCARDIOGRAM TRACING: CPT | Performed by: STUDENT IN AN ORGANIZED HEALTH CARE EDUCATION/TRAINING PROGRAM

## 2025-02-10 PROCEDURE — 80307 DRUG TEST PRSMV CHEM ANLYZR: CPT

## 2025-02-10 PROCEDURE — G0480 DRUG TEST DEF 1-7 CLASSES: HCPCS

## 2025-02-10 PROCEDURE — 90792 PSYCH DIAG EVAL W/MED SRVCS: CPT | Performed by: NURSE PRACTITIONER

## 2025-02-10 PROCEDURE — 85025 COMPLETE CBC W/AUTO DIFF WBC: CPT

## 2025-02-10 PROCEDURE — 80143 DRUG ASSAY ACETAMINOPHEN: CPT

## 2025-02-10 PROCEDURE — 99285 EMERGENCY DEPT VISIT HI MDM: CPT

## 2025-02-10 PROCEDURE — 80053 COMPREHEN METABOLIC PANEL: CPT

## 2025-02-10 ASSESSMENT — PAIN - FUNCTIONAL ASSESSMENT: PAIN_FUNCTIONAL_ASSESSMENT: NONE - DENIES PAIN

## 2025-02-11 ENCOUNTER — TELEPHONE (OUTPATIENT)
Dept: PSYCHIATRY | Age: 70
End: 2025-02-11

## 2025-02-11 ENCOUNTER — APPOINTMENT (OUTPATIENT)
Dept: GENERAL RADIOLOGY | Age: 70
End: 2025-02-11
Payer: COMMERCIAL

## 2025-02-11 ENCOUNTER — APPOINTMENT (OUTPATIENT)
Dept: CT IMAGING | Age: 70
End: 2025-02-11
Payer: COMMERCIAL

## 2025-02-11 LAB
ALBUMIN SERPL-MCNC: 3.8 G/DL (ref 3.5–5.2)
ALP SERPL-CCNC: 84 U/L (ref 35–104)
ALT SERPL-CCNC: 41 U/L (ref 0–32)
AMPHET UR QL SCN: NEGATIVE
ANION GAP SERPL CALCULATED.3IONS-SCNC: 14 MMOL/L (ref 7–16)
APAP SERPL-MCNC: <5 UG/ML (ref 10–30)
AST SERPL-CCNC: 37 U/L (ref 0–31)
BACTERIA URNS QL MICRO: ABNORMAL
BARBITURATES UR QL SCN: NEGATIVE
BENZODIAZ UR QL: NEGATIVE
BILIRUB SERPL-MCNC: 0.3 MG/DL (ref 0–1.2)
BILIRUB UR QL STRIP: NEGATIVE
BNP SERPL-MCNC: 72 PG/ML (ref 0–125)
BUN SERPL-MCNC: 14 MG/DL (ref 6–23)
BUPRENORPHINE UR QL: NEGATIVE
CALCIUM SERPL-MCNC: 9.1 MG/DL (ref 8.6–10.2)
CANNABINOIDS UR QL SCN: NEGATIVE
CHLORIDE SERPL-SCNC: 97 MMOL/L (ref 98–107)
CLARITY UR: CLEAR
CO2 SERPL-SCNC: 23 MMOL/L (ref 22–29)
COCAINE UR QL SCN: NEGATIVE
COLOR UR: YELLOW
CREAT SERPL-MCNC: 0.9 MG/DL (ref 0.5–1)
EKG ATRIAL RATE: 108 BPM
EKG P AXIS: 61 DEGREES
EKG P-R INTERVAL: 174 MS
EKG Q-T INTERVAL: 354 MS
EKG QRS DURATION: 70 MS
EKG QTC CALCULATION (BAZETT): 474 MS
EKG R AXIS: 38 DEGREES
EKG T AXIS: 69 DEGREES
EKG VENTRICULAR RATE: 108 BPM
ETHANOLAMINE SERPL-MCNC: <10 MG/DL (ref 0–0.08)
FENTANYL UR QL: NEGATIVE
GFR, ESTIMATED: 74 ML/MIN/1.73M2
GLUCOSE SERPL-MCNC: 182 MG/DL (ref 74–99)
GLUCOSE UR STRIP-MCNC: >=1000 MG/DL
HGB UR QL STRIP.AUTO: NEGATIVE
KETONES UR STRIP-MCNC: NEGATIVE MG/DL
LEUKOCYTE ESTERASE UR QL STRIP: NEGATIVE
METHADONE UR QL: NEGATIVE
NITRITE UR QL STRIP: NEGATIVE
OPIATES UR QL SCN: NEGATIVE
OXYCODONE UR QL SCN: NEGATIVE
PCP UR QL SCN: NEGATIVE
PH UR STRIP: 5.5 [PH] (ref 5–8)
PLATELET CONFIRMATION: NORMAL
POTASSIUM SERPL-SCNC: 4.1 MMOL/L (ref 3.5–5)
PROT SERPL-MCNC: 6.6 G/DL (ref 6.4–8.3)
PROT UR STRIP-MCNC: NEGATIVE MG/DL
RBC #/AREA URNS HPF: ABNORMAL /HPF
SALICYLATES SERPL-MCNC: <0.3 MG/DL (ref 0–30)
SODIUM SERPL-SCNC: 134 MMOL/L (ref 132–146)
SP GR UR STRIP: <1.005 (ref 1–1.03)
TEST INFORMATION: NORMAL
TOXIC TRICYCLIC SC,BLOOD: NEGATIVE
TROPONIN I SERPL HS-MCNC: 68 NG/L (ref 0–9)
UROBILINOGEN UR STRIP-ACNC: 0.2 EU/DL (ref 0–1)
WBC #/AREA URNS HPF: ABNORMAL /HPF

## 2025-02-11 PROCEDURE — 80143 DRUG ASSAY ACETAMINOPHEN: CPT

## 2025-02-11 PROCEDURE — 93010 ELECTROCARDIOGRAM REPORT: CPT | Performed by: INTERNAL MEDICINE

## 2025-02-11 PROCEDURE — 70450 CT HEAD/BRAIN W/O DYE: CPT

## 2025-02-11 PROCEDURE — 6370000000 HC RX 637 (ALT 250 FOR IP): Performed by: EMERGENCY MEDICINE

## 2025-02-11 PROCEDURE — 71045 X-RAY EXAM CHEST 1 VIEW: CPT

## 2025-02-11 PROCEDURE — 83880 ASSAY OF NATRIURETIC PEPTIDE: CPT

## 2025-02-11 PROCEDURE — 80307 DRUG TEST PRSMV CHEM ANLYZR: CPT

## 2025-02-11 PROCEDURE — 96372 THER/PROPH/DIAG INJ SC/IM: CPT

## 2025-02-11 PROCEDURE — 80179 DRUG ASSAY SALICYLATE: CPT

## 2025-02-11 PROCEDURE — 84484 ASSAY OF TROPONIN QUANT: CPT

## 2025-02-11 PROCEDURE — G0480 DRUG TEST DEF 1-7 CLASSES: HCPCS

## 2025-02-11 PROCEDURE — 80053 COMPREHEN METABOLIC PANEL: CPT

## 2025-02-11 PROCEDURE — 81001 URINALYSIS AUTO W/SCOPE: CPT

## 2025-02-11 PROCEDURE — 6360000002 HC RX W HCPCS: Performed by: STUDENT IN AN ORGANIZED HEALTH CARE EDUCATION/TRAINING PROGRAM

## 2025-02-11 RX ORDER — GLIPIZIDE 5 MG/1
10 TABLET ORAL
Status: DISCONTINUED | OUTPATIENT
Start: 2025-02-12 | End: 2025-02-12 | Stop reason: HOSPADM

## 2025-02-11 RX ORDER — FUROSEMIDE 20 MG/1
20 TABLET ORAL ONCE
Status: COMPLETED | OUTPATIENT
Start: 2025-02-11 | End: 2025-02-11

## 2025-02-11 RX ORDER — DROPERIDOL 2.5 MG/ML
5 INJECTION, SOLUTION INTRAMUSCULAR; INTRAVENOUS ONCE
Status: COMPLETED | OUTPATIENT
Start: 2025-02-11 | End: 2025-02-11

## 2025-02-11 RX ORDER — PIOGLITAZONE 15 MG/1
45 TABLET ORAL DAILY
Status: DISCONTINUED | OUTPATIENT
Start: 2025-02-11 | End: 2025-02-12 | Stop reason: HOSPADM

## 2025-02-11 RX ORDER — ALOGLIPTIN 25 MG/1
25 TABLET, FILM COATED ORAL DAILY
Status: DISCONTINUED | OUTPATIENT
Start: 2025-02-11 | End: 2025-02-12 | Stop reason: HOSPADM

## 2025-02-11 RX ORDER — LORAZEPAM 2 MG/ML
2 INJECTION INTRAMUSCULAR ONCE
Status: COMPLETED | OUTPATIENT
Start: 2025-02-11 | End: 2025-02-11

## 2025-02-11 RX ADMIN — METFORMIN HYDROCHLORIDE 1000 MG: 1000 TABLET, FILM COATED ORAL at 11:35

## 2025-02-11 RX ADMIN — DROPERIDOL 5 MG: 2.5 INJECTION, SOLUTION INTRAMUSCULAR; INTRAVENOUS at 01:53

## 2025-02-11 RX ADMIN — PIOGLITAZONE 45 MG: 15 TABLET ORAL at 12:22

## 2025-02-11 RX ADMIN — METFORMIN HYDROCHLORIDE 1000 MG: 1000 TABLET, FILM COATED ORAL at 17:06

## 2025-02-11 RX ADMIN — FUROSEMIDE 20 MG: 20 TABLET ORAL at 11:35

## 2025-02-11 RX ADMIN — LORAZEPAM 2 MG: 2 INJECTION INTRAMUSCULAR; INTRAVENOUS at 01:53

## 2025-02-11 RX ADMIN — ALOGLIPTIN 25 MG: 25 TABLET, FILM COATED ORAL at 11:35

## 2025-02-11 NOTE — PROGRESS NOTES
Ana Paula Kunz was ordered empagliflozin. If an SGLT2 inhibitor, regardless of formulary  status, is ordered in the hospital for an indication  other than heart failure or chronic kidney disease,  the pharmacist will discontinue the SGLT2  inhibitor while the patient is admitted to the  hospital.

## 2025-02-11 NOTE — ED NOTES
ED MD notified of  to 106.  ED MD noted patient is medically clear and stable for transfer to [facility].

## 2025-02-11 NOTE — ED NOTES
Patient has been up for 3 days according to family. Refusing to sleep      1 bag locker 26 labeled

## 2025-02-11 NOTE — TELEPHONE ENCOUNTER
KAREEN received a call from [facility] stating that the pt was at an appointment and they did not get the pt's discharge information and is asking for it to be re-sent. KAREEN will fax paperwork.

## 2025-02-11 NOTE — ED NOTES
[facility] called and have delayed her transport another 2-3 hours due to high call volume they report they are trying to outsource this transport.

## 2025-02-11 NOTE — VIRTUAL HEALTH
grooming.  Does appear stated age. No acute distress.  Behavior/Motor: Restless  Attitude toward examiner:  Cooperative but distracted  Sleep: Decreased  Speech:  rapid, pressured, and slurred (history of Bell's Palsy)  Mood: \"great\"  Affect:  anxious  Thought Processes:  rapid, flight of ideas, tangential, and circumstantial.  Thought Content:  Homocidal ideation: Denies  Suicidal Ideation:  denies suicidal ideation.   Hallucinations: Denies; however, per chart patient has been speaking to people who are not in the room.  Cognition:  oriented to person, place, and time   Concentration: distractible  Memory: impaired , though not formally tested.  Insight: poor   Judgement: poor   Fund of Knowledge: limited      Risk Assessment:  Protective Factors:  Protective: Female gender, Denies suicidal ideation, Does not have lethal plan, Patient is verbally lissy for safety, No prior suicide attempts, Patient has social or family support, Has outpatient services in place, and Compliant with recommended medications  Risk Factors:  Risk Factors: Age <25 or >55, Depressed mood, Highly impulsive behaviors, Not attending to self-care/ADLs, Chronic pain or medical illness, Active psychosis, and No collateral information to support safety    C-SSRS Score       2/10/2025    11:09 PM   C-SSRS Suicide Screening   1) Within the past month, have you wished you were dead or wished you could go to sleep and not wake up?  No   2) Have you actually had any thoughts of killing yourself?  No   6) Have you ever done anything, started to do anything, or prepared to do anything to end your life? No    :      Overall Level Suicide Risk: TelePsych CSSRS Risk Level: Low Risk    Assessment:   Patient is a 69 y.o.  female who presents for psychiatric evaluation for possible psychosis.  Patient has poor judgment and poor insight.  She reports low level of depression and anxiety.  Patient is cooperative but highly distractible.

## 2025-02-11 NOTE — ED NOTES
Per Natalya SCOTT/ Dr. RHEA Garces  patient is declined for admission as she is need of a [facility].

## 2025-02-11 NOTE — ED NOTES
Patient calling  people sluts. Patient believes everyone is fucking around and everyone is a slut.  Patient verbal sexual comments towards many including persons whom are not present,  Patient denies HI SI

## 2025-02-11 NOTE — ED NOTES
Nurse to nurse report given to Ashley OWUSU at [facility] who call for ETA and nurse to nurse.  Told ETA was at anytime now per [facility].

## 2025-02-11 NOTE — ED NOTES
Patients family states that there was a med change recently, since then patient has been cussing and verbally abusive.     Patient is verbally abusing her brother and his wife. Patient talking to self in a manic erratic behavior,

## 2025-02-11 NOTE — ED NOTES
Patient has been accepted to [facility]  Room 203 A. Nurse to nurse number is [phone] ext [phone] [facility] is 2-3 hours.

## 2025-02-11 NOTE — ED PROVIDER NOTES
[facility] EMERGENCY DEPARTMENT  EMERGENCY DEPARTMENT ENCOUNTER        Pt Name: Ana Paula Kunz  MRN: [id number]  Birthdate 1955  Date of evaluation: 2/10/2025  Provider: Evi Cho DO  PCP: Sheryl Wade DO  Note Started: 11:02 PM EST 2/10/25    CHIEF COMPLAINT       Chief Complaint   Patient presents with    Psychiatric Evaluation     (Patient calling \"a hospital making threats\", per  recent changes in medications) Patient denies SI/HI at this time. Patient just states \"I'm aggravated with the bullshit\"       HISTORY OF PRESENT ILLNESS: 1 or more Elements   History From: patient    Limitations to history : None    Ana Paula Kunz is a 69 y.o. female with a history of bipolar, type 2 diabetes presenting to the emergency department for psychiatric evaluation.  Apparently the patient was going to hospital making threats per her brother who is with her at the bedside.  She has recent change in her medications and was recently admitted to the hospital at the end of January.  Patient states that she is aggravated and irritated.  She states that since he made the medication change she has not been feeling like herself.  The brother at bedside states that she is verbally aggressive at times and randomly talks about other inappropriate things and being seen at times with other issues.  Patient mother states that she was yelling screaming at people and calling the names.  She has not slept for several days as well.  Denies any suicidal homicidal ideations.  Denies any visual auditory hallucinations.     Nursing Notes were all reviewed and agreed with or any disagreements were addressed in the HPI.    REVIEW OF SYSTEMS :      Review of Systems    Positives and Pertinent negatives as per HPI.     SURGICAL HISTORY     Past Surgical History:   Procedure Laterality Date    COLONOSCOPY      ECHO COMPLETE  06/22/2013         VAGINA SURGERY         CURRENTMEDICATIONS

## 2025-02-12 VITALS
HEART RATE: 112 BPM | HEIGHT: 64 IN | SYSTOLIC BLOOD PRESSURE: 145 MMHG | BODY MASS INDEX: 32.27 KG/M2 | TEMPERATURE: 98.8 F | WEIGHT: 189 LBS | DIASTOLIC BLOOD PRESSURE: 67 MMHG | RESPIRATION RATE: 16 BRPM | OXYGEN SATURATION: 98 %

## 2025-04-10 ENCOUNTER — HOSPITAL ENCOUNTER (EMERGENCY)
Age: 70
Discharge: PSYCHIATRIC OR SUBSTANCE USE FACILITY | End: 2025-04-11
Attending: EMERGENCY MEDICINE
Payer: COMMERCIAL

## 2025-04-10 DIAGNOSIS — R46.89 AGGRESSIVE BEHAVIOR: Primary | ICD-10-CM

## 2025-04-10 LAB
ALBUMIN SERPL-MCNC: 4.2 G/DL (ref 3.5–5.2)
ALP SERPL-CCNC: 91 U/L (ref 35–104)
ALT SERPL-CCNC: 15 U/L (ref 0–32)
AMPHET UR QL SCN: NEGATIVE
ANION GAP SERPL CALCULATED.3IONS-SCNC: 15 MMOL/L (ref 7–16)
APAP SERPL-MCNC: <5 UG/ML (ref 10–30)
AST SERPL-CCNC: 13 U/L (ref 0–31)
BACTERIA URNS QL MICRO: ABNORMAL
BARBITURATES UR QL SCN: NEGATIVE
BASOPHILS # BLD: 0.02 K/UL (ref 0–0.2)
BASOPHILS NFR BLD: 0 % (ref 0–2)
BENZODIAZ UR QL: NEGATIVE
BILIRUB SERPL-MCNC: 0.2 MG/DL (ref 0–1.2)
BILIRUB UR QL STRIP: NEGATIVE
BNP SERPL-MCNC: 570 PG/ML (ref 0–125)
BUN SERPL-MCNC: 21 MG/DL (ref 6–23)
BUPRENORPHINE UR QL: NEGATIVE
CALCIUM SERPL-MCNC: 9.3 MG/DL (ref 8.6–10.2)
CANNABINOIDS UR QL SCN: NEGATIVE
CHLORIDE SERPL-SCNC: 100 MMOL/L (ref 98–107)
CLARITY UR: CLEAR
CO2 SERPL-SCNC: 24 MMOL/L (ref 22–29)
COCAINE UR QL SCN: NEGATIVE
COLOR UR: YELLOW
CREAT SERPL-MCNC: 0.8 MG/DL (ref 0.5–1)
EOSINOPHIL # BLD: 0.12 K/UL (ref 0.05–0.5)
EOSINOPHILS RELATIVE PERCENT: 3 % (ref 0–6)
EPI CELLS #/AREA URNS HPF: ABNORMAL /HPF
ERYTHROCYTE [DISTWIDTH] IN BLOOD BY AUTOMATED COUNT: 15.6 % (ref 11.5–15)
ETHANOLAMINE SERPL-MCNC: <10 MG/DL (ref 0–0.08)
FENTANYL UR QL: NEGATIVE
GFR, ESTIMATED: 78 ML/MIN/1.73M2
GLUCOSE SERPL-MCNC: 236 MG/DL (ref 74–99)
GLUCOSE UR STRIP-MCNC: 100 MG/DL
HCT VFR BLD AUTO: 36.1 % (ref 34–48)
HGB BLD-MCNC: 11.6 G/DL (ref 11.5–15.5)
HGB UR QL STRIP.AUTO: NEGATIVE
IMM GRANULOCYTES # BLD AUTO: <0.03 K/UL (ref 0–0.58)
IMM GRANULOCYTES NFR BLD: 0 % (ref 0–5)
KETONES UR STRIP-MCNC: NEGATIVE MG/DL
LEUKOCYTE ESTERASE UR QL STRIP: NEGATIVE
LYMPHOCYTES NFR BLD: 1.06 K/UL (ref 1.5–4)
LYMPHOCYTES RELATIVE PERCENT: 23 % (ref 20–42)
MCH RBC QN AUTO: 28.9 PG (ref 26–35)
MCHC RBC AUTO-ENTMCNC: 32.1 G/DL (ref 32–34.5)
MCV RBC AUTO: 89.8 FL (ref 80–99.9)
METHADONE UR QL: NEGATIVE
MONOCYTES NFR BLD: 0.53 K/UL (ref 0.1–0.95)
MONOCYTES NFR BLD: 11 % (ref 2–12)
NEUTROPHILS NFR BLD: 62 % (ref 43–80)
NEUTS SEG NFR BLD: 2.89 K/UL (ref 1.8–7.3)
NITRITE UR QL STRIP: NEGATIVE
OPIATES UR QL SCN: NEGATIVE
OXYCODONE UR QL SCN: NEGATIVE
PCP UR QL SCN: NEGATIVE
PH UR STRIP: 7 [PH] (ref 5–8)
PLATELET # BLD AUTO: 321 K/UL (ref 130–450)
PMV BLD AUTO: 9 FL (ref 7–12)
POTASSIUM SERPL-SCNC: 4.5 MMOL/L (ref 3.5–5)
PROT SERPL-MCNC: 7.1 G/DL (ref 6.4–8.3)
PROT UR STRIP-MCNC: NEGATIVE MG/DL
RBC # BLD AUTO: 4.02 M/UL (ref 3.5–5.5)
RBC #/AREA URNS HPF: ABNORMAL /HPF
SALICYLATES SERPL-MCNC: <0.3 MG/DL (ref 0–30)
SODIUM SERPL-SCNC: 139 MMOL/L (ref 132–146)
SP GR UR STRIP: 1.01 (ref 1–1.03)
TEST INFORMATION: NORMAL
TOXIC TRICYCLIC SC,BLOOD: NEGATIVE
UROBILINOGEN UR STRIP-ACNC: 0.2 EU/DL (ref 0–1)
WBC #/AREA URNS HPF: ABNORMAL /HPF
WBC OTHER # BLD: 4.6 K/UL (ref 4.5–11.5)

## 2025-04-10 PROCEDURE — 90791 PSYCH DIAGNOSTIC EVALUATION: CPT | Performed by: SOCIAL WORKER

## 2025-04-10 PROCEDURE — 6360000002 HC RX W HCPCS: Performed by: EMERGENCY MEDICINE

## 2025-04-10 PROCEDURE — 99284 EMERGENCY DEPT VISIT MOD MDM: CPT

## 2025-04-10 PROCEDURE — 80053 COMPREHEN METABOLIC PANEL: CPT

## 2025-04-10 PROCEDURE — 93005 ELECTROCARDIOGRAM TRACING: CPT

## 2025-04-10 PROCEDURE — 96372 THER/PROPH/DIAG INJ SC/IM: CPT

## 2025-04-10 PROCEDURE — 81001 URINALYSIS AUTO W/SCOPE: CPT

## 2025-04-10 PROCEDURE — 80307 DRUG TEST PRSMV CHEM ANLYZR: CPT

## 2025-04-10 PROCEDURE — 2500000003 HC RX 250 WO HCPCS: Performed by: EMERGENCY MEDICINE

## 2025-04-10 PROCEDURE — 80179 DRUG ASSAY SALICYLATE: CPT

## 2025-04-10 PROCEDURE — 83880 ASSAY OF NATRIURETIC PEPTIDE: CPT

## 2025-04-10 PROCEDURE — 6360000002 HC RX W HCPCS

## 2025-04-10 PROCEDURE — 80143 DRUG ASSAY ACETAMINOPHEN: CPT

## 2025-04-10 PROCEDURE — G0480 DRUG TEST DEF 1-7 CLASSES: HCPCS

## 2025-04-10 PROCEDURE — 85025 COMPLETE CBC W/AUTO DIFF WBC: CPT

## 2025-04-10 RX ORDER — 0.9 % SODIUM CHLORIDE 0.9 %
1000 INTRAVENOUS SOLUTION INTRAVENOUS ONCE
Status: DISCONTINUED | OUTPATIENT
Start: 2025-04-10 | End: 2025-04-11

## 2025-04-10 RX ORDER — LORAZEPAM 2 MG/ML
2 INJECTION INTRAMUSCULAR ONCE
Status: COMPLETED | OUTPATIENT
Start: 2025-04-10 | End: 2025-04-10

## 2025-04-10 RX ORDER — ZIPRASIDONE MESYLATE 20 MG/ML
20 INJECTION, POWDER, LYOPHILIZED, FOR SOLUTION INTRAMUSCULAR ONCE
Status: DISCONTINUED | OUTPATIENT
Start: 2025-04-10 | End: 2025-04-10 | Stop reason: SDUPTHER

## 2025-04-10 RX ORDER — LORAZEPAM 2 MG/ML
INJECTION INTRAMUSCULAR
Status: COMPLETED
Start: 2025-04-10 | End: 2025-04-10

## 2025-04-10 RX ORDER — WATER 10 ML/10ML
INJECTION INTRAMUSCULAR; INTRAVENOUS; SUBCUTANEOUS
Status: DISCONTINUED
Start: 2025-04-10 | End: 2025-04-11 | Stop reason: HOSPADM

## 2025-04-10 RX ORDER — ZIPRASIDONE MESYLATE 20 MG/ML
INJECTION, POWDER, LYOPHILIZED, FOR SOLUTION INTRAMUSCULAR
Status: DISCONTINUED
Start: 2025-04-10 | End: 2025-04-11 | Stop reason: HOSPADM

## 2025-04-10 RX ADMIN — LORAZEPAM 2 MG: 2 INJECTION INTRAMUSCULAR at 23:10

## 2025-04-10 RX ADMIN — LORAZEPAM 2 MG: 2 INJECTION INTRAMUSCULAR; INTRAVENOUS at 23:10

## 2025-04-10 RX ADMIN — ZIPRASIDONE MESYLATE 20 MG: 20 INJECTION, POWDER, LYOPHILIZED, FOR SOLUTION INTRAMUSCULAR at 23:11

## 2025-04-11 VITALS
TEMPERATURE: 98.1 F | OXYGEN SATURATION: 99 % | SYSTOLIC BLOOD PRESSURE: 166 MMHG | HEART RATE: 99 BPM | DIASTOLIC BLOOD PRESSURE: 71 MMHG | RESPIRATION RATE: 18 BRPM

## 2025-04-11 LAB
EKG ATRIAL RATE: 104 BPM
EKG P AXIS: 67 DEGREES
EKG P-R INTERVAL: 184 MS
EKG Q-T INTERVAL: 350 MS
EKG QRS DURATION: 76 MS
EKG QTC CALCULATION (BAZETT): 460 MS
EKG R AXIS: 34 DEGREES
EKG T AXIS: 70 DEGREES
EKG VENTRICULAR RATE: 104 BPM

## 2025-04-11 PROCEDURE — 6360000002 HC RX W HCPCS: Performed by: EMERGENCY MEDICINE

## 2025-04-11 PROCEDURE — 93010 ELECTROCARDIOGRAM REPORT: CPT | Performed by: INTERNAL MEDICINE

## 2025-04-11 PROCEDURE — 96372 THER/PROPH/DIAG INJ SC/IM: CPT

## 2025-04-11 RX ORDER — LORAZEPAM 2 MG/ML
2 INJECTION INTRAMUSCULAR ONCE
Status: COMPLETED | OUTPATIENT
Start: 2025-04-11 | End: 2025-04-11

## 2025-04-11 RX ADMIN — LORAZEPAM 2 MG: 2 INJECTION INTRAMUSCULAR; INTRAVENOUS at 07:08

## 2025-04-11 NOTE — ED NOTES
SW received a call from [facility]- asking for involuntary hold. SW faxed as requested.

## 2025-04-11 NOTE — ED NOTES
Pt was asked by this RN, PCA and officer to put gown back on, pt being uncooperative and defensive

## 2025-04-11 NOTE — ED PROVIDER NOTES
[facility] EMERGENCY DEPARTMENT  EMERGENCY DEPARTMENT ENCOUNTER        Pt Name: Ana Paula Kunz  MRN: [id number]  Birthdate 1955  Date of evaluation: 4/10/2025  Provider: Chantal Carrillo MD  PCP: Sheryl Wade DO  Note Started: 8:42 PM EDT 4/10/25    CHIEF COMPLAINT       Chief Complaint   Patient presents with    Psychiatric Evaluation     Patient having episode of yelling at [facility]. Patient states she does not wants to go back       HISTORY OF PRESENT ILLNESS: 1 or more Elements   History From: Patient    Limitations to history : None    Ana Paula Kunz is a 70 y.o. female who presents from facility for having episodes of yelling.  On my exam she states she wanted to kill her nurse referring to nurse at her facility.  She denies suicidal ideation or hallucinations.  She states she does have healing wounds to bilateral feet.  Denies fevers, chest pain, shortness of breath, abdominal pain.  Denies alcohol or substance use.  Patient is alert and oriented x 3 and responding appropriately to all my questions    Nursing Notes were all reviewed and agreed with or any disagreements were addressed in the HPI.        REVIEW OF EXTERNAL NOTE :       Patient was seen on 2/10/2025 for manic behavior    She was admitted on 1/17/2025 for bilateral leg edema, hypoglycemia, altered mental status, elevated troponin    REVIEW OF SYSTEMS :           Positives and Pertinent negatives as per HPI.     SURGICAL HISTORY     Past Surgical History:   Procedure Laterality Date    COLONOSCOPY      ECHO COMPLETE  06/22/2013         VAGINA SURGERY         CURRENTMEDICATIONS       Previous Medications    ATORVASTATIN (LIPITOR) 40 MG TABLET    TAKE 1 TABLET BY MOUTH EVERY DAY    BENZTROPINE (COGENTIN) 1 MG TABLET    Take 1 tablet by mouth 2 times daily    BRIMONIDINE (ALPHAGAN) 0.2 % OPHTHALMIC SOLUTION    Place 1 drop into both eyes in the morning and at bedtime    CARBAMAZEPINE (TEGRETOL) 200  in time range)   LORazepam (ATIVAN) injection 2 mg (2 mg IntraMUSCular Given 4/10/25 2310)   ziprasidone (GEODON) 20 mg in sterile water 1 mL injection (20 mg IntraMUSCular Given 4/10/25 2311)         No   Exclusion criteria - the patient is NOT to be included for SEP-1 Core Measure due to:  Infection is not suspected        Medical Decision Making/Differential Diagnosis:    CC/HPI Summary, Social Determinants of health, Records Reviewed, DDx, testing done/not done, ED Course, Reassessment, disposition considerations/shared decision making with patient, consults, disposition:      ED Course as of 04/10/25 2332   Thu Apr 10, 2025   2121 EKG shows sinus tachycardia at a rate of 104, normal IN interval, normal QRS, QTc 460, no significant ST or T wave abnormalities, does not meet STEMI criteria [KM]      ED Course User Index  [KM] Chantal Carrillo MD      She is a 70-year-old female with a history of bipolar disorder presenting from facility for having yelling episodes while at facility.  She is making statements that she wanted to kill her nurse and also stating that there is going to be a bad flood tonight.  On my exam the patient is in no acute distress.  She is alert and oriented x 3.  Her vitals show blood pressure of 204/96 and heart rate 108.  Vitals otherwise unremarkable.  She is afebrile.  On exam she does have well healing wounds to bilateral heels and right anterior shin with no crepitus or drainage.  Abdomen is soft and nontender, heart regular rate and rhythm, lungs clear to auscultation bilaterally.  Blood work shows no leukocytosis and stable hemoglobin of 11.6.  Electrolytes are unremarkable. However glucose elevated to 236 however she is a diabetic. LFTs unremarkable.  BNP obtained due to bilateral lower extremity edema which was elevated to 570 however stable with readings in the past.  The patient does not have chest pain or shortness of breath so low concern for CHF.  EKG is unremarkable.

## 2025-04-11 NOTE — ED NOTES
Patient presented to Dr. Garces at this time. Patient denied admission to this facility and is to be referred out due to patients medical history.

## 2025-04-11 NOTE — ED NOTES
SW contacted [facility] pt referred out due to being denied by Dr. Garces due to medical history.

## 2025-04-11 NOTE — ED NOTES
Acute Bronchitis  Bronchitis is inflammation of the airways that extend from the windpipe into the lungs (bronchi). The inflammation often causes mucus to develop. This leads to a cough, which is the most common symptom of bronchitis.   In acute bronchitis, the condition usually develops suddenly and goes away over time, usually in a couple weeks. Smoking, allergies, and asthma can make bronchitis worse. Repeated episodes of bronchitis may cause further lung problems.   CAUSES  Acute bronchitis is most often caused by the same virus that causes a cold. The virus can spread from person to person (contagious) through coughing, sneezing, and touching contaminated objects.  SIGNS AND SYMPTOMS   · Cough.    · Fever.    · Coughing up mucus.    · Body aches.    · Chest congestion.    · Chills.    · Shortness of breath.    · Sore throat.    DIAGNOSIS   Acute bronchitis is usually diagnosed through a physical exam. Your health care provider will also ask you questions about your medical history. Tests, such as chest X-rays, are sometimes done to rule out other conditions.   TREATMENT   Acute bronchitis usually goes away in a couple weeks. Oftentimes, no medical treatment is necessary. Medicines are sometimes given for relief of fever or cough. Antibiotic medicines are usually not needed but may be prescribed in certain situations. In some cases, an inhaler may be recommended to help reduce shortness of breath and control the cough. A cool mist vaporizer may also be used to help thin bronchial secretions and make it easier to clear the chest.   HOME CARE INSTRUCTIONS  · Get plenty of rest.    · Drink enough fluids to keep your urine clear or pale yellow (unless you have a medical condition that requires fluid restriction). Increasing fluids may help thin your respiratory secretions (sputum) and reduce chest congestion, and it will prevent dehydration.    · Take medicines only as directed by your health care provider.  · If  Pt yelling that \"there is going to be a bad flood tonight\"   you were prescribed an antibiotic medicine, finish it all even if you start to feel better.  · Avoid smoking and secondhand smoke. Exposure to cigarette smoke or irritating chemicals will make bronchitis worse. If you are a smoker, consider using nicotine gum or skin patches to help control withdrawal symptoms. Quitting smoking will help your lungs heal faster.    · Reduce the chances of another bout of acute bronchitis by washing your hands frequently, avoiding people with cold symptoms, and trying not to touch your hands to your mouth, nose, or eyes.    · Keep all follow-up visits as directed by your health care provider.    SEEK MEDICAL CARE IF:  Your symptoms do not improve after 1 week of treatment.   SEEK IMMEDIATE MEDICAL CARE IF:  · You develop an increased fever or chills.    · You have chest pain.    · You have severe shortness of breath.  · You have bloody sputum.    · You develop dehydration.  · You faint or repeatedly feel like you are going to pass out.  · You develop repeated vomiting.  · You develop a severe headache.  MAKE SURE YOU:   · Understand these instructions.  · Will watch your condition.  · Will get help right away if you are not doing well or get worse.     This information is not intended to replace advice given to you by your health care provider. Make sure you discuss any questions you have with your health care provider.     Document Released: 01/25/2006 Document Revised: 01/08/2016 Document Reviewed: 06/10/2014  [employer] Patient Education ©2016 [employer]

## 2025-04-11 NOTE — ED NOTES
Pt swearing at this RN and PCA during bed change and stating \"they're dead just watch\" and telling staff members \"just because I'm old I will whoop your motherfucking ass and when I tell you to do something do it\"

## 2025-04-11 NOTE — VIRTUAL HEALTH
Ana Paula Kunz  [id number]  1955     Social Work Behavioral Health Crisis Assessment    04/10/25    Chief Complaint: Mental Health Evaluation    HPI: Patient is a 70 y.o. Black /  female who presents for mental health evaluation. Per EMR \"Ana Paula Kunz is a 70 y.o. female who presents from facility for having episodes of yelling.  On my exam she states she wanted to kill her nurse referring to nurse at her facility.  She denies suicidal ideation or hallucinations.  She states she does have healing wounds to bilateral feet.  Denies fevers, chest pain, shortness of breath, abdominal pain.  Denies alcohol or substance use.  Patient is alert and oriented x 3 and responding appropriately to all my questions\"    Past Psychiatric History:  Previous Diagnoses/symptoms: schizoaffective disorder, bipolar 1 with psychotic  Previous suicide attempts/self-harm: Denies  Inpatient psychiatric hospitalizations: yes  Current outpatient psychiatric provider: [facility]  Current therapist: Yes, can't remember  Previous psychiatric medication trials: Abilify, Aristada, Depakote, Ativan, Seroquel, risperidone  Current psychiatric medications: Cogentin Invega  Family Psychiatric History: Unknown     Substance Abuse History:  Tobacco: Denies  Alcohol: Denies  Marijuana: Denies  Stimulant: Denies  Opiates: Denies  Benzodiazepine: Denies  Other illicit drug usage: Denies  History of substance/alcohol abuse treatment: Denies     Social History:  Education: H.S[employer]  Living Situation/Interest: Lives with mom  Marital/Committed relationship and parenting hx: single  Occupation: [employer]  Legal History/Hx of Violence: Denies  Spiritual History: [facility]  Psychological trauma, neglect, or abuse: denies hx of trauma/abuse   Access to guns or other weapons: denies having access to firearms/dangerous weapons     Past Medical History:  Active Ambulatory Problems     Diagnosis Date Noted    Severe bipolar affective disorder with  Last A1c 6.0 borderline advised diabetic diet will monitor closely

## 2025-04-11 NOTE — ED NOTES
Patient states she urinated on herself \"to prove a point\". Soiled pants and socks removed and bagged appropriately.

## 2025-04-14 LAB
ALBUMIN SERPL-MCNC: 3.8 G/DL (ref 3.5–5.2)
ALP SERPL-CCNC: 95 U/L (ref 35–104)
ALT SERPL-CCNC: 8 U/L (ref 0–32)
ANION GAP SERPL CALCULATED.3IONS-SCNC: 12 MMOL/L (ref 7–16)
AST SERPL-CCNC: 16 U/L (ref 0–31)
BASOPHILS # BLD: 0.02 K/UL (ref 0–0.2)
BASOPHILS NFR BLD: 0 % (ref 0–2)
BILIRUB SERPL-MCNC: 0.2 MG/DL (ref 0–1.2)
BUN SERPL-MCNC: 23 MG/DL (ref 6–23)
CALCIUM SERPL-MCNC: 9.7 MG/DL (ref 8.6–10.2)
CARBAMAZEPINE DOSE: NORMAL MG
CARBAMAZEPINE SERPL-MCNC: 5.8 UG/ML (ref 4–10)
CHLORIDE SERPL-SCNC: 101 MMOL/L (ref 98–107)
CHOLEST SERPL-MCNC: 162 MG/DL
CO2 SERPL-SCNC: 24 MMOL/L (ref 22–29)
CREAT SERPL-MCNC: 0.9 MG/DL (ref 0.5–1)
DATE LAST DOSE: NORMAL
EOSINOPHIL # BLD: 0.15 K/UL (ref 0.05–0.5)
EOSINOPHILS RELATIVE PERCENT: 3 % (ref 0–6)
ERYTHROCYTE [DISTWIDTH] IN BLOOD BY AUTOMATED COUNT: 15.5 % (ref 11.5–15)
GFR, ESTIMATED: 74 ML/MIN/1.73M2
GLUCOSE SERPL-MCNC: 188 MG/DL (ref 74–99)
HBA1C MFR BLD: 7.3 % (ref 4–5.6)
HCT VFR BLD AUTO: 34.5 % (ref 34–48)
HDLC SERPL-MCNC: 84 MG/DL
HGB BLD-MCNC: 11.4 G/DL (ref 11.5–15.5)
IMM GRANULOCYTES # BLD AUTO: 0.03 K/UL (ref 0–0.58)
IMM GRANULOCYTES NFR BLD: 1 % (ref 0–5)
LDLC SERPL CALC-MCNC: 68 MG/DL
LYMPHOCYTES NFR BLD: 1.27 K/UL (ref 1.5–4)
LYMPHOCYTES RELATIVE PERCENT: 25 % (ref 20–42)
MAGNESIUM SERPL-MCNC: 1.6 MG/DL (ref 1.6–2.6)
MCH RBC QN AUTO: 29.8 PG (ref 26–35)
MCHC RBC AUTO-ENTMCNC: 33 G/DL (ref 32–34.5)
MCV RBC AUTO: 90.3 FL (ref 80–99.9)
MONOCYTES NFR BLD: 0.53 K/UL (ref 0.1–0.95)
MONOCYTES NFR BLD: 11 % (ref 2–12)
NEUTROPHILS NFR BLD: 60 % (ref 43–80)
NEUTS SEG NFR BLD: 3.05 K/UL (ref 1.8–7.3)
PLATELET # BLD AUTO: 341 K/UL (ref 130–450)
PMV BLD AUTO: 9.9 FL (ref 7–12)
POTASSIUM SERPL-SCNC: 4.5 MMOL/L (ref 3.5–5)
PROT SERPL-MCNC: 6.6 G/DL (ref 6.4–8.3)
RBC # BLD AUTO: 3.82 M/UL (ref 3.5–5.5)
SODIUM SERPL-SCNC: 137 MMOL/L (ref 132–146)
TME LAST DOSE: NORMAL H
TRIGL SERPL-MCNC: 52 MG/DL
TSH SERPL DL<=0.05 MIU/L-ACNC: 2.69 UIU/ML (ref 0.27–4.2)
VLDLC SERPL CALC-MCNC: 10 MG/DL
WBC OTHER # BLD: 5.1 K/UL (ref 4.5–11.5)